# Patient Record
Sex: MALE | Race: WHITE | Employment: UNEMPLOYED | URBAN - METROPOLITAN AREA
[De-identification: names, ages, dates, MRNs, and addresses within clinical notes are randomized per-mention and may not be internally consistent; named-entity substitution may affect disease eponyms.]

---

## 2017-01-09 ENCOUNTER — DOCUMENTATION ONLY (OUTPATIENT)
Dept: CARDIOLOGY | Facility: CLINIC | Age: 57
End: 2017-01-09

## 2017-01-09 NOTE — PROGRESS NOTES
Patient left  on 12/27 that he had to cancel 12/28 echo due to his wife being in the hospital. He stated that he would call back sometime soon to reschedule. Echo cd form [facility] was uploaded to [employer] today. Leona

## 2017-02-10 ENCOUNTER — HOSPITAL ENCOUNTER (OUTPATIENT)
Dept: CARDIOLOGY | Facility: CLINIC | Age: 57
Discharge: HOME | End: 2017-02-10
Attending: INTERNAL MEDICINE | Admitting: INTERNAL MEDICINE
Payer: MEDICAID

## 2017-02-10 DIAGNOSIS — E78.5 HYPERLIPIDEMIA LDL GOAL <160: ICD-10-CM

## 2017-02-10 DIAGNOSIS — I35.0 NONRHEUMATIC AORTIC VALVE STENOSIS: ICD-10-CM

## 2017-02-10 PROCEDURE — 93306 TTE W/DOPPLER COMPLETE: CPT | Mod: 26 | Performed by: INTERNAL MEDICINE

## 2017-02-10 PROCEDURE — 25500064 ZZH RX 255 OP 636: Performed by: INTERNAL MEDICINE

## 2017-02-10 PROCEDURE — 40000264 ECHO COMPLETE WITH LUMASON

## 2017-02-10 RX ADMIN — SULFUR HEXAFLUORIDE 5 ML: KIT at 14:42

## 2017-02-13 ENCOUNTER — DOCUMENTATION ONLY (OUTPATIENT)
Dept: CARDIOLOGY | Facility: CLINIC | Age: 57
End: 2017-02-13

## 2017-02-13 NOTE — PROGRESS NOTES
Dr. Salinas: you saw this patient in December and a new patient for severe aortic stenosis based on an outside echo. At that time you wanted a repeat echo done here so this was done Friday and it shows a worsening of his aortic stenosis. Patient is scheduled to see Dr. Simon this Thursday in consult. I spoke to patient about the results today and advised him no to drive if possible due to concerns over syncope. Bill      Interpretation Summary     Severe valvular aortic stenosis.  Left ventricular systolic function is normal.  The study was technically difficult.  _____________________________________________________________________________  __        Left Ventricle  The left ventricle is normal in size. There is moderate asymmetric septal  hypertrophy. Images and measurments are technically difficult. Left  ventricular systolic function is normal. The visual ejection fraction is  estimated at 55-60%. Left ventricular diastolic function is indeterminate. E  by E prime ratio is between 8 and 15, which is indeterminate for assessment of  left ventricular filling pressures. No regional wall motion abnormalities  noted.     Right Ventricle  The right ventricle is grossly normal size. The right ventricular systolic  function is normal.     Atria  The left atrium is not well visualized. The left atrium is mildly dilated.  Right atrium not well visualized. Right atrial size is normal. A patent  foramen ovale is suspected.     Mitral Valve  The mitral valve is not well visualized. There is no mitral regurgitation  noted. Trivial mitral stenosis. The mean mitral valve gradient is 1.6 mmHg.     Tricuspid Valve  The tricuspid valve is not well visualized. There is physiologic tricuspid  regurgitation. Right ventricular systolic pressure could not be approximated  due to inadequate tricuspid regurgitation. Normal IVC (1.5-2.5cm) with <50%  respiratory collapse; right atrial pressure is estimated at 10-15mmHg.        Aortic  Valve  The aortic valve is not well visualized. There is mild (1+) aortic  regurgitation. The calculated aortic valve are is 0.58 cm^2. The peak AoV  pressure gradient is 91.8 mmHg. The mean AoV pressure gradient is 53.7 mmHg.  Severe valvular aortic stenosis.     Pulmonic Valve  The pulmonic valve is not well visualized. There is no pulmonic valvular  regurgitation. Normal pulmonic valve velocity.     Vessels  The aortic root is normal size. Normal size ascending aorta. The inferior vena  cava is not dilated.     Pericardium  Small pericardial effusion.     Rhythm  The rhythm was normal sinus.     _____________________________________________________________________________  __  MMode/2D Measurements & Calculations  IVSd: 1.5 cm  LVIDd: 3.8 cm  LVIDs: 2.6 cm  LVPWd: 1.0 cm  FS: 32.6 %  EDV(Teich): 63.8 ml  ESV(Teich): 24.4 ml  LV mass(C)d: 165.8 grams  Ao root diam: 3.2 cm  LA dimension: 3.6 cm     asc Aorta Diam: 3.0 cm  LA/Ao: 1.1  LVOT diam: 2.0 cm  LVOT area: 3.0 cm2        Doppler Measurements & Calculations  MV E max magali: 76.1 cm/sec  MV A max magali: 76.8 cm/sec  MV E/A: 0.99  MV max PG: 3.4 mmHg  MV mean P.6 mmHg  MV V2 VTI: 25.5 cm  MVA(VTI): 2.3 cm2  MV dec time: 0.21 sec  Ao V2 max: 479.2 cm/sec  Ao max P.8 mmHg  Ao V2 mean: 349.9 cm/sec  Ao mean P.7 mmHg  Ao V2 VTI: 102.3 cm  MACARENA(I,D): 0.58 cm2  MACARENA(V,D): 0.63 cm2  AI P1/2t: 615.1 msec  LV V1 max P.0 mmHg  LV V1 max: 99.8 cm/sec  LV V1 VTI: 19.4 cm  SV(LVOT): 59.0 ml  PA acc time: 0.12 sec  MACARENA Index (cm2/m2): 0.26  Lateral E/e': 10.9  Medial E/e': 11.2             2016       JOAN Johansen   [facility]   [address] [place] [id number]       Rylie Katz MD   [facility] Cardiothoracic Surgery   [facility] [facility], [address]   [place] [address]       RE: Gil Chowdary   MRN: [id number]   : 1960       Dear Doctors:       I met Gil Chowdary today. He is  56. He has been a remarkably healthy man all of his life and has never really had much in the way of any medical problems. He does not remember ever being told he had a cardiac murmur, but for all that, Dr. Villatoro appreciated one and sent him for an echocardiogram. His echocardiogram was done through Dr. Nely Cardona, who interpreted the study showing a calcific aortic valve stenosis, a mean gradient of 42 mmHg, a calculated aortic valve area of 0.8 cm squared and trace aortic insufficiency. The left ventricle was normal size. There was mild concentric LVH and, not surprisingly, diastolic dysfunction of the LV. There was mild left atrial dilation associated with this.       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       SOCIAL HISTORY: He lives with his wife. He drives a car. He does not smoke or drink. He leads a very active lifestyle, but he does not exercise.       With regards to symptoms, he denies any cardiovascular symptoms at all. He denies chest pressure, pain, tightness, squeezing, shortness of breath on exertion, palpitations, dizziness or syncope.       REVIEW OF SYSTEMS: From a review of systems standpoint, which is available in Epic, he has a negative 10-point review except for the fact that he has sleep apnea and uses CPAP.       MEDICATIONS: Currently are aspirin and vitamin D and omega 3 fatty acids.       FAMILY HISTORY: Noncontributory, although he had a father who had diabetes. He had a brother who has coronary artery disease and bypass surgery.       ALLERGIES: Amoxicillin led to some itchiness of the skin.       PHYSICAL EXAMINATION:   GENERAL: Well-developed, well-nourished male. Weighs 241 pounds. He is mildly overweight.   VITAL SIGNS: Blood pressure 130/80, heart rate 76 beats a minute.   HEAD: Normal.   NECK: Free of neck vein distention, mass, bruit or  "goiter.   HEART: Regular with a 2 to 3/6 systolic ejection murmur in the aortic area radiating to the left sternal border, no S3 or AI heard.   LUNGS: Clear.   ABDOMEN: Soft without organomegaly.   EXTREMITIES: Show +2 pedal pulses, no edema.   NEUROLOGIC AND CUTANEOUS: Observations were normal.       Over an hour was spent reviewing Mr. Chowdary's history. He has severe aortic stenosis. His ejection fraction of 70%-75%, might be a bit artificially increasing the transvalvular gradients, but nonetheless his valve area of 0.8 and the mean gradient of 41 mmHg supports severe aortic stenosis. He has trace aortic insufficiency. His left ventricle is not dilated, but he does have mild LVH and diastolic dysfunction of the left ventricle with mild left atrial enlargement. He has not been hypertensive. He is not symptomatic.       I discussed aortic stenosis and the effects on the left ventricle and the heart. I discussed the effects of aortic stenosis on increasing exercise and activity. I encouraged him to not do activities that \"take him to the wall\" or lead to significant shortness of breath, and certainly if he were to have any dyspnea, chest pain, palpitations or dizziness that he would let us or Dr. Villatoro know immediately.       I have discussed the possibility of reviewing these issues with CV Surgery. I gave him the names of Dr. Katz and Dr. Guallpa, CV surgeons at [facility] and the [facility].       He has virtually no symptoms and he has been very tolerant of this degree of AS. I believe this can be followed by echo and by symptoms, although I think at his relatively young age and his very vigorous lifestyle he may wish to proceed to surgical intervention. He probably would be a surgical candidate. He is too young and healthy to be a TAVR candidate. I did open the door to tissue valve versus a mechanical [facility] type valve.       IMPRESSION:   1. Severe well-tolerated aortic stenosis.   2. " No signs of significant hypertension.   3. If there is any, we should treat it gingerly with appropriate antihypertensive therapy.   4. Mildly overweight.   5. Mild hyperlipidemia.   6. Positive family history for CAD, no current signs of angina.       I anticipate in the next 6 months to 2 years he will choose to have aortic valve replacement. He will have this discussion with CV Surgery. I believe he would have a very successful surgical result, but I do not believe he needs to jump to treatment at this time. I will see him in 6 months. I have given him the names of our CV surgeons, and he will likely make an appointment with them to review their surgical perspective.       Prognosis is excellent, but he should not be pushing his body violently with sustained or intense aerobic activity, and he understands that.       IMPRESSION: Severe symptomatic aortic stenosis.       PLAN

## 2017-02-14 ENCOUNTER — PRE VISIT (OUTPATIENT)
Dept: CARDIOLOGY | Facility: CLINIC | Age: 57
End: 2017-02-14

## 2017-02-14 NOTE — TELEPHONE ENCOUNTER
ASKED BY REFERRING PHYSICIAN: Dr Guru Salinas    CHIEF COMPLAINT: Non-Rheumatic aortic stenosis    HPI: Gil is a 56 year old male who on recent echocardiogram study showed a calcific aortic valve stenosis, a mean gradient of 42 mmHg, a calculated aortic valve area of 0.8 cm squared and trace aortic insufficiency. The left ventricle was normal size. There was mild concentric LVH and, not surprisingly, diastolic dysfunction of the LV. There was mild left atrial dilation associated with this.     PAST MEDICAL HISTORY:  Past Medical History   Diagnosis Date     Heart murmur previously undiagnosed      Right bundle branch block      Severe aortic stenosis      On Echo 10/28/16       PAST SURGICAL HISTORY:  No past surgical history on file.    FAMILY HISTORY:   Family History   Problem Relation Age of Onset     DIABETES Father      Hypertension Brother      DIABETES Brother      HEART DISEASE Brother 49     TRIPLE BYPASS       SOCIAL HISTORY:  Social History     Social History     Marital status:      Spouse name: N/A     Number of children: N/A     Years of education: N/A     Occupational History     Not on file.     Social History Main Topics     Smoking status: Former Smoker     Packs/day: 1.00     Years: 20.00     Types: Cigarettes, Cigars     Quit date: 10/21/2011     Smokeless tobacco: Never Used     Alcohol use No     Drug use: No     Sexual activity: Yes     Partners: Female     Other Topics Concern     Parent/Sibling W/ Cabg, Mi Or Angioplasty Before 65f 55m? Yes     brother triple bypass 49     Social History Narrative        ALLERGIES:   Allergies   Allergen Reactions     Amoxicillin      Itchy        CURRENT MEDICATIONS:   [unfilled]    ROS:  Constitutional: No fever, chills, or sweats. No weight gain/loss.   HEENT: No visual disturbance, ear ache, epistaxis, sore throat.   Allergies/Immunologic: Negative.   Respiratory: No cough, hemoptysis.   Cardiovascular: As per HPI.   GI: No nausea, vomiting,  hematemesis, melena, or hematochezia.   : No urinary frequency, dysuria, or hematuria.   Integument: No rash.   Psychiatric: No anxiety / depression.   Neuro: No speech disturbance, focal sensory or motor deficit.   Endocrinology: No polyuria / polyphagia.   Musculoskeletal: No myalgia.      PHYSICAL EXAMINATION:   There were no vitals taken for this visit.  General: alert and oriented x 3, pleasant, no acute distress  CV: S1 S2, positive for murmur, no rubs or gallops, regular rate and rhythm, no peripheral edema, no carotid or abdomenal bruits, pulses in upper and lower extremities palpable  Pulm: bilateral breath sounds, clear to auscultation, easy work of breathing  GI: (+) bowel sounds, soft non-tender and non-distended  : voiding without problems  MS: moves all extremities x 4,  5+/5+ equal strength bilaterally  Neuro: pupils equal round and reactive to light, cranial nerves, II-XII grossly intact, no gross neurologic deficits noted    LABS:  BMP RESULTS:  Lab Results   Component Value Date     10/22/2016    POTASSIUM 4.5 10/22/2016    CHLORIDE 99 10/22/2016    GLC 87 10/22/2016    BUN 11 10/22/2016    CR 0.80 10/22/2016    MYRON 9.0 10/22/2016        CBC RESULTS:  No results found for: WBC, RBC, HGB, HCT, MCV, MCH, MCHC, RDW, PLT    No results found for: INR  No results found for: PTT  No results found for: UA  No results found for: A1C    PROCEDURES/IMAGING:    ECHOCARDIOGRAM: 02/10/17  Interpretation Summary     Severe valvular aortic stenosis.  Left ventricular systolic function is normal.  The study was technically difficult.     Left Ventricle  The left ventricle is normal in size. There is moderate asymmetric septal hypertrophy. Images and measurments are technically difficult. Left ventricular systolic function is normal. The visual ejection fraction is estimated at 55-60%. Left ventricular diastolic function is indeterminate. E by E prime ratio is between 8 and 15, which is indeterminate for  assessment of left ventricular filling pressures. No regional wall motion abnormalities noted.     Right Ventricle  The right ventricle is grossly normal size. The right ventricular systolic function is normal.     Atria  The left atrium is not well visualized. The left atrium is mildly dilated.  Right atrium not well visualized. Right atrial size is normal. A patent foramen ovale is suspected.     Mitral Valve  The mitral valve is not well visualized. There is no mitral regurgitation noted. Trivial mitral stenosis. The mean mitral valve gradient is 1.6 mmHg.     Tricuspid Valve  The tricuspid valve is not well visualized. There is physiologic tricuspid regurgitation. Right ventricular systolic pressure could not be approximated due to inadequate tricuspid regurgitation. Normal IVC (1.5-2.5cm) with <50% respiratory collapse; right atrial pressure is estimated at 10-15mmHg.      Aortic Valve  The aortic valve is not well visualized. There is mild (1+) aortic regurgitation. The calculated aortic valve are is 0.58 cm^2. The peak AoV pressure gradient is 91.8 mmHg. The mean AoV pressure gradient is 53.7 mmHg. Severe valvular aortic stenosis.      Pulmonic Valve  The pulmonic valve is not well visualized. There is no pulmonic valvular  regurgitation. Normal pulmonic valve velocity.     Vessels  The aortic root is normal size. Normal size ascending aorta. The inferior vena cava is not dilated.     Pericardium  Small pericardial effusion.     Rhythm  The rhythm was normal sinus.     MMode/2D Measurements & Calculations  IVSd: 1.5 cm  LVIDd: 3.8 cm  LVIDs: 2.6 cm  LVPWd: 1.0 cm  FS: 32.6 %  EDV(Teich): 63.8 ml  ESV(Teich): 24.4 ml  LV mass(C)d: 165.8 grams  Ao root diam: 3.2 cm  LA dimension: 3.6 cm  asc Aorta Diam: 3.0 cm  LA/Ao: 1.1  LVOT diam: 2.0 cm  LVOT area: 3.0 cm2    Doppler Measurements & Calculations  MV E max magali: 76.1 cm/sec  MV A max magali: 76.8 cm/sec  MV E/A: 0.99  MV max PG: 3.4 mmHg  MV mean P.6 mmHg  MV  V2 VTI: 25.5 cm  MVA(VTI): 2.3 cm2  MV dec time: 0.21 sec  Ao V2 max: 479.2 cm/sec  Ao max P.8 mmHg  Ao V2 mean: 349.9 cm/sec  Ao mean P.7 mmHg  Ao V2 VTI: 102.3 cm  MACARENA(I,D): 0.58 cm2  MACARENA(V,D): 0.63 cm2  AI P1/2t: 615.1 msec  LV V1 max P.0 mmHg  LV V1 max: 99.8 cm/sec  LV V1 VTI: 19.4 cm  SV(LVOT): 59.0 ml  PA acc time: 0.12 sec  MACARENA Index (cm2/m2): 0.26  Lateral E/e': 10.9  Medial E/e': 11.2    ECHOCARDIOGRAM: 10/28/16  Scanned into chart    ASSESSMENT AND PLAN:  Gil is a 56 year old male    Risks and benefits of surgery were discussed with patient (and all present) including risk of death, stroke, bleeding, cardiac ischemia, wound infection, renal failure, arrhythmias and possible pacemaker implantation. Patient accepts these risks and is willing to proceed with surgery.   Valve choices were discussed with the patient, mechanical and bioprosthetic. Risks and benefits of both explained. Patient accepts these and prefers a mechanical/bioprosthetic valve.     Approximately 50 minutes spent with this case including review of the clinical history and data; discussion with the patient and his family and coordination of the care    Thank you for including me in the care of this kind patient. Do not hesitate to contact me with any questions.    Dr. Jun Simon     Cardiothoracic Surgery  [phone] pager  [phone] office      CC  Patient Care Team:  Sam Villatoro [id number] as PCP - General   manuela Salinas

## 2017-02-16 ENCOUNTER — OFFICE VISIT (OUTPATIENT)
Dept: CARDIOLOGY | Facility: CLINIC | Age: 57
End: 2017-02-16
Attending: THORACIC SURGERY (CARDIOTHORACIC VASCULAR SURGERY)
Payer: MEDICAID

## 2017-02-16 VITALS
BODY MASS INDEX: 34.13 KG/M2 | DIASTOLIC BLOOD PRESSURE: 83 MMHG | WEIGHT: 243.8 LBS | HEART RATE: 102 BPM | HEIGHT: 71 IN | OXYGEN SATURATION: 95 % | SYSTOLIC BLOOD PRESSURE: 132 MMHG

## 2017-02-16 DIAGNOSIS — I35.0 NONRHEUMATIC AORTIC VALVE STENOSIS: Primary | ICD-10-CM

## 2017-02-16 PROCEDURE — 99213 OFFICE O/P EST LOW 20 MIN: CPT | Mod: ZF

## 2017-02-16 RX ORDER — LIDOCAINE 40 MG/G
CREAM TOPICAL
Status: CANCELLED | OUTPATIENT
Start: 2017-02-16

## 2017-02-16 RX ORDER — SODIUM CHLORIDE 9 MG/ML
INJECTION, SOLUTION INTRAVENOUS CONTINUOUS
Status: CANCELLED | OUTPATIENT
Start: 2017-02-16

## 2017-02-16 RX ORDER — TIMOLOL MALEATE 5 MG/ML
1 SOLUTION/ DROPS OPHTHALMIC 2 TIMES DAILY
COMMUNITY
Start: 2015-12-29

## 2017-02-16 RX ORDER — PREDNISOLONE ACETATE 10 MG/ML
1 SUSPENSION/ DROPS OPHTHALMIC PRN
COMMUNITY
Start: 2015-12-29

## 2017-02-16 ASSESSMENT — PAIN SCALES - GENERAL: PAINLEVEL: NO PAIN (0)

## 2017-02-16 NOTE — LETTER
2/16/2017      RE: Gil Chowdary  [address] [place] [address]       Dear Colleague,    Thank you for the opportunity to participate in the care of your patient, Gil Chowdary, at the [facility] at [facility]. Please see a copy of my visit note below.    ASKED BY REFERRING PHYSICIAN: Dr Guru herrera see patient in consultation of surgical treatment of AS.     CHIEF COMPLAINT: activity tolerance diminishing, SOB and fatigue     HPI:   Gil is a 56 year old male who on recent echocardiogram study showed a calcific aortic valve stenosis, a mean gradient of 53 mmHg, a calculated aortic valve area of 0.8 cm squared and trace aortic insufficiency. The left ventricle was normal size. There was mild concentric LVH and, not surprisingly, diastolic dysfunction of the LV. There was mild left atrial dilation associated with this.     He tells me that he had been noticing his activity tolerance diminishing to some degree, though he was not really describing any chest pain or shortness of breath per se.  He did not have any lightheadedness or dizziness, no palpitations, orthopnea, or paroxysmal nocturnal dyspnea.  He was not complaining of any lower extremity edema at all either.       PAST MEDICAL HISTORY:  Past Medical History   Diagnosis Date     Heart murmur previously undiagnosed      Hyperlipidemia LDL goal <160 12/6/2011     Right bundle branch block      Severe aortic stenosis      On Echo 10/28/16       PAST SURGICAL HISTORY:  No past surgical history on file.    FAMILY HISTORY:   Family History   Problem Relation Age of Onset     DIABETES Father      Hypertension Brother      DIABETES Brother      HEART DISEASE Brother 49     TRIPLE BYPASS       SOCIAL HISTORY:  Social History     Social History     Marital status:      Spouse name: N/A     Number of children: N/A     Years of education: N/A     Occupational History     Not on file.  "    Social History Main Topics     Smoking status: Former Smoker     Packs/day: 1.00     Years: 20.00     Types: Cigarettes, Cigars     Quit date: 10/21/2011     Smokeless tobacco: Never Used     Alcohol use No     Drug use: No     Sexual activity: Yes     Partners: Female     Other Topics Concern     Parent/Sibling W/ Cabg, Mi Or Angioplasty Before 65f 55m? Yes     brother triple bypass 49     Social History Narrative        ALLERGIES:   Allergies   Allergen Reactions     Amoxicillin      Itchy      Penicillins Hives       CURRENT MEDICATIONS:   Prescription Medications as of 2/16/2017             prednisoLONE acetate (PRED FORTE) 1 % ophthalmic susp INSTILL 1 DROP INTO RIGHT THREE TIME DAILY    timolol (TIMOPTIC) 0.5 % ophthalmic solution 1 drop 1 DROP INTO BOTH EYES 2 TIMES DAILY    aspirin 81 MG tablet Take 81 mg by mouth daily    vitamin D (ERGOCALCIFEROL) 63122 UNIT capsule 1 capsule by mouth weekly.    Omega-3 Fatty Acids (FISH OIL PO) Take  by mouth daily.          ROS:  Constitutional: No fever, chills, or sweats. No weight gain/loss.   HEENT: No visual disturbance, ear ache, epistaxis, sore throat.   Allergies/Immunologic: Negative.   Respiratory: No cough, hemoptysis.   Cardiovascular: As per HPI.   GI: No nausea, vomiting, hematemesis, melena, or hematochezia.   : No urinary frequency, dysuria, or hematuria.   Integument: No rash.   Psychiatric: No anxiety / depression.   Neuro: No speech disturbance, focal sensory or motor deficit.   Endocrinology: No polyuria / polyphagia.   Musculoskeletal: No myalgia.      PHYSICAL EXAMINATION:   /83 (BP Location: Right arm, Patient Position: Chair, Cuff Size: Adult Large)  Pulse 102  Ht 1.803 m (5' 11\")  Wt 110.6 kg (243 lb 12.8 oz)  SpO2 95%  BMI 34 kg/m2  General: alert and oriented x 3, pleasant, no acute distress  CV: S1 S2, there is systolic murmur; no rubs or gallops, regular rate and rhythm, no peripheral edema, no carotid or abdomenal bruits, pulses " in upper and lower extremities palpable  Pulm: bilateral breath sounds, clear to auscultation, easy work of breathing  GI: (+) bowel sounds, soft non-tender and non-distended  : voiding without problems  MS: moves all extremities x 4,  5+/5+ equal strength bilaterally  Neuro: pupils equal round and reactive to light, cranial nerves, II-XII grossly intact, no gross neurologic deficits noted    LABS:  BMP RESULTS:  Lab Results   Component Value Date     10/22/2016    POTASSIUM 4.5 10/22/2016    CHLORIDE 99 10/22/2016    GLC 87 10/22/2016    BUN 11 10/22/2016    CR 0.80 10/22/2016    MYRON 9.0 10/22/2016        CBC RESULTS:  No results found for: WBC, RBC, HGB, HCT, MCV, MCH, MCHC, RDW, PLT    No results found for: INR  No results found for: PTT  No results found for: UA  No results found for: A1C    PROCEDURES/IMAGING:  ECHOCARDIOGRAM: 02/10/17  Interpretation Summary     Severe valvular aortic stenosis.  Left ventricular systolic function is normal.  The study was technically difficult.     Left Ventricle  The left ventricle is normal in size. There is moderate asymmetric septal hypertrophy. Images and measurments are technically difficult. Left ventricular systolic function is normal. The visual ejection fraction is estimated at 55-60%. Left ventricular diastolic function is indeterminate. E by E prime ratio is between 8 and 15, which is indeterminate for assessment of left ventricular filling pressures. No regional wall motion abnormalities noted.     Right Ventricle  The right ventricle is grossly normal size. The right ventricular systolic function is normal.     Atria  The left atrium is not well visualized. The left atrium is mildly dilated. Right atrium not well visualized. Right atrial size is normal. A patent foramen ovale is suspected.     Mitral Valve  The mitral valve is not well visualized. There is no mitral regurgitation noted. Trivial mitral stenosis. The mean mitral valve gradient is 1.6  mmHg.     Tricuspid Valve  The tricuspid valve is not well visualized. There is physiologic tricuspid regurgitation. Right ventricular systolic pressure could not be approximated due to inadequate tricuspid regurgitation. Normal IVC (1.5-2.5cm) with <50% respiratory collapse; right atrial pressure is estimated at 10-15mmHg.      Aortic Valve  The aortic valve is not well visualized. There is mild (1+) aortic regurgitation. The calculated aortic valve are is 0.58 cm^2. The peak AoV pressure gradient is 91.8 mmHg. The mean AoV pressure gradient is 53.7 mmHg. Severe valvular aortic stenosis.      Pulmonic Valve  The pulmonic valve is not well visualized. There is no pulmonic valvular  regurgitation. Normal pulmonic valve velocity.     Vessels  The aortic root is normal size. Normal size ascending aorta. The inferior vena cava is not dilated.     Pericardium  Small pericardial effusion.     Rhythm  The rhythm was normal sinus.     MMode/2D Measurements & Calculations  IVSd: 1.5 cm  LVIDd: 3.8 cm  LVIDs: 2.6 cm  LVPWd: 1.0 cm  FS: 32.6 %  EDV(Teich): 63.8 ml  ESV(Teich): 24.4 ml  LV mass(C)d: 165.8 grams  Ao root diam: 3.2 cm  LA dimension: 3.6 cm  asc Aorta Diam: 3.0 cm  LA/Ao: 1.1  LVOT diam: 2.0 cm  LVOT area: 3.0 cm2    Doppler Measurements & Calculations  MV E max magali: 76.1 cm/sec  MV A max magali: 76.8 cm/sec  MV E/A: 0.99  MV max PG: 3.4 mmHg  MV mean P.6 mmHg  MV V2 VTI: 25.5 cm  MVA(VTI): 2.3 cm2  MV dec time: 0.21 sec  Ao V2 max: 479.2 cm/sec  Ao max P.8 mmHg  Ao V2 mean: 349.9 cm/sec  Ao mean P.7 mmHg  Ao V2 VTI: 102.3 cm  MACARENA(I,D): 0.58 cm2  MACARENA(V,D): 0.63 cm2  AI P1/2t: 615.1 msec  LV V1 max P.0 mmHg  LV V1 max: 99.8 cm/sec  LV V1 VTI: 19.4 cm  SV(LVOT): 59.0 ml  PA acc time: 0.12 sec  MACARENA Index (cm2/m2): 0.26  Lateral E/e': 10.9  Medial E/e': 11.2     ECHOCARDIOGRAM: 10/28/16  Scanned into chart      ASSESSMENT AND PLAN:    Gil is a 56 year old male presents with activity intolerance  diminishing and fatigue.  Cardiac work up revealed severe AS with symptoms.    Recommend surgical AVR.     Valve choices were discussed with the patient, mechanical and bioprosthetic. Risks and benefits of both explained. Patient accepts these and prefers a bioprosthetic valve.     Risks and benefits of surgery were discussed with patient. Patient accepts these risks and is willing to proceed with surgery.      Approximately 50 minutes spent with this case including review of the clinical history and data; discussion with the patient and his family and coordination of the care     Thank you for including me in the care of this kind patient. Do not hesitate to contact me with any questions.     Dr. Jun Simon   Cardiothoracic Surgery  [phone] pager  [phone] office        CC  Patient Care Team:  Sam Villatoro [id number] as PCP - General  Mercedes Salinas MD as MD (Cardiology)  MERCEDES SALINAS    Please do not hesitate to contact me if you have any questions/concerns.     Sincerely,     Jun Simon MD

## 2017-02-16 NOTE — PATIENT INSTRUCTIONS
You were seen today in the [facility]                     Cardiothoracic Surgery Clinic    Your surgeon was:  Dr Jun Simon      Recommendations:  Dr Jun Simon recommends you have your aortic valve replaced.    Your will need several tests prior to surgery including  Coronary angiogram  CT of the chest  Carotid US  Pulmonary function test  Lab work  EKG  Chest X-ray.    We will call you to schedule these test at your convenience.          Please feel free to call me with any questions or concerns.    Raine Myers RN  Care Coordinator, Cardiothoracic Surgery   [phone]

## 2017-02-16 NOTE — NURSING NOTE
Patient here for consultation with Dr Simon for aortic valve replacement.    Patient was instructed on procedure, need for surgery, hospital stay, and recovery.    Patient states he will call after next week to discuss a date for surgery as his wife recently passed away and he is having a  service for her this weekend.      Patient will call for questions or concerns.

## 2017-02-16 NOTE — NURSING NOTE
Chief Complaint   Patient presents with     New Patient     Referral from Dr Salinas [facility], non-rheumatic aortic valve stenosis

## 2017-02-16 NOTE — LETTER
February 16, 2017      To Whom it May Concern:      Gil Chowdary needs to have his aortic valve replaced due to severe stenosis.  Prior to surgery Mr Chowdary has been asked to refrain from strenuous activities.  He should not lift, push or pull anything over 20 pounds, he should not run or change positions rapidly.      Please feel free to call my nurse Raine Myers RN at [phone] with any questions regarding these limitations.     Sincerely,        Jun Simon MD  [facility]

## 2017-02-16 NOTE — PROGRESS NOTES
ASKED BY REFERRING PHYSICIAN: Dr Guru herrera see patient in consultation of surgical treatment of AS.     CHIEF COMPLAINT: activity tolerance diminishing, SOB and fatigue     HPI:   Gil is a 56 year old male who on recent echocardiogram study showed a calcific aortic valve stenosis, a mean gradient of 53 mmHg, a calculated aortic valve area of 0.8 cm squared and trace aortic insufficiency. The left ventricle was normal size. There was mild concentric LVH and, not surprisingly, diastolic dysfunction of the LV. There was mild left atrial dilation associated with this.     He tells me that he had been noticing his activity tolerance diminishing to some degree, though he was not really describing any chest pain or shortness of breath per se.  He did not have any lightheadedness or dizziness, no palpitations, orthopnea, or paroxysmal nocturnal dyspnea.  He was not complaining of any lower extremity edema at all either.       PAST MEDICAL HISTORY:  Past Medical History   Diagnosis Date     Heart murmur previously undiagnosed      Hyperlipidemia LDL goal <160 12/6/2011     Right bundle branch block      Severe aortic stenosis      On Echo 10/28/16       PAST SURGICAL HISTORY:  No past surgical history on file.    FAMILY HISTORY:   Family History   Problem Relation Age of Onset     DIABETES Father      Hypertension Brother      DIABETES Brother      HEART DISEASE Brother 49     TRIPLE BYPASS       SOCIAL HISTORY:  Social History     Social History     Marital status:      Spouse name: N/A     Number of children: N/A     Years of education: N/A     Occupational History     Not on file.     Social History Main Topics     Smoking status: Former Smoker     Packs/day: 1.00     Years: 20.00     Types: Cigarettes, Cigars     Quit date: 10/21/2011     Smokeless tobacco: Never Used     Alcohol use No     Drug use: No     Sexual activity: Yes     Partners: Female     Other Topics Concern     Parent/Sibling W/ Cabg, Mi  "Or Angioplasty Before 65f 55m? Yes     brother triple bypass 49     Social History Narrative        ALLERGIES:   Allergies   Allergen Reactions     Amoxicillin      Itchy      Penicillins Hives       CURRENT MEDICATIONS:   Prescription Medications as of 2/16/2017             prednisoLONE acetate (PRED FORTE) 1 % ophthalmic susp INSTILL 1 DROP INTO RIGHT THREE TIME DAILY    timolol (TIMOPTIC) 0.5 % ophthalmic solution 1 drop 1 DROP INTO BOTH EYES 2 TIMES DAILY    aspirin 81 MG tablet Take 81 mg by mouth daily    vitamin D (ERGOCALCIFEROL) 81500 UNIT capsule 1 capsule by mouth weekly.    Omega-3 Fatty Acids (FISH OIL PO) Take  by mouth daily.          ROS:  Constitutional: No fever, chills, or sweats. No weight gain/loss.   HEENT: No visual disturbance, ear ache, epistaxis, sore throat.   Allergies/Immunologic: Negative.   Respiratory: No cough, hemoptysis.   Cardiovascular: As per HPI.   GI: No nausea, vomiting, hematemesis, melena, or hematochezia.   : No urinary frequency, dysuria, or hematuria.   Integument: No rash.   Psychiatric: No anxiety / depression.   Neuro: No speech disturbance, focal sensory or motor deficit.   Endocrinology: No polyuria / polyphagia.   Musculoskeletal: No myalgia.      PHYSICAL EXAMINATION:   /83 (BP Location: Right arm, Patient Position: Chair, Cuff Size: Adult Large)  Pulse 102  Ht 1.803 m (5' 11\")  Wt 110.6 kg (243 lb 12.8 oz)  SpO2 95%  BMI 34 kg/m2  General: alert and oriented x 3, pleasant, no acute distress  CV: S1 S2, there is systolic murmur; no rubs or gallops, regular rate and rhythm, no peripheral edema, no carotid or abdomenal bruits, pulses in upper and lower extremities palpable  Pulm: bilateral breath sounds, clear to auscultation, easy work of breathing  GI: (+) bowel sounds, soft non-tender and non-distended  : voiding without problems  MS: moves all extremities x 4,  5+/5+ equal strength bilaterally  Neuro: pupils equal round and reactive to light, cranial " nerves, II-XII grossly intact, no gross neurologic deficits noted    LABS:  BMP RESULTS:  Lab Results   Component Value Date     10/22/2016    POTASSIUM 4.5 10/22/2016    CHLORIDE 99 10/22/2016    GLC 87 10/22/2016    BUN 11 10/22/2016    CR 0.80 10/22/2016    MYRON 9.0 10/22/2016        CBC RESULTS:  No results found for: WBC, RBC, HGB, HCT, MCV, MCH, MCHC, RDW, PLT    No results found for: INR  No results found for: PTT  No results found for: UA  No results found for: A1C    PROCEDURES/IMAGING:  ECHOCARDIOGRAM: 02/10/17  Interpretation Summary     Severe valvular aortic stenosis.  Left ventricular systolic function is normal.  The study was technically difficult.     Left Ventricle  The left ventricle is normal in size. There is moderate asymmetric septal hypertrophy. Images and measurments are technically difficult. Left ventricular systolic function is normal. The visual ejection fraction is estimated at 55-60%. Left ventricular diastolic function is indeterminate. E by E prime ratio is between 8 and 15, which is indeterminate for assessment of left ventricular filling pressures. No regional wall motion abnormalities noted.     Right Ventricle  The right ventricle is grossly normal size. The right ventricular systolic function is normal.     Atria  The left atrium is not well visualized. The left atrium is mildly dilated. Right atrium not well visualized. Right atrial size is normal. A patent foramen ovale is suspected.     Mitral Valve  The mitral valve is not well visualized. There is no mitral regurgitation noted. Trivial mitral stenosis. The mean mitral valve gradient is 1.6 mmHg.     Tricuspid Valve  The tricuspid valve is not well visualized. There is physiologic tricuspid regurgitation. Right ventricular systolic pressure could not be approximated due to inadequate tricuspid regurgitation. Normal IVC (1.5-2.5cm) with <50% respiratory collapse; right atrial pressure is estimated at  10-15mmHg.      Aortic Valve  The aortic valve is not well visualized. There is mild (1+) aortic regurgitation. The calculated aortic valve are is 0.58 cm^2. The peak AoV pressure gradient is 91.8 mmHg. The mean AoV pressure gradient is 53.7 mmHg. Severe valvular aortic stenosis.      Pulmonic Valve  The pulmonic valve is not well visualized. There is no pulmonic valvular  regurgitation. Normal pulmonic valve velocity.     Vessels  The aortic root is normal size. Normal size ascending aorta. The inferior vena cava is not dilated.     Pericardium  Small pericardial effusion.     Rhythm  The rhythm was normal sinus.     MMode/2D Measurements & Calculations  IVSd: 1.5 cm  LVIDd: 3.8 cm  LVIDs: 2.6 cm  LVPWd: 1.0 cm  FS: 32.6 %  EDV(Teich): 63.8 ml  ESV(Teich): 24.4 ml  LV mass(C)d: 165.8 grams  Ao root diam: 3.2 cm  LA dimension: 3.6 cm  asc Aorta Diam: 3.0 cm  LA/Ao: 1.1  LVOT diam: 2.0 cm  LVOT area: 3.0 cm2    Doppler Measurements & Calculations  MV E max magali: 76.1 cm/sec  MV A max magali: 76.8 cm/sec  MV E/A: 0.99  MV max PG: 3.4 mmHg  MV mean P.6 mmHg  MV V2 VTI: 25.5 cm  MVA(VTI): 2.3 cm2  MV dec time: 0.21 sec  Ao V2 max: 479.2 cm/sec  Ao max P.8 mmHg  Ao V2 mean: 349.9 cm/sec  Ao mean P.7 mmHg  Ao V2 VTI: 102.3 cm  MACARENA(I,D): 0.58 cm2  MACARENA(V,D): 0.63 cm2  AI P1/2t: 615.1 msec  LV V1 max P.0 mmHg  LV V1 max: 99.8 cm/sec  LV V1 VTI: 19.4 cm  SV(LVOT): 59.0 ml  PA acc time: 0.12 sec  MACARENA Index (cm2/m2): 0.26  Lateral E/e': 10.9  Medial E/e': 11.2     ECHOCARDIOGRAM: 10/28/16  Scanned into chart      ASSESSMENT AND PLAN:    Gil is a 56 year old male presents with activity intolerance diminishing and fatigue.  Cardiac work up revealed severe AS with symptoms.    Recommend surgical AVR.     Valve choices were discussed with the patient, mechanical and bioprosthetic. Risks and benefits of both explained. Patient accepts these and prefers a bioprosthetic valve.     Risks and benefits of surgery were  discussed with patient. Patient accepts these risks and is willing to proceed with surgery.      Approximately 50 minutes spent with this case including review of the clinical history and data; discussion with the patient and his family and coordination of the care     Thank you for including me in the care of this kind patient. Do not hesitate to contact me with any questions.     Dr. Jun Simon   Cardiothoracic Surgery  [phone] pager  [phone] office        CC  Patient Care Team:  Sam Villatoro [id number] as PCP - General  Mercedes Salinas MD as MD (Cardiology)  MERCEDES SALINAS

## 2017-02-20 ENCOUNTER — CARE COORDINATION (OUTPATIENT)
Dept: CARDIOLOGY | Facility: CLINIC | Age: 57
End: 2017-02-20

## 2017-02-20 NOTE — PROGRESS NOTES
Sent patient an email regarding scheduling his pre op tests at [facility], and gave patient the scheduling phone numbers.  Waiting for patient to schedule.    Raine Myers -[phone]

## 2017-03-02 ENCOUNTER — CARE COORDINATION (OUTPATIENT)
Dept: CARDIOLOGY | Facility: CLINIC | Age: 57
End: 2017-03-02

## 2017-03-02 NOTE — PROGRESS NOTES
Patient called to state he is waiting for his new health insurance information before scheduling his pre-op testing.  Patient will call when his new insurance is established.  Will continue to follow.    Raine Myers -[phone]

## 2017-03-10 ENCOUNTER — TELEPHONE (OUTPATIENT)
Dept: CARDIOLOGY | Facility: CLINIC | Age: 57
End: 2017-03-10

## 2017-03-10 NOTE — TELEPHONE ENCOUNTER
Per task, pt wanted to hold off scheduling surgery due to a death in the family. Called pt to follow up. No answer . Left message asking pt to call back if ready to schedule. Will try again later

## 2017-03-10 NOTE — TELEPHONE ENCOUNTER
Pt returned call. Explained that they still want to have surgery just waiting for insurance. Wont have coverage until April 1. Pt states that he will call back once covered. Will follow up second week in April if pt doesn't call

## 2017-03-24 ENCOUNTER — HOSPITAL ENCOUNTER (OUTPATIENT)
Facility: CLINIC | Age: 57
End: 2017-03-24
Admitting: THORACIC SURGERY (CARDIOTHORACIC VASCULAR SURGERY)
Payer: COMMERCIAL

## 2017-03-28 ENCOUNTER — OFFICE VISIT (OUTPATIENT)
Dept: CARDIOLOGY | Facility: CLINIC | Age: 57
End: 2017-03-28
Payer: MEDICAID

## 2017-03-28 ENCOUNTER — HOSPITAL ENCOUNTER (OUTPATIENT)
Dept: RESPIRATORY THERAPY | Facility: CLINIC | Age: 57
End: 2017-03-28
Attending: THORACIC SURGERY (CARDIOTHORACIC VASCULAR SURGERY)
Payer: MEDICAID

## 2017-03-28 ENCOUNTER — HOSPITAL ENCOUNTER (OUTPATIENT)
Dept: LAB | Facility: CLINIC | Age: 57
Discharge: HOME | End: 2017-03-28
Attending: THORACIC SURGERY (CARDIOTHORACIC VASCULAR SURGERY) | Admitting: THORACIC SURGERY (CARDIOTHORACIC VASCULAR SURGERY)
Payer: MEDICAID

## 2017-03-28 VITALS
HEIGHT: 70 IN | DIASTOLIC BLOOD PRESSURE: 88 MMHG | WEIGHT: 251 LBS | HEART RATE: 90 BPM | BODY MASS INDEX: 35.93 KG/M2 | SYSTOLIC BLOOD PRESSURE: 130 MMHG

## 2017-03-28 DIAGNOSIS — Z87.891 PERSONAL HISTORY OF TOBACCO USE, PRESENTING HAZARDS TO HEALTH: ICD-10-CM

## 2017-03-28 DIAGNOSIS — I35.0 NONRHEUMATIC AORTIC VALVE STENOSIS: Primary | ICD-10-CM

## 2017-03-28 DIAGNOSIS — Z01.810 PRE-OPERATIVE CARDIOVASCULAR EXAMINATION: ICD-10-CM

## 2017-03-28 LAB — HGB BLD-MCNC: 14.4 G/DL (ref 13.3–17.7)

## 2017-03-28 PROCEDURE — 85018 HEMOGLOBIN: CPT | Performed by: THORACIC SURGERY (CARDIOTHORACIC VASCULAR SURGERY)

## 2017-03-28 PROCEDURE — 99214 OFFICE O/P EST MOD 30 MIN: CPT | Performed by: NURSE PRACTITIONER

## 2017-03-28 PROCEDURE — 36415 COLL VENOUS BLD VENIPUNCTURE: CPT | Performed by: THORACIC SURGERY (CARDIOTHORACIC VASCULAR SURGERY)

## 2017-03-28 NOTE — MR AVS SNAPSHOT
After Visit Summary   3/28/2017    Gil Chowdary    MRN: [id number]           Patient Information     Date Of Birth          1960        Visit Information        Provider Department      3/28/2017 12:30 PM Abena Christiansen APRN CNP [facility] PHYSIC[facility] HE[facility] AT [place]        Today's Diagnoses     Nonrheumatic aortic valve stenosis    -  1       Follow-ups after your visit        Your next 10 appointments already scheduled     Mar 29, 2017  2:00 PM CDT   XR CHEST 2 VIEWS with SHXR3   [facility] Radiology ([facility])    [address] [address]   [phone]           Please bring a list of your current medicines to your exam. (Include vitamins, minerals and over-thecounter medicines.) Leave your valuables at home.  Tell your doctor if there is a chance you may be pregnant.  You do not need to do anything special for this exam.            Mar 29, 2017  2:15 PM CDT   CT CHEST W/O CONTRAST with SHCT1   [facility] CT ([facility])    [address] [address] [phone]           Please bring any scans or X-rays taken at other hospitals, if similar tests were done. Also bring a list of your medicines, including vitamins, minerals and over-the-counter drugs. It is safest to leave personal items at home.  Be sure to tell your doctor:   If you have any allergies.   If there s any chance you are pregnant.   If you are breastfeeding.   If you have any special needs.  You do not need to do anything special to prepare.  Please wear loose clothing, such as a sweat suit or jogging clothes. Avoid snaps, zippers and other metal. We may ask you to undress and put on a hospital gown.            Mar 29, 2017  2:30 PM CDT   US CAROTID BILATERAL with SHUS5   [facility] Ultrasound ([facility])    [address] [address] [phone]           Please  bring a list of your medicines (including vitamins, minerals and over-the-counter drugs). Also, tell your doctor about any allergies you may have. Wear comfortable clothes and leave your valuables at home.  You do not need to do anything special to prepare for your exam.  Please call the Imaging Department at your exam site with any questions.            Apr 03, 2017  8:30 AM CDT   Cath 90 Minute with SHCVR2   [facility] Cardiac Catheter[facility] ([facility])    [address]  [place] [address] [phone]            Apr 11, 2017  1:50 PM CDT   Return Visit with RODRIGUEZ Rodriguez CNP   [facility] PHYSICIANS HEART AT [place] ([facility])    [address]  [place] [id number] [phone]              Future tests that were ordered for you today     Open Future Orders        Priority Expected Expires Ordered    Hemoglobin ASAP  3/28/2018 3/28/2017            Who to contact     If you have questions or need follow up information about today's clinic visit or your schedule please contact [facility] PHYS[facility] HE[facility] AT [place] directly at [phone].  Normal or non-critical lab and imaging results will be communicated to you by MyChart, letter or phone within 4 business days after the clinic has received the results. If you do not hear from us within 7 days, please contact the clinic through Weevehart or phone. If you have a critical or abnormal lab result, we will notify you by phone as soon as possible.  Submit refill requests through MailLift or call your pharmacy and they will forward the refill request to us. Please allow 3 business days for your refill to be completed.          Additional Information About Your Visit        WeeveharTopFun Information     MailLift gives you secure access to your electronic health record. If you see a primary care provider, you can also send messages to your care team and make appointments.  "If you have questions, please call your primary care clinic.  If you do not have a primary care provider, please call [phone] and they will assist you.        Care EveryWhere ID     This is your Care EveryWhere ID. This could be used by other organizations to access your [place] medical records  [id number]        Your Vitals Were     Pulse Height BMI (Body Mass Index)             90 1.778 m (5' 10\") 36.01 kg/m2          Blood Pressure from Last 3 Encounters:   03/28/17 130/88   02/16/17 132/83   12/07/16 134/78    Weight from Last 3 Encounters:   03/28/17 113.9 kg (251 lb)   02/16/17 110.6 kg (243 lb 12.8 oz)   12/07/16 109.3 kg (241 lb)              Today, you had the following     No orders found for display       Primary Care Provider Fax #    Sam Grimes [id number] [phone]       [place] [address]        Thank you!     Thank you for choosing [facility] PHYSIC[facility] HEART AT [place]  for your care. Our goal is always to provide you with excellent care. Hearing back from our patients is one way we can continue to improve our services. Please take a few minutes to complete the written survey that you may receive in the mail after your visit with us. Thank you!             Your Updated Medication List - Protect others around you: Learn how to safely use, store and throw away your medicines at www.disposemymeds.org.          This list is accurate as of: 3/28/17  1:09 PM.  Always use your most recent med list.                   Brand Name Dispense Instructions for use    aspirin 81 MG tablet      Take 81 mg by mouth daily       CENTRUM ADULTS PO          FISH OIL PO      Take  by mouth daily.       prednisoLONE acetate 1 % ophthalmic susp    PRED FORTE     INSTILL 1 DROP INTO RIGHT THREE TIME DAILY       timolol 0.5 % ophthalmic solution    TIMOPTIC     1 drop 1 DROP INTO BOTH EYES 2 TIMES DAILY       vitamin D 06313 UNIT capsule    ERGOCALCIFEROL     1 capsule by mouth weekly.         "

## 2017-03-28 NOTE — PROGRESS NOTES
CHIEF COMPLAINT:  H&P for angiogram.      HISTORY OF PRESENT ILLNESS:  Mr. Chowdary is a pleasant 56-year-old gentleman who recently established with Dr. Salinas.  He has a new diagnosis of severe aortic stenosis.  He tells me that he had been noticing his activity tolerance diminishing to some degree, though he was not really describing any chest pain or shortness of breath per se.  He did not have any lightheadedness or dizziness, no palpitations, orthopnea, or paroxysmal nocturnal dyspnea.  He was not complaining of any lower extremity edema at all either.  His wife apparently passed away at the beginning of this year and up until then was having a number of medical issues.  He thought perhaps that his fatigue was more related to stress than anything else.  Regardless, he had seen a primary doctor who noticed a cardiac murmur and sent him subsequently for an echocardiogram.  This was completed in 10/2016.  Results identified a normal resting left ventricular size.  Ejection fraction noted at 70%-75%.  There were no regional wall motion abnormalities noted.  There was concentric left ventricular hypertrophy and grade 1 diastolic dysfunction.  He was also found to have severe aortic stenosis with a mean transvalvular gradient of 41 mmHg and a calculated valve area of 0.5 cm2.  From there he was referred to Cardiology.  He saw Dr. Salinas in December.  He underwent a repeat echocardiogram which again showed severe aortic stenosis with a mean pressure gradient of 57.3 mmHg and a valve area of 0.58 cm2.  He had normal left ventricular systolic function with an ejection fraction of 55%-60%.  He was referred to Dr. Simon who he saw on 02/16.  At this point, the plan is to pursue aortic valve replacement.  He is now in the preoperative workup process.  He has a carotid ultrasound ordered in the upcoming weeks.  He also has a coronary angiogram scheduled in the near future.      PHYSICAL EXAMINATION:   GENERAL:  On exam, this  is a well-developed, well-nourished male who appears his stated age.  He is cooperative and appropriate.   VITAL SIGNS:  Stable.   NEUROLOGIC:  He is intact.   HEENT:  Normocephalic, atraumatic without tenderness or involuntary movements.  Face appears symmetric.  Visual fields are full.  EOMs intact.  Conjunctivae clear.  Oral mucosa is pink and moist.   NECK:  Supple, full range of motion.  Trachea appears midline.  No JVD, no carotid bruit.  There is a transmitted murmur.   CARDIOVASCULAR:  S1, S2, regular rate and rhythm, 2/6 systolic murmur at the right upper sternal border, 3/6 systolic murmur at the left upper sternal border.   LUNGS:  Chest expansion appears symmetric.  Breath sounds are clear.   ABDOMEN:  Soft.  Bowel sounds present.   EXTREMITIES:  Warm and dry without edema, cyanosis or clubbing.      IMPRESSION AND PLAN:   1.  A 56-year-old gentleman with new diagnosis of severe aortic stenosis.  Mr. Chowdary is blake in that he really has not had any significant symptoms of this.  Fortunately, he was seen in primary care and appropriately sent for an echocardiogram.  At this point, he is scheduled to undergo aortic valve replacement.  He is scheduled for a diagnostic only coronary angiogram in the coming weeks.  I talked to Mr. Chowdary about the procedures itself.  We talked about the risks and benefits.  Risks include but are not limited to bleeding, hematoma, infection, contrast nephropathy, vascular perforation, heart attack, stroke or death.  He verbalizes understanding of this and is willing to proceed.  He has an upcoming appointment with me again after the procedure just to make sure that he is stable and not having any issues with the stick site.  If he has any questions or concerns, he can contact our office.  Beyond that, thank you for allowing me to participate in the care of this patient.         RODRIGUEZ ALRBECHT CNP             D: 03/28/2017 13:08   T: 03/28/2017 17:19   MT: al       Name:     ABIGAIL MATOS   MRN:      [id number]        Account:      [id number]   :      1960           Service Date: 2017      Document: [id number]

## 2017-03-28 NOTE — LETTER
3/28/2017    Sam Villatoro      RE: Gil Chowdary       Dear Colleague,    I had the pleasure of seeing Gil Chowdary in the [facility].    CHIEF COMPLAINT:  H&P for angiogram.      Mr. Chowdary is a pleasant 56-year-old gentleman who recently established with Dr. Salinas.  He has a new diagnosis of severe aortic stenosis.  He tells me that he had been noticing his activity tolerance diminishing to some degree, though he was not really describing any chest pain or shortness of breath per se.  He did not have any lightheadedness or dizziness, no palpitations, orthopnea, or paroxysmal nocturnal dyspnea.  He was not complaining of any lower extremity edema at all either.  His wife apparently passed away at the beginning of this year and up until then was having a number of medical issues.  He thought perhaps that his fatigue was more related to stress than anything else.  Regardless, he had seen a primary doctor who noticed a cardiac murmur and sent him subsequently for an echocardiogram.  This was completed in 10/2016.  Results identified a normal resting left ventricular size.  Ejection fraction noted at 70%-75%.  There were no regional wall motion abnormalities noted.  There was concentric left ventricular hypertrophy and grade 1 diastolic dysfunction.  He was also found to have severe aortic stenosis with a mean transvalvular gradient of 41 mmHg and a calculated valve area of 0.5 cm2.  From there he was referred to Cardiology.  He saw Dr. Salinas in December.  He underwent a repeat echocardiogram which again showed severe aortic stenosis with a mean pressure gradient of 57.3 mmHg and a valve area of 0.58 cm2.  He had normal left ventricular systolic function with an ejection fraction of 55%-60%.  He was referred to Dr. Simon who he saw on 02/16.  At this point, the plan is to pursue aortic valve replacement.  He is now in the preoperative workup process.  He has a carotid  ultrasound ordered in the upcoming weeks.  He also has a coronary angiogram scheduled in the near future.      PHYSICAL EXAMINATION:   GENERAL:  On exam, this is a well-developed, well-nourished male who appears his stated age.  He is cooperative and appropriate.   VITAL SIGNS:  Stable.   NEUROLOGIC:  He is intact.   HEENT:  Normocephalic, atraumatic without tenderness or involuntary movements.  Face appears symmetric.  Visual fields are full.  EOMs intact.  Conjunctivae clear.  Oral mucosa is pink and moist.   NECK:  Supple, full range of motion.  Trachea appears midline.  No JVD, no carotid bruit.  There is a transmitted murmur.   CARDIOVASCULAR:  S1, S2, regular rate and rhythm, 2/6 systolic murmur at the right upper sternal border, 3/6 systolic murmur at the left upper sternal border.   LUNGS:  Chest expansion appears symmetric.  Breath sounds are clear.   ABDOMEN:  Soft.  Bowel sounds present.   EXTREMITIES:  Warm and dry without edema, cyanosis or clubbing.     Outpatient Encounter Prescriptions as of 3/28/2017   Medication Sig Dispense Refill     Multiple Vitamins-Minerals (CENTRUM ADULTS PO)        prednisoLONE acetate (PRED FORTE) 1 % ophthalmic susp INSTILL 1 DROP INTO RIGHT THREE TIME DAILY       timolol (TIMOPTIC) 0.5 % ophthalmic solution 1 drop 1 DROP INTO BOTH EYES 2 TIMES DAILY       aspirin 81 MG tablet Take 81 mg by mouth daily       vitamin D (ERGOCALCIFEROL) 88938 UNIT capsule 1 capsule by mouth weekly.       Omega-3 Fatty Acids (FISH OIL PO) Take  by mouth daily.       No facility-administered encounter medications on file as of 3/28/2017.       IMPRESSION AND PLAN:   1.  A 56-year-old gentleman with new diagnosis of severe aortic stenosis.  Mr. Chowdary is blake in that he really has not had any significant symptoms of this.  Fortunately, he was seen in primary care and appropriately sent for an echocardiogram.  At this point, he is scheduled to undergo aortic valve replacement.  He is scheduled  for a diagnostic only coronary angiogram in the coming weeks.  I talked to Mr. Chowdary about the procedures itself.  We talked about the risks and benefits.  Risks include but are not limited to bleeding, hematoma, infection, contrast nephropathy, vascular perforation, heart attack, stroke or death.  He verbalizes understanding of this and is willing to proceed.  He has an upcoming appointment with me again after the procedure just to make sure that he is stable and not having any issues with the stick site.  If he has any questions or concerns, he can contact our office.  Beyond that, thank you for allowing me to participate in the care of this patient.     Again, thank you for allowing me to participate in the care of your patient.      Sincerely,    RODRIGUEZ Rodriguez [facility]

## 2017-03-28 NOTE — PROGRESS NOTES
HPI and Plan:   See dictation  1:02 PM  [id number]    No orders of the defined types were placed in this encounter.      Orders Placed This Encounter   Medications     Multiple Vitamins-Minerals (CENTRUM ADULTS PO)       There are no discontinued medications.      No diagnosis found.    CURRENT MEDICATIONS:  Current Outpatient Prescriptions   Medication Sig Dispense Refill     Multiple Vitamins-Minerals (CENTRUM ADULTS PO)        prednisoLONE acetate (PRED FORTE) 1 % ophthalmic susp INSTILL 1 DROP INTO RIGHT THREE TIME DAILY       timolol (TIMOPTIC) 0.5 % ophthalmic solution 1 drop 1 DROP INTO BOTH EYES 2 TIMES DAILY       aspirin 81 MG tablet Take 81 mg by mouth daily       vitamin D (ERGOCALCIFEROL) 74529 UNIT capsule 1 capsule by mouth weekly.       Omega-3 Fatty Acids (FISH OIL PO) Take  by mouth daily.         ALLERGIES     Allergies   Allergen Reactions     Amoxicillin      Itchy      Penicillins Hives       PAST MEDICAL HISTORY:  Past Medical History:   Diagnosis Date     Heart murmur previously undiagnosed      Hyperlipidemia LDL goal <160 12/6/2011     Right bundle branch block      Severe aortic stenosis     On Echo 10/28/16       PAST SURGICAL HISTORY:  No past surgical history on file.    FAMILY HISTORY:  Family History   Problem Relation Age of Onset     DIABETES Father      Hypertension Brother      DIABETES Brother      HEART DISEASE Brother 49     TRIPLE BYPASS       SOCIAL HISTORY:  Social History     Social History     Marital status:      Spouse name: N/A     Number of children: N/A     Years of education: N/A     Social History Main Topics     Smoking status: Former Smoker     Packs/day: 1.00     Years: 20.00     Types: Cigarettes, Cigars     Quit date: 10/21/2011     Smokeless tobacco: Never Used     Alcohol use No     Drug use: No     Sexual activity: Yes     Partners: Female     Other Topics Concern     Parent/Sibling W/ Cabg, Mi Or Angioplasty Before 65f 55m? Yes     brother triple bypass  "49     Social History Narrative       Review of Systems:  Skin:  Negative       Eyes:  Negative      ENT:  Negative      Respiratory:  Positive for sleep apnea;CPAP     Cardiovascular:    Positive for;palpitations on occ  Gastroenterology: Negative      Genitourinary:  Negative      Musculoskeletal:  Negative      Neurologic:  Negative      Psychiatric:  Negative      Heme/Lymph/Imm:  Negative      Endocrine:  Negative        Physical Exam:  Vitals: /88  Pulse 90  Ht 1.778 m (5' 10\")  Wt 113.9 kg (251 lb)  BMI 36.01 kg/m2    Constitutional:  cooperative, alert and oriented, well developed, well nourished, in no acute distress        Skin:  warm and dry to the touch, no apparent skin lesions or masses noted        Head:  normocephalic, no masses or lesions        Eyes:  pupils equal and round, conjunctivae and lids unremarkable, sclera white, no xanthalasma, EOMS intact, no nystagmus        ENT:  no pallor or cyanosis, dentition good        Neck:  carotid pulses are full and equal bilaterally, JVP normal, no carotid bruit, no thyromegaly transmitted murmur      Chest:  clear to auscultation          Cardiac: regular rhythm       systolic murmur;grade 2;RUSB systolic murmur;LUSB;grade 3        Abdomen:  abdomen soft;BS normoactive        Vascular: not assessed this visit                                        Extremities and Back:  no deformities, clubbing, cyanosis, erythema observed;no edema              Neurological:  affect appropriate, oriented to time, person and place;no gross motor deficits              JARETT Vargas [id number] [place] [address]              "

## 2017-03-29 ENCOUNTER — HOSPITAL ENCOUNTER (OUTPATIENT)
Dept: ULTRASOUND IMAGING | Facility: CLINIC | Age: 57
End: 2017-03-29
Attending: THORACIC SURGERY (CARDIOTHORACIC VASCULAR SURGERY)
Payer: MEDICAID

## 2017-03-29 ENCOUNTER — HOSPITAL ENCOUNTER (OUTPATIENT)
Dept: CT IMAGING | Facility: CLINIC | Age: 57
End: 2017-03-29
Attending: THORACIC SURGERY (CARDIOTHORACIC VASCULAR SURGERY)
Payer: MEDICAID

## 2017-03-29 ENCOUNTER — HOSPITAL ENCOUNTER (OUTPATIENT)
Dept: GENERAL RADIOLOGY | Facility: CLINIC | Age: 57
Discharge: HOME | End: 2017-03-29
Attending: THORACIC SURGERY (CARDIOTHORACIC VASCULAR SURGERY) | Admitting: THORACIC SURGERY (CARDIOTHORACIC VASCULAR SURGERY)
Payer: MEDICAID

## 2017-03-29 DIAGNOSIS — I35.0 AORTIC VALVE STENOSIS: ICD-10-CM

## 2017-03-29 DIAGNOSIS — Z01.810 PRE-OPERATIVE CARDIOVASCULAR EXAMINATION: ICD-10-CM

## 2017-03-29 PROCEDURE — 71020 XR CHEST 2 VW: CPT

## 2017-03-29 PROCEDURE — 93880 EXTRACRANIAL BILAT STUDY: CPT

## 2017-03-29 PROCEDURE — 71250 CT THORAX DX C-: CPT

## 2017-03-31 LAB
DLCOCOR-%PRED-PRE: 81 %
DLCOCOR-PRE: 23.35 ML/MIN/MMHG
DLCOUNC-%PRED-PRE: 81 %
DLCOUNC-PRE: 23.22 ML/MIN/MMHG
DLCOUNC-PRED: 28.59 ML/MIN/MMHG
ERV-%PRED-PRE: 37 %
ERV-PRE: 0.26 L
ERV-PRED: 0.7 L
EXPTIME-PRE: 7.25 SEC
FEF2575-%PRED-POST: 79 %
FEF2575-%PRED-PRE: 85 %
FEF2575-POST: 2.43 L/SEC
FEF2575-PRE: 2.62 L/SEC
FEF2575-PRED: 3.06 L/SEC
FEFMAX-%PRED-PRE: 75 %
FEFMAX-PRE: 6.91 L/SEC
FEFMAX-PRED: 9.17 L/SEC
FEV1-%PRED-PRE: 82 %
FEV1-PRE: 2.95 L
FEV1FEV6-PRE: 78 %
FEV1FEV6-PRED: 79 %
FEV1FVC-PRE: 78 %
FEV1FVC-PRED: 78 %
FEV1SVC-PRE: 78 %
FEV1SVC-PRED: 73 %
FIFMAX-PRE: 5.18 L/SEC
FRCPLETH-%PRED-PRE: 71 %
FRCPLETH-PRE: 2.54 L
FRCPLETH-PRED: 3.53 L
FVC-%PRED-PRE: 83 %
FVC-PRE: 3.8 L
FVC-PRED: 4.58 L
IC-%PRED-PRE: 84 %
IC-PRE: 3.52 L
IC-PRED: 4.17 L
RVPLETH-%PRED-PRE: 97 %
RVPLETH-PRE: 2.28 L
RVPLETH-PRED: 2.33 L
TLCPLETH-%PRED-PRE: 87 %
TLCPLETH-PRE: 6.06 L
TLCPLETH-PRED: 6.92 L
VA-%PRED-PRE: 88 %
VA-PRE: 5.84 L
VC-%PRED-PRE: 77 %
VC-PRE: 3.78 L
VC-PRED: 4.87 L

## 2017-04-07 ENCOUNTER — APPOINTMENT (OUTPATIENT)
Dept: CARDIOLOGY | Facility: CLINIC | Age: 57
End: 2017-04-07
Attending: THORACIC SURGERY (CARDIOTHORACIC VASCULAR SURGERY)
Payer: COMMERCIAL

## 2017-04-07 ENCOUNTER — HOSPITAL ENCOUNTER (OUTPATIENT)
Facility: CLINIC | Age: 57
Discharge: HOME | End: 2017-04-07
Attending: THORACIC SURGERY (CARDIOTHORACIC VASCULAR SURGERY) | Admitting: THORACIC SURGERY (CARDIOTHORACIC VASCULAR SURGERY)
Payer: COMMERCIAL

## 2017-04-07 VITALS
SYSTOLIC BLOOD PRESSURE: 106 MMHG | RESPIRATION RATE: 18 BRPM | DIASTOLIC BLOOD PRESSURE: 74 MMHG | TEMPERATURE: 97.8 F | HEART RATE: 94 BPM | OXYGEN SATURATION: 94 % | HEIGHT: 71 IN | WEIGHT: 245.5 LBS | BODY MASS INDEX: 34.37 KG/M2

## 2017-04-07 DIAGNOSIS — Z98.890 STATUS POST CORONARY ANGIOGRAM: ICD-10-CM

## 2017-04-07 DIAGNOSIS — I25.10 ATHEROSCLEROSIS OF NATIVE CORONARY ARTERY, ANGINA PRESENCE UNSPECIFIED, UNSPECIFIED WHETHER NATIVE OR TRANSPLANTED HEART: ICD-10-CM

## 2017-04-07 DIAGNOSIS — I25.10 CORONARY ARTERY DISEASE INVOLVING NATIVE CORONARY ARTERY OF NATIVE HEART WITHOUT ANGINA PECTORIS: ICD-10-CM

## 2017-04-07 LAB
ANION GAP SERPL CALCULATED.3IONS-SCNC: 7 MMOL/L (ref 3–14)
BUN SERPL-MCNC: 12 MG/DL (ref 7–30)
CALCIUM SERPL-MCNC: 8.8 MG/DL (ref 8.5–10.1)
CHLORIDE SERPL-SCNC: 108 MMOL/L (ref 94–109)
CO2 SERPL-SCNC: 23 MMOL/L (ref 20–32)
CREAT SERPL-MCNC: 0.75 MG/DL (ref 0.66–1.25)
ERYTHROCYTE [DISTWIDTH] IN BLOOD BY AUTOMATED COUNT: 13.9 % (ref 10–15)
GFR SERPL CREATININE-BSD FRML MDRD: ABNORMAL ML/MIN/1.7M2
GLUCOSE SERPL-MCNC: 130 MG/DL (ref 70–99)
HCT VFR BLD AUTO: 40.8 % (ref 40–53)
HGB BLD-MCNC: 13.9 G/DL (ref 13.3–17.7)
MCH RBC QN AUTO: 31.9 PG (ref 26.5–33)
MCHC RBC AUTO-ENTMCNC: 34.1 G/DL (ref 31.5–36.5)
MCV RBC AUTO: 94 FL (ref 78–100)
PLATELET # BLD AUTO: 122 10E9/L (ref 150–450)
POTASSIUM SERPL-SCNC: 4 MMOL/L (ref 3.4–5.3)
RBC # BLD AUTO: 4.36 10E12/L (ref 4.4–5.9)
SODIUM SERPL-SCNC: 138 MMOL/L (ref 133–144)
WBC # BLD AUTO: 5.6 10E9/L (ref 4–11)

## 2017-04-07 PROCEDURE — 99152 MOD SED SAME PHYS/QHP 5/>YRS: CPT | Performed by: INTERNAL MEDICINE

## 2017-04-07 PROCEDURE — 36415 COLL VENOUS BLD VENIPUNCTURE: CPT | Performed by: THORACIC SURGERY (CARDIOTHORACIC VASCULAR SURGERY)

## 2017-04-07 PROCEDURE — 99152 MOD SED SAME PHYS/QHP 5/>YRS: CPT

## 2017-04-07 PROCEDURE — 80048 BASIC METABOLIC PNL TOTAL CA: CPT | Performed by: THORACIC SURGERY (CARDIOTHORACIC VASCULAR SURGERY)

## 2017-04-07 PROCEDURE — 93010 ELECTROCARDIOGRAM REPORT: CPT | Mod: 59 | Performed by: INTERNAL MEDICINE

## 2017-04-07 PROCEDURE — 27210795 ZZH PAD DEFIB QUICK CR4

## 2017-04-07 PROCEDURE — B2111ZZ FLUOROSCOPY OF MULTIPLE CORONARY ARTERIES USING LOW OSMOLAR CONTRAST: ICD-10-PCS | Performed by: INTERNAL MEDICINE

## 2017-04-07 PROCEDURE — 85027 COMPLETE CBC AUTOMATED: CPT | Performed by: THORACIC SURGERY (CARDIOTHORACIC VASCULAR SURGERY)

## 2017-04-07 PROCEDURE — C1769 GUIDE WIRE: HCPCS

## 2017-04-07 PROCEDURE — 93454 CORONARY ARTERY ANGIO S&I: CPT | Mod: 26 | Performed by: INTERNAL MEDICINE

## 2017-04-07 PROCEDURE — 25000125 ZZHC RX 250: Performed by: INTERNAL MEDICINE

## 2017-04-07 PROCEDURE — 25000128 H RX IP 250 OP 636: Performed by: THORACIC SURGERY (CARDIOTHORACIC VASCULAR SURGERY)

## 2017-04-07 PROCEDURE — 93454 CORONARY ARTERY ANGIO S&I: CPT

## 2017-04-07 PROCEDURE — 27210787 ZZH MANIFOLD CR2

## 2017-04-07 PROCEDURE — 27210856 ZZH ACCESS HEART CATH CR2

## 2017-04-07 PROCEDURE — 27210914 ZZH SHEATH CR8

## 2017-04-07 PROCEDURE — 27210946 ZZH KIT HC TOTES DISP CR8

## 2017-04-07 PROCEDURE — 25000128 H RX IP 250 OP 636: Performed by: INTERNAL MEDICINE

## 2017-04-07 PROCEDURE — 93005 ELECTROCARDIOGRAM TRACING: CPT

## 2017-04-07 PROCEDURE — 40000852 ZZH STATISTIC HEART CATH LAB OR EP LAB

## 2017-04-07 PROCEDURE — 27210892 ZZH CATH CR4

## 2017-04-07 PROCEDURE — 27211089 ZZH KIT ACIST INJECTOR CR3

## 2017-04-07 PROCEDURE — 40000065 ZZH STATISTIC EKG NON-CHARGEABLE

## 2017-04-07 RX ORDER — ASPIRIN 81 MG/1
81-324 TABLET, CHEWABLE ORAL
Status: DISCONTINUED | OUTPATIENT
Start: 2017-04-07 | End: 2017-04-07 | Stop reason: HOSPADM

## 2017-04-07 RX ORDER — PROMETHAZINE HYDROCHLORIDE 25 MG/ML
6.25-25 INJECTION, SOLUTION INTRAMUSCULAR; INTRAVENOUS EVERY 4 HOURS PRN
Status: DISCONTINUED | OUTPATIENT
Start: 2017-04-07 | End: 2017-04-07 | Stop reason: HOSPADM

## 2017-04-07 RX ORDER — SODIUM CHLORIDE 9 MG/ML
INJECTION, SOLUTION INTRAVENOUS CONTINUOUS
Status: DISCONTINUED | OUTPATIENT
Start: 2017-04-07 | End: 2017-04-07 | Stop reason: HOSPADM

## 2017-04-07 RX ORDER — PROTAMINE SULFATE 10 MG/ML
25-100 INJECTION, SOLUTION INTRAVENOUS EVERY 5 MIN PRN
Status: DISCONTINUED | OUTPATIENT
Start: 2017-04-07 | End: 2017-04-07 | Stop reason: HOSPADM

## 2017-04-07 RX ORDER — NALOXONE HYDROCHLORIDE 0.4 MG/ML
.2-.4 INJECTION, SOLUTION INTRAMUSCULAR; INTRAVENOUS; SUBCUTANEOUS
Status: DISCONTINUED | OUTPATIENT
Start: 2017-04-07 | End: 2017-04-07 | Stop reason: HOSPADM

## 2017-04-07 RX ORDER — NITROGLYCERIN 20 MG/100ML
.07-1.8 INJECTION INTRAVENOUS CONTINUOUS PRN
Status: DISCONTINUED | OUTPATIENT
Start: 2017-04-07 | End: 2017-04-07 | Stop reason: HOSPADM

## 2017-04-07 RX ORDER — LIDOCAINE HYDROCHLORIDE 10 MG/ML
1-10 INJECTION, SOLUTION EPIDURAL; INFILTRATION; INTRACAUDAL; PERINEURAL
Status: COMPLETED | OUTPATIENT
Start: 2017-04-07 | End: 2017-04-07

## 2017-04-07 RX ORDER — DEXTROSE MONOHYDRATE 25 G/50ML
12.5-5 INJECTION, SOLUTION INTRAVENOUS EVERY 30 MIN PRN
Status: DISCONTINUED | OUTPATIENT
Start: 2017-04-07 | End: 2017-04-07 | Stop reason: HOSPADM

## 2017-04-07 RX ORDER — DIPHENHYDRAMINE HYDROCHLORIDE 50 MG/ML
25-50 INJECTION INTRAMUSCULAR; INTRAVENOUS
Status: DISCONTINUED | OUTPATIENT
Start: 2017-04-07 | End: 2017-04-07 | Stop reason: HOSPADM

## 2017-04-07 RX ORDER — SODIUM NITROPRUSSIDE 25 MG/ML
100-200 INJECTION INTRAVENOUS
Status: DISCONTINUED | OUTPATIENT
Start: 2017-04-07 | End: 2017-04-07 | Stop reason: HOSPADM

## 2017-04-07 RX ORDER — ONDANSETRON 2 MG/ML
4 INJECTION INTRAMUSCULAR; INTRAVENOUS EVERY 4 HOURS PRN
Status: DISCONTINUED | OUTPATIENT
Start: 2017-04-07 | End: 2017-04-07 | Stop reason: HOSPADM

## 2017-04-07 RX ORDER — POTASSIUM CHLORIDE 7.45 MG/ML
10 INJECTION INTRAVENOUS
Status: DISCONTINUED | OUTPATIENT
Start: 2017-04-07 | End: 2017-04-07 | Stop reason: HOSPADM

## 2017-04-07 RX ORDER — FUROSEMIDE 10 MG/ML
20-100 INJECTION INTRAMUSCULAR; INTRAVENOUS
Status: DISCONTINUED | OUTPATIENT
Start: 2017-04-07 | End: 2017-04-07 | Stop reason: HOSPADM

## 2017-04-07 RX ORDER — NITROGLYCERIN 5 MG/ML
100-500 VIAL (ML) INTRAVENOUS
Status: COMPLETED | OUTPATIENT
Start: 2017-04-07 | End: 2017-04-07

## 2017-04-07 RX ORDER — VERAPAMIL HYDROCHLORIDE 2.5 MG/ML
1-2.5 INJECTION, SOLUTION INTRAVENOUS
Status: COMPLETED | OUTPATIENT
Start: 2017-04-07 | End: 2017-04-07

## 2017-04-07 RX ORDER — DOBUTAMINE HYDROCHLORIDE 200 MG/100ML
2-20 INJECTION INTRAVENOUS CONTINUOUS PRN
Status: DISCONTINUED | OUTPATIENT
Start: 2017-04-07 | End: 2017-04-07 | Stop reason: HOSPADM

## 2017-04-07 RX ORDER — PHENYLEPHRINE HCL IN 0.9% NACL 1 MG/10 ML
20-100 SYRINGE (ML) INTRAVENOUS
Status: DISCONTINUED | OUTPATIENT
Start: 2017-04-07 | End: 2017-04-07 | Stop reason: HOSPADM

## 2017-04-07 RX ORDER — METHYLPREDNISOLONE SODIUM SUCCINATE 125 MG/2ML
125 INJECTION, POWDER, LYOPHILIZED, FOR SOLUTION INTRAMUSCULAR; INTRAVENOUS
Status: DISCONTINUED | OUTPATIENT
Start: 2017-04-07 | End: 2017-04-07 | Stop reason: HOSPADM

## 2017-04-07 RX ORDER — ATROPINE SULFATE 0.1 MG/ML
0.5 INJECTION INTRAVENOUS EVERY 5 MIN PRN
Status: DISCONTINUED | OUTPATIENT
Start: 2017-04-07 | End: 2017-04-07 | Stop reason: HOSPADM

## 2017-04-07 RX ORDER — DOPAMINE HYDROCHLORIDE 160 MG/100ML
2-20 INJECTION, SOLUTION INTRAVENOUS CONTINUOUS PRN
Status: DISCONTINUED | OUTPATIENT
Start: 2017-04-07 | End: 2017-04-07 | Stop reason: HOSPADM

## 2017-04-07 RX ORDER — CLOPIDOGREL BISULFATE 75 MG/1
300-600 TABLET ORAL
Status: DISCONTINUED | OUTPATIENT
Start: 2017-04-07 | End: 2017-04-07 | Stop reason: HOSPADM

## 2017-04-07 RX ORDER — NALOXONE HYDROCHLORIDE 0.4 MG/ML
.1-.4 INJECTION, SOLUTION INTRAMUSCULAR; INTRAVENOUS; SUBCUTANEOUS
Status: DISCONTINUED | OUTPATIENT
Start: 2017-04-07 | End: 2017-04-07 | Stop reason: HOSPADM

## 2017-04-07 RX ORDER — HYDROCODONE BITARTRATE AND ACETAMINOPHEN 5; 325 MG/1; MG/1
1-2 TABLET ORAL EVERY 4 HOURS PRN
Status: DISCONTINUED | OUTPATIENT
Start: 2017-04-07 | End: 2017-04-07 | Stop reason: HOSPADM

## 2017-04-07 RX ORDER — FENTANYL CITRATE 50 UG/ML
25-50 INJECTION, SOLUTION INTRAMUSCULAR; INTRAVENOUS
Status: DISCONTINUED | OUTPATIENT
Start: 2017-04-07 | End: 2017-04-07 | Stop reason: HOSPADM

## 2017-04-07 RX ORDER — NITROGLYCERIN 5 MG/ML
100-200 VIAL (ML) INTRAVENOUS
Status: DISCONTINUED | OUTPATIENT
Start: 2017-04-07 | End: 2017-04-07 | Stop reason: HOSPADM

## 2017-04-07 RX ORDER — PRASUGREL 10 MG/1
10-60 TABLET, FILM COATED ORAL
Status: DISCONTINUED | OUTPATIENT
Start: 2017-04-07 | End: 2017-04-07 | Stop reason: HOSPADM

## 2017-04-07 RX ORDER — LIDOCAINE 40 MG/G
CREAM TOPICAL
Status: DISCONTINUED | OUTPATIENT
Start: 2017-04-07 | End: 2017-04-07 | Stop reason: HOSPADM

## 2017-04-07 RX ORDER — NALOXONE HYDROCHLORIDE 0.4 MG/ML
0.4 INJECTION, SOLUTION INTRAMUSCULAR; INTRAVENOUS; SUBCUTANEOUS EVERY 5 MIN PRN
Status: DISCONTINUED | OUTPATIENT
Start: 2017-04-07 | End: 2017-04-07 | Stop reason: HOSPADM

## 2017-04-07 RX ORDER — ATROPINE SULFATE 0.1 MG/ML
.5-1 INJECTION INTRAVENOUS
Status: DISCONTINUED | OUTPATIENT
Start: 2017-04-07 | End: 2017-04-07 | Stop reason: HOSPADM

## 2017-04-07 RX ORDER — BUPIVACAINE HYDROCHLORIDE 2.5 MG/ML
1-10 INJECTION, SOLUTION EPIDURAL; INFILTRATION; INTRACAUDAL
Status: DISCONTINUED | OUTPATIENT
Start: 2017-04-07 | End: 2017-04-07 | Stop reason: HOSPADM

## 2017-04-07 RX ORDER — ENALAPRILAT 1.25 MG/ML
1.25-2.5 INJECTION INTRAVENOUS
Status: DISCONTINUED | OUTPATIENT
Start: 2017-04-07 | End: 2017-04-07 | Stop reason: HOSPADM

## 2017-04-07 RX ORDER — ASPIRIN 325 MG
325 TABLET ORAL
Status: DISCONTINUED | OUTPATIENT
Start: 2017-04-07 | End: 2017-04-07 | Stop reason: HOSPADM

## 2017-04-07 RX ORDER — POTASSIUM CHLORIDE 29.8 MG/ML
20 INJECTION INTRAVENOUS
Status: DISCONTINUED | OUTPATIENT
Start: 2017-04-07 | End: 2017-04-07 | Stop reason: HOSPADM

## 2017-04-07 RX ORDER — HEPARIN SODIUM 1000 [USP'U]/ML
1000-10000 INJECTION, SOLUTION INTRAVENOUS; SUBCUTANEOUS EVERY 5 MIN PRN
Status: DISCONTINUED | OUTPATIENT
Start: 2017-04-07 | End: 2017-04-07 | Stop reason: HOSPADM

## 2017-04-07 RX ORDER — ACETAMINOPHEN 325 MG/1
325-650 TABLET ORAL EVERY 4 HOURS PRN
Status: DISCONTINUED | OUTPATIENT
Start: 2017-04-07 | End: 2017-04-07 | Stop reason: HOSPADM

## 2017-04-07 RX ORDER — NITROGLYCERIN 0.4 MG/1
0.4 TABLET SUBLINGUAL EVERY 5 MIN PRN
Status: DISCONTINUED | OUTPATIENT
Start: 2017-04-07 | End: 2017-04-07 | Stop reason: HOSPADM

## 2017-04-07 RX ORDER — HYDRALAZINE HYDROCHLORIDE 20 MG/ML
10-20 INJECTION INTRAMUSCULAR; INTRAVENOUS
Status: DISCONTINUED | OUTPATIENT
Start: 2017-04-07 | End: 2017-04-07 | Stop reason: HOSPADM

## 2017-04-07 RX ORDER — CLOPIDOGREL BISULFATE 75 MG/1
75 TABLET ORAL
Status: DISCONTINUED | OUTPATIENT
Start: 2017-04-07 | End: 2017-04-07 | Stop reason: HOSPADM

## 2017-04-07 RX ORDER — EPTIFIBATIDE 2 MG/ML
180 INJECTION, SOLUTION INTRAVENOUS EVERY 10 MIN PRN
Status: DISCONTINUED | OUTPATIENT
Start: 2017-04-07 | End: 2017-04-07 | Stop reason: HOSPADM

## 2017-04-07 RX ORDER — NIFEDIPINE 10 MG/1
10 CAPSULE ORAL
Status: DISCONTINUED | OUTPATIENT
Start: 2017-04-07 | End: 2017-04-07 | Stop reason: HOSPADM

## 2017-04-07 RX ORDER — LIDOCAINE HYDROCHLORIDE 10 MG/ML
30 INJECTION, SOLUTION EPIDURAL; INFILTRATION; INTRACAUDAL; PERINEURAL
Status: DISCONTINUED | OUTPATIENT
Start: 2017-04-07 | End: 2017-04-07 | Stop reason: HOSPADM

## 2017-04-07 RX ORDER — FLUMAZENIL 0.1 MG/ML
0.2 INJECTION, SOLUTION INTRAVENOUS
Status: DISCONTINUED | OUTPATIENT
Start: 2017-04-07 | End: 2017-04-07 | Stop reason: HOSPADM

## 2017-04-07 RX ORDER — NICARDIPINE HYDROCHLORIDE 2.5 MG/ML
100 INJECTION INTRAVENOUS
Status: DISCONTINUED | OUTPATIENT
Start: 2017-04-07 | End: 2017-04-07 | Stop reason: HOSPADM

## 2017-04-07 RX ORDER — PROTAMINE SULFATE 10 MG/ML
1-5 INJECTION, SOLUTION INTRAVENOUS
Status: DISCONTINUED | OUTPATIENT
Start: 2017-04-07 | End: 2017-04-07 | Stop reason: HOSPADM

## 2017-04-07 RX ORDER — METOPROLOL TARTRATE 1 MG/ML
5 INJECTION, SOLUTION INTRAVENOUS EVERY 5 MIN PRN
Status: DISCONTINUED | OUTPATIENT
Start: 2017-04-07 | End: 2017-04-07 | Stop reason: HOSPADM

## 2017-04-07 RX ORDER — EPTIFIBATIDE 2 MG/ML
2 INJECTION, SOLUTION INTRAVENOUS CONTINUOUS PRN
Status: DISCONTINUED | OUTPATIENT
Start: 2017-04-07 | End: 2017-04-07 | Stop reason: HOSPADM

## 2017-04-07 RX ORDER — LORAZEPAM 2 MG/ML
.5-2 INJECTION INTRAMUSCULAR EVERY 4 HOURS PRN
Status: DISCONTINUED | OUTPATIENT
Start: 2017-04-07 | End: 2017-04-07 | Stop reason: HOSPADM

## 2017-04-07 RX ORDER — IOPAMIDOL 755 MG/ML
45 INJECTION, SOLUTION INTRAVASCULAR ONCE
Status: COMPLETED | OUTPATIENT
Start: 2017-04-07 | End: 2017-04-07

## 2017-04-07 RX ORDER — ADENOSINE 3 MG/ML
12-12000 INJECTION, SOLUTION INTRAVENOUS
Status: DISCONTINUED | OUTPATIENT
Start: 2017-04-07 | End: 2017-04-07 | Stop reason: HOSPADM

## 2017-04-07 RX ORDER — MORPHINE SULFATE 2 MG/ML
1-2 INJECTION, SOLUTION INTRAMUSCULAR; INTRAVENOUS EVERY 5 MIN PRN
Status: DISCONTINUED | OUTPATIENT
Start: 2017-04-07 | End: 2017-04-07 | Stop reason: HOSPADM

## 2017-04-07 RX ADMIN — LIDOCAINE HYDROCHLORIDE 1 ML: 10 INJECTION, SOLUTION EPIDURAL; INFILTRATION; INTRACAUDAL; PERINEURAL at 08:54

## 2017-04-07 RX ADMIN — NITROGLYCERIN 400 MCG: 5 INJECTION, SOLUTION INTRAVENOUS at 08:55

## 2017-04-07 RX ADMIN — MIDAZOLAM HYDROCHLORIDE 2 MG: 1 INJECTION, SOLUTION INTRAMUSCULAR; INTRAVENOUS at 08:46

## 2017-04-07 RX ADMIN — IOPAMIDOL 45 ML: 755 INJECTION, SOLUTION INTRAVASCULAR at 09:15

## 2017-04-07 RX ADMIN — FENTANYL CITRATE 50 MCG: 50 INJECTION, SOLUTION INTRAMUSCULAR; INTRAVENOUS at 08:53

## 2017-04-07 RX ADMIN — FENTANYL CITRATE 50 MCG: 50 INJECTION, SOLUTION INTRAMUSCULAR; INTRAVENOUS at 08:46

## 2017-04-07 RX ADMIN — VERAPAMIL HYDROCHLORIDE 2.5 MG: 2.5 INJECTION, SOLUTION INTRAVENOUS at 08:55

## 2017-04-07 RX ADMIN — SODIUM CHLORIDE: 9 INJECTION, SOLUTION INTRAVENOUS at 07:15

## 2017-04-07 RX ADMIN — Medication 5000 UNITS: at 08:55

## 2017-04-07 NOTE — DISCHARGE INSTRUCTIONS
Cardiac Angiogram Discharge Instructions - Radial    After you go home:      Have an adult stay with you until tomorrow.    Drink extra fluids for 2 days.    You may resume your normal diet.    No smoking       For 24 hours - due to the sedation you received:    Relax and take it easy.    Do NOT make any important or legal decisions.    Do NOT drive or operate machines at home or at work.    Do NOT drink alcohol.    Care of Wrist Puncture Site:      For the first 24 hrs - check the puncture site every 1-2 hours while awake.    It is normal to have soreness at the puncture site and mild tingling in your hand for up to 3 days.    Remove the bandaid after 24 hours. If there is minor oozing, apply another bandaid and remove it after 12 hours.    You may shower tomorrow.  Do NOT take a bath, or use a hot tub or pool for at least 3 days. Do NOT scrub the site. Do not use lotion or powder near the puncture site.           Activity:        For 2 days:     do not use your hand or arm to support your weight (such as rising from a chair)     do not bend your wrist (such as lifting a garage door).    do not lift more than 5 pounds or exercise your arm (such as tennis, golf or bowling).    Do NOT do any heavy activity such as exercise, lifting, or straining.     Bleeding:      If you start bleeding from the site in your wrist, sit down and press firmly on/above the site for 10 minutes.     Once bleeding stops, keep arm still for 2 hours.     Call [facility] as soon as you can.       Call 911 right away if you have heavy bleeding or bleeding that does not stop.      Medicines:      If you are taking antiplatelet medications such as Plavix, Brilinta or Effient, do not stop taking it until you talk to your cardiologist.        If you are on Metformin (Glucophage), do not restart it until you have blood tests (within 2 to 3 days after discharge).  After you have your blood drawn, you may restart the Metformin.    Take your  medications, including blood thinners, unless your provider tells you not to.  If you take Coumadin (Warfarin), have your INR checked by your provider in  3-5 days. Call your clinic to schedule this.    If you have stopped any other medicines, check with your provider about when to restart them.    Follow Up Appointments:      Follow up with [facility] Nurse Practitioner at [facility] of patient preference in 7-10 days.    Call the clinic if:      You have a large or growing hard lump around the site.    The site is red, swollen, hot or tender.    Blood or fluid is draining from the site.    You have chills or a fever greater than 101 F (38 C).    Your arm turns feels numb, cool or changes color.    You have hives, a rash or unusual itching.    Any questions or concerns.          [facility] at [place]:    [phone] [facility] (7 days a week)

## 2017-04-07 NOTE — CONSULTS
I have examined the patient, reviewed the history, medications and pre procedural tests. Severe symptomatic AS. I have explained to the patient the risks of death, MI, stroke, hematoma, peripheral vascular complications with coronary angiograrphy. The patient voiced understanding and wishes to proceed. The patient has a good right radial pulse, normal ulnar pulse and a normal Norman's sign.

## 2017-04-07 NOTE — IP AVS SNAPSHOT
[place] [address]    [place] [address]    Phone:  [phone]                                       After Visit Summary   4/7/2017    Gil Chowdary    MRN: [id number]           After Visit Summary Signature Page     I have received my discharge instructions, and my questions have been answered. I have discussed any challenges I see with this plan with the nurse or doctor.    ..........................................................................................................................................  Patient/Patient Representative Signature      ..........................................................................................................................................  Patient Representative Print Name and Relationship to Patient    ..................................................               ................................................  Date                                            Time    ..........................................................................................................................................  Reviewed by Signature/Title    ...................................................              ..............................................  Date                                                            Time

## 2017-04-07 NOTE — IP AVS SNAPSHOT
MRN:[id number]                      After Visit Summary   4/7/2017    Gil Chowdary    MRN: [id number]           Visit Information        Department      4/7/2017  6:10 AM [facility]          Review of your medicines      UNREVIEWED medicines. Ask your doctor about these medicines        Dose / Directions    aspirin 81 MG tablet        Dose:  81 mg   Take 81 mg by mouth daily   Refills:  0       CENTRUM ADULTS PO        Refills:  0       FISH OIL PO        Take  by mouth daily.   Refills:  0       prednisoLONE acetate 1 % ophthalmic susp   Commonly known as:  PRED FORTE   Used for:  Aortic valve stenosis, Pre-operative cardiovascular examination        INSTILL 1 DROP INTO RIGHT THREE TIME DAILY   Refills:  0       timolol 0.5 % ophthalmic solution   Commonly known as:  TIMOPTIC   Used for:  Aortic valve stenosis, Pre-operative cardiovascular examination        Dose:  1 drop   1 drop 1 DROP INTO BOTH EYES 2 TIMES DAILY   Refills:  0       vitamin D 04474 UNIT capsule   Commonly known as:  ERGOCALCIFEROL        1 capsule by mouth weekly.   Refills:  0                Protect others around you: Learn how to safely use, store and throw away your medicines at www.disposemymeds.org.         Follow-ups after your visit        Your next 10 appointments already scheduled     Apr 11, 2017  1:50 PM CDT   Return Visit with RODRIGUEZ Rodriguez CNP   [facility] AT [place] ([facility])    [address] [place] [id number] [phone]               Care Instructions        After Care Instructions     Discharge Instructions - Follow up with [facility] Heart Nurse Practitioner        Follow up with [facility] Heart Nurse Practitioner at [facility] Heart Clinic of patient preference in 7-10 days.            Discharge Instructions - IF on Metformin (Glucophage or Glucovance) or Metformin containing medications       IF on Metformin (Glucophage or  Glucovance) or Metformin containing medications , schedule a Basic Metabolic Panel at [facility] Heart or Primary Clinic in 48 - 72 hours post procedure and PRIOR TO resuming the Metformin or Metformin containing medications.  Hold Metformin (Glucophage or Glucovance) or Metformin containing medications until after the Basic Metabolic Panel on the 2nd or 3rd day following the procedure.  May resume after blood draw is complete.                  Further instructions from your care team       Cardiac Angiogram Discharge Instructions - Radial    After you go home:      Have an adult stay with you until tomorrow.    Drink extra fluids for 2 days.    You may resume your normal diet.    No smoking       For 24 hours - due to the sedation you received:    Relax and take it easy.    Do NOT make any important or legal decisions.    Do NOT drive or operate machines at home or at work.    Do NOT drink alcohol.    Care of Wrist Puncture Site:      For the first 24 hrs - check the puncture site every 1-2 hours while awake.    It is normal to have soreness at the puncture site and mild tingling in your hand for up to 3 days.    Remove the bandaid after 24 hours. If there is minor oozing, apply another bandaid and remove it after 12 hours.    You may shower tomorrow.  Do NOT take a bath, or use a hot tub or pool for at least 3 days. Do NOT scrub the site. Do not use lotion or powder near the puncture site.           Activity:        For 2 days:     do not use your hand or arm to support your weight (such as rising from a chair)     do not bend your wrist (such as lifting a garage door).    do not lift more than 5 pounds or exercise your arm (such as tennis, golf or bowling).    Do NOT do any heavy activity such as exercise, lifting, or straining.     Bleeding:      If you start bleeding from the site in your wrist, sit down and press firmly on/above the site for 10 minutes.     Once bleeding stops, keep arm still for 2 hours.     Call  [facility] as soon as you can.       Call 911 right away if you have heavy bleeding or bleeding that does not stop.      Medicines:      If you are taking antiplatelet medications such as Plavix, Brilinta or Effient, do not stop taking it until you talk to your cardiologist.        If you are on Metformin (Glucophage), do not restart it until you have blood tests (within 2 to 3 days after discharge).  After you have your blood drawn, you may restart the Metformin.    Take your medications, including blood thinners, unless your provider tells you not to.  If you take Coumadin (Warfarin), have your INR checked by your provider in  3-5 days. Call your clinic to schedule this.    If you have stopped any other medicines, check with your provider about when to restart them.    Follow Up Appointments:      Follow up with [facility] Nurse Practitioner at [facility] Clinic of patient preference in 7-10 days.    Call the clinic if:      You have a large or growing hard lump around the site.    The site is red, swollen, hot or tender.    Blood or fluid is draining from the site.    You have chills or a fever greater than 101 F (38 C).    Your arm turns feels numb, cool or changes color.    You have hives, a rash or unusual itching.    Any questions or concerns.          [facility] [place]:    [phone] [facility] (7 days a week)             Additional Information About Your Visit        MyChart Information     [employer] gives you secure access to your electronic health record. If you see a primary care provider, you can also send messages to your care team and make appointments. If you have questions, please call your primary care clinic.  If you do not have a primary care provider, please call [phone] and they will assist you.        Care EveryWhere ID     This is your Care EveryWhere ID. This could be used by other organizations to access your [place] medical records  [id number]        Your  "Vitals Were     Blood Pressure Pulse Temperature Respirations Height Weight    106/73 94 97.8  F (36.6  C) (Oral) 18 1.803 m (5' 11\") 111.4 kg (245 lb 8 oz)    Pulse Oximetry BMI (Body Mass Index)                93% 34.24 kg/m2           Primary Care Provider Fax #    Sam Grimes [phone] [place] [phone]      Thank you!     Thank you for choosing [facility] for your care. Our goal is always to provide you with excellent care. Hearing back from our patients is one way we can continue to improve our services. Please take a few minutes to complete the written survey that you may receive in the mail after you visit with us. Thank you!             Medication List: This is a list of all your medications and when to take them. Check marks below indicate your daily home schedule. Keep this list as a reference.      Medications           Morning Afternoon Evening Bedtime As Needed    aspirin 81 MG tablet   Take 81 mg by mouth daily                                CENTRUM ADULTS PO                                FISH OIL PO   Take  by mouth daily.                                prednisoLONE acetate 1 % ophthalmic susp   Commonly known as:  PRED FORTE   INSTILL 1 DROP INTO RIGHT THREE TIME DAILY                                timolol 0.5 % ophthalmic solution   Commonly known as:  TIMOPTIC   1 drop 1 DROP INTO BOTH EYES 2 TIMES DAILY                                vitamin D 80200 UNIT capsule   Commonly known as:  ERGOCALCIFEROL   1 capsule by mouth weekly.                                  "

## 2017-04-07 NOTE — PROGRESS NOTES
Care Suites Discharge Summary    Heart Catheterization: Right tr band site dry and intact at time of discharge.    Discharge Criteria:   Discharge Criteria met per MD orders: Yes.   Vital signs stable.     Pt demonstrates ability to ambulate safely: Yes.  (See discharge questionnaire for additional information)    Discharge instructions & education:   Discharge instructions reviewed with patient. Patient verbalizes understanding.   Patient education provided:  Angiogram and contrast dc instructions     Medications:   Patient will be discharging on new medications- No. Patient verbalizes reason for use, start date, and side effects NA.    Items returned to patient:   Home and hospital acquired medications returned to patient NA   Listed belongings gathered and returned to patient: Yes    Patient discharged to home with friend.     Meliton Stoll

## 2017-04-08 LAB — INTERPRETATION ECG - MUSE: NORMAL

## 2017-04-10 DIAGNOSIS — I35.0 NONRHEUMATIC AORTIC VALVE STENOSIS: Primary | ICD-10-CM

## 2017-04-10 DIAGNOSIS — Z01.810 PRE-OPERATIVE CARDIOVASCULAR EXAMINATION: ICD-10-CM

## 2017-04-10 NOTE — TELEPHONE ENCOUNTER
Returning pt voicemail. Pt is ready to schedule surgery. Offered him the first opening. 5/16 at 830. Pt ok with that. Also scheduled preop test for 5/9 starting at 11. Will mail letter

## 2017-04-17 ENCOUNTER — DOCUMENTATION ONLY (OUTPATIENT)
Dept: CARDIOLOGY | Facility: CLINIC | Age: 57
End: 2017-04-17

## 2017-04-18 ENCOUNTER — OFFICE VISIT (OUTPATIENT)
Dept: CARDIOLOGY | Facility: CLINIC | Age: 57
End: 2017-04-18
Payer: COMMERCIAL

## 2017-04-18 VITALS
BODY MASS INDEX: 36.22 KG/M2 | DIASTOLIC BLOOD PRESSURE: 90 MMHG | HEIGHT: 70 IN | HEART RATE: 88 BPM | SYSTOLIC BLOOD PRESSURE: 116 MMHG | WEIGHT: 253 LBS

## 2017-04-18 DIAGNOSIS — I25.10 CORONARY ARTERY DISEASE INVOLVING NATIVE CORONARY ARTERY OF NATIVE HEART WITHOUT ANGINA PECTORIS: Primary | ICD-10-CM

## 2017-04-18 DIAGNOSIS — I35.0 NONRHEUMATIC AORTIC VALVE STENOSIS: ICD-10-CM

## 2017-04-18 PROCEDURE — 99214 OFFICE O/P EST MOD 30 MIN: CPT | Performed by: NURSE PRACTITIONER

## 2017-04-18 NOTE — PROGRESS NOTES
HPI and Plan:   See dictation  12:54 PM  [id number]    Orders Placed This Encounter   Procedures     Lipid Profile     ALT       No orders of the defined types were placed in this encounter.      There are no discontinued medications.      Encounter Diagnosis   Name Primary?     Coronary artery disease involving native coronary artery of native heart without angina pectoris Yes       CURRENT MEDICATIONS:  Current Outpatient Prescriptions   Medication Sig Dispense Refill     Multiple Vitamins-Minerals (CENTRUM ADULTS PO) Take by mouth daily        prednisoLONE acetate (PRED FORTE) 1 % ophthalmic susp INSTILL 1 DROP INTO RIGHT THREE TIME DAILY       timolol (TIMOPTIC) 0.5 % ophthalmic solution 1 drop 1 DROP INTO BOTH EYES 2 TIMES DAILY       aspirin 81 MG tablet Take 81 mg by mouth daily       vitamin D (ERGOCALCIFEROL) 83409 UNIT capsule 1 capsule by mouth weekly.       Omega-3 Fatty Acids (FISH OIL PO) Take  by mouth daily.         ALLERGIES     Allergies   Allergen Reactions     Amoxicillin      Itchy      Penicillins Hives       PAST MEDICAL HISTORY:  Past Medical History:   Diagnosis Date     Heart murmur previously undiagnosed      Hyperlipidemia LDL goal <160 12/6/2011     Right bundle branch block      Severe aortic stenosis     On Echo 10/28/16       PAST SURGICAL HISTORY:  Past Surgical History:   Procedure Laterality Date     HEART CATH LEFT HEART CATH  04/07/2017    Diffuse non-obstuctive CAD       FAMILY HISTORY:  Family History   Problem Relation Age of Onset     Family History Negative Mother      DIABETES Father      Heart Surgery Father      bypass     Hypertension Brother      DIABETES Brother      HEART DISEASE Brother 49     TRIPLE BYPASS     Heart Surgery Brother      bypass x3     Family History Negative Sister      Family History Negative Brother      Family History Negative Brother        SOCIAL HISTORY:  Social History     Social History     Marital status:      Spouse name: N/A     Number  "of children: N/A     Years of education: N/A     Social History Main Topics     Smoking status: Former Smoker     Packs/day: 1.00     Years: 20.00     Types: Cigarettes, Cigars     Quit date: 10/21/2011     Smokeless tobacco: Never Used     Alcohol use No     Drug use: No     Sexual activity: Yes     Partners: Female     Other Topics Concern     Parent/Sibling W/ Cabg, Mi Or Angioplasty Before 65f 55m? Yes     brother triple bypass 49     Social History Narrative       Review of Systems:  Skin:  Negative       Eyes:  Positive for glasses    ENT:  Negative      Respiratory:  Positive for sleep apnea;CPAP;cough dry cough   Cardiovascular:  Negative      Gastroenterology: Negative      Genitourinary:  Negative      Musculoskeletal:  Negative      Neurologic:  Negative      Psychiatric:  Negative      Heme/Lymph/Imm:  Negative      Endocrine:  Negative        Physical Exam:  Vitals: /90  Pulse 88  Ht 1.778 m (5' 10\")  Wt 114.8 kg (253 lb)  BMI 36.3 kg/m2    Constitutional:  cooperative, alert and oriented, well developed, well nourished, in no acute distress        Skin:  warm and dry to the touch, no apparent skin lesions or masses noted        Head:  normocephalic, no masses or lesions        Eyes:  pupils equal and round, conjunctivae and lids unremarkable, sclera white, no xanthalasma, EOMS intact, no nystagmus        ENT:  no pallor or cyanosis, dentition good        Neck:  carotid pulses are full and equal bilaterally;JVP normal;no carotid bruit transmitted murmur      Chest:  clear to auscultation          Cardiac: regular rhythm       systolic murmur;grade 2;RUSB systolic murmur;LUSB;grade 3        Abdomen:  abdomen soft, non-tender, BS normoactive, no mass, no HSM, no bruits;abdomen soft        Vascular: pulses full and equal, no bruits auscultated                                        Extremities and Back:  no deformities, clubbing, cyanosis, erythema observed;no edema              Neurological:  " affect appropriate, oriented to time, person and place;no gross motor deficits              CC  Sam [id number] [place] [address]

## 2017-04-18 NOTE — LETTER
4/18/2017    Sam Villatoro      RE: Gil Chowdary       Dear Colleague,    I had the pleasure of seeing Gil Chowdary in the [facility].    CHIEF COMPLAINT:  Followup angiogram.      Mr. Chowdary is a pleasant 56-year-old patient who recently established with Dr. Salinas.  He has a new diagnosis of severe aortic stenosis.  He had been noticing that his activity tolerance had diminished to some degree.  He was not really describing any chest pain or shortness of breath, per se.  He saw Dr. Sailnas in December.  He then underwent an echocardiogram in February.  Results showed an ejection fraction of 55%-60%.  He did have moderate asymmetric septal hypertrophy identified.  Left ventricular size was normal.  Ejection fraction was 55%-60%.  Right ventricular systolic function was normal.  The aortic valve appeared to have mild regurgitation.  The aortic valve area was 0.58 cm squared.  The peak aortic valve gradient was 91.8 mmHg.  The mean was 53.7 mmHg.  He saw Dr. Simon on 02/16.  At that time, they discussed aortic valve replacement surgery.  I then saw him on 03/28 in preparation for a presurgical diagnostic coronary angiogram.  Results identified diffuse atheromatous change but no significant focal narrowing.  There was a right dominant coronary system.  The proximal right coronary artery had about a 50% narrowing.  He comes in today post-angiogram telling me he feels relatively well.  He has no complaints of chest pain, no complaints of shortness of breath, no lightheadedness, dizziness, palpitations, orthopnea, paroxysmal nocturnal dyspnea.  Right radial site has healed up well.  Blood pressure is stable.  Heart rate is stable.  He is frustrated with his weight.  He has gained a couple of pounds since the last time I visited with him.  He has been working hard on trying to modify his diet.  He is frustrated that he cannot exercise to the degree he would like to right now.       PHYSICAL EXAMINATION:   GENERAL:  On exam, this is a well-developed, well-nourished male who appears his stated age.  He is cooperative and appropriate.   VITAL SIGNS:  Stable.   NEUROLOGIC:  Intact.   HEENT:  Normocephalic, atraumatic without tenderness or involuntary movements.  Face appears symmetric.  Visual fields are full, EOMs intact.  Conjunctivae clear.  Oral mucosa is pink and moist.   NECK:  Supple, full range of motion, trachea appears midline, no JVD, no carotid bruit.   CARDIAC:  S1, S2, regular rate and rhythm, 2 to 3/6 systolic murmur.   LUNGS:  Chest expansion appears symmetric.  Breath sounds are clear.   ABDOMEN:  Soft, bowel sounds present.   EXTREMITIES:  Warm and dry without edema, cyanosis or clubbing.  Right radial site without hematoma or bruit.     Outpatient Encounter Prescriptions as of 4/18/2017   Medication Sig Dispense Refill     Multiple Vitamins-Minerals (CENTRUM ADULTS PO) Take 1 tablet by mouth every morning        prednisoLONE acetate (PRED FORTE) 1 % ophthalmic susp Place 1 drop into both eyes as needed        timolol (TIMOPTIC) 0.5 % ophthalmic solution 1 drop 1 DROP INTO BOTH EYES 2 TIMES DAILY       aspirin 81 MG tablet Take 81 mg by mouth every morning        [DISCONTINUED] vitamin D (ERGOCALCIFEROL) 23289 UNIT capsule 1 capsule by mouth weekly.       Omega-3 Fatty Acids (FISH OIL PO) Take 1 g by mouth every morning        No facility-administered encounter medications on file as of 4/18/2017.       IMPRESSION AND PLAN:   1.  Severe aortic stenosis.  Plan for aortic valve replacement with Dr. Simon next month.  Further preoperative workup as per Surgery.   2.  Nonobstructive coronary artery disease.  No complaints of angina or anginal equivalents.  He is on a daily aspirin.  He has not had a recent cholesterol panel.  I would like to check fasting lipids and then get him started on a statin medication.  I told him I will be in contact with him regarding his lipid results, at  which time we would consider using either atorvastatin or rosuvastatin.      Once we have gotten him on statin therapy, he will need a repeat fasting lipid and liver functions within 6 weeks.  Again, as mentioned, he is scheduled for surgery next month.  Certainly we will be happy to be involved in his care thereafter.      Thank you for allowing me to participate in the care of this patient.     Sincerely,    RODRIGUEZ Rodriguez [facility]

## 2017-04-18 NOTE — MR AVS SNAPSHOT
After Visit Summary   4/18/2017    Gil Chowdary    MRN: [id number]           Patient Information     Date Of Birth          1960        Visit Information        Provider Department      4/18/2017 12:30 PM Abena Christiansen APRN CNP [facility] AT [place]        Today's Diagnoses     Coronary artery disease involving native coronary artery of native heart without angina pectoris    -  1    Nonrheumatic aortic valve stenosis           Follow-ups after your visit        Your next 10 appointments already scheduled     Apr 25, 2017  9:50 AM CDT   LAB with SOTOMAYOR LAB   [facility] AT [place] ([facility] Clin[facility])    [address]  [facility] [address] [phone]           Patient must bring picture ID.  Patient should be prepared to give a urine specimen  Please do not eat 10-12 hours before your appointment if you are coming in fasting for labs on lipids, cholesterol, or glucose (sugar).  Pregnant women should follow their Care Team instructions. Water with medications is okay. Do not drink coffee or other fluids.   If you have concerns about taking  your medications, please ask at office or if scheduling via [employer], send a message by clicking on Secure Messaging, Message Your Care Team.            May 09, 2017 11:00 AM CDT   (Arrive by 10:45 AM)   PAC RN ASSESSMENT with Karla Pac Rn   [facility] Preoperative Assessment [place] ([facility])    [address] [place] [id number] [phone]            May 09, 2017 11:30 AM CDT   (Arrive by 11:15 AM)   PAC EVALUATION with Karla Pac Kristy 3   [facility] Preoperative Assessment [place] ([facility])    [address] [place] [address]   [phone]            May 09, 2017 12:30 PM CDT   (Arrive by 12:15 PM)   PAC Anesthesia Consult with Karla Pac Anesthesiologist   [facility]  Preoperative Assessment Center ([facility])    [address]  4th [address]  [facility] [address]   [phone]            May 09, 2017 12:45 PM CDT   LAB with  LAB   [facility] ([facility])    [address]  [address] [facility] [address]   [phone]           Patient must bring picture ID.  Patient should be prepared to give a urine specimen  Please do not eat 10-12 hours before your appointment if you are coming in fasting for labs on lipids, cholesterol, or glucose (sugar).  Pregnant women should follow their Care Team instructions. Water with medications is okay. Do not drink coffee or other fluids.   If you have concerns about taking  your medications, please ask at office or if scheduling via [employer], send a message by clicking on Secure Messaging, Message Your Care Team.            May 16, 2017   Procedure with Jun Simon MD   [facility], [place], Same Day Surgery (--)    [address]  [place] [address]   [phone]              Future tests that were ordered for you today     Open Future Orders        Priority Expected Expires Ordered    Lipid Profile Routine 4/25/2017 4/18/2018 4/18/2017    ALT Routine 4/25/2017 4/18/2018 4/18/2017            Who to contact     If you have questions or need follow up information about today's clinic visit or your schedule please contact [facility] [place] directly at [phone].  Normal or non-critical lab and imaging results will be communicated to you by MyChart, letter or phone within 4 business days after the clinic has received the results. If you do not hear from us within 7 days, please contact the clinic through [employer] or phone. If you have a critical or abnormal lab result, we will notify you by phone as soon as possible.  Submit refill requests through [employer] or call your pharmacy and they will forward the refill request to us. Please  "allow 3 business days for your refill to be completed.          Additional Information About Your Visit        MyChart Information     [employer] gives you secure access to your electronic health record. If you see a primary care provider, you can also send messages to your care team and make appointments. If you have questions, please call your primary care clinic.  If you do not have a primary care provider, please call [phone] and they will assist you.        Care EveryWhere ID     This is your Care EveryWhere ID. This could be used by other organizations to access your [place] medical records  [id number]        Your Vitals Were     Pulse Height BMI (Body Mass Index)             88 1.778 m (5' 10\") 36.3 kg/m2          Blood Pressure from Last 3 Encounters:   04/18/17 116/90   04/07/17 106/74   03/28/17 130/88    Weight from Last 3 Encounters:   04/18/17 114.8 kg (253 lb)   04/07/17 111.4 kg (245 lb 8 oz)   03/28/17 113.9 kg (251 lb)               Primary Care Provider Fax #    Sam Grimes [phone] [place] [phone]       [address]  [facility] [address]        Thank you!     Thank you for choosing [facility] AT [place]  for your care. Our goal is always to provide you with excellent care. Hearing back from our patients is one way we can continue to improve our services. Please take a few minutes to complete the written survey that you may receive in the mail after your visit with us. Thank you!             Your Updated Medication List - Protect others around you: Learn how to safely use, store and throw away your medicines at www.disposemymeds.org.          This list is accurate as of: 4/18/17 12:59 PM.  Always use your most recent med list.                   Brand Name Dispense Instructions for use    aspirin 81 MG tablet      Take 81 mg by mouth daily       CENTRUM ADULTS PO      Take by mouth daily       FISH OIL PO      Take  by mouth daily.       prednisoLONE acetate 1 % ophthalmic " susp    PRED FORTE     INSTILL 1 DROP INTO RIGHT THREE TIME DAILY       timolol 0.5 % ophthalmic solution    TIMOPTIC     1 drop 1 DROP INTO BOTH EYES 2 TIMES DAILY       vitamin D 93694 UNIT capsule    ERGOCALCIFEROL     1 capsule by mouth weekly.

## 2017-04-19 NOTE — PROGRESS NOTES
CHIEF COMPLAINT:  Followup angiogram.      HISTORY OF PRESENT ILLNESS:  Mr. Chowdary is a pleasant 56-year-old patient who recently established with Dr. Salinas.  He has a new diagnosis of severe aortic stenosis.  He had been noticing that his activity tolerance had diminished to some degree.  He was not really describing any chest pain or shortness of breath, per se.  He saw Dr. Salinas in December.  He then underwent an echocardiogram in February.  Results showed an ejection fraction of 55%-60%.  He did have moderate asymmetric septal hypertrophy identified.  Left ventricular size was normal.  Ejection fraction was 55%-60%.  Right ventricular systolic function was normal.  The aortic valve appeared to have mild regurgitation.  The aortic valve area was 0.58 cm squared.  The peak aortic valve gradient was 91.8 mmHg.  The mean was 53.7 mmHg.  He saw Dr. Simon on 02/16.  At that time, they discussed aortic valve replacement surgery.  I then saw him on 03/28 in preparation for a presurgical diagnostic coronary angiogram.  Results identified diffuse atheromatous change but no significant focal narrowing.  There was a right dominant coronary system.  The proximal right coronary artery had about a 50% narrowing.  He comes in today post-angiogram telling me he feels relatively well.  He has no complaints of chest pain, no complaints of shortness of breath, no lightheadedness, dizziness, palpitations, orthopnea, paroxysmal nocturnal dyspnea.  Right radial site has healed up well.  Blood pressure is stable.  Heart rate is stable.  He is frustrated with his weight.  He has gained a couple of pounds since the last time I visited with him.  He has been working hard on trying to modify his diet.  He is frustrated that he cannot exercise to the degree he would like to right now.      PHYSICAL EXAMINATION:   GENERAL:  On exam, this is a well-developed, well-nourished male who appears his stated age.  He is cooperative and  appropriate.   VITAL SIGNS:  Stable.   NEUROLOGIC:  Intact.   HEENT:  Normocephalic, atraumatic without tenderness or involuntary movements.  Face appears symmetric.  Visual fields are full, EOMs intact.  Conjunctivae clear.  Oral mucosa is pink and moist.   NECK:  Supple, full range of motion, trachea appears midline, no JVD, no carotid bruit.   CARDIAC:  S1, S2, regular rate and rhythm, 2 to 3/6 systolic murmur.   LUNGS:  Chest expansion appears symmetric.  Breath sounds are clear.   ABDOMEN:  Soft, bowel sounds present.   EXTREMITIES:  Warm and dry without edema, cyanosis or clubbing.  Right radial site without hematoma or bruit.      IMPRESSION AND PLAN:   1.  Severe aortic stenosis.  Plan for aortic valve replacement with Dr. Simon next month.  Further preoperative workup as per Surgery.   2.  Nonobstructive coronary artery disease.  No complaints of angina or anginal equivalents.  He is on a daily aspirin.  He has not had a recent cholesterol panel.  I would like to check fasting lipids and then get him started on a statin medication.  I told him I will be in contact with him regarding his lipid results, at which time we would consider using either atorvastatin or rosuvastatin.      Once we have gotten him on statin therapy, he will need a repeat fasting lipid and liver functions within 6 weeks.  Again, as mentioned, he is scheduled for surgery next month.  Certainly we will be happy to be involved in his care thereafter.      Thank you for allowing me to participate in the care of this patient.         RODRIGUEZ ALBRECHT CNP             D: 2017 12:58   T: 2017 02:31   MT: BLANE      Name:     ABIGAIL MATOS   MRN:      [id number]        Account:      [id number]   :      1960           Service Date: 2017      Document: [id number]

## 2017-04-25 ENCOUNTER — TELEPHONE (OUTPATIENT)
Dept: CARDIOLOGY | Facility: CLINIC | Age: 57
End: 2017-04-25

## 2017-04-25 DIAGNOSIS — E78.5 HYPERLIPIDEMIA WITH TARGET LDL LESS THAN 70: Primary | ICD-10-CM

## 2017-04-25 DIAGNOSIS — I25.10 CORONARY ARTERY DISEASE INVOLVING NATIVE CORONARY ARTERY OF NATIVE HEART WITHOUT ANGINA PECTORIS: ICD-10-CM

## 2017-04-25 LAB
ALT SERPL W P-5'-P-CCNC: 52 U/L (ref 5–30)
CHOLEST SERPL-MCNC: 283 MG/DL
HDLC SERPL-MCNC: 39 MG/DL
LDLC SERPL CALC-MCNC: 209 MG/DL
NONHDLC SERPL-MCNC: 244 MG/DL
TRIGL SERPL-MCNC: 174 MG/DL

## 2017-04-25 PROCEDURE — 36415 COLL VENOUS BLD VENIPUNCTURE: CPT | Performed by: NURSE PRACTITIONER

## 2017-04-25 PROCEDURE — 80061 LIPID PANEL: CPT | Performed by: NURSE PRACTITIONER

## 2017-04-25 PROCEDURE — 84460 ALANINE AMINO (ALT) (SGPT): CPT | Performed by: NURSE PRACTITIONER

## 2017-04-25 RX ORDER — ATORVASTATIN CALCIUM 40 MG/1
40 TABLET, FILM COATED ORAL DAILY
Qty: 90 TABLET | Refills: 0 | Status: SHIPPED | OUTPATIENT
Start: 2017-04-25 | End: 2017-06-06

## 2017-04-25 NOTE — TELEPHONE ENCOUNTER
Forwarded labs to providers nurse team to address in KARYN absence.     Recent Labs   Lab Test  04/25/17   0914   CHOL  283*   HDL  39*   LDL  209*   TRIG  174*   Alt       52  (h)    Plan per KARYN visit note 4/18/2017 start statin: will be in contact with him regarding his lipid results, at which time we would consider using either atorvastatin or rosuvastatin.   Once we have gotten him on statin therapy, he will need a repeat fasting lipid and liver functions within 6 weeks.    Nurse team to further discuss w/ DR Salinas for medication and dosing. Valve surgery scheduled w/ Dr Simon 5/16/2017.  Jennifer Savage RN    4/25/2017 201 pm Reponse from Dr Salinas:  Guru Salinas MD McMahon, Bullis Mary, RN                   Needs atorv 40 at HS indefinitely.   Non critical CAD LDL > 200!   Lots of reassurance - needs AVR.    je                4/25/2017 235  pm Called to patient w/ Dr Alvarez response. Patient agreeable to proceed with statin therapy, reviewed plan and potential side effects, taking dose in PM. Patient states planned compliance and understanding, states has already started on low fat, low carb, low salt diet and life style changes.  Reviewed to call if muscle aches (myalgias) or other symptoms/concerns on drug. Patient agrees to repeat labs 1st week of June (6 weeks). Orders placed. Script sent to [facility] pharmacy [facility].  Jennifer Savage RN

## 2017-05-08 ENCOUNTER — ANESTHESIA EVENT (OUTPATIENT)
Dept: SURGERY | Facility: CLINIC | Age: 57
DRG: 220 | End: 2017-05-08
Payer: COMMERCIAL

## 2017-05-09 ENCOUNTER — HOSPITAL ENCOUNTER (OUTPATIENT)
Facility: CLINIC | Age: 57
Setting detail: SPECIMEN
Discharge: HOME | End: 2017-05-09
Admitting: THORACIC SURGERY (CARDIOTHORACIC VASCULAR SURGERY)
Payer: COMMERCIAL

## 2017-05-09 ENCOUNTER — ALLIED HEALTH/NURSE VISIT (OUTPATIENT)
Dept: SURGERY | Facility: CLINIC | Age: 57
End: 2017-05-09

## 2017-05-09 ENCOUNTER — OFFICE VISIT (OUTPATIENT)
Dept: SURGERY | Facility: CLINIC | Age: 57
End: 2017-05-09

## 2017-05-09 VITALS
OXYGEN SATURATION: 96 % | TEMPERATURE: 98.2 F | HEART RATE: 91 BPM | WEIGHT: 253.5 LBS | RESPIRATION RATE: 18 BRPM | SYSTOLIC BLOOD PRESSURE: 130 MMHG | BODY MASS INDEX: 36.29 KG/M2 | DIASTOLIC BLOOD PRESSURE: 87 MMHG | HEIGHT: 70 IN

## 2017-05-09 DIAGNOSIS — Z01.810 PRE-OPERATIVE CARDIOVASCULAR EXAMINATION: Primary | ICD-10-CM

## 2017-05-09 DIAGNOSIS — Z01.818 PREOP EXAMINATION: Primary | ICD-10-CM

## 2017-05-09 DIAGNOSIS — Z01.810 PRE-OPERATIVE CARDIOVASCULAR EXAMINATION: ICD-10-CM

## 2017-05-09 DIAGNOSIS — I35.0 NONRHEUMATIC AORTIC VALVE STENOSIS: ICD-10-CM

## 2017-05-09 DIAGNOSIS — E78.5 HYPERLIPIDEMIA WITH TARGET LDL LESS THAN 70: ICD-10-CM

## 2017-05-09 LAB
ALBUMIN SERPL-MCNC: 3.8 G/DL (ref 3.4–5)
ALBUMIN UR-MCNC: NEGATIVE MG/DL
ALP SERPL-CCNC: 72 U/L (ref 40–150)
ALT SERPL W P-5'-P-CCNC: 79 U/L (ref 0–70)
ALT SERPL W P-5'-P-CCNC: 85 U/L (ref 0–70)
ANION GAP SERPL CALCULATED.3IONS-SCNC: 9 MMOL/L (ref 3–14)
APPEARANCE UR: CLEAR
AST SERPL W P-5'-P-CCNC: 110 U/L (ref 0–45)
BASOPHILS # BLD AUTO: 0 10E9/L (ref 0–0.2)
BASOPHILS NFR BLD AUTO: 0.4 %
BILIRUB DIRECT SERPL-MCNC: 0.1 MG/DL (ref 0–0.2)
BILIRUB SERPL-MCNC: 1.2 MG/DL (ref 0.2–1.3)
BILIRUB UR QL STRIP: NEGATIVE
BUN SERPL-MCNC: 9 MG/DL (ref 7–30)
CALCIUM SERPL-MCNC: 9 MG/DL (ref 8.5–10.1)
CHLORIDE SERPL-SCNC: 102 MMOL/L (ref 94–109)
CO2 SERPL-SCNC: 27 MMOL/L (ref 20–32)
COLOR UR AUTO: ABNORMAL
CREAT SERPL-MCNC: 0.75 MG/DL (ref 0.66–1.25)
DIFFERENTIAL METHOD BLD: ABNORMAL
EOSINOPHIL # BLD AUTO: 0.3 10E9/L (ref 0–0.7)
EOSINOPHIL NFR BLD AUTO: 3.7 %
ERYTHROCYTE [DISTWIDTH] IN BLOOD BY AUTOMATED COUNT: 13.5 % (ref 10–15)
GFR SERPL CREATININE-BSD FRML MDRD: NORMAL ML/MIN/1.7M2
GLUCOSE SERPL-MCNC: 96 MG/DL (ref 70–99)
GLUCOSE UR STRIP-MCNC: NEGATIVE MG/DL
HCT VFR BLD AUTO: 42.8 % (ref 40–53)
HGB BLD-MCNC: 13.9 G/DL (ref 13.3–17.7)
HGB UR QL STRIP: NEGATIVE
IMM GRANULOCYTES # BLD: 0 10E9/L (ref 0–0.4)
IMM GRANULOCYTES NFR BLD: 0.6 %
KETONES UR STRIP-MCNC: NEGATIVE MG/DL
LEUKOCYTE ESTERASE UR QL STRIP: NEGATIVE
LYMPHOCYTES # BLD AUTO: 2.2 10E9/L (ref 0.8–5.3)
LYMPHOCYTES NFR BLD AUTO: 31.8 %
MCH RBC QN AUTO: 31 PG (ref 26.5–33)
MCHC RBC AUTO-ENTMCNC: 32.5 G/DL (ref 31.5–36.5)
MCV RBC AUTO: 96 FL (ref 78–100)
MONOCYTES # BLD AUTO: 0.6 10E9/L (ref 0–1.3)
MONOCYTES NFR BLD AUTO: 8.4 %
MUCOUS THREADS #/AREA URNS LPF: PRESENT /LPF
NEUTROPHILS # BLD AUTO: 3.9 10E9/L (ref 1.6–8.3)
NEUTROPHILS NFR BLD AUTO: 55.1 %
NITRATE UR QL: NEGATIVE
NRBC # BLD AUTO: 0 10*3/UL
NRBC BLD AUTO-RTO: 0 /100
PH UR STRIP: 6 PH (ref 5–7)
PLATELET # BLD AUTO: 136 10E9/L (ref 150–450)
POTASSIUM SERPL-SCNC: 4 MMOL/L (ref 3.4–5.3)
PROT SERPL-MCNC: 8.7 G/DL (ref 6.8–8.8)
RBC # BLD AUTO: 4.48 10E12/L (ref 4.4–5.9)
RBC #/AREA URNS AUTO: 1 /HPF (ref 0–2)
SODIUM SERPL-SCNC: 138 MMOL/L (ref 133–144)
SP GR UR STRIP: 1.01 (ref 1–1.03)
URN SPEC COLLECT METH UR: ABNORMAL
UROBILINOGEN UR STRIP-MCNC: 0 MG/DL (ref 0–2)
WBC # BLD AUTO: 7 10E9/L (ref 4–11)
WBC #/AREA URNS AUTO: <1 /HPF (ref 0–2)

## 2017-05-09 PROCEDURE — 80076 HEPATIC FUNCTION PANEL: CPT | Performed by: THORACIC SURGERY (CARDIOTHORACIC VASCULAR SURGERY)

## 2017-05-09 ASSESSMENT — LIFESTYLE VARIABLES: TOBACCO_USE: 1

## 2017-05-09 ASSESSMENT — ENCOUNTER SYMPTOMS: ORTHOPNEA: 0

## 2017-05-09 NOTE — PATIENT INSTRUCTIONS
Preparing for Your Surgery  Name:  Gil Chowdary   MRN:  [id number]   :  1960   Today's Date:  2017     Arriving for surgery:  Surgery date:  17  Surgery time:  8:30 am  Arrival time:  6:30 am    Please come to:     [facility], [address]    [address] [place]  [address]    -   parking is available in front of the hospital from 5:15 am to 8:00 pm    -  Stop at the Information Desk in the lobby    -   Inform the information person that you are here for surgery. An escort to  will be provided. If you would not like an escort, please proceed to 3C on the 3rd floor. [phone]     What can I eat or drink?  -  You may have solid food or milk products until 8 hours prior to your surgery (midnight).  -  You may have clear liquids such as water, gatorade, tea, black coffee, broth, jello, apple juice or 7up/Sprite until 2 hours prior to your surgery ( 6:30 am).    Which medicines can I take?        -   Please hold vitamins/supplements and fish oil for 7 days before surgery.    -  Please take these medications the day of surgery: ASPIRIN.    How do I prepare myself?  -  Take two showers: one the night before surgery; and one the morning of surgery.         Use Scrubcare or Hibiclens to wash from neck down.  You may use your own shampoo and conditioner. No other hair products.   -  Do NOT use lotion, powder, deodorant, or antiperspirant the day of your surgery.  -  Do NOT wear any makeup, fingernail polish or jewelry.  -  Begin using Incentive Spirometer 1 week prior to surgery.  Use 4 times per day, up to 5-10 breaths each time.  Bring Incentive Spirometer to hospital.  -  Do not bring your own medications to the hospital, except for inhalers and eye drops.  -  Bring your ID and insurance card.    Questions or Concerns:  If you have questions or concerns, please call the  Preoperative Assessment Center, Monday-Friday 7AM-7PM:  [phone]    AFTER  YOUR SURGERY  Breathing exercises   Breathing exercises help you recover faster. Take deep breaths and let the air out slowly. This will:     Help you wake up after surgery.    Help prevent complications like pneumonia.  Preventing complications will help you go home sooner.   We may give you a breathing device (incentive spirometer) to encourage you to breathe deeply.     Using an Incentive Spirometer  Soon after your surgery, a nurse or therapist will teach you breathing exercises. These keep your lungs clear, strengthen your breathing muscles, and help prevent complications.  The exercises include doing a deep-breathing exercise using a device called an incentive spirometer.  To do these exercises, you will breathe in through your mouth and not your nose. The incentive spirometer only works correctly if you breathe in through your mouth.  Four steps to clear lungs     Deep breathing expands the lungs, aids circulation, and helps prevent pneumonia.   1. Exhale normally.    Relax and breathe out.  2. Place your lips tightly around the mouthpiece.    Make sure the device is upright and not tilted.  3. Inhale as much air as you can through the mouthpiece (don't breath through your nose).    Inhale slowly and deeply.    Hold your breath long enough to keep the balls or disk raised for at least 3 seconds.    If you re inhaling too quickly, your device may make a tone. If you hear this tone, inhale more slowly.  4. Repeat the exercise regularly.    Do this exercise every hour while you're awake, or as your health care provider instructs.    You will also be taught coughing exercises and be asked to do them regularly on your own.    [id number] [employer]. [address] [place] [address], [place] [address]. All rights reserved. This information is not intended as a substitute for professional medical care. Always follow your healthcare professional's instructions.        Nausea and vomiting   You may feel sick to  your stomach after surgery; if so, let your nurse know.    Pain control:  After surgery, you may have pain. Our goal is to help you manage your pain. Pain medicine will help you feel comfortable enough to do activities that will help you heal.  These activities may include breathing exercises, walking and physical therapy.   To help your health care team treat your pain we will ask: 1) If you have pain  2) where it is located 3) describe your pain in your words  Methods of pain control include medications given by mouth, vein or by nerve block for some surgeries.  We may give you a pain control pump that will:  1) Deliver the medicine through a tube placed in your vein  2) Control the amount of medicine you receive  3) Allow you to push a button to deliver a dose of pain medicine  Sequential Compression Device (SCD) or Pneumo Boots:  You may need to wear SCD S on your legs or feet. These are wraps connected to a machine that pumps in air and releases it. The repeated pumping helps prevent blood clots from forming.

## 2017-05-09 NOTE — ANESTHESIA PREPROCEDURE EVALUATION
"  Anesthesia Evaluation     . Pt has had prior anesthetic. Type: General    No history of anesthetic complications          ROS/MED HX    ENT/Pulmonary:     (+)sleep apnea, tobacco use, Past use uses CPAP , . .    Neurologic:  - neg neurologic ROS     Cardiovascular: Comment: Will note slight chest discomfort, \"a jabbing\" sensation over the left chest wall at times    (+) Dyslipidemia, ----. Taking blood thinners Pt has received instructions: Instructions Given to patient: ASA 81 mg PO QD to be continued. . . :. valvular problems/murmurs type: AS Severe aortic stenosis with a mean gradient measuring 53.7 mmHg, aortic valve area at 0.58 cm2 on 2/13 echo:.      (-) CHF, MOTA, orthopnea/PND and irregular heartbeat/palpitations   METS/Exercise Tolerance:  4 - Raking leaves, gardening   Hematologic:  - neg hematologic  ROS       Musculoskeletal:   (+) , , other musculoskeletal- Chronic neck pain, treats with chiropractic care      GI/Hepatic:  - neg GI/hepatic ROS       Renal/Genitourinary:  - ROS Renal section negative       Endo:     (+) Obesity, .      Psychiatric: Comment: Situational depression due to recent death of his wife.    (+) psychiatric history depression      Infectious Disease:  - neg infectious disease ROS       Malignancy:      - no malignancy   Other:    - neg other ROS                 Physical Exam      Airway   Mallampati: I  TM distance: >3 FB  Neck ROM: full    Dental   (+) partials    Cardiovascular   Rhythm and rate: regular and normal  (+) murmur   (-) carotid bruit is not present and no peripheral edema    Pulmonary    breath sounds clear to auscultation    Other findings: 5/9/17: WBC 7; Hgb 13.9; Hct 42.8; Plt 136  5/9/17: Na 138; K 4; Cl 102; Glu 96; BUN 9; Cr 0.75; Ca 9    2/10/17 Echocardiogram:  The left ventricle is normal in size. There is moderate asymmetric septal hypertrophy. Images and measurments are technically difficult. Left ventricular systolic function is normal. The visual " ejection fraction is estimated at 55-60%. Left ventricular diastolic function is indeterminate. E by E prime ratio is between 8 and 15, which is indeterminate for assessment of left ventricular filling pressures. No regional wall motion abnormalities noted.  The right ventricle is grossly normal size. The right ventricular systolic function is normal.  The left atrium is not well visualized. The left atrium is mildly dilated.  Right atrium not well visualized. Right atrial size is normal. A patent foramen ovale is suspected.  The mitral valve is not well visualized. There is no mitral regurgitation noted. Trivial mitral stenosis. The mean mitral valve gradient is 1.6 mmHg.  The tricuspid valve is not well visualized. There is physiologic tricuspid regurgitation. Right ventricular systolic pressure could not be approximated due to inadequate tricuspid regurgitation. Normal IVC (1.5-2.5cm) with <50% respiratory collapse; right atrial pressure is estimated at 10-15mmHg.  The aortic valve is not well visualized. There is mild (1+) aortic regurgitation. The calculated aortic valve are is 0.58 cm^2. The peak AoV pressure gradient is 91.8 mmHg. The mean AoV pressure gradient is 53.7 mmHg. Severe valvular aortic stenosis.   The pulmonic valve is not well visualized. There is no pulmonic valvular regurgitation. Normal pulmonic valve velocity.  The aortic root is normal size. Normal size ascending aorta. The inferior vena cava is not dilated.  Small pericardial effusion.  The rhythm was normal sinus.    3/28/17 PFT:  The FEV1 and FVC are reduced but the FEV1/FVC ratio is normal.  The inspiratory flow rates are within normal limits.  Lung volumes are within normal limits.  Following administration of bronchodilators, there is no significant response.  The diffusing  capacity is normal.  The results are within normal limits.  IMPRESSION: Normal Pulmonary Function    3/29/17 Bilateral Carotid Ultrasound:  IMPRESSION: Less than  50% bilateral internal carotid artery stenosis.    3/29/17 CT Chest:  IMPRESSION:   1. Calcification of the aortic valve with mild coronary calcification.  2. Tiny nodule in the left lung base.  3. Irregular contour to the liver with prominence of the left lobe which can be seen with cirrhosis.  4. Mild emphysematous changes in both lungs.  5. Remainder of the scan is negative.    4/7/17 EKG: Sinus rhythm with a right bundle branch block    4/7/17 Coronary Angiogram:  FINDINGS:  Left main: No significant narrowing  Circumflex: Proximal segment no significant narrowing. The circumflex then gives off a very large single marginal branch which exhibits diffuse atheromatous change but is no significant focal stenosis. The remainder of the circumflex runs in the AV groove and gives off no other marginal branches.  LAD: Proximal segment no significant narrowing. Midsegment mild atheromatous change and tortuosity with no significant narrowing. Distal segment tortuous with no significant focal narrowing.  Right coronary: Dominant vessel. Very tortuous. Diffuse atheromatous change. Proximal 50% narrowing. Mid vessel no significant narrowing. Distal segment no significant narrowing.  Assessment: The patient's coronaries exhibits diffuse atheromatous change with no significant focal narrowing. There is a right dominant coronary system.           PAC Discussion and Assessment    ASA Classification: 4  Case is suitable for: [place]  Anesthetic techniques and relevant risks discussed: GA  Invasive monitoring and risk discussed: Yes  Types:   Possibility and Risk of blood transfusion discussed: Yes  NPO instructions given:   Additional anesthetic preparation and risks discussed:   Needs early admission to pre-op area:   Other:     PAC Resident/NP Anesthesia Assessment:  Gil Chowdary is a 56 year old male scheduled to undergo Aortic Valve Repair or Replacement on 5/16/17 with Dr. Jun Simon.    He has the following  specific operative considerations:   1.  Diagnosis of obstructive sleep apnea with CPAP use: Recommend careful positioning to prevent airway compromise and close monitoring of the patient's respiratory status.    2.  Severe aortic stenosis: Recommend close monitoring of hemodynamics throughout the perioperative period.    3.  Obesity: Recommend careful positioning to prevent airway/ventilatory compromise, or tissue injury.    4.  Labs as ordered by Dr. Simon.    VTE risk: 0.5%  DRAKE risk: Diagnosed with DRAKE, uses CPAP.  PONV risk score= 1.  (If > 2, anti-emetic intervention is recommended.)  Anesthesia considerations: Refer to PAC assessment in the anesthesia records.        Reviewed and Signed by PAC Mid-Level Provider/Resident  Mid-Level Provider/Resident: Indira Ford CNP  Date: 5/9/17  Time: 1410    Attending Anesthesiologist Anesthesia Assessment:  56 year old for AVR in management of severe AS. Chart reviewed, patient seen and evaluated; agree with above assessment. Last echo shows peak gradient of 90 mmHg, MACARENA 0.58. Patient has SOB, but has not had decompensation or CHF. No pulmonary disease.     Patient is appropriate for the planned procedure without further workup or medical management change. The final anesthesia plan will be determined by the physician anesthesiologist caring for the patient on the day of surgery.      Reviewed and Signed by PAC Anesthesiologist  Anesthesiologist: DONNA  Date: 5/9/2017  Time:   Pass/Fail: Pass  Disposition:     PAC Pharmacist Assessment:        Pharmacist:   Date:   Time:      Anesthesia Plan      History & Physical Review  History and physical reviewed and following examination; no interval change.    ASA Status:  4 .    NPO Status:  > 8 hours    Plan for General and ETT with Intravenous induction. Maintenance will be Balanced.           Postoperative Care  Postoperative pain management:  IV analgesics.      Consents  Anesthetic plan, risks, benefits and alternatives  discussed with:  Patient.  Use of blood products discussed: Yes.   .          Procedure:  Procedure(s):  Median Sternotony, CardioPulmonary Bypass, Aortic Valve Replace using 25MM Trifecta Valve with [employer], Septal myectomy - Wound Class: I-Clean   - Wound Class: I-Clean    Patient Active Problem List   Diagnosis     Hyperlipidemia LDL goal <160     Aortic valve stenosis     Aortic stenosis       Past Medical History:   Diagnosis Date     Former tobacco use      Glaucoma      Hyperlipidemia LDL goal <160 12/6/2011     Obesity      DRAKE on CPAP      Right bundle branch block      Severe aortic stenosis     On Echo 10/28/16       Past Surgical History:   Procedure Laterality Date     HEART CATH LEFT HEART CATH  04/07/2017    Diffuse non-obstuctive CAD     SINUS SURGERY  2005         No current facility-administered medications on file prior to encounter.   Current Outpatient Prescriptions on File Prior to Encounter:  Multiple Vitamins-Minerals (CENTRUM ADULTS PO) Take 1 tablet by mouth every morning    prednisoLONE acetate (PRED FORTE) 1 % ophthalmic susp Place 1 drop into both eyes as needed    timolol (TIMOPTIC) 0.5 % ophthalmic solution 1 drop 1 DROP INTO BOTH EYES 2 TIMES DAILY   aspirin 81 MG tablet Take 81 mg by mouth every morning    Omega-3 Fatty Acids (FISH OIL PO) Take 1 g by mouth every morning        Allergies   Allergen Reactions     Amoxicillin      Itchy      Penicillins Hives       Recent Labs   Lab Test  05/16/17   1415   HGB  12.0*     Recent Labs   Lab Test  05/16/17   1227   POTASSIUM  5.2     Recent Labs   Lab Test  05/16/17   1415   PLT  124*     Recent Labs   Lab Test  05/16/17   1225   INR  1.64*       No results found for this or any previous visit (from the past 4320 hour(s)).      Attending Anesthesiologist    Arnold Reddy MD    *[id number]                .

## 2017-05-09 NOTE — H&P
Pre-Operative H & P     Date of Encounter: 5/9/2017  Primary Care Physician:  Sam Villatoro [id number]    CC: Severe aortic stenosis.    HPI:  Gil Chowdary is a 56 year old male who presents for pre-operative H & P in preparation for Aortic Valve Repair or Replacement on 5/16/17 with Dr. Jun Simon at [facility].   The patient was referred to Cardiology after his primary provider noted a cardiac murmur on physical exam.  An echocardiogram was completed at an outside facility and revealed calcific aortic valve stenosis with a mean gradient of 42 mmHg, aortic valve area of 0.8 cm2, and trace aortic insufficiency.  The patient was evaluated by Dr. Salinas on 12/7/16, and denied any cardiovascular symptoms at all.  At that appointment, plans were made for evaluation by a Cardiovascular Surgeon, but there was no rush to proceed with surgery.  In addition, a repeat echocardiogram was ordered, but was not completed until 2/13/17, and revealed progression of the aortic stenosis, with a mean gradient measuring 53.7 mmHg, and aortic valve area at 0.58 cm2.  The patient was evaluated by Dr. Simon on 2/14, and surgical intervention was recommended.  Unfortunately, preparatory testing and scheduling of the surgery were delayed due to the death of the patient's wife earlier this year and a change in insurance.  Arrangements are now being made for the above procedures.    History is obtained from the patient and the medical record.    Past Medical History:  Past Medical History:   Diagnosis Date     Hyperlipidemia LDL goal <160 12/6/2011     Right bundle branch block      Severe aortic stenosis     On Echo 10/28/16     Past Surgical History:  Past Surgical History:   Procedure Laterality Date     HEART CATH LEFT HEART CATH  04/07/2017    Diffuse non-obstuctive CAD     Hx of Blood transfusions/reactions: Denies.     Hx of abnormal bleeding or anti-platelet use: Patient  instructed to continue ASA 81 mg PO QD prior to surgery.      Menstrual history: No LMP for male patient.    Steroid use in the last year: Denies.    Personal or FH of difficulty with anesthesia: Denies.    Prior to admission medications  Current Outpatient Prescriptions   Medication Sig Dispense Refill     atorvastatin (LIPITOR) 40 MG tablet Take 1 tablet (40 mg) by mouth daily 90 tablet 0     Multiple Vitamins-Minerals (CENTRUM ADULTS PO) Take by mouth daily        prednisoLONE acetate (PRED FORTE) 1 % ophthalmic susp INSTILL 1 DROP INTO RIGHT THREE TIME DAILY       timolol (TIMOPTIC) 0.5 % ophthalmic solution 1 drop 1 DROP INTO BOTH EYES 2 TIMES DAILY       aspirin 81 MG tablet Take 81 mg by mouth daily       Omega-3 Fatty Acids (FISH OIL PO) Take  by mouth daily.       Allergies  Allergies   Allergen Reactions     Amoxicillin      Itchy      Penicillins Hives     Social History  Social History     Social History     Marital status:      Spouse name: N/A     Number of children: N/A     Years of education: N/A     Occupational History     Not on file.     Social History Main Topics     Smoking status: Former Smoker     Packs/day: 1.00     Years: 20.00     Types: Cigarettes, Cigars     Quit date: 10/21/2011     Smokeless tobacco: Never Used     Alcohol use No     Drug use: No     Sexual activity: Yes     Partners: Female     Other Topics Concern     Parent/Sibling W/ Cabg, Mi Or Angioplasty Before 65f 55m? Yes     brother triple bypass 49     Social History Narrative     Family History  Family History   Problem Relation Age of Onset     Family History Negative Mother      DIABETES Father      Heart Surgery Father      bypass     Hypertension Brother      DIABETES Brother      HEART DISEASE Brother 49     TRIPLE BYPASS     Heart Surgery Brother      bypass x3     Family History Negative Sister      Family History Negative Brother      Family History Negative Brother      Review of Systems  Functional status:  "Independent in ADL's.  4 METS.     The complete review of systems is negative other than noted in the HPI or here.   Constitutional: Denies recent fevers/chills.  Reports an approximately 25 pound weight gain since the beginning of the year.  Reports some nights, he will toss/turn.  Eyes: Glasses for vision correction.  No recent vision changes.  EENT: Denies recent changes in hearing, mouth pain, or difficulty swallowing.  Cardiovascular: Denies MOTA or orthopnea, or palpitations.  Reports occasional \"jabbing\" discomfort over his left anterior chest wall.  They do not occur frequently, and quickly resolve.  Respiratory: Denies shortness of breath or significant cough.    GI: Denies nausea/vomiting or diarrhea/constipation.    : Denies dysuria.    Musculoskeletal: Denies joint pain or swelling.    Skin: Denies rashes or wounds.    Hematologic: Denies easy bruising or bleeding.    Neurologic: Denies migraines, seizures, dizziness, numbness/tingling.  Psychiatric: Denies changes in mood or affect.      /87  Pulse 91  Temp 98.2  F (36.8  C) (Oral)  Resp 18  Ht 1.778 m (5' 10\")  Wt 115 kg (253 lb 8 oz)  SpO2 96%  BMI 36.37 kg/m2    253 lbs 8 oz  5' 10\"   Body mass index is 36.37 kg/(m^2).    Physical Exam  Constitutional: Patient awake, seated upright in a chair, in no apparent distress.  Appears stated age.  Eyes: Pupils equal, round and reactive to light.  Extra ocular muscles intact. Sclera clear.  Conjunctiva normal.  HENT: Head normocephalic.  Oral pharynx intact with moist mucous membranes.  Dentition intact.  No thyromegaly appreciated.   Respiratory: Lung sounds clear to auscultation bilaterally.  No rales, rhonchi, or wheezing noted.    Cardiovascular: S1, S2, regular rate and rhythm.  2/6 holosystolic murmur noted.  No rubs, or gallops noted. No carotid bruits auscultated.  Radial and pedal pulses palpable, bilaterally.  Trace pedal edema noted, bilaterally.   GI: Bowel sounds present.  Abdomen " rounded, soft, non-tender to light palpation.  No hepatosplenomegaly or masses palpated.   Genitourinary: Exam deferred.  Lymph/Hematologic: No cervical or supraclavicular lymphadenopathy noted.  No excessive bruising noted.    Skin: Color appropriate for race, warm, dry.  No rashes or wounds at anticipated surgical site.   Musculoskeletal: Full extension of the neck.  No redness, warmth, or swelling of the joints noted. Gross motor strength is normal.    Neurologic: Alert, oriented to name, place and time. Cranial nerves II-XII are grossly intact. Gait is normal.      Neuropsychiatric: Calm, cooperative. Normal affect.     Labs:  17: WBC 7; Hgb 13.9; Hct 42.8; Plt 136  17: Na 138; K 4; Cl 102; Glu 96; BUN 9; Cr 0.75; Ca 9    Imagin/10/17 Echocardiogram:  The left ventricle is normal in size. There is moderate asymmetric septal hypertrophy. Images and measurments are technically difficult. Left ventricular systolic function is normal. The visual ejection fraction is estimated at 55-60%. Left ventricular diastolic function is indeterminate. E by E prime ratio is between 8 and 15, which is indeterminate for assessment of left ventricular filling pressures. No regional wall motion abnormalities noted.  The right ventricle is grossly normal size. The right ventricular systolic function is normal.  The left atrium is not well visualized. The left atrium is mildly dilated.  Right atrium not well visualized. Right atrial size is normal. A patent foramen ovale is suspected.  The mitral valve is not well visualized. There is no mitral regurgitation noted. Trivial mitral stenosis. The mean mitral valve gradient is 1.6 mmHg.  The tricuspid valve is not well visualized. There is physiologic tricuspid regurgitation. Right ventricular systolic pressure could not be approximated due to inadequate tricuspid regurgitation. Normal IVC (1.5-2.5cm) with <50% respiratory collapse; right atrial pressure is estimated at  10-15mmHg.  The aortic valve is not well visualized. There is mild (1+) aortic regurgitation. The calculated aortic valve are is 0.58 cm^2. The peak AoV pressure gradient is 91.8 mmHg. The mean AoV pressure gradient is 53.7 mmHg. Severe valvular aortic stenosis.   The pulmonic valve is not well visualized. There is no pulmonic valvular regurgitation. Normal pulmonic valve velocity.  The aortic root is normal size. Normal size ascending aorta. The inferior vena cava is not dilated.  Small pericardial effusion.  The rhythm was normal sinus.    3/28/17 PFT:  The FEV1 and FVC are reduced but the FEV1/FVC ratio is normal.  The inspiratory flow rates are within normal limits.  Lung volumes are within normal limits.  Following administration of bronchodilators, there is no significant response.  The diffusing  capacity is normal.  The results are within normal limits.  IMPRESSION: Normal Pulmonary Function    3/29/17 Bilateral Carotid Ultrasound:  IMPRESSION: Less than 50% bilateral internal carotid artery stenosis.    3/29/17 CT Chest:  IMPRESSION:   1. Calcification of the aortic valve with mild coronary calcification.  2. Tiny nodule in the left lung base.  3. Irregular contour to the liver with prominence of the left lobe which can be seen with cirrhosis.  4. Mild emphysematous changes in both lungs.  5. Remainder of the scan is negative.    4/7/17 EKG: Sinus rhythm with a right bundle branch block    4/7/17 Coronary Angiogram:  FINDINGS:  Left main: No significant narrowing  Circumflex: Proximal segment no significant narrowing. The circumflex then gives off a very large single marginal branch which exhibits diffuse atheromatous change but is no significant focal stenosis. The remainder of the circumflex runs in the AV groove and gives off no other marginal branches.  LAD: Proximal segment no significant narrowing. Midsegment mild atheromatous change and tortuosity with no significant narrowing. Distal segment  tortuous with no significant focal narrowing.  Right coronary: Dominant vessel. Very tortuous. Diffuse atheromatous change. Proximal 50% narrowing. Mid vessel no significant narrowing. Distal segment no significant narrowing.  Assessment: The patient's coronaries exhibits diffuse atheromatous change with no significant focal narrowing. There is a right dominant coronary system.    Lab results, EKG were personally reviewed by this provider.      Assessment and Plan  Gil Chowdary is a 56 year old male scheduled to undergo Aortic Valve Repair or Replacement on 5/16/17 with Dr. Jun Simon.    He has the following specific operative considerations:   1.  Diagnosis of obstructive sleep apnea with CPAP use: Recommend careful positioning to prevent airway compromise and close monitoring of the patient's respiratory status.    2.  Severe aortic stenosis: Recommend close monitoring of hemodynamics throughout the perioperative period.    3.  Obesity: Recommend careful positioning to prevent airway/ventilatory compromise, or tissue injury.    4.  Labs as ordered by Dr. Simon.    VTE risk: 0.5%  DRAKE risk: Diagnosed with DRAKE, uses CPAP.  PONV risk score= 1.  (If > 2, anti-emetic intervention is recommended.)  Anesthesia considerations: Refer to PAC assessment in the anesthesia records.    Patient was discussed with Dr. Kelly.    Indira Ford NP  Preoperative Assessment Center  [facility]  Phone: [phone]  Fax: [phone]

## 2017-05-09 NOTE — MR AVS SNAPSHOT
After Visit Summary   2017    Gil Chowdary    MRN: [id number]           Patient Information     Date Of Birth          1960        Visit Information        Provider Department      2017 11:00 AM Rn, [facility] Preoperative [facility]        Care Instructions    Preparing for Your Surgery  Name:  Gil Chowdary   MRN:  [id number]   :  1960   Today's Date:  2017     Arriving for surgery:  Surgery date:  17  Surgery time:  8:30 am  Arrival time:  6:30 am    Please come to:     [facility], [address]    [address] [id number]    -   parking is available in front of the hospital from 5:15 am to 8:00 pm    -  Stop at the Information Desk in the lobby    -   Inform the information person that you are here for surgery. An escort to 3c will be provided. If you would not like an escort, please proceed to 3C on the 3rd floor. [phone]     What can I eat or drink?  -  You may have solid food or milk products until 8 hours prior to your surgery (midnight).  -  You may have clear liquids such as water, gatorade, tea, black coffee, broth, jello, apple juice or 7up/Sprite until 2 hours prior to your surgery ( 6:30 am).    Which medicines can I take?        -   Please hold vitamins/supplements and fish oil for 7 days before surgery.    -  Please take these medications the day of surgery: ASPIRIN.    How do I prepare myself?  -  Take two showers: one the night before surgery; and one the morning of surgery.         Use Scrubcare or Hibiclens to wash from neck down.  You may use your own shampoo and conditioner. No other hair products.   -  Do NOT use lotion, powder, deodorant, or antiperspirant the day of your surgery.  -  Do NOT wear any makeup, fingernail polish or jewelry.  -  Begin using Incentive Spirometer 1 week prior to surgery.  Use 4 times per day, up to 5-10 breaths each time.  Bring Incentive  Spirometer to hospital.  -  Do not bring your own medications to the hospital, except for inhalers and eye drops.  -  Bring your ID and insurance card.    Questions or Concerns:  If you have questions or concerns, please call the  Preoperative Assessment Center, Monday-Friday 7AM-7PM:  [phone]    AFTER YOUR SURGERY  Breathing exercises   Breathing exercises help you recover faster. Take deep breaths and let the air out slowly. This will:     Help you wake up after surgery.    Help prevent complications like pneumonia.  Preventing complications will help you go home sooner.   We may give you a breathing device (incentive spirometer) to encourage you to breathe deeply.     Using an Incentive Spirometer  Soon after your surgery, a nurse or therapist will teach you breathing exercises. These keep your lungs clear, strengthen your breathing muscles, and help prevent complications.  The exercises include doing a deep-breathing exercise using a device called an incentive spirometer.  To do these exercises, you will breathe in through your mouth and not your nose. The incentive spirometer only works correctly if you breathe in through your mouth.  Four steps to clear lungs     Deep breathing expands the lungs, aids circulation, and helps prevent pneumonia.   1. Exhale normally.    Relax and breathe out.  2. Place your lips tightly around the mouthpiece.    Make sure the device is upright and not tilted.  3. Inhale as much air as you can through the mouthpiece (don't breath through your nose).    Inhale slowly and deeply.    Hold your breath long enough to keep the balls or disk raised for at least 3 seconds.    If you re inhaling too quickly, your device may make a tone. If you hear this tone, inhale more slowly.  4. Repeat the exercise regularly.    Do this exercise every hour while you're awake, or as your health care provider instructs.    You will also be taught coughing exercises and be asked to do them regularly  on your own.    2603-6108 [employer]. [address] [place] [address], [place] [address]. All rights reserved. This information is not intended as a substitute for professional medical care. Always follow your healthcare professional's instructions.        Nausea and vomiting   You may feel sick to your stomach after surgery; if so, let your nurse know.    Pain control:  After surgery, you may have pain. Our goal is to help you manage your pain. Pain medicine will help you feel comfortable enough to do activities that will help you heal.  These activities may include breathing exercises, walking and physical therapy.   To help your health care team treat your pain we will ask: 1) If you have pain  2) where it is located 3) describe your pain in your words  Methods of pain control include medications given by mouth, vein or by nerve block for some surgeries.  We may give you a pain control pump that will:  1) Deliver the medicine through a tube placed in your vein  2) Control the amount of medicine you receive  3) Allow you to push a button to deliver a dose of pain medicine  Sequential Compression Device (SCD) or Pneumo Boots:  You may need to wear SCD S on your legs or feet. These are wraps connected to a machine that pumps in air and releases it. The repeated pumping helps prevent blood clots from forming.         Follow-ups after your visit        Your next 10 appointments already scheduled     May 09, 2017 12:30 PM CDT   (Arrive by 12:15 PM)   PAC Anesthesia Consult with  Pac Anesthesiologist   [facility]operative [facility] ([facility])    [address] [place] [id number] [phone]            May 09, 2017 12:45 PM CDT   LAB with  LAB   [facility] ([facility])    [address] [place] [id number] [phone]           Patient must bring picture ID.  Patient should be prepared to give  a urine specimen  Please do not eat 10-12 hours before your appointment if you are coming in fasting for labs on lipids, cholesterol, or glucose (sugar).  Pregnant women should follow their Care Team instructions. Water with medications is okay. Do not drink coffee or other fluids.   If you have concerns about taking  your medications, please ask at office or if scheduling via [employer], send a message by clicking on Secure Messaging, Message Your Care Team.            May 16, 2017   Procedure with uJn Simon MD   [facility], [place], Same Day Surgery (--)    [address]  [place] [id number] [phone]              Who to contact     Please call your clinic at [phone] to:    Ask questions about your health    Make or cancel appointments    Discuss your medicines    Learn about your test results    Speak to your doctor   If you have compliments or concerns about an experience at your clinic, or if you wish to file a complaint, please contact [facility] Patient Relations at [phone] or email us at [email or web address]         Additional Information About Your Visit        [employer] Information     [employer] gives you secure access to your electronic health record. If you see a primary care provider, you can also send messages to your care team and make appointments. If you have questions, please call your primary care clinic.  If you do not have a primary care provider, please call [phone] and they will assist you.      [employer] is an electronic gateway that provides easy, online access to your medical records. With [employer], you can request a clinic appointment, read your test results, renew a prescription or communicate with your care team.     To access your existing account, please contact your [facility] or call [phone] for assistance.        Care EveryWhere ID     This is your Care EveryWhere ID. This could be used by  other organizations to access your [place] medical records  [id number]         Blood Pressure from Last 3 Encounters:   05/09/17 130/87   04/18/17 116/90   04/07/17 106/74    Weight from Last 3 Encounters:   05/09/17 115 kg (253 lb 8 oz)   04/18/17 114.8 kg (253 lb)   04/07/17 111.4 kg (245 lb 8 oz)              Today, you had the following     No orders found for display         Today's Medication Changes          These changes are accurate as of: 5/9/17 12:08 PM.  If you have any questions, ask your nurse or doctor.               These medicines have changed or have updated prescriptions.        Dose/Directions    atorvastatin 40 MG tablet   Commonly known as:  LIPITOR   This may have changed:  when to take this   Used for:  Hyperlipidemia with target LDL less than 70        Dose:  40 mg   Take 1 tablet (40 mg) by mouth daily   Quantity:  90 tablet   Refills:  0                Primary Care Provider Fax #    Sam Grimes [address] [place] [phone]       DUPLICATE  [place] [address]        Thank you!     Thank you for choosing [facility]OPER[facility]  for your care. Our goal is always to provide you with excellent care. Hearing back from our patients is one way we can continue to improve our services. Please take a few minutes to complete the written survey that you may receive in the mail after your visit with us. Thank you!             Your Updated Medication List - Protect others around you: Learn how to safely use, store and throw away your medicines at www.disposemymeds.org.          This list is accurate as of: 5/9/17 12:08 PM.  Always use your most recent med list.                   Brand Name Dispense Instructions for use    aspirin 81 MG tablet      Take 81 mg by mouth every morning       atorvastatin 40 MG tablet    LIPITOR    90 tablet    Take 1 tablet (40 mg) by mouth daily       CENTRUM ADULTS PO      Take 1 tablet by mouth every morning       cholecalciferol 5000 UNITS Tabs tablet     vitamin D3     Take 5,000 Units by mouth every morning       FISH OIL PO      Take 1 g by mouth every morning       prednisoLONE acetate 1 % ophthalmic susp    PRED FORTE     Place 1 drop into both eyes as needed       timolol 0.5 % ophthalmic solution    TIMOPTIC     1 drop 1 DROP INTO BOTH EYES 2 TIMES DAILY

## 2017-05-12 DIAGNOSIS — I35.0 AORTIC VALVE STENOSIS, UNSPECIFIED ETIOLOGY: Primary | ICD-10-CM

## 2017-05-12 RX ORDER — CLINDAMYCIN PHOSPHATE 900 MG/50ML
900 INJECTION, SOLUTION INTRAVENOUS SEE ADMIN INSTRUCTIONS
Status: CANCELLED | OUTPATIENT
Start: 2017-05-12

## 2017-05-12 RX ORDER — CLINDAMYCIN PHOSPHATE 900 MG/50ML
900 INJECTION, SOLUTION INTRAVENOUS
Status: CANCELLED | OUTPATIENT
Start: 2017-05-12

## 2017-05-16 ENCOUNTER — APPOINTMENT (OUTPATIENT)
Dept: GENERAL RADIOLOGY | Facility: CLINIC | Age: 57
DRG: 220 | End: 2017-05-16
Attending: THORACIC SURGERY (CARDIOTHORACIC VASCULAR SURGERY)
Payer: COMMERCIAL

## 2017-05-16 ENCOUNTER — HOSPITAL ENCOUNTER (INPATIENT)
Facility: CLINIC | Age: 57
LOS: 8 days | Discharge: HOME | DRG: 220 | End: 2017-05-24
Attending: THORACIC SURGERY (CARDIOTHORACIC VASCULAR SURGERY) | Admitting: THORACIC SURGERY (CARDIOTHORACIC VASCULAR SURGERY)
Payer: COMMERCIAL

## 2017-05-16 ENCOUNTER — ANESTHESIA (OUTPATIENT)
Dept: SURGERY | Facility: CLINIC | Age: 57
DRG: 220 | End: 2017-05-16
Payer: COMMERCIAL

## 2017-05-16 DIAGNOSIS — I47.10 PAROXYSMAL SUPRAVENTRICULAR TACHYCARDIA (H): Primary | ICD-10-CM

## 2017-05-16 DIAGNOSIS — G89.18 ACUTE POST-OPERATIVE PAIN: ICD-10-CM

## 2017-05-16 DIAGNOSIS — I35.0 AORTIC VALVE STENOSIS, UNSPECIFIED ETIOLOGY: ICD-10-CM

## 2017-05-16 DIAGNOSIS — Z95.2 S/P AVR (AORTIC VALVE REPLACEMENT): ICD-10-CM

## 2017-05-16 LAB
ABO + RH BLD: NORMAL
ABO + RH BLD: NORMAL
ANION GAP SERPL CALCULATED.3IONS-SCNC: 8 MMOL/L (ref 3–14)
APTT PPP: 29 SEC (ref 22–37)
APTT PPP: 33 SEC (ref 22–37)
APTT PPP: 44 SEC (ref 22–37)
BASE DEFICIT BLDA-SCNC: 0.4 MMOL/L
BASE DEFICIT BLDA-SCNC: 0.6 MMOL/L
BASE DEFICIT BLDA-SCNC: 0.6 MMOL/L
BASE DEFICIT BLDA-SCNC: 1.5 MMOL/L
BASE DEFICIT BLDA-SCNC: 1.5 MMOL/L
BASE DEFICIT BLDA-SCNC: 2.5 MMOL/L
BASE DEFICIT BLDA-SCNC: 3.1 MMOL/L
BASE DEFICIT BLDA-SCNC: 3.3 MMOL/L
BASE DEFICIT BLDA-SCNC: 5.3 MMOL/L
BASE DEFICIT BLDA-SCNC: 5.9 MMOL/L
BASE DEFICIT BLDV-SCNC: 0.6 MMOL/L
BASE EXCESS BLDA CALC-SCNC: 0.4 MMOL/L
BASE EXCESS BLDV CALC-SCNC: 3.2 MMOL/L
BLD GP AB SCN SERPL QL: NORMAL
BLD PROD TYP BPU: NORMAL
BLD UNIT ID BPU: 0
BLOOD BANK CMNT PATIENT-IMP: NORMAL
BLOOD BANK CMNT PATIENT-IMP: NORMAL
BLOOD PRODUCT CODE: NORMAL
BPU ID: NORMAL
BUN SERPL-MCNC: 10 MG/DL (ref 7–30)
CA-I BLD-MCNC: 4.3 MG/DL (ref 4.4–5.2)
CA-I BLD-MCNC: 4.3 MG/DL (ref 4.4–5.2)
CA-I BLD-MCNC: 4.4 MG/DL (ref 4.4–5.2)
CA-I BLD-MCNC: 4.7 MG/DL (ref 4.4–5.2)
CA-I BLD-MCNC: 4.7 MG/DL (ref 4.4–5.2)
CA-I BLD-MCNC: 4.8 MG/DL (ref 4.4–5.2)
CA-I BLD-MCNC: 4.8 MG/DL (ref 4.4–5.2)
CALCIUM SERPL-MCNC: 8.5 MG/DL (ref 8.5–10.1)
CHLORIDE SERPL-SCNC: 110 MMOL/L (ref 94–109)
CO2 SERPL-SCNC: 23 MMOL/L (ref 20–32)
CREAT SERPL-MCNC: 0.94 MG/DL (ref 0.66–1.25)
ERYTHROCYTE [DISTWIDTH] IN BLOOD BY AUTOMATED COUNT: 14 % (ref 10–15)
FIBRINOGEN PPP-MCNC: 202 MG/DL (ref 200–420)
FIBRINOGEN PPP-MCNC: 248 MG/DL (ref 200–420)
GFR SERPL CREATININE-BSD FRML MDRD: 83 ML/MIN/1.7M2
GLUCOSE BLD-MCNC: 109 MG/DL (ref 70–99)
GLUCOSE BLD-MCNC: 114 MG/DL (ref 70–99)
GLUCOSE BLD-MCNC: 118 MG/DL (ref 70–99)
GLUCOSE BLD-MCNC: 129 MG/DL (ref 70–99)
GLUCOSE BLD-MCNC: 131 MG/DL (ref 70–99)
GLUCOSE BLD-MCNC: 149 MG/DL (ref 70–99)
GLUCOSE BLDC GLUCOMTR-MCNC: 102 MG/DL (ref 70–99)
GLUCOSE BLDC GLUCOMTR-MCNC: 107 MG/DL (ref 70–99)
GLUCOSE BLDC GLUCOMTR-MCNC: 117 MG/DL (ref 70–99)
GLUCOSE BLDC GLUCOMTR-MCNC: 135 MG/DL (ref 70–99)
GLUCOSE BLDC GLUCOMTR-MCNC: 139 MG/DL (ref 70–99)
GLUCOSE BLDC GLUCOMTR-MCNC: 140 MG/DL (ref 70–99)
GLUCOSE BLDC GLUCOMTR-MCNC: 142 MG/DL (ref 70–99)
GLUCOSE BLDC GLUCOMTR-MCNC: 143 MG/DL (ref 70–99)
GLUCOSE SERPL-MCNC: 121 MG/DL (ref 70–99)
HCO3 BLD-SCNC: 18 MMOL/L (ref 21–28)
HCO3 BLD-SCNC: 19 MMOL/L (ref 21–28)
HCO3 BLD-SCNC: 22 MMOL/L (ref 21–28)
HCO3 BLD-SCNC: 22 MMOL/L (ref 21–28)
HCO3 BLD-SCNC: 23 MMOL/L (ref 21–28)
HCO3 BLD-SCNC: 24 MMOL/L (ref 21–28)
HCO3 BLD-SCNC: 25 MMOL/L (ref 21–28)
HCO3 BLD-SCNC: 25 MMOL/L (ref 21–28)
HCO3 BLD-SCNC: 27 MMOL/L (ref 21–28)
HCO3 BLDV-SCNC: 26 MMOL/L (ref 21–28)
HCO3 BLDV-SCNC: 28 MMOL/L (ref 21–28)
HCT VFR BLD AUTO: 36.2 % (ref 40–53)
HGB BLD-MCNC: 11 G/DL (ref 13.3–17.7)
HGB BLD-MCNC: 11.1 G/DL (ref 13.3–17.7)
HGB BLD-MCNC: 11.4 G/DL (ref 13.3–17.7)
HGB BLD-MCNC: 11.5 G/DL (ref 13.3–17.7)
HGB BLD-MCNC: 11.6 G/DL (ref 13.3–17.7)
HGB BLD-MCNC: 12 G/DL (ref 13.3–17.7)
HGB BLD-MCNC: 12.9 G/DL (ref 13.3–17.7)
INR PPP: 1.18 (ref 0.86–1.14)
INR PPP: 1.55 (ref 0.86–1.14)
INR PPP: 1.64 (ref 0.86–1.14)
LACTATE BLD-SCNC: 1.5 MMOL/L (ref 0.7–2.1)
LACTATE BLD-SCNC: 1.5 MMOL/L (ref 0.7–2.1)
LACTATE BLD-SCNC: 1.6 MMOL/L (ref 0.7–2.1)
LACTATE BLD-SCNC: 2.1 MMOL/L (ref 0.7–2.1)
LACTATE BLD-SCNC: 2.1 MMOL/L (ref 0.7–2.1)
LACTATE BLD-SCNC: 2.4 MMOL/L (ref 0.7–2.1)
LACTATE BLD-SCNC: 2.6 MMOL/L (ref 0.7–2.1)
MAGNESIUM SERPL-MCNC: 2.6 MG/DL (ref 1.6–2.3)
MCH RBC QN AUTO: 31.2 PG (ref 26.5–33)
MCHC RBC AUTO-ENTMCNC: 33.1 G/DL (ref 31.5–36.5)
MCV RBC AUTO: 94 FL (ref 78–100)
MRSA DNA SPEC QL NAA+PROBE: NORMAL
NUM BPU REQUESTED: 1
NUM BPU REQUESTED: 2
O2/TOTAL GAS SETTING VFR VENT: 100 %
O2/TOTAL GAS SETTING VFR VENT: 100 %
O2/TOTAL GAS SETTING VFR VENT: 40 %
O2/TOTAL GAS SETTING VFR VENT: 70 %
O2/TOTAL GAS SETTING VFR VENT: 70 %
O2/TOTAL GAS SETTING VFR VENT: 80 %
O2/TOTAL GAS SETTING VFR VENT: 80 %
O2/TOTAL GAS SETTING VFR VENT: ABNORMAL %
OXYHGB MFR BLD: 93 % (ref 92–100)
OXYHGB MFR BLD: 93 % (ref 92–100)
OXYHGB MFR BLD: 94 % (ref 92–100)
OXYHGB MFR BLD: 95 % (ref 92–100)
OXYHGB MFR BLD: 95 % (ref 92–100)
OXYHGB MFR BLD: 97 % (ref 92–100)
OXYHGB MFR BLDV: 64 %
PCO2 BLD: 29 MM HG (ref 35–45)
PCO2 BLD: 32 MM HG (ref 35–45)
PCO2 BLD: 32 MM HG (ref 35–45)
PCO2 BLD: 33 MM HG (ref 35–45)
PCO2 BLD: 34 MM HG (ref 35–45)
PCO2 BLD: 37 MM HG (ref 35–45)
PCO2 BLD: 37 MM HG (ref 35–45)
PCO2 BLD: 40 MM HG (ref 35–45)
PCO2 BLD: 53 MM HG (ref 35–45)
PCO2 BLD: 55 MM HG (ref 35–45)
PCO2 BLD: 63 MM HG (ref 35–45)
PCO2 BLDV: 44 MM HG (ref 40–50)
PCO2 BLDV: 55 MM HG (ref 40–50)
PH BLD: 7.24 PH (ref 7.35–7.45)
PH BLD: 7.26 PH (ref 7.35–7.45)
PH BLD: 7.28 PH (ref 7.35–7.45)
PH BLD: 7.35 PH (ref 7.35–7.45)
PH BLD: 7.37 PH (ref 7.35–7.45)
PH BLD: 7.39 PH (ref 7.35–7.45)
PH BLD: 7.4 PH (ref 7.35–7.45)
PH BLD: 7.4 PH (ref 7.35–7.45)
PH BLD: 7.46 PH (ref 7.35–7.45)
PH BLDV: 7.28 PH (ref 7.32–7.43)
PH BLDV: 7.41 PH (ref 7.32–7.43)
PHOSPHATE SERPL-MCNC: 3.3 MG/DL (ref 2.5–4.5)
PLATELET # BLD AUTO: 124 10E9/L (ref 150–450)
PLATELET # BLD AUTO: 155 10E9/L (ref 150–450)
PO2 BLD: 110 MM HG (ref 80–105)
PO2 BLD: 111 MM HG (ref 80–105)
PO2 BLD: 316 MM HG (ref 80–105)
PO2 BLD: 316 MM HG (ref 80–105)
PO2 BLD: 350 MM HG (ref 80–105)
PO2 BLD: 409 MM HG (ref 80–105)
PO2 BLD: 451 MM HG (ref 80–105)
PO2 BLD: 71 MM HG (ref 80–105)
PO2 BLD: 77 MM HG (ref 80–105)
PO2 BLD: 79 MM HG (ref 80–105)
PO2 BLD: 92 MM HG (ref 80–105)
PO2 BLDV: 35 MM HG (ref 25–47)
PO2 BLDV: 57 MM HG (ref 25–47)
POTASSIUM BLD-SCNC: 3.9 MMOL/L (ref 3.4–5.3)
POTASSIUM BLD-SCNC: 4.9 MMOL/L (ref 3.4–5.3)
POTASSIUM BLD-SCNC: 5.2 MMOL/L (ref 3.4–5.3)
POTASSIUM BLD-SCNC: 5.2 MMOL/L (ref 3.4–5.3)
POTASSIUM BLD-SCNC: 5.9 MMOL/L (ref 3.4–5.3)
POTASSIUM BLD-SCNC: 6.1 MMOL/L (ref 3.4–5.3)
POTASSIUM SERPL-SCNC: 4.7 MMOL/L (ref 3.4–5.3)
RBC # BLD AUTO: 3.85 10E12/L (ref 4.4–5.9)
SODIUM BLD-SCNC: 136 MMOL/L (ref 133–144)
SODIUM BLD-SCNC: 137 MMOL/L (ref 133–144)
SODIUM BLD-SCNC: 138 MMOL/L (ref 133–144)
SODIUM BLD-SCNC: 138 MMOL/L (ref 133–144)
SODIUM BLD-SCNC: 139 MMOL/L (ref 133–144)
SODIUM BLD-SCNC: 142 MMOL/L (ref 133–144)
SODIUM SERPL-SCNC: 140 MMOL/L (ref 133–144)
SPECIMEN EXP DATE BLD: NORMAL
SPECIMEN SOURCE: NORMAL
TRANSFUSION STATUS PATIENT QL: NORMAL
WBC # BLD AUTO: 12.9 10E9/L (ref 4–11)

## 2017-05-16 PROCEDURE — 83605 ASSAY OF LACTIC ACID: CPT | Performed by: ANESTHESIOLOGY

## 2017-05-16 PROCEDURE — 82330 ASSAY OF CALCIUM: CPT | Performed by: ANESTHESIOLOGY

## 2017-05-16 PROCEDURE — 25000565 ZZH ISOFLURANE, EA 15 MIN: Performed by: THORACIC SURGERY (CARDIOTHORACIC VASCULAR SURGERY)

## 2017-05-16 PROCEDURE — 25800025 ZZH RX 258: Performed by: THORACIC SURGERY (CARDIOTHORACIC VASCULAR SURGERY)

## 2017-05-16 PROCEDURE — P9016 RBC LEUKOCYTES REDUCED: HCPCS | Performed by: THORACIC SURGERY (CARDIOTHORACIC VASCULAR SURGERY)

## 2017-05-16 PROCEDURE — 40000275 ZZH STATISTIC RCP TIME EA 10 MIN

## 2017-05-16 PROCEDURE — 83735 ASSAY OF MAGNESIUM: CPT | Performed by: THORACIC SURGERY (CARDIOTHORACIC VASCULAR SURGERY)

## 2017-05-16 PROCEDURE — S0077 INJECTION, CLINDAMYCIN PHOSP: HCPCS | Performed by: THORACIC SURGERY (CARDIOTHORACIC VASCULAR SURGERY)

## 2017-05-16 PROCEDURE — 40000940 XR CHEST PORT 1 VW

## 2017-05-16 PROCEDURE — 88311 DECALCIFY TISSUE: CPT | Performed by: THORACIC SURGERY (CARDIOTHORACIC VASCULAR SURGERY)

## 2017-05-16 PROCEDURE — 36415 COLL VENOUS BLD VENIPUNCTURE: CPT | Performed by: THORACIC SURGERY (CARDIOTHORACIC VASCULAR SURGERY)

## 2017-05-16 PROCEDURE — 83605 ASSAY OF LACTIC ACID: CPT | Performed by: THORACIC SURGERY (CARDIOTHORACIC VASCULAR SURGERY)

## 2017-05-16 PROCEDURE — 25000125 ZZHC RX 250: Performed by: NURSE ANESTHETIST, CERTIFIED REGISTERED

## 2017-05-16 PROCEDURE — 02BM0ZZ EXCISION OF VENTRICULAR SEPTUM, OPEN APPROACH: ICD-10-PCS | Performed by: THORACIC SURGERY (CARDIOTHORACIC VASCULAR SURGERY)

## 2017-05-16 PROCEDURE — P9041 ALBUMIN (HUMAN),5%, 50ML: HCPCS

## 2017-05-16 PROCEDURE — 41000018 ZZH PER-PERFUSION 1ST 30 MIN: Performed by: THORACIC SURGERY (CARDIOTHORACIC VASCULAR SURGERY)

## 2017-05-16 PROCEDURE — 40000014 ZZH STATISTIC ARTERIAL MONITORING DAILY

## 2017-05-16 PROCEDURE — 74000 XR ABDOMEN PORT F1 VW: CPT

## 2017-05-16 PROCEDURE — 20000004 ZZH R&B ICU UMMC

## 2017-05-16 PROCEDURE — 25000132 ZZH RX MED GY IP 250 OP 250 PS 637: Performed by: THORACIC SURGERY (CARDIOTHORACIC VASCULAR SURGERY)

## 2017-05-16 PROCEDURE — 88305 TISSUE EXAM BY PATHOLOGIST: CPT | Performed by: THORACIC SURGERY (CARDIOTHORACIC VASCULAR SURGERY)

## 2017-05-16 PROCEDURE — 94002 VENT MGMT INPAT INIT DAY: CPT

## 2017-05-16 PROCEDURE — 25000128 H RX IP 250 OP 636

## 2017-05-16 PROCEDURE — 25000128 H RX IP 250 OP 636: Performed by: THORACIC SURGERY (CARDIOTHORACIC VASCULAR SURGERY)

## 2017-05-16 PROCEDURE — 85384 FIBRINOGEN ACTIVITY: CPT | Performed by: THORACIC SURGERY (CARDIOTHORACIC VASCULAR SURGERY)

## 2017-05-16 PROCEDURE — 82330 ASSAY OF CALCIUM: CPT

## 2017-05-16 PROCEDURE — 85610 PROTHROMBIN TIME: CPT | Performed by: ANESTHESIOLOGY

## 2017-05-16 PROCEDURE — 25000125 ZZHC RX 250: Performed by: THORACIC SURGERY (CARDIOTHORACIC VASCULAR SURGERY)

## 2017-05-16 PROCEDURE — 80048 BASIC METABOLIC PNL TOTAL CA: CPT | Performed by: THORACIC SURGERY (CARDIOTHORACIC VASCULAR SURGERY)

## 2017-05-16 PROCEDURE — 85027 COMPLETE CBC AUTOMATED: CPT | Performed by: THORACIC SURGERY (CARDIOTHORACIC VASCULAR SURGERY)

## 2017-05-16 PROCEDURE — 40000196 ZZH STATISTIC RAPCV CVP MONITORING

## 2017-05-16 PROCEDURE — 82805 BLOOD GASES W/O2 SATURATION: CPT | Performed by: THORACIC SURGERY (CARDIOTHORACIC VASCULAR SURGERY)

## 2017-05-16 PROCEDURE — 36000076 ZZH SURGERY LEVEL 6 EA 15 ADDTL MIN - UMMC: Performed by: THORACIC SURGERY (CARDIOTHORACIC VASCULAR SURGERY)

## 2017-05-16 PROCEDURE — 02RF08Z REPLACEMENT OF AORTIC VALVE WITH ZOOPLASTIC TISSUE, OPEN APPROACH: ICD-10-PCS | Performed by: THORACIC SURGERY (CARDIOTHORACIC VASCULAR SURGERY)

## 2017-05-16 PROCEDURE — 84100 ASSAY OF PHOSPHORUS: CPT | Performed by: THORACIC SURGERY (CARDIOTHORACIC VASCULAR SURGERY)

## 2017-05-16 PROCEDURE — 85730 THROMBOPLASTIN TIME PARTIAL: CPT | Performed by: ANESTHESIOLOGY

## 2017-05-16 PROCEDURE — 86901 BLOOD TYPING SEROLOGIC RH(D): CPT | Performed by: THORACIC SURGERY (CARDIOTHORACIC VASCULAR SURGERY)

## 2017-05-16 PROCEDURE — 25000128 H RX IP 250 OP 636: Performed by: NURSE ANESTHETIST, CERTIFIED REGISTERED

## 2017-05-16 PROCEDURE — P9041 ALBUMIN (HUMAN),5%, 50ML: HCPCS | Performed by: NURSE ANESTHETIST, CERTIFIED REGISTERED

## 2017-05-16 PROCEDURE — P9041 ALBUMIN (HUMAN),5%, 50ML: HCPCS | Performed by: THORACIC SURGERY (CARDIOTHORACIC VASCULAR SURGERY)

## 2017-05-16 PROCEDURE — 27210460 ZZH PUMP APP ADULT PERFUSION: Performed by: THORACIC SURGERY (CARDIOTHORACIC VASCULAR SURGERY)

## 2017-05-16 PROCEDURE — 85730 THROMBOPLASTIN TIME PARTIAL: CPT | Performed by: THORACIC SURGERY (CARDIOTHORACIC VASCULAR SURGERY)

## 2017-05-16 PROCEDURE — 87641 MR-STAPH DNA AMP PROBE: CPT | Performed by: THORACIC SURGERY (CARDIOTHORACIC VASCULAR SURGERY)

## 2017-05-16 PROCEDURE — 84132 ASSAY OF SERUM POTASSIUM: CPT

## 2017-05-16 PROCEDURE — 85610 PROTHROMBIN TIME: CPT | Performed by: THORACIC SURGERY (CARDIOTHORACIC VASCULAR SURGERY)

## 2017-05-16 PROCEDURE — 25000125 ZZHC RX 250

## 2017-05-16 PROCEDURE — 93005 ELECTROCARDIOGRAM TRACING: CPT

## 2017-05-16 PROCEDURE — 86900 BLOOD TYPING SEROLOGIC ABO: CPT | Performed by: THORACIC SURGERY (CARDIOTHORACIC VASCULAR SURGERY)

## 2017-05-16 PROCEDURE — 82803 BLOOD GASES ANY COMBINATION: CPT

## 2017-05-16 PROCEDURE — 86850 RBC ANTIBODY SCREEN: CPT | Performed by: THORACIC SURGERY (CARDIOTHORACIC VASCULAR SURGERY)

## 2017-05-16 PROCEDURE — 84295 ASSAY OF SERUM SODIUM: CPT

## 2017-05-16 PROCEDURE — 27210447 ZZH PACK CELL SAVER CSP: Performed by: THORACIC SURGERY (CARDIOTHORACIC VASCULAR SURGERY)

## 2017-05-16 PROCEDURE — 27210794 ZZH OR GENERAL SUPPLY STERILE: Performed by: THORACIC SURGERY (CARDIOTHORACIC VASCULAR SURGERY)

## 2017-05-16 PROCEDURE — 5A1221Z PERFORMANCE OF CARDIAC OUTPUT, CONTINUOUS: ICD-10-PCS | Performed by: THORACIC SURGERY (CARDIOTHORACIC VASCULAR SURGERY)

## 2017-05-16 PROCEDURE — 82947 ASSAY GLUCOSE BLOOD QUANT: CPT

## 2017-05-16 PROCEDURE — 83605 ASSAY OF LACTIC ACID: CPT

## 2017-05-16 PROCEDURE — 27810169 ZZH OR IMPLANT GENERAL: Performed by: THORACIC SURGERY (CARDIOTHORACIC VASCULAR SURGERY)

## 2017-05-16 PROCEDURE — 84295 ASSAY OF SERUM SODIUM: CPT | Performed by: ANESTHESIOLOGY

## 2017-05-16 PROCEDURE — 87640 STAPH A DNA AMP PROBE: CPT | Performed by: THORACIC SURGERY (CARDIOTHORACIC VASCULAR SURGERY)

## 2017-05-16 PROCEDURE — 37000009 ZZH ANESTHESIA TECHNICAL FEE, EACH ADDTL 15 MIN: Performed by: THORACIC SURGERY (CARDIOTHORACIC VASCULAR SURGERY)

## 2017-05-16 PROCEDURE — 00000146 ZZHCL STATISTIC GLUCOSE BY METER IP

## 2017-05-16 PROCEDURE — 84132 ASSAY OF SERUM POTASSIUM: CPT | Performed by: ANESTHESIOLOGY

## 2017-05-16 PROCEDURE — 85049 AUTOMATED PLATELET COUNT: CPT | Performed by: ANESTHESIOLOGY

## 2017-05-16 PROCEDURE — 85384 FIBRINOGEN ACTIVITY: CPT | Performed by: ANESTHESIOLOGY

## 2017-05-16 PROCEDURE — 71010 XR CHEST PORT 1 VW: CPT

## 2017-05-16 PROCEDURE — 82803 BLOOD GASES ANY COMBINATION: CPT | Performed by: ANESTHESIOLOGY

## 2017-05-16 PROCEDURE — 82330 ASSAY OF CALCIUM: CPT | Performed by: THORACIC SURGERY (CARDIOTHORACIC VASCULAR SURGERY)

## 2017-05-16 PROCEDURE — 99233 SBSQ HOSP IP/OBS HIGH 50: CPT | Mod: GC | Performed by: SURGERY

## 2017-05-16 PROCEDURE — 93010 ELECTROCARDIOGRAM REPORT: CPT | Performed by: INTERNAL MEDICINE

## 2017-05-16 PROCEDURE — 40000170 ZZH STATISTIC PRE-PROCEDURE ASSESSMENT II: Performed by: THORACIC SURGERY (CARDIOTHORACIC VASCULAR SURGERY)

## 2017-05-16 PROCEDURE — 36000074 ZZH SURGERY LEVEL 6 1ST 30 MIN - UMMC: Performed by: THORACIC SURGERY (CARDIOTHORACIC VASCULAR SURGERY)

## 2017-05-16 PROCEDURE — S5010 5% DEXTROSE AND 0.45% SALINE: HCPCS | Performed by: THORACIC SURGERY (CARDIOTHORACIC VASCULAR SURGERY)

## 2017-05-16 PROCEDURE — S0017 INJECTION, AMINOCAPROIC ACID: HCPCS | Performed by: NURSE ANESTHETIST, CERTIFIED REGISTERED

## 2017-05-16 PROCEDURE — 82947 ASSAY GLUCOSE BLOOD QUANT: CPT | Performed by: ANESTHESIOLOGY

## 2017-05-16 PROCEDURE — 41000019 ZZH PERA-PERFUSION EACH ADDTL 15 MIN: Performed by: THORACIC SURGERY (CARDIOTHORACIC VASCULAR SURGERY)

## 2017-05-16 PROCEDURE — S0017 INJECTION, AMINOCAPROIC ACID: HCPCS | Performed by: THORACIC SURGERY (CARDIOTHORACIC VASCULAR SURGERY)

## 2017-05-16 PROCEDURE — 27210995 ZZH RX 272: Performed by: THORACIC SURGERY (CARDIOTHORACIC VASCULAR SURGERY)

## 2017-05-16 PROCEDURE — 37000008 ZZH ANESTHESIA TECHNICAL FEE, 1ST 30 MIN: Performed by: THORACIC SURGERY (CARDIOTHORACIC VASCULAR SURGERY)

## 2017-05-16 DEVICE — VALVE AORTIC ST JUDE TRIFECTA GLIDE BIOPROSTH 25MM TFGT-25A: Type: IMPLANTABLE DEVICE | Site: AORTA | Status: FUNCTIONAL

## 2017-05-16 RX ORDER — ONDANSETRON 2 MG/ML
4 INJECTION INTRAMUSCULAR; INTRAVENOUS EVERY 6 HOURS PRN
Status: DISCONTINUED | OUTPATIENT
Start: 2017-05-16 | End: 2017-05-24 | Stop reason: HOSPADM

## 2017-05-16 RX ORDER — CLINDAMYCIN PHOSPHATE 900 MG/50ML
900 INJECTION, SOLUTION INTRAVENOUS
Status: DISCONTINUED | OUTPATIENT
Start: 2017-05-16 | End: 2017-05-16 | Stop reason: HOSPADM

## 2017-05-16 RX ORDER — ETOMIDATE 2 MG/ML
INJECTION INTRAVENOUS PRN
Status: DISCONTINUED | OUTPATIENT
Start: 2017-05-16 | End: 2017-05-16

## 2017-05-16 RX ORDER — SODIUM CHLORIDE, SODIUM LACTATE, POTASSIUM CHLORIDE, CALCIUM CHLORIDE 600; 310; 30; 20 MG/100ML; MG/100ML; MG/100ML; MG/100ML
INJECTION, SOLUTION INTRAVENOUS CONTINUOUS PRN
Status: DISCONTINUED | OUTPATIENT
Start: 2017-05-16 | End: 2017-05-16

## 2017-05-16 RX ORDER — FENTANYL CITRATE 50 UG/ML
INJECTION, SOLUTION INTRAMUSCULAR; INTRAVENOUS PRN
Status: DISCONTINUED | OUTPATIENT
Start: 2017-05-16 | End: 2017-05-16

## 2017-05-16 RX ORDER — PROPOFOL 10 MG/ML
10-20 INJECTION, EMULSION INTRAVENOUS EVERY 30 MIN PRN
Status: DISCONTINUED | OUTPATIENT
Start: 2017-05-16 | End: 2017-05-17

## 2017-05-16 RX ORDER — LIDOCAINE 40 MG/G
CREAM TOPICAL
Status: DISCONTINUED | OUTPATIENT
Start: 2017-05-16 | End: 2017-05-24 | Stop reason: HOSPADM

## 2017-05-16 RX ORDER — MAGNESIUM SULFATE HEPTAHYDRATE 40 MG/ML
4 INJECTION, SOLUTION INTRAVENOUS EVERY 4 HOURS PRN
Status: DISCONTINUED | OUTPATIENT
Start: 2017-05-16 | End: 2017-05-24 | Stop reason: HOSPADM

## 2017-05-16 RX ORDER — POTASSIUM CHLORIDE 750 MG/1
20-40 TABLET, EXTENDED RELEASE ORAL
Status: DISCONTINUED | OUTPATIENT
Start: 2017-05-16 | End: 2017-05-24 | Stop reason: HOSPADM

## 2017-05-16 RX ORDER — NITROGLYCERIN 20 MG/100ML
.07-1 INJECTION INTRAVENOUS CONTINUOUS
Status: DISCONTINUED | OUTPATIENT
Start: 2017-05-16 | End: 2017-05-17

## 2017-05-16 RX ORDER — NALOXONE HYDROCHLORIDE 0.4 MG/ML
.1-.4 INJECTION, SOLUTION INTRAMUSCULAR; INTRAVENOUS; SUBCUTANEOUS
Status: DISCONTINUED | OUTPATIENT
Start: 2017-05-16 | End: 2017-05-16

## 2017-05-16 RX ORDER — ONDANSETRON 4 MG/1
4 TABLET, ORALLY DISINTEGRATING ORAL EVERY 6 HOURS PRN
Status: DISCONTINUED | OUTPATIENT
Start: 2017-05-16 | End: 2017-05-24 | Stop reason: HOSPADM

## 2017-05-16 RX ORDER — CLINDAMYCIN PHOSPHATE 900 MG/50ML
900 INJECTION, SOLUTION INTRAVENOUS EVERY 8 HOURS
Status: COMPLETED | OUTPATIENT
Start: 2017-05-16 | End: 2017-05-17

## 2017-05-16 RX ORDER — ACETAMINOPHEN 650 MG/1
650 SUPPOSITORY RECTAL EVERY 4 HOURS PRN
Status: DISCONTINUED | OUTPATIENT
Start: 2017-05-16 | End: 2017-05-24 | Stop reason: HOSPADM

## 2017-05-16 RX ORDER — MUPIROCIN 20 MG/G
0.5 OINTMENT TOPICAL 2 TIMES DAILY
Status: DISCONTINUED | OUTPATIENT
Start: 2017-05-16 | End: 2017-05-18 | Stop reason: CLARIF

## 2017-05-16 RX ORDER — CALCIUM CHLORIDE 100 MG/ML
INJECTION INTRAVENOUS; INTRAVENTRICULAR PRN
Status: DISCONTINUED | OUTPATIENT
Start: 2017-05-16 | End: 2017-05-16

## 2017-05-16 RX ORDER — PROPOFOL 10 MG/ML
INJECTION, EMULSION INTRAVENOUS CONTINUOUS PRN
Status: DISCONTINUED | OUTPATIENT
Start: 2017-05-16 | End: 2017-05-16

## 2017-05-16 RX ORDER — PROTAMINE SULFATE 10 MG/ML
INJECTION, SOLUTION INTRAVENOUS PRN
Status: DISCONTINUED | OUTPATIENT
Start: 2017-05-16 | End: 2017-05-16

## 2017-05-16 RX ORDER — ALBUTEROL SULFATE 90 UG/1
6 AEROSOL, METERED RESPIRATORY (INHALATION) EVERY 4 HOURS PRN
Status: DISCONTINUED | OUTPATIENT
Start: 2017-05-16 | End: 2017-05-24 | Stop reason: HOSPADM

## 2017-05-16 RX ORDER — POTASSIUM CHLORIDE 29.8 MG/ML
20 INJECTION INTRAVENOUS
Status: DISCONTINUED | OUTPATIENT
Start: 2017-05-16 | End: 2017-05-24 | Stop reason: HOSPADM

## 2017-05-16 RX ORDER — ASPIRIN 81 MG/1
81 TABLET, CHEWABLE ORAL DAILY
Status: DISCONTINUED | OUTPATIENT
Start: 2017-05-17 | End: 2017-05-24 | Stop reason: HOSPADM

## 2017-05-16 RX ORDER — POTASSIUM CHLORIDE 1.5 G/1.58G
20-40 POWDER, FOR SOLUTION ORAL
Status: DISCONTINUED | OUTPATIENT
Start: 2017-05-16 | End: 2017-05-24 | Stop reason: HOSPADM

## 2017-05-16 RX ORDER — ALBUMIN, HUMAN INJ 5% 5 %
SOLUTION INTRAVENOUS CONTINUOUS PRN
Status: DISCONTINUED | OUTPATIENT
Start: 2017-05-16 | End: 2017-05-16

## 2017-05-16 RX ORDER — FENTANYL CITRATE 50 UG/ML
50-100 INJECTION, SOLUTION INTRAMUSCULAR; INTRAVENOUS
Status: DISCONTINUED | OUTPATIENT
Start: 2017-05-16 | End: 2017-05-17

## 2017-05-16 RX ORDER — DEXTROSE MONOHYDRATE, SODIUM CHLORIDE, AND POTASSIUM CHLORIDE 50; 1.49; 4.5 G/1000ML; G/1000ML; G/1000ML
INJECTION, SOLUTION INTRAVENOUS
Status: DISCONTINUED
Start: 2017-05-16 | End: 2017-05-19 | Stop reason: HOSPADM

## 2017-05-16 RX ORDER — AMOXICILLIN 250 MG
1-2 CAPSULE ORAL 2 TIMES DAILY
Status: DISCONTINUED | OUTPATIENT
Start: 2017-05-16 | End: 2017-05-24 | Stop reason: HOSPADM

## 2017-05-16 RX ORDER — PROPOFOL 10 MG/ML
5-75 INJECTION, EMULSION INTRAVENOUS CONTINUOUS
Status: DISCONTINUED | OUTPATIENT
Start: 2017-05-16 | End: 2017-05-17

## 2017-05-16 RX ORDER — POTASSIUM CHLORIDE 7.45 MG/ML
10 INJECTION INTRAVENOUS
Status: DISCONTINUED | OUTPATIENT
Start: 2017-05-16 | End: 2017-05-24 | Stop reason: HOSPADM

## 2017-05-16 RX ORDER — DEXTROSE MONOHYDRATE 25 G/50ML
25-50 INJECTION, SOLUTION INTRAVENOUS
Status: DISCONTINUED | OUTPATIENT
Start: 2017-05-16 | End: 2017-05-24 | Stop reason: HOSPADM

## 2017-05-16 RX ORDER — CLINDAMYCIN PHOSPHATE 900 MG/50ML
900 INJECTION, SOLUTION INTRAVENOUS SEE ADMIN INSTRUCTIONS
Status: DISCONTINUED | OUTPATIENT
Start: 2017-05-16 | End: 2017-05-16 | Stop reason: HOSPADM

## 2017-05-16 RX ORDER — EPHEDRINE SULFATE 50 MG/ML
INJECTION, SOLUTION INTRAMUSCULAR; INTRAVENOUS; SUBCUTANEOUS PRN
Status: DISCONTINUED | OUTPATIENT
Start: 2017-05-16 | End: 2017-05-16

## 2017-05-16 RX ORDER — LIDOCAINE HYDROCHLORIDE 20 MG/ML
INJECTION, SOLUTION INFILTRATION; PERINEURAL PRN
Status: DISCONTINUED | OUTPATIENT
Start: 2017-05-16 | End: 2017-05-16

## 2017-05-16 RX ORDER — POTASSIUM CL/LIDO/0.9 % NACL 10MEQ/0.1L
10 INTRAVENOUS SOLUTION, PIGGYBACK (ML) INTRAVENOUS
Status: DISCONTINUED | OUTPATIENT
Start: 2017-05-16 | End: 2017-05-24 | Stop reason: HOSPADM

## 2017-05-16 RX ORDER — NICOTINE POLACRILEX 4 MG
15-30 LOZENGE BUCCAL
Status: DISCONTINUED | OUTPATIENT
Start: 2017-05-16 | End: 2017-05-24 | Stop reason: HOSPADM

## 2017-05-16 RX ORDER — NITROGLYCERIN 10 MG/100ML
INJECTION INTRAVENOUS PRN
Status: DISCONTINUED | OUTPATIENT
Start: 2017-05-16 | End: 2017-05-16

## 2017-05-16 RX ORDER — MEPERIDINE HYDROCHLORIDE 25 MG/ML
12.5-25 INJECTION INTRAMUSCULAR; INTRAVENOUS; SUBCUTANEOUS
Status: DISCONTINUED | OUTPATIENT
Start: 2017-05-16 | End: 2017-05-17

## 2017-05-16 RX ORDER — ACETAMINOPHEN 325 MG/1
650 TABLET ORAL EVERY 4 HOURS PRN
Status: DISCONTINUED | OUTPATIENT
Start: 2017-05-16 | End: 2017-05-24 | Stop reason: HOSPADM

## 2017-05-16 RX ORDER — ALBUMIN, HUMAN INJ 5% 5 %
500-1000 SOLUTION INTRAVENOUS
Status: COMPLETED | OUTPATIENT
Start: 2017-05-16 | End: 2017-05-16

## 2017-05-16 RX ORDER — HEPARIN SODIUM 1000 [USP'U]/ML
INJECTION, SOLUTION INTRAVENOUS; SUBCUTANEOUS PRN
Status: DISCONTINUED | OUTPATIENT
Start: 2017-05-16 | End: 2017-05-16

## 2017-05-16 RX ORDER — NALOXONE HYDROCHLORIDE 0.4 MG/ML
.1-.4 INJECTION, SOLUTION INTRAMUSCULAR; INTRAVENOUS; SUBCUTANEOUS
Status: DISCONTINUED | OUTPATIENT
Start: 2017-05-16 | End: 2017-05-24 | Stop reason: HOSPADM

## 2017-05-16 RX ADMIN — METOPROLOL TARTRATE 12.5 MG: 25 TABLET, FILM COATED ORAL at 07:31

## 2017-05-16 RX ADMIN — FENTANYL CITRATE 200 MCG: 50 INJECTION, SOLUTION INTRAMUSCULAR; INTRAVENOUS at 08:43

## 2017-05-16 RX ADMIN — CALCIUM CHLORIDE 1000 MG: 100 INJECTION, SOLUTION INTRAVENOUS at 11:55

## 2017-05-16 RX ADMIN — MIDAZOLAM HYDROCHLORIDE 1 MG: 1 INJECTION, SOLUTION INTRAMUSCULAR; INTRAVENOUS at 08:17

## 2017-05-16 RX ADMIN — PHENYLEPHRINE HYDROCHLORIDE 100 MCG: 10 INJECTION, SOLUTION INTRAMUSCULAR; INTRAVENOUS; SUBCUTANEOUS at 08:55

## 2017-05-16 RX ADMIN — EPINEPHRINE 0.03 MCG/KG/MIN: 1 INJECTION PARENTERAL at 11:32

## 2017-05-16 RX ADMIN — ALBUMIN (HUMAN) 500 ML: 12.5 INJECTION, SOLUTION INTRAVENOUS at 17:15

## 2017-05-16 RX ADMIN — LIDOCAINE HYDROCHLORIDE 100 MG: 20 INJECTION, SOLUTION INFILTRATION; PERINEURAL at 08:43

## 2017-05-16 RX ADMIN — ROCURONIUM BROMIDE 50 MG: 10 INJECTION INTRAVENOUS at 09:45

## 2017-05-16 RX ADMIN — AMINOCAPROIC ACID 1 G/HR: 250 INJECTION, SOLUTION INTRAVENOUS at 10:18

## 2017-05-16 RX ADMIN — MIDAZOLAM HYDROCHLORIDE 2 MG: 1 INJECTION, SOLUTION INTRAMUSCULAR; INTRAVENOUS at 13:30

## 2017-05-16 RX ADMIN — FENTANYL CITRATE 50 MCG: 50 INJECTION, SOLUTION INTRAMUSCULAR; INTRAVENOUS at 13:45

## 2017-05-16 RX ADMIN — FENTANYL CITRATE 50 MCG: 50 INJECTION, SOLUTION INTRAMUSCULAR; INTRAVENOUS at 08:27

## 2017-05-16 RX ADMIN — VANCOMYCIN HYDROCHLORIDE 1500 MG: 10 INJECTION, POWDER, LYOPHILIZED, FOR SOLUTION INTRAVENOUS at 09:19

## 2017-05-16 RX ADMIN — FENTANYL CITRATE 100 MCG: 50 INJECTION, SOLUTION INTRAMUSCULAR; INTRAVENOUS at 09:34

## 2017-05-16 RX ADMIN — DEXTROSE AND SODIUM CHLORIDE: 5; 450 INJECTION, SOLUTION INTRAVENOUS at 15:14

## 2017-05-16 RX ADMIN — MIDAZOLAM HYDROCHLORIDE 2 MG: 1 INJECTION, SOLUTION INTRAMUSCULAR; INTRAVENOUS at 08:43

## 2017-05-16 RX ADMIN — SODIUM BICARBONATE 100 MEQ: 84 INJECTION, SOLUTION INTRAVENOUS at 18:02

## 2017-05-16 RX ADMIN — ROCURONIUM BROMIDE 30 MG: 10 INJECTION INTRAVENOUS at 12:30

## 2017-05-16 RX ADMIN — NITROGLYCERIN 100 MCG: 10 INJECTION INTRAVENOUS at 13:03

## 2017-05-16 RX ADMIN — PROPOFOL 30 MCG/KG/MIN: 10 INJECTION, EMULSION INTRAVENOUS at 13:23

## 2017-05-16 RX ADMIN — HUMAN INSULIN 1 UNITS/HR: 100 INJECTION, SOLUTION SUBCUTANEOUS at 17:51

## 2017-05-16 RX ADMIN — SODIUM CHLORIDE, POTASSIUM CHLORIDE, SODIUM LACTATE AND CALCIUM CHLORIDE: 600; 310; 30; 20 INJECTION, SOLUTION INTRAVENOUS at 09:25

## 2017-05-16 RX ADMIN — NOREPINEPHRINE BITARTRATE 0.03 MCG/KG/MIN: 1 INJECTION INTRAVENOUS at 11:33

## 2017-05-16 RX ADMIN — PANTOPRAZOLE SODIUM 40 MG: 40 INJECTION, POWDER, FOR SOLUTION INTRAVENOUS at 17:51

## 2017-05-16 RX ADMIN — Medication 5 MG: at 11:51

## 2017-05-16 RX ADMIN — ROCURONIUM BROMIDE 50 MG: 10 INJECTION INTRAVENOUS at 10:59

## 2017-05-16 RX ADMIN — ALBUMIN (HUMAN): 12.5 SOLUTION INTRAVENOUS at 13:17

## 2017-05-16 RX ADMIN — ETOMIDATE 20 MG: 2 INJECTION INTRAVENOUS at 08:43

## 2017-05-16 RX ADMIN — NOREPINEPHRINE BITARTRATE 0.06 MCG/KG/MIN: 1 INJECTION INTRAVENOUS at 14:27

## 2017-05-16 RX ADMIN — PHENYLEPHRINE HYDROCHLORIDE 200 MCG: 10 INJECTION, SOLUTION INTRAMUSCULAR; INTRAVENOUS; SUBCUTANEOUS at 12:06

## 2017-05-16 RX ADMIN — AMINOCAPROIC ACID 1 G/HR: 250 INJECTION, SOLUTION INTRAVENOUS at 14:48

## 2017-05-16 RX ADMIN — ACETAMINOPHEN 650 MG: 325 TABLET, FILM COATED ORAL at 15:16

## 2017-05-16 RX ADMIN — CLINDAMYCIN PHOSPHATE 900 MG: 18 INJECTION, SOLUTION INTRAVENOUS at 16:06

## 2017-05-16 RX ADMIN — SENNOSIDES AND DOCUSATE SODIUM 1 TABLET: 8.6; 5 TABLET ORAL at 20:22

## 2017-05-16 RX ADMIN — Medication 34000 UNITS: at 09:42

## 2017-05-16 RX ADMIN — CLINDAMYCIN PHOSPHATE 900 MG: 18 INJECTION, SOLUTION INTRAVENOUS at 09:20

## 2017-05-16 RX ADMIN — PHENYLEPHRINE HYDROCHLORIDE 200 MCG: 10 INJECTION, SOLUTION INTRAMUSCULAR; INTRAVENOUS; SUBCUTANEOUS at 09:17

## 2017-05-16 RX ADMIN — ROCURONIUM BROMIDE 100 MG: 10 INJECTION INTRAVENOUS at 08:43

## 2017-05-16 RX ADMIN — PROPOFOL 25 MCG/KG/MIN: 10 INJECTION, EMULSION INTRAVENOUS at 14:49

## 2017-05-16 RX ADMIN — PHENYLEPHRINE HYDROCHLORIDE 100 MCG: 10 INJECTION, SOLUTION INTRAMUSCULAR; INTRAVENOUS; SUBCUTANEOUS at 09:43

## 2017-05-16 RX ADMIN — FENTANYL CITRATE 100 MCG: 50 INJECTION, SOLUTION INTRAMUSCULAR; INTRAVENOUS at 13:51

## 2017-05-16 RX ADMIN — NITROGLYCERIN 100 MCG: 10 INJECTION INTRAVENOUS at 12:52

## 2017-05-16 RX ADMIN — VANCOMYCIN HYDROCHLORIDE 1500 MG: 10 INJECTION, POWDER, LYOPHILIZED, FOR SOLUTION INTRAVENOUS at 20:29

## 2017-05-16 RX ADMIN — AMINOCAPROIC ACID 5 G: 250 INJECTION, SOLUTION INTRAVENOUS at 09:18

## 2017-05-16 RX ADMIN — MUPIROCIN 0.5 G: 20 OINTMENT TOPICAL at 20:22

## 2017-05-16 RX ADMIN — SODIUM CHLORIDE, POTASSIUM CHLORIDE, SODIUM LACTATE AND CALCIUM CHLORIDE: 600; 310; 30; 20 INJECTION, SOLUTION INTRAVENOUS at 08:10

## 2017-05-16 RX ADMIN — PROTAMINE SULFATE 250 MG: 10 INJECTION, SOLUTION INTRAVENOUS at 12:10

## 2017-05-16 RX ADMIN — SODIUM CHLORIDE, POTASSIUM CHLORIDE, SODIUM LACTATE AND CALCIUM CHLORIDE: 600; 310; 30; 20 INJECTION, SOLUTION INTRAVENOUS at 12:08

## 2017-05-16 RX ADMIN — MIDAZOLAM HYDROCHLORIDE 2 MG: 1 INJECTION, SOLUTION INTRAMUSCULAR; INTRAVENOUS at 08:25

## 2017-05-16 NOTE — PROGRESS NOTES
SPIRITUAL HEALTH SERVICES  [facility] 3C  PRE-SURGERY VISIT    Had brief pre-surgery visit with pt.  Provided spiritual support, prayer per request.     Sam Felix  ACPE Supervisor  Staff /Clinical Educator

## 2017-05-16 NOTE — ANESTHESIA CARE TRANSFER NOTE
Patient: Gil Chowdary    Procedure(s):  Median Sternotony, CardioPulmonary Bypass, Aortic Valve Replace using 25MM Trifecta Valve with [employer], Septal myectomy - Wound Class: I-Clean   - Wound Class: I-Clean    Diagnosis: Aortic Stenosis   Diagnosis Additional Information: No value filed.    Anesthesia Type:   No value filed.     Note:  Airway :ETT  Patient transferred to:ICU  Comments: VSS. Remains sedated and intubated.        Vitals: (Last set prior to Anesthesia Care Transfer)    CRNA VITALS  5/16/2017 1317 - 5/16/2017 1357      5/16/2017             Resp Rate (observed): (!)  5    Resp Rate (set): 14                Electronically Signed By: RODRIGUEZ De La Vega CRNA  May 16, 2017  1:57 PM

## 2017-05-16 NOTE — ANESTHESIA POSTPROCEDURE EVALUATION
Patient: Gil Chowdary    Procedure(s):  Median Sternotony, CardioPulmonary Bypass, Aortic Valve Replace using 25MM Trifecta Valve with [employer], Septal myectomy - Wound Class: I-Clean   - Wound Class: I-Clean    Diagnosis:Aortic Stenosis   Diagnosis Additional Information: No value filed.    Anesthesia Type:  General    Note:  Anesthesia Post Evaluation    Patient location during evaluation: ICU  Patient participation: Unable to evaluate secondary to administered sedation  Level of consciousness: obtunded/minimal responses  Pain management: adequate  Airway patency: patent  Cardiovascular status: acceptable  Respiratory status: acceptable, intubated and ventilator  Hydration status: acceptable  PONV: unable to assess     Anesthetic complications: None          Last vitals:  Vitals:    05/16/17 0706 05/16/17 1400   BP: 135/89    Pulse: 91    Resp: 18 17   Temp: 36.8  C (98.3  F) 36.4  C (97.6  F)   SpO2: 93% 99%         Electronically Signed By: Arnold Reddy MD  May 16, 2017  2:46 PM

## 2017-05-16 NOTE — PLAN OF CARE
Problem: Goal Outcome Summary  Goal: Goal Outcome Summary  Outcome: Improving  Patient arrived to unit at ~1400. Labile pressures, requiring intermittent Epi, Levo infusion between 0.04 - 0.1 mcg/kg/min. Now off propofol. Pressure supporting with anticipated extubation. Lungs clear, diminished bilaterally with oxygen saturations 94 - 98 percent. 500 ml albumin given. CVP 11, 16. Pressures now 100s / 50s with MAPs 70 - 75 on 0.08 mcg/kg/min Levo. D5 1/2 NS at 10 ml/hr. Blood sugars low 100s. Insulin gtt held. Chest tube output minimal, with 80 cc total since 1400. Urine output between 60 - 200 ml/hr. NSR w BBB, epicardial PM in place set to VVI 52. Follows commands, moves all extremities. Low grade temp of 99.6 - Tylenol given. PERRLA. Will continue with plan of care and likely extubate by end of shift.

## 2017-05-16 NOTE — IP AVS SNAPSHOT
[address] [address]    Phone:  [phone]                                       After Visit Summary   5/16/2017    Gil Chowdary    MRN: [id number]           After Visit Summary Signature Page     I have received my discharge instructions, and my questions have been answered. I have discussed any challenges I see with this plan with the nurse or doctor.    ..........................................................................................................................................  Patient/Patient Representative Signature      ..........................................................................................................................................  Patient Representative Print Name and Relationship to Patient    ..................................................               ................................................  Date                                            Time    ..........................................................................................................................................  Reviewed by Signature/Title    ...................................................              ..............................................  Date                                                            Time

## 2017-05-16 NOTE — H&P
CVICU Consult    HPI:   Gil Chowdary is a 56 year old male severe aortic stenosis manifesting as decreased activity tolerance. On diagnostic coronary angio found to have a right dominant coronary system and diffuse atherosclerotic disease w/o focal narrowing. Patient presents today for elective aortic valve replacement. Uncomplicated intraop course. 400cc of cell saver and colloid used for fluids. He did not require pressors. Adequate urine output.     PMH:  Past Medical History:   Diagnosis Date     Former tobacco use      Glaucoma      Hyperlipidemia LDL goal <160 12/6/2011     Obesity      DRAKE on CPAP      Right bundle branch block      Severe aortic stenosis     On Echo 10/28/16      PSHx:  Past Surgical History:   Procedure Laterality Date     HEART CATH LEFT HEART CATH  04/07/2017    Diffuse non-obstuctive CAD     SINUS SURGERY  2005      Medications:  No current facility-administered medications for this encounter.      Allergies:   Amoxicillin and Penicillins     FamHx:  Family History   Problem Relation Age of Onset     Family History Negative Mother      DIABETES Father      Heart Surgery Father      CABG     Coronary Artery Disease Father      Hypertension Brother      DIABETES Brother      HEART DISEASE Brother      Heart Surgery Brother      CABG x 3     Family History Negative Sister      DIABETES Brother      History of diabetes, but no longer an issue     Family History Negative Brother        SocHx:   reports that he quit smoking about 5 years ago. His smoking use included Cigarettes and Cigars. He has a 20.00 pack-year smoking history. He has never used smokeless tobacco. He reports that he does not drink alcohol or use illicit drugs.     Review of Systems:  ROS: 10 point ROS neg other than the symptoms noted above in the HPI.    Objective:  Vitals:   Temp:  [97.6  F (36.4  C)-98.3  F (36.8  C)] 97.6  F (36.4  C)  Pulse:  [91] 91  Heart Rate:  [83] 83  Resp:  [17-18] 17  BP: (135)/(89)  135/89  MAP:  [59 mmHg] 59 mmHg  Arterial Line BP: (82)/(47) 82/47  SpO2:  [93 %-99 %] 99 %    Ventilation Mode: CMV/AC  Rate Set (breaths/minute): 14 breaths/min  Tidal Volume Set (mL): 500 mL  PEEP (cm H2O): 5 cmH2O  Oxygen Concentration (%): 60 %  Resp: 17    Intake/Output:        Physical exam:  General: NAD  Neuro: sedated  Resp: Breathing non-labored; intubated  CV: RRR  Abdomen: Soft, Non-distended, Non-tender  Extremities: warm and well perfused    Labs:    Recent Labs  Lab 05/16/17  1227 05/16/17  1225 05/16/17  1152  05/16/17  0748   HGB 11.1*  --  11.5*  < >  --    PLT  --  155  --   --   --    INR  --  1.64*  --   --  1.18*     --  142  < >  --    POTASSIUM 5.2  --  5.9*  < >  --    < > = values in this interval not displayed.    Assessment and Plan: 56M with severe aortic stenosis POD#0 s/p median sternotomy, CP bypass, aortic valve replacement, septal myectomy.    Neuro:  propofol gtt, precedex gtt, prn fentanyl  Psych: n/a  CV:  NE and vaso as needed, ASA 81, metop 12.5 BID  Resp:  Intubated; wean PEEP and FiO2, pressure support in the morning  GI: PPI, senna  FEN:  NPO  :  [place], strict I/Os   Heme:  Post op labs pending  ID:  Vanc/ clinda post op x 48hrs  Endo:  Insulin gtt  PPx:  SCDs  Lines: CVC, a line, ETT, OG, PIV, upton  Dispo:  CVICU    Seen and discussed with Dr. Tonja Mejia MD PGY-3  Surgery Resident

## 2017-05-16 NOTE — LETTER
Transition Communication Hand-off for Care Transitions to Next Level of Care Provider    Name: Gil Chowdary  MRN #: [id number]  Primary Care Provider: Sam Grimse [id number]     Primary Clinic: [facility] [place] [address]     Reason for Hospitalization:  Aortic Stenosis   Aortic stenosis  Admit Date/Time: 5/16/2017  6:29 AM  Discharge Date: 5/24/17  Payor Source: Payor: MEDICA / Plan: MEDICA CHOICE CARE [employer] PLUS / Product Type: HMO /     Reason for Communication Hand-off Referral: Multiple providers/specialties  Discharge Plan:     Concern for non-adherence with plan of care:   no  Discharge Needs Assessment:  Needs       Most Recent Value    Equipment Currently Used at Home none    Transportation Available car, family or friend will provide    Other Resources Other (see comment) [[facility]: [phone] for INR]     Follow-up specialty is recommended: Yes    Follow-up plan:  Future Appointments  Date Time Provider Department Center          6/6/2017 3:30 PM [facility]                Key Recommendations:  Post hospitalization.     FRANCOIS HERNANDEZ RN CC    AVS/Discharge Summary is the source of truth; this is a helpful guide for improved communication of patient story

## 2017-05-16 NOTE — BRIEF OP NOTE
[facility], [place]    Brief Operative Note    Pre-operative diagnosis: Aortic Stenosis   Post-operative diagnosis * No post-op diagnosis entered *  Procedure: Procedure(s):  Median Sternotony, CardioPulmonary Bypass, Aortic Valve Replace using 25MM Trifecta Valve with [employer], Septal myectomy - Wound Class: I-Clean   - Wound Class: I-Clean  Surgeon: Surgeon(s) and Role:     * Jun Simon MD - Primary     * Arnold Flowers MD     * Terrance Mejia MD - Resident - Assisting  Anesthesia: General   Estimated blood loss: 1000cc  Drains: Mediastinal x2  Specimens:   ID Type Source Tests Collected by Time Destination   A : Aortic valve leaflets Tissue Heart SURGICAL PATHOLOGY EXAM Jun Simon MD 5/16/2017 10:44 AM    B : Myomectomy  Tissue Heart SURGICAL PATHOLOGY EXAM Jun Simon MD 5/16/2017 10:47 AM      Findings:   See op note.  Complications: None.  Implants: None.

## 2017-05-16 NOTE — ANESTHESIA PROCEDURE NOTES
WALESKA Probe Insertion Note  Probe Inserted by: YANG MIRAMONTES   Insertion method: WALESKA probe easily inserted and WALESKA probe insertion required a jaw lift   Bite block used:   Yes      Probe type:  Adult 3D   Insertion complications: No complications.      Billing for WALESKA report is being done by Anesthesiology

## 2017-05-16 NOTE — ANESTHESIA PROCEDURE NOTES
Perioperative WALESKA Report  Anesthesia Information  WALESKA probe placed and report generated by: : YANG MIRAMONTES MATTHEW DOUGLAS  Images for this study have been archived.   Surgeon:  MARGARET JEFFERS      Preanesthesia Checklist:  Patient identified, IV assessed, risks and benefits discussed, monitors and equipment assessed, procedure being performed at surgeon's request and anesthesia consent obtained.  General Procedure Information  Modalities:  2D, 3D, CW Doppler, PW Doppler and Color flow mapping  Diagnostic indications for WALESKA:         Aortic stenosis                    .    Echocardiographic and Doppler Measurements  Right Ventricle:  Cavity size normal.   Hypertrophy not present.   Thrombus not present.    Global function normal.     Left Ventricle:  Cavity size normal.   Hypertrophy present.   Thrombus not present.   Global Function normal.   Ejection Fraction 60%.      Ventricular Regional Function:  1- Basal Anteroseptal:  normal  2- Basal Anterior:  normal  3- Basal Anterolateral:  normal  4- Basal Inferolateral:  normal  5- Basal Inferior:  normal  6- Basal Inferoseptal:  normal  7- Mid Anteroseptal:  normal  8- Mid Anterior:  normal  9- Mid Anterolateral:  normal  10- Mid Inferolateral:  normal  11- Mid Inferior:  normal  12- Mid Inferoseptal:  normal  13- Apical Anterior:  normal  14- Apical Lateral:  normal  15- Apical Inferior:  normal  16- Apical Septal:  normal  17- [place]:  normal    Valves  Aortic Valve: Annulus calcified.  Stenosis severe.  Regurgitation +2.  Leaflets calcified.  Leaflet motions restricted.  Specific leaflet segments with abnormal motions:  RCC, LCC and NCC  Mitral Valve: Annulus normal.  Stenosis not present.  Regurgitation +1.  Leaflets normal.  Leaflet motions normal.    Tricuspid Valve: Annulus normal.  Stenosis not present.  Regurgitation +2.  Leaflets normal.  Leaflet motions normal.    Pulmonic Valve: Annulus normal.  Stenosis not present.   Regurgitation absent.      Aorta: Ascending Aorta: Size normal.  Dissection not present.  Mobile plaque not present.    Aortic Arch: Mobile plaque not present.    Descending Aorta: Size normal.   Dissection not present.   Mobile plaque not present.        Right Atrium:  Size dilated.   Spontaneous echo contrast not present.   Thrombus not present.   Tumor not present.   Device not present.     Left Atrium: Size dilated.  Spontaneous echo contrast not present.  Thrombus not present.  Tumor not present.  Device not present.    Left atrial appendage normal.     Atrial Septum: Intra-atrial septal morphology normal.     Ventricular Septum: Intra-ventricular septum morphology normal.       Other Findings:   Pericardium:  normal.  Pleural Effusion:  none. Pulmonary Arteries:  normal.  Pulmonary Venous Flow:  normal.  Cornoary sinus catheter present.  .  .  Post Intervention Findings  Procedure(s) performed:  Aortic Valve Repair/Replace. Global function:  Unchanged.   Regional wall motion:  Unchanged   Surgeon(s) notified of all postintervention findings:  Yes  .  .  .   Aortic Valve: Valve replaced with bioprosthetic valve.  No KIKI present.  No paravalvular leak.  .  .  .  .  .  .        Echocardiogram Comments

## 2017-05-16 NOTE — ANESTHESIA PROCEDURE NOTES
Arterial Line Procedure Note  Staff:     Anesthesiologist:  YANG MIRAMONTES    Resident/CRNA:  MARKY ALEXANDER    Arterial line performed by resident/CRNA in presence of a teaching physician    Location: In OR Before Induction  Procedure Start/Stop Times:     patient identified, IV checked, site marked, risks and benefits discussed, informed consent, monitors and equipment checked, pre-op evaluation and at physician/surgeon's request      Correct Patient: Yes      Correct Position: Yes      Correct Site: Yes      Correct Procedure: Yes      Correct Laterality:  Yes    Site Marked:  Yes  Line Placement:     Procedure:  Arterial Line    Insertion Site:  Radial    Insertion laterality:  Left    Skin Prep: Chloraprep      Patient Prep: patient draped, mask, sterile gloves and hat      Local skin infiltration:  2% lidocaine    amount (mL):  2    Ultrasound Guided?: Yes      Artery evaluated via ultrasound confirming patency.   Using realtime imaging, the artery was punctured and the needle was observed entering the artery.      A permanent image is entered into patient's chart.      Catheter size:  20 gauge, 12 cm    Cath secured with: suture      Dressing:  Tegaderm    Complications:  Hematoma    Arterial waveform: Yes      IBP within 10% of NIBP: Yes

## 2017-05-16 NOTE — ANESTHESIA PROCEDURE NOTES
Central Line Procedure Note  Staff:     Anesthesiologist:  YANG MIRAMONTES  Location: In OR after induction  Procedure Start/Stop Times:     patient identified, IV checked, site marked, risks and benefits discussed, informed consent, monitors and equipment checked, pre-op evaluation and at physician/surgeon's request      Correct Patient: Yes      Correct Position: Yes      Correct Site: Yes      Correct Procedure: Yes      Correct Laterality:  Yes    Site Marked:  Yes  Line Placement:     Procedure:  Central Line    Insertion laterality:  Right    Insertion site:  Internal Jugular    Position:  Trendelenburg      Maximal Sterile Barriers: All elements of maximal sterile barrier technique followed      (Maximal sterile barriers include:   Sterile gown, Sterile Gloves, Mask, Cap, Whole body draped, hand hygiene and acceptable skin prep).Skin Prep: Chloraprep         Injection Technique:  Ultrasound guided    Sterile Ultrasound Technique:  Sterile probe cover and Sterile gel    Vein evaluated via U/S for patency/adequacy of catheter insertion and is adequate.  Using realtime U/S imaging the vein was punctured, and needle was observed entering vein on U/S      Permanent Image entered into patient's record      Local skin infiltration:  None    Catheter size:  9 Fr, 2 lumen 11.5 cm (MAC)    Cath secured with: suture      Dressing:  Tegaderm and Biopatch    Complications:  None obvious    Blood aspirated all lumens: Yes      All Lumens Flushed: Yes      Verification method:  Ultrasound

## 2017-05-16 NOTE — IP AVS SNAPSHOT
MRN:[id number]                      After Visit Summary   5/16/2017    Gil Chowdary    MRN: [id number]           Thank you!     Thank you for choosing [facility] for your care. Our goal is always to provide you with excellent care. Hearing back from our patients is one way we can continue to improve our services. Please take a few minutes to complete the written survey that you may receive in the mail after you visit with us. Thank you!        Patient Information     Date Of Birth          1960        Designated Caregiver       Most Recent Value    Caregiver    Will someone help with your care after discharge? yes    Name of designated caregiver Greg    Phone number of caregiver NA    Caregiver address NA      About your hospital stay     You were admitted on:  May 16, 2017 You last received care in the:  [address] [facility]    You were discharged on:  May 24, 2017        Reason for your hospital stay       You had your aortic valve replaced by Dr. Simon                  Who to Call     For medical emergencies, please call 911.  For non-urgent questions about your medical care, please call your primary care provider or clinic, None  For questions related to your surgery, please call your surgery clinic        Attending Provider     Provider Jun Schmidt MD Thoracic Diseases       Primary Care Provider Fax #    [facility] [phone] [place] [phone]       [facility] [address]         When to contact your care team       Call your primary doctor if you have any of the following: fevers, chills, increased shortness of breath, increased drainage, increased swelling or increased pain. Please call if you have any weight gain of 3 pounds overnight or 5 pounds in a week.                  After Care Instructions     Activity       Your activity upon discharge: activity as tolerated with sternal precautions            Diet       Follow this diet upon discharge: Orders  Placed This Encounter      Regular Diet Adult    Restrict fluid to less than 2.5 liters            Monitor and record       pulse when/if you feel palpitations  weight every day            Wound care and dressings       Instructions to care for your wound at home:   You have dissolvable sutures under your skin that do not need to be removed.  Pat wound dressing dry after showers.  Skin glue will eventually fall off in 1-2 weeks.     Keep wound clean and dry, showers are okay after discharge, but don't let spray hit directly on incision. No baths or swimming for 1 month.  Clean wounds twice a day for 2 weeks with microklenz spray if available. Cover chest tube sites with gauze until they stop draining, then leave open.                  Follow-up Appointments     Adult [facility] Follow-up and recommended labs and tests       Follow up with primary care provider, Sma Simon67Ciarra Villatoro, within 7 days to evaluate medication change, to evaluate treatment change, to evaluate after surgery and for hospital follow- up.  The following labs/tests are recommended: BMP, Hgb and INR. Please have bumex and coumadin adjusted as necessary.    Follow up with CV Surgery on 6/6/2017 at 3:30pm.   Follow up with your primary cardiologist within 4-6 weeks after discharge  to evaluate medication change, to evaluate treatment change and to evaluate after surgery. The following labs/tests are recommended: echo.  Follow-up with electrophysiology in 6-8 weeks to discuss your rhythm and possible ablation. Please call to make this appointment    Appointments on [facility] and/or [facility] (with [facility] or [facility] provider or service). Call [phone] if you haven't heard regarding these appointments within 7 days of discharge.            Follow Up and recommended labs and tests       ______________________________________________________    [facility] [address] [place]   Phone: [phone]    Fax: [phone]    For INR and Warfarin monitoring (Goal=2-3).   Indication for Anticoagulation: Bioprosthetic AVR and Atrial Fibrillation.   Expected duration of therapy: 3 months; then reassess.   Dr. Melecio Mcgowan to follow.      Appointment on 5/26/17 at 10:10 AM for INR lab draw, Warfarin education reinforcement, and post hospitalization follow up.   _______________________________________________________                  Your next 10 appointments already scheduled     May 31, 2017  2:30 PM CDT   Cardiac Evaluation with  Cardiac Rehab 2   [facility]    [address] [place] [address] 1st Floor [address]  [facility] [id number] [phone]            Jun 06, 2017  3:30 PM CDT   (Arrive by 3:15 PM)   Return Visit with  CVTS   [facility] ([facility] [place])    [address] [place] [address]  [address] [facility] [id number] [phone]            Jun 19, 2017 11:00 AM CDT   Procedure 1.5 hr with U2A ROOM 10   [address] [facility] ([facility])    [address] [facility] [address]               Jun 19, 2017 12:30 PM CDT   Ep 90 Minute with UUHCVR1   [facility] ([facility])    [address] [facility] [address] [facility] [address] [phone]              Additional Services     Cardiac Rehab Referral       Your provider has referred you to: [facility]: [facility] ([facility]) [facility] [phone]   [email or web address]                  Further instructions from your care team         We will call you to arrange your ablation for your SVT (fast heart rhythm).   Cardiovascular Clinic:   [address] [place] [address]. [place] [address]  Your Care Team:  EP Cardiology   Telephone Number     Radha Quintana [phone]    Shilpa Singleton RN  [phone]     For scheduling appts or procedures:    Lashonda Zhang   [phone]     As always, Thank you for trusting us with your health care needs!          [facility]      AFTER YOU GO HOME FROM YOUR HEART SURGERY    Avoid lifting anything greater than ten pounds for 6 weeks after surgery and then less than 20 pounds for an additional 6 weeks.    No driving for 4 weeks after surgery or while on pain medication. If you had a minimally invasive procedure, you may drive after three weeks if not taking pain medication.      Avoid strenuous activities such as bowling, vacuuming, raking, shoveling, golf or tennis for 12 weeks after your surgery. It is okay to resume sex if you feel comfortable in doing so. You may have to try different positions with your partner.     Splint your chest incision by hugging a pillow or bringing your arms across your chest when coughing or sneezing. Avoid pushing off with your arms when getting up for the first month if you have had your sternum opened.     Shower or wash your incisions daily with soap and water (or as instructed), pat dry. Watch for signs of infection: increased redness, tenderness, warmth or any drainage that appears infected (pus like) or is persistent.  Also a temperature > 100.5 F or chills. Call your surgeon or primary care provider's office immediately. Remove any skin glue left on incisions after 10 days. This will not affect your incision and can speed up healing.    Exercise is very important in your recovery. Please follow the guidelines set up for you in your cardiac rehab classes at the hospital. If outpatient cardiac rehab was ordered for you, we highly recommend you participate. If you have problems arranging your cardiac rehab, please call [phone].     Avoid sitting for prolonged periods of time, try to walk every hour during the day. If you have a leg incision, elevate your leg  often when you are not walking.    Check your weight when you get home from the hospital and continue to check it daily through your recovery for at least a month. If you notice a weight gain of 2-3 pounds in a week, notify your primary care physician, cardiologist or surgeon.    Bowel activity may be slow after surgery. If necessary, you may take an over the counter laxative such as Milk of Magnesia or Miralax. You may have stool softeners prescribed (docusate sodium, Senokot). We recommend using stool softeners while using narcotics for pain (oxycodone/percocet, hydrocodone/vicodin).      DENTAL VISITS AFTER SURGERY  If you have had your heart valve repaired or replaced, we do not recommend having any dental work done for 6 months and you will need to take an antibiotic prior to dental visits from now on.  Please notify your dentist before any procedure for the proper treatment needed. The antibiotic is taken by mouth one hour prior to visit. This includes routine cleanings.    DO NOT SMOKE.  IF YOU NEED HELP QUITTING, PLEASE TALK WITH YOUR CARDIOLOGIST OR PRIMARY DOCTOR.    If you are on a blood thinner, follow the instructions you were given in the hospital and DO NOT SKIP this medication. Try and take it the same time everyday. Your primary care physician or coumadin clinic will manage the dosing.     REGARDING PRESCRIPTION REFILLS.  If you need a refill on your pain medication contact us.  All other medications will be adjusted, discontinued and re-filled by your primary care physician and/or your cardiologist as they were prior to your surgery. We have given you enough for one to three month with possibly one refill.    POST-OPERATIVE CLINIC VISITS  You have a follow up visit with CVTS Surgery Advance Care Practitioners on 6/6/2017 at 3:30 pm at the [facility].  You will then return to the care of your primary physician and your cardiologist.   It is important to call for an  appointment to see your cardiologist in 4-6 weeks after surgery and your primary care physician in 2-4 weeks after surgery. If there is a need to return to see CT Surgery please call our  at [phone].    SURGICAL QUESTIONS  Please call Raine Myers with any surgical questions, her phone number is listed below.  She can assist you with your needs and contact other surgery care team members as indicated.    For general questions or concerns, please call the Cardiothoracic Surgery Department at [phone] 8-4:30 M-F.   On weekends or after hours, please call [phone] and ask the  to   page the Cardiothoracic Surgery fellow on call.      Thank you,    Your Cardiothoracic Surgery Team  Raine Myers RN Care Coordinator-  [phone]   Francia Barnett PA-C                        Warfarin Instruction     You have started taking a medicine called warfarin. This is a blood-thinning medicine (anticoagulant). It helps prevent and treat blood clots.      Before leaving the hospital, make sure you know how much to take and how long to take it.      You will need regular blood tests to make sure your blood is clotting safely. It is very important to see your doctor for regular blood tests.    Talk to your doctor before taking any new medicine (this includes over-the-counter drugs and herbal products). Many medicines can interact with warfarin. This may cause more bleeding or too much clotting.     Eating a lot of vitamin K--found in green, leafy vegetables--can change the way warfarin works in your body. Do NOT avoid these foods. Instead, try to eat the same amount each day.     Bleeding is the most common side-effect of warfarin. You may notice bleeding gums, a bloody nose, bruises and bleeding longer when you cut yourself. See a doctor at once if:   o You cough up blood  o You find blood in your stool (poop)  o You have a deep cut, or a cut that bleeds  "longer than 10 minutes   o You have a bad cut, hard fall, accident or hit your head (go to urgent care or the emergency room).    For women who can get pregnant: This medicine can harm an unborn baby. Be very careful not to get pregnant while taking this medicine. If you think you might be pregnant, call your doctor right away.    For more information, read \"Guide to Warfarin Therapy,  the booklet you received in the hospital.        Pending Results     Date and Time Order Name Status Description    5/23/2017 0337 EKG 12-lead, tracing only Preliminary             Statement of Approval     Ordered          05/24/17 8822  I have reviewed and agree with all the recommendations and orders detailed in this document.  EFFECTIVE NOW     Approved and electronically signed by:  Hugo Barnett PA-C           05/24/17 6044  I have reviewed and agree with all the recommendations and orders detailed in this document.     Approved and electronically signed by:  Hugo Barnett PA-C             Admission Information     Date & Time Provider Department Dept. Phone    5/16/2017 Jun Simon MD [address] [facility] [phone]      Your Vitals Were     Blood Pressure Pulse Temperature Respirations Height Weight    105/69 (BP Location: Left arm) 81 97.5  F (36.4  C) (Oral) 16 1.778 m (5' 10\") 116.3 kg (256 lb 6.4 oz)    Pulse Oximetry BMI (Body Mass Index)                95% 36.79 kg/m2          MyChart Information     [employer] gives you secure access to your electronic health record. If you see a primary care provider, you can also send messages to your care team and make appointments. If you have questions, please call your primary care clinic.  If you do not have a primary care provider, please call [phone] and they will assist you.        Care EveryWhere ID     This is your Care EveryWhere ID. This could be used by other organizations to access your [facility] medical records  [id number]         "   Review of your medicines      START taking        Dose / Directions    acetaminophen 325 MG tablet   Commonly known as:  TYLENOL   Used for:  S/P AVR (aortic valve replacement), Acute post-operative pain        Dose:  650 mg   Take 2 tablets (650 mg) by mouth every 4 hours as needed for mild pain   Quantity:  100 tablet   Refills:  0       bumetanide 1 MG tablet   Commonly known as:  BUMEX   Used for:  S/P AVR (aortic valve replacement)        Dose:  1 mg   Take 1 tablet (1 mg) by mouth 2 times daily   Quantity:  60 tablet   Refills:  0       metoprolol 50 MG tablet   Commonly known as:  LOPRESSOR   Used for:  Paroxysmal supraventricular tachycardia (H), S/P AVR (aortic valve replacement)        Dose:  50 mg   Take 1 tablet (50 mg) by mouth 2 times daily   Quantity:  60 tablet   Refills:  0       oxyCODONE 5 MG IR tablet   Commonly known as:  ROXICODONE   Used for:  S/P AVR (aortic valve replacement), Acute post-operative pain        Dose:  5-10 mg   Take 1-2 tablets (5-10 mg) by mouth every 4 hours as needed for moderate to severe pain   Quantity:  80 tablet   Refills:  0       pantoprazole 40 MG EC tablet   Commonly known as:  PROTONIX   Used for:  S/P AVR (aortic valve replacement)   Notes to Patient:  Best to take 30 minutes before a meal        Dose:  40 mg   Take 1 tablet (40 mg) by mouth every morning   Quantity:  30 tablet   Refills:  0       Potassium Chloride Glenys CR 15 MEQ Tbcr   Used for:  S/P AVR (aortic valve replacement)        Dose:  30 mEq   Take 30 mEq by mouth 2 times daily   Quantity:  90 tablet   Refills:  0       senna-docusate 8.6-50 MG per tablet   Commonly known as:  SENOKOT-S;PERICOLACE   Used for:  S/P AVR (aortic valve replacement)        Dose:  1-2 tablet   Take 1-2 tablets by mouth 2 times daily   Quantity:  100 tablet   Refills:  0       warfarin 3 MG tablet   Commonly known as:  COUMADIN   Used for:  Paroxysmal supraventricular tachycardia (H), S/P AVR (aortic valve replacement)         Take 1 tab (3 mg) on 5/24 and 5/25 and have INR checked on 5/26 and have dose adjusted   Quantity:  30 tablet   Refills:  0         CONTINUE these medicines which may have CHANGED, or have new prescriptions. If we are uncertain of the size of tablets/capsules you have at home, strength may be listed as something that might have changed.        Dose / Directions    atorvastatin 40 MG tablet   Commonly known as:  LIPITOR   This may have changed:  when to take this   Used for:  Hyperlipidemia with target LDL less than 70        Dose:  40 mg   Take 1 tablet (40 mg) by mouth daily   Quantity:  90 tablet   Refills:  0         CONTINUE these medicines which have NOT CHANGED        Dose / Directions    aspirin 81 MG tablet        Dose:  81 mg   Take 81 mg by mouth every morning   Refills:  0       CENTRUM ADULTS PO        Dose:  1 tablet   Take 1 tablet by mouth every morning   Refills:  0       cholecalciferol 5000 UNITS Tabs tablet   Commonly known as:  vitamin D3        Dose:  5000 Units   Take 5,000 Units by mouth every morning   Refills:  0       FISH OIL PO        Dose:  1 g   Take 1 g by mouth every morning   Refills:  0       prednisoLONE acetate 1 % ophthalmic susp   Commonly known as:  PRED FORTE   Used for:  Aortic valve stenosis, Pre-operative cardiovascular examination        Dose:  1 drop   Place 1 drop into both eyes as needed   Refills:  0       timolol 0.5 % ophthalmic solution   Commonly known as:  TIMOPTIC   Used for:  Aortic valve stenosis, Pre-operative cardiovascular examination        Dose:  1 drop   1 drop 1 DROP INTO BOTH EYES 2 TIMES DAILY   Refills:  0            Where to get your medicines      These medications were sent to [facility] [place], [place] - [address] [facility]  [address] [address]     Phone:  [phone]     acetaminophen 325 MG tablet    bumetanide 1 MG tablet    metoprolol 50 MG tablet    pantoprazole 40 MG EC tablet    Potassium  Chloride Glenys CR 15 MEQ Tbcr    senna-docusate 8.6-50 MG per tablet    warfarin 3 MG tablet         Some of these will need a paper prescription and others can be bought over the counter. Ask your nurse if you have questions.     Bring a paper prescription for each of these medications     oxyCODONE 5 MG IR tablet                Protect others around you: Learn how to safely use, store and throw away your medicines at www.disposemymeds.org.             Medication List: This is a list of all your medications and when to take them. Check marks below indicate your daily home schedule. Keep this list as a reference.      Medications           Morning Afternoon Evening Bedtime As Needed    acetaminophen 325 MG tablet   Commonly known as:  TYLENOL   Take 2 tablets (650 mg) by mouth every 4 hours as needed for mild pain   Last time this was given:  650 mg on 5/23/2017  7:50 PM                                   aspirin 81 MG tablet   Take 81 mg by mouth every morning   Next Dose Due:  5/25                                   atorvastatin 40 MG tablet   Commonly known as:  LIPITOR   Take 1 tablet (40 mg) by mouth daily   Last time this was given:  40 mg on 5/23/2017  9:26 PM   Next Dose Due:  5/25                                   bumetanide 1 MG tablet   Commonly known as:  BUMEX   Take 1 tablet (1 mg) by mouth 2 times daily   Last time this was given:  1 mg on 5/24/2017  9:46 AM   Next Dose Due:  Afternoon dose 5/24                                      CENTRUM ADULTS PO   Take 1 tablet by mouth every morning   Next Dose Due:  5/25                                   cholecalciferol 5000 UNITS Tabs tablet   Commonly known as:  vitamin D3   Take 5,000 Units by mouth every morning   Next Dose Due:  5/25                                   FISH OIL PO   Take 1 g by mouth every morning   Next Dose Due:  5/25                                metoprolol 50 MG tablet   Commonly known as:  LOPRESSOR   Take 1 tablet (50 mg) by mouth 2 times  daily   Last time this was given:  50 mg on 5/24/2017  8:14 AM            8AM        8PM               oxyCODONE 5 MG IR tablet   Commonly known as:  ROXICODONE   Take 1-2 tablets (5-10 mg) by mouth every 4 hours as needed for moderate to severe pain   Last time this was given:  5 mg on 5/20/2017 10:00 PM                                   pantoprazole 40 MG EC tablet   Commonly known as:  PROTONIX   Take 1 tablet (40 mg) by mouth every morning   Last time this was given:  40 mg on 5/24/2017  8:14 AM   Next Dose Due:  5/25   Notes to Patient:  Best to take 30 minutes before a meal                                   Potassium Chloride Glenys CR 15 MEQ Tbcr   Take 30 mEq by mouth 2 times daily   Last time this was given:  20 mEq on 5/24/2017  9:48 AM   Next Dose Due:  Afternoon 5/24                                      prednisoLONE acetate 1 % ophthalmic susp   Commonly known as:  PRED FORTE   Place 1 drop into both eyes as needed                                   senna-docusate 8.6-50 MG per tablet   Commonly known as:  SENOKOT-S;PERICOLACE   Take 1-2 tablets by mouth 2 times daily   Last time this was given:  1 tablet on 5/23/2017  7:43 PM                                   timolol 0.5 % ophthalmic solution   Commonly known as:  TIMOPTIC   1 drop 1 DROP INTO BOTH EYES 2 TIMES DAILY                                warfarin 3 MG tablet   Commonly known as:  COUMADIN   Take 1 tab (3 mg) on 5/24 and 5/25 and have INR checked on 5/26 and have dose adjusted   Last time this was given:  4 mg on 5/23/2017  5:54 PM   Next Dose Due:  5/24

## 2017-05-17 ENCOUNTER — APPOINTMENT (OUTPATIENT)
Dept: OCCUPATIONAL THERAPY | Facility: CLINIC | Age: 57
DRG: 220 | End: 2017-05-17
Attending: THORACIC SURGERY (CARDIOTHORACIC VASCULAR SURGERY)
Payer: COMMERCIAL

## 2017-05-17 ENCOUNTER — APPOINTMENT (OUTPATIENT)
Dept: GENERAL RADIOLOGY | Facility: CLINIC | Age: 57
DRG: 220 | End: 2017-05-17
Attending: THORACIC SURGERY (CARDIOTHORACIC VASCULAR SURGERY)
Payer: COMMERCIAL

## 2017-05-17 LAB
ALBUMIN UR-MCNC: 30 MG/DL
AMORPH CRY #/AREA URNS HPF: ABNORMAL /HPF
ANION GAP SERPL CALCULATED.3IONS-SCNC: 9 MMOL/L (ref 3–14)
ANION GAP SERPL CALCULATED.3IONS-SCNC: 9 MMOL/L (ref 3–14)
APPEARANCE UR: ABNORMAL
BACTERIA #/AREA URNS HPF: ABNORMAL /HPF
BASE DEFICIT BLDA-SCNC: 0 MMOL/L
BASE DEFICIT BLDA-SCNC: 0.3 MMOL/L
BASE DEFICIT BLDA-SCNC: 0.5 MMOL/L
BASE DEFICIT BLDA-SCNC: 2.7 MMOL/L
BASE EXCESS BLDA CALC-SCNC: 0 MMOL/L
BASE EXCESS BLDA CALC-SCNC: 0.4 MMOL/L
BASE EXCESS BLDV CALC-SCNC: 1.7 MMOL/L
BILIRUB UR QL STRIP: NEGATIVE
BUN SERPL-MCNC: 13 MG/DL (ref 7–30)
BUN SERPL-MCNC: 16 MG/DL (ref 7–30)
CA-I BLD-MCNC: 4.4 MG/DL (ref 4.4–5.2)
CALCIUM SERPL-MCNC: 7.9 MG/DL (ref 8.5–10.1)
CALCIUM SERPL-MCNC: 8.2 MG/DL (ref 8.5–10.1)
CHLORIDE SERPL-SCNC: 108 MMOL/L (ref 94–109)
CHLORIDE SERPL-SCNC: 110 MMOL/L (ref 94–109)
CO2 SERPL-SCNC: 24 MMOL/L (ref 20–32)
CO2 SERPL-SCNC: 24 MMOL/L (ref 20–32)
COLOR UR AUTO: ABNORMAL
CREAT SERPL-MCNC: 0.95 MG/DL (ref 0.66–1.25)
CREAT SERPL-MCNC: 1.03 MG/DL (ref 0.66–1.25)
ERYTHROCYTE [DISTWIDTH] IN BLOOD BY AUTOMATED COUNT: 14.4 % (ref 10–15)
GFR SERPL CREATININE-BSD FRML MDRD: 74 ML/MIN/1.7M2
GFR SERPL CREATININE-BSD FRML MDRD: 82 ML/MIN/1.7M2
GLUCOSE BLDC GLUCOMTR-MCNC: 127 MG/DL (ref 70–99)
GLUCOSE BLDC GLUCOMTR-MCNC: 129 MG/DL (ref 70–99)
GLUCOSE BLDC GLUCOMTR-MCNC: 139 MG/DL (ref 70–99)
GLUCOSE BLDC GLUCOMTR-MCNC: 140 MG/DL (ref 70–99)
GLUCOSE BLDC GLUCOMTR-MCNC: 144 MG/DL (ref 70–99)
GLUCOSE BLDC GLUCOMTR-MCNC: 146 MG/DL (ref 70–99)
GLUCOSE BLDC GLUCOMTR-MCNC: 147 MG/DL (ref 70–99)
GLUCOSE BLDC GLUCOMTR-MCNC: 99 MG/DL (ref 70–99)
GLUCOSE SERPL-MCNC: 104 MG/DL (ref 70–99)
GLUCOSE SERPL-MCNC: 140 MG/DL (ref 70–99)
GLUCOSE UR STRIP-MCNC: NEGATIVE MG/DL
HBA1C MFR BLD: 6.2 % (ref 4.3–6)
HCO3 BLD-SCNC: 21 MMOL/L (ref 21–28)
HCO3 BLD-SCNC: 23 MMOL/L (ref 21–28)
HCO3 BLD-SCNC: 23 MMOL/L (ref 21–28)
HCO3 BLD-SCNC: 24 MMOL/L (ref 21–28)
HCO3 BLDV-SCNC: 27 MMOL/L (ref 21–28)
HCT VFR BLD AUTO: 34.5 % (ref 40–53)
HGB BLD-MCNC: 11 G/DL (ref 13.3–17.7)
HGB UR QL STRIP: ABNORMAL
INR PPP: 1.35 (ref 0.86–1.14)
INTERPRETATION ECG - MUSE: NORMAL
INTERPRETATION ECG - MUSE: NORMAL
KETONES UR STRIP-MCNC: NEGATIVE MG/DL
LEUKOCYTE ESTERASE UR QL STRIP: NEGATIVE
MAGNESIUM SERPL-MCNC: 2.2 MG/DL (ref 1.6–2.3)
MAGNESIUM SERPL-MCNC: 2.5 MG/DL (ref 1.6–2.3)
MCH RBC QN AUTO: 30.4 PG (ref 26.5–33)
MCHC RBC AUTO-ENTMCNC: 31.9 G/DL (ref 31.5–36.5)
MCV RBC AUTO: 95 FL (ref 78–100)
MUCOUS THREADS #/AREA URNS LPF: PRESENT /LPF
NITRATE UR QL: NEGATIVE
O2/TOTAL GAS SETTING VFR VENT: 10 %
O2/TOTAL GAS SETTING VFR VENT: 40 %
O2/TOTAL GAS SETTING VFR VENT: 40 %
O2/TOTAL GAS SETTING VFR VENT: 50 %
O2/TOTAL GAS SETTING VFR VENT: 70 %
O2/TOTAL GAS SETTING VFR VENT: ABNORMAL %
O2/TOTAL GAS SETTING VFR VENT: ABNORMAL %
OXYHGB MFR BLD: 91 % (ref 92–100)
OXYHGB MFR BLD: 92 % (ref 92–100)
OXYHGB MFR BLD: 93 % (ref 92–100)
OXYHGB MFR BLD: 94 % (ref 92–100)
OXYHGB MFR BLD: 94 % (ref 92–100)
OXYHGB MFR BLD: 96 % (ref 92–100)
OXYHGB MFR BLDV: 54 %
PCO2 BLD: 30 MM HG (ref 35–45)
PCO2 BLD: 31 MM HG (ref 35–45)
PCO2 BLD: 31 MM HG (ref 35–45)
PCO2 BLD: 33 MM HG (ref 35–45)
PCO2 BLD: 33 MM HG (ref 35–45)
PCO2 BLD: 34 MM HG (ref 35–45)
PCO2 BLDV: 43 MM HG (ref 40–50)
PH BLD: 7.46 PH (ref 7.35–7.45)
PH BLD: 7.47 PH (ref 7.35–7.45)
PH BLD: 7.48 PH (ref 7.35–7.45)
PH BLD: 7.49 PH (ref 7.35–7.45)
PH BLDV: 7.4 PH (ref 7.32–7.43)
PH UR STRIP: 6 PH (ref 5–7)
PHOSPHATE SERPL-MCNC: 2.8 MG/DL (ref 2.5–4.5)
PHOSPHATE SERPL-MCNC: 4 MG/DL (ref 2.5–4.5)
PLATELET # BLD AUTO: 88 10E9/L (ref 150–450)
PO2 BLD: 103 MM HG (ref 80–105)
PO2 BLD: 62 MM HG (ref 80–105)
PO2 BLD: 70 MM HG (ref 80–105)
PO2 BLD: 75 MM HG (ref 80–105)
PO2 BLD: 76 MM HG (ref 80–105)
PO2 BLD: 78 MM HG (ref 80–105)
PO2 BLDV: 31 MM HG (ref 25–47)
POTASSIUM BLD-SCNC: 3.7 MMOL/L (ref 3.4–5.3)
POTASSIUM BLD-SCNC: 3.8 MMOL/L (ref 3.4–5.3)
POTASSIUM SERPL-SCNC: 3.6 MMOL/L (ref 3.4–5.3)
POTASSIUM SERPL-SCNC: 4 MMOL/L (ref 3.4–5.3)
RBC # BLD AUTO: 3.62 10E12/L (ref 4.4–5.9)
RBC #/AREA URNS AUTO: >182 /HPF (ref 0–2)
SODIUM SERPL-SCNC: 141 MMOL/L (ref 133–144)
SODIUM SERPL-SCNC: 143 MMOL/L (ref 133–144)
SP GR UR STRIP: 1.03 (ref 1–1.03)
SQUAMOUS #/AREA URNS AUTO: 1 /HPF (ref 0–1)
URN SPEC COLLECT METH UR: ABNORMAL
UROBILINOGEN UR STRIP-MCNC: NORMAL MG/DL (ref 0–2)
WBC # BLD AUTO: 10 10E9/L (ref 4–11)
WBC #/AREA URNS AUTO: 2 /HPF (ref 0–2)

## 2017-05-17 PROCEDURE — 25000128 H RX IP 250 OP 636

## 2017-05-17 PROCEDURE — 25000128 H RX IP 250 OP 636: Performed by: THORACIC SURGERY (CARDIOTHORACIC VASCULAR SURGERY)

## 2017-05-17 PROCEDURE — 83735 ASSAY OF MAGNESIUM: CPT | Performed by: THORACIC SURGERY (CARDIOTHORACIC VASCULAR SURGERY)

## 2017-05-17 PROCEDURE — S0077 INJECTION, CLINDAMYCIN PHOSP: HCPCS | Performed by: THORACIC SURGERY (CARDIOTHORACIC VASCULAR SURGERY)

## 2017-05-17 PROCEDURE — 84100 ASSAY OF PHOSPHORUS: CPT | Performed by: THORACIC SURGERY (CARDIOTHORACIC VASCULAR SURGERY)

## 2017-05-17 PROCEDURE — 82805 BLOOD GASES W/O2 SATURATION: CPT | Performed by: THORACIC SURGERY (CARDIOTHORACIC VASCULAR SURGERY)

## 2017-05-17 PROCEDURE — 85610 PROTHROMBIN TIME: CPT | Performed by: THORACIC SURGERY (CARDIOTHORACIC VASCULAR SURGERY)

## 2017-05-17 PROCEDURE — 25000132 ZZH RX MED GY IP 250 OP 250 PS 637: Performed by: THORACIC SURGERY (CARDIOTHORACIC VASCULAR SURGERY)

## 2017-05-17 PROCEDURE — 83036 HEMOGLOBIN GLYCOSYLATED A1C: CPT | Performed by: THORACIC SURGERY (CARDIOTHORACIC VASCULAR SURGERY)

## 2017-05-17 PROCEDURE — P9041 ALBUMIN (HUMAN),5%, 50ML: HCPCS

## 2017-05-17 PROCEDURE — 71010 XR CHEST PORT 1 VW: CPT | Mod: 77

## 2017-05-17 PROCEDURE — 85027 COMPLETE CBC AUTOMATED: CPT | Performed by: THORACIC SURGERY (CARDIOTHORACIC VASCULAR SURGERY)

## 2017-05-17 PROCEDURE — 80048 BASIC METABOLIC PNL TOTAL CA: CPT | Performed by: THORACIC SURGERY (CARDIOTHORACIC VASCULAR SURGERY)

## 2017-05-17 PROCEDURE — 84132 ASSAY OF SERUM POTASSIUM: CPT | Performed by: THORACIC SURGERY (CARDIOTHORACIC VASCULAR SURGERY)

## 2017-05-17 PROCEDURE — 97165 OT EVAL LOW COMPLEX 30 MIN: CPT | Mod: GO | Performed by: OCCUPATIONAL THERAPIST

## 2017-05-17 PROCEDURE — 71010 XR CHEST PORT 1 VW: CPT

## 2017-05-17 PROCEDURE — 00000146 ZZHCL STATISTIC GLUCOSE BY METER IP

## 2017-05-17 PROCEDURE — 40000014 ZZH STATISTIC ARTERIAL MONITORING DAILY

## 2017-05-17 PROCEDURE — 99233 SBSQ HOSP IP/OBS HIGH 50: CPT | Mod: GC | Performed by: SURGERY

## 2017-05-17 PROCEDURE — 94003 VENT MGMT INPAT SUBQ DAY: CPT

## 2017-05-17 PROCEDURE — 93010 ELECTROCARDIOGRAM REPORT: CPT | Performed by: INTERNAL MEDICINE

## 2017-05-17 PROCEDURE — 25000132 ZZH RX MED GY IP 250 OP 250 PS 637: Performed by: SURGERY

## 2017-05-17 PROCEDURE — 20000004 ZZH R&B ICU UMMC

## 2017-05-17 PROCEDURE — 25000128 H RX IP 250 OP 636: Performed by: STUDENT IN AN ORGANIZED HEALTH CARE EDUCATION/TRAINING PROGRAM

## 2017-05-17 PROCEDURE — 25000131 ZZH RX MED GY IP 250 OP 636 PS 637: Performed by: THORACIC SURGERY (CARDIOTHORACIC VASCULAR SURGERY)

## 2017-05-17 PROCEDURE — 97535 SELF CARE MNGMENT TRAINING: CPT | Mod: GO | Performed by: OCCUPATIONAL THERAPIST

## 2017-05-17 PROCEDURE — 40000275 ZZH STATISTIC RCP TIME EA 10 MIN

## 2017-05-17 PROCEDURE — 93005 ELECTROCARDIOGRAM TRACING: CPT

## 2017-05-17 PROCEDURE — 81001 URINALYSIS AUTO W/SCOPE: CPT | Performed by: STUDENT IN AN ORGANIZED HEALTH CARE EDUCATION/TRAINING PROGRAM

## 2017-05-17 PROCEDURE — 40000133 ZZH STATISTIC OT WARD VISIT: Performed by: OCCUPATIONAL THERAPIST

## 2017-05-17 PROCEDURE — 40000196 ZZH STATISTIC RAPCV CVP MONITORING

## 2017-05-17 PROCEDURE — 25000125 ZZHC RX 250: Performed by: THORACIC SURGERY (CARDIOTHORACIC VASCULAR SURGERY)

## 2017-05-17 PROCEDURE — 82330 ASSAY OF CALCIUM: CPT | Performed by: THORACIC SURGERY (CARDIOTHORACIC VASCULAR SURGERY)

## 2017-05-17 RX ORDER — HYDROMORPHONE HYDROCHLORIDE 1 MG/ML
.3-.5 INJECTION, SOLUTION INTRAMUSCULAR; INTRAVENOUS; SUBCUTANEOUS
Status: DISCONTINUED | OUTPATIENT
Start: 2017-05-17 | End: 2017-05-22

## 2017-05-17 RX ORDER — FUROSEMIDE 10 MG/ML
INJECTION INTRAMUSCULAR; INTRAVENOUS
Status: COMPLETED
Start: 2017-05-17 | End: 2017-05-17

## 2017-05-17 RX ORDER — ALBUMIN, HUMAN INJ 5% 5 %
SOLUTION INTRAVENOUS
Status: COMPLETED
Start: 2017-05-17 | End: 2017-05-17

## 2017-05-17 RX ORDER — OXYCODONE HYDROCHLORIDE 5 MG/1
5-10 TABLET ORAL EVERY 4 HOURS PRN
Status: DISCONTINUED | OUTPATIENT
Start: 2017-05-17 | End: 2017-05-24 | Stop reason: HOSPADM

## 2017-05-17 RX ORDER — ATORVASTATIN CALCIUM 40 MG/1
40 TABLET, FILM COATED ORAL AT BEDTIME
Status: DISCONTINUED | OUTPATIENT
Start: 2017-05-17 | End: 2017-05-24 | Stop reason: HOSPADM

## 2017-05-17 RX ORDER — WARFARIN SODIUM 4 MG/1
4 TABLET ORAL
Status: COMPLETED | OUTPATIENT
Start: 2017-05-17 | End: 2017-05-17

## 2017-05-17 RX ORDER — ALBUMIN (HUMAN) 12.5 G/50ML
12.5 SOLUTION INTRAVENOUS ONCE
Status: DISCONTINUED | OUTPATIENT
Start: 2017-05-17 | End: 2017-05-17

## 2017-05-17 RX ORDER — FUROSEMIDE 10 MG/ML
10 INJECTION INTRAMUSCULAR; INTRAVENOUS ONCE
Status: COMPLETED | OUTPATIENT
Start: 2017-05-17 | End: 2017-05-17

## 2017-05-17 RX ORDER — ALBUMIN (HUMAN) 12.5 G/50ML
25 SOLUTION INTRAVENOUS ONCE
Status: DISCONTINUED | OUTPATIENT
Start: 2017-05-17 | End: 2017-05-18

## 2017-05-17 RX ADMIN — POTASSIUM CHLORIDE 20 MEQ: 29.8 INJECTION, SOLUTION INTRAVENOUS at 07:33

## 2017-05-17 RX ADMIN — ACETAMINOPHEN 650 MG: 325 TABLET, FILM COATED ORAL at 02:03

## 2017-05-17 RX ADMIN — MUPIROCIN 0.5 G: 20 OINTMENT TOPICAL at 07:42

## 2017-05-17 RX ADMIN — POTASSIUM CHLORIDE 20 MEQ: 750 TABLET, EXTENDED RELEASE ORAL at 21:27

## 2017-05-17 RX ADMIN — ACETAMINOPHEN 650 MG: 325 TABLET, FILM COATED ORAL at 15:22

## 2017-05-17 RX ADMIN — FUROSEMIDE 20 MG: 10 INJECTION, SOLUTION INTRAVENOUS at 14:30

## 2017-05-17 RX ADMIN — INSULIN ASPART 2 UNITS: 100 INJECTION, SOLUTION INTRAVENOUS; SUBCUTANEOUS at 12:27

## 2017-05-17 RX ADMIN — METOPROLOL TARTRATE 12.5 MG: 25 TABLET, FILM COATED ORAL at 19:59

## 2017-05-17 RX ADMIN — ALBUMIN (HUMAN) 25 G: 12.5 INJECTION, SOLUTION INTRAVENOUS at 16:11

## 2017-05-17 RX ADMIN — METOPROLOL TARTRATE 12.5 MG: 25 TABLET, FILM COATED ORAL at 10:11

## 2017-05-17 RX ADMIN — ASPIRIN 81 MG CHEWABLE TABLET 81 MG: 81 TABLET CHEWABLE at 07:35

## 2017-05-17 RX ADMIN — ATORVASTATIN CALCIUM 40 MG: 40 TABLET, FILM COATED ORAL at 21:27

## 2017-05-17 RX ADMIN — CLINDAMYCIN PHOSPHATE 900 MG: 18 INJECTION, SOLUTION INTRAVENOUS at 09:30

## 2017-05-17 RX ADMIN — OXYCODONE HYDROCHLORIDE 5 MG: 5 TABLET ORAL at 19:59

## 2017-05-17 RX ADMIN — PROPOFOL 30 MCG/KG/MIN: 10 INJECTION, EMULSION INTRAVENOUS at 02:59

## 2017-05-17 RX ADMIN — SENNOSIDES AND DOCUSATE SODIUM 1 TABLET: 8.6; 5 TABLET ORAL at 19:59

## 2017-05-17 RX ADMIN — VANCOMYCIN HYDROCHLORIDE 1500 MG: 10 INJECTION, POWDER, LYOPHILIZED, FOR SOLUTION INTRAVENOUS at 09:31

## 2017-05-17 RX ADMIN — FUROSEMIDE 10 MG: 10 INJECTION, SOLUTION INTRAVENOUS at 05:23

## 2017-05-17 RX ADMIN — WARFARIN SODIUM 4 MG: 4 TABLET ORAL at 20:00

## 2017-05-17 RX ADMIN — PANTOPRAZOLE SODIUM 40 MG: 40 INJECTION, POWDER, FOR SOLUTION INTRAVENOUS at 07:35

## 2017-05-17 RX ADMIN — CLINDAMYCIN PHOSPHATE 900 MG: 18 INJECTION, SOLUTION INTRAVENOUS at 01:27

## 2017-05-17 RX ADMIN — SENNOSIDES AND DOCUSATE SODIUM 2 TABLET: 8.6; 5 TABLET ORAL at 07:35

## 2017-05-17 ASSESSMENT — PAIN DESCRIPTION - DESCRIPTORS: DESCRIPTORS: DISCOMFORT

## 2017-05-17 ASSESSMENT — ACTIVITIES OF DAILY LIVING (ADL): PREVIOUS_RESPONSIBILITIES: MEAL PREP;HOUSEKEEPING;LAUNDRY;SHOPPING;YARDWORK;MEDICATION MANAGEMENT;FINANCES;DRIVING;WORK

## 2017-05-17 NOTE — PROGRESS NOTES
Patient suctioned and electively extubated per physician order. Placed on 4L Oxi-Plus, breath sounds were diminished/clear, fair cough with large amount of white secretions. Patient tolerated procedure well without any immediate complications.    Mckenzie Blanco, RT  5/17/2017 8:24 AM

## 2017-05-17 NOTE — PLAN OF CARE
"Problem: Goal Outcome Summary  Goal: Goal Outcome Summary  Outcome: Improving  Patient follows commands. Stays calm with no sedation during PS trails. Can write out questions. PS x 2 this shift. Currently PS. P02 in 70s has delayed extubation.  Lungs clear and diminished bilat. SR with BBB with rates 70s-90s. No ectopy noted. Temp pacemaker at 52. Not paced over NOC. CVP 18,20,20. CVTS notified of CVP of 18. \"Not concerned\". CVTS notified x3 more times of CVP levels. 10mg Lasix given. Levo titrated off to time. MAPs steady in 70-80s. Med CT output 10-50cc/hr. UOP 20-150cc/hr. Insulin at 0.5 units all NOC. Plan to extubate today if able. Recheck ABG around 0700.      "

## 2017-05-17 NOTE — PROGRESS NOTES
CVICU Progress Note  05/17/2017    Subjective and Interval Events: Overnight would drop PaO2 to 70 during pressure support trials but had good tidal volumes 400-500s. Doing well with pressure support this am. Denies cp, n/v, lightheadedness. Pain controlled.    Objective:  Vitals:   Temp:  [97.6  F (36.4  C)-100.2  F (37.9  C)] 100  F (37.8  C)  Heart Rate:  [78-92] 92  Resp:  [14-21] 21  BP: (106)/(55) 106/55  MAP:  [59 mmHg-87 mmHg] 87 mmHg  Arterial Line BP: ()/(47-67) 136/67  FiO2 (%):  [40 %-45 %] 40 %  SpO2:  [94 %-99 %] 95 %    Ventilation Mode: CPAP/PS  FiO2 (%): 40 %  Rate Set (breaths/minute): 14 breaths/min  Tidal Volume Set (mL): 500 mL  PEEP (cm H2O): 5 cmH2O  Pressure Support (cm H2O): 7 cmH2O  Oxygen Concentration (%): 70 %  Resp: 21    Intake/Output:   I/O last 3 completed shifts:  In: 3834.71 [I.V.:2934.71; Other:400]  Out: 3400 [Urine:2075; Blood:1000; Chest Tube:325]    Physical exam:  General: NAD  Neuro: A&Ox3  Resp: Breathing non-labored; intuabted  CV: RRR  Abdomen: Soft, Non-distended, Non-tender  Extremities: warm and well perfused    Labs:    Recent Labs  Lab 05/17/17  0421 05/16/17  1415   WBC 10.0 12.9*   HGB 11.0* 12.0*   PLT 88* 124*   INR 1.35* 1.55*    140   POTASSIUM 4.0 4.7   BUN 13 10   CR 0.95 0.94       Assessment and Plan: 56M with severe aortic stenosis POD#1 s/p median sternotomy, CP bypass, aortic valve replacement, septal myectomy.     Neuro:  PO oxy and prn dilaudid  Psych: n/a  CV:  Off pressors ASA 81, metop 12.5 BID  Resp:  Wean O2 as tolerated. Cont mediastinal tubes  GI: PPI, senna  FEN:  Regular diet  :  D/c alexsandra  Heme:  Post op labs pending  ID:  Vanc/ clinda post op x 48hrs  Endo:  SSI  PPx:  SCDs  Lines: PIV, CT x2  Dispo:  CVICU, to floor this PM if doing well.    Seen and discussed with Dr. Flowers.    Terrance Mejia MD PGY-3  Surgery Resident

## 2017-05-17 NOTE — PROGRESS NOTES
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  His family is helping with her now and will assist him at [place].    Self-Care   Dominant Hand right   Usual Activity Tolerance good   Current Activity Tolerance fair   Activity/Exercise/Self-Care Comment Pt was IND prior.    Functional Level Prior   Ambulation 0-->independent   Transferring 0-->independent   Toileting 0-->independent   Bathing 0-->independent   Dressing 0-->independent   Eating 0-->independent   Cognition 0 - no cognition issues reported   Fall history within last six months no   Which of the above functional risks had a recent onset or change? ambulation;transferring;toileting;bathing;dressing   General Information   Onset of Illness/Injury or Date of Surgery - Date 05/16/17   Referring Physician Dr Flowers   Patient/Family Goals Statement to get fang and back to work   Additional Occupational Profile Info/Pertinent History of Current Problem 56M with severe aortic stenosis POD#1 s/p median sternotomy, CP bypass, aortic valve replacement, septal myectomy.   Precautions/Limitations sternal precautions   General Observations Pt supine, hugging sternal pillow.    General Info Comments activity: up to chair   Cognitive Status Examination   Orientation orientation to person, place and time   Cognitive Comment mild post op deficits   Visual Perception   Visual Perception No deficits were identified   Pain Assessment   Patient Currently in Pain Yes, see Vital Sign flowsheet   Posture   Posture not impaired   Range of Motion (ROM)   ROM  Comment limited by precautions   Strength   Strength Comments NT   Coordination   Gross Motor Coordination WFL   Fine Motor Coordination WFL   Mobility   Bed Mobility Comments mod A x2 with VC for sternal precautions   Transfer Skill: Bed to Chair/Chair to Bed   Level of [place]: Bed to Chair moderate assist (50% patients effort)   Physical Assist/Nonphysical Assist: Bed to Chair 2 persons   Transfer Skill: Sit to Stand   Level of [place]: Sit/Stand minimum assist (75% patients effort)   Physical Assist/Nonphysical Assist: Sit/Stand 2 persons   Balance   Balance Comments below baseline   Lower Body Dressing   Level of [place]: Dress Lower Body maximum assist (25% patients effort)   Eating/Self Feeding   Level of [place]: Eating minimum assist (75% patients effort)   Instrumental Activities of Daily Living (IADL)   Previous Responsibilities meal prep;housekeeping;laundry;shopping;yardwork;medication management;finances;driving;work   Activities of Daily Living Analysis   Impairments Contributing to Impaired Activities of Daily Living balance impaired;cognition impaired;pain;post surgical precautions;ROM decreased;strength decreased   General Therapy Interventions   Planned Therapy Interventions ADL retraining;IADL retraining;transfer training;bed mobility training;progressive activity/exercise;home program guidelines;risk factor education   Clinical Impression   Criteria for Skilled Therapeutic Interventions Met yes, treatment indicated   OT Diagnosis post op   Influenced by the following impairments post op precautions and pain   Assessment of Occupational Performance 1-3 Performance Deficits   Identified Performance Deficits dressing, home management   Clinical Decision Making (Complexity) Low complexity   Therapy Frequency daily   Predicted Duration of Therapy Intervention (days/wks) 1 week   Anticipated Discharge Disposition Home with Outpatient Therapy   Risks and Benefits of Treatment have  "been explained. Yes   Patient, Family & other staff in agreement with plan of care Yes   [facility] TM \"6 Clicks\"   2016, Trustees of [facility], under license to [employer].  All rights reserved.   6 Clicks Short Forms Daily Activity Inpatient Short Form   [facility]  \"6 Clicks\" Daily Activity Inpatient Short Form   1. Putting on and taking off regular lower body clothing? 2 - A Lot   2. Bathing (including washing, rinsing, drying)? 2 - A Lot   3. Toileting, which includes using toilet, bedpan or urinal? 1 - Total   4. Putting on and taking off regular upper body clothing? 2 - A Lot   5. Taking care of personal grooming such as brushing teeth? 3 - A Little   6. Eating meals? 3 - A Little   Daily Activity Raw Score (Score out of 24.Lower scores equate to lower levels of function) 13   Total Evaluation Time   Total Evaluation Time (Minutes) 5     "

## 2017-05-17 NOTE — PLAN OF CARE
Problem: Goal Outcome Summary  Goal: Goal Outcome Summary  OT 4E Orders received, evaluation complete, treatment initiated.  Pt doing well recently extubated.  Motivated to get up and work with therapies.  Mod A x2 for bed mobility and up to chair.   Provided education re sternal precautions and OP CR.     Rec:   Anticipate home w A and oP CR pending progress

## 2017-05-17 NOTE — PROGRESS NOTES
CVICU Progress Note  05/17/2017    Subjective and Interval Events: Overnight would drop PaO2 to 70 during pressure support trials but had good tidal volumes 400-500s. [place] with pressure support this am. Extubated without issues. Denies cp, n/v, lightheadedness. Pain controlled.    Objective:  Vitals:   Temp:  [97.6  F (36.4  C)-100.2  F (37.9  C)] 100  F (37.8  C)  Heart Rate:  [78-92] 92  Resp:  [14-21] 21  BP: (106)/(55) 106/55  MAP:  [59 mmHg-87 mmHg] 87 mmHg  Arterial Line BP: ()/(47-67) 136/67  FiO2 (%):  [40 %-45 %] 40 %  SpO2:  [94 %-99 %] 95 %    Ventilation Mode: CPAP/PS  FiO2 (%): 40 %  Rate Set (breaths/minute): 14 breaths/min  Tidal Volume Set (mL): 500 mL  PEEP (cm H2O): 5 cmH2O  Pressure Support (cm H2O): 7 cmH2O  Oxygen Concentration (%): 70 %  Resp: 21    Intake/Output:   I/O last 3 completed shifts:  In: 3834.71 [I.V.:2934.71; Other:400]  Out: 3400 [Urine:2075; Blood:1000; Chest Tube:325]    Physical exam:  General: NAD  Neuro: A&Ox3  Resp: Breathing non-labored  CV: RRR  Abdomen: Soft, Non-distended, Non-tender  Extremities: warm and well perfused    Labs:    Recent Labs  Lab 05/17/17  0421 05/16/17  1415   WBC 10.0 12.9*   HGB 11.0* 12.0*   PLT 88* 124*   INR 1.35* 1.55*    140   POTASSIUM 4.0 4.7   BUN 13 10   CR 0.95 0.94       Assessment and Plan: 56M with severe aortic stenosis POD#1 s/p median sternotomy, CP bypass, aortic valve replacement, septal myectomy.     Neuro:  PO oxy and prn dilaudid  Psych: n/a  CV:  Off pressors ASA 81, metop 12.5 BID  Resp:  Wean O2 as tolerated. Cont mediastinal tubes  GI: PPI, senna  FEN:  Regular diet  :  D/c alexsandra  Heme:  Post op labs pending  ID:  Vanc/ clinda post op x 48hrs  Endo:  SSI  PPx:  SCDs  Lines: PIV, CT x2  Dispo:  CVICU, transfer to floor this PM if doing well.    Seen and discussed with Dr. Evangelista.    Terrance Mejia MD PGY-3  Surgery Resident

## 2017-05-17 NOTE — PLAN OF CARE
Problem: Goal Outcome Summary  Goal: Goal Outcome Summary  4E PT - Hold PT as pt moving well with OT and anticipate will need only 1 discipline.

## 2017-05-17 NOTE — PHARMACY-ANTICOAGULATION SERVICE
Pharmacy Warfarin Consult Note     Pharmacy has been consulted to manage this patient s warfarin therapy.  Indication: Bioprosthetic Heart Valve  Therapy Goal: INR 2-3  OP Anticoag Clinic: CVTS   Warfarin Prior to Admission: No  Significant drug interactions: - Aspirin: increased risk of bleeding  Recent documented change in oral intake/nutrition: Yes (Recently NPO for surgery.)  Dose Comments: Given elevated baseline INR and low platelets will start with lower initial warfarin dose.    INR   Date Value Ref Range Status   05/17/2017 1.35 (H) 0.86 - 1.14 Final   05/16/2017 1.55 (H) 0.86 - 1.14 Final       Recommend warfarin 4 mg today.  Pharmacy will monitor Gil Chowdary daily and order warfarin doses to achieve specified goal.      Please contact pharmacy as soon as possible if the warfarin needs to be held for a procedure or if the warfarin goals change.      Yadi Sanchez, PharmD  May 17, 2017

## 2017-05-17 NOTE — PROGRESS NOTES
CLINICAL NUTRITION SERVICES - BRIEF NOTE    Received provider consult for nutrition education with comments post op cardiovascular surgery (automatic consult on post-op order set). S/p median sternotomy, CP bypass, aortic valve replacement, septal myectomy on 5/17. Nutrition education not indicated.    RD will follow per LOS protocol or if re-consulted.     Carolina Gillespie RD, LD  CVICU Dietitian  Pager: [phone]

## 2017-05-17 NOTE — PLAN OF CARE
Problem: Goal Outcome Summary  Goal: Goal Outcome Summary  Outcome: Improving  Patient extubated at 0800 this morning. Oxygen has been between 6LNC to 8L Oxymask. Current saturations between 94 - 98 percent. 20 mg Lasix given for increased O2 requirements, inspiratory wheezes in right lung field, low urine output, and CVP of 22. Up to chair x2. Mediastinal chest tube output minimal. BP between 100 - 120s / 60s. MAPs 70s. AOx4, fevers between 100.4 - 99.6. WNL neurologically. Plan to monitor overnight and transfer to floor tomorrow.

## 2017-05-18 ENCOUNTER — APPOINTMENT (OUTPATIENT)
Dept: GENERAL RADIOLOGY | Facility: CLINIC | Age: 57
DRG: 220 | End: 2017-05-18
Attending: NEUROLOGICAL SURGERY
Payer: COMMERCIAL

## 2017-05-18 ENCOUNTER — APPOINTMENT (OUTPATIENT)
Dept: OCCUPATIONAL THERAPY | Facility: CLINIC | Age: 57
DRG: 220 | End: 2017-05-18
Attending: THORACIC SURGERY (CARDIOTHORACIC VASCULAR SURGERY)
Payer: COMMERCIAL

## 2017-05-18 LAB
ANION GAP SERPL CALCULATED.3IONS-SCNC: 7 MMOL/L (ref 3–14)
ANION GAP SERPL CALCULATED.3IONS-SCNC: 8 MMOL/L (ref 3–14)
BASE EXCESS BLDA CALC-SCNC: 0.4 MMOL/L
BASE EXCESS BLDA CALC-SCNC: 0.6 MMOL/L
BUN SERPL-MCNC: 22 MG/DL (ref 7–30)
BUN SERPL-MCNC: 27 MG/DL (ref 7–30)
CALCIUM SERPL-MCNC: 8.3 MG/DL (ref 8.5–10.1)
CALCIUM SERPL-MCNC: 8.4 MG/DL (ref 8.5–10.1)
CHLORIDE SERPL-SCNC: 105 MMOL/L (ref 94–109)
CHLORIDE SERPL-SCNC: 106 MMOL/L (ref 94–109)
CO2 SERPL-SCNC: 25 MMOL/L (ref 20–32)
CO2 SERPL-SCNC: 26 MMOL/L (ref 20–32)
CREAT SERPL-MCNC: 1.01 MG/DL (ref 0.66–1.25)
CREAT SERPL-MCNC: 1.08 MG/DL (ref 0.66–1.25)
ERYTHROCYTE [DISTWIDTH] IN BLOOD BY AUTOMATED COUNT: 14.6 % (ref 10–15)
GFR SERPL CREATININE-BSD FRML MDRD: 70 ML/MIN/1.7M2
GFR SERPL CREATININE-BSD FRML MDRD: 76 ML/MIN/1.7M2
GLUCOSE BLDC GLUCOMTR-MCNC: 103 MG/DL (ref 70–99)
GLUCOSE BLDC GLUCOMTR-MCNC: 131 MG/DL (ref 70–99)
GLUCOSE BLDC GLUCOMTR-MCNC: 139 MG/DL (ref 70–99)
GLUCOSE SERPL-MCNC: 113 MG/DL (ref 70–99)
GLUCOSE SERPL-MCNC: 128 MG/DL (ref 70–99)
HCO3 BLD-SCNC: 25 MMOL/L (ref 21–28)
HCO3 BLD-SCNC: 25 MMOL/L (ref 21–28)
HCT VFR BLD AUTO: 32.4 % (ref 40–53)
HGB BLD-MCNC: 10.4 G/DL (ref 13.3–17.7)
INR PPP: 1.44 (ref 0.86–1.14)
MAGNESIUM SERPL-MCNC: 2.3 MG/DL (ref 1.6–2.3)
MAGNESIUM SERPL-MCNC: 2.5 MG/DL (ref 1.6–2.3)
MCH RBC QN AUTO: 30.7 PG (ref 26.5–33)
MCHC RBC AUTO-ENTMCNC: 32.1 G/DL (ref 31.5–36.5)
MCV RBC AUTO: 96 FL (ref 78–100)
O2/TOTAL GAS SETTING VFR VENT: 45 %
O2/TOTAL GAS SETTING VFR VENT: 60 %
OXYHGB MFR BLD: 92 % (ref 92–100)
OXYHGB MFR BLD: 94 % (ref 92–100)
PCO2 BLD: 35 MM HG (ref 35–45)
PCO2 BLD: 38 MM HG (ref 35–45)
PH BLD: 7.43 PH (ref 7.35–7.45)
PH BLD: 7.46 PH (ref 7.35–7.45)
PHOSPHATE SERPL-MCNC: 2.5 MG/DL (ref 2.5–4.5)
PHOSPHATE SERPL-MCNC: 3.4 MG/DL (ref 2.5–4.5)
PLATELET # BLD AUTO: 69 10E9/L (ref 150–450)
PO2 BLD: 67 MM HG (ref 80–105)
PO2 BLD: 84 MM HG (ref 80–105)
POTASSIUM SERPL-SCNC: 3.8 MMOL/L (ref 3.4–5.3)
POTASSIUM SERPL-SCNC: 4 MMOL/L (ref 3.4–5.3)
RBC # BLD AUTO: 3.39 10E12/L (ref 4.4–5.9)
SODIUM SERPL-SCNC: 138 MMOL/L (ref 133–144)
SODIUM SERPL-SCNC: 138 MMOL/L (ref 133–144)
WBC # BLD AUTO: 11.9 10E9/L (ref 4–11)

## 2017-05-18 PROCEDURE — 40000014 ZZH STATISTIC ARTERIAL MONITORING DAILY

## 2017-05-18 PROCEDURE — 80048 BASIC METABOLIC PNL TOTAL CA: CPT | Performed by: SURGERY

## 2017-05-18 PROCEDURE — 12000008 ZZH R&B INTERMEDIATE UMMC

## 2017-05-18 PROCEDURE — 83735 ASSAY OF MAGNESIUM: CPT | Performed by: SURGERY

## 2017-05-18 PROCEDURE — 94660 CPAP INITIATION&MGMT: CPT

## 2017-05-18 PROCEDURE — 97110 THERAPEUTIC EXERCISES: CPT | Mod: GO

## 2017-05-18 PROCEDURE — 00000146 ZZHCL STATISTIC GLUCOSE BY METER IP

## 2017-05-18 PROCEDURE — 40000275 ZZH STATISTIC RCP TIME EA 10 MIN

## 2017-05-18 PROCEDURE — 25000128 H RX IP 250 OP 636: Performed by: SURGERY

## 2017-05-18 PROCEDURE — 25000132 ZZH RX MED GY IP 250 OP 250 PS 637: Performed by: SURGERY

## 2017-05-18 PROCEDURE — 25000125 ZZHC RX 250: Performed by: THORACIC SURGERY (CARDIOTHORACIC VASCULAR SURGERY)

## 2017-05-18 PROCEDURE — 86022 PLATELET ANTIBODIES: CPT | Performed by: SURGERY

## 2017-05-18 PROCEDURE — 84100 ASSAY OF PHOSPHORUS: CPT | Performed by: SURGERY

## 2017-05-18 PROCEDURE — 85027 COMPLETE CBC AUTOMATED: CPT | Performed by: SURGERY

## 2017-05-18 PROCEDURE — 40000196 ZZH STATISTIC RAPCV CVP MONITORING

## 2017-05-18 PROCEDURE — 25000132 ZZH RX MED GY IP 250 OP 250 PS 637: Performed by: THORACIC SURGERY (CARDIOTHORACIC VASCULAR SURGERY)

## 2017-05-18 PROCEDURE — 71010 XR CHEST PORT 1 VW: CPT

## 2017-05-18 PROCEDURE — 85610 PROTHROMBIN TIME: CPT | Performed by: SURGERY

## 2017-05-18 PROCEDURE — 97535 SELF CARE MNGMENT TRAINING: CPT | Mod: GO

## 2017-05-18 PROCEDURE — 82805 BLOOD GASES W/O2 SATURATION: CPT | Performed by: THORACIC SURGERY (CARDIOTHORACIC VASCULAR SURGERY)

## 2017-05-18 PROCEDURE — 40000133 ZZH STATISTIC OT WARD VISIT

## 2017-05-18 RX ORDER — PANTOPRAZOLE SODIUM 40 MG/1
40 TABLET, DELAYED RELEASE ORAL EVERY MORNING
Status: DISCONTINUED | OUTPATIENT
Start: 2017-05-19 | End: 2017-05-24 | Stop reason: HOSPADM

## 2017-05-18 RX ORDER — METOPROLOL TARTRATE 25 MG/1
25 TABLET, FILM COATED ORAL 2 TIMES DAILY
Status: DISCONTINUED | OUTPATIENT
Start: 2017-05-18 | End: 2017-05-18

## 2017-05-18 RX ADMIN — ACETAMINOPHEN 650 MG: 325 TABLET, FILM COATED ORAL at 07:55

## 2017-05-18 RX ADMIN — SENNOSIDES AND DOCUSATE SODIUM 2 TABLET: 8.6; 5 TABLET ORAL at 07:40

## 2017-05-18 RX ADMIN — PANTOPRAZOLE SODIUM 40 MG: 40 INJECTION, POWDER, FOR SOLUTION INTRAVENOUS at 07:40

## 2017-05-18 RX ADMIN — METOPROLOL TARTRATE 25 MG: 25 TABLET, FILM COATED ORAL at 07:40

## 2017-05-18 RX ADMIN — POTASSIUM CHLORIDE 20 MEQ: 750 TABLET, EXTENDED RELEASE ORAL at 20:04

## 2017-05-18 RX ADMIN — ASPIRIN 81 MG CHEWABLE TABLET 81 MG: 81 TABLET CHEWABLE at 07:40

## 2017-05-18 RX ADMIN — CALCIUM GLUCONATE 1 G: 94 INJECTION, SOLUTION INTRAVENOUS at 18:15

## 2017-05-18 RX ADMIN — OXYCODONE HYDROCHLORIDE 5 MG: 5 TABLET ORAL at 20:52

## 2017-05-18 RX ADMIN — SENNOSIDES AND DOCUSATE SODIUM 1 TABLET: 8.6; 5 TABLET ORAL at 19:58

## 2017-05-18 RX ADMIN — ATORVASTATIN CALCIUM 40 MG: 40 TABLET, FILM COATED ORAL at 21:34

## 2017-05-18 RX ADMIN — METOPROLOL TARTRATE 12.5 MG: 25 TABLET, FILM COATED ORAL at 19:58

## 2017-05-18 RX ADMIN — POTASSIUM CHLORIDE 20 MEQ: 29.8 INJECTION, SOLUTION INTRAVENOUS at 05:31

## 2017-05-18 ASSESSMENT — ACTIVITIES OF DAILY LIVING (ADL)
BATHING: 0-->INDEPENDENT
TOILETING: 0-->INDEPENDENT
TRANSFERRING: 0-->INDEPENDENT
SWALLOWING: 0-->SWALLOWS FOODS/LIQUIDS WITHOUT DIFFICULTY
FALL_HISTORY_WITHIN_LAST_SIX_MONTHS: NO
COGNITION: 0 - NO COGNITION ISSUES REPORTED
AMBULATION: 0-->INDEPENDENT
RETIRED_EATING: 0-->INDEPENDENT
DRESS: 0-->INDEPENDENT
RETIRED_COMMUNICATION: 0-->UNDERSTANDS/COMMUNICATES WITHOUT DIFFICULTY

## 2017-05-18 ASSESSMENT — PAIN DESCRIPTION - DESCRIPTORS: DESCRIPTORS: DISCOMFORT

## 2017-05-18 NOTE — PLAN OF CARE
Problem: Goal Outcome Summary  Goal: Goal Outcome Summary  Outcome: Improving  VSS overnight.  A&Ox4, following commands.  Afebrile.  Placed on BiPAP at 45% for sleeping, sats 90-93%, ABGs drawn.  FiO2 increased to 60% on BiPAP 12/7, sats 93-96%, new ABG drawn.  See results in chart.  Strong productive cough, self suctioning, clear-coarse LS with rhonchi, diminished bases bilaterally.  MAPs 70-90s, HR 80s SR, CTOP 10-20ml/hr, CVP 20.  UOP>30ml/hr, no BMs.  Electrolytes replaced per protocol. Will continue to update MD with any changes in condition per protocol. Will continue to update MD with any changes in condition per protocol.

## 2017-05-18 NOTE — PROGRESS NOTES
CVICU Progress Note  05/18/2017    Subjective and Interval Events: patient was on BiPAP overnight. No desaturations. BP stable.     Objective:  Vitals:   Temp:  [98.1  F (36.7  C)-101.1  F (38.4  C)] 100.1  F (37.8  C)  Heart Rate:  [75-93] 85  Resp:  [18-24] 20  MAP:  [69 mmHg-90 mmHg] 77 mmHg  Arterial Line BP: (109-148)/(51-72) 118/58  FiO2 (%):  [45 %-60 %] 60 %  SpO2:  [90 %-96 %] 93 %    Ventilation Mode: CPAP/PS  FiO2 (%): 60 %  Rate Set (breaths/minute): 14 breaths/min  Tidal Volume Set (mL): 500 mL  PEEP (cm H2O): 5 cmH2O  Pressure Support (cm H2O): 7 cmH2O  Oxygen Concentration (%): 70 %  Resp: 20    Intake/Output:   I/O last 3 completed shifts:  In: 595.5 [P.O.:200; I.V.:395.5]  Out: 1480 [Urine:1260; Chest Tube:220]    Physical exam:  General: NAD  Neuro: A&Ox3  Resp: Breathing non-labored   CV: RRR  Abdomen: Soft, Non-distended, Non-tender  Extremities: warm and well perfused    Labs:    Recent Labs  Lab 05/18/17  0352 05/17/17 2010 05/17/17  1527 05/17/17  0421   WBC 11.9*  --   --  10.0   HGB 10.4*  --   --  11.0*   PLT 69*  --   --  88*   INR 1.44*  --   --  1.35*     --  141 143   POTASSIUM 3.8 3.7 3.6  3.8 4.0   BUN 22  --  16 13   CR 1.01  --  1.03 0.95       Assessment and Plan: 56M with severe aortic stenosis POD#2 s/p median sternotomy, CP bypass, aortic valve replacement, septal myectomy.     Neuro:  PO oxy and prn dilaudid  Psych: n/a  CV:  ASA 81, metop 12.5 BID. Patient's BP dropped when metoprolol was increased to 25 mg BID  Resp:  Wean O2 as tolerated. Cont mediastinal tubes  GI: PPI, senna  FEN:  Regular diet  :  D/c alexsandra  Heme:  Post op labs pending  ID:  Vanc/ clinda post op x 48hrs  Endo:  SSI  PPx:  SCDs  Lines: PIV, CT x2  [facility]  [facility]    Seen and discussed with Dr. Evangelista.    Chito Roger CA-2

## 2017-05-18 NOTE — PROGRESS NOTES
CVICU Progress Note  05/18/2017     Subjective and Interval Events: patient was on BiPAP overnight. No desaturations. BP stable.      Objective:  Vitals:   Temp: [98.1  F (36.7  C)-101.1  F (38.4  C)] 100.1  F (37.8  C)  Heart Rate: [75-93] 85  Resp: [18-24] 20  MAP: [69 mmHg-90 mmHg] 77 mmHg  Arterial Line BP: (109-148)/(51-72) 118/58  FiO2 (%): [45 %-60 %] 60 %  SpO2: [90 %-96 %] 93 %     Ventilation Mode: CPAP/PS  FiO2 (%): 60 %  Rate Set (breaths/minute): 14 breaths/min  Tidal Volume Set (mL): 500 mL  PEEP (cm H2O): 5 cmH2O  Pressure Support (cm H2O): 7 cmH2O  Oxygen Concentration (%): 70 %  Resp: 20   Intake/Output:   I/O last 3 completed shifts:  In: 595.5 [P.O.:200; I.V.:395.5]  Out: 1480 [Urine:1260; Chest Tube:220]     Physical exam:  General: NAD  Neuro: A&Ox3  Resp: Breathing non-labored   CV: RRR  Abdomen: Soft, Non-distended, Non-tender  Extremities: warm and well perfused   Labs:     Recent Labs  Lab 05/18/17  0352 05/17/17 2010 05/17/17  1527 05/17/17  0421   WBC 11.9* --  --  10.0   HGB 10.4* --  --  11.0*   PLT 69* --  --  88*   INR 1.44* --  --  1.35*    --  141 143   POTASSIUM 3.8 3.7 3.6  3.8 4.0   BUN 22 --  16 13   CR 1.01 --  1.03 0.95         Assessment and Plan: 56M with severe aortic stenosis POD#2 s/p median sternotomy, CP bypass, aortic valve replacement, septal myectomy.      Neuro: PO oxy and prn dilaudid  Psych: n/a  CV: ASA 81, metop 12.5 BID. Patient's BP dropped when metoprolol was increased to 25 mg BID  Resp: Wean O2 as tolerated. Cont mediastinal tubes  GI: PPI, senna  FEN: Regular diet  : D/c alexsandra  Heme: Post op labs pending  ID: Vanc/ clinda post op x 48hrs  Endo: SSI  PPx: SCDs  Lines: PIV, CT x2  Dispo: Transfer to [facility]     Seen and discussed with Dr. Flowers.    Terrance Mejia MD PGY-3  Surgery Resident

## 2017-05-18 NOTE — PLAN OF CARE
Problem: Goal Outcome Summary  Goal: Goal Outcome Summary  OT AM: Facilitated increased independence with functional transfers and ADLs. Pt completed x4 sit <> stands with min-mod A from low surface. Pt mod I for LB dressing within precautions. Pt tolerated standing activity for 8 minutes with x2 seated rest breaks.      OT PM: Pt progressing in activity tolerance for ADLs. Pt mod A for bed mobility and min A for sit <> stands. Pt ambulated hallway 50ft with CGA and FWW. Pt ambulated with decreased gait speed and limited by SOB. Pt on 10L oxymask with O2 sats 90-94%.

## 2017-05-19 ENCOUNTER — APPOINTMENT (OUTPATIENT)
Dept: GENERAL RADIOLOGY | Facility: CLINIC | Age: 57
DRG: 220 | End: 2017-05-19
Attending: THORACIC SURGERY (CARDIOTHORACIC VASCULAR SURGERY)
Payer: COMMERCIAL

## 2017-05-19 ENCOUNTER — APPOINTMENT (OUTPATIENT)
Dept: PHYSICAL THERAPY | Facility: CLINIC | Age: 57
DRG: 220 | End: 2017-05-19
Attending: THORACIC SURGERY (CARDIOTHORACIC VASCULAR SURGERY)
Payer: COMMERCIAL

## 2017-05-19 LAB
ANION GAP SERPL CALCULATED.3IONS-SCNC: 8 MMOL/L (ref 3–14)
BUN SERPL-MCNC: 27 MG/DL (ref 7–30)
CALCIUM SERPL-MCNC: 8.3 MG/DL (ref 8.5–10.1)
CHLORIDE SERPL-SCNC: 104 MMOL/L (ref 94–109)
CO2 SERPL-SCNC: 25 MMOL/L (ref 20–32)
CREAT SERPL-MCNC: 1 MG/DL (ref 0.66–1.25)
ERYTHROCYTE [DISTWIDTH] IN BLOOD BY AUTOMATED COUNT: 14.7 % (ref 10–15)
ERYTHROCYTE [DISTWIDTH] IN BLOOD BY AUTOMATED COUNT: NORMAL % (ref 10–15)
GFR SERPL CREATININE-BSD FRML MDRD: 77 ML/MIN/1.7M2
GLUCOSE BLDC GLUCOMTR-MCNC: 108 MG/DL (ref 70–99)
GLUCOSE BLDC GLUCOMTR-MCNC: 109 MG/DL (ref 70–99)
GLUCOSE BLDC GLUCOMTR-MCNC: 111 MG/DL (ref 70–99)
GLUCOSE BLDC GLUCOMTR-MCNC: 122 MG/DL (ref 70–99)
GLUCOSE BLDC GLUCOMTR-MCNC: 81 MG/DL (ref 70–99)
GLUCOSE SERPL-MCNC: 108 MG/DL (ref 70–99)
HCT VFR BLD AUTO: 30.7 % (ref 40–53)
HCT VFR BLD AUTO: NORMAL % (ref 40–53)
HGB BLD-MCNC: 9.9 G/DL (ref 13.3–17.7)
HGB BLD-MCNC: NORMAL G/DL (ref 13.3–17.7)
INR PPP: 1.29 (ref 0.86–1.14)
MAGNESIUM SERPL-MCNC: 2.8 MG/DL (ref 1.6–2.3)
MCH RBC QN AUTO: 30.4 PG (ref 26.5–33)
MCH RBC QN AUTO: NORMAL PG (ref 26.5–33)
MCHC RBC AUTO-ENTMCNC: 32.2 G/DL (ref 31.5–36.5)
MCHC RBC AUTO-ENTMCNC: NORMAL G/DL (ref 31.5–36.5)
MCV RBC AUTO: 94 FL (ref 78–100)
MCV RBC AUTO: NORMAL FL (ref 78–100)
PF4 HEPARIN CMPLX AB SER QL: NORMAL
PHOSPHATE SERPL-MCNC: 3.5 MG/DL (ref 2.5–4.5)
PLATELET # BLD AUTO: 76 10E9/L (ref 150–450)
PLATELET # BLD AUTO: NORMAL 10E9/L (ref 150–450)
POTASSIUM SERPL-SCNC: 4 MMOL/L (ref 3.4–5.3)
RBC # BLD AUTO: 3.26 10E12/L (ref 4.4–5.9)
RBC # BLD AUTO: NORMAL 10E12/L (ref 4.4–5.9)
SODIUM SERPL-SCNC: 138 MMOL/L (ref 133–144)
WBC # BLD AUTO: 10 10E9/L (ref 4–11)
WBC # BLD AUTO: NORMAL 10E9/L (ref 4–11)

## 2017-05-19 PROCEDURE — 21400006 ZZH R&B CCU INTERMEDIATE UMMC

## 2017-05-19 PROCEDURE — 25000132 ZZH RX MED GY IP 250 OP 250 PS 637: Performed by: THORACIC SURGERY (CARDIOTHORACIC VASCULAR SURGERY)

## 2017-05-19 PROCEDURE — 97116 GAIT TRAINING THERAPY: CPT | Mod: GP | Performed by: PHYSICAL THERAPIST

## 2017-05-19 PROCEDURE — 71010 XR CHEST PORT 1 VW: CPT

## 2017-05-19 PROCEDURE — 80048 BASIC METABOLIC PNL TOTAL CA: CPT | Performed by: SURGERY

## 2017-05-19 PROCEDURE — 97530 THERAPEUTIC ACTIVITIES: CPT | Mod: GP | Performed by: PHYSICAL THERAPIST

## 2017-05-19 PROCEDURE — 85610 PROTHROMBIN TIME: CPT | Performed by: SURGERY

## 2017-05-19 PROCEDURE — 84100 ASSAY OF PHOSPHORUS: CPT | Performed by: SURGERY

## 2017-05-19 PROCEDURE — 00000146 ZZHCL STATISTIC GLUCOSE BY METER IP

## 2017-05-19 PROCEDURE — 25000132 ZZH RX MED GY IP 250 OP 250 PS 637: Performed by: SURGERY

## 2017-05-19 PROCEDURE — 40000193 ZZH STATISTIC PT WARD VISIT: Performed by: PHYSICAL THERAPIST

## 2017-05-19 PROCEDURE — 85027 COMPLETE CBC AUTOMATED: CPT | Performed by: NEUROLOGICAL SURGERY

## 2017-05-19 PROCEDURE — 40000275 ZZH STATISTIC RCP TIME EA 10 MIN

## 2017-05-19 PROCEDURE — 85027 COMPLETE CBC AUTOMATED: CPT | Performed by: SURGERY

## 2017-05-19 PROCEDURE — 83735 ASSAY OF MAGNESIUM: CPT | Performed by: SURGERY

## 2017-05-19 PROCEDURE — 97161 PT EVAL LOW COMPLEX 20 MIN: CPT | Mod: GP | Performed by: PHYSICAL THERAPIST

## 2017-05-19 RX ADMIN — METOPROLOL TARTRATE 12.5 MG: 25 TABLET, FILM COATED ORAL at 09:05

## 2017-05-19 RX ADMIN — OXYCODONE HYDROCHLORIDE 5 MG: 5 TABLET ORAL at 04:50

## 2017-05-19 RX ADMIN — POTASSIUM CHLORIDE 20 MEQ: 750 TABLET, EXTENDED RELEASE ORAL at 06:03

## 2017-05-19 RX ADMIN — PANTOPRAZOLE SODIUM 40 MG: 40 TABLET, DELAYED RELEASE ORAL at 09:04

## 2017-05-19 RX ADMIN — OXYCODONE HYDROCHLORIDE 5 MG: 5 TABLET ORAL at 22:31

## 2017-05-19 RX ADMIN — ATORVASTATIN CALCIUM 40 MG: 40 TABLET, FILM COATED ORAL at 21:13

## 2017-05-19 RX ADMIN — ASPIRIN 81 MG CHEWABLE TABLET 81 MG: 81 TABLET CHEWABLE at 09:04

## 2017-05-19 RX ADMIN — METOPROLOL TARTRATE 12.5 MG: 25 TABLET, FILM COATED ORAL at 21:11

## 2017-05-19 RX ADMIN — SENNOSIDES AND DOCUSATE SODIUM 2 TABLET: 8.6; 5 TABLET ORAL at 09:04

## 2017-05-19 ASSESSMENT — PAIN DESCRIPTION - DESCRIPTORS
DESCRIPTORS: DISCOMFORT
DESCRIPTORS: ACHING
DESCRIPTORS: DISCOMFORT

## 2017-05-19 NOTE — PROGRESS NOTES
CVICU Progress Note  05/19/2017    Subjective and Interval Events: No issues with BP or respiratory. Weaned down to 6L NC. Pain controlled. W    Objective:  Vitals:   Temp:  [98.4  F (36.9  C)-98.9  F (37.2  C)] 98.4  F (36.9  C)  Heart Rate:  [68-88] 78  Resp:  [16-25] 16  BP: (100-119)/(54-79) 108/79  FiO2 (%):  [60 %] 60 %  SpO2:  [90 %-98 %] 94 %    FiO2 (%): 60 %  Resp: 16    Intake/Output:   I/O last 3 completed shifts:  In: 966 [P.O.:640; I.V.:326]  Out: 550 [Urine:380; Chest Tube:170]    Physical exam:  General: NAD  Neuro: A&Ox3  Resp: Breathing non-labored   CV: RRR  Abdomen: Soft, Non-distended, Non-tender  Extremities: warm and well perfused     Labs:    Recent Labs  Lab 05/19/17  0745 05/19/17  0608 05/19/17  0436 05/18/17  1523 05/18/17  0352   WBC 10.0 Canceled, Test credited Unsatisfactory specimen - clottedNOTIFIED YOANNA OATES U4E ON 05/19/17 @ 0710 BY DTCORRECTED ON 05/19 AT 0718: PREVIOUSLY REPORTED AS 10.7  --   --  11.9*   HGB 9.9* Canceled, Test credited Unsatisfactory specimen - clottedNOTIFIED YOANNA OATES U4E ON 05/19/17 @ 0710 BY DTCORRECTED ON 05/19 AT 0718: PREVIOUSLY REPORTED AS 10.2  --   --  10.4*   PLT 76* Canceled, Test credited Unsatisfactory specimen - clottedNOTIFIED YOANNA OATES U4E ON 05/19/17 @ 0710 BY DTCORRECTED ON 05/19 AT 0718: PREVIOUSLY REPORTED AS 48 This result has been called to YOANNA OATES by Sanjay Junior on 05 19 2017 at 0702, and has been read back.  --   --  69*   INR  --   --  1.29*  --  1.44*   NA  --   --  138 138 138   POTASSIUM  --   --  4.0 4.0 3.8   BUN  --   --  27 27 22   CR  --   --  1.00 1.08 1.01       Assessment and Plan: 56M with severe aortic stenosis POD#3 s/p median sternotomy, CP bypass, aortic valve replacement, septal myectomy.      Neuro: PO oxy and prn dilaudid  Psych: n/a  CV: ASA 81, metop 12.5 BID. Stable HDs; remove cordis  Resp: Wean O2 as tolerated. Stable on 6L NC Cont mediastinal tubes  GI: PPI, senna  FEN:  Regular diet  : D/c alexsandra  Heme: Post op labs pending  ID: Vanc/ clinda post op x 48hrs  Endo: SSI  PPx: SCDs  Lines: PIV, CT x2  Dispo: Transfer to [facility]    Seen and discussed with Dr. Tonja Mejia MD PGY-3  Surgery Resident

## 2017-05-19 NOTE — PLAN OF CARE
Problem: Goal Outcome Summary  Goal: Goal Outcome Summary  Outcome: Improving  VSS overnight. A&Ox4, following commands. Afebrile. C/O pain overnight to incision, gave Oxycodone PRN 5mg x2. Placed on BiPAP at 60% for sleeping, sats %, pt request to try NC at 6L while sleeping so that he can continue to Pulmonary toilet and cough up secretions which he has difficulty with while wearing his BIPAP, sats 90-93%. Strong productive cough, self suctioning, clear-coarse LS with rhonchi, diminished bases bilaterally. MAPs 70-80s, HR 80s SR with occasional bigeminal PACs (strips in chart), CTOP 0-10ml/hr.  A-line DC'd yesterday, [place] DC'd yesterday, cordis remains in place for access until transfer to floor. UOP 300ml since 0000 , no BMs. Electrolytes replaced per protocol. Pt has transfer orders to .  Will continue to update MD with any changes in condition per protocol.

## 2017-05-19 NOTE — PLAN OF CARE
Problem: Goal Outcome Summary  Goal: Goal Outcome Summary  Outcome: Improving  D/I: VSS. SR 80s w/ freq PACs. Temp pacer wires capped. O2 sats 95% on 5L NC. Strong, productive cough. Minimal CT output. Voiding adequate amounts. Appetite good. Ambulated in marie w. Stand-by assist. Tires and some SOB after walk.  Denies pain, except w/ transfers.   A: progressing ell POD #3 AVR.   P; transferred to  from  @ 12:30 via w/c, as no longer requires ICU monitoring. Belongings w/ pt. Family/ sons not available to notify of transfer.

## 2017-05-19 NOTE — PLAN OF CARE
Problem: Goal Outcome Summary  Goal: Goal Outcome Summary  PT 4E: Evaluation completed, treatment initiated.  Patient requiring min assist to transfer OOB and ambulate without AD.  Mobility limited primarily by pain and fatigue.       Currently recommend transfer to TCU setting due to limited tolerance to activity and dependence with functional mobility.  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

## 2017-05-19 NOTE — PROGRESS NOTES
Transfer        12:50 PM  ---------------------------------------------------------------------  Transferred to/from: 4E  Via: Bed  Reason for transfer: stable for 6C  Family: Aware of transfer  Belongings: Sent with pt (clothes and bi pap)  Chart: Sent with pt  Medications: Meds from bin sent with pt  Report called to:      Temp:  [98.4  F (36.9  C)-98.9  F (37.2  C)] 98.4  F (36.9  C)  Heart Rate:  [68-88] 78  Resp:  [16-25] 16  BP: (100-119)/(54-79) 108/79  FiO2 (%):  [60 %] 60 %  SpO2:  [90 %-98 %] 94 %

## 2017-05-19 NOTE — PLAN OF CARE
Problem: Goal Outcome Summary  Goal: Goal Outcome Summary  OT 4E: Cancel - pt transitioning to 6C upon initial attempt, pt busy with other healthcare providers upon later attempt. Will reschedule per POC.

## 2017-05-19 NOTE — PROGRESS NOTES
CVICU Progress Note  05/19/2017    Subjective and Interval Events: No issues with BP or respiratory. Weaned down to 6L NC. Pain controlled. W    Objective:  Vitals:   Temp:  [98.4  F (36.9  C)-98.9  F (37.2  C)] 98.4  F (36.9  C)  Heart Rate:  [68-88] 78  Resp:  [16-25] 16  BP: (100-119)/(54-79) 108/79  FiO2 (%):  [60 %] 60 %  SpO2:  [90 %-98 %] 94 %    FiO2 (%): 60 %  Resp: 16    Intake/Output:   I/O last 3 completed shifts:  In: 966 [P.O.:640; I.V.:326]  Out: 550 [Urine:380; Chest Tube:170]    Physical exam:  General: NAD  Neuro: A&Ox3  Resp: Breathing non-labored   CV: RRR  Abdomen: Soft, Non-distended, Non-tender  Extremities: warm and well perfused     Labs:    Recent Labs  Lab 05/19/17  0745 05/19/17  0608 05/19/17  0436 05/18/17  1523 05/18/17  0352   WBC 10.0 Canceled, Test credited Unsatisfactory specimen - clottedNOTIFIED YOANNA OATES U4E ON 05/19/17 @ 0710 BY DTCORRECTED ON 05/19 AT 0718: PREVIOUSLY REPORTED AS 10.7  --   --  11.9*   HGB 9.9* Canceled, Test credited Unsatisfactory specimen - clottedNOTIFIED YOANNA OATES U4E ON 05/19/17 @ 0710 BY DTCORRECTED ON 05/19 AT 0718: PREVIOUSLY REPORTED AS 10.2  --   --  10.4*   PLT 76* Canceled, Test credited Unsatisfactory specimen - clottedNOTIFIED YOANNA OATES U4E ON 05/19/17 @ 0710 BY DTCORRECTED ON 05/19 AT 0718: PREVIOUSLY REPORTED AS 48 This result has been called to YOANNA OATES by Sanjay Junior on 05 19 2017 at 0702, and has been read back.  --   --  69*   INR  --   --  1.29*  --  1.44*   NA  --   --  138 138 138   POTASSIUM  --   --  4.0 4.0 3.8   BUN  --   --  27 27 22   CR  --   --  1.00 1.08 1.01       Assessment and Plan: 56M with severe aortic stenosis POD#3 s/p median sternotomy, CP bypass, aortic valve replacement, septal myectomy.      Neuro: PO oxy and prn dilaudid  Psych: n/a  CV: ASA 81, metop 12.5 BID. Stable HDs; remove cordis  Resp: Wean O2 as tolerated. Stable on 6L NC Cont mediastinal tubes  GI: PPI, senna  FEN:  Regular diet  : D/c alexsandra  Heme: Post op labs pending  ID: Vanc/ clinda post op x 48hrs  Endo: SSI  PPx: SCDs  Lines: PIV, CT x2  Dispo: Transfer to [facility]    Seen and discussed with Kristie Mejia MD PGY-3  Surgery Resident

## 2017-05-20 ENCOUNTER — APPOINTMENT (OUTPATIENT)
Dept: OCCUPATIONAL THERAPY | Facility: CLINIC | Age: 57
DRG: 220 | End: 2017-05-20
Attending: THORACIC SURGERY (CARDIOTHORACIC VASCULAR SURGERY)
Payer: COMMERCIAL

## 2017-05-20 ENCOUNTER — APPOINTMENT (OUTPATIENT)
Dept: GENERAL RADIOLOGY | Facility: CLINIC | Age: 57
DRG: 220 | End: 2017-05-20
Attending: THORACIC SURGERY (CARDIOTHORACIC VASCULAR SURGERY)
Payer: COMMERCIAL

## 2017-05-20 LAB
ANION GAP SERPL CALCULATED.3IONS-SCNC: 10 MMOL/L (ref 3–14)
BUN SERPL-MCNC: 31 MG/DL (ref 7–30)
CALCIUM SERPL-MCNC: 8.2 MG/DL (ref 8.5–10.1)
CHLORIDE SERPL-SCNC: 103 MMOL/L (ref 94–109)
CO2 SERPL-SCNC: 22 MMOL/L (ref 20–32)
CREAT SERPL-MCNC: 1 MG/DL (ref 0.66–1.25)
ERYTHROCYTE [DISTWIDTH] IN BLOOD BY AUTOMATED COUNT: 14.6 % (ref 10–15)
GFR SERPL CREATININE-BSD FRML MDRD: 77 ML/MIN/1.7M2
GLUCOSE BLDC GLUCOMTR-MCNC: 108 MG/DL (ref 70–99)
GLUCOSE BLDC GLUCOMTR-MCNC: 163 MG/DL (ref 70–99)
GLUCOSE SERPL-MCNC: 103 MG/DL (ref 70–99)
HCT VFR BLD AUTO: 31.1 % (ref 40–53)
HGB BLD-MCNC: 10 G/DL (ref 13.3–17.7)
INR PPP: 1.25 (ref 0.86–1.14)
MAGNESIUM SERPL-MCNC: 2.6 MG/DL (ref 1.6–2.3)
MCH RBC QN AUTO: 30.2 PG (ref 26.5–33)
MCHC RBC AUTO-ENTMCNC: 32.2 G/DL (ref 31.5–36.5)
MCV RBC AUTO: 94 FL (ref 78–100)
PHOSPHATE SERPL-MCNC: 3.4 MG/DL (ref 2.5–4.5)
PLATELET # BLD AUTO: 95 10E9/L (ref 150–450)
POTASSIUM SERPL-SCNC: 4 MMOL/L (ref 3.4–5.3)
RBC # BLD AUTO: 3.31 10E12/L (ref 4.4–5.9)
SODIUM SERPL-SCNC: 135 MMOL/L (ref 133–144)
WBC # BLD AUTO: 8.6 10E9/L (ref 4–11)

## 2017-05-20 PROCEDURE — 85610 PROTHROMBIN TIME: CPT | Performed by: SURGERY

## 2017-05-20 PROCEDURE — 40000133 ZZH STATISTIC OT WARD VISIT

## 2017-05-20 PROCEDURE — 25000128 H RX IP 250 OP 636: Performed by: SURGERY

## 2017-05-20 PROCEDURE — 85027 COMPLETE CBC AUTOMATED: CPT | Performed by: SURGERY

## 2017-05-20 PROCEDURE — 36415 COLL VENOUS BLD VENIPUNCTURE: CPT | Performed by: SURGERY

## 2017-05-20 PROCEDURE — 25000132 ZZH RX MED GY IP 250 OP 250 PS 637: Performed by: SURGERY

## 2017-05-20 PROCEDURE — 21400006 ZZH R&B CCU INTERMEDIATE UMMC

## 2017-05-20 PROCEDURE — 80048 BASIC METABOLIC PNL TOTAL CA: CPT | Performed by: SURGERY

## 2017-05-20 PROCEDURE — 71020 XR CHEST 2 VW: CPT

## 2017-05-20 PROCEDURE — 83735 ASSAY OF MAGNESIUM: CPT | Performed by: SURGERY

## 2017-05-20 PROCEDURE — 25000132 ZZH RX MED GY IP 250 OP 250 PS 637: Performed by: THORACIC SURGERY (CARDIOTHORACIC VASCULAR SURGERY)

## 2017-05-20 PROCEDURE — 97110 THERAPEUTIC EXERCISES: CPT | Mod: GO

## 2017-05-20 PROCEDURE — 84100 ASSAY OF PHOSPHORUS: CPT | Performed by: SURGERY

## 2017-05-20 PROCEDURE — 00000146 ZZHCL STATISTIC GLUCOSE BY METER IP

## 2017-05-20 RX ORDER — FUROSEMIDE 10 MG/ML
20 INJECTION INTRAMUSCULAR; INTRAVENOUS ONCE
Status: COMPLETED | OUTPATIENT
Start: 2017-05-20 | End: 2017-05-20

## 2017-05-20 RX ORDER — METOPROLOL TARTRATE 25 MG/1
25 TABLET, FILM COATED ORAL 2 TIMES DAILY
Status: DISCONTINUED | OUTPATIENT
Start: 2017-05-20 | End: 2017-05-21

## 2017-05-20 RX ORDER — WARFARIN SODIUM 5 MG/1
5 TABLET ORAL
Status: COMPLETED | OUTPATIENT
Start: 2017-05-20 | End: 2017-05-20

## 2017-05-20 RX ADMIN — METOPROLOL TARTRATE 12.5 MG: 25 TABLET, FILM COATED ORAL at 11:14

## 2017-05-20 RX ADMIN — PANTOPRAZOLE SODIUM 40 MG: 40 TABLET, DELAYED RELEASE ORAL at 08:53

## 2017-05-20 RX ADMIN — WARFARIN SODIUM 5 MG: 5 TABLET ORAL at 18:31

## 2017-05-20 RX ADMIN — METOPROLOL TARTRATE 12.5 MG: 25 TABLET, FILM COATED ORAL at 08:53

## 2017-05-20 RX ADMIN — FUROSEMIDE 20 MG: 10 INJECTION, SOLUTION INTRAVENOUS at 11:14

## 2017-05-20 RX ADMIN — ASPIRIN 81 MG CHEWABLE TABLET 81 MG: 81 TABLET CHEWABLE at 08:53

## 2017-05-20 RX ADMIN — ATORVASTATIN CALCIUM 40 MG: 40 TABLET, FILM COATED ORAL at 22:00

## 2017-05-20 RX ADMIN — METOPROLOL TARTRATE 25 MG: 25 TABLET ORAL at 20:26

## 2017-05-20 RX ADMIN — OXYCODONE HYDROCHLORIDE 5 MG: 5 TABLET ORAL at 22:00

## 2017-05-20 RX ADMIN — POTASSIUM CHLORIDE 20 MEQ: 750 TABLET, EXTENDED RELEASE ORAL at 11:14

## 2017-05-20 ASSESSMENT — PAIN DESCRIPTION - DESCRIPTORS: DESCRIPTORS: ACHING

## 2017-05-20 NOTE — PROGRESS NOTES
CVTS Fellow Note    Some frequent PACs  Plt improving further  No complaints this AM      Labs and imaging reviewed      Remove PW and CTs  Increase beta blocker  Ensure lytes wnl  Lasix today  Start coumadin for valve, goal 2-3      6th floor

## 2017-05-20 NOTE — PLAN OF CARE
Problem: Goal Outcome Summary  Goal: Goal Outcome Summary  OT/6C: Pt very motivated for therapy. For increased strength/activity tolerance needed to complete ADLs, pt completed functional mobility with CGA and TH managing oxygen/CT. Pt demonstrating minimal balance deficits, no LOB during activity. Pt completed ~90 ft x 2 with x 3 standing rest breaks. HR  with one instance of 117. SpO2 on 4L ~92-95%, pt endorsing fatigue but pt with good insight into deficits. CGA for sit > stand within precautions, able to verbalize with x 1 VC.     Recommendation: Anticipate Home with Assist and OP CR Phase II as pt is limited by decreased strength/activity tolerance, post-surgical precautions, and pain impacting pt's independence in ADLs/IADLs.

## 2017-05-20 NOTE — PROGRESS NOTES
MAURA: Pt with h/y of aortic stenosis, POD # 4 , AVR, Pt A&O X 4, afebrile, HR at 95, BP stable, O2 sat at 95 on 5 L nc, reported incisional pain,oxycodone prn given with effect. Dressings CDI, CT to -20 suc, with 40 cc output.  Plan: Continue to monitor,notify MD as needed.

## 2017-05-20 NOTE — PLAN OF CARE
Problem: Goal Outcome Summary  Goal: Goal Outcome Summary  D- POD 3 AVR  I/A- SR 60-90 with up to 36 PAC/min and up to 20 couplets. 5L O2 via NC. CT x2 to suction. VSS. Declines need for pain med. Pacer wires capped. + void, + BM x2 today.   P Continue to monitor and notify team of changes.

## 2017-05-21 ENCOUNTER — APPOINTMENT (OUTPATIENT)
Dept: OCCUPATIONAL THERAPY | Facility: CLINIC | Age: 57
DRG: 220 | End: 2017-05-21
Attending: THORACIC SURGERY (CARDIOTHORACIC VASCULAR SURGERY)
Payer: COMMERCIAL

## 2017-05-21 ENCOUNTER — APPOINTMENT (OUTPATIENT)
Dept: PHYSICAL THERAPY | Facility: CLINIC | Age: 57
DRG: 220 | End: 2017-05-21
Attending: THORACIC SURGERY (CARDIOTHORACIC VASCULAR SURGERY)
Payer: COMMERCIAL

## 2017-05-21 LAB
ANION GAP SERPL CALCULATED.3IONS-SCNC: 10 MMOL/L (ref 3–14)
BUN SERPL-MCNC: 27 MG/DL (ref 7–30)
CALCIUM SERPL-MCNC: 8.1 MG/DL (ref 8.5–10.1)
CHLORIDE SERPL-SCNC: 102 MMOL/L (ref 94–109)
CO2 SERPL-SCNC: 22 MMOL/L (ref 20–32)
CREAT SERPL-MCNC: 1.09 MG/DL (ref 0.66–1.25)
GFR SERPL CREATININE-BSD FRML MDRD: 70 ML/MIN/1.7M2
GLUCOSE SERPL-MCNC: 92 MG/DL (ref 70–99)
INR PPP: 1.2 (ref 0.86–1.14)
LACTATE BLD-SCNC: 1.3 MMOL/L (ref 0.7–2.1)
MAGNESIUM SERPL-MCNC: 2.3 MG/DL (ref 1.6–2.3)
POTASSIUM SERPL-SCNC: 3.4 MMOL/L (ref 3.4–5.3)
SODIUM SERPL-SCNC: 134 MMOL/L (ref 133–144)

## 2017-05-21 PROCEDURE — 25000132 ZZH RX MED GY IP 250 OP 250 PS 637: Performed by: THORACIC SURGERY (CARDIOTHORACIC VASCULAR SURGERY)

## 2017-05-21 PROCEDURE — 36415 COLL VENOUS BLD VENIPUNCTURE: CPT | Performed by: SURGERY

## 2017-05-21 PROCEDURE — 97110 THERAPEUTIC EXERCISES: CPT | Mod: GO

## 2017-05-21 PROCEDURE — 83605 ASSAY OF LACTIC ACID: CPT | Performed by: THORACIC SURGERY (CARDIOTHORACIC VASCULAR SURGERY)

## 2017-05-21 PROCEDURE — 97535 SELF CARE MNGMENT TRAINING: CPT | Mod: GO

## 2017-05-21 PROCEDURE — 40000133 ZZH STATISTIC OT WARD VISIT

## 2017-05-21 PROCEDURE — 21400006 ZZH R&B CCU INTERMEDIATE UMMC

## 2017-05-21 PROCEDURE — 83735 ASSAY OF MAGNESIUM: CPT | Performed by: SURGERY

## 2017-05-21 PROCEDURE — 93005 ELECTROCARDIOGRAM TRACING: CPT

## 2017-05-21 PROCEDURE — 93010 ELECTROCARDIOGRAM REPORT: CPT | Performed by: INTERNAL MEDICINE

## 2017-05-21 PROCEDURE — 85610 PROTHROMBIN TIME: CPT | Performed by: SURGERY

## 2017-05-21 PROCEDURE — 97116 GAIT TRAINING THERAPY: CPT | Mod: GP

## 2017-05-21 PROCEDURE — 80048 BASIC METABOLIC PNL TOTAL CA: CPT | Performed by: SURGERY

## 2017-05-21 PROCEDURE — 97530 THERAPEUTIC ACTIVITIES: CPT | Mod: GP

## 2017-05-21 PROCEDURE — 25000128 H RX IP 250 OP 636: Performed by: SURGERY

## 2017-05-21 PROCEDURE — 25000132 ZZH RX MED GY IP 250 OP 250 PS 637: Performed by: SURGERY

## 2017-05-21 PROCEDURE — 40000193 ZZH STATISTIC PT WARD VISIT

## 2017-05-21 PROCEDURE — 40000141 ZZH STATISTIC PERIPHERAL IV START W/O US GUIDANCE

## 2017-05-21 PROCEDURE — 36415 COLL VENOUS BLD VENIPUNCTURE: CPT | Performed by: THORACIC SURGERY (CARDIOTHORACIC VASCULAR SURGERY)

## 2017-05-21 RX ORDER — WARFARIN SODIUM 5 MG/1
5 TABLET ORAL
Status: COMPLETED | OUTPATIENT
Start: 2017-05-21 | End: 2017-05-21

## 2017-05-21 RX ORDER — FUROSEMIDE 10 MG/ML
20 INJECTION INTRAMUSCULAR; INTRAVENOUS
Status: COMPLETED | OUTPATIENT
Start: 2017-05-21 | End: 2017-05-22

## 2017-05-21 RX ADMIN — METOPROLOL TARTRATE 37.5 MG: 25 TABLET, FILM COATED ORAL at 20:32

## 2017-05-21 RX ADMIN — ATORVASTATIN CALCIUM 40 MG: 40 TABLET, FILM COATED ORAL at 21:49

## 2017-05-21 RX ADMIN — METOPROLOL TARTRATE 25 MG: 25 TABLET ORAL at 08:00

## 2017-05-21 RX ADMIN — POTASSIUM CHLORIDE 20 MEQ: 750 TABLET, EXTENDED RELEASE ORAL at 21:04

## 2017-05-21 RX ADMIN — ACETAMINOPHEN 650 MG: 325 TABLET, FILM COATED ORAL at 08:07

## 2017-05-21 RX ADMIN — PANTOPRAZOLE SODIUM 40 MG: 40 TABLET, DELAYED RELEASE ORAL at 08:00

## 2017-05-21 RX ADMIN — FUROSEMIDE 20 MG: 10 INJECTION, SOLUTION INTRAVENOUS at 17:19

## 2017-05-21 RX ADMIN — FUROSEMIDE 20 MG: 10 INJECTION, SOLUTION INTRAVENOUS at 10:49

## 2017-05-21 RX ADMIN — WARFARIN SODIUM 5 MG: 5 TABLET ORAL at 17:19

## 2017-05-21 RX ADMIN — ASPIRIN 81 MG CHEWABLE TABLET 81 MG: 81 TABLET CHEWABLE at 08:00

## 2017-05-21 RX ADMIN — ACETAMINOPHEN 650 MG: 325 TABLET, FILM COATED ORAL at 20:31

## 2017-05-21 ASSESSMENT — PAIN DESCRIPTION - DESCRIPTORS: DESCRIPTORS: ACHING

## 2017-05-21 NOTE — PLAN OF CARE
Problem: Goal Outcome Summary  Goal: Goal Outcome Summary  PT 6C: Pt ambulated 150' x2 with CGA, no AD. Demonstrates significantly decreased una, MOTA, and mild unsteadiness, 1 episode of knee buckling which was self corrected. Requires 1 seated rest break and 2 standing rest break due to SOB. Pt navigated 3 stairs x2 with 1 rail and CGA, slow but steady. SpO2 stable on 2L, HR 80s-120s.      REC: Current discharge recommendation is TCU as pt is very deconditioned, below baseline level of function, and is primary caregiver of 89 year old mother who lives with pt. Pending LOS and continued progress with therapy, pt may be appropriate to discharge home with OP Cardiac Rehab. Will continue to follow and update recs appropriately. Patient is agreeable to TCU if needed, but would prefer to discharge directly home. Pt's brother was present during session and stated that he does not believe pt is ready to discharge home given pt's living situation with their mother and current functional mobility status. Brother is hoping pt can stay in hospital a couple more days so the patient is in better position to discharge home independently.

## 2017-05-21 NOTE — PLAN OF CARE
Problem: Goal Outcome Summary  Goal: Goal Outcome Summary  OT/6C: Pt very motivated for therapy. Pt able to % of sternal precautions, educated on compensatory techniques for ADLs pt verbalized understanding. For increased strength/activity tolerance needed to complete ADLs, pt completed functional mobility with SBA while pushing IV pole. Pt with improved balance, pt completed ~400 ft with fair tolerance, endorsing fatigue. Pt's VSS throughout, initially pt on 3L with sats %, reduced to 2L pt's sats remained 91-93% during activity.      Recommendation: If pt were to discharge today would recommend TCU as pt is limited by decreased strength/activity tolerance, post-surgical precautions and SOB impacting pt's independence in ADLs/IADLs. 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 Pending LOS may be able to discharge home with OP CR Phase II. Will continue to monitor to facilitate safe discharge.

## 2017-05-21 NOTE — PROGRESS NOTES
CVTS Fellow Note    No acute  Feeling well    Labs and imaging reviewed      Increase beta blocker  Continue lasix  Continue warfarin  Wean oxygen  OOB, ambulate    6th floor

## 2017-05-21 NOTE — PLAN OF CARE
Problem: Goal Outcome Summary  Goal: Goal Outcome Summary  D- POD 4 AVR  I/A- SR  with up to 47 PAC/min and afib bursts up to 7 beats per min. MD aware, Metoprolol increased. O2 weaned to 2L O2 via NC. Pacer wires and CT d/c'd. VSS. Declines need for pain med. + void, + BM x2 today.   P-Continue to monitor and notify team of changes.

## 2017-05-21 NOTE — PLAN OF CARE
Problem: Goal Outcome Summary  Goal: Goal Outcome Summary  MAURA: Pt with h/y of aortic stenosis, POD # 4 , AVR, Pt A&O X 4, afebrile, HR at 95, BP stable, O2 sat at 93% on 2 L nc, reported incisional pain,oxycodone prn given with effect. Dressings CDI,tele reported that pt had a burst of Afib (not new) approximately at 9 p.m  that converted to SR. Pt asymptomatic,VSS, HR 99,  /61, denies any pain,nausea,palpitation.   Plan: Continue to monitor,notify MD with concerns.

## 2017-05-21 NOTE — PLAN OF CARE
Problem: Goal Outcome Summary  Goal: Goal Outcome Summary  D- POD 5 AVR  I/A- SR 60-90 with up to 24 PAC/min and afib bursts up to 8 beats per min, MD aware, Metoprolol increased. A-fib increased to bursts up to 10 sec this evening, MD notified, labs ordered. 2L O2 via NC. VSS. Declines need for pain med. Lasix 20 mg IV BID. + void, + BM x1 today. Ambulated in halls x3 with staff, tolerated fair. New on coumadin, education given, needs care management assist with set up.   P-Continue to monitor and notify team of changes.

## 2017-05-22 ENCOUNTER — APPOINTMENT (OUTPATIENT)
Dept: OCCUPATIONAL THERAPY | Facility: CLINIC | Age: 57
DRG: 220 | End: 2017-05-22
Attending: THORACIC SURGERY (CARDIOTHORACIC VASCULAR SURGERY)
Payer: COMMERCIAL

## 2017-05-22 LAB
ANION GAP SERPL CALCULATED.3IONS-SCNC: 9 MMOL/L (ref 3–14)
BUN SERPL-MCNC: 24 MG/DL (ref 7–30)
CALCIUM SERPL-MCNC: 8.1 MG/DL (ref 8.5–10.1)
CHLORIDE SERPL-SCNC: 101 MMOL/L (ref 94–109)
CO2 SERPL-SCNC: 24 MMOL/L (ref 20–32)
COPATH REPORT: NORMAL
CREAT SERPL-MCNC: 0.92 MG/DL (ref 0.66–1.25)
ERYTHROCYTE [DISTWIDTH] IN BLOOD BY AUTOMATED COUNT: 14.6 % (ref 10–15)
GFR SERPL CREATININE-BSD FRML MDRD: 85 ML/MIN/1.7M2
GLUCOSE BLDC GLUCOMTR-MCNC: 170 MG/DL (ref 70–99)
GLUCOSE BLDC GLUCOMTR-MCNC: 70 MG/DL (ref 70–99)
GLUCOSE BLDC GLUCOMTR-MCNC: 73 MG/DL (ref 70–99)
GLUCOSE BLDC GLUCOMTR-MCNC: 92 MG/DL (ref 70–99)
GLUCOSE BLDC GLUCOMTR-MCNC: 98 MG/DL (ref 70–99)
GLUCOSE SERPL-MCNC: 179 MG/DL (ref 70–99)
HCT VFR BLD AUTO: 32.9 % (ref 40–53)
HGB BLD-MCNC: 10.4 G/DL (ref 13.3–17.7)
INR PPP: 1.53 (ref 0.86–1.14)
MCH RBC QN AUTO: 30.3 PG (ref 26.5–33)
MCHC RBC AUTO-ENTMCNC: 31.6 G/DL (ref 31.5–36.5)
MCV RBC AUTO: 96 FL (ref 78–100)
PLATELET # BLD AUTO: 133 10E9/L (ref 150–450)
POTASSIUM SERPL-SCNC: 3.3 MMOL/L (ref 3.4–5.3)
POTASSIUM SERPL-SCNC: 4 MMOL/L (ref 3.4–5.3)
RBC # BLD AUTO: 3.43 10E12/L (ref 4.4–5.9)
SODIUM SERPL-SCNC: 134 MMOL/L (ref 133–144)
WBC # BLD AUTO: 9.2 10E9/L (ref 4–11)

## 2017-05-22 PROCEDURE — 25000132 ZZH RX MED GY IP 250 OP 250 PS 637: Performed by: SURGERY

## 2017-05-22 PROCEDURE — 85027 COMPLETE CBC AUTOMATED: CPT | Performed by: SURGERY

## 2017-05-22 PROCEDURE — 00000146 ZZHCL STATISTIC GLUCOSE BY METER IP

## 2017-05-22 PROCEDURE — 25000132 ZZH RX MED GY IP 250 OP 250 PS 637: Performed by: THORACIC SURGERY (CARDIOTHORACIC VASCULAR SURGERY)

## 2017-05-22 PROCEDURE — 25000128 H RX IP 250 OP 636: Performed by: PHYSICIAN ASSISTANT

## 2017-05-22 PROCEDURE — 80048 BASIC METABOLIC PNL TOTAL CA: CPT | Performed by: SURGERY

## 2017-05-22 PROCEDURE — 40000133 ZZH STATISTIC OT WARD VISIT: Performed by: OCCUPATIONAL THERAPIST

## 2017-05-22 PROCEDURE — 25000131 ZZH RX MED GY IP 250 OP 636 PS 637: Performed by: THORACIC SURGERY (CARDIOTHORACIC VASCULAR SURGERY)

## 2017-05-22 PROCEDURE — 36415 COLL VENOUS BLD VENIPUNCTURE: CPT | Performed by: SURGERY

## 2017-05-22 PROCEDURE — 21400006 ZZH R&B CCU INTERMEDIATE UMMC

## 2017-05-22 PROCEDURE — 97110 THERAPEUTIC EXERCISES: CPT | Mod: GO | Performed by: OCCUPATIONAL THERAPIST

## 2017-05-22 PROCEDURE — 40000275 ZZH STATISTIC RCP TIME EA 10 MIN

## 2017-05-22 PROCEDURE — 36415 COLL VENOUS BLD VENIPUNCTURE: CPT | Performed by: THORACIC SURGERY (CARDIOTHORACIC VASCULAR SURGERY)

## 2017-05-22 PROCEDURE — 99222 1ST HOSP IP/OBS MODERATE 55: CPT | Performed by: INTERNAL MEDICINE

## 2017-05-22 PROCEDURE — 84132 ASSAY OF SERUM POTASSIUM: CPT | Performed by: THORACIC SURGERY (CARDIOTHORACIC VASCULAR SURGERY)

## 2017-05-22 PROCEDURE — 25000132 ZZH RX MED GY IP 250 OP 250 PS 637

## 2017-05-22 PROCEDURE — 85610 PROTHROMBIN TIME: CPT | Performed by: SURGERY

## 2017-05-22 PROCEDURE — 25000128 H RX IP 250 OP 636: Performed by: SURGERY

## 2017-05-22 RX ORDER — WARFARIN SODIUM 5 MG/1
5 TABLET ORAL
Status: COMPLETED | OUTPATIENT
Start: 2017-05-22 | End: 2017-05-22

## 2017-05-22 RX ADMIN — METOPROLOL TARTRATE 37.5 MG: 25 TABLET, FILM COATED ORAL at 19:59

## 2017-05-22 RX ADMIN — WARFARIN SODIUM 5 MG: 5 TABLET ORAL at 17:41

## 2017-05-22 RX ADMIN — POTASSIUM CHLORIDE 40 MEQ: 750 TABLET, EXTENDED RELEASE ORAL at 10:26

## 2017-05-22 RX ADMIN — FUROSEMIDE 20 MG: 10 INJECTION, SOLUTION INTRAVENOUS at 09:27

## 2017-05-22 RX ADMIN — METOPROLOL TARTRATE 37.5 MG: 25 TABLET, FILM COATED ORAL at 09:28

## 2017-05-22 RX ADMIN — ACETAMINOPHEN 650 MG: 325 TABLET, FILM COATED ORAL at 19:59

## 2017-05-22 RX ADMIN — INSULIN ASPART 7 UNITS: 100 INJECTION, SOLUTION INTRAVENOUS; SUBCUTANEOUS at 18:41

## 2017-05-22 RX ADMIN — POTASSIUM CHLORIDE 20 MEQ: 750 TABLET, EXTENDED RELEASE ORAL at 12:44

## 2017-05-22 RX ADMIN — SENNOSIDES AND DOCUSATE SODIUM 1 TABLET: 8.6; 5 TABLET ORAL at 19:58

## 2017-05-22 RX ADMIN — FUROSEMIDE 20 MG: 10 INJECTION, SOLUTION INTRAVENOUS at 15:57

## 2017-05-22 RX ADMIN — PANTOPRAZOLE SODIUM 40 MG: 40 TABLET, DELAYED RELEASE ORAL at 09:28

## 2017-05-22 RX ADMIN — ATORVASTATIN CALCIUM 40 MG: 40 TABLET, FILM COATED ORAL at 20:58

## 2017-05-22 RX ADMIN — ASPIRIN 81 MG CHEWABLE TABLET 81 MG: 81 TABLET CHEWABLE at 09:30

## 2017-05-22 NOTE — CONSULTS
Electrophysiology Consultation Note   EP Attending: .   Reason for consultation: SVT.   Provider requesting consultation: , CVTS Service.  Date of Service: 5/22/2017      HPI:   Mr. Chowdary is a 56 year old male who has a past medical history significant for severe AS (?bicuspid) now s/p bioprosthetic AVR (25 mm Trifecta) and septal myectomy on 5/161/7, HLD, DRAKE, glaucoma, and former tobacco use. He has been recovering well post operatively. On 5/20/17 he started having bursts of SVT. His Metoprolol dose was escalated from 12.5 mg BID to 37.5 mg BID. He continues to have runs of SVT HRs 130's bpm. He reports intermittent minor palpitations; however, on exam today (5/22/17) he was noted to be in  bpm and was asymptomatic. Therapy reports deconditioning and MOTA with only minimal exertion. He denies any chest pain, worsening shortness of breath, or syncopal symptoms. He endorses chest congestion.  Telemetry reviewed showing SR with multiple (10+) episodes of SVT.  He denies any known history of arrhyhmias. He does report having some palpitations at home; however, these were never investigated nor that problematic. Pre-op echo showed LVEF 55-60%. Stable renal function and electrolytes. Current cardiac medications include: ASA, Lipitor, Lasix, Metoprolol, and Warfarin. He is hemodynamically stable.     Past Medical History:   Past Medical History:   Diagnosis Date     Former tobacco use      Glaucoma      Hyperlipidemia LDL goal <160 12/6/2011     Obesity      DRAKE on CPAP      Right bundle branch block      Severe aortic stenosis     On Echo 10/28/16     Past Surgical History:   Past Surgical History:   Procedure Laterality Date     HEART CATH LEFT HEART CATH  04/07/2017    Diffuse non-obstuctive CAD     REPAIR VALVE AORTIC N/A 5/16/2017    Procedure: REPAIR VALVE AORTIC;  Median Sternotony, CardioPulmonary Bypass, Aortic Valve Replace using 25MM Trifecta Valve with [employer], Septal  myectomy;  Surgeon: Jun Simon MD;  Location: [facility] OR     REPLACE VALVE AORTIC N/A 5/16/2017    Procedure: REPLACE VALVE AORTIC;;  Surgeon: Jun Simon MD;  Location: [facility] OR     SINUS SURGERY  2005     Allergies: Per MAR     Allergies   Allergen Reactions     Amoxicillin      Itchy      Penicillins Hives     Medications:   Per MAR current outpatient cardiovascular medications include: Prescriptions Prior to Admission   Medication Sig Dispense Refill Last Dose     cholecalciferol (VITAMIN D3) 5000 UNITS TABS tablet Take 5,000 Units by mouth every morning   Past Week at Unknown time     atorvastatin (LIPITOR) 40 MG tablet Take 1 tablet (40 mg) by mouth daily (Patient taking differently: Take 40 mg by mouth At Bedtime ) 90 tablet 0 5/15/2017 at Unknown time     Multiple Vitamins-Minerals (CENTRUM ADULTS PO) Take 1 tablet by mouth every morning    Past Week at Unknown time     prednisoLONE acetate (PRED FORTE) 1 % ophthalmic susp Place 1 drop into both eyes as needed    Past Week at Unknown time     timolol (TIMOPTIC) 0.5 % ophthalmic solution 1 drop 1 DROP INTO BOTH EYES 2 TIMES DAILY   Past Week at Unknown time     aspirin 81 MG tablet Take 81 mg by mouth every morning    5/15/2017 at Unknown time     Omega-3 Fatty Acids (FISH OIL PO) Take 1 g by mouth every morning    Past Week at Unknown time     No current outpatient prescriptions on file.     Current Facility-Administered Medications   Medication Dose Route Frequency     furosemide  20 mg Intravenous BID     metoprolol  37.5 mg Oral BID     pantoprazole  40 mg Oral QAM     atorvastatin  40 mg Oral At Bedtime     sodium chloride (PF)  3 mL Intracatheter Q8H     insulin aspart   Subcutaneous TID w/meals     aspirin  81 mg Oral Daily     senna-docusate  1-2 tablet Oral BID     Family History:   Family History   Problem Relation Age of Onset     Family History Negative Mother      DIABETES Father      Heart Surgery Father      CABG     Coronary  "Artery Disease Father      Hypertension Brother      DIABETES Brother      HEART DISEASE Brother      Heart Surgery Brother      CABG x 3     Family History Negative Sister      DIABETES Brother      History of diabetes, but no longer an issue     Family History Negative Brother      Social History:   Social History   Substance Use Topics     Smoking status: Former Smoker     Packs/day: 1.00     Years: 20.00     Types: Cigarettes, Cigars     Quit date: 10/21/2011     Smokeless tobacco: Never Used     Alcohol use No       ROS:   A comprehensive 10 point ROS was negative other than as mentioned in HPI.    Physical Examination:   VITALS: /60 (BP Location: Left arm)  Pulse 136  Temp 98  F (36.7  C) (Oral)  Resp 18  Ht 1.778 m (5' 10\")  Wt 114.7 kg (252 lb 12.8 oz)  SpO2 100%  BMI 36.27 kg/m2  GENERAL APPEARANCE: AxO, NAD   HEENT: NCAT, EOMI, MMM. PERRLA.   NECK: Supple.  CHEST: CTAB   CARDIOVASCULAR: S1S2, Reg, No m/r/g.   ABDOMEN: Obese, rounded, NT, ND, BS+, soft.  EXTREMITIES: 1+ BLE edema. Distal pulses intact.   NEURO: Grossly nonfocal.   PSYCH: Normal affect.  SKIN: Warm and dry.   Data:   Labs:  BMP  Recent Labs  Lab 05/21/17  1950 05/20/17  0654 05/19/17  0436 05/18/17  1523    135 138 138   POTASSIUM 3.4 4.0 4.0 4.0   CHLORIDE 102 103 104 106   MYRON 8.1* 8.2* 8.3* 8.3*   CO2 22 22 25 25   BUN 27 31* 27 27   CR 1.09 1.00 1.00 1.08   GLC 92 103* 108* 113*     CBC  Recent Labs  Lab 05/20/17  0654 05/19/17  0745 05/19/17  0608 05/18/17  0352   WBC 8.6 10.0 Canceled, Test credited Unsatisfactory specimen - clottedNOTIFIED YOANNA OATES U4E ON 05/19/17 @ 0710 BY DTCORRECTED ON 05/19 AT 0718: PREVIOUSLY REPORTED AS 10.7 11.9*   RBC 3.31* 3.26* Canceled, Test credited Unsatisfactory specimen - clottedNOTIFIED YOANNA OATES U4E ON 05/19/17 @ 0710 BY DTCORRECTED ON 05/19 AT 0718: PREVIOUSLY REPORTED AS 3.33 3.39*   HGB 10.0* 9.9* Canceled, Test credited Unsatisfactory specimen - " clottedNOTIFIED YOANNA OATES U4E ON 05/19/17 @ 0710 BY DTCORRECTED ON 05/19 AT 0718: PREVIOUSLY REPORTED AS 10.2 10.4*   HCT 31.1* 30.7* Canceled, Test credited Unsatisfactory specimen - clottedNOTIFIED YOANNA OATES U4E ON 05/19/17 @ 0710 BY DTCORRECTED ON 05/19 AT 0718: PREVIOUSLY REPORTED AS 31.5 32.4*   MCV 94 94 Canceled, Test credited Unsatisfactory specimen - clottedNOTIFIED YOANNA OATES U4NANCY ON 05/19/17 @ 0710 BY DTCORRECTED ON 05/19 AT 0718: PREVIOUSLY REPORTED AS 95 96   MCH 30.2 30.4 Canceled, Test credited Unsatisfactory specimen - clottedNOTIFIED YOANNA OATES U4E ON 05/19/17 @ 0710 BY DTCORRECTED ON 05/19 AT 0718: PREVIOUSLY REPORTED AS 30.6 30.7   MCHC 32.2 32.2 Canceled, Test credited Unsatisfactory specimen - clottedNOTIFIED YOANNA OATES U4NANCY ON 05/19/17 @ 0710 BY DTCORRECTED ON 05/19 AT 0718: PREVIOUSLY REPORTED AS 32.4 32.1   RDW 14.6 14.7 Canceled, Test credited Unsatisfactory specimen - clottedNOTIFIED YOANNA OATES UCALI ON 05/19/17 @ 0710 BY DTCORRECTED ON 05/19 AT 0718: PREVIOUSLY REPORTED AS 14.8 14.6   PLT 95* 76* Canceled, Test credited Unsatisfactory specimen - clottedNOTIFIED YOANNA OATES UMartineE ON 05/19/17 @ 0710 BY DTCORRECTED ON 05/19 AT 0718: PREVIOUSLY REPORTED AS 48 This result has been called to YOANNA OATES by Sanjay Junior on 05 19 2017 at 0702, and has been read back. 69*     INR  Recent Labs  Lab 05/22/17  0841 05/21/17  0757 05/20/17  0654 05/19/17  0436   INR 1.53* 1.20* 1.25* 1.29*     No results found for: CKTOTAL, CKMB, TROPN  Cholesterol (mg/dL)   Date Value   04/25/2017 283 (H)     HDL Cholesterol (mg/dL)   Date Value   04/25/2017 39 (L)     LDL Cholesterol Calculated (mg/dL)   Date Value   04/25/2017 209 (H)     EKG:     Tele:         2/2017 ECHO:   Left Ventricle  The left ventricle is normal in size. There is moderate asymmetric septal  hypertrophy. Images and measurments are technically difficult. Left  ventricular systolic  function is normal. The visual ejection fraction is  estimated at 55-60%. Left ventricular diastolic function is indeterminate. E  by E prime ratio is between 8 and 15, which is indeterminate for assessment of  left ventricular filling pressures. No regional wall motion abnormalities  noted.     Right Ventricle  The right ventricle is grossly normal size. The right ventricular systolic  function is normal.     Atria  The left atrium is not well visualized. The left atrium is mildly dilated.  Right atrium not well visualized. Right atrial size is normal. A patent  foramen ovale is suspected.     Mitral Valve  The mitral valve is not well visualized. There is no mitral regurgitation  noted. Trivial mitral stenosis. The mean mitral valve gradient is 1.6 mmHg.     Tricuspid Valve  The tricuspid valve is not well visualized. There is physiologic tricuspid  regurgitation. Right ventricular systolic pressure could not be approximated  due to inadequate tricuspid regurgitation. Normal IVC (1.5-2.5cm) with <50%  respiratory collapse; right atrial pressure is estimated at 10-15mmHg.        Aortic Valve  The aortic valve is not well visualized. There is mild (1+) aortic  regurgitation. The calculated aortic valve are is 0.58 cm^2. The peak AoV  pressure gradient is 91.8 mmHg. The mean AoV pressure gradient is 53.7 mmHg.  Severe valvular aortic stenosis.     Pulmonic Valve  The pulmonic valve is not well visualized. There is no pulmonic valvular  regurgitation. Normal pulmonic valve velocity.     Vessels  The aortic root is normal size. Normal size ascending aorta. The inferior vena  cava is not dilated.     Pericardium  Small pericardial effusion.     Rhythm  The rhythm was normal sinus.  Assessment:   Mr. Chowdary is a 56 year old male who has a past medical history significant for severe AS (?bicuspid) now s/p bioprosthetic AVR (25 mm Trifecta) and septal myectomy on 5/161/7, HLD, DRAKE, glaucoma, and former tobacco use. He has  been recovering well post operatively. 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   EP Recommendations:  We discussed various options for treatment of SVT. This is not a life threatening arrhythmia. Medications such as calcium channel blockers or beta blockers in some cases reduce the frequency and duration of the episodes but they will not cure it. He is already on Metoprolol with increased doses and continues to have frequent SVT episodes. The other option discussed was an electrophysiology study (EPS) and possible ablation. Success rate of ablation 80-90% depending on the mechanism. The procedural risk of EP study and ablation were discussed in detail. Risks discussed include: vascular complications, CVA, AVB, pericardial effusion and tamponade. We favor treatment for SVT given frequency of episodes. After discussion, he would like to pursue ablation. This is not an emergency and can be addressed after further post operative healing. We can arrange for this in the next 4-8 weeks. In the interim, he can continue with therapies without restrictions. If he has prolonged episode, adenosine can be administered.     The patient states understanding and is agreeable with plan.   Thank you much for allowing us to participate in the care of this pleasant patient.     The patient was discussed w/ Dr. Quintana.  The above note reflects our joint plan.    RODRIGUEZ Thompson CNP  Electrophysiology Consult Service  Pager: [phone]    EP STAFF NOTE  I have seen and examined the patient as part of a shared visit with CHRISTOPH Thompson NP.  I agree with the note above. I reviewed today's vital signs and medications. I have reviewed and discussed with the advanced practice provider their physical examination, assessment, and plan.  Briefly, recurrent high burden SVT.  My key history/exam findings are: frequent SVT episodes.   The key management decisions made by  me: SVT ablation after recovery.    Deondre Quintana MD [facility]  Cardiology - Electrophysiology

## 2017-05-22 NOTE — PROGRESS NOTES
"Pt reports feeling a little \"foggy\" after taking potassium pills. The feeling passed after 10-15 minute per pt. Will continue to monitor.   "

## 2017-05-22 NOTE — PLAN OF CARE
Problem: Goal Outcome Summary  Goal: Goal Outcome Summary  Outcome: Improving  D: s/p AVR 5/16. Runs of SVT since 5/20. Episode of confusion overnight.  I: IV lasix per orders. K = 3.3 replaced per protocol. Weaned to RA.   A: A/Ox4 all shift. Continued to have runs of SVT: anywhere from 5 beats to 90 seconds, no SVT since noon. Denies dizziness, acknowledges feeling palpitations occasionally. Up with SBA, Good urine output today. LS clear.  P: Continue to monitor rhythm. EP consult today.

## 2017-05-22 NOTE — PLAN OF CARE
Problem: Individualization  Goal: Patient Preferences  Outcome: Improving  Pt had AVR and is doing well. All tubes and wires out. Incision CDI. Pt denies complaints of pain. Pt's chief complaint is being tired.Pt in SR Pt's FSBG WNL. Pt's lungs clear and diminished in bases. Pt bowel sounds present.Pt has right PIV SL. Pt getting up independently and using sternal precautions. POC anticipate discharge tomorrow.

## 2017-05-22 NOTE — PROGRESS NOTES
"Called to evaluate patient for persistent SVT. Mr. Chowdary is a 57 y/o M with history of severe AS (bicuspid?) s/p bioprosthetic AVR (25 mm Trifecta) and septal myectomy on 5/16/17. Post-op course has been fairly unremarkable. Yesterday (5/20) he began having bursts of an atrial tachyarrhythmia, initially though to be post-op paroxysmal atrial fibrillation. He is asymptomatic. Metoprolol dose was escalated for rate control, from 12.5mg BID initially to 37.5mg BID as of this evening. Tonight, around 10:10 pm he went into an SVT, rate 135 bpm. 12-lead EKG was obtained which revealed wide complex tachycardia (known bifascicular block) with possible short R-P segment, suggestive of AVNRT or atypical AVRT. On telemetry, episodes initiate with a PAC and have abrupt onset of the tachycardia, with rate always 130s. Some episodes break appear to end with a P-wave, which suggests AVNRT. Further review of telemetry reveals that all previous bursts of tachycardia over the last 1-2 days were actually more consistent with AVNRT rather than atrial fibrillation.    Upon interviewing the patient, he stated that he \"feels great\". He denies any chest pain (except when coughing) or palpitations.     Carotid massage was performed, which broke the tachycardia immediately back to sinus bradycardia (HR 50s). However, within 5 minutes he went back into SVT and this persists for the past few hours. He remains asymptomatic.    Will recommend:  --Continue Metoprolol 37.5mg BID; given resting bradycardia probably would not further escalate dose for now  --If he is symptomatic, can give Adenosine 6mg IV push to break AVNRT  --EP consultation in AM for additional discussion regarding the management of post-op SVT (likely AVNRT)    Fariha Phillips MD  Cardiology Fellow  [phone]            "

## 2017-05-22 NOTE — PROGRESS NOTES
CVTS progress note - 5/22/2017   Aortic valve stenosis, unspecified etiology    S: Patient with intermittent SVT overnight and into this morning. Asymptomatic. Otherwise doing well. Pain controlled well. +BM. Maintaining O2 saturations on 2L supplemental O2. No new SOB, chest pain, abdominal pain. No nausea. Ambulating. Patient has no concerns today.    O:   Temp:  [97.4  F (36.3  C)-98.4  F (36.9  C)] 98  F (36.7  C)  Pulse:  [] 136  Heart Rate:  [] 86  Resp:  [18-20] 18  BP: ()/(58-82) 100/60  SpO2:  [92 %-100 %] 100 %    Physical Exam:  Temp: 98  F (36.7  C) Temp src: Oral BP: 100/60 Pulse: 136 Heart Rate: 86 Resp: 18 SpO2: 100 % O2 Device: Nasal cannula Oxygen Delivery: 2 LPM    General: Pleasant, seen up in chair, NAD.  HEENT: BESSY, no sclera icterus, moist mucosa  CV: RRR without M/R/G. Intermittent.  Pulm: No wheeze or rhonchi. Non-labored effort. Symmetrical excursion. All chest tubes removed. Incision C/D/I; small amount of erythema present on sternal wound edges.  Abd: Soft, NT, ND, +BS   : Voiding independently.  Neuro: CN grossly intact.    UOP: Adequate.      Intake/Output Summary (Last 24 hours) at 05/22/17 0928  Last data filed at 05/21/17 1800   Gross per 24 hour   Intake              920 ml   Output              600 ml   Net              320 ml       Resp: 18    BMP  Recent Labs  Lab 05/21/17  1950 05/20/17  0654 05/19/17  0436    135 138   POTASSIUM 3.4 4.0 4.0   CHLORIDE 102 103 104   CO2 22 22 25   ANIONGAP 10 10 8   GLC 92 103* 108*   BUN 27 31* 27   CR 1.09 1.00 1.00   MYRON 8.1* 8.2* 8.3*   MAG 2.3 2.6* 2.8*   PHOS  --  3.4 3.5       LFTNo lab results found in last 7 days.    CBC  Recent Labs  Lab 05/20/17  0654 05/19/17  0745   WBC 8.6 10.0   HGB 10.0* 9.9*   PLT 95* 76*   HCT 31.1* 30.7*       INR  Recent Labs  Lab 05/22/17  0841   INR 1.53*       Arterial Blood Gas  Recent Labs  Lab 05/18/17  0643 05/18/17  0400 05/17/17 2010 05/17/17  1527   PH 7.46* 7.43 7.48*  7.49*   PCO2 35 38 31* 31*   PO2 84 67* 70* 62*   HCO3 25 25 23 24   O2PER 60 45 10.0 8L       Recent imaging personally reviewed.  Labs pending for today.       A/P:  Mr. Chowdary is a 57 y/o M with history of severe AS (bicuspid?) s/p bioprosthetic AVR (25 mm Trifecta) and septal myectomy on 5/16/17. Post-op course has been fairly unremarkable, however has new SVT beginning 5/22. Today is POD #6.    NEURO:   - Tylenol and oxycodone PRN    CV:   - Intermittent SVT beginning overnight. First episode resolved with carotid massage. Metoprolol dose increased from 12.5mg BID to 37.5mg BID for rate control. Awaiting EP recs, appreciate.  - 81mg ASA  - PTA Lipitor    PULM:   - Maintaining sats on 2L NC. Wean as able.   - Encourage pulmonary toilet    FEN/GI:    - Regular diet   - Continue electrolyte replacement protocol  - Bowel regimen    Renal:   - Adequate UOP/hr. Continue to monitor closely. Repeat Mg/BMP tomorrow.  - Lasix 20mg BID through today, then re-assess need.    Heme: Acute blood loss anemia post-op.   - Hgb historically stable although pending today. No concerns for bleeding.  - Follow CBC in AM     ID:   - Janki op ppx complete, afebrile    ENDO:   - Continue SSI    PPx:   - DVT: Ambulation, coumadin (INR 1.53 today)  - GI: Protonix    DISPO: 6C. Discharge currently pending resolve/management of new arrhythmia.    ____  Breanna Meléndez PA-C  Transplant Surgery  Pager: [phone]    9:28 AM   May 22, 2017

## 2017-05-22 NOTE — PLAN OF CARE
Problem: Goal Outcome Summary  Goal: Goal Outcome Summary  PT 6C: Cancel- pt unavailable upon 2 attempts today. Will reschedule.

## 2017-05-22 NOTE — PLAN OF CARE
Problem: Goal Outcome Summary  Goal: Goal Outcome Summary  MAURA: Pt with h/y of aortic stenosis, AVR. Approximately at  10:10 pm, pt developed SVT with HR at  135, pt denies any pain, nausea,pallpitation , ECG ordered, MD notified, Cards MD consulted , notify MD if pt will be symptomatic per verbal MD order,  after carotid massage by MD ,pt converted back to SR with PAC for 3-5 minutes and then converted to SVT again with HR at 133. Pt afebrile, BP stable, denies any pain,nausea,palpitation,fatique. Will continue to monitor pt,HR, and  VS. Potassium 3.4 replaced per replacement protocol. Lactic acid 1.3. At 12:40 a.m pt converted back to SR with PAC.   Around 3:10 a.m pt in SVT at 136 , pt woke up with intermittent confusion,started to cry, talked to the patient and calmed down, MD cross cover notified. Since 4 a.m pt has frequent burst of SVTs, pt.asymptomatic,VSS (see flowsheets).  Plan: Continue to monitor,notify MD with concerns. Notify MD ,if pt on SVT and will be symptomatic.

## 2017-05-22 NOTE — PLAN OF CARE
Problem: Goal Outcome Summary  Goal: Goal Outcome Summary  OT: 6C:  Pt completed 10min on Nustep, pt's pre vitals 117/68, HR 70s O2 mid 90s on RA, Post vitals BP 96/50, HR 77bpm, O2 95% on RA after sitting ~3min /64.  Rec: If pt were to discharge today would recommend TCU as pt is limited by decreased strength/activity tolerance, post-surgical precautions and SOB impacting pt's independence in ADLs/IADLs. 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 Pending LOS may be able to discharge home with OP CR Phase II. Will continue to monitor to facilitate safe discharge.

## 2017-05-23 ENCOUNTER — APPOINTMENT (OUTPATIENT)
Dept: OCCUPATIONAL THERAPY | Facility: CLINIC | Age: 57
DRG: 220 | End: 2017-05-23
Attending: THORACIC SURGERY (CARDIOTHORACIC VASCULAR SURGERY)
Payer: COMMERCIAL

## 2017-05-23 ENCOUNTER — RESULTS ONLY (OUTPATIENT)
Dept: ADMINISTRATIVE | Facility: CLINIC | Age: 57
End: 2017-05-23

## 2017-05-23 DIAGNOSIS — I47.10 PAROXYSMAL SUPRAVENTRICULAR TACHYCARDIA (H): Primary | ICD-10-CM

## 2017-05-23 LAB
GLUCOSE BLDC GLUCOMTR-MCNC: 111 MG/DL (ref 70–99)
GLUCOSE BLDC GLUCOMTR-MCNC: 113 MG/DL (ref 70–99)
GLUCOSE BLDC GLUCOMTR-MCNC: 99 MG/DL (ref 70–99)
INR PPP: 1.75 (ref 0.86–1.14)
INTERPRETATION ECG - MUSE: NORMAL
MAGNESIUM SERPL-MCNC: 2.5 MG/DL (ref 1.6–2.3)
MAGNESIUM SERPL-MCNC: 2.5 MG/DL (ref 1.6–2.3)
POTASSIUM SERPL-SCNC: 3.5 MMOL/L (ref 3.4–5.3)
POTASSIUM SERPL-SCNC: 3.6 MMOL/L (ref 3.4–5.3)

## 2017-05-23 PROCEDURE — 25000132 ZZH RX MED GY IP 250 OP 250 PS 637: Performed by: SURGERY

## 2017-05-23 PROCEDURE — 97535 SELF CARE MNGMENT TRAINING: CPT | Mod: GO | Performed by: OCCUPATIONAL THERAPIST

## 2017-05-23 PROCEDURE — 83735 ASSAY OF MAGNESIUM: CPT | Performed by: PHYSICIAN ASSISTANT

## 2017-05-23 PROCEDURE — 40000275 ZZH STATISTIC RCP TIME EA 10 MIN

## 2017-05-23 PROCEDURE — 36415 COLL VENOUS BLD VENIPUNCTURE: CPT | Performed by: PHYSICIAN ASSISTANT

## 2017-05-23 PROCEDURE — 25000132 ZZH RX MED GY IP 250 OP 250 PS 637

## 2017-05-23 PROCEDURE — 25000132 ZZH RX MED GY IP 250 OP 250 PS 637: Performed by: PHYSICIAN ASSISTANT

## 2017-05-23 PROCEDURE — 00000146 ZZHCL STATISTIC GLUCOSE BY METER IP

## 2017-05-23 PROCEDURE — 36415 COLL VENOUS BLD VENIPUNCTURE: CPT | Performed by: STUDENT IN AN ORGANIZED HEALTH CARE EDUCATION/TRAINING PROGRAM

## 2017-05-23 PROCEDURE — 40000133 ZZH STATISTIC OT WARD VISIT: Performed by: OCCUPATIONAL THERAPIST

## 2017-05-23 PROCEDURE — 93005 ELECTROCARDIOGRAM TRACING: CPT

## 2017-05-23 PROCEDURE — 25000132 ZZH RX MED GY IP 250 OP 250 PS 637: Performed by: THORACIC SURGERY (CARDIOTHORACIC VASCULAR SURGERY)

## 2017-05-23 PROCEDURE — 83735 ASSAY OF MAGNESIUM: CPT | Performed by: STUDENT IN AN ORGANIZED HEALTH CARE EDUCATION/TRAINING PROGRAM

## 2017-05-23 PROCEDURE — 84132 ASSAY OF SERUM POTASSIUM: CPT | Performed by: STUDENT IN AN ORGANIZED HEALTH CARE EDUCATION/TRAINING PROGRAM

## 2017-05-23 PROCEDURE — 84132 ASSAY OF SERUM POTASSIUM: CPT | Performed by: PHYSICIAN ASSISTANT

## 2017-05-23 PROCEDURE — 97110 THERAPEUTIC EXERCISES: CPT | Mod: GO | Performed by: OCCUPATIONAL THERAPIST

## 2017-05-23 PROCEDURE — 93010 ELECTROCARDIOGRAM REPORT: CPT | Performed by: INTERNAL MEDICINE

## 2017-05-23 PROCEDURE — 21400006 ZZH R&B CCU INTERMEDIATE UMMC

## 2017-05-23 PROCEDURE — 85610 PROTHROMBIN TIME: CPT | Performed by: SURGERY

## 2017-05-23 RX ORDER — LIDOCAINE 40 MG/G
CREAM TOPICAL
Status: CANCELLED | OUTPATIENT
Start: 2017-05-23

## 2017-05-23 RX ORDER — POTASSIUM CHLORIDE 750 MG/1
20 TABLET, EXTENDED RELEASE ORAL 2 TIMES DAILY
Status: DISCONTINUED | OUTPATIENT
Start: 2017-05-23 | End: 2017-05-24

## 2017-05-23 RX ORDER — FUROSEMIDE 40 MG
40 TABLET ORAL 2 TIMES DAILY
Status: DISCONTINUED | OUTPATIENT
Start: 2017-05-23 | End: 2017-05-24

## 2017-05-23 RX ORDER — WARFARIN SODIUM 4 MG/1
4 TABLET ORAL
Status: COMPLETED | OUTPATIENT
Start: 2017-05-23 | End: 2017-05-23

## 2017-05-23 RX ORDER — METOPROLOL TARTRATE 50 MG
50 TABLET ORAL 2 TIMES DAILY
Status: DISCONTINUED | OUTPATIENT
Start: 2017-05-23 | End: 2017-05-24 | Stop reason: HOSPADM

## 2017-05-23 RX ADMIN — METOPROLOL TARTRATE 37.5 MG: 25 TABLET, FILM COATED ORAL at 08:12

## 2017-05-23 RX ADMIN — SENNOSIDES AND DOCUSATE SODIUM 1 TABLET: 8.6; 5 TABLET ORAL at 19:43

## 2017-05-23 RX ADMIN — POTASSIUM CHLORIDE 20 MEQ: 750 TABLET, EXTENDED RELEASE ORAL at 13:50

## 2017-05-23 RX ADMIN — ASPIRIN 81 MG CHEWABLE TABLET 81 MG: 81 TABLET CHEWABLE at 08:13

## 2017-05-23 RX ADMIN — ACETAMINOPHEN 650 MG: 325 TABLET, FILM COATED ORAL at 16:06

## 2017-05-23 RX ADMIN — ACETAMINOPHEN 650 MG: 325 TABLET, FILM COATED ORAL at 19:50

## 2017-05-23 RX ADMIN — WARFARIN SODIUM 4 MG: 4 TABLET ORAL at 17:54

## 2017-05-23 RX ADMIN — ACETAMINOPHEN 650 MG: 325 TABLET, FILM COATED ORAL at 02:46

## 2017-05-23 RX ADMIN — ATORVASTATIN CALCIUM 40 MG: 40 TABLET, FILM COATED ORAL at 21:26

## 2017-05-23 RX ADMIN — POTASSIUM CHLORIDE 20 MEQ: 750 TABLET, EXTENDED RELEASE ORAL at 05:48

## 2017-05-23 RX ADMIN — PANTOPRAZOLE SODIUM 40 MG: 40 TABLET, DELAYED RELEASE ORAL at 08:12

## 2017-05-23 RX ADMIN — POTASSIUM CHLORIDE 20 MEQ: 750 TABLET, EXTENDED RELEASE ORAL at 19:43

## 2017-05-23 RX ADMIN — METOPROLOL TARTRATE 50 MG: 50 TABLET, FILM COATED ORAL at 19:43

## 2017-05-23 RX ADMIN — FUROSEMIDE 40 MG: 40 TABLET ORAL at 15:53

## 2017-05-23 NOTE — PLAN OF CARE
Problem: Goal Outcome Summary  Goal: Goal Outcome Summary  PT 6C: Cancel- attempted x2, pt with another provider on first attempt, showering on 2nd attempt, unable to check back. Will reschedule.

## 2017-05-23 NOTE — PLAN OF CARE
Problem: Goal Outcome Summary  Goal: Goal Outcome Summary  OT: REC TCU however pt has been progressing well and after a few more days in this hospital stay may progress to home with outpatient CR. Pt ambulating 570 feet today requiring one standing rest break all SBA no LOB. Pt also on nu-step 11 min, ascend/descend 6 stairs SBA.

## 2017-05-23 NOTE — PROGRESS NOTES
5/23/2017   CVTS Progress Note  Attending provider: Jun Simon,*        S:  No acute issues over night. Patient denies  SOB, CP, fever, chills, sweats. Patient  eating well.  + ambulating in halls. + BM. Continues to have short runs of SVT, asymptomatic.      O:   Vitals:    05/22/17 2356 05/23/17 0450 05/23/17 0550 05/23/17 0722   BP: 105/62 106/85  114/70   BP Location: Left arm Left arm  Left arm   Pulse:    70   Resp: 18 18 18   Temp: 98  F (36.7  C)   97.9  F (36.6  C)   TempSrc: Oral   Oral   SpO2: 92%   94%   Weight:   115.4 kg (254 lb 6.4 oz)    Height:         Vitals:    05/21/17 0355 05/22/17 0049 05/23/17 0550   Weight: 115 kg (253 lb 9.6 oz) 114.7 kg (252 lb 12.8 oz) 115.4 kg (254 lb 6.4 oz)       Intake/Output Summary (Last 24 hours) at 05/23/17 1036  Last data filed at 05/23/17 0700   Gross per 24 hour   Intake              240 ml   Output             1165 ml   Net             -925 ml       General: Pleasant, seen up in chair, NAD.  CV: RRR without M/R/G. Intermittent SVT.  Pulm: CTA, All chest tubes removed. Incision C/D/I; small amount of redness present on sternal wound edges-likely dermatitis from dermabond.  Abd: Soft, NT, ND, +BS   : Voiding independently.  Neuro: CN grossly intact.     Labs:   BMP  Recent Labs  Lab 05/23/17  0445 05/22/17  1543 05/22/17  0841 05/21/17  1950 05/20/17  0654 05/19/17  0436   NA  --   --  134 134 135 138   POTASSIUM 3.6 4.0 3.3* 3.4 4.0 4.0   CHLORIDE  --   --  101 102 103 104   MYRON  --   --  8.1* 8.1* 8.2* 8.3*   CO2  --   --  24 22 22 25   BUN  --   --  24 27 31* 27   CR  --   --  0.92 1.09 1.00 1.00   GLC  --   --  179* 92 103* 108*     CBC  Recent Labs  Lab 05/22/17  0841 05/20/17  0654 05/19/17  0745 05/19/17  0608   WBC 9.2 8.6 10.0 Canceled, Test credited Unsatisfactory specimen - clottedNOTIFIED YOANNA OATES U4E ON 05/19/17 @ 0710 BY DTCORRECTED ON 05/19 AT 0718: PREVIOUSLY REPORTED AS 10.7   RBC 3.43* 3.31* 3.26* Canceled, Test  credited Unsatisfactory specimen - clottedNOTIFIED YOANNA OATES U4E ON 05/19/17 @ 0710 BY DTCORRECTED ON 05/19 AT 0718: PREVIOUSLY REPORTED AS 3.33   HGB 10.4* 10.0* 9.9* Canceled, Test credited Unsatisfactory specimen - clottedNOTIFIED YOANNA OATES U4E ON 05/19/17 @ 0710 BY DTCORRECTED ON 05/19 AT 0718: PREVIOUSLY REPORTED AS 10.2   HCT 32.9* 31.1* 30.7* Canceled, Test credited Unsatisfactory specimen - clottedNOTIFIED YOANNA OATES U4E ON 05/19/17 @ 0710 BY DTCORRECTED ON 05/19 AT 0718: PREVIOUSLY REPORTED AS 31.5   MCV 96 94 94 Canceled, Test credited Unsatisfactory specimen - clottedNOTIFIED YOANNA OATES U4E ON 05/19/17 @ 0710 BY DTCORRECTED ON 05/19 AT 0718: PREVIOUSLY REPORTED AS 95   MCH 30.3 30.2 30.4 Canceled, Test credited Unsatisfactory specimen - clottedNOTIFIED YOANNA OATES U4E ON 05/19/17 @ 0710 BY DTCORRECTED ON 05/19 AT 0718: PREVIOUSLY REPORTED AS 30.6   MCHC 31.6 32.2 32.2 Canceled, Test credited Unsatisfactory specimen - clottedNOTIFIED YOANNA OATES U4E ON 05/19/17 @ 0710 BY DTCORRECTED ON 05/19 AT 0718: PREVIOUSLY REPORTED AS 32.4   RDW 14.6 14.6 14.7 Canceled, Test credited Unsatisfactory specimen - clottedNOTIFIED YOANNA FLOOD4E ON 05/19/17 @ 0710 BY DTCORRECTED ON 05/19 AT 0718: PREVIOUSLY REPORTED AS 14.8   * 95* 76* Canceled, Test credited Unsatisfactory specimen - clottedNOTIFIED YOANNA OATES U4E ON 05/19/17 @ 0710 BY DTCORRECTED ON 05/19 AT 0718: PREVIOUSLY REPORTED AS 48 This result has been called to YOANNA BENNETTKI CATARAI by Sanjay Junior on 05 19 2017 at 0702, and has been read back.     INR  Recent Labs  Lab 05/23/17  7687 05/22/17  0841 05/21/17  0757 05/20/17  0654   INR 1.75* 1.53* 1.20* 1.25*      Hepatic Panel   Lab Results   Component Value Date     05/09/2017     Lab Results   Component Value Date    ALT 79 05/09/2017    ALT 85 05/09/2017     No results found for: BILICONJ   Lab Results   Component Value Date    BILITOTAL  1.2 05/09/2017     Lab Results   Component Value Date    ALBUMIN 3.8 05/09/2017     Lab Results   Component Value Date    PROTTOTAL 8.7 05/09/2017      Lab Results   Component Value Date    ALKPHOS 72 05/09/2017         Recent Labs  Lab 05/23/17  0816 05/22/17  2104 05/22/17  1703 05/22/17  1142 05/22/17  1135 05/22/17  0909 05/22/17  0841 05/21/17  1950  05/20/17  0654  05/19/17  0436 05/18/17  1523  05/18/17  0352   GLC  --   --   --   --   --   --  179* 92  --  103*  --  108* 113*  --  128*   * 98 92 73 70 170*  --   --   < >  --   < >  --   --   < >  --    < > = values in this interval not displayed.      Imaging: none     A/P:  Mr. Chowdary is a 55 y/o M with history of severe AS (bicuspid?) s/p bioprosthetic AVR (25 mm Trifecta) and septal myectomy on 5/16/17. Post-op course has been fairly unremarkable, however has new SVT beginning 5/22. Today is POD #6.    NEURO:   - Tylenol and oxycodone PRN     CV:   - Intermittent SVT-asymptomatic. First episode resolved with carotid massage. Metoprolol dose increased from 12.5mg BID to 37.5mg BID for rate control. Increase metoprolol to 50 mg po bid today.  - Per EP, episodes are non life threatening, self limiting, and patient asymptomatic. Ill discharge home and follow up in clinic for possible ablation in 6-8 weeks. EP with arrange.    - 81mg ASA  - PTA Lipitor     PULM:   - Maintaining sats on RA.   - Encourage pulmonary toilet   FEN/GI:   - Regular diet   - Continue electrolyte replacement protocol  - Bowel regimen   Renal:   - Adequate UOP/hr. Continue to monitor closely. Repeat Mg/BMP tomorrow.  - Lasix 40mg po bid with KCL   Heme: Acute blood loss anemia post-op.   - Hgb stable, No concerns for bleeding.  - Follow CBC in AM    ID:   - Janki op ppx complete, afebrile     ENDO:   - Stop SS  PPx:   - DVT: Ambulation, coumadin for SVT, tissue valve (INR 1.75 today, goal 2-3)  - GI: Protonix     DISPO: 6C, dc to home tomorrow.     Will discuss with  staff    Francia Carrillo PA-C  [phone]  May 23, 2017

## 2017-05-23 NOTE — PLAN OF CARE
Problem: Individualization  Goal: Patient Preferences  Outcome: Improving  D:Patient up walking in halls with cardiac rehab.  Has had no complaints of chest pain or shortness of breath.  Lungs are clear bilaterally.   Sightly diminished in the lower lobes.  Has a productive cough.   Sternal incision intact with dermabond.   Rhythm is NSR with occassional runs of SVT with a rate in the 150's.  Hugo and JOAN uBrks's informed. K+ checked and was 3.5 and was replaced per protacol.  Mg 2.5.  Temp 97.8, Bp 107/68 MAP 86.  O2 sat 93% on room air.  JOAN Feldman indicated to patient a plan to increase metoprolol dose.       A:Is doing well.  Adequate pain control.  Tolerating diet and activity well.  Rhythm is stable.   Afebrile and VSS. O2 sats are normal on room air.   Condition is stable.     P:Continue to assess level of comfort and medicate prn pain.  Assess tolerance to activity.   Assess incision for signs of infection.  Monitor rhythm.  Monitor temp and vital signs.   Monitor electrolytes and replace per protacol.

## 2017-05-23 NOTE — PLAN OF CARE
Problem: Goal Outcome Summary  Goal: Goal Outcome Summary  Outcome: No Change  D: Aortic stenosis S/P bioprosthetic valve replacement and septal myectomy.  I/A: VSS. Denies pain but c/o generalized discomfort, refused Tylenol initially but did end up taking some. Pt also c/o inability to sleep and inability to get comfortable. Initially patient was in sinus rhythm with PACs. As night progressed patient began having increased PACs and PVCs and 12-lead EKG was done, MD notified, K+ and Mg labs ordered. MD came to unit to see patient and shortly thereafter patient began having runs of tachycardia. Pt asymptomatic and vitals remain stable. Awaiting lab results.  P: Continue to monitor. Replace electrolytes as needed.    0650  Per protocol 20 mEq of potassium was given. After a short while the patient's rhythm stabilized back to normal sinus.

## 2017-05-23 NOTE — PROGRESS NOTES
Care Coordinator Progress Note     Admission Date/Time:  2017  Attending MD:  Jun Simon   Data  Chart reviewed, discussed with interdisciplinary team.   Patient was admitted for: Aortic valve stenosis, unspecified etiology.  Pt is now s/p bioprosthetic AVR and septal myectomy on 17.     Concerns with insurance coverage for discharge needs: TBD Pt states that his insurance will change on 17.   Current Living Situation: Patient said that he lives with his mother. (Pt said that his wife  in 2017.)  Support System: Supportive and Involved family.   Services Involved: none prior to this admission.   Transportation: Family or Friend will provide  Barriers to Discharge: post-op recovery.   Coordination of Care and Referrals: Provided patient/family with options for outpt INR and Warfarin monitoring.    Assessment      Per PA, pt will need outpt INR and Warfarin monitoring arranged. Pt said that his PCP is Sam Villatoro PA-C at the [facility]; and pt said that he wants to be followed at that clinic. Sam Villatoro is not available on 17; pt is in agreement with seeing Dr. Melecio Mcgowan instead.       OT is recommending TCU vs OPCR; pt has declined TCU and instead wants to go home and go to OPCR.   Intervention:   Arrangements made with [facility] (Ph: [phone] Fax: [phone]) for INR and Warfarin monitoring (Goal=2-3). Indication for Anticoagulation: Bioprosthetic AVR and Atrial Fibrillation. Expected duration of therapy: 3 months; then reassess. Dr. Melecio Mcgowan to follow.  Appointment made on 17 at 10:10 AM for INR lab draw, Warfarin education reinforcement, and post hospitalization follow up.   Plan  Anticipated Discharge Date:  17  Anticipated Discharge Plan:  Discharge to home.     FRANCOIS HERNANDEZ RN BSN  [phone]

## 2017-05-24 ENCOUNTER — APPOINTMENT (OUTPATIENT)
Dept: OCCUPATIONAL THERAPY | Facility: CLINIC | Age: 57
DRG: 220 | End: 2017-05-24
Attending: THORACIC SURGERY (CARDIOTHORACIC VASCULAR SURGERY)
Payer: COMMERCIAL

## 2017-05-24 ENCOUNTER — HOSPITAL ENCOUNTER (OUTPATIENT)
Facility: CLINIC | Age: 57
End: 2017-05-24
Admitting: INTERNAL MEDICINE

## 2017-05-24 ENCOUNTER — CARE COORDINATION (OUTPATIENT)
Dept: CARE COORDINATION | Facility: CLINIC | Age: 57
End: 2017-05-24

## 2017-05-24 VITALS
OXYGEN SATURATION: 95 % | WEIGHT: 256.4 LBS | SYSTOLIC BLOOD PRESSURE: 105 MMHG | DIASTOLIC BLOOD PRESSURE: 69 MMHG | HEIGHT: 70 IN | RESPIRATION RATE: 16 BRPM | HEART RATE: 81 BPM | BODY MASS INDEX: 36.71 KG/M2 | TEMPERATURE: 97.5 F

## 2017-05-24 LAB
ANION GAP SERPL CALCULATED.3IONS-SCNC: 9 MMOL/L (ref 3–14)
BUN SERPL-MCNC: 19 MG/DL (ref 7–30)
CALCIUM SERPL-MCNC: 8.4 MG/DL (ref 8.5–10.1)
CHLORIDE SERPL-SCNC: 105 MMOL/L (ref 94–109)
CO2 SERPL-SCNC: 24 MMOL/L (ref 20–32)
CREAT SERPL-MCNC: 0.89 MG/DL (ref 0.66–1.25)
ERYTHROCYTE [DISTWIDTH] IN BLOOD BY AUTOMATED COUNT: 14.9 % (ref 10–15)
GFR SERPL CREATININE-BSD FRML MDRD: 88 ML/MIN/1.7M2
GLUCOSE SERPL-MCNC: 95 MG/DL (ref 70–99)
HCT VFR BLD AUTO: 32.1 % (ref 40–53)
HGB BLD-MCNC: 10.8 G/DL (ref 13.3–17.7)
INR PPP: 2.5 (ref 0.86–1.14)
MCH RBC QN AUTO: 31.9 PG (ref 26.5–33)
MCHC RBC AUTO-ENTMCNC: 33.6 G/DL (ref 31.5–36.5)
MCV RBC AUTO: 95 FL (ref 78–100)
PLATELET # BLD AUTO: 139 10E9/L (ref 150–450)
POTASSIUM SERPL-SCNC: 4 MMOL/L (ref 3.4–5.3)
RBC # BLD AUTO: 3.39 10E12/L (ref 4.4–5.9)
SODIUM SERPL-SCNC: 138 MMOL/L (ref 133–144)
WBC # BLD AUTO: 9.2 10E9/L (ref 4–11)

## 2017-05-24 PROCEDURE — 97535 SELF CARE MNGMENT TRAINING: CPT | Mod: GO | Performed by: OCCUPATIONAL THERAPIST

## 2017-05-24 PROCEDURE — 97110 THERAPEUTIC EXERCISES: CPT | Mod: GO | Performed by: OCCUPATIONAL THERAPIST

## 2017-05-24 PROCEDURE — 85027 COMPLETE CBC AUTOMATED: CPT | Performed by: SURGERY

## 2017-05-24 PROCEDURE — 25000132 ZZH RX MED GY IP 250 OP 250 PS 637: Performed by: THORACIC SURGERY (CARDIOTHORACIC VASCULAR SURGERY)

## 2017-05-24 PROCEDURE — 85610 PROTHROMBIN TIME: CPT | Performed by: SURGERY

## 2017-05-24 PROCEDURE — 25000132 ZZH RX MED GY IP 250 OP 250 PS 637: Performed by: PHYSICIAN ASSISTANT

## 2017-05-24 PROCEDURE — 40000275 ZZH STATISTIC RCP TIME EA 10 MIN

## 2017-05-24 PROCEDURE — 36415 COLL VENOUS BLD VENIPUNCTURE: CPT | Performed by: SURGERY

## 2017-05-24 PROCEDURE — 80048 BASIC METABOLIC PNL TOTAL CA: CPT | Performed by: SURGERY

## 2017-05-24 PROCEDURE — 40000133 ZZH STATISTIC OT WARD VISIT: Performed by: OCCUPATIONAL THERAPIST

## 2017-05-24 RX ORDER — WARFARIN SODIUM 3 MG/1
TABLET ORAL
Qty: 30 TABLET | Refills: 0 | Status: SHIPPED | OUTPATIENT
Start: 2017-05-24 | End: 2018-10-12

## 2017-05-24 RX ORDER — POTASSIUM CHLORIDE 750 MG/1
30 TABLET, EXTENDED RELEASE ORAL 2 TIMES DAILY
Status: DISCONTINUED | OUTPATIENT
Start: 2017-05-24 | End: 2017-05-24 | Stop reason: HOSPADM

## 2017-05-24 RX ORDER — METOPROLOL TARTRATE 50 MG
50 TABLET ORAL 2 TIMES DAILY
Qty: 60 TABLET | Refills: 0 | Status: ON HOLD | OUTPATIENT
Start: 2017-05-24 | End: 2019-03-20

## 2017-05-24 RX ORDER — PANTOPRAZOLE SODIUM 40 MG/1
40 TABLET, DELAYED RELEASE ORAL EVERY MORNING
Qty: 30 TABLET | Refills: 0 | Status: SHIPPED | OUTPATIENT
Start: 2017-05-24 | End: 2018-10-12

## 2017-05-24 RX ORDER — POTASSIUM CHLORIDE 1125 MG/1
30 TABLET, EXTENDED RELEASE ORAL 2 TIMES DAILY
Qty: 90 TABLET | Refills: 0 | Status: SHIPPED | OUTPATIENT
Start: 2017-05-24 | End: 2018-10-12

## 2017-05-24 RX ORDER — BUMETANIDE 1 MG/1
1 TABLET ORAL
Qty: 60 TABLET | Refills: 0 | Status: SHIPPED | OUTPATIENT
Start: 2017-05-24 | End: 2018-10-24

## 2017-05-24 RX ORDER — AMOXICILLIN 250 MG
1-2 CAPSULE ORAL 2 TIMES DAILY
Qty: 100 TABLET | Refills: 0 | Status: SHIPPED | OUTPATIENT
Start: 2017-05-24 | End: 2018-04-20

## 2017-05-24 RX ORDER — BUMETANIDE 1 MG/1
1 TABLET ORAL
Status: DISCONTINUED | OUTPATIENT
Start: 2017-05-24 | End: 2017-05-24 | Stop reason: HOSPADM

## 2017-05-24 RX ORDER — WARFARIN SODIUM 3 MG/1
3 TABLET ORAL
Status: DISCONTINUED | OUTPATIENT
Start: 2017-05-24 | End: 2017-05-24 | Stop reason: HOSPADM

## 2017-05-24 RX ORDER — ACETAMINOPHEN 325 MG/1
650 TABLET ORAL EVERY 4 HOURS PRN
Qty: 100 TABLET | Refills: 0 | Status: SHIPPED | OUTPATIENT
Start: 2017-05-24 | End: 2018-10-12

## 2017-05-24 RX ORDER — OXYCODONE HYDROCHLORIDE 5 MG/1
5-10 TABLET ORAL EVERY 4 HOURS PRN
Qty: 80 TABLET | Refills: 0 | Status: SHIPPED | OUTPATIENT
Start: 2017-05-24 | End: 2018-04-20

## 2017-05-24 RX ADMIN — METOPROLOL TARTRATE 50 MG: 50 TABLET, FILM COATED ORAL at 08:14

## 2017-05-24 RX ADMIN — PANTOPRAZOLE SODIUM 40 MG: 40 TABLET, DELAYED RELEASE ORAL at 08:14

## 2017-05-24 RX ADMIN — POTASSIUM CHLORIDE 20 MEQ: 750 TABLET, EXTENDED RELEASE ORAL at 09:48

## 2017-05-24 RX ADMIN — POTASSIUM CHLORIDE 20 MEQ: 750 TABLET, EXTENDED RELEASE ORAL at 09:45

## 2017-05-24 RX ADMIN — BUMETANIDE 1 MG: 1 TABLET ORAL at 09:46

## 2017-05-24 RX ADMIN — ASPIRIN 81 MG CHEWABLE TABLET 81 MG: 81 TABLET CHEWABLE at 08:14

## 2017-05-24 NOTE — PROGRESS NOTES
"[facility]  \"Hello, my name is Susan Ferguson , and I am calling from the [facility].  I want to check in and see how you are doing, after leaving the hospital.  You may also receive a call from your Care Coordinator (care team), but I want to make sure you don t have any urgent needs.  I have a couple questions to review with you:     Post-Discharge Outreach                                                    Gil Chowdary is a 56 year old male     Follow-up Appointments           Adult [facility] Follow-up and recommended labs and tests         Follow up with primary care provider, Sam Simon67Ciarra Villatoro, within 7 days to evaluate medication change, to evaluate treatment change, to evaluate after surgery and for hospital follow- up.  The following labs/tests are recommended: BMP, Hgb and INR. Please have bumex and coumadin adjusted as necessary.    Follow up with CV Surgery on 6/6/2017 at 3:30pm.   Follow up with your primary cardiologist within 4-6 weeks after discharge  to evaluate medication change, to evaluate treatment change and to evaluate after surgery. The following labs/tests are recommended: echo.  Follow-up with electrophysiology in 6-8 weeks to discuss your rhythm and possible ablation. Please call to make this appointment     Appointments on [place] and/or [facility] (with [facility] or [facility] provider or service). Call [phone] if you haven't heard regarding these appointments within 7 days of discharge.               Follow Up and recommended labs and tests         ______________________________________________________     [facility]   [address] [place] [address]   Phone: [phone]   Fax: [phone]     For INR and Warfarin monitoring (Goal=2-3).   Indication for Anticoagulation: Bioprosthetic AVR and Atrial Fibrillation.   Expected duration of therapy: 3 months; then reassess.   Dr. Melecio Mcgowan to follow.  " "     Appointment on 5/26/17 at 10:10 AM for INR lab draw, Warfarin education reinforcement, and post hospitalization follow up.   _______________________________________________________                        Your next 10 appointments already scheduled            May 31, 2017  2:30 PM CDT   Cardiac Evaluation with [facility]   [facility] ([facility])     [address]  [facility] [address] [phone]                  Jun 06, 2017  3:30 PM CDT   (Arrive by 3:15 PM)   Return Visit with  CVTS   [facility] ([facility])     [address]  3rd Floor  [facility] [id number] [phone]                  Jun 19, 2017 11:00 AM CDT   Procedure 1.5 hr with U2A ROOM 10   [address] [facility] ([facility])     [address] [address]                      Jun 19, 2017 12:30 PM CDT   Ep 90 Minute with [id number]   [facility] ([facility])     [address] [id number] [phone]            Care Team:    Patient Care Team       Relationship Specialty Notifications Start End    Sam Villatoro [place] [id number] PCP - General   11/1/16     Fax: [phone]          [place] [address]    Guru Salinas MD MD Cardiology  2/14/17     Phone: [phone] Fax: [phone]         [facility] HEART [address] [place] [address]            Transition of Care Review                                                      Did you have a surgery or procedure during your hospital visit? Yes   If yes, do you have any of the following:     Signs of infection:  NO    Pain:  Yes     Pain Scale \"minimal\"     Location: Surg site    Wound/incision concerns? NO    Do you have all of your medications/refills?  Yes    Are you having " any side effects or questions about your medication(s)? No    Do you have any new or worsening symptoms?  No    Do you have any future appointments scheduled?   Yes             Plan                                                      Thanks for your time.  Your Care Coordinator may follow-up within the next couple days.  In the meantime if you have questions, concerns or problems call your care team.        Susan Ferguson

## 2017-05-24 NOTE — DISCHARGE INSTRUCTIONS
We will call you to arrange your ablation for your SVT (fast heart rhythm).   Cardiovascular Clinic:   [address] [address]  Your Care Team:  EP Cardiology   Telephone Number     Radha Quintana [phone]   Shilpa Singleton RN  [phone]     For scheduling appts or procedures:    Lashonda Joannaantione   [phone]     As always, Thank you for trusting us with your health care needs!          [facility]      AFTER YOU GO HOME FROM YOUR HEART SURGERY    Avoid lifting anything greater than ten pounds for 6 weeks after surgery and then less than 20 pounds for an additional 6 weeks.    No driving for 4 weeks after surgery or while on pain medication. If you had a minimally invasive procedure, you may drive after three weeks if not taking pain medication.      Avoid strenuous activities such as bowling, vacuuming, raking, shoveling, golf or tennis for 12 weeks after your surgery. It is okay to resume sex if you feel comfortable in doing so. You may have to try different positions with your partner.     Splint your chest incision by hugging a pillow or bringing your arms across your chest when coughing or sneezing. Avoid pushing off with your arms when getting up for the first month if you have had your sternum opened.     Shower or wash your incisions daily with soap and water (or as instructed), pat dry. Watch for signs of infection: increased redness, tenderness, warmth or any drainage that appears infected (pus like) or is persistent.  Also a temperature > 100.5 F or chills. Call your surgeon or primary care provider's office immediately. Remove any skin glue left on incisions after 10 days. This will not affect your incision and can speed up healing.    Exercise is very important in your recovery. Please follow the guidelines set up for you in your cardiac rehab classes at the hospital. If outpatient cardiac rehab was ordered  for you, we highly recommend you participate. If you have problems arranging your cardiac rehab, please call [phone].     Avoid sitting for prolonged periods of time, try to walk every hour during the day. If you have a leg incision, elevate your leg often when you are not walking.    Check your weight when you get home from the hospital and continue to check it daily through your recovery for at least a month. If you notice a weight gain of 2-3 pounds in a week, notify your primary care physician, cardiologist or surgeon.    Bowel activity may be slow after surgery. If necessary, you may take an over the counter laxative such as Milk of Magnesia or Miralax. You may have stool softeners prescribed (docusate sodium, Senokot). We recommend using stool softeners while using narcotics for pain (oxycodone/percocet, hydrocodone/vicodin).      DENTAL VISITS AFTER SURGERY  If you have had your heart valve repaired or replaced, we do not recommend having any dental work done for 6 months and you will need to take an antibiotic prior to dental visits from now on.  Please notify your dentist before any procedure for the proper treatment needed. The antibiotic is taken by mouth one hour prior to visit. This includes routine cleanings.    DO NOT SMOKE.  IF YOU NEED HELP QUITTING, PLEASE TALK WITH YOUR CARDIOLOGIST OR PRIMARY DOCTOR.    If you are on a blood thinner, follow the instructions you were given in the hospital and DO NOT SKIP this medication. Try and take it the same time everyday. Your primary care physician or coumadin clinic will manage the dosing.     REGARDING PRESCRIPTION REFILLS.  If you need a refill on your pain medication contact us.  All other medications will be adjusted, discontinued and re-filled by your primary care physician and/or your cardiologist as they were prior to your surgery. We have given you enough for one to three month with possibly one refill.    POST-OPERATIVE CLINIC VISITS  You have  a follow up visit with CVTS Surgery Advance Care Practitioners on 6/6/2017 at 3:30 pm at the [facility].  You will then return to the care of your primary physician and your cardiologist.   It is important to call for an appointment to see your cardiologist in 4-6 weeks after surgery and your primary care physician in 2-4 weeks after surgery. If there is a need to return to see CT Surgery please call our  at [phone].    SURGICAL QUESTIONS  Please call Raine Myers with any surgical questions, her phone number is listed below.  She can assist you with your needs and contact other surgery care team members as indicated.    For general questions or concerns, please call the Cardiothoracic Surgery Department at [phone] 8-4:30 M-F.   On weekends or after hours, please call [phone] and ask the  to   page the Cardiothoracic Surgery fellow on call.      Thank you,    Your Cardiothoracic Surgery Team  Raine Myers RN Care Coordinator-  [phone]   Francia Barnett PA-C

## 2017-05-24 NOTE — PLAN OF CARE
Problem: Goal Outcome Summary  Goal: Goal Outcome Summary  D: s/p AVR 5/16.   I: Scheduled meds. K = 4.0 replaced per protocol.   A: Patient stable, excited to discharge to home. VSS, only short runs (11 episodes) of up to 6 beats SVT in past 24 hours.   P: Discharge to home today.

## 2017-05-24 NOTE — PLAN OF CARE
Problem: Goal Outcome Summary  Goal: Goal Outcome Summary  OT: REC Home with OP CR.  Pt limited by post surgical precautions and decreased activity tolerance and functional endurance.  Pt ambulated a total of 700 ft SBA  with no rest breaks required and no assistive device.  Therapist educated Pt on adapted techniques for dressing and reviewed EC/WS with Pt verbalizing competence with all education.

## 2017-05-24 NOTE — DISCHARGE SUMMARY
[facility], [place]   Cardiothoracic Surgery Hospital Discharge Summary       Gil Chowdary MRN# [id number]   YOB: 1960 Age: 56 year old     Date of Admission:  5/16/2017  Date of Discharge::  5/24/2017  Admitting Physician:  Jun Simon MD  Discharge Physician:  Hugo Barnett PA-C  Primary Care Physician:        Sam Villatoro [id number]          Admission Diagnoses:   Aortic Stenosis   Hypertension  Dyslipidemia          Discharge Diagnosis:   Severe aortic stenosis - s/p AVR  Hypertension  Dyslipidemia  Paroxysmal supraventricular tachycardia         Procedures:   5/16/2017 Dr. Jun Simon  Aortic Valve Replace using 25MM Trifecta Valve with [employer], Septal myectomy        Non-operative procedures:   None performed          Consultations:   NUTRITION SERVICES ADULT IP CONSULT  CARDIAC REHAB IP CONSULT  PHYSICAL THERAPY ADULT IP CONSULT  OCCUPATIONAL THERAPY ADULT IP CONSULT  PHARMACY TO DOSE WARFARIN  VASCULAR ACCESS CARE ADULT IP CONSULT  CARDIOLOGY ELECTROPHYSIOLOGY (EP) IP CONSULT          Brief History of Illness:   Gil Chowdary is a 56 year old male with a past medical history of severe aortic stenosis who presented with shortness of breath. Echocardiogram demonstrated severe AS with mean valve gradient of 53.7 mmHg Patient presented for scheduled outpatient aortic valve replacement on 5/16/2017.            Hospital Course:   Gil Chowdary is a 56 year old male who on 5/16/2017 underwent the above-named procedures.  Patient tolerated the operation well and postoperatively was admitted to the CVICU.  Patient was extubated on POD # 1.  Pressors were weaned off. He was transferred to the surgical floor on POD # 2. Chest tubes were removed when drainage decreased and follow CXR was negative for any significant pneumothorax.    Patient continued to improve post-operatively. Patient was started on ASA 81 mg, lipitor, metoprolol.  Patient developed paroxysmal SVT with short runs of SVT. He remained hemodynamically stable and asymptomatic with these runs. EP consulted and recommended up titration of metoprolol and no need for intervention at this time. He will follow-up with EP in 6-8 weeks for possible SVT ablation. His runs appeared to correlate with potassium levels less than 4, therefore will be sent out with K replacement.    Patient was diuresed with IV lasix 20 mg and was switched to PO lasix 40 mg BID. Patient did not have adequate response and weight went up 2 pounds overnight. Will discharge patient on bumex 1 mg BID and monitor response over the next couple days. He will follow-up on Friday 5/26 with PCP and can have weight and BMP check. Patient did receive one dose of bumex in the hospital and reported better response compared to PO lasix. He remained hemodynamically stable and off oxygen without SOB at time of discharge.       Patient was started on coumadin with INR goal 2-3 for 3 months for his bioprosthetic AVR and SVT. His INR was therapeutic at discharge and he will follow-up with PCP on Friday to recheck INR.     Post-operative pain control included dilaudid, oxycodone and tylenol and will be discharged on oxycodone and tylenol.     Prior to discharge, his pain was controlled well, he was able to perform most ADLs and ambulate without difficulty, and had full return of bowel and bladder function.  On May 24, 2017, he was discharged home in stable condition.    Patient discharged on aspirin:  Yes 81 mg with coumadin  Patient discharged on beta blocker: yes metoprolol 50 mg   Patient discharged on ACE Inhibitor/ARB: no not indicated        Day of Discharge Exam     DAY OF DISCHARGE PHYSICAL EXAM:  Vitals:    05/23/17 2346 05/24/17 0051 05/24/17 0757 05/24/17 1152   BP: 108/63  106/67 105/69   BP Location: Left arm  Left arm Left arm   Pulse:       Resp: 16  18 16   Temp: 97.7  F (36.5  C)  98.3  F (36.8  C) 97.5  F (36.4  C)    TempSrc: Axillary  Oral Oral   SpO2: 96%  95% 95%   Weight:  116.3 kg (256 lb 6.4 oz)     Height:         Vitals:    05/22/17 0049 05/23/17 0550 05/24/17 0051   Weight: 114.7 kg (252 lb 12.8 oz) 115.4 kg (254 lb 6.4 oz) 116.3 kg (256 lb 6.4 oz)     Gen: NAD, conversational  CV: RRR, S1S2 normal, no murmurs, rubs, or gallops  Pulm: Lungs CTA, no rhonchi or wheezes  Abd: +BS, Soft, nondistended, NTTP  Ext: trace to 1+ bilateral lower extremity edema  Neuro: CN II-XII intact, no focal deficits  Incision: Clean, dry, intact, trace erythema around edge of incision by dermabond  Chest Tube sites:  Dressings clean and dry    LABS  BMP    Recent Labs  Lab 05/24/17  0726 05/23/17  1251 05/23/17  0445 05/22/17  1543 05/22/17  0841 05/21/17  1950 05/20/17  0654     --   --   --  134 134 135   POTASSIUM 4.0 3.5 3.6 4.0 3.3* 3.4 4.0   CHLORIDE 105  --   --   --  101 102 103   MYRON 8.4*  --   --   --  8.1* 8.1* 8.2*   CO2 24  --   --   --  24 22 22   BUN 19  --   --   --  24 27 31*   CR 0.89  --   --   --  0.92 1.09 1.00   GLC 95  --   --   --  179* 92 103*     CBC    Recent Labs  Lab 05/24/17  0726 05/22/17  0841 05/20/17  0654 05/19/17  0745   WBC 9.2 9.2 8.6 10.0   RBC 3.39* 3.43* 3.31* 3.26*   HGB 10.8* 10.4* 10.0* 9.9*   HCT 32.1* 32.9* 31.1* 30.7*   MCV 95 96 94 94   MCH 31.9 30.3 30.2 30.4   MCHC 33.6 31.6 32.2 32.2   RDW 14.9 14.6 14.6 14.7   * 133* 95* 76*     INR    Recent Labs  Lab 05/24/17  0726 05/23/17  0445 05/22/17  0841 05/21/17  0757   INR 2.50* 1.75* 1.53* 1.20*      Hepatic Panel   Lab Results   Component Value Date     05/09/2017     Lab Results   Component Value Date    ALT 79 05/09/2017    ALT 85 05/09/2017     No results found for: BILICONJ   Lab Results   Component Value Date    BILITOTAL 1.2 05/09/2017     Lab Results   Component Value Date    ALBUMIN 3.8 05/09/2017     Lab Results   Component Value Date    PROTTOTAL 8.7 05/09/2017      Lab Results   Component Value Date    ALKPHOS  72 05/09/2017        Recent Labs  Lab 05/24/17  0726 05/23/17  1706 05/23/17  1352 05/23/17  0816 05/22/17  2104 05/22/17  1703 05/22/17  1142  05/22/17  0841 05/21/17  1950  05/20/17  0654  05/19/17  0436 05/18/17  1523   GLC 95  --   --   --   --   --   --   --  179* 92  --  103*  --  108* 113*   BGM  --  99 113* 111* 98 92 73  < >  --   --   < >  --   < >  --   --    < > = values in this interval not displayed.         Imaging Studies:   CXR 5/20:  1. Postoperative chest with interval removal of mediastinal drains and  right IJ central venous catheter sheath.  2. Small pleural effusions with associated atelectasis/consolidation.     AXR 5/16:  1. Gastric tube tip and side-port project over the stomach fundus.  2. Nonspecific bowel gas pattern.      Final Pathology/Culture Result:   5/16/2017:  A. Aortic valve leaflets:   - Nodular calcification with fibrosis and myxoid change     B. Myomectomy:        - Focal myocardial hypertrophy with focal replacement, pericellular   and perivascular fibrosis       Drains/tubes Present at Discharge   None         Medications Prior to Admission:     Prescriptions Prior to Admission   Medication Sig Dispense Refill Last Dose     cholecalciferol (VITAMIN D3) 5000 UNITS TABS tablet Take 5,000 Units by mouth every morning   Past Week at Unknown time     atorvastatin (LIPITOR) 40 MG tablet Take 1 tablet (40 mg) by mouth daily (Patient taking differently: Take 40 mg by mouth At Bedtime ) 90 tablet 0 5/15/2017 at Unknown time     Multiple Vitamins-Minerals (CENTRUM ADULTS PO) Take 1 tablet by mouth every morning    Past Week at Unknown time     prednisoLONE acetate (PRED FORTE) 1 % ophthalmic susp Place 1 drop into both eyes as needed    Past Week at Unknown time     timolol (TIMOPTIC) 0.5 % ophthalmic solution 1 drop 1 DROP INTO BOTH EYES 2 TIMES DAILY   Past Week at Unknown time     aspirin 81 MG tablet Take 81 mg by mouth every morning    5/15/2017 at Unknown time     Omega-3 Fatty  Acids (FISH OIL PO) Take 1 g by mouth every morning    Past Week at Unknown time          Discharge Medications:        Review of your medicines      START taking       Dose / Directions    acetaminophen 325 MG tablet   Commonly known as:  TYLENOL   Used for:  S/P AVR (aortic valve replacement), Acute post-operative pain        Dose:  650 mg   Take 2 tablets (650 mg) by mouth every 4 hours as needed for mild pain   Quantity:  100 tablet   Refills:  0       bumetanide 1 MG tablet   Commonly known as:  BUMEX   Used for:  S/P AVR (aortic valve replacement)        Dose:  1 mg   Take 1 tablet (1 mg) by mouth 2 times daily   Quantity:  60 tablet   Refills:  0       metoprolol 50 MG tablet   Commonly known as:  LOPRESSOR   Used for:  Paroxysmal supraventricular tachycardia (H), S/P AVR (aortic valve replacement)        Dose:  50 mg   Take 1 tablet (50 mg) by mouth 2 times daily   Quantity:  60 tablet   Refills:  0       oxyCODONE 5 MG IR tablet   Commonly known as:  ROXICODONE   Used for:  S/P AVR (aortic valve replacement), Acute post-operative pain        Dose:  5-10 mg   Take 1-2 tablets (5-10 mg) by mouth every 4 hours as needed for moderate to severe pain   Quantity:  80 tablet   Refills:  0       pantoprazole 40 MG EC tablet   Commonly known as:  PROTONIX   Used for:  S/P AVR (aortic valve replacement)   Notes to Patient:  Best to take 30 minutes before a meal        Dose:  40 mg   Take 1 tablet (40 mg) by mouth every morning   Quantity:  30 tablet   Refills:  0       Potassium Chloride [place] CR 15 MEQ Tbcr   Used for:  S/P AVR (aortic valve replacement)        Dose:  30 mEq   Take 30 mEq by mouth 2 times daily   Quantity:  90 tablet   Refills:  0       senna-docusate 8.6-50 MG per tablet   Commonly known as:  SENOKOT-S;PERICOLACE   Used for:  S/P AVR (aortic valve replacement)        Dose:  1-2 tablet   Take 1-2 tablets by mouth 2 times daily   Quantity:  100 tablet   Refills:  0       warfarin 3 MG tablet   Commonly  known as:  COUMADIN   Used for:  Paroxysmal supraventricular tachycardia (H), S/P AVR (aortic valve replacement)        Take 1 tab (3 mg) on 5/24 and 5/25 and have INR checked on 5/26 and have dose adjusted   Quantity:  30 tablet   Refills:  0         CONTINUE these medicines which may have CHANGED, or have new prescriptions. If we are uncertain of the size of tablets/capsules you have at home, strength may be listed as something that might have changed.       Dose / Directions    atorvastatin 40 MG tablet   Commonly known as:  LIPITOR   This may have changed:  when to take this   Used for:  Hyperlipidemia with target LDL less than 70        Dose:  40 mg   Take 1 tablet (40 mg) by mouth daily   Quantity:  90 tablet   Refills:  0         CONTINUE these medicines which have NOT CHANGED       Dose / Directions    aspirin 81 MG tablet        Dose:  81 mg   Take 81 mg by mouth every morning   Refills:  0       CENTRUM ADULTS PO        Dose:  1 tablet   Take 1 tablet by mouth every morning   Refills:  0       cholecalciferol 5000 UNITS Tabs tablet   Commonly known as:  vitamin D3        Dose:  5000 Units   Take 5,000 Units by mouth every morning   Refills:  0       FISH OIL PO        Dose:  1 g   Take 1 g by mouth every morning   Refills:  0       prednisoLONE acetate 1 % ophthalmic susp   Commonly known as:  PRED FORTE   Used for:  Aortic valve stenosis, Pre-operative cardiovascular examination        Dose:  1 drop   Place 1 drop into both eyes as needed   Refills:  0       timolol 0.5 % ophthalmic solution   Commonly known as:  TIMOPTIC   Used for:  Aortic valve stenosis, Pre-operative cardiovascular examination        Dose:  1 drop   1 drop 1 DROP INTO BOTH EYES 2 TIMES DAILY   Refills:  0            Where to get your medicines      These medications were sent to [facility] - [place], [place] - [address]  [address] [address]     Phone:  [phone]      acetaminophen  325 MG tablet     bumetanide 1 MG tablet     metoprolol 50 MG tablet     pantoprazole 40 MG EC tablet     Potassium Chloride [place] CR 15 MEQ Tbcr     senna-docusate 8.6-50 MG per tablet     warfarin 3 MG tablet         Some of these will need a paper prescription and others can be bought over the counter. Ask your nurse if you have questions.     Bring a paper prescription for each of these medications      oxyCODONE 5 MG IR tablet                 Discharge Instructions and Follow-Up:   Avoid lifting anything greater than ten pounds for 6 weeks after surgery and then less than 20 pounds for an additional 6 weeks.     No driving for 4 weeks after surgery or while on pain medication. If you had a minimally invasive procedure, you may drive after three weeks if not taking pain medication.       Avoid strenuous activities such as bowling, vacuuming, raking, shoveling, golf or tennis for 12 weeks after your surgery. It is okay to resume sex if you feel comfortable in doing so. You may have to try different positions with your partner.      Splint your chest incision by hugging a pillow or bringing your arms across your chest when coughing or sneezing. Avoid pushing off with your arms when getting up for the first month if you have had your sternum opened.      Shower or wash your incisions daily with soap and water (or as instructed), pat dry. Watch for signs of infection: increased redness, tenderness, warmth or any drainage that appears infected (pus like) or is persistent.  Also a temperature > 100.5 F or chills. Call your surgeon or primary care provider's office immediately. Remove any skin glue left on incisions after 10 days. This will not affect your incision and can speed up healing.     Exercise is very important in your recovery. Please follow the guidelines set up for you in your cardiac rehab classes at the hospital. If outpatient cardiac rehab was ordered for you, we highly recommend you participate. If you  have problems arranging your cardiac rehab, please call [phone].      Avoid sitting for prolonged periods of time, try to walk every hour during the day. If you have a leg incision, elevate your leg often when you are not walking.     Check your weight when you get home from the hospital and continue to check it daily through your recovery for at least a month. If you notice a weight gain of 2-3 pounds in a week, notify your primary care physician, cardiologist or surgeon.     Bowel activity may be slow after surgery. If necessary, you may take an over the counter laxative such as Milk of Magnesia or Miralax. You may have stool softeners prescribed (docusate sodium, Senokot). We recommend using stool softeners while using narcotics for pain (oxycodone/percocet, hydrocodone/vicodin).       DENTAL VISITS AFTER SURGERY  If you have had your heart valve repaired or replaced, we do not recommend having any dental work done for 6 months and you will need to take an antibiotic prior to dental visits from now on.  Please notify your dentist before any procedure for the proper treatment needed. The antibiotic is taken by mouth one hour prior to visit. This includes routine cleanings.     DO NOT SMOKE.  IF YOU NEED HELP QUITTING, PLEASE TALK WITH YOUR CARDIOLOGIST OR PRIMARY DOCTOR.     If you are on a blood thinner, follow the instructions you were given in the hospital and DO NOT SKIP this medication. Try and take it the same time everyday. Your primary care physician or coumadin clinic will manage the dosing.      REGARDING PRESCRIPTION REFILLS.  If you need a refill on your pain medication contact us.  All other medications will be adjusted, discontinued and re-filled by your primary care physician and/or your cardiologist as they were prior to your surgery. We have given you enough for one to three month with possibly one refill.     POST-OPERATIVE CLINIC VISITS  You have a follow up visit with [facility] Surgery Advance  Care Practitioners on 6/6/2017 at 3:30 pm at the [facility].  You will then return to the care of your primary physician and your cardiologist.   It is important to call for an appointment to see your cardiologist in 4-6 weeks after surgery and your primary care physician in 2-4 weeks after surgery. If there is a need to return to see CT Surgery please call our  at [phone].     SURGICAL QUESTIONS  Please call Raine Myers with any surgical questions, her phone number is listed below.  She can assist you with your needs and contact other surgery care team members as indicated.     For general questions or concerns, please call the Cardiothoracic Surgery Department at [phone] 8-4:30 M-F.   On weekends or after hours, please call [phone] and ask the  to   page the Cardiothoracic Surgery fellow on call.        Home Health Care:   Not needed           Discharge Disposition:   Discharged to home      Condition at discharge: Stable    Hugo Barnett PA-C            CC:s  Dr. Melecio Mcgowan

## 2017-05-24 NOTE — PLAN OF CARE
"Problem: Individualization  Goal: Patient Preferences  Outcome: Improving  Pt id POD 6 for AVR and septal myectomy. Pt doing very well. Pt up independently. Pt eating,drinking and voiding. Pt has had BM. Pt has been in SR with occasional short runs of SVT(4-5 beats) correlating with activity. Pt SOB with extended activity but sating well on RA. Pt's lungs clear. Pt showered this evening. Pt's incisions CDI, slightly reddened patient has PIV in left arm,SL. Pt's VSS. Pt denies c/o pain but encouraged pt to take tylenol 650 mg po x 2 and pt stated \"really helped\" . FSBG discontinued. Pt given printed material on all medications, coumadin teaching initiated. POC: anticipate discharge tomorrow.      "

## 2017-05-24 NOTE — PLAN OF CARE
Problem: Goal Outcome Summary  Goal: Goal Outcome Summary  Outcome: No Change  DISCHARGE:     Discharged to:  Home  Via:  Automobile  Accompanied by:  Brother  Discharge Instructions:  diet, activity, medications, follow up appointments, when to call the MD, and what to watchout for (i.e. s/s of infection, increasing SOB, palpitations, chest pain,)  Prescriptions:  To be filled by discharge pharmacy per pt's request; medication list reviewed & sent with pt. Coumadin teaching completed.  Follow Up Appointments:  Arranged; information given  Belongings:  All sent with pt  IV:  Out  Telemetry:  Off  Pt exhibits understanding of above discharge instructions; all questions answered.

## 2017-05-24 NOTE — PHARMACY-ANTICOAGULATION SERVICE
Clinical Pharmacy- Warfarin Discharge Note  This patient is currently on warfarin for the treatment of Bioprosthetic heart valve.  INR Goal= 2-3  Expected length of therapy undetermined.    Anticoagulation Dose History     Recent Dosing and Labs Latest Ref Rng & Units 5/18/2017 5/19/2017 5/20/2017 5/21/2017 5/22/2017 5/23/2017 5/24/2017    Warfarin 4 mg - - - - - - 4 mg -    Warfarin 5 mg - - - 5 mg 5 mg 5 mg - -    INR 0.86 - 1.14 1.44(H) 1.29(H) 1.25(H) 1.20(H) 1.53(H) 1.75(H) 2.50(H)          Vitamin K doses administered during the last 7 days: None  FFP administered during the last 7 days: None  Recommend discharging the patient on a warfarin regimen of 3 mg with a prescription for warfarin 3mg tablets.      The patient should have an INR checked in 2 days. Appointment scheduled by care coordinator in the morning of 5/26

## 2017-05-25 NOTE — PLAN OF CARE
Problem: Goal Outcome Summary  Goal: Goal Outcome Summary  Occupational Therapy Discharge Summary     Reason for therapy discharge:    Discharged to home with outpatient therapy.     Progress towards therapy goal(s). See goals on Care Plan in [facility] electronic [facility] record for goal details.  Goals partially met.  Barriers to achieving goals:   discharge from facility.     Therapy recommendation(s):    Continued therapy is recommended.  Rationale/Recommendations:  OP CR recommended to increase activity tolerance and functional endurance to allow for increased independence with desired occupations.

## 2017-05-31 ENCOUNTER — CARE COORDINATION (OUTPATIENT)
Dept: CARDIOLOGY | Facility: CLINIC | Age: 57
End: 2017-05-31

## 2017-05-31 NOTE — PROGRESS NOTES
"Tell me how you have been doing since you were discharged from the hospital? I am sore and slow but doing ok.    ACTIVITY  How is your activity tolerance? Walking and increasing activity daily    POST OP MONITORING  How is your pain on a 0-10 scale, how are you managing your pain? Minimal pain, not using pain medication only tylenol    Are you still doing sternal precautions? Yes, \"the pillow\" is my best friend.\"    Do you hear any clicking when you are moving or taking a deep breath?  NO    Are you weighing yourself daily?  Yes, weight is mostly stable, some swelling in his ankles, but is limiting fluids to 2 liters, and elevating legs and taking bumex as ordered.        SIGNS AND SYMPTOMS OF INFECTION  1. INCREASE IN PAIN No  2. FEVER No  3. DRAINAGE No    If Yes, color:                 5. REDNESS No    6. SWELLING No    ASSISTANCE  Do you have someone at home to assist you with your daily activities?  Patient's mother is staying with him.       MEDICATIONS  Is someone helping you to set up your medications?  Pt is independent.   Do you have any questions about your medications?  No, reviewed current med list and patient is taking all meds appropriately      Are you on a blood thinner?  Yes  Who is managing your INRs?  PCP was checked late less week and INR was 3.5, coumadin adjusted and re-check will be tomorrow.      FOLLOW UP  Are you scheduled for cardiac rehab? Yes, it starts 06/02      You are scheduled to see our surgery advanced practice provider for post operative follow up on 06/06   You are scheduled to see your cardiologist on: patient is scheduled for ablation for SVT on 06/20 and would like someone to call him to discuss some concerns he has regarding the procedure.  Patient states he is still having some irregular heart beats.  Sent note to Radha Higginbotham asking her to call patient.      You are scheduled to see your primary care physician on: has seen PCP already will see him again tomorrow.     "   CONTACT INFORMATION  Please feel free to call us with any other questions or symptoms that are concerning for you at [phone], if it is after 4:30 in the afternoon, or a weekend please call [phone] and ask for the on call specialist.  We want to do everything we can to help prevent you needing to return to the ED, so please do not hesitate to call us.

## 2017-06-06 ENCOUNTER — OFFICE VISIT (OUTPATIENT)
Dept: CARDIOLOGY | Facility: CLINIC | Age: 57
End: 2017-06-06
Attending: INTERNAL MEDICINE
Payer: COMMERCIAL

## 2017-06-06 VITALS
HEART RATE: 77 BPM | DIASTOLIC BLOOD PRESSURE: 77 MMHG | WEIGHT: 251.1 LBS | OXYGEN SATURATION: 96 % | SYSTOLIC BLOOD PRESSURE: 128 MMHG | HEIGHT: 70 IN | BODY MASS INDEX: 35.95 KG/M2

## 2017-06-06 DIAGNOSIS — E78.5 HYPERLIPIDEMIA WITH TARGET LDL LESS THAN 70: ICD-10-CM

## 2017-06-06 DIAGNOSIS — E78.5 HYPERLIPIDEMIA LDL GOAL <160: ICD-10-CM

## 2017-06-06 DIAGNOSIS — I35.0 NONRHEUMATIC AORTIC VALVE STENOSIS: ICD-10-CM

## 2017-06-06 DIAGNOSIS — I47.10 SVT (SUPRAVENTRICULAR TACHYCARDIA) (H): Primary | ICD-10-CM

## 2017-06-06 LAB
ANION GAP SERPL CALCULATED.3IONS-SCNC: 7 MMOL/L (ref 3–14)
BUN SERPL-MCNC: 12 MG/DL (ref 7–30)
CALCIUM SERPL-MCNC: 8.6 MG/DL (ref 8.5–10.1)
CHLORIDE SERPL-SCNC: 105 MMOL/L (ref 94–109)
CHOLEST SERPL-MCNC: 151 MG/DL
CO2 SERPL-SCNC: 28 MMOL/L (ref 20–32)
CREAT SERPL-MCNC: 0.98 MG/DL (ref 0.66–1.25)
GFR SERPL CREATININE-BSD FRML MDRD: 79 ML/MIN/1.7M2
GLUCOSE SERPL-MCNC: 91 MG/DL (ref 70–99)
HDLC SERPL-MCNC: 40 MG/DL
LDLC SERPL CALC-MCNC: 79 MG/DL
NONHDLC SERPL-MCNC: 112 MG/DL
POTASSIUM SERPL-SCNC: 4.4 MMOL/L (ref 3.4–5.3)
SODIUM SERPL-SCNC: 140 MMOL/L (ref 133–144)
TRIGL SERPL-MCNC: 162 MG/DL

## 2017-06-06 PROCEDURE — 80048 BASIC METABOLIC PNL TOTAL CA: CPT | Performed by: PHYSICIAN ASSISTANT

## 2017-06-06 PROCEDURE — 99212 OFFICE O/P EST SF 10 MIN: CPT

## 2017-06-06 PROCEDURE — 36415 COLL VENOUS BLD VENIPUNCTURE: CPT | Performed by: PHYSICIAN ASSISTANT

## 2017-06-06 PROCEDURE — 93010 ELECTROCARDIOGRAM REPORT: CPT | Mod: ZP | Performed by: INTERNAL MEDICINE

## 2017-06-06 PROCEDURE — 93005 ELECTROCARDIOGRAM TRACING: CPT | Mod: ZF

## 2017-06-06 RX ORDER — ATORVASTATIN CALCIUM 40 MG/1
40 TABLET, FILM COATED ORAL DAILY
Qty: 90 TABLET | Refills: 0 | Status: SHIPPED | OUTPATIENT
Start: 2017-06-06

## 2017-06-06 ASSESSMENT — PAIN SCALES - GENERAL: PAINLEVEL: NO PAIN (0)

## 2017-06-06 NOTE — LETTER
6/6/2017      RE: Gil Chowdary  [address] [place] [id number]       Dear Colleague,    Thank you for the opportunity to participate in the care of your patient, Gil Chowdary, at the [facility]. Please see a copy of my visit note below.    Reason for visit: Post-Op AVR with tissue valve and septal myomectomy on 5/16/17 by Dr. Simon     HPI: Gil Chowdary is a 56 year old male seen in clinic for follow-up appointment after surgery. Patient has past medical history of Severe aortic stenosis, HTN, Paroxysmal SVT. Hospital course was remarkable for short runs of SVT. EP was consulted and recommended ablation as an outpatient. Rate was controlled with metoprolol. Patient asymptomatic during episodes. Electrolytes were replaced. Patient discharged to home instable condition.     Patient has been doing well since discharge and now returns to clinic for postop visit.    Patient reports incision is healing well.  Patient denies to any fever, chills, chest pain, palpitations, edema, SOB, lightheadedness, nausea and vomiting.  Normal bowel movements.  Voiding without problems. Pain is well controlled. He is no longer using his oxycodone.     He will be attending cardiac rehabilitation this coming week. He reports he is sleeping better. He has been walking a lot and feels he is also breathing better and using his C-pap at night. He feels he has a lot more energy.     Patient is on Coumadin and Coumadin is therapeutic.  Weight  Stable and below pre op weight .      PAST MEDICAL HISTORY:  Past Medical History:   Diagnosis Date     Former tobacco use      Glaucoma      Hyperlipidemia LDL goal <160 12/6/2011     Obesity      DRAKE on CPAP      Right bundle branch block      Severe aortic stenosis     On Echo 10/28/16     PAST SURGICAL HISTORY:  Past Surgical History:   Procedure Laterality Date     HEART CATH LEFT HEART CATH  04/07/2017     Diffuse non-obstuctive CAD     REPAIR VALVE AORTIC N/A 5/16/2017    Procedure: REPAIR VALVE AORTIC;  Median Sternotony, CardioPulmonary Bypass, Aortic Valve Replace using 25MM Trifecta Valve with [employer], Septal myectomy;  Surgeon: Jun Simon MD;  Location: [facility] OR     REPLACE VALVE AORTIC N/A 5/16/2017    Procedure: REPLACE VALVE AORTIC;;  Surgeon: Jun Simon MD;  Location: [facility] OR     SINUS SURGERY  2005     CURRENT MEDICATIONS:   Current Outpatient Prescriptions:      atorvastatin (LIPITOR) 40 MG tablet, Take 1 tablet (40 mg) by mouth daily, Disp: 90 tablet, Rfl: 0     oxyCODONE (ROXICODONE) 5 MG IR tablet, Take 1-2 tablets (5-10 mg) by mouth every 4 hours as needed for moderate to severe pain, Disp: 80 tablet, Rfl: 0     acetaminophen (TYLENOL) 325 MG tablet, Take 2 tablets (650 mg) by mouth every 4 hours as needed for mild pain, Disp: 100 tablet, Rfl: 0     metoprolol (LOPRESSOR) 50 MG tablet, Take 1 tablet (50 mg) by mouth 2 times daily, Disp: 60 tablet, Rfl: 0     senna-docusate (SENOKOT-S;PERICOLACE) 8.6-50 MG per tablet, Take 1-2 tablets by mouth 2 times daily, Disp: 100 tablet, Rfl: 0     pantoprazole (PROTONIX) 40 MG EC tablet, Take 1 tablet (40 mg) by mouth every morning, Disp: 30 tablet, Rfl: 0     bumetanide (BUMEX) 1 MG tablet, Take 1 tablet (1 mg) by mouth 2 times daily, Disp: 60 tablet, Rfl: 0     potassium chloride SA 15 MEQ TBCR, Take 30 mEq by mouth 2 times daily, Disp: 90 tablet, Rfl: 0     warfarin (COUMADIN) 3 MG tablet, Take 1 tab (3 mg) on 5/24 and 5/25 and have INR checked on 5/26 and have dose adjusted, Disp: 30 tablet, Rfl: 0     cholecalciferol (VITAMIN D3) 5000 UNITS TABS tablet, Take 5,000 Units by mouth every morning, Disp: , Rfl:      Multiple Vitamins-Minerals (CENTRUM ADULTS PO), Take 1 tablet by mouth every morning , Disp: , Rfl:      prednisoLONE acetate (PRED FORTE) 1 % ophthalmic susp, Place 1 drop into both eyes as needed , Disp: , Rfl:       "timolol (TIMOPTIC) 0.5 % ophthalmic solution, 1 drop 1 DROP INTO BOTH EYES 2 TIMES DAILY, Disp: , Rfl:      aspirin 81 MG tablet, Take 81 mg by mouth every morning , Disp: , Rfl:      Omega-3 Fatty Acids (FISH OIL PO), Take 1 g by mouth every morning , Disp: , Rfl:     ALLERGIES:    Allergies   Allergen Reactions     Amoxicillin      Itchy      Penicillins Hives     LABS:  CBC RESULTS:  Lab Results   Component Value Date    WBC 9.2 05/24/2017    RBC 3.39 (L) 05/24/2017    HGB 10.8 (L) 05/24/2017    HCT 32.1 (L) 05/24/2017    MCV 95 05/24/2017    MCH 31.9 05/24/2017    MCHC 33.6 05/24/2017    RDW 14.9 05/24/2017     (L) 05/24/2017       BMP RESULTS:  Lab Results   Component Value Date     06/06/2017    POTASSIUM 4.4 06/06/2017    CHLORIDE 105 06/06/2017    CO2 28 06/06/2017    ANIONGAP 7 06/06/2017    GLC 91 06/06/2017    BUN 12 06/06/2017    CR 0.98 06/06/2017    GFRESTIMATED 79 06/06/2017    GFRESTBLACK >90   GFR Calc   06/06/2017    MYRON 8.6 06/06/2017        INR RESULTS:  Lab Results   Component Value Date    INR 2.50 (H) 05/24/2017     IMAGING: none     PHYSICAL EXAM:   /77 (BP Location: Left arm, Patient Position: Chair, Cuff Size: Adult Regular)  Pulse 77  Ht 1.765 m (5' 9.5\")  Wt 113.9 kg (251 lb 1.6 oz)  SpO2 96%  BMI 36.55 kg/m2  General: alert and oriented x 3, pleasant, no acute distress, normal mood and affect  CV: S1 S2, no murmurs, rubs or gallops, regular rate and rhythm, trace peripheral edema-improving per patient. NSR on EKG today  Pulm: bilateral breath sounds, clear to auscultation, easy work of breathing  Incision: incision clean dry and intact without erythema, swelling or drainage  Neuro: nonfocal    PROCEDURES: none     ASSESSMENT/PLAN:  Gil is a 56 year old male  Status post AVR-tissue and septal myomectomy on 5/16/17 who returns to clinic for postop visit.    1. Continue coumadin per cardiology for SVT, only needs for 8 weeks from a valve " standpoint. Continue Aspirin 81 mg.   2. Paroxysmal SVT: patient questioning whether he still needs ablation. In sinus rhythm today. I contacted Radha HAWTHORNE NP who confirmed that patient's still needs ablation and this is not something that will resolve on its own. She told me she had contacted patient by phone earlier and discussed this with him. I confirmed with patient again that this procedure is necessary per EP. He is scheduled on June 20th for procedure.   3. Continue current mediations, labs WNL, follow up with PCP within 1 weeks for diuretics and Dr. Salinas from cardiology within 4 weeks.      4. Patient healing well from surgical standpoint. I reviewed restrictions and expectations with patient today.   5. Patient's Lipitor refilled today.     Approximately 45 minutes spent with the patient in clinic at this visit.    Francia Carrillo PA-C    CC  Patient Care Team:  Sam Villatoro [phone] as PCP - General  Mercedes Salinas MD as MD (Cardiology)  MERCEDES SALINAS

## 2017-06-06 NOTE — MR AVS SNAPSHOT
After Visit Summary   6/6/2017    Gil Chowdary    MRN: [id number]           Patient Information     Date Of Birth          1960        Visit Information        Provider Department      6/6/2017 3:30 PM  [facility]        Today's Diagnoses     SVT (supraventricular tachycardia) (H)    -  1    Nonrheumatic aortic valve stenosis        Hyperlipidemia LDL goal <160        Hyperlipidemia with target LDL less than 70           Follow-ups after your visit        Your next 10 appointments already scheduled     Jun 21, 2017  9:00 AM CDT   Cardiac Treatment with Ur Cardiac Rehab 1   [facility] ([facility])    [address]  [facility] [address]   [phone]            Jun 23, 2017  9:00 AM CDT   Cardiac Treatment with Ur Cardiac Rehab 1   [facility] ([facility])    [address]  [facility] [address]   [phone]            Jun 26, 2017  9:00 AM CDT   Cardiac Treatment with Ur Cardiac Rehab 1   [facility] ([facility])    [address]  [facility] [address]   [phone]            Jun 28, 2017  9:00 AM CDT   Cardiac Treatment with Ur Cardiac Rehab 1   [facility] ([facility])    [address]  [facility] [address]   [phone]            Jun 30, 2017  9:00 AM CDT   Cardiac Treatment with Ur Cardiac Rehab 1   [facility] ([facility])    [address]  [facility] [address]   [phone]            Jul 03,  2017  9:00 AM CDT   Cardiac Treatment with Ur Cardiac Rehab 1   [facility], Cardiac Rehabilitation ([facility])    [address]  [facility] [address]   [phone]            Jul 05, 2017  9:00 AM CDT   Cardiac Treatment with Ur Cardiac Rehab 1   [facility] ([facility])    [address] [place] [address]   [phone]            Jul 07, 2017  9:00 AM CDT   Cardiac Treatment with Ur Cardiac Rehab 1   [facility] ([facility])    [address] [address]   [phone]            Jul 10, 2017  9:00 AM CDT   Cardiac Treatment with Ur Cardiac Rehab 1   [facility] ([facility])    [address] [address]   [phone]            Jul 12, 2017  9:00 AM CDT   Cardiac Treatment with Ur Cardiac Rehab 1   [facility] ([facility])    [address] [address]   [phone]              Who to contact     If you have questions or need follow up information about today's clinic visit or your schedule please contact [facility] directly at [phone].  Normal or non-critical lab and imaging results will be communicated to you by MyChart, letter or phone within 4 business days after the clinic has received the results. If you do not hear from us within 7 days, please contact the clinic through MyChart or phone. If you have a critical or abnormal lab result, we will notify you by phone as soon as possible.  Submit refill requests  "through [employer] or call your pharmacy and they will forward the refill request to us. Please allow 3 business days for your refill to be completed.          Additional Information About Your Visit        [email or web address] Information     [employer] gives you secure access to your electronic health record. If you see a primary care provider, you can also send messages to your care team and make appointments. If you have questions, please call your primary care clinic.  If you do not have a primary care provider, please call [phone] and they will assist you.        Care EveryWhere ID     This is your Care EveryWhere ID. This could be used by other organizations to access your [place] medical records  [id number]        Your Vitals Were     Pulse Height Pulse Oximetry BMI (Body Mass Index)          77 1.765 m (5' 9.5\") 96% 36.55 kg/m2         Blood Pressure from Last 3 Encounters:   06/06/17 128/77   05/24/17 105/69   05/09/17 130/87    Weight from Last 3 Encounters:   06/09/17 113.4 kg (250 lb)   06/06/17 113.9 kg (251 lb 1.6 oz)   05/24/17 116.3 kg (256 lb 6.4 oz)              We Performed the Following     Basic metabolic panel     EKG 12-lead, tracing only     Follow-Up with Cardiologist          Today's Medication Changes          These changes are accurate as of: 6/6/17 11:59 PM.  If you have any questions, ask your nurse or doctor.               These medicines have changed or have updated prescriptions.        Dose/Directions    atorvastatin 40 MG tablet   Commonly known as:  LIPITOR   This may have changed:  when to take this   Used for:  Hyperlipidemia with target LDL less than 70        Dose:  40 mg   Take 1 tablet (40 mg) by mouth daily   Quantity:  90 tablet   Refills:  0            Where to get your medicines      These medications were sent to [facility] [address] [place] [address] AT [address] [place] [address] & [address] [address]     Phone:  [phone]     " atorvastatin 40 MG tablet                Primary Care Provider Fax #    Sam Grimes [id number] [place] [phone]       DUPLICATE  [place] [address]        Thank you!     Thank you for choosing [facility]  for your care. Our goal is always to provide you with excellent care. Hearing back from our patients is one way we can continue to improve our services. Please take a few minutes to complete the written survey that you may receive in the mail after your visit with us. Thank you!             Your Updated Medication List - Protect others around you: Learn how to safely use, store and throw away your medicines at www.disposemymeds.org.          This list is accurate as of: 6/6/17 11:59 PM.  Always use your most recent med list.                   Brand Name Dispense Instructions for use    acetaminophen 325 MG tablet    TYLENOL    100 tablet    Take 2 tablets (650 mg) by mouth every 4 hours as needed for mild pain       aspirin 81 MG tablet      Take 81 mg by mouth every morning       atorvastatin 40 MG tablet    LIPITOR    90 tablet    Take 1 tablet (40 mg) by mouth daily       bumetanide 1 MG tablet    BUMEX    60 tablet    Take 1 tablet (1 mg) by mouth 2 times daily       CENTRUM ADULTS PO      Take 1 tablet by mouth every morning       cholecalciferol 5000 UNITS Tabs tablet    vitamin D3     Take 5,000 Units by mouth every morning       FISH OIL PO      Take 1 g by mouth every morning       metoprolol 50 MG tablet    LOPRESSOR    60 tablet    Take 1 tablet (50 mg) by mouth 2 times daily       oxyCODONE 5 MG IR tablet    ROXICODONE    80 tablet    Take 1-2 tablets (5-10 mg) by mouth every 4 hours as needed for moderate to severe pain       pantoprazole 40 MG EC tablet    PROTONIX    30 tablet    Take 1 tablet (40 mg) by mouth every morning       Potassium Chloride [place] CR 15 MEQ Tbcr     90 tablet    Take 30 mEq by mouth 2 times daily       prednisoLONE acetate 1 % ophthalmic susp    PRED FORTE     Place 1 drop  into both eyes as needed       senna-docusate 8.6-50 MG per tablet    SENOKOT-S;PERICOLACE    100 tablet    Take 1-2 tablets by mouth 2 times daily       timolol 0.5 % ophthalmic solution    TIMOPTIC     1 drop 1 DROP INTO BOTH EYES 2 TIMES DAILY       warfarin 3 MG tablet    COUMADIN    30 tablet    Take 1 tab (3 mg) on 5/24 and 5/25 and have INR checked on 5/26 and have dose adjusted

## 2017-06-06 NOTE — OP NOTE
DATE OF PROCEDURE:  05/16/2017      PREOPERATIVE DIAGNOSES:   1.  Severe aortic stenosis.   2.  Left ventricular hypertrophy with septal myocardial hypertrophy visually causing narrowing of the left ventricular outflow tract.   3.  Left ventricular hypertrophy.   4.  Morbid obesity.      POSTOPERATIVE DIAGNOSES:   1.  Severe aortic stenosis.   2.  Left ventricular hypertrophy with septal myocardial hypertrophy visually causing narrowing of the left ventricular outflow tract.   3.  Left ventricular hypertrophy.   4.  Morbid obesity.      SURGICAL PROCEDURES PERFORMED:   1.  Aortic valve replacement with 25 mm Trifecta bovine pericardial valve.   2.  Septal myomectomy.   3.  Placement of ventricular epicardial pacing leads.   4.  Reinforced sternal closure due to patient's obesity.      SURGEON:  Jun Simon MD      FIRST ASSISTANT:  Arnold Flowers MD      SECOND ASSISTANT:  Terrance Mejia MD.        INDICATION FOR PROCEDURE:  Gil Chowdary is a 56-year-old gentleman who is obese.  He was found to have a heart murmur.  Cardiac workup revealed severe aortic stenosis with a calculated valve area less than 1 square cm.  The mean gradient was over 50 mmHg.  Reviewing the echocardiogram also showed LV hypertrophy with septal hypertrophy.  The LV septum is hypertrophied with bulging to the LVOT causing partial obstruction visually on the echocardiogram.  Because of these findings, we recommend patient to undergo aortic valve replacement and septal myomectomy.  Due to the valve choices, mechanical versus  tissue valve were explained to the patient.  Since the patient is physically active and he is very concerned about taking Coumadin and wanted to have a tissue valve.  The benefits and risks of surgery were explained to the patient and the consent was obtained.      DESCRIPTION OF PROCEDURE:  The patient was taken to the operating room.  He was intubated.  General anesthesia was given.  His chest, abdomen and bilateral  lower extremities were prepped and draped in sterile fashion.  We intubated the patient and performed a WALESKA, which showed severe aortic stenosis.  There are also signs of LV hypertrophy with septal myocardial hypertrophy bulging into the LVOT.  The septal thickness in myocardium is at least 1.5 cm by the WALESKA measurement.  Based on these findings, we decided to proceed with aortic valve replacement and septal myomectomy.      We made a median sternotomy.  The chest was opened.  We opened the pericardium.  We gave heparin.  We cannulated the ascending aorta with a 22 Syrian metal tip arterial cannula.  We placed 2-stage venous cannula into the inferior vena cava through the right atrial appendage.  We placed antegrade cardioplegic catheter into the proximal ascending aorta.  We placed a retrograde cardioplegic catheter into the coronary sinus.  We induced cardiopulmonary bypass.  We placed a LV vent from the right superior pulmonary vein.  We applied the aortic crossclamp.  We gave a dose of antegrade cardioplegia, which was followed with a dose of retrograde cardioplegia.  The heart was arrested.      We made a transverse aortotomy.  We exposed the aortic valve and noted the aortic valve leaflets all calcified with significant stenosis.  We excised the calcified aortic valve leaflets.  We used a rongeur to remove the calcium deposits along the aortic annulus.  We irrigated the calcium debris with normal saline.      We inspected the subvalvular structure.  We noted LV septal hypertrophy that is bulging towards the LVOT.  We proceeded with a septal myomectomy.  We inserted a scalpel vertically down to cut into the LV septum.  The septum mass that was cut out was about 6 mm in depth and 1.5 cm in width and with the incision down to the mid ventricle 3 cm from aortic annulus.  Once LV myocardium is resected we used normal saline to irrigate the left ventricular cavity to remove the possible debris.      At this time,  measured the size of the aortic annulus.  A 25-mm Trifecta bovine pericardial valve was found to be a good fit.  We placed multiple, a total of 25 interrupted 2-0 Tevdek sutures without pledgets, across the aortic annulus in an interrupted fashion.  We passed the sutures through the sewing cuff of the Trifecta bovine pericardial valve.  There were no pledgets placed in the annulus.  We pushed the valve down to the aortic annulus.  The sutures were tied with the Cor-knots individually.  We then tested the valve opening and closure and valve functions properly.  We gave retrograde cardioplegia and noted good blood return from both left and right coronary ostial buttons.  We closed the aortotomy with 2 layers of 4-0 Prolene sutures.  We performed aggressive deair measures and released the aortic cross-clamp.  We allowed the heart to be reperfused for a period of time.      The patient's heart soon resumed native sinus rate but heart rate was very slow, so we placed a pair of ventricular epicardial pacing leads to the right ventricle and paced heart rate at a rate of 80 beats per minute.  We were able to wean the patient off the cardiopulmonary bypass after aggressive deair measures.  The patient was weaned off cardiopulmonary bypass without difficulties.  We performed another WALESKA, which showed normal aortic valve function without paravalvular leakage.  There is minimal gradient across the aortic valve.  There is no perivalvular leakage.  The septal myocardial hypertrophy is also significantly reduced morphologically.  LV function is preserved.      We reversed heparin with protamine.  We removed arterial venous cannulas.  The cannulation sites were reinforced with Prolene sutures.  Hemostasis is reassured.  We placed multiple mediastinal drainage tubes around the heart.  The sternum was closed with multiple single and double strain sternal wires.  The subcutaneous tissue and skin were closed with the Vicryl sutures.   The patient tolerated procedure well and was transferred to thecardiovascular ICU.         MARGARET JEFFERS MD             D: 2017 17:52   T: 2017 20:15   MT: QUE      Name:     ABIGAIL MATOS   MRN:      [id number]        Account:        [id number]   :      1960           Procedure Date: 2017      Document: [id number]       cc: Guru Salinas MD, [facility]       Sam FRANCO       [facility] Surgery Billing

## 2017-06-07 LAB — INTERPRETATION ECG - MUSE: NORMAL

## 2017-06-09 ENCOUNTER — DOCUMENTATION ONLY (OUTPATIENT)
Dept: CARDIOLOGY | Facility: CLINIC | Age: 57
End: 2017-06-09

## 2017-06-09 ENCOUNTER — HOSPITAL ENCOUNTER (OUTPATIENT)
Dept: CARDIAC REHAB | Facility: CLINIC | Age: 57
End: 2017-06-09
Attending: PHYSICIAN ASSISTANT
Payer: COMMERCIAL

## 2017-06-09 VITALS — HEIGHT: 69 IN | WEIGHT: 250 LBS | BODY MASS INDEX: 37.03 KG/M2

## 2017-06-09 PROCEDURE — 93798 PHYS/QHP OP CAR RHAB W/ECG: CPT | Performed by: CLINICAL EXERCISE PHYSIOLOGIST

## 2017-06-09 PROCEDURE — 40000575 ZZH STATISTIC OP CARDIAC VISIT #2: Performed by: CLINICAL EXERCISE PHYSIOLOGIST

## 2017-06-09 PROCEDURE — 40000116 ZZH STATISTIC OP CR VISIT: Performed by: CLINICAL EXERCISE PHYSIOLOGIST

## 2017-06-09 PROCEDURE — 93797 PHYS/QHP OP CAR RHAB WO ECG: CPT | Performed by: CLINICAL EXERCISE PHYSIOLOGIST

## 2017-06-09 ASSESSMENT — 6 MINUTE WALK TEST (6MWT)
MALE CALC: 1774.35
PREDICTED: 1785.17
FEMALE CALC: 1475.1
GENDER SELECTION: MALE
TOTAL DISTANCE WALKED (FT): 1167

## 2017-06-09 NOTE — PROGRESS NOTES
06/09/17 0800   Session   Session Initial Evaluation and Exercise Prescription   Certified through this date 07/08/17   Cardiac Rehab Assessment   Cardiac Rehab Assessment Patient went in for a routine check up when his PCP noticed a murmur. Further testing revealed severe aortic stenosis, so patient underwent aortic valve replacement. He had some short runs of SVT while he was an inpatient so he is going to be meeting with an EP to discuss a possible ablation. Patient would benefit from skilled therapy in order to monitor CV response to exercise, educate on stress management, nutrition, and exercise principles, and to help the patient establish an independent exercise program. The patient's history and clinical status including hemodynamics and ECG were evaluated.  The patient was assessed to be stable and appropriate to begin exercise.   The patient's functional capacity and exercise prescription were determined by the completion of the 6 minute walk test.  See results below.  The patient was oriented to the program.  Risk factor profile was completed. Goals and objectives were discussed. CV response was WNL. No symptoms, complaints or pain were reported. Good prognosis for reaching above goals. Skilled therapy is necessary in order to monitor CV response to exercise, to provide education on risk factors and behavior change counseling needed to achieve patient's goals.  Plan to progress to 30-40 minutes of exercise prior to discharge from cardiac rehab.  Initial THR of 20-30 beats above RHR; Effort rating of 4-6.  Initiate muscle conditioning as appropriate.  Provide risk factor education and behavior change counseling.      General Information   Treatment Diagnosis Valve Replacement  (Aortic)   Date of Treatment Diagnosis 05/16/17   Significant Past CV History None   Comorbidities None   Lead up symptoms None   Hospital Location [facility]   Hospital Discharge Date 05/24/17   Signs and Symptoms Post Hospital Discharge  "Fatigue   Outpatient Cardiac Rehab Start Date 06/09/17   Primary Physician Sam Villatoro   Primary Physician Follow Up Scheduled   Surgeon Dr. Simon   Cardiologist Dr. Salinas   Cardiologist Follow Up Completed   Ejection Fraction 55-60   Risk Stratification Low   Summary of Cath Report   Summary of Cath Report Available   Date Performed 04/07/17   Left Main None   LAD Mild   LCX Not significant   OM Not significant   RCA 50%   Living and Work Status    Living Arrangements and Social Status house   Support System Live with an adult   Return to Employment No   Occupation Maintenance   Preventative Medications   CMS recommended medications Anticoagulants;Antiplatelets;Beta Blocker   Falls Screen   Have you fallen two or more times in the past year? No   Have you fallen and had an injury in the past year? No   Referral Initiated to Physical Therapy No   Pain   Patient Currently in Pain Yes   Pain Location Incisional   Pain Description (Discomfort)   Physical Assessments   Incisions WNL   Edema +3 Moderate   Right Lung Sounds normal   Left Lung Sounds diminished   Limitations Surgical   Individualized Treatment Plan   Monitored Sessions Scheduled 24   Monitored Sessions Attended 1   Oxygen   Supplemental Oxygen needed No   Nutrition Management - Weight Management   Assessment Initial Assessment   Age 56   Weight 113.4 kg (250 lb)   Height 1.765 m (5' 9.49\")   BMI (Calculated) 36.48   Initial Rate Your Plate Score. Dietary tool to assess eating patterns. Scores range from 24 to 72. The higher the score the healthier the eating pattern. 64   Nutrition Management - Lipids   Lipids Labs Available   Date 06/06/17   Total Cholesterol 151   Triglycerides 162   HDL 40   LDL 79   Prescribed Lipid Medication No   Nutrition Management - Diabetes   Diabetes No   Nutrition Management Summary   Dietary Recommendations Low Sodium;Anticoagulation Therapy   Stages of Change for Diet Compliance Action   Interventions Planned Attend " Nutrition Education Class(es);Instruct on Label Reading;Educate on Weight Management Principles;Educate on Benefits of Exercise   Patient Goals Goal #1   Goal #1 Description Attend the two nutrition classes.   Goal #1 Target Date 08/09/17   Psychosocial Management   Psychosocial Assessment Initial   Is there history of clinical depression or increased risk of depression? No previous history   Current Level of Stress per Patient Report Moderate    Current Coping Skills Uses Stress Management/Relaxation Techniques  (Walking, locksmithing)   Initial Patient Health Questionnaire -9 Score (PHQ-9) for depression. 5-9 Minimal symptoms, 10-14 Minor depression, 15-19 Major depression, moderately severe, > 20 Major depression, severe  9   Initial [employer] COOP Survey score.  Quality of Life:   If total score > 25 review individual areas where patient rated a 4 or 5.  Consider patients current medical condition and what role that plays on the score.   Adjust treatment protocol to improve areas of concern.  Consider the following:  PHQ9 score, DASI, and re-assessment within the next 30 days to assist with developing treatments.  30   Stages of Change Preparation   Interventions Planned Patient to set goal to reduce stress level/anxiety;Reassess PHQ-9 and/or [employer] COOP Surveys if outside of defined limits;Patient will practice coping/stress management techniques   Patient Goal Yes   Goal Description Attend stress management/ healing process class.   Goal Target Date 08/09/17   Other Core Components - Hypertension   History of or Diagnosis of Hypertension No   Currently taking Anti-Hypertensives Beta blocker;Yes   Other Core Components - Tobacco   History of Tobacco Use Yes   Quit Date or Planned Quit Date 06/09/10   Tobacco Use Status Former (Quit > 6 mo ago)   Tobacco Habit Cigarettes   Tobacco Use per Day (average) 1   Years of Tobacco Use 15   Stages of Change Maintenance   Other Core Components Summary   Interventions  Planned Instruct patient on the DASH diet;List benefits of weight management;Educate on importance of maintaining low sodium diet;None: Patient is in maintenance for smoking cessation   Activity/Exercise History   Activity/Exercise Assessment Initial   Activity/Exercise Status prior to event? Was Physically Active   Number of Days Currently participating in Moderate Physical Activity? 0   Number of Days Currently performing  Aerobic Exercise (including rehab)? 0   Number of Minutes per Session Currently of Aerobic Exercise (average)? 0   Current Stage of Change (Physical Activity) Preparation   Current Stage of Change (Aerobic Exercise) Preparation   Patient Goals Goal #1   Goal #1 Description Establish and maintain a regular aerobic exercise routine of at least 3 days a week for 30 minutes in order to improve tolerance for ADLs such as cooking/maintenance work.    Goal #1 Target Date 08/09/17   Exercise Assessment   6 Minute Walk Predicted - Gender Selection Male   6 Minute Walk Predicted (Male) 1774.35   6 Minute Walk Predicted (Female) 1475.1   Initial 6 Minute Walk Distance (Feet) 1167 ft   Resting HR 73 bpm   Exercise HR 90 bpm   Post Exercise HR 73 bpm   Resting BP 92/62   Exercise /58   Post Exercise BP 92/62   Effort Rating 4   Current MET Level 2.7   MET Level Goal 4.5-5   ECG Rhythm Sinus rhythm   Ectopy None   Current Symptoms Fatigue   Limitations/Restrictions Sternal Precautions   Exercise Prescription   Mode Treadmill;Nustep;Weights   Duration/Time 15-30 min   Frequency 3 daysweek   THR (85% of age predicted max HR) 138.55   OMNI Effort Rating (0-10 Scale) 4-6/10   Progression Progress peak intensity by 1/2 MET per week   Recommended Home Exercise   Type of Exercise Walking   Frequency (days per week) 1-2   Duration (minutes per session) 15-30 min   Effort Rating Recommended 4-6/10   30 Day Exercise Plan Initiate aerobic exercise, muscle conditioning, and stretching   Current Home Exercise   Type  of Exercise None   Frequency (days per week) 0   Duration (minutes per session) 0   Follow-up/On-going Support   Provider follow-up needed on the following No follow-up needed   Learning Assessment   Learner Patient   Primary Language English   Preferred Learning Style Listening;Reading;Demonstration;Pictures/Video   Barriers to Learning No barriers noted   Patient Education   Education recommended Anatomy and Physiology of the Heart;Blood Pressure;Exercise Principles;Medication Overview;Muscle Conditioning;Nutrition;Stress Management;Weight Loss   Physician cosignature/electronic signature indicates approval of this ITP document. I have established, reviewed and made necessary changes to the individualized treatment plan and exercise prescription for this patient.

## 2017-06-09 NOTE — PROGRESS NOTES
Dr. Salinas: Patient called in and would like to know when he should see you with an echocardiogram. He had his aortic valve replaced on May 16 with a 25 mm Trifecta. He is doing well. You initially saw him last December for severe aortic stenosis. He thinks the world of you and passes on his greetings to you. Below is your clinic note. He may undergo SVT ablation at some point at the [facility]-not sure when this will occur. Thanks, Bill        I met Gil Chowdary today.  He is 56.  He has been a remarkably healthy man all of his life and has never really had much in the way of any medical problems.  He does not remember ever being told he had a cardiac murmur, but for all that, Dr. Villatoro appreciated one and sent him for an echocardiogram.  His echocardiogram was done through Dr. Nely Cardona, who interpreted the study showing a calcific aortic valve stenosis, a mean gradient of 42 mmHg, a calculated aortic valve area of 0.8 cm squared and trace aortic insufficiency.  The left ventricle was normal size.  There was mild concentric LVH and, not surprisingly, diastolic dysfunction of the LV.  There was mild left atrial dilation associated with this.       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       SOCIAL HISTORY:  He lives with his wife.  He drives a car.  He does not smoke or drink.  He leads a very active lifestyle, but he does not exercise.       With regards to symptoms, he denies any cardiovascular symptoms at all.  He denies chest pressure, pain, tightness, squeezing, shortness of breath on exertion, palpitations, dizziness or syncope.       REVIEW OF SYSTEMS:  From a review of systems standpoint, which is available in Epic, he has a negative 10-point review except for the fact that he has sleep apnea and uses CPAP.       MEDICATIONS:  Currently are aspirin and vitamin D and  "omega 3 fatty acids.       FAMILY HISTORY:  Noncontributory, although he had a father who had diabetes.  He had a brother who has coronary artery disease and bypass surgery.       ALLERGIES:  Amoxicillin led to some itchiness of the skin.       PHYSICAL EXAMINATION:   GENERAL:  Well-developed, well-nourished male.  Weighs 241 pounds.  He is mildly overweight.   VITAL SIGNS:  Blood pressure 130/80, heart rate 76 beats a minute.   HEAD:  Normal.   NECK:  Free of neck vein distention, mass, bruit or goiter.   HEART:  Regular with a 2 to 3/6 systolic ejection murmur in the aortic area radiating to the left sternal border, no S3 or AI heard.   LUNGS:  Clear.   ABDOMEN:  Soft without organomegaly.   EXTREMITIES:  Show +2 pedal pulses, no edema.   NEUROLOGIC AND CUTANEOUS:  Observations were normal.       Over an hour was spent reviewing Mr. Chowdary's history.  He has severe aortic stenosis.  His ejection fraction of 70%-75%, might be a bit artificially increasing the transvalvular gradients, but nonetheless his valve area of 0.8 and the mean gradient of 41 mmHg supports severe aortic stenosis.  He has trace aortic insufficiency.  His left ventricle is not dilated, but he does have mild LVH and diastolic dysfunction of the left ventricle with mild left atrial enlargement.  He has not been hypertensive.  He is not symptomatic.       I discussed aortic stenosis and the effects on the left ventricle and the heart.  I discussed the effects of aortic stenosis on increasing exercise and activity.  I encouraged him to not do activities that \"take him to the wall\" or lead to significant shortness of breath, and certainly if he were to have any dyspnea, chest pain, palpitations or dizziness that he would let us or Dr. Villatoro know immediately.       I have discussed the possibility of reviewing these issues with CV Surgery.  I gave him the names of Dr. Katz and Dr. Guallpa, CV surgeons at [facility] and the [facility] [place].       He has virtually no symptoms and he has been very tolerant of this degree of AS.  I believe this can be followed by echo and by symptoms, although I think at his relatively young age and his very vigorous lifestyle he may wish to proceed to surgical intervention.  He probably would be a surgical candidate.  He is too young and healthy to be a TAVR candidate.  I did open the door to tissue valve versus a mechanical [place] type valve.       IMPRESSION:   1.  Severe well-tolerated aortic stenosis.   2.  No signs of significant hypertension.   3.  If there is any, we should treat it gingerly with appropriate antihypertensive therapy.   4.  Mildly overweight.   5.  Mild hyperlipidemia.   6.  Positive family history for CAD, no current signs of angina.       I anticipate in the next 6 months to 2 years he will choose to have aortic valve replacement.  He will have this discussion with CV Surgery.  I believe he would have a very successful surgical result, but I do not believe he needs to jump to treatment at this time.  I will see him in 6 months.  I have given him the names of our CV surgeons, and he will likely make an appointment with them to review their surgical perspective.       Prognosis is excellent, but he should not be pushing his body violently with sustained or intense aerobic activity, and he understands that.       IMPRESSION:  Severe symptomatic aortic stenosis.       PLAN:  As above.       Sincerely,

## 2017-06-14 ENCOUNTER — HOSPITAL ENCOUNTER (OUTPATIENT)
Dept: CARDIAC REHAB | Facility: CLINIC | Age: 57
End: 2017-06-14
Attending: PHYSICIAN ASSISTANT
Payer: COMMERCIAL

## 2017-06-14 PROCEDURE — 93798 PHYS/QHP OP CAR RHAB W/ECG: CPT | Performed by: CLINICAL EXERCISE PHYSIOLOGIST

## 2017-06-14 PROCEDURE — 40000116 ZZH STATISTIC OP CR VISIT: Performed by: CLINICAL EXERCISE PHYSIOLOGIST

## 2017-06-15 ENCOUNTER — CARE COORDINATION (OUTPATIENT)
Dept: CARDIOLOGY | Facility: CLINIC | Age: 57
End: 2017-06-15

## 2017-06-15 NOTE — PROGRESS NOTES
Patient was scheduled for ablation due to post op proximal SVT.  Patient called to ask if this was safe to do the ablation so close to having AVR on 05/16/17..  This writer spoke to Dr Simon and he does not want the patient to have an ablation for at least 3 months or longer post op to be sure all the suture lines near the area to be ablated are healed.      Called patient and informed him of Dr Simon's recommendations and patient agreed to plan.  Patient states he will call and cancel the ablation scheduled for 06/20.  Patient denies any symptoms of lightheadedness, chest pain or dizziness, and states he does not feel when he is in a fast rhythm.  Patient states his cardiologist and his PCP both stated he was in NSR during recent clinic visits.      Patient is normally seen by cardiology in [facility], instructed patient to follow up with his cardiologist there and follow their recommendations going forward.  Patient verbalized understanding and will call with any questions or concerns.     Raine Myers, RNCC  Cardiothoracic Surgery   O) [phone]

## 2017-06-16 ENCOUNTER — HOSPITAL ENCOUNTER (OUTPATIENT)
Dept: CARDIAC REHAB | Facility: CLINIC | Age: 57
End: 2017-06-16
Attending: PHYSICIAN ASSISTANT
Payer: COMMERCIAL

## 2017-06-16 PROCEDURE — 40000116 ZZH STATISTIC OP CR VISIT: Performed by: CLINICAL EXERCISE PHYSIOLOGIST

## 2017-06-16 PROCEDURE — 93798 PHYS/QHP OP CAR RHAB W/ECG: CPT | Performed by: CLINICAL EXERCISE PHYSIOLOGIST

## 2017-06-20 ENCOUNTER — APPOINTMENT (OUTPATIENT)
Dept: MEDSURG UNIT | Facility: CLINIC | Age: 57
End: 2017-06-20

## 2017-06-20 NOTE — PROGRESS NOTES
Reason for visit: Post-Op AVR with tissue valve and septal myomectomy on 5/16/17 by Dr. Simon     HPI: Gil Chowdary is a 56 year old male seen in clinic for follow-up appointment after surgery. Patient has past medical history of Severe aortic stenosis, HTN, Paroxysmal SVT. Hospital course was remarkable for short runs of SVT. EP was consulted and recommended ablation as an outpatient. Rate was controlled with metoprolol. Patient asymptomatic during episodes. Electrolytes were replaced. Patient discharged to home instable condition.     Patient has been doing well since discharge and now returns to clinic for postop visit.    Patient reports incision is healing well.  Patient denies to any fever, chills, chest pain, palpitations, edema, SOB, lightheadedness, nausea and vomiting.  Normal bowel movements.  Voiding without problems. Pain is well controlled. He is no longer using his oxycodone.     He will be attending cardiac rehabilitation this coming week. He reports he is sleeping better. He has been walking a lot and feels he is also breathing better and using his C-pap at night. He feels he has a lot more energy.     Patient is on Coumadin and Coumadin is therapeutic.  Weight  Stable and below pre op weight .      PAST MEDICAL HISTORY:  Past Medical History:   Diagnosis Date     Former tobacco use      Glaucoma      Hyperlipidemia LDL goal <160 12/6/2011     Obesity      DRAKE on CPAP      Right bundle branch block      Severe aortic stenosis     On Echo 10/28/16       PAST SURGICAL HISTORY:  Past Surgical History:   Procedure Laterality Date     HEART CATH LEFT HEART CATH  04/07/2017    Diffuse non-obstuctive CAD     REPAIR VALVE AORTIC N/A 5/16/2017    Procedure: REPAIR VALVE AORTIC;  Median Sternotony, CardioPulmonary Bypass, Aortic Valve Replace using 25MM Trifecta Valve with [employer], Septal myectomy;  Surgeon: Jun Simon MD;  Location: [facility] OR     REPLACE VALVE AORTIC N/A 5/16/2017     Procedure: REPLACE VALVE AORTIC;;  Surgeon: Jun Simon MD;  Location: [facility] OR     SINUS SURGERY  2005       CURRENT MEDICATIONS:   Current Outpatient Prescriptions:      atorvastatin (LIPITOR) 40 MG tablet, Take 1 tablet (40 mg) by mouth daily, Disp: 90 tablet, Rfl: 0     oxyCODONE (ROXICODONE) 5 MG IR tablet, Take 1-2 tablets (5-10 mg) by mouth every 4 hours as needed for moderate to severe pain, Disp: 80 tablet, Rfl: 0     acetaminophen (TYLENOL) 325 MG tablet, Take 2 tablets (650 mg) by mouth every 4 hours as needed for mild pain, Disp: 100 tablet, Rfl: 0     metoprolol (LOPRESSOR) 50 MG tablet, Take 1 tablet (50 mg) by mouth 2 times daily, Disp: 60 tablet, Rfl: 0     senna-docusate (SENOKOT-S;PERICOLACE) 8.6-50 MG per tablet, Take 1-2 tablets by mouth 2 times daily, Disp: 100 tablet, Rfl: 0     pantoprazole (PROTONIX) 40 MG EC tablet, Take 1 tablet (40 mg) by mouth every morning, Disp: 30 tablet, Rfl: 0     bumetanide (BUMEX) 1 MG tablet, Take 1 tablet (1 mg) by mouth 2 times daily, Disp: 60 tablet, Rfl: 0     potassium chloride SA 15 MEQ TBCR, Take 30 mEq by mouth 2 times daily, Disp: 90 tablet, Rfl: 0     warfarin (COUMADIN) 3 MG tablet, Take 1 tab (3 mg) on 5/24 and 5/25 and have INR checked on 5/26 and have dose adjusted, Disp: 30 tablet, Rfl: 0     cholecalciferol (VITAMIN D3) 5000 UNITS TABS tablet, Take 5,000 Units by mouth every morning, Disp: , Rfl:      Multiple Vitamins-Minerals (CENTRUM ADULTS PO), Take 1 tablet by mouth every morning , Disp: , Rfl:      prednisoLONE acetate (PRED FORTE) 1 % ophthalmic susp, Place 1 drop into both eyes as needed , Disp: , Rfl:      timolol (TIMOPTIC) 0.5 % ophthalmic solution, 1 drop 1 DROP INTO BOTH EYES 2 TIMES DAILY, Disp: , Rfl:      aspirin 81 MG tablet, Take 81 mg by mouth every morning , Disp: , Rfl:      Omega-3 Fatty Acids (FISH OIL PO), Take 1 g by mouth every morning , Disp: , Rfl:     ALLERGIES:    Allergies   Allergen Reactions      "Amoxicillin      Itchy      Penicillins Hives         LABS:  CBC RESULTS:  Lab Results   Component Value Date    WBC 9.2 05/24/2017    RBC 3.39 (L) 05/24/2017    HGB 10.8 (L) 05/24/2017    HCT 32.1 (L) 05/24/2017    MCV 95 05/24/2017    MCH 31.9 05/24/2017    MCHC 33.6 05/24/2017    RDW 14.9 05/24/2017     (L) 05/24/2017       BMP RESULTS:  Lab Results   Component Value Date     06/06/2017    POTASSIUM 4.4 06/06/2017    CHLORIDE 105 06/06/2017    CO2 28 06/06/2017    ANIONGAP 7 06/06/2017    GLC 91 06/06/2017    BUN 12 06/06/2017    CR 0.98 06/06/2017    GFRESTIMATED 79 06/06/2017    GFRESTBLACK >90   GFR Calc   06/06/2017    MYRON 8.6 06/06/2017        INR RESULTS:  Lab Results   Component Value Date    INR 2.50 (H) 05/24/2017       IMAGING: none     PHYSICAL EXAM:   /77 (BP Location: Left arm, Patient Position: Chair, Cuff Size: Adult Regular)  Pulse 77  Ht 1.765 m (5' 9.5\")  Wt 113.9 kg (251 lb 1.6 oz)  SpO2 96%  BMI 36.55 kg/m2  General: alert and oriented x 3, pleasant, no acute distress, normal mood and affect  CV: S1 S2, no murmurs, rubs or gallops, regular rate and rhythm, trace peripheral edema-improving per patient. NSR on EKG today  Pulm: bilateral breath sounds, clear to auscultation, easy work of breathing  Incision: incision clean dry and intact without erythema, swelling or drainage  Neuro: nonfocal    PROCEDURES: none     ASSESSMENT/PLAN:  Gil is a 56 year old male  Status post AVR-tissue and septal myomectomy on 5/16/17 who returns to clinic for postop visit.    1. Continue coumadin per cardiology for SVT, only needs for 8 weeks from a valve standpoint. Continue Aspirin 81 mg.   2. Paroxysmal SVT: patient questioning whether he still needs ablation. In sinus rhythm today. I contacted Radha HAWTHORNE NP who confirmed that patient's still needs ablation and this is not something that will resolve on its own. She told me she had contacted patient by phone earlier and " discussed this with him. I confirmed with patient again that this procedure is necessary per EP. He is scheduled on June 20th for procedure.   3. Continue current mediations, labs WNL, follow up with PCP within 1 weeks for diuretics and Dr. Salinas from cardiology within 4 weeks.      4. Patient healing well from surgical standpoint. I reviewed restrictions and expectations with patient today.   5. Patient's Lipitor refilled today.     Approximately 45 minutes spent with the patient in clinic at this visit.    Francia Carrillo PA-C    CC  Patient Care Team:  Sam Villatoro [id number] as PCP - General  Mercedes Salinas MD as MD (Cardiology)  MERCEDES SALINAS

## 2017-06-21 ENCOUNTER — TELEPHONE (OUTPATIENT)
Dept: CARDIOLOGY | Facility: CLINIC | Age: 57
End: 2017-06-21

## 2017-06-21 DIAGNOSIS — Z95.2 S/P AVR (AORTIC VALVE REPLACEMENT): Primary | ICD-10-CM

## 2017-06-21 NOTE — TELEPHONE ENCOUNTER
Patient was phone to inform him that an echo and office visit next may to followup on his AVR from may 2017 would be the timeline. Patient is fine with this but knows that he can call us if he wishes this moved up if he is not feeling well. I will place the orders. Leona

## 2017-06-21 NOTE — TELEPHONE ENCOUNTER
----- Message from Guru Salinas MD sent at 6/21/2017  1:42 PM CDT -----  If he's feeling well => 1 year from his surgery.   If not feeling well => see me sooner.   I'll trust your judgement.   [place]

## 2017-06-23 ENCOUNTER — HOSPITAL ENCOUNTER (OUTPATIENT)
Dept: CARDIAC REHAB | Facility: CLINIC | Age: 57
End: 2017-06-23
Attending: PHYSICIAN ASSISTANT
Payer: COMMERCIAL

## 2017-06-23 PROCEDURE — 93798 PHYS/QHP OP CAR RHAB W/ECG: CPT | Performed by: CLINICAL EXERCISE PHYSIOLOGIST

## 2017-06-23 PROCEDURE — 40000116 ZZH STATISTIC OP CR VISIT: Performed by: CLINICAL EXERCISE PHYSIOLOGIST

## 2017-06-26 ENCOUNTER — HOSPITAL ENCOUNTER (OUTPATIENT)
Dept: CARDIAC REHAB | Facility: CLINIC | Age: 57
End: 2017-06-26
Attending: PHYSICIAN ASSISTANT
Payer: COMMERCIAL

## 2017-06-26 VITALS — BODY MASS INDEX: 36.73 KG/M2 | WEIGHT: 248 LBS | HEIGHT: 69 IN

## 2017-06-26 PROCEDURE — 40000116 ZZH STATISTIC OP CR VISIT: Performed by: CLINICAL EXERCISE PHYSIOLOGIST

## 2017-06-26 PROCEDURE — 93798 PHYS/QHP OP CAR RHAB W/ECG: CPT | Performed by: CLINICAL EXERCISE PHYSIOLOGIST

## 2017-06-26 ASSESSMENT — 6 MINUTE WALK TEST (6MWT)
GENDER SELECTION: MALE
TOTAL DISTANCE WALKED (FT): 1167
FEMALE CALC: 1481.93
PREDICTED: 1790.45
MALE CALC: 1779.6

## 2017-06-26 NOTE — PROGRESS NOTES
06/26/17 0900   Session   Session 30 Day Individualized Treatment Plan   Certified through this date 08/02/17   Cardiac Rehab Assessment   Cardiac Rehab Assessment Patient went in for a routine check up when his PCP noticed a murmur. Further testing revealed severe aortic stenosis, so patient underwent aortic valve replacement. He had some short runs of SVT while he was an inpatient so he is going to be meeting with an EP to discuss a possible ablation. Patient would benefit from skilled therapy in order to monitor CV response to exercise, educate on stress management, nutrition, and exercise principles, and to help the patient establish an independent exercise program.6/26 Patient is striving towards his goals of establishing an aerobic exercise program by using the TDM and walking outside of rehab. He also established a new goal of starting a strength training program to get stronger with his ADL's. Patient would benefit from skilled therapy in order to be educated on exercise principles, nutrition, and monitor cv response to exercise.     Therapy services were provided by the student with Samuel Speaker guiding and directing the services and providing the skilled judgment and assessment throughout the session.     General Information   Treatment Diagnosis Valve Replacement  (Aortic)   Date of Treatment Diagnosis 05/16/17   Significant Past CV History None   Comorbidities None   Lead up symptoms None   Hospital Location [facility]   Hospital Discharge Date 05/24/17   Signs and Symptoms Post Hospital Discharge Fatigue   Outpatient Cardiac Rehab Start Date 06/09/17   Primary Physician Sam Villatoro   Primary Physician Follow Up Scheduled   Surgeon Dr. Simon   Cardiologist Dr. Salinas   Cardiologist Follow Up Completed   Ejection Fraction 55-60   Risk Stratification Low   Living and Work Status    Living Arrangements and Social Status house   Support System Live with an adult   Return to Employment No   Occupation  "Maintenance   Preventative Medications   CMS recommended medications Anticoagulants;Antiplatelets;Beta Blocker   Falls Screen   Have you fallen two or more times in the past year? No   Have you fallen and had an injury in the past year? No   Referral Initiated to Physical Therapy No   Pain   Patient Currently in Pain No   Physical Assessments   Incisions WNL   Edema Not assessed   Right Lung Sounds not assessed   Left Lung Sounds not assessed   Limitations Surgical   Individualized Treatment Plan   Monitored Sessions Scheduled 24   Monitored Sessions Attended 6   Oxygen   Supplemental Oxygen needed No   Nutrition Management - Weight Management   Assessment Re-assessment   Age 56   Weight 112.5 kg (248 lb)   Height 1.765 m (5' 9.49\")   BMI (Calculated) 36.19   Initial Rate Your Plate Score. Dietary tool to assess eating patterns. Scores range from 24 to 72. The higher the score the healthier the eating pattern. 64   Nutrition Management - Lipids   Lipids Labs Available   Date 06/06/17   Total Cholesterol 151   Triglycerides 162   HDL 40   LDL 79   Prescribed Lipid Medication No   Nutrition Management - Diabetes   Diabetes No   Nutrition Management Summary   Dietary Recommendations Low Sodium;Anticoagulation Therapy   Stages of Change for Diet Compliance Action   Interventions Planned Attend Nutrition Education Class(es);Instruct on Label Reading;Educate on Weight Management Principles;Educate on Benefits of Exercise   Patient Goals Goal #1   Goal #1 Description Attend the two nutrition classes.    Goal #1 Target Date 08/09/17   Goal #1 Progress Towards Goal 6/26 Patient is still interested in attending the two nutrition classes. He will attend the classes in July.    Psychosocial Management   Psychosocial Assessment Re-assessment   Is there history of clinical depression or increased risk of depression? No previous history   Current Level of Stress per Patient Report Moderate    Current Coping Skills Uses Stress " Management/Relaxation Techniques  (Walking, locksmithing)   Initial Patient Health Questionnaire -9 Score (PHQ-9) for depression. 5-9 Minimal symptoms, 10-14 Minor depression, 15-19 Major depression, moderately severe, > 20 Major depression, severe  9   Initial [employer] COOP Survey score.  Quality of Life:   If total score > 25 review individual areas where patient rated a 4 or 5.  Consider patients current medical condition and what role that plays on the score.   Adjust treatment protocol to improve areas of concern.  Consider the following:  PHQ9 score, DASI, and re-assessment within the next 30 days to assist with developing treatments.  30   Stages of Change Preparation   Interventions Planned Patient to set goal to reduce stress level/anxiety;Reassess PHQ-9 and/or [employer] COOP Surveys if outside of defined limits;Patient will practice coping/stress management techniques   Patient Goal Yes   Goal Description Attend stress management/ healing process class.   Goal Target Date 08/09/17   Progress Towards Goal 6/26 Patient is still interested in attending stress management class. He will attend class in July.    Other Core Components - Hypertension   History of or Diagnosis of Hypertension No   Currently taking Anti-Hypertensives Beta blocker;Yes   Other Core Components - Tobacco   History of Tobacco Use Yes   Quit Date or Planned Quit Date 06/09/10   Tobacco Use Status Former (Quit > 6 mo ago)   Tobacco Habit Cigarettes   Tobacco Use per Day (average) 1   Years of Tobacco Use 15   Stages of Change Maintenance   Other Core Components Summary   Interventions Planned Instruct patient on the DASH diet;List benefits of weight management;Educate on importance of maintaining low sodium diet;None: Patient is in maintenance for smoking cessation   Activity/Exercise History   Activity/Exercise Assessment Re-assessment   Activity/Exercise Status prior to event? Was Physically Active   Number of Days Currently  participating in Moderate Physical Activity? 0   Number of Days Currently performing  Aerobic Exercise (including rehab)? 0   Number of Minutes per Session Currently of Aerobic Exercise (average)? 0   Current Stage of Change (Physical Activity) Preparation   Current Stage of Change (Aerobic Exercise) Preparation   Patient Goals Goal #1;Goal #2   Goal #1 Description Establish and maintain a regular aerobic exercise routine of at least 3 days a week for 30 minutes in order to improve tolerance for ADLs such as cooking/maintenance work.    Goal #1 Target Date 08/09/17   Goal #1 Progress Towards Goal 6/26 Patient is working towards his goal of maintaining a regular aerobic exercise routine by using the TDM and walking around the block. He is working on making his exercise last for at least 30 minutes.    Goal #2 Description Added 6/26: Pt will establish a strength training program that will allow him to improve his ADL's.    Goal #2 Target Date 08/26/17   Goal #2 Progress Towards Goal 6/26 Patient has already starting muscle conditioning three times a week at cardiac rehab, performing both upper and lower body exercises,.   Exercise Assessment   6 Minute Walk Predicted - Gender Selection Male   6 Minute Walk Predicted (Male) 1779.6   6 Minute Walk Predicted (Female) 1481.93   Initial 6 Minute Walk Distance (Feet) 1167 ft   Resting HR 82 bpm   Exercise HR 98 bpm   Post Exercise HR 86 bpm   Resting /64   Exercise /62   Post Exercise /56   Effort Rating 4   Current MET Level 2.9   MET Level Goal 4.5-5   ECG Rhythm Sinus rhythm   Ectopy PVCs   Current Symptoms Fatigue   Limitations/Restrictions Sternal Precautions   Exercise Prescription   Mode Treadmill;Nustep;Weights   Duration/Time 15-30 min   Frequency 3 daysweek   THR (85% of age predicted max HR) 138.55   OMNI Effort Rating (0-10 Scale) 4-6/10   Progression Progress peak intensity by 1/2 MET per week   Recommended Home Exercise   Type of Exercise  Walking   Frequency (days per week) 1-2   Duration (minutes per session) 15-30 min   Effort Rating Recommended 4-6/10   30 Day Exercise Plan Initiate aerobic exercise, muscle conditioning, and stretching   Current Home Exercise   Type of Exercise None   Frequency (days per week) 0   Duration (minutes per session) 0   Follow-up/On-going Support   Provider follow-up needed on the following No follow-up needed   Learning Assessment   Learner Patient   Primary Language English   Preferred Learning Style Listening;Reading;Demonstration;Pictures/Video   Barriers to Learning No barriers noted   Patient Education   Education recommended Anatomy and Physiology of the Heart;Blood Pressure;Exercise Principles;Medication Overview;Muscle Conditioning;Nutrition;Stress Management;Weight Loss   Physician cosignature/electronic signature indicates approval of this ITP document. I have established, reviewed and made necessary changes to the individualized treatment plan and exercise prescription for this patient.

## 2017-06-28 ENCOUNTER — HOSPITAL ENCOUNTER (OUTPATIENT)
Dept: CARDIAC REHAB | Facility: CLINIC | Age: 57
End: 2017-06-28
Attending: PHYSICIAN ASSISTANT
Payer: COMMERCIAL

## 2017-06-28 PROCEDURE — 93798 PHYS/QHP OP CAR RHAB W/ECG: CPT | Performed by: REHABILITATION PRACTITIONER

## 2017-06-28 PROCEDURE — 40000116 ZZH STATISTIC OP CR VISIT: Performed by: REHABILITATION PRACTITIONER

## 2017-06-30 ENCOUNTER — HOSPITAL ENCOUNTER (OUTPATIENT)
Dept: CARDIAC REHAB | Facility: CLINIC | Age: 57
End: 2017-06-30
Attending: PHYSICIAN ASSISTANT
Payer: COMMERCIAL

## 2017-06-30 PROCEDURE — 40000116 ZZH STATISTIC OP CR VISIT: Performed by: CLINICAL EXERCISE PHYSIOLOGIST

## 2017-06-30 PROCEDURE — 93798 PHYS/QHP OP CAR RHAB W/ECG: CPT | Performed by: CLINICAL EXERCISE PHYSIOLOGIST

## 2017-07-02 ENCOUNTER — HOSPITAL ENCOUNTER (EMERGENCY)
Facility: CLINIC | Age: 57
Discharge: HOME | End: 2017-07-02
Attending: EMERGENCY MEDICINE | Admitting: EMERGENCY MEDICINE
Payer: COMMERCIAL

## 2017-07-02 VITALS
OXYGEN SATURATION: 97 % | DIASTOLIC BLOOD PRESSURE: 65 MMHG | WEIGHT: 255 LBS | HEIGHT: 69 IN | HEART RATE: 83 BPM | SYSTOLIC BLOOD PRESSURE: 121 MMHG | BODY MASS INDEX: 37.77 KG/M2 | TEMPERATURE: 98.5 F | RESPIRATION RATE: 16 BRPM

## 2017-07-02 DIAGNOSIS — Z48.89 ENCOUNTER FOR POSTOPERATIVE WOUND CHECK: ICD-10-CM

## 2017-07-02 PROCEDURE — 99283 EMERGENCY DEPT VISIT LOW MDM: CPT

## 2017-07-02 ASSESSMENT — ENCOUNTER SYMPTOMS: WOUND: 1

## 2017-07-02 NOTE — ED AVS SNAPSHOT
Emergency Department    [address] [facility] [address]    Phone:  [phone]    Fax:  [phone]                                       Gil Chowdary   MRN: [id number]    Department:   Emergency Department   Date of Visit:  7/2/2017           After Visit Summary Signature Page     I have received my discharge instructions, and my questions have been answered. I have discussed any challenges I see with this plan with the nurse or doctor.    ..........................................................................................................................................  Patient/Patient Representative Signature      ..........................................................................................................................................  Patient Representative Print Name and Relationship to Patient    ..................................................               ................................................  Date                                            Time    ..........................................................................................................................................  Reviewed by Signature/Title    ...................................................              ..............................................  Date                                                            Time

## 2017-07-02 NOTE — DISCHARGE INSTRUCTIONS
Wound Care  Taking proper care of your wound will help it heal. Your healthcare provider may show you how to clean and dress the wound. He or she will also explain how to tell if the wound is healing normally. If you are unsure of how to take care of the wound, be sure to clarify what dressing to use and how often you should change the bandages. Here are the basic steps.     A wound that's not healing normally may be dark in color or have white streaks.   Wash your hands  Tips for washing your hands include:    Use liquid soap and lather for 2 minutes. Scrub between your fingers and under your nails.    Rinse with warm water, keeping your fingers pointing down.    Use a paper towel to dry your hands and to turn off the faucet.  Remove the used dressing  Here are suggestions for removing the dressing:    If dressing changes cause you pain, be sure to take your pain medicine as prescribed by your healthcare provider 30 minutes before dressing changes.    Set up your supplies.    Put on disposable gloves if you re dressing a wound for someone else or your wound is infected.    Loosen the tape by pulling gently toward the wound.    Gently take off the old dressing. If the dressing is stuck to the wound, moisten it with saline (if available) or clean water.    If you have a drain or tube in the wound, be careful not to pull on it.    Remove the dressing 1 layer at a time and put it in a plastic bag.    Remove your gloves.  Inspect and dress the wound  Check the wound carefully:    Each time you change the dressing, inspect the wound carefully to be sure it s healing normally by making sure your wound appears to be pink and moist, and is free of infection.      Wash your hands again. Put on a new pair of gloves.    Clean and dress the wound as directed by your healthcare provider or nurse. Do not put anything in the wound that is not prescribed or directed by your healthcare provider. If you have a drain or tube, be  careful not to pull on it. Make sure to secure the drain or tube as well.    Put all supplies in a plastic bag. Seal the bag and put it in the trash.    Be sure to wash your hands again.  Call your healthcare provider  Call your healthcare provider if you see any of the following signs of a problem:    Bleeding that soaks the dressing    Pink fluid weeping from the wound    Increased drainage or drainage that is yellow, yellow-green, or foul-smelling    Increased swelling or pain, or redness or swelling in the skin around the wound    A change in the color of the wound, or if streaks develop in a direction away from the wound    The area between any stitches opens up    An increase in the size of the wound    A fever of 100.4 F (38 C) or higher, or as directed by your healthcare provider    Chills, increased fatigue, or a loss of appetite      Date Last Reviewed: 7/30/2015 2000-2017 [employer]. [address] [place] [address]. All rights reserved. This information is not intended as a substitute for professional medical care. Always follow your healthcare professional's instructions.

## 2017-07-02 NOTE — ED PROVIDER NOTES
"  History     Chief Complaint:  Wound Check    HPI   Gil Chowdary is a 56 year old male who presents for wound check. The patient states that he had aortic valve repair surgery on 5/16/17. The patient reports that he bumped into something today and noticed some red drainage from the incision site with surrounding tenderness. The patient is on blood thinners.    Allergies:  Amoxicillin  Penicillin     Medications:    Atorvastatin  Oxycodone  Acetaminophen  Metoprolol  Senna-docusate  Pantoprazole  Bumetanide  K Cl  Warfarin  Cholecalciferol  Multivitamin  Prednisolone  Timolol  Aspirin  Omega 3 fatty acids     Past Medical History:    Former tobacco use  Glaucoma  Hyperlipidemia  Obesity  DRAKE on CPAP  Right bundle branch block  Severe aortic stenosis    Past Surgical History:    Heart cath left  Repair valve aortic  Repair valve aortic  Sinus surgery    Family History:    Father: Diabetes, CABG, coronary artery disease  Brother: Hypertension, Diabetes, heart disease, heart surgery    Social History:  Marital Status:   Presents to the ED alone  Tobacco Use: Quit 2011  PCP: Sam Grimes [id number] [place]     Review of Systems   Skin: Positive for wound (surgical wound to epigastric region).   All other systems reviewed and are negative.    Physical Exam   First Vitals:  BP: 121/65  Pulse: 83  Temp: 98.5  F (36.9  C)  Resp: 16  Height: 175.3 cm (5' 9\")  Weight: 115.7 kg (255 lb)  SpO2: 97 %    Physical Exam  General: No distress.   Head: No signs of trauma.   Mouth/Throat: Oropharynx moist.   Eyes: Conjunctivae are normal. Pupils are equal..   Neck: Normal range of motion.    Resp:No respiratory distress.   MSK: Normal range of motion. No obvious deformity.  Neuro: The patient is alert and interactive. LEY. Speech normal. GCS 15  Skin: 2x0.5 cm scab to epigastric region, right side of scab is not attached to underlying dermis. Serous, whitish discharge to right side of scab. No surrounding erythema or " crepitance.  Psych: normal mood and affect. behavior is normal.    Emergency Department Course   ED Course:  Nursing notes and past medical history reviewed.   I performed a physical examination of the patient as documented above.  I explained the plan with the patient who consents to this.  Findings and plan explained to the patient. Patient discharged home with instructions regarding supportive care, medications, and reasons to return. The importance of close follow-up was reviewed.     Impression & Plan    Medical Decision Making:  Gil Chowdary is a 56 year old male who presents for evaluation of an open wound on the epigastric region.  The most likely etiology of the wound is and inflammatory response to localized trauma.  I will have them see their doctor for recheck if complications persist.  There are no signs of infection at this point and would not start antibiotics.  The wound was cleaned then covered with bacitracin and a sterile dressing. No signs of lymphadenitis, lymphangitis, necrotizing fascitis, osteomyelitis, abscess, cellulitis, etc.    Diagnosis:    ICD-10-CM    1. Encounter for postoperative wound check Z48.89        Disposition:   Discharge to home.      I, Nik Gamble, am serving as a scribe on 7/2/2017 at 2:53 PM to personally document services performed by Brian Rooney MD, based on my observations and the provider's statements to me.       Brian Rooney MD  07/02/17 [id number]

## 2017-07-02 NOTE — ED AVS SNAPSHOT
Emergency Department    [address] [facility] [address]    Phone:  [phone]    Fax:  [phone]                                       Gil Chowdary   MRN: [id number]    Department:   Emergency Department   Date of Visit:  7/2/2017           Patient Information     Date Of Birth          1960        Your diagnoses for this visit were:     Encounter for postoperative wound check        You were seen by Brian Rooney MD.      Follow-up Information     Follow up with Jun Simon MD In 1 day.    Specialty:  Thoracic Diseases    Why:  If symptoms worsen    Contact information:     PHYSICIANS  [address] [facility] [place] [address]  [facility] [address] [phone]          Discharge Instructions         Wound Care  Taking proper care of your wound will help it heal. Your healthcare provider may show you how to clean and dress the wound. He or she will also explain how to tell if the wound is healing normally. If you are unsure of how to take care of the wound, be sure to clarify what dressing to use and how often you should change the bandages. Here are the basic steps.     A wound that's not healing normally may be dark in color or have white streaks.   Wash your hands  Tips for washing your hands include:    Use liquid soap and lather for 2 minutes. Scrub between your fingers and under your nails.    Rinse with warm water, keeping your fingers pointing down.    Use a paper towel to dry your hands and to turn off the faucet.  Remove the used dressing  Here are suggestions for removing the dressing:    If dressing changes cause you pain, be sure to take your pain medicine as prescribed by your healthcare provider 30 minutes before dressing changes.    Set up your supplies.    Put on disposable gloves if you re dressing a wound for someone else or your wound is infected.    Loosen the tape by pulling gently toward the wound.    Gently take off the old dressing. If the dressing  is stuck to the wound, moisten it with saline (if available) or clean water.    If you have a drain or tube in the wound, be careful not to pull on it.    Remove the dressing 1 layer at a time and put it in a plastic bag.    Remove your gloves.  Inspect and dress the wound  Check the wound carefully:    Each time you change the dressing, inspect the wound carefully to be sure it s healing normally by making sure your wound appears to be pink and moist, and is free of infection.      Wash your hands again. Put on a new pair of gloves.    Clean and dress the wound as directed by your healthcare provider or nurse. Do not put anything in the wound that is not prescribed or directed by your healthcare provider. If you have a drain or tube, be careful not to pull on it. Make sure to secure the drain or tube as well.    Put all supplies in a plastic bag. Seal the bag and put it in the trash.    Be sure to wash your hands again.  Call your healthcare provider  Call your healthcare provider if you see any of the following signs of a problem:    Bleeding that soaks the dressing    Pink fluid weeping from the wound    Increased drainage or drainage that is yellow, yellow-green, or foul-smelling    Increased swelling or pain, or redness or swelling in the skin around the wound    A change in the color of the wound, or if streaks develop in a direction away from the wound    The area between any stitches opens up    An increase in the size of the wound    A fever of 100.4 F (38 C) or higher, or as directed by your healthcare provider    Chills, increased fatigue, or a loss of appetite      Date Last Reviewed: 7/30/2015 2000-2017 [employer]. [address] [place] [address]. All rights reserved. This information is not intended as a substitute for professional medical care. Always follow your healthcare professional's instructions.          Future Appointments        Provider Department Dept Phone  [place]    7/3/2017 9:00 AM [facility] [phone] [place]    7/5/2017 9:00 AM [facility] [place], Cardiac Rehabilitation [phone] [place]    7/7/2017 9:00 AM [facility] [phone] [place]    7/10/2017 9:00 AM [facility] [place], Cardiac Rehabilitation [phone] [place]    7/12/2017 9:00 AM [facility] [place], Cardiac Rehabilitation [phone] [place]    7/12/2017 10:00 AM [facility] [place], Cardiac Rehabilitation [phone] [place]    7/14/2017 9:00 AM [facility], Card[facility] Rehabilitation [phone] [place]    7/17/2017 9:00 AM [facility] [phone] [place]    7/19/2017 9:00 AM [facility] [place], Cardiac Rehabilitation [phone] [place]    7/21/2017 9:00 AM [facility] [phone] [place]    7/24/2017 9:00 AM [facility] Card[facility] [phone] [place]    7/26/2017 9:00 AM [facility] Card[facility] Rehabilitation [phone] [place]    7/28/2017 9:00 AM [facility], Cardiac Rehabilitation [phone] [place]    7/31/2017 9:00 AM [facility], Card[facility] [phone] [place]    8/2/2017 9:00 AM [facility], Card[facility] [phone] [place]    8/4/2017 9:00 AM [facility], Card[facility] Rehabilitation [phone] [place]    8/7/2017 9:00 AM [facility] Cardiac Rehabilitation [phone] [place]    9/22/2017 2:30 PM Deondre Quintana MD [facility] [phone] [facility]      24 Hour Appointment Hotline       To make an appointment at any [facility], call [phone] ([phone]). If you don't have a family doctor or clinic, we will help you find one. [facility] clinics are conveniently located to serve the needs of you and your family.             Review of your medicines      Our records show that you are taking the medicines listed below. If these are incorrect, please call your family doctor or clinic.        Dose / Directions Last dose taken    acetaminophen 325 MG tablet   Commonly known as:  TYLENOL   Dose:  650 mg   Quantity:  100 tablet        Take 2 tablets (650 mg) by mouth every 4 hours as needed for mild pain   Refills:  0        aspirin 81 MG tablet   Dose:  81 mg        Take 81 mg by mouth every morning   Refills:  0        atorvastatin 40 MG tablet   Commonly known as:  LIPITOR   Dose:  40 mg   Quantity:  90 tablet        Take 1 tablet (40 mg) by mouth daily   Refills:  0        bumetanide 1 MG tablet   Commonly known as:  BUMEX   Dose:  1 mg   Quantity:  60 tablet        Take 1 tablet (1 mg) by mouth 2 times daily   Refills:  0        CENTRUM ADULTS PO   Dose:  1 tablet        Take 1 tablet by mouth every morning   Refills:  0        cholecalciferol 5000 UNITS Tabs tablet   Commonly known as:  vitamin D3   Dose:  5000 Units        Take 5,000 Units by mouth every morning   Refills:  0        FISH OIL PO   Dose:  1 g        Take 1 g by mouth every morning   Refills:  0        metoprolol 50 MG tablet   Commonly known as:  LOPRESSOR   Dose:  50 mg   Quantity:  60 tablet        Take 1 tablet (50 mg) by mouth 2 times daily   Refills:  0        oxyCODONE 5 MG IR tablet   Commonly known as:  ROXICODONE   Dose:  5-10 mg   Quantity:  80 tablet        Take 1-2 tablets (5-10 mg) by mouth every 4 hours as needed for moderate to severe pain   Refills:  0        pantoprazole 40 MG EC tablet   Commonly known as:  PROTONIX   Dose:  40  mg   Quantity:  30 tablet        Take 1 tablet (40 mg) by mouth every morning   Refills:  0        Potassium Chloride [place] CR 15 MEQ Tbcr   Dose:  30 mEq   Quantity:  90 tablet        Take 30 mEq by mouth 2 times daily   Refills:  0        prednisoLONE acetate 1 % ophthalmic susp   Commonly known as:  PRED FORTE   Dose:  1 drop        Place 1 drop into both eyes as needed   Refills:  0        senna-docusate 8.6-50 MG per tablet   Commonly known as:  SENOKOT-S;PERICOLACE   Dose:  1-2 tablet   Quantity:  100 tablet        Take 1-2 tablets by mouth 2 times daily   Refills:  0        timolol 0.5 % ophthalmic solution   Commonly known as:  TIMOPTIC   Dose:  1 drop        1 drop 1 DROP INTO BOTH EYES 2 TIMES DAILY   Refills:  0        warfarin 3 MG tablet   Commonly known as:  COUMADIN   Quantity:  30 tablet        Take 1 tab (3 mg) on 5/24 and 5/25 and have INR checked on 5/26 and have dose adjusted   Refills:  0                Orders Needing Specimen Collection     None      Pending Results     No orders found from 6/30/2017 to 7/3/2017.            Pending Culture Results     No orders found from 6/30/2017 to 7/3/2017.            Pending Results Instructions     If you had any lab results that were not finalized at the time of your Discharge, you can call the ED Lab Result RN at [phone]. You will be contacted by this team for any positive Lab results or changes in treatment. The nurses are available 7 days a week from 10A to 6:30P.  You can leave a message 24 hours per day and they will return your call.        Test Results From Your Hospital Stay               Clinical Quality Measure: Blood Pressure Screening     Your blood pressure was checked while you were in the emergency department today. The last reading we obtained was  BP: 121/65 . Please read the guidelines below about what these numbers mean and what you should do about them.  If your systolic blood pressure (the top number) is less than 120 and your  diastolic blood pressure (the bottom number) is less than 80, then your blood pressure is normal. There is nothing more that you need to do about it.  If your systolic blood pressure (the top number) is 120-139 or your diastolic blood pressure (the bottom number) is 80-89, your blood pressure may be higher than it should be. You should have your blood pressure rechecked within a year by a primary care provider.  If your systolic blood pressure (the top number) is 140 or greater or your diastolic blood pressure (the bottom number) is 90 or greater, you may have high blood pressure. High blood pressure is treatable, but if left untreated over time it can put you at risk for heart attack, stroke, or kidney failure. You should have your blood pressure rechecked by a primary care provider within the next 4 weeks.  If your provider in the emergency department today gave you specific instructions to follow-up with your doctor or provider even sooner than that, you should follow that instruction and not wait for up to 4 weeks for your follow-up visit.        Thank you for choosing [facility]       Thank you for choosing [facility] for your care. Our goal is always to provide you with excellent care. Hearing back from our patients is one way we can continue to improve our services. Please take a few minutes to complete the written survey that you may receive in the mail after you visit with us. Thank you!        [email or web address] Information     [employer] gives you secure access to your electronic health record. If you see a primary care provider, you can also send messages to your care team and make appointments. If you have questions, please call your primary care clinic.  If you do not have a primary care provider, please call [phone] and they will assist you.        Care EveryWhere ID     This is your Care EveryWhere ID. This could be used by other organizations to access your [facility] medical records  [id number]        Equal  Access to Services     [facility] : Renetta Max, marbin aguilar, qahue garcia. So [facility] [phone].    ATENCIÓN: Si habla español, tiene a cross disposición servicios gratuitos de asistencia lingüística. Llame al [phone].    We comply with applicable federal civil rights laws and [place] laws. We do not discriminate on the basis of race, color, national origin, age, disability sex, sexual orientation or gender identity.            After Visit Summary       This is your record. Keep this with you and show to your community pharmacist(s) and doctor(s) at your next visit.

## 2017-07-03 ENCOUNTER — HOSPITAL ENCOUNTER (OUTPATIENT)
Dept: CARDIAC REHAB | Facility: CLINIC | Age: 57
End: 2017-07-03
Attending: PHYSICIAN ASSISTANT
Payer: COMMERCIAL

## 2017-07-03 PROCEDURE — 93798 PHYS/QHP OP CAR RHAB W/ECG: CPT

## 2017-07-03 PROCEDURE — 40000575 ZZH STATISTIC OP CARDIAC VISIT #2

## 2017-07-03 PROCEDURE — 93797 PHYS/QHP OP CAR RHAB WO ECG: CPT

## 2017-07-03 PROCEDURE — 40000116 ZZH STATISTIC OP CR VISIT

## 2017-07-10 ENCOUNTER — HOSPITAL ENCOUNTER (OUTPATIENT)
Dept: CARDIAC REHAB | Facility: CLINIC | Age: 57
End: 2017-07-10
Attending: PHYSICIAN ASSISTANT
Payer: COMMERCIAL

## 2017-07-10 PROCEDURE — 40000116 ZZH STATISTIC OP CR VISIT: Performed by: CLINICAL EXERCISE PHYSIOLOGIST

## 2017-07-10 PROCEDURE — 93798 PHYS/QHP OP CAR RHAB W/ECG: CPT | Performed by: CLINICAL EXERCISE PHYSIOLOGIST

## 2017-07-12 ENCOUNTER — HOSPITAL ENCOUNTER (OUTPATIENT)
Dept: CARDIAC REHAB | Facility: CLINIC | Age: 57
End: 2017-07-12
Attending: PHYSICIAN ASSISTANT
Payer: COMMERCIAL

## 2017-07-12 VITALS — HEIGHT: 69 IN | BODY MASS INDEX: 36.58 KG/M2 | WEIGHT: 247 LBS

## 2017-07-12 PROCEDURE — 40000575 ZZH STATISTIC OP CARDIAC VISIT #2: Performed by: CLINICAL EXERCISE PHYSIOLOGIST

## 2017-07-12 PROCEDURE — 40000116 ZZH STATISTIC OP CR VISIT: Performed by: CLINICAL EXERCISE PHYSIOLOGIST

## 2017-07-12 PROCEDURE — 93797 PHYS/QHP OP CAR RHAB WO ECG: CPT | Performed by: CLINICAL EXERCISE PHYSIOLOGIST

## 2017-07-12 PROCEDURE — 93798 PHYS/QHP OP CAR RHAB W/ECG: CPT | Performed by: CLINICAL EXERCISE PHYSIOLOGIST

## 2017-07-12 ASSESSMENT — 6 MINUTE WALK TEST (6MWT)
PREDICTED: 1763.14
GENDER SELECTION: MALE
FEMALE CALC: 1477.05
MALE CALC: 1752.46
TOTAL DISTANCE WALKED (FT): 1167

## 2017-07-12 NOTE — PROGRESS NOTES
07/12/17 1000   Session   Session 60 Day Individualized Treatment Plan   Certified through this date 08/30/17   Cardiac Rehab Assessment   Cardiac Rehab Assessment Patient went in for a routine check up when his PCP noticed a murmur. Further testing revealed severe aortic stenosis, so patient underwent aortic valve replacement. He had some short runs of SVT while he was an inpatient so he is going to be meeting with an EP to discuss a possible ablation. Patient would benefit from skilled therapy in order to monitor CV response to exercise, educate on stress management, nutrition, and exercise principles, and to help the patient establish an independent exercise program.6/26 Patient is striving towards his goals of establishing an aerobic exercise program by using the TDM and walking outside of rehab. He also established a new goal of starting a strength training program to get stronger with his ADL's. Patient would benefit from skilled therapy in order to be educated on exercise principles, nutrition, and monitor cv response to exercise.  7/12 Patient has been progressing well at rehab. Added a new goal today regarding weight loss. Skilled therapy is recommended in order to monitor CV response to exercise and to aid patient with weight loss goal.   General Information   Treatment Diagnosis Valve Replacement  (Aortic)   Date of Treatment Diagnosis 05/16/17   Significant Past CV History None   Comorbidities None   Lead up symptoms None   Hospital Location [facility]   Hospital Discharge Date 05/24/17   Signs and Symptoms Post Hospital Discharge Fatigue   Outpatient Cardiac Rehab Start Date 06/09/17   Primary Physician Sam Villatoro   Primary Physician Follow Up Scheduled   Surgeon Dr. Simon   Cardiologist Dr. Salinas   Cardiologist Follow Up Completed   Ejection Fraction 55-60   Risk Stratification Low   Living and Work Status    Living Arrangements and Social Status house   Support System Live with an adult   Return to  "Employment No   Occupation Maintanance   Preventative Medications   CMS recommended medications Anticoagulants;Antiplatelets;Beta Blocker   Falls Screen   Have you fallen two or more times in the past year? No   Have you fallen and had an injury in the past year? No   Referral Initiated to Physical Therapy No   Pain   Patient Currently in Pain No   Physical Assessments   Incisions WNL   Edema Not assessed   Right Lung Sounds not assessed   Left Lung Sounds not assessed   Limitations Surgical   Individualized Treatment Plan   Monitored Sessions Scheduled 24   Monitored Sessions Attended 10   Oxygen   Supplemental Oxygen needed No   Nutrition Management - Weight Management   Assessment Re-assessment   Age 56   Weight 112 kg (247 lb)   Height 1.753 m (5' 9.02\")   BMI (Calculated) 36.53   Initial Rate Your Plate Score. Dietary tool to assess eating patterns. Scores range from 24 to 72. The higher the score the healthier the eating pattern. 64   Nutrition Management - Lipids   Lipids Labs Available   Date 06/06/17   Total Cholesterol 151   Triglycerides 162   HDL 40   LDL 79   Prescribed Lipid Medication No   Nutrition Management - Diabetes   Diabetes No   Nutrition Management Summary   Dietary Recommendations Low Sodium;Anticoagulation Therapy   Stages of Change for Diet Compliance Action   Interventions Planned Attend Nutrition Education Class(es);Instruct on Label Reading;Educate on Weight Management Principles;Educate on Benefits of Exercise   Patient Goals Goal #1;Goal #2   Goal #1 Description Attend the two nutrition classes.    Goal #1 Target Date 08/09/17   Goal #1 Progress Towards Goal 6/26 Patient is still interested in attending the two nutrition classes. He will attend the classes in July.  7/12 Patient has not attended the classes at this time, but still plans to attend.   Goal #2 Description ADDED 7/12: Lose 0.5-1 pound per week by decreasing caloric intake by at least 500 calories per day.   Goal #2 " Progress Towards Goal 7/12 Discussed portion sizes, eating multiple smaller meals, and logging food using an lele such as My Fitness Pal.    Psychosocial Management   Psychosocial Assessment Re-assessment   Is there history of clinical depression or increased risk of depression? No previous history   Current Level of Stress per Patient Report Moderate    Current Coping Skills Uses Stress Management/Relaxation Techniques  (Walking, locksmithing)   Initial Patient Health Questionnaire -9 Score (PHQ-9) for depression. 5-9 Minimal symptoms, 10-14 Minor depression, 15-19 Major depression, moderately severe, > 20 Major depression, severe  9   Initial [employer] COOP Survey score.  Quality of Life:   If total score > 25 review individual areas where patient rated a 4 or 5.  Consider patients current medical condition and what role that plays on the score.   Adjust treatment protocol to improve areas of concern.  Consider the following:  PHQ9 score, DASI, and re-assessment within the next 30 days to assist with developing treatments.  30   Stages of Change Preparation   Interventions Planned Patient to set goal to reduce stress level/anxiety;Reassess PHQ-9 and/or [employer] COOP Surveys if outside of defined limits;Patient will practice coping/stress management techniques   Patient Goal Yes   Goal Description Attend stress management/ healing process class.   Goal Target Date 08/09/17   Progress Towards Goal 6/26 Patient is still interested in attending stress management class. He will attend class in July.  7/12 Patient was not able to attend the class on stress management today due to other an appointment conflict. He would still like to attend this class.   Other Core Components - Hypertension   History of or Diagnosis of Hypertension No   Currently taking Anti-Hypertensives Beta blocker;Yes   Other Core Components - Tobacco   History of Tobacco Use Yes   Quit Date or Planned Quit Date 06/09/10   Tobacco Use Status Former  (Quit > 6 mo ago)   Tobacco Habit Cigarettes   Tobacco Use per Day (average) 1   Years of Tobacco Use 15   Stages of Change Maintenance   Other Core Components Summary   Interventions Planned Instruct patient on the DASH diet;List benefits of weight management;Educate on importance of maintaining low sodium diet;None: Patient is in maintenance for smoking cessation   Activity/Exercise History   Activity/Exercise Assessment Re-assessment   Activity/Exercise Status prior to event? Was Physically Active   Number of Days Currently participating in Moderate Physical Activity? 0   Number of Days Currently performing  Aerobic Exercise (including rehab)? 0   Number of Minutes per Session Currently of Aerobic Exercise (average)? 0   Current Stage of Change (Physical Activity) Preparation   Current Stage of Change (Aerobic Exercise) Preparation   Patient Goals Goal #1;Goal #2   Goal #1 Description Establish and maintain a regular aerobic exercise routine of at least 3 days a week for 30 minutes in order to improve tolerance for ADLs such as cooking/Maintanance work.    Goal #1 Target Date 08/09/17   Goal #1 Progress Towards Goal 6/26 Patient is working towards his goal of maintaining a regular aerobic exercise routine by using the TDM and walking around the block. He is working on making his exercise last for at least 30 minutes.  7/12 Patient continues to attend rehab 3 days a week and then on the weekends he usually uses his treadmill at home or goes walking for the same amount of time as he does at rehab.   Goal #2 Description Added 6/26: Pt will establish a strength training program that will allow him to improve his ADL's.    Goal #2 Target Date 08/26/17   Goal #2 Progress Towards Goal 6/26 Patient has already starting muscle conditioning three times a week at cardiac rehab, performing both upper and lower body exercises,. 7/12 Patient continues to engage in strength training at rehab. He is currently using 5 pound  weights.   Exercise Assessment   6 Minute Walk Predicted - Gender Selection Male   6 Minute Walk Predicted (Male) 1752.46   6 Minute Walk Predicted (Female) 1477.05   Initial 6 Minute Walk Distance (Feet) 1167 ft   Resting HR 69 bpm  (vitals from 7/10)   Exercise HR 96 bpm   Post Exercise HR 76 bpm   Resting /64   Exercise /64   Post Exercise /64   Effort Rating 4   Current MET Level 3.1   MET Level Goal 4.5-5   ECG Rhythm Sinus rhythm   Ectopy PVCs   Current Symptoms Fatigue   Limitations/Restrictions Sternal Precautions   Exercise Prescription   Mode Treadmill;Nustep;Weights   Duration/Time 15-30 min   Frequency 3 daysweek   THR (85% of age predicted max HR) 138.55   OMNI Effort Rating (0-10 Scale) 4-6/10   Progression Progress peak intensity by 1/2 MET per week   Recommended Home Exercise   Type of Exercise Walking   Frequency (days per week) 1-2   Duration (minutes per session) 15-30 min   Effort Rating Recommended 4-6/10   30 Day Exercise Plan Initiate aerobic exercise, muscle conditioning, and stretching   Current Home Exercise   Type of Exercise None   Frequency (days per week) 0   Duration (minutes per session) 0   Follow-up/On-going Support   Provider follow-up needed on the following No follow-up needed   Learning Assessment   Learner Patient   Primary Language English   Preferred Learning Style Listening;Reading;Demonstration;Pictures/Video   Barriers to Learning No barriers noted   Patient Education   Education recommended Anatomy and Physiology of the Heart;Blood Pressure;Exercise Principles;Medication Overview;Muscle Conditioning;Nutrition;Stress Management;Weight Loss   Education classes attended Exercise Principles   Physician cosignature/electronic signature indicates approval of this ITP document. I have established, reviewed and made necessary changes to the individualized treatment plan and exercise prescription for this patient.

## 2017-07-14 ENCOUNTER — HOSPITAL ENCOUNTER (OUTPATIENT)
Dept: CARDIAC REHAB | Facility: CLINIC | Age: 57
End: 2017-07-14
Attending: PHYSICIAN ASSISTANT
Payer: COMMERCIAL

## 2017-07-14 PROCEDURE — 40000116 ZZH STATISTIC OP CR VISIT

## 2017-07-14 PROCEDURE — 93798 PHYS/QHP OP CAR RHAB W/ECG: CPT

## 2017-07-17 ENCOUNTER — HOSPITAL ENCOUNTER (OUTPATIENT)
Dept: CARDIAC REHAB | Facility: CLINIC | Age: 57
End: 2017-07-17
Attending: PHYSICIAN ASSISTANT
Payer: COMMERCIAL

## 2017-07-17 PROCEDURE — 40000116 ZZH STATISTIC OP CR VISIT: Performed by: CLINICAL EXERCISE PHYSIOLOGIST

## 2017-07-17 PROCEDURE — 93798 PHYS/QHP OP CAR RHAB W/ECG: CPT | Performed by: CLINICAL EXERCISE PHYSIOLOGIST

## 2017-07-19 ENCOUNTER — HOSPITAL ENCOUNTER (OUTPATIENT)
Dept: CARDIAC REHAB | Facility: CLINIC | Age: 57
End: 2017-07-19
Attending: PHYSICIAN ASSISTANT
Payer: COMMERCIAL

## 2017-07-19 PROCEDURE — 40000116 ZZH STATISTIC OP CR VISIT: Performed by: CLINICAL EXERCISE PHYSIOLOGIST

## 2017-07-19 PROCEDURE — 93798 PHYS/QHP OP CAR RHAB W/ECG: CPT | Performed by: CLINICAL EXERCISE PHYSIOLOGIST

## 2017-07-21 ENCOUNTER — HOSPITAL ENCOUNTER (OUTPATIENT)
Dept: CARDIAC REHAB | Facility: CLINIC | Age: 57
End: 2017-07-21
Attending: PHYSICIAN ASSISTANT
Payer: COMMERCIAL

## 2017-07-21 PROCEDURE — 93798 PHYS/QHP OP CAR RHAB W/ECG: CPT | Performed by: CLINICAL EXERCISE PHYSIOLOGIST

## 2017-07-21 PROCEDURE — 40000116 ZZH STATISTIC OP CR VISIT: Performed by: CLINICAL EXERCISE PHYSIOLOGIST

## 2017-07-24 ENCOUNTER — HOSPITAL ENCOUNTER (OUTPATIENT)
Dept: CARDIAC REHAB | Facility: CLINIC | Age: 57
End: 2017-07-24
Attending: PHYSICIAN ASSISTANT
Payer: COMMERCIAL

## 2017-07-24 PROCEDURE — 93798 PHYS/QHP OP CAR RHAB W/ECG: CPT | Performed by: CLINICAL EXERCISE PHYSIOLOGIST

## 2017-07-24 PROCEDURE — 40000116 ZZH STATISTIC OP CR VISIT: Performed by: CLINICAL EXERCISE PHYSIOLOGIST

## 2017-07-31 ENCOUNTER — HOSPITAL ENCOUNTER (OUTPATIENT)
Dept: CARDIAC REHAB | Facility: CLINIC | Age: 57
End: 2017-07-31
Attending: PHYSICIAN ASSISTANT
Payer: COMMERCIAL

## 2017-07-31 PROCEDURE — 93798 PHYS/QHP OP CAR RHAB W/ECG: CPT | Performed by: CLINICAL EXERCISE PHYSIOLOGIST

## 2017-07-31 PROCEDURE — 40000116 ZZH STATISTIC OP CR VISIT: Performed by: CLINICAL EXERCISE PHYSIOLOGIST

## 2017-08-02 ENCOUNTER — HOSPITAL ENCOUNTER (OUTPATIENT)
Dept: CARDIAC REHAB | Facility: CLINIC | Age: 57
End: 2017-08-02
Attending: PHYSICIAN ASSISTANT
Payer: COMMERCIAL

## 2017-08-02 PROCEDURE — 40000116 ZZH STATISTIC OP CR VISIT: Performed by: CLINICAL EXERCISE PHYSIOLOGIST

## 2017-08-02 PROCEDURE — 93798 PHYS/QHP OP CAR RHAB W/ECG: CPT | Performed by: CLINICAL EXERCISE PHYSIOLOGIST

## 2017-08-04 ENCOUNTER — HOSPITAL ENCOUNTER (OUTPATIENT)
Dept: CARDIAC REHAB | Facility: CLINIC | Age: 57
End: 2017-08-04
Attending: PHYSICIAN ASSISTANT
Payer: COMMERCIAL

## 2017-08-04 PROCEDURE — 93798 PHYS/QHP OP CAR RHAB W/ECG: CPT | Performed by: CLINICAL EXERCISE PHYSIOLOGIST

## 2017-08-04 PROCEDURE — 40000116 ZZH STATISTIC OP CR VISIT: Performed by: CLINICAL EXERCISE PHYSIOLOGIST

## 2017-08-07 ENCOUNTER — HOSPITAL ENCOUNTER (OUTPATIENT)
Dept: CARDIAC REHAB | Facility: CLINIC | Age: 57
End: 2017-08-07
Attending: PHYSICIAN ASSISTANT
Payer: COMMERCIAL

## 2017-08-07 PROCEDURE — 40000116 ZZH STATISTIC OP CR VISIT

## 2017-08-07 PROCEDURE — 93798 PHYS/QHP OP CAR RHAB W/ECG: CPT

## 2017-08-09 ENCOUNTER — HOSPITAL ENCOUNTER (OUTPATIENT)
Dept: CARDIAC REHAB | Facility: CLINIC | Age: 57
End: 2017-08-09
Attending: PHYSICIAN ASSISTANT
Payer: COMMERCIAL

## 2017-08-09 PROCEDURE — 93798 PHYS/QHP OP CAR RHAB W/ECG: CPT | Performed by: CLINICAL EXERCISE PHYSIOLOGIST

## 2017-08-09 PROCEDURE — 40000116 ZZH STATISTIC OP CR VISIT: Performed by: CLINICAL EXERCISE PHYSIOLOGIST

## 2017-08-14 ENCOUNTER — HOSPITAL ENCOUNTER (OUTPATIENT)
Dept: CARDIAC REHAB | Facility: CLINIC | Age: 57
End: 2017-08-14
Attending: PHYSICIAN ASSISTANT
Payer: COMMERCIAL

## 2017-08-14 VITALS — WEIGHT: 256 LBS | HEIGHT: 69 IN | BODY MASS INDEX: 37.92 KG/M2

## 2017-08-14 PROCEDURE — 93798 PHYS/QHP OP CAR RHAB W/ECG: CPT

## 2017-08-14 PROCEDURE — 93797 PHYS/QHP OP CAR RHAB WO ECG: CPT

## 2017-08-14 PROCEDURE — 40000116 ZZH STATISTIC OP CR VISIT

## 2017-08-14 PROCEDURE — 40000575 ZZH STATISTIC OP CARDIAC VISIT #2

## 2017-08-14 ASSESSMENT — 6 MINUTE WALK TEST (6MWT)
GENDER SELECTION: MALE
TOTAL DISTANCE WALKED (FT): 1216
TOTAL DISTANCE WALKED (FT): 1167
PREDICTED: 1739.38
FEMALE CALC: 1446.32
MALE CALC: 1728.84

## 2017-08-14 NOTE — PROGRESS NOTES
Gil Chowdary  56 year old  Aortic Valve Replacement 08/14/17 1500   Session   Session Discharge Note   Certified through this date 08/30/17   Cardiac Rehab Assessment    Physician cosignature/electronic signature indicates agreements with the ITP document and approval of discharge.   Cardiac Rehab Assessment Patient went in for a routine check up when his PCP noticed a murmur. Further testing revealed severe aortic stenosis, so patient underwent aortic valve replacement. He had some short runs of SVT while he was an inpatient so he is going to be meeting with an EP to discuss a possible ablation. Patient would benefit from skilled therapy in order to monitor CV response to exercise, educate on stress management, nutrition, and exercise principles, and to help the patient establish an independent exercse program.6/26 Patient is striving towards his goals of establishing an aerobic exercise program by using the TDM and walking outside of rehab. He also established a new goal of starting a strength training program to get stronger with his ADL's. Patient would benefit from skilled therapy in order to be educated on exercise principles, nutrition, and monitor cv response to exercise.  7/12 Patient has been progressing well at rehab. Added a new goal today regarding weight loss. Skilled therapy is recommended in order to monitor CV response to exercise and to aid patient with weight loss goal. 8/14/17 Patient discharged from cardiac rehab today. Patient is very pleased with his progress and all of the education he gained while attending cardiac rehab. Patient hoped to achieve weight loss, but unfortunately gained 6# while attending rehab. Spent time discussing weight loss recommendations and going over dietary intake with patient to provide education going forward. Varsha had a stable CV response with exercise and will be able to safely progress to exercising on his own at home.     Pt made significant gains in  exercise tolerance. Initially patient tolerated 35 minutes at 2.7 METs, now tolerating 35 minutes at 3.8 METs. Patient also increased 6-minute walk test by 4%. The PT was given instructions on frequency, intensity, and duration for continued exercise as well as muscle conditioning and stretching exercises.  Your PT plans to continue exercising 6 days per week on treadmill 30-60 minutes with OMNI effort of 4-6/10. He will maintain a muscle conditioning program 3 days per week including 6-8 large muscle group exercises to promote healthy weight loss. Patient will continue to follow a heart healthy diet while trying to control caloric intake/ portion control to also promote weight loss.    General Information   Treatment Diagnosis Valve Replacement  (Aortic)   Date of Treatment Diagnosis 05/16/17   Significant Past CV History None   Comorbidities None   Lead up symptoms None   Hospital Location [facility]   Hospital Discharge Date 05/24/17   Signs and Symptoms Post Hospital Discharge Fatigue   Outpatient Cardiac Rehab Start Date 06/09/17   Primary Physician Sam Villatoro   Primary Physician Follow Up Scheduled   Surgeon Dr. Simon   Cardiologist Dr. Salinas   Cardiologist Follow Up Completed   Ejection Fraction 55-60   Risk Stratification Low   Living and Work Status    Living Arrangements and Social Status house   Support System Live with an adult   Return to Employment No   Occupation Maintanance   Preventative Medications   CMS recommended medications Anticoagulants;Antiplatelets;Beta Blocker   Falls Screen   Have you fallen two or more times in the past year? No   Have you fallen and had an injury in the past year? No   Referral Initiated to Physical Therapy No   Pain   Patient Currently in Pain No   Physical Assessments   Incisions WNL   Edema Not assessed   Right Lung Sounds not assessed   Left Lung Sounds not assessed   Limitations Surgical   Individualized Treatment Plan   Monitored Sessions Scheduled 24   Monitored  "Sessions Attended 21   Oxygen   Supplemental Oxygen needed No   Nutrition Management - Weight Management   Assessment Discharge   Age 56   Weight 116.1 kg (256 lb)   Height 1.753 m (5' 9.02\")   BMI (Calculated) 37.87   Initial Rate Your Plate Score. Dietary tool to assess eating patterns. Scores range from 24 to 72. The higher the score the healthier the eating pattern. 64   Discharge Rate Your Plate Score 66   Nutrition Management - Lipids   Lipids Labs Available   Date 06/06/17   Total Cholesterol 151   Triglycerides 162   HDL 40   LDL 79   Prescribed Lipid Medication No   Nutrition Management - Diabetes   Diabetes No   Nutrition Management Summary   Dietary Recommendations Low Sodium;Anticoagulation Therapy   Stages of Change for Diet Compliance Action   Interventions Planned Attend Nutrition Education Class(es);Instruct on Label Reading;Educate on Weight Management Principles;Educate on Benefits of Exercise   Interventions In Progress or Completed Attended Nutrition Class(es);Understands Weight Management Principles;Understands how to accurately read Food Labels   Patient Goals Goal #1;Goal #2   Goal #1 Description Attend the two nutrition classes.    Goal #1 Target Date 08/09/17   Goal #1 Progress Towards Goal 6/26 Patient is still interested in attending the two nutrition classes. He will attend the classes in July.  7/12 Patient has not attended the classes at this time, but still plans to attend. 8/14/17 Patient attended 1 nutrition class. He did ask a lot of questions during DC for what is recommended for heart healthy and weight loss.    Goal #2 Description ADDED 7/12: Lose 0.5-1 pound per week by decreasing caloric intake by at least 500 calories per day.   Goal #2 Progress Towards Goal 7/12 Discussed portion sizes, eating multiple smaller meals, and logging food using an lele such as [employer].  8/14/17 Patient feels he is eating heart healthy and controlling portion sizes. Patient unfortunately " gained 6# while attending cardiac rehab. Discussed weight loss recommendations and encouraged patient to track dietary intake to gain understanding of how many calories he is eating.   Psychosocial Management   Psychosocial Assessment Discharge   Is there history of clinical depression or increased risk of depression? No previous history   Current Level of Stress per Patient Report Mild   Current Coping Skills Uses Stress Management/Relaxation Techniques  (Walking, locksmithing)   Initial Patient Health Questionnaire -9 Score (PHQ-9) for depression. 5-9 Minimal symptoms, 10-14 Minor depression, 15-19 Major depression, moderately severe, > 20 Major depression, severe  9   Discharge PHQ-9 Score for Depression 3   Initial [employer] COOP Survey score.  Quality of Life:   If total score > 25 review individual areas where patient rated a 4 or 5.  Consider patients current medical condition and what role that plays on the score.   Adjust treatment protocol to improve areas of concern.  Consider the following:  PHQ9 score, DASI, and re-assessment within the next 30 days to assist with developing treatments.  30   Discharge [employer] COOP Survey Score 16   Stages of Change Preparation   Interventions Planned Patient to set goal to reduce stress level/anxiety;Reassess PHQ-9 and/or [employer] COOP Surveys if outside of defined limits;Patient will practice coping/stress management techniques   Patient Goal Yes   Goal Description Attend stress managament/ healing process class.   Goal Target Date 08/09/17   Progress Towards Goal 6/26 Patient is still interested in attending stress management class. He will attend class in July.  7/12 Patient was not able to attend the class on stress management today due to other an appointment conflict. He would still like to attend this class. 8/14/17 Patient did not make it to this education class. Patient reports his depression/ stress levels have drastically improved since coming to cardiac  rehab. He denies need for intervention at this time.   Other Core Components - Hypertension   History of or Diagnosis of Hypertension No   Currently taking Anti-Hypertensives Beta blocker;Yes   Other Core Components - Tobacco   History of Tobacco Use Yes   Quit Date or Planned Quit Date 06/09/10   Tobacco Use Status Former (Quit > 6 mo ago)   Tobacco Habit Cigarettes   Tobacco Use per Day (average) 1   Years of Tobacco Use 15   Stages of Change Maintenance   Other Core Components Summary   Interventions Planned Instruct patient on the DASH diet;List benefits of weight management;Educate on importance of maintaining low sodium diet;None: Patient is in maintenance for smoking cessation   Interventions In Progress or Completed Listed benefits of weight management;Educated on importance of maintaining low sodium diet;None: Patient remains in maintenance for smoking cessation;Instructed on DASH diet   Activity/Exercise History   Activity/Exercise Assessment Discharge   Activity/Exercise Status prior to event? Was Physically Active   Number of Days Currently participating in Moderate Physical Activity? 4-5   Number of Days Currently performing  Aerobic Exercise (including rehab)? 5   Number of Minutes per Session Currently of Aerobic Exercise (average)? 20-35   Current Stage of Change (Physical Activity) Preparation   Current Stage of Change (Aerobic Exercise) Action   Patient Goals Goal #1;Goal #2   Goal #1 Description Establish and maintain a regular aerobic exercise routine of at least 3 days a week for 30 minutes in order to improve tolerance for ADLs such as cooking/maintanence work.    Goal #1 Target Date 08/09/17   Goal #1 Date Met 08/14/17   Goal #1 Progress Towards Goal 6/26 Patient is working towards his goal of maintaining a regular aerobic exercise routine by using the TDM and walking around the block. He is working on making his exercise last for at least 30 minutes.  7/12 Patient continues to attend rehab 3  days a week and then on the weekends he usually uses his treadmill at home or goes walking for the same amount of time as he does at rehab. 8/14/17 Towards the end of rehab, patient was exercising 5 days per week between rehab and home exercise. Goal achieved.    Goal #2 Description Added 6/26: Pt will establish a strength training program that will allow him to improve his ADL's.    Goal #2 Target Date 08/26/17   Goal #2 Date Met 08/14/17   Goal #2 Progress Towards Goal 6/26 Patient has already starting muscle conditioning three times a week at cardiac rehab, performing both upper and lower body exercises,. 7/12 Patient continues to engage in strength training at rehab. He is currently using 5 pound weights. 8/14/17 Patient maintined this goal while attending cardiac rehab. He plans to continue a weight conditioning program at home after DC.   Exercise Assessment   6 Minute Walk Predicted - Gender Selection Male   6 Minute Walk Predicted (Male) 1728.84   6 Minute Walk Predicted (Female) 1446.32   Initial 6 Minute Walk Distance (Feet) 1167 ft   Discharge 6 Minute Walk Distance (Feet) 1216   Resting HR 81 bpm   Exercise  bpm   Post Exercise HR 88 bpm   Resting /60   Exercise /60   Post Exercise BP 96/62   Effort Rating 6   Current MET Level 3.8   MET Level Goal 4.5-5   ECG Rhythm Sinus rhythm   Ectopy None   Current Symptoms Denies symptoms   Limitations/Restrictions Sternal Precautions   Exercise Prescription   Mode Treadmill;Nustep;Weights   Duration/Time 15-30 min   Frequency 3 daysweek   THR (85% of age predicted max HR) 138.55   OMNI Effort Rating (0-10 Scale) 4-6/10   Progression Progress peak intensity by 1/2 MET per week   Recommended Home Exercise   Type of Exercise Walking   Frequency (days per week) 1-2   Duration (minutes per session) 15-30 min   Effort Rating Recommended 4-6/10   30 Day Exercise Plan Initiate aerobic exercise, muscle conditioning, and stretching   Current Home Exercise    Type of Exercise Walking;Treadmill   Frequency (days per week) 2   Duration (minutes per session) 20   Follow-up/On-going Support   Provider follow-up needed on the following No follow-up needed   Learning Assessment   Learner Patient   Primary Language English   Preferred Learning Style Listening;Reading;Demonstration;Pictures/Video   Barriers to Learning No barriers noted   Patient Education   Education recommended Anatomy and Physiology of the Heart;Blood Pressure;Exercise Principles;Medication Overview;Muscle Conditioning;Nutrition;Stress Management;Weight Loss   Education classes attended Exercise Principles;Nutrition

## 2017-09-15 ENCOUNTER — PRE VISIT (OUTPATIENT)
Dept: CARDIOLOGY | Facility: CLINIC | Age: 57
End: 2017-09-15

## 2017-09-15 DIAGNOSIS — I47.10 PAROXYSMAL SUPRAVENTRICULAR TACHYCARDIA (H): Primary | ICD-10-CM

## 2017-09-15 NOTE — TELEPHONE ENCOUNTER
Patient seen in the hospital by Radha Joyce for SVT on 5/22/17    Patient seen in the clinic 6/6/17 by CVTS    Last labs done 6/6/17( BMP,Lipids)

## 2018-01-24 ENCOUNTER — TELEPHONE (OUTPATIENT)
Dept: CARDIOLOGY | Facility: CLINIC | Age: 58
End: 2018-01-24

## 2018-01-24 NOTE — TELEPHONE ENCOUNTER
Pt calling - he is having brain MRI today.  Questions with hx of AVR tissue valve placed last year, are there precautions to be taken.  Informed pt Nothing needed precautionary for cardiac concerns.  Pt also wants to schedule follow up with Dr. Salinas.  Open orders showing need for echo and OV in April/May  Will have scheduling call pt tomorrow per pt request.

## 2018-04-18 ENCOUNTER — HOSPITAL ENCOUNTER (OUTPATIENT)
Dept: CARDIOLOGY | Facility: CLINIC | Age: 58
Discharge: HOME | End: 2018-04-18
Attending: INTERNAL MEDICINE | Admitting: INTERNAL MEDICINE
Payer: COMMERCIAL

## 2018-04-18 DIAGNOSIS — Z95.2 S/P AVR (AORTIC VALVE REPLACEMENT): ICD-10-CM

## 2018-04-18 PROCEDURE — 40000264 ECHO COMPLETE WITH OPTISON

## 2018-04-18 PROCEDURE — 93306 TTE W/DOPPLER COMPLETE: CPT

## 2018-04-18 PROCEDURE — 93306 TTE W/DOPPLER COMPLETE: CPT | Mod: 26 | Performed by: INTERNAL MEDICINE

## 2018-04-18 PROCEDURE — 25500064 ZZH RX 255 OP 636: Performed by: INTERNAL MEDICINE

## 2018-04-18 RX ADMIN — HUMAN ALBUMIN MICROSPHERES AND PERFLUTREN 9 ML: 10; .22 INJECTION, SOLUTION INTRAVENOUS at 10:26

## 2018-04-20 ENCOUNTER — OFFICE VISIT (OUTPATIENT)
Dept: CARDIOLOGY | Facility: CLINIC | Age: 58
End: 2018-04-20
Attending: INTERNAL MEDICINE
Payer: COMMERCIAL

## 2018-04-20 VITALS
HEART RATE: 76 BPM | SYSTOLIC BLOOD PRESSURE: 109 MMHG | HEIGHT: 70 IN | DIASTOLIC BLOOD PRESSURE: 63 MMHG | BODY MASS INDEX: 40.23 KG/M2 | WEIGHT: 281 LBS

## 2018-04-20 DIAGNOSIS — Z95.2 S/P AVR (AORTIC VALVE REPLACEMENT): ICD-10-CM

## 2018-04-20 PROCEDURE — 99214 OFFICE O/P EST MOD 30 MIN: CPT | Performed by: INTERNAL MEDICINE

## 2018-04-20 RX ORDER — CLINDAMYCIN HCL 300 MG
300 CAPSULE ORAL PRN
Qty: 10 CAPSULE | Refills: 3 | Status: SHIPPED | OUTPATIENT
Start: 2018-04-20 | End: 2019-07-30 | Stop reason: DRUGHIGH

## 2018-04-20 NOTE — LETTER
4/20/2018    Asm [place] [id number]  Duplicate  [place] [id number]    RE: Gil Chowdary       Dear Colleague,    I had the pleasure of seeing Gil Chowdary in the [facility].    HPI and Plan:   See dictation        No orders of the defined types were placed in this encounter.    Orders Placed This Encounter   Medications     insulin aspart (NOVOLOG FLEXPEN) 100 UNIT/ML injection     Sig: Inject Subcutaneous 3 times daily (with meals)     Insulin Glargine (BASAGLAR KWIKPEN SC)     clindamycin (CLEOCIN) 300 MG capsule     Sig: Take 1 capsule (300 mg) by mouth as needed     Dispense:  10 capsule     Refill:  3     Take 2 one hour prior to dental proceedures     Medications Discontinued During This Encounter   Medication Reason     Omega-3 Fatty Acids (FISH OIL PO) Stopped by Patient     oxyCODONE (ROXICODONE) 5 MG IR tablet Stopped by Patient     senna-docusate (SENOKOT-S;PERICOLACE) 8.6-50 MG per tablet Stopped by Patient         Encounter Diagnosis   Name Primary?     S/P AVR (aortic valve replacement)        CURRENT MEDICATIONS:  Current Outpatient Prescriptions   Medication Sig Dispense Refill     acetaminophen (TYLENOL) 325 MG tablet Take 2 tablets (650 mg) by mouth every 4 hours as needed for mild pain 100 tablet 0     aspirin 81 MG tablet Take 81 mg by mouth every morning        atorvastatin (LIPITOR) 40 MG tablet Take 1 tablet (40 mg) by mouth daily 90 tablet 0     bumetanide (BUMEX) 1 MG tablet Take 1 tablet (1 mg) by mouth 2 times daily 60 tablet 0     cholecalciferol (VITAMIN D3) 5000 UNITS TABS tablet Take 5,000 Units by mouth every morning       clindamycin (CLEOCIN) 300 MG capsule Take 1 capsule (300 mg) by mouth as needed 10 capsule 3     insulin aspart (NOVOLOG FLEXPEN) 100 UNIT/ML injection Inject Subcutaneous 3 times daily (with meals)       Insulin Glargine (BASAGLAR KWIKPEN SC)        metoprolol (LOPRESSOR) 50 MG tablet Take 1 tablet (50 mg) by mouth 2 times  daily 60 tablet 0     Multiple Vitamins-Minerals (CENTRUM ADULTS PO) Take 1 tablet by mouth every morning        pantoprazole (PROTONIX) 40 MG EC tablet Take 1 tablet (40 mg) by mouth every morning 30 tablet 0     potassium chloride SA 15 MEQ TBCR Take 30 mEq by mouth 2 times daily 90 tablet 0     prednisoLONE acetate (PRED FORTE) 1 % ophthalmic susp Place 1 drop into both eyes as needed        timolol (TIMOPTIC) 0.5 % ophthalmic solution 1 drop 1 DROP INTO BOTH EYES 2 TIMES DAILY       warfarin (COUMADIN) 3 MG tablet Take 1 tab (3 mg) on 5/24 and 5/25 and have INR checked on 5/26 and have dose adjusted 30 tablet 0       ALLERGIES     Allergies   Allergen Reactions     Amoxicillin      Itchy      Penicillins Hives       PAST MEDICAL HISTORY:  Past Medical History:   Diagnosis Date     Former tobacco use      Glaucoma      Hyperlipidemia LDL goal <160 12/6/2011     Obesity      DRAKE on CPAP      Right bundle branch block      Severe aortic stenosis     On Echo 10/28/16       PAST SURGICAL HISTORY:  Past Surgical History:   Procedure Laterality Date     HEART CATH LEFT HEART CATH  04/07/2017    Diffuse non-obstuctive CAD     REPAIR VALVE AORTIC N/A 5/16/2017    Procedure: REPAIR VALVE AORTIC;  Median Sternotony, CardioPulmonary Bypass, Aortic Valve Replace using 25MM Trifecta Valve with [employer], Septal myectomy;  Surgeon: Jun Simon MD;  Location: [facility] OR     REPLACE VALVE AORTIC N/A 5/16/2017    Procedure: REPLACE VALVE AORTIC;;  Surgeon: Jun Simon MD;  Location: [facility]     SINUS SURGERY  2005       FAMILY HISTORY:  Family History   Problem Relation Age of Onset     Family History Negative Mother      DIABETES Father      Heart Surgery Father      CABG     Coronary Artery Disease Father      Hypertension Brother      DIABETES Brother      HEART DISEASE Brother      Heart Surgery Brother      CABG x 3     Family History Negative Sister      DIABETES Brother      History of diabetes,  "but no longer an issue     Family History Negative Brother        SOCIAL HISTORY:  Social History     Social History     Marital status:      Spouse name: N/A     Number of children: N/A     Years of education: N/A     Social History Main Topics     Smoking status: Former Smoker     Packs/day: 1.00     Years: 20.00     Types: Cigarettes, Cigars     Quit date: 10/21/2011     Smokeless tobacco: Never Used     Alcohol use No     Drug use: No     Sexual activity: Not Asked     Other Topics Concern     Parent/Sibling W/ Cabg, Mi Or Angioplasty Before 65f 55m? Yes     brother triple bypass 49     Social History Narrative         Review of Systems:  Skin:  Negative       Eyes:  Positive for glasses    ENT:  Negative      Respiratory:  Positive for CPAP;sleep apnea     Cardiovascular:  Negative      Gastroenterology: Negative      Genitourinary:  Negative      Musculoskeletal:  Negative      Neurologic:  Negative      Psychiatric:  Negative      Heme/Lymph/Imm:  Negative      Endocrine:  Negative        Physical Exam:  Vitals: /63  Pulse 76  Ht 1.765 m (5' 9.5\")  Wt 127.5 kg (281 lb)  BMI 40.9 kg/m2    Constitutional:  cooperative, alert and oriented, well developed, well nourished, in no acute distress overweight      Skin:  warm and dry to the touch, no apparent skin lesions or masses noted          Head:  normocephalic, no masses or lesions        Eyes:  pupils equal and round;sclera white        Lymph:      ENT:  no pallor or cyanosis        Neck:  carotid pulses are full and equal bilaterally;JVP normal;no carotid bruit        Respiratory:  clear to auscultation;healed median sternotomy scar         Cardiac: regular rhythm;no murmurs, gallops or rubs detected                                                         GI:  abdomen soft, non-tender, BS normoactive, no mass, no HSM, no bruits;abdomen soft        Extremities and Muscular Skeletal:  no deformities, clubbing, cyanosis, erythema observed;no " edema              Neurological:  affect appropriate        Psych:  oriented to time, person and place        Recent Lab Results:  LIPID RESULTS:  Lab Results   Component Value Date    CHOL 151 06/06/2017    HDL 40 06/06/2017    LDL 79 06/06/2017    TRIG 162 (H) 06/06/2017       LIVER ENZYME RESULTS:  Lab Results   Component Value Date     (H) 05/09/2017    ALT 79 (H) 05/09/2017    ALT 85 (H) 05/09/2017       CBC RESULTS:  Lab Results   Component Value Date    WBC 9.2 05/24/2017    RBC 3.39 (L) 05/24/2017    HGB 10.8 (L) 05/24/2017    HCT 32.1 (L) 05/24/2017    MCV 95 05/24/2017    MCH 31.9 05/24/2017    MCHC 33.6 05/24/2017    RDW 14.9 05/24/2017     (L) 05/24/2017       BMP RESULTS:  Lab Results   Component Value Date     06/06/2017    POTASSIUM 4.4 06/06/2017    CHLORIDE 105 06/06/2017    CO2 28 06/06/2017    ANIONGAP 7 06/06/2017    GLC 91 06/06/2017    BUN 12 06/06/2017    CR 0.98 06/06/2017    GFRESTIMATED 79 06/06/2017    GFRESTBLACK >90   GFR Calc   06/06/2017    MYRON 8.6 06/06/2017        A1C RESULTS:  Lab Results   Component Value Date    A1C 6.2 (H) 05/17/2017       INR RESULTS:  Lab Results   Component Value Date    INR 2.50 (H) 05/24/2017    INR 1.75 (H) 05/23/2017           CC  Mercedes Salinas MD  [id number]  [place] [id number]                      Thank you for allowing me to participate in the care of your patient.      Sincerely,     MERCEDES SALINAS MD     [facility]    cc:   Mercedes Salinas MD  [id number]  [place] [id number]

## 2018-04-20 NOTE — LETTER
4/20/2018      Sam Villatoro      RE: Gil Chowdary       Dear Colleague,    I had the pleasure of seeing Gil Chowdary in the [facility].    Service Date: 04/20/2018      HISTORY OF PRESENT ILLNESS:  I got together with Gil Chowdary today, who is 57.  In 2016 he presented with critical aortic stenosis and an aortic valve area of 0.5 cm squared.  His peak gradient was 98 mm.  He subsequently had aortic valve replacement.  He surgically received a 25 mm Trifecta tissue valve.  He had a septal myectomy, and he recovered from surgery quite beautifully.      He has always tended to be a big, somewhat overweight gentleman and his weight has jumped up over the past couple of years from 250 to 280 pounds.  This is mostly central body weight and not edema.      Other than that, however, he says he is fully active.  He has no sense of limitation.  He denies chest pain, palpitations, dizziness, syncope, shortness of breath, orthopnea or lower leg edema.        He has gone on to be diabetic and has been forced to take insulin, in part related to, I think, his nutritional indiscretions.      PAST MEDICAL HISTORY:   1.  Severe-critical aortic stenosis.   2.  #25 Tissue aortic valve.   3.  Chronically overweight.   4.  Insulin-dependent diabetes mellitus related to the above.   5.  Treated hyperlipidemia.  Last LDL was 79 on simvastatin 40 mg at bedtime.   6.  Normal renal function.      FAMILY HISTORY:  Noncontributory.      ALLERGIES:  Supposedly, amoxicillin led to itchiness of the skin.        He had a surveillance echo done today to document the status of his cardiac function post-aortic valve replacement.  This was done 2 days ago.  It revealed that his aortic valve was performing wonderfully with a mean gradient of 6.8 and a peak gradient of 13 mmHg and no aortic insufficiency.  Systolic function was normal with an EF of 60%-65%.  LVH was mild.  Previously it had been moderate.   Left atrium was borderline dilated.  Right heart pressures were normal at 25 mmHg plus right atrial pressure.  Relatively, a normal heart post-aortic valve replacement.      PHYSICAL EXAMINATION:   GENERAL:  Overweight male.   VITAL SIGNS:  Blood pressure 110/60, heart rate 70 and regular.  He weighs 281 pounds with his clothes on.   HEAD AND NECK:  Normal.  Carotids were equal, no bruits heard.   HEART:  Regular without gallop, murmur, rub or click.  Sternum is solid and well-healed.   LUNGS:  Clear to auscultation.   ABDOMEN:  Firm, obese, nontender without pain, mass or guarding.     EXTREMITIES:  Show +2 pedal pulses and no edema.      For lack of a better phrase, I read him the riot act with regards to his weight gain, and we talked at length about diet, weight loss and exercise.      I reviewed the results of the echo with what a wonderful surgical result that he has had.  His aortic valve is performing perfectly with a mean gradient of 6 mmHg.      His lipids are under control.  His blood pressure is normal.  His renal function is normal, but he has just crept up to become an insulin-dependent diabetic.  Diet, weight loss and exercise should be mandatory for this relatively young 57-year-old man.  I reviewed this with him at length.  I discussed diet.  I discussed exercise, etc.  He is a smart courtney.  He says he will do better.  Time will tell.  For the time being, I will see him in 2 years with an echo at that time.  Follow up with Sam Villatoro with regards to ongoing medical care and Internal Medicine.      cc:   JOAN Alfaro    [facility]    [address] [place] [address], [address]    [place] [address]       Jun Simon MD    [facility]    [address] [place] [address], [facility] [address]    [place]  [address]         MERCEDES FLOOD MD, [facility]             D: 04/20/2018   T: 04/20/2018   MT: BLANE      Name:     ABIGAIL MATOS   MRN:      [id number]        Account:       [id number]   :      1960           Service Date: 2018      Document: [id number]         Outpatient Encounter Prescriptions as of 2018   Medication Sig Dispense Refill     acetaminophen (TYLENOL) 325 MG tablet Take 2 tablets (650 mg) by mouth every 4 hours as needed for mild pain 100 tablet 0     aspirin 81 MG tablet Take 81 mg by mouth every morning        atorvastatin (LIPITOR) 40 MG tablet Take 1 tablet (40 mg) by mouth daily 90 tablet 0     bumetanide (BUMEX) 1 MG tablet Take 1 tablet (1 mg) by mouth 2 times daily 60 tablet 0     cholecalciferol (VITAMIN D3) 5000 UNITS TABS tablet Take 5,000 Units by mouth every morning       clindamycin (CLEOCIN) 300 MG capsule Take 1 capsule (300 mg) by mouth as needed 10 capsule 3     insulin aspart (NOVOLOG FLEXPEN) 100 UNIT/ML injection Inject Subcutaneous 3 times daily (with meals)       Insulin Glargine (BASAGLAR KWIKPEN SC)        metoprolol (LOPRESSOR) 50 MG tablet Take 1 tablet (50 mg) by mouth 2 times daily 60 tablet 0     Multiple Vitamins-Minerals (CENTRUM ADULTS PO) Take 1 tablet by mouth every morning        pantoprazole (PROTONIX) 40 MG EC tablet Take 1 tablet (40 mg) by mouth every morning 30 tablet 0     potassium chloride SA 15 MEQ TBCR Take 30 mEq by mouth 2 times daily 90 tablet 0     prednisoLONE acetate (PRED FORTE) 1 % ophthalmic susp Place 1 drop into both eyes as needed        timolol (TIMOPTIC) 0.5 % ophthalmic solution 1 drop 1 DROP INTO BOTH EYES 2 TIMES DAILY       warfarin (COUMADIN) 3 MG tablet Take 1 tab (3 mg) on  and  and have INR checked on  and have dose adjusted 30 tablet 0     [DISCONTINUED] Omega-3 Fatty Acids (FISH OIL PO) Take 1 g by mouth every morning        [DISCONTINUED] oxyCODONE (ROXICODONE) 5 MG IR tablet Take 1-2 tablets (5-10 mg) by mouth every 4 hours as needed for moderate to severe pain 80 tablet 0     [DISCONTINUED] senna-docusate (SENOKOT-S;PERICOLACE) 8.6-50 MG per tablet Take 1-2 tablets  by mouth 2 times daily 100 tablet 0     No facility-administered encounter medications on file as of 4/20/2018.        Again, thank you for allowing me to participate in the care of your patient.      Sincerely,    MERCEDES FLOOD MD     [facility]

## 2018-04-20 NOTE — PROGRESS NOTES
HPI and Plan:   See dictation        No orders of the defined types were placed in this encounter.    Orders Placed This Encounter   Medications     insulin aspart (NOVOLOG FLEXPEN) 100 UNIT/ML injection     Sig: Inject Subcutaneous 3 times daily (with meals)     Insulin Glargine (BASAGLAR KWIKPEN SC)     clindamycin (CLEOCIN) 300 MG capsule     Sig: Take 1 capsule (300 mg) by mouth as needed     Dispense:  10 capsule     Refill:  3     Take 2 one hour prior to dental proceedures     Medications Discontinued During This Encounter   Medication Reason     Omega-3 Fatty Acids (FISH OIL PO) Stopped by Patient     oxyCODONE (ROXICODONE) 5 MG IR tablet Stopped by Patient     senna-docusate (SENOKOT-S;PERICOLACE) 8.6-50 MG per tablet Stopped by Patient         Encounter Diagnosis   Name Primary?     S/P AVR (aortic valve replacement)        CURRENT MEDICATIONS:  Current Outpatient Prescriptions   Medication Sig Dispense Refill     acetaminophen (TYLENOL) 325 MG tablet Take 2 tablets (650 mg) by mouth every 4 hours as needed for mild pain 100 tablet 0     aspirin 81 MG tablet Take 81 mg by mouth every morning        atorvastatin (LIPITOR) 40 MG tablet Take 1 tablet (40 mg) by mouth daily 90 tablet 0     bumetanide (BUMEX) 1 MG tablet Take 1 tablet (1 mg) by mouth 2 times daily 60 tablet 0     cholecalciferol (VITAMIN D3) 5000 UNITS TABS tablet Take 5,000 Units by mouth every morning       clindamycin (CLEOCIN) 300 MG capsule Take 1 capsule (300 mg) by mouth as needed 10 capsule 3     insulin aspart (NOVOLOG FLEXPEN) 100 UNIT/ML injection Inject Subcutaneous 3 times daily (with meals)       Insulin Glargine (BASAGLAR KWIKPEN SC)        metoprolol (LOPRESSOR) 50 MG tablet Take 1 tablet (50 mg) by mouth 2 times daily 60 tablet 0     Multiple Vitamins-Minerals (CENTRUM ADULTS PO) Take 1 tablet by mouth every morning        pantoprazole (PROTONIX) 40 MG EC tablet Take 1 tablet (40 mg) by mouth every morning 30 tablet 0      potassium chloride SA 15 MEQ TBCR Take 30 mEq by mouth 2 times daily 90 tablet 0     prednisoLONE acetate (PRED FORTE) 1 % ophthalmic susp Place 1 drop into both eyes as needed        timolol (TIMOPTIC) 0.5 % ophthalmic solution 1 drop 1 DROP INTO BOTH EYES 2 TIMES DAILY       warfarin (COUMADIN) 3 MG tablet Take 1 tab (3 mg) on 5/24 and 5/25 and have INR checked on 5/26 and have dose adjusted 30 tablet 0       ALLERGIES     Allergies   Allergen Reactions     Amoxicillin      Itchy      Penicillins Hives       PAST MEDICAL HISTORY:  Past Medical History:   Diagnosis Date     Former tobacco use      Glaucoma      Hyperlipidemia LDL goal <160 12/6/2011     Obesity      DRAKE on CPAP      Right bundle branch block      Severe aortic stenosis     On Echo 10/28/16       PAST SURGICAL HISTORY:  Past Surgical History:   Procedure Laterality Date     HEART CATH LEFT HEART CATH  04/07/2017    Diffuse non-obstuctive CAD     REPAIR VALVE AORTIC N/A 5/16/2017    Procedure: REPAIR VALVE AORTIC;  Median Sternotony, CardioPulmonary Bypass, Aortic Valve Replace using 25MM Trifecta Valve with [employer], Septal myectomy;  Surgeon: Jun Simon MD;  Location: [facility] OR     REPLACE VALVE AORTIC N/A 5/16/2017    Procedure: REPLACE VALVE AORTIC;;  Surgeon: Jun Simon MD;  Location: [facility] OR     SINUS SURGERY  2005       FAMILY HISTORY:  Family History   Problem Relation Age of Onset     Family History Negative Mother      DIABETES Father      Heart Surgery Father      CABG     Coronary Artery Disease Father      Hypertension Brother      DIABETES Brother      HEART DISEASE Brother      Heart Surgery Brother      CABG x 3     Family History Negative Sister      DIABETES Brother      History of diabetes, but no longer an issue     Family History Negative Brother        SOCIAL HISTORY:  Social History     Social History     Marital status:      Spouse name: N/A     Number of children: N/A     Years of  "education: N/A     Social History Main Topics     Smoking status: Former Smoker     Packs/day: 1.00     Years: 20.00     Types: Cigarettes, Cigars     Quit date: 10/21/2011     Smokeless tobacco: Never Used     Alcohol use No     Drug use: No     Sexual activity: Not Asked     Other Topics Concern     Parent/Sibling W/ Cabg, Mi Or Angioplasty Before 65f 55m? Yes     brother triple bypass 49     Social History Narrative         Review of Systems:  Skin:  Negative       Eyes:  Positive for glasses    ENT:  Negative      Respiratory:  Positive for CPAP;sleep apnea     Cardiovascular:  Negative      Gastroenterology: Negative      Genitourinary:  Negative      Musculoskeletal:  Negative      Neurologic:  Negative      Psychiatric:  Negative      Heme/Lymph/Imm:  Negative      Endocrine:  Negative        Physical Exam:  Vitals: /63  Pulse 76  Ht 1.765 m (5' 9.5\")  Wt 127.5 kg (281 lb)  BMI 40.9 kg/m2    Constitutional:  cooperative, alert and oriented, well developed, well nourished, in no acute distress overweight      Skin:  warm and dry to the touch, no apparent skin lesions or masses noted          Head:  normocephalic, no masses or lesions        Eyes:  pupils equal and round;sclera white        Lymph:      ENT:  no pallor or cyanosis        Neck:  carotid pulses are full and equal bilaterally;JVP normal;no carotid bruit        Respiratory:  clear to auscultation;healed median sternotomy scar         Cardiac: regular rhythm;no murmurs, gallops or rubs detected                                                         GI:  abdomen soft, non-tender, BS normoactive, no mass, no HSM, no bruits;abdomen soft        Extremities and Muscular Skeletal:  no deformities, clubbing, cyanosis, erythema observed;no edema              Neurological:  affect appropriate        Psych:  oriented to time, person and place        Recent Lab Results:  LIPID RESULTS:  Lab Results   Component Value Date    CHOL 151 06/06/2017    HDL " 40 06/06/2017    LDL 79 06/06/2017    TRIG 162 (H) 06/06/2017       LIVER ENZYME RESULTS:  Lab Results   Component Value Date     (H) 05/09/2017    ALT 79 (H) 05/09/2017    ALT 85 (H) 05/09/2017       CBC RESULTS:  Lab Results   Component Value Date    WBC 9.2 05/24/2017    RBC 3.39 (L) 05/24/2017    HGB 10.8 (L) 05/24/2017    HCT 32.1 (L) 05/24/2017    MCV 95 05/24/2017    MCH 31.9 05/24/2017    MCHC 33.6 05/24/2017    RDW 14.9 05/24/2017     (L) 05/24/2017       BMP RESULTS:  Lab Results   Component Value Date     06/06/2017    POTASSIUM 4.4 06/06/2017    CHLORIDE 105 06/06/2017    CO2 28 06/06/2017    ANIONGAP 7 06/06/2017    GLC 91 06/06/2017    BUN 12 06/06/2017    CR 0.98 06/06/2017    GFRESTIMATED 79 06/06/2017    GFRESTBLACK >90   GFR Calc   06/06/2017    MYRON 8.6 06/06/2017        A1C RESULTS:  Lab Results   Component Value Date    A1C 6.2 (H) 05/17/2017       INR RESULTS:  Lab Results   Component Value Date    INR 2.50 (H) 05/24/2017    INR 1.75 (H) 05/23/2017           CC  Guru Salinas MD  [address]  [place] [address]

## 2018-04-20 NOTE — MR AVS SNAPSHOT
"              After Visit Summary   4/20/2018    Gil Chowdary    MRN: [id number]           Patient Information     Date Of Birth          1960        Visit Information        Provider Department      4/20/2018 3:45 PM Guru Salinas MD [facility]        Today's Diagnoses     S/P AVR (aortic valve replacement)           Follow-ups after your visit        Who to contact     If you have questions or need follow up information about today's clinic visit or your schedule please contact [facility] directly at [phone].  Normal or non-critical lab and imaging results will be communicated to you by [employer], letter or phone within 4 business days after the clinic has received the results. If you do not hear from us within 7 days, please contact the clinic through [employer] or phone. If you have a critical or abnormal lab result, we will notify you by phone as soon as possible.  Submit refill requests through [employer] or call your pharmacy and they will forward the refill request to us. Please allow 3 business days for your refill to be completed.          Additional Information About Your Visit        MyChart Information     [employer] gives you secure access to your electronic health record. If you see a primary care provider, you can also send messages to your care team and make appointments. If you have questions, please call your primary care clinic.  If you do not have a primary care provider, please call [phone] and they will assist you.        Care EveryWhere ID     This is your Care EveryWhere ID. This could be used by other organizations to access your [facility] medical records  [id number]        Your Vitals Were     Pulse Height BMI (Body Mass Index)             76 1.765 m (5' 9.5\") 40.9 kg/m2          Blood Pressure from Last 3 Encounters:   04/20/18 109/63   07/02/17 121/65   06/06/17 128/77    Weight from Last 3 " Encounters:   04/20/18 127.5 kg (281 lb)   08/14/17 116.1 kg (256 lb)   07/12/17 112 kg (247 lb)              We Performed the Following     Follow-Up with Cardiologist          Today's Medication Changes          These changes are accurate as of 4/20/18  5:20 PM.  If you have any questions, ask your nurse or doctor.               Start taking these medicines.        Dose/Directions    clindamycin 300 MG capsule   Commonly known as:  CLEOCIN   Used for:  S/P AVR (aortic valve replacement)   Started by:  Guru Salinas MD        Dose:  300 mg   Take 1 capsule (300 mg) by mouth as needed   Quantity:  10 capsule   Refills:  3            Where to get your medicines      These medications were sent to [facility] [address] [place] [address] & [address] [address]     Phone:  [phone]     clindamycin 300 MG capsule                Primary Care Provider Fax #    Sam Grimes [phone] [phone]       [facility] [place] [address]        Equal Access to Services     [facility]: Hadii jovanna ku hadasho Soomaali, waaxda luqadaha, qaybta kaalmada adeegyada, waxroger garcia hayrubi [place] . So [facility] [phone].    ATENCIÓN: Si habla español, tiene a cross disposición servicios gratuitos de asistencia lingüística. Llame al [phone].    We comply with applicable federal civil rights laws and [place] laws. We do not discriminate on the basis of race, color, national origin, age, disability, sex, sexual orientation, or gender identity.            Thank you!     Thank you for choosing [facility]  for your care. Our goal is always to provide you with excellent care. Hearing back from our patients is one way we can continue to improve our services. Please take a few minutes to complete the written survey that you may receive in the mail after your visit with us. Thank you!             Your Updated  Medication List - Protect others around you: Learn how to safely use, store and throw away your medicines at www.disposemymeds.org.          This list is accurate as of 4/20/18  5:20 PM.  Always use your most recent med list.                   Brand Name Dispense Instructions for use Diagnosis    acetaminophen 325 MG tablet    TYLENOL    100 tablet    Take 2 tablets (650 mg) by mouth every 4 hours as needed for mild pain    S/P AVR (aortic valve replacement), Acute post-operative pain       aspirin 81 MG tablet      Take 81 mg by mouth every morning        atorvastatin 40 MG tablet    LIPITOR    90 tablet    Take 1 tablet (40 mg) by mouth daily    Hyperlipidemia with target LDL less than 70       BASAGLAR KWIKPEN SC           bumetanide 1 MG tablet    BUMEX    60 tablet    Take 1 tablet (1 mg) by mouth 2 times daily    S/P AVR (aortic valve replacement)       CENTRUM ADULTS PO      Take 1 tablet by mouth every morning        cholecalciferol 5000 units Tabs tablet    vitamin D3     Take 5,000 Units by mouth every morning        clindamycin 300 MG capsule    CLEOCIN    10 capsule    Take 1 capsule (300 mg) by mouth as needed    S/P AVR (aortic valve replacement)       metoprolol tartrate 50 MG tablet    LOPRESSOR    60 tablet    Take 1 tablet (50 mg) by mouth 2 times daily    Paroxysmal supraventricular tachycardia (H), S/P AVR (aortic valve replacement)       NovoLOG FLEXPEN 100 UNIT/ML injection   Generic drug:  insulin aspart      Inject Subcutaneous 3 times daily (with meals)        pantoprazole 40 MG EC tablet    PROTONIX    30 tablet    Take 1 tablet (40 mg) by mouth every morning    S/P AVR (aortic valve replacement)       Potassium Chloride [place] CR 15 MEQ Tbcr     90 tablet    Take 30 mEq by mouth 2 times daily    S/P AVR (aortic valve replacement)       prednisoLONE acetate 1 % ophthalmic susp    PRED FORTE     Place 1 drop into both eyes as needed        timolol 0.5 % ophthalmic solution    TIMOPTIC     1  drop 1 DROP INTO BOTH EYES 2 TIMES DAILY        warfarin 3 MG tablet    COUMADIN    30 tablet    Take 1 tab (3 mg) on 5/24 and 5/25 and have INR checked on 5/26 and have dose adjusted    Paroxysmal supraventricular tachycardia (H), S/P AVR (aortic valve replacement)

## 2018-04-21 NOTE — PROGRESS NOTES
Service Date: 04/20/2018      HISTORY OF PRESENT ILLNESS:  I got together with Gil Chowdary today, who is 57.  In 2016 he presented with critical aortic stenosis and an aortic valve area of 0.5 cm squared.  His peak gradient was 98 mm.  He subsequently had aortic valve replacement.  He surgically received a 25 mm Trifecta tissue valve.  He had a septal myectomy, and he recovered from surgery quite beautifully.      He has always tended to be a big, somewhat overweight gentleman and his weight has jumped up over the past couple of years from 250 to 280 pounds.  This is mostly central body weight and not edema.      Other than that, however, he says he is fully active.  He has no sense of limitation.  He denies chest pain, palpitations, dizziness, syncope, shortness of breath, orthopnea or lower leg edema.        He has gone on to be diabetic and has been forced to take insulin, in part related to, I think, his nutritional indiscretions.      PAST MEDICAL HISTORY:   1.  Severe-critical aortic stenosis.   2.  #25 Tissue aortic valve.   3.  Chronically overweight.   4.  Insulin-dependent diabetes mellitus related to the above.   5.  Treated hyperlipidemia.  Last LDL was 79 on simvastatin 40 mg at bedtime.   6.  Normal renal function.      FAMILY HISTORY:  Noncontributory.      ALLERGIES:  Supposedly, amoxicillin led to itchiness of the skin.        He had a surveillance echo done today to document the status of his cardiac function post-aortic valve replacement.  This was done 2 days ago.  It revealed that his aortic valve was performing wonderfully with a mean gradient of 6.8 and a peak gradient of 13 mmHg and no aortic insufficiency.  Systolic function was normal with an EF of 60%-65%.  LVH was mild.  Previously it had been moderate.  Left atrium was borderline dilated.  Right heart pressures were normal at 25 mmHg plus right atrial pressure.  Relatively, a normal heart post-aortic valve replacement.      PHYSICAL  EXAMINATION:   GENERAL:  Overweight male.   VITAL SIGNS:  Blood pressure 110/60, heart rate 70 and regular.  He weighs 281 pounds with his clothes on.   HEAD AND NECK:  Normal.  Carotids were equal, no bruits heard.   HEART:  Regular without gallop, murmur, rub or click.  Sternum is solid and well-healed.   LUNGS:  Clear to auscultation.   ABDOMEN:  Firm, obese, nontender without pain, mass or guarding.     EXTREMITIES:  Show +2 pedal pulses and no edema.      For lack of a better phrase, I read him the riot act with regards to his weight gain, and we talked at length about diet, weight loss and exercise.      I reviewed the results of the echo with what a wonderful surgical result that he has had.  His aortic valve is performing perfectly with a mean gradient of 6 mmHg.      His lipids are under control.  His blood pressure is normal.  His renal function is normal, but he has just crept up to become an insulin-dependent diabetic.  Diet, weight loss and exercise should be mandatory for this relatively young 57-year-old man.  I reviewed this with him at length.  I discussed diet.  I discussed exercise, etc.  He is a smart courtney.  He says he will do better.  Time will tell.  For the time being, I will see him in 2 years with an echo at that time.  Follow up with Sam Villatoro with regards to ongoing medical care and Internal Medicine.      cc:   JOAN Alfaro    [facility]    [address], [address]    [place] [address]       Jun Simon MD    [facility]    [address] [place] [address], [facility] [address]    [place] [address]         MERCEDES FLOOD MD, [facility]             D: 2018   T: 2018   MT: BLANE      Name:     ABIGAIL MATOS   MRN:      [id number]        Account:      [id number]   :      1960           Service Date: 2018      Document: [id number]

## 2018-05-15 ENCOUNTER — OFFICE VISIT (OUTPATIENT)
Dept: PODIATRY | Facility: CLINIC | Age: 58
End: 2018-05-15
Payer: COMMERCIAL

## 2018-05-15 VITALS
HEIGHT: 71 IN | SYSTOLIC BLOOD PRESSURE: 110 MMHG | DIASTOLIC BLOOD PRESSURE: 68 MMHG | WEIGHT: 260 LBS | BODY MASS INDEX: 36.4 KG/M2

## 2018-05-15 DIAGNOSIS — I89.0 LYMPHEDEMA OF BOTH LOWER EXTREMITIES: ICD-10-CM

## 2018-05-15 DIAGNOSIS — M79.605 LEG PAIN, BILATERAL: ICD-10-CM

## 2018-05-15 DIAGNOSIS — E11.42 DIABETIC POLYNEUROPATHY ASSOCIATED WITH TYPE 2 DIABETES MELLITUS (H): Primary | ICD-10-CM

## 2018-05-15 DIAGNOSIS — M79.604 LEG PAIN, BILATERAL: ICD-10-CM

## 2018-05-15 PROCEDURE — 99203 OFFICE O/P NEW LOW 30 MIN: CPT | Performed by: PODIATRIST

## 2018-05-15 NOTE — MR AVS SNAPSHOT
After Visit Summary   5/15/2018    iGl Chowdary    MRN: [id number]           Patient Information     Date Of Birth          1960        Visit Information        Provider Department      5/15/2018 9:45 AM Iris Mosley DPM, Podiatry/Foot and Ankle Surgery [facility]        Today's Diagnoses     Diabetic polyneuropathy associated with type 2 diabetes mellitus (H)    -  1    Lymphedema of both lower extremities        Leg pain, bilateral          Care Instructions    Thank you for choosing [facility]!    DR. MOSLEY'S CLINIC SCHEDULE  MONDAY AM - [place] TUESDAY - [place]   [address] [address] [address] [place] [address] [place] [address]   [phone] / [phone] / [phone]       WEDNESDAY - [place] FRIDAY AM - [facility]   [address]   [place] [address] [place] [address]   [phone] / [phone]       FRIDAY PM - [place] SCHEDULE SURGERY: [phone]   [address] BILLING QUESTIONS: [phone]   [place] [address] AFTER HOURS: [phone]   [phone] / [phone] APPOINTMENTS: [phone]     Consumer Price Line (CPL) [phone]       Home Medical Equipment:  1. [facility]    [facility] -[address]  [address]   [place] [phone]     2. [facility] - [address], [place], [phone]      EDEMA (SWELLING)  Swollen ankles and swollen feet are common and usually not cause for concern, particularly if you have been standing or walking a lot. But feet and ankles that stay swollen or are accompanied by other symptoms could signal a serious health problem.     1. Pregnancy complications: Some swelling of the ankles and feet is normal during pregnancy. Sudden or excessive swelling, however,  may be a sign of preeclampsia, a serious condition in which high blood pressure and protein in the urine develop after the 20th week of pregnancy. If you experience severe swelling or swelling accompanied by other symptoms such as abdominal pain, headaches, infrequent urination, nausea and vomiting, or vision changes, call your doctor immediately.    2. Foot or ankle injury or surgery: An injury to the foot or ankle can lead to swelling. The most common is a sprained ankle, which occurs when an injury or misstep causes the ligaments that hold the ankle in place to be stretched beyond their normal range. To reduce the swelling from a foot or ankle injury, rest to avoid walking on the injured ankle or foot, use ice packs, wrap the foot or ankle with compression bandage, and elevate the foot on a stool or pillow.    3. Lymphedema: This is a collection of lymphatic fluid in the tissues that can develop because of the absence of or problems with the lymph vessels or after the removal of lymph nodes. Lymph is a protein-rich fluid that normally travels along an extensive network of vessels and capillaries. It is filtered through the lymph nodes, which trap and destroy unwanted substances, such as bacteria. When there is a problem with the vessels or lymph nodes, however, the fluid's movement can be blocked. Untreated, lymph buildup can impair wound healing and lead to infection and deformity. Lymphedema is common following radiation therapy or removal of the lymph nodes in patients with cancer. Can also be due to certain conditions such as spina bifida, heart disease, kidney disease, liver disease, obesity, or even from medications.    4. Venous insufficiency (Moste Common): Swelling of the ankles and feet is often an early symptom of venous insufficiency, a condition in which blood inadequately moves up the veins from the legs and feet up to the heart. Normally, the veins keep blood flowing upward with one-way valves.  "When these valves become damaged or weakened, the blood leaks back down the vessels and fluid is retained in the soft tissue of the lower legs, especially the ankles and feet. Chronic venous insufficiency can lead to skin changes, skin ulcers, and infection.    TREATMENT  1.  Compression - Either with compression stockings, ace bandages, wraps, or leg pumps. Compression is the most important treatment and usually the 1st step.   2.  Thermal ablation - This is done by a vascular surgeon to the veins.  3.  Phlebectomy - Surgical removal of the veins or varicosities.  Again done by a vascular surgeon.  4.  Scleropathy - Laser removal of veins (also done by vascular surgeon).    PREVENTION  1.  Exercise to improve circulation and fluid distribution.  2.  Eat a healthy diet; too much salt may cause fluid retention, hypertension and swelling.  3.  Interrupt sitting or standing several times a day and elevate the feet and ankles above the heart  4. Lose excess weight to retain less fluid and decrease pressure on muscles and joints  5. Consider using support stockings or hose   6. Examine prescription and other medications; consult with the doctor if medication may be responsible for fluid retention   7. Avoid smoking, alcohol and other substances that can lead to underlying causes of swelling    DIABETES AND YOUR FEET  Diabetes can result in several problems in the feet including ulcers (open sores) and amputations. Two of the most important reasons why people develop foot problems when they have diabetes is : 1. Neuropathy (loss of feeling)  2. Vascular disease (loss or decrease of blood flow).    Neuropathy is a term used to describe a loss of nerve function.  Patients with diabetes are at risk of developing neuropathy if their sugars continue to run high and are above the normal value. One theory for neuropathy is that the \"extra\" sugar in the body enters the nerves and is broken down. These by-products build up in the " nerve causing it to swell and impairing nerve function. Often times, this can be prevented by controlling your sugars, dieting and exercise.    When a person develops neuropathy, they usually begin to feel numbness or tingling in their feet and sometime in their legs.  Other symptoms may include painful burning or hot feet, tingling or feeling like insects or ants are crawling on your feet or legs.  If the diabetes is sever and the sugars run high for long periods of time, neuropathy can also occur in the hands.    Vascular disease  is a term used to describe a loss or decrease in circulation (blood flow). There is a problem in getting blood and oxygen to areas that need it. Similar to neuropathy, sugars can build up in the walls of the arteries (blood vessels) and cause them to become swollen, thickened and hardened. This decreases the amount of blood that can go to an area that needs it. Though this is common in the legs of diabetic patients, it can also affect other arteries (blood vessels) in the body such as in the heart and eyes.    In the legs, vascular disease usually results in cramping. Patients who develop leg cramps after walking the same distance every time (i.e. One block, half a mile, ect.) need to let their doctors know so that their circulation may be checked. Cramps causing severe pain in the feet and/or legs while sleeping and the cramps go away when you stand or hang your legs off the side of the bed, may also be a sign of poor blood circulation.  Occasional cramping in cold weather or on rare occasions with activity may not be due to poor circulation, but you should inform your doctor.    PREVENTION OF THESE DISEASES  The key to prevention is good blood sugar control. Poor blood sugar control is a big reason many of these problems start. Physical activity (exercise) is a very good way to help decrease your blood sugars. Exercise can lower your blood sugar, blood pressure, and cholesterol. It  also reduces your risk for heart disease and stroke, relieves stress, and strengthens your heart, muscles and bones.  In addition, regular activity helps insulin work better, improves your blood circulation, and keeps your joints flexible. If you're trying to lose weight, a combination of exercise and wise food choices can help you reach your target weight and maintain it.      PAIN MANAGEMENT  1.Blood Sugar Control - Most important  2. Medications such as:  Amytriptylline, duloxetine, gabapentin, lyrica, tramadol  3. Nutritional therapy:  Vitamin B6 (100mg daily), Vitamin B12 (75mcg daily), Vitamin D 2000 IU daily), Alpha-Lipoic Acid (600-1800mg daily), Acetyl-L-Carnitine (500-1000mg TID, L-methyl folate (1500mcg daily)    ** Metformin can block Vitamin B6 and B12 so it is important to supplement**    FOOT CARE RECOMMENDATIONS   1. Wash your feet with lukewarm water and a mild soap and then dry them thoroughly, especially between the toes.     2. Examine your feet daily looking for cuts, corns, blisters, cracks, ect, especially after wearing new shoes. Make sure to look between your toes. If you cannot see the bottom of your feet, set a mirror on the floor and hold your foot over it, or ask a spouse, friend or family member to examine your feet for you. Contact your doctor immediately if new problems are noted or if sores are not healing.     3. Immediately apply moisturizer to the tops and bottoms of your feet, avoiding areas between the toes. Hand lotion (Intesive Care, Rona, Eucerin, Neutrogena, Curel, ect) is sufficient unless your doctor prescribes a medicated lotion. Apply sunscreen to your feet when going swimming outside.     4. Use clean comfortable shoes, wear white socks (if you have any bleeding or drainage, you will see it on white socks). Socks should not have thick seams or cut off the circulation around the leg. Break in new shoes slowly and rotate with older shoes until broken in. Check the inside  of your shoes with your hand to look for areas of irritation or objects that may have fallen into your shoes.       5. Keep slippers by the side of your bed for use during the night.     6.  Shoes should be fitted by a professional and should not cause areas of irritation.  Check your feet regularly when wearing a new pair of shoes and replace them as needed.     7.  Talk to your doctor about proper exercise. Exercise and stretching stimulate blood flow to your feet and maintain proper glucose levels.     8.  Monitor your blood glucose level as instructed by your doctor. Notify your doctor immediately if your blood sugar is abnormally high or low.    9. Cut your nails straight across, but then gently round any sharp edges with a cardboard nail file. If you have neuropathy, peripheral vascular disease or cannot see that well to trim your own toenails contact [facility] ([phone]) or [facility] ([phone]).      THINGS TO AVOID DOING   1.  Do not soak your feet if you have an open sore. Use only lukewarm water and always check the temperature with your hand as hot water can easily burn your feet.       2.  Never use a hot water bottle or heating pad on your feet. Also do not apply cold compresses to your feet. With decreased sensation, you could burn or freeze your feet.       3.  Do not apply any of these to your feet:    -  Over the counter medicine for corns or warts    -  Harsh chemicals like boric acid    -  Do not self-treat corns, cuts, blisters or infections. Always consult your doctor.       4.  Do not wear sandals, slippers or walk barefoot, especially on hot sand or concrete or other harsh surfaces.     5.  If you smoke, stop!!!            Body Mass Index (BMI)  Many things can cause foot and ankle problems. Foot structure, activity level, foot mechanics and injuries are common causes of pain.  One very important issue that often goes unmentioned, is body weight. Extra weight can cause increased  stress on muscles, ligaments, bones and tendons.  Sometimes just a few extra pounds is all it takes to put one over her/his threshold. Without reducing that stress, it can be difficult to alleviate pain. Some people are uncomfortable addressing this issue, but we feel it is important for you to think about it. As Foot &  Ankle specialists, our job is addressing the lower extremity problem and possible causes. Regarding extra body weight, we encourage patients to discuss diet and weight management plans with their primary care doctors. It is this team approach that gives you the best opportunity for pain relief and getting you back on your feet.                  Follow-ups after your visit        Additional Services     ORTHOTICS REFERRAL       Please be aware that coverage of these services is subject to the terms and limitations of your health insurance plan.  Call member services at your health plan with any benefit or coverage questions.      Please bring the following to your appointment:    >>   Any x-rays, CTs or MRIs which have been performed.  Contact the facility where they were done to arrange for  prior to your scheduled appointment.  Any new CT, MRI or other procedures ordered by your specialist must be performed at a [facility] facility or coordinated by your clinic's referral office.    >>   List of current medications   >>   This referral request   >>   Any documents/labs given to you for this referral    ==This Referral PRINTS in the [facility] (ORTHOTICS & PROSTHETICS) Central scheduling office ==     The [facility] Central Scheduling staff will contact patient to arrange appointments. Central Scheduling Phone #:  [place]  [phone]     Diabetic shoes and inserts                  Who to contact     If you have questions or need follow up information about today's clinic visit or your schedule please contact [facility] directly at  "[phone].  Normal or non-critical lab and imaging results will be communicated to you by MyChart, letter or phone within 4 business days after the clinic has received the results. If you do not hear from us within 7 days, please contact the clinic through [employer] or phone. If you have a critical or abnormal lab result, we will notify you by phone as soon as possible.  Submit refill requests through [employer] or call your pharmacy and they will forward the refill request to us. Please allow 3 business days for your refill to be completed.          Additional Information About Your Visit        [employer] Information     [employer] gives you secure access to your electronic health record. If you see a primary care provider, you can also send messages to your care team and make appointments. If you have questions, please call your primary care clinic.  If you do not have a primary care provider, please call [phone] and they will assist you.        Care EveryWhere ID     This is your Care EveryWhere ID. This could be used by other organizations to access your [facility] medical records  [id number]        Your Vitals Were     Height BMI (Body Mass Index)                5' 11\" (1.803 m) 36.26 kg/m2           Blood Pressure from Last 3 Encounters:   05/15/18 110/68   04/20/18 109/63   07/02/17 121/65    Weight from Last 3 Encounters:   05/15/18 260 lb (117.9 kg)   04/20/18 281 lb (127.5 kg)   08/14/17 256 lb (116.1 kg)              We Performed the Following     ORTHOTICS REFERRAL        Primary Care Provider Fax #    Sam [place] [phone] [place] [phone]       [facility] [address]        Equal Access to Services     [facility]: Hadii aad ku hadasho Sojunali, waaxda luqadaha, qaybta kaalmada hue kitchen. So [facility] [phone].    ATENCIÓN: Si habla español, tiene a cross disposición servicios gratuitos de asistencia lingüística. Llame al [phone].    We comply with applicable " federal civil rights laws and [place] laws. We do not discriminate on the basis of race, color, national origin, age, disability, sex, sexual orientation, or gender identity.            Thank you!     Thank you for choosing [facility]  for your care. Our goal is always to provide you with excellent care. Hearing back from our patients is one way we can continue to improve our services. Please take a few minutes to complete the written survey that you may receive in the mail after your visit with us. Thank you!             Your Updated Medication List - Protect others around you: Learn how to safely use, store and throw away your medicines at www.disposemymeds.org.          This list is accurate as of 5/15/18  9:51 AM.  Always use your most recent med list.                   Brand Name Dispense Instructions for use Diagnosis    acetaminophen 325 MG tablet    TYLENOL    100 tablet    Take 2 tablets (650 mg) by mouth every 4 hours as needed for mild pain    S/P AVR (aortic valve replacement), Acute post-operative pain       aspirin 81 MG tablet      Take 81 mg by mouth every morning        atorvastatin 40 MG tablet    LIPITOR    90 tablet    Take 1 tablet (40 mg) by mouth daily    Hyperlipidemia with target LDL less than 70       SELENA STERLING SC           bumetanide 1 MG tablet    BUMEX    60 tablet    Take 1 tablet (1 mg) by mouth 2 times daily    S/P AVR (aortic valve replacement)       CENTRUM ADULTS PO      Take 1 tablet by mouth every morning        cholecalciferol 5000 units Tabs tablet    vitamin D3     Take 5,000 Units by mouth every morning        clindamycin 300 MG capsule    CLEOCIN    10 capsule    Take 1 capsule (300 mg) by mouth as needed    S/P AVR (aortic valve replacement)       DONEPEZIL HCL PO      Take 5 mg by mouth    Diabetic polyneuropathy associated with type 2 diabetes mellitus (H), Lymphedema of both lower extremities, Leg pain, bilateral       metoprolol tartrate 50  MG tablet    LOPRESSOR    60 tablet    Take 1 tablet (50 mg) by mouth 2 times daily    Paroxysmal supraventricular tachycardia (H), S/P AVR (aortic valve replacement)       NovoLOG FLEXPEN 100 UNIT/ML injection   Generic drug:  insulin aspart      Inject Subcutaneous 3 times daily (with meals)        pantoprazole 40 MG EC tablet    PROTONIX    30 tablet    Take 1 tablet (40 mg) by mouth every morning    S/P AVR (aortic valve replacement)       Potassium Chloride [place] CR 15 MEQ Tbcr     90 tablet    Take 30 mEq by mouth 2 times daily    S/P AVR (aortic valve replacement)       prednisoLONE acetate 1 % ophthalmic susp    PRED FORTE     Place 1 drop into both eyes as needed        timolol 0.5 % ophthalmic solution    TIMOPTIC     1 drop 1 DROP INTO BOTH EYES 2 TIMES DAILY        warfarin 3 MG tablet    COUMADIN    30 tablet    Take 1 tab (3 mg) on 5/24 and 5/25 and have INR checked on 5/26 and have dose adjusted    Paroxysmal supraventricular tachycardia (H), S/P AVR (aortic valve replacement)

## 2018-05-15 NOTE — PATIENT INSTRUCTIONS
Thank you for choosing [facility]!    DR. RODRIGUEZ'S CLINIC SCHEDULE  MONDAY AM - [place] TUESDAY - [place]   [address] [address] [address] [address] [address] [place] [address]   [phone] / [phone] / [phone]       WEDNESDAY - [place] FRIDAY AM - [facility]   [address]   [place] [address] [place] [address]   [phone] / [phone]       FRIDAY PM - [place] SCHEDULE SURGERY: [phone]   [address] BILLING QUESTIONS: [phone]   [place] [address] AFTER HOURS: [phone]   [phone] / [phone] APPOINTMENTS: [phone]     Consumer Price Line (CPL) [phone]       Home Medical Equipment:  1. [facility]    [facility] -[address]  [address]   [place] [phone]     2. [facility] - [address], [place], [phone]      EDEMA (SWELLING)  Swollen ankles and swollen feet are common and usually not cause for concern, particularly if you have been standing or walking a lot. But feet and ankles that stay swollen or are accompanied by other symptoms could signal a serious health problem.     1. Pregnancy complications: Some swelling of the ankles and feet is normal during pregnancy. Sudden or excessive swelling, however, may be a sign of preeclampsia, a serious condition in which high blood pressure and protein in the urine develop after the 20th week of pregnancy. If you experience severe swelling or swelling accompanied by other symptoms such as abdominal pain, headaches, infrequent urination, nausea and vomiting, or vision changes, call your doctor immediately.    2. Foot or ankle injury or surgery: An injury to the foot or ankle can lead to swelling. The most common is a sprained ankle, which occurs when an injury or  misstep causes the ligaments that hold the ankle in place to be stretched beyond their normal range. To reduce the swelling from a foot or ankle injury, rest to avoid walking on the injured ankle or foot, use ice packs, wrap the foot or ankle with compression bandage, and elevate the foot on a stool or pillow.    3. Lymphedema: This is a collection of lymphatic fluid in the tissues that can develop because of the absence of or problems with the lymph vessels or after the removal of lymph nodes. Lymph is a protein-rich fluid that normally travels along an extensive network of vessels and capillaries. It is filtered through the lymph nodes, which trap and destroy unwanted substances, such as bacteria. When there is a problem with the vessels or lymph nodes, however, the fluid's movement can be blocked. Untreated, lymph buildup can impair wound healing and lead to infection and deformity. Lymphedema is common following radiation therapy or removal of the lymph nodes in patients with cancer. Can also be due to certain conditions such as spina bifida, heart disease, kidney disease, liver disease, obesity, or even from medications.    4. Venous insufficiency (Moste Common): Swelling of the ankles and feet is often an early symptom of venous insufficiency, a condition in which blood inadequately moves up the veins from the legs and feet up to the heart. Normally, the veins keep blood flowing upward with one-way valves. When these valves become damaged or weakened, the blood leaks back down the vessels and fluid is retained in the soft tissue of the lower legs, especially the ankles and feet. Chronic venous insufficiency can lead to skin changes, skin ulcers, and infection.    TREATMENT  1.  Compression - Either with compression stockings, ace bandages, wraps, or leg pumps. Compression is the most important treatment and usually the 1st step.   2.  Thermal ablation - This is done by a vascular surgeon to the veins.  3.   "Phlebectomy - Surgical removal of the veins or varicosities.  Again done by a vascular surgeon.  4.  Scleropathy - Laser removal of veins (also done by vascular surgeon).    PREVENTION  1.  Exercise to improve circulation and fluid distribution.  2.  Eat a healthy diet; too much salt may cause fluid retention, hypertension and swelling.  3.  Interrupt sitting or standing several times a day and elevate the feet and ankles above the heart  4. Lose excess weight to retain less fluid and decrease pressure on muscles and joints  5. Consider using support stockings or hose   6. Examine prescription and other medications; consult with the doctor if medication may be responsible for fluid retention   7. Avoid smoking, alcohol and other substances that can lead to underlying causes of swelling    DIABETES AND YOUR FEET  Diabetes can result in several problems in the feet including ulcers (open sores) and amputations. Two of the most important reasons why people develop foot problems when they have diabetes is : 1. Neuropathy (loss of feeling)  2. Vascular disease (loss or decrease of blood flow).    Neuropathy is a term used to describe a loss of nerve function.  Patients with diabetes are at risk of developing neuropathy if their sugars continue to run high and are above the normal value. One theory for neuropathy is that the \"extra\" sugar in the body enters the nerves and is broken down. These by-products build up in the nerve causing it to swell and impairing nerve function. Often times, this can be prevented by controlling your sugars, dieting and exercise.    When a person develops neuropathy, they usually begin to feel numbness or tingling in their feet and sometime in their legs.  Other symptoms may include painful burning or hot feet, tingling or feeling like insects or ants are crawling on your feet or legs.  If the diabetes is sever and the sugars run high for long periods of time, neuropathy can also occur in the " hands.    Vascular disease  is a term used to describe a loss or decrease in circulation (blood flow). There is a problem in getting blood and oxygen to areas that need it. Similar to neuropathy, sugars can build up in the walls of the arteries (blood vessels) and cause them to become swollen, thickened and hardened. This decreases the amount of blood that can go to an area that needs it. Though this is common in the legs of diabetic patients, it can also affect other arteries (blood vessels) in the body such as in the heart and eyes.    In the legs, vascular disease usually results in cramping. Patients who develop leg cramps after walking the same distance every time (i.e. One block, half a mile, ect.) need to let their doctors know so that their circulation may be checked. Cramps causing severe pain in the feet and/or legs while sleeping and the cramps go away when you stand or hang your legs off the side of the bed, may also be a sign of poor blood circulation.  Occasional cramping in cold weather or on rare occasions with activity may not be due to poor circulation, but you should inform your doctor.    PREVENTION OF THESE DISEASES  The key to prevention is good blood sugar control. Poor blood sugar control is a big reason many of these problems start. Physical activity (exercise) is a very good way to help decrease your blood sugars. Exercise can lower your blood sugar, blood pressure, and cholesterol. It also reduces your risk for heart disease and stroke, relieves stress, and strengthens your heart, muscles and bones.  In addition, regular activity helps insulin work better, improves your blood circulation, and keeps your joints flexible. If you're trying to lose weight, a combination of exercise and wise food choices can help you reach your target weight and maintain it.      PAIN MANAGEMENT  1.Blood Sugar Control - Most important  2. Medications such as:  Amytriptylline, duloxetine, gabapentin, lyrica,  tramadol  3. Nutritional therapy:  Vitamin B6 (100mg daily), Vitamin B12 (75mcg daily), Vitamin D 2000 IU daily), Alpha-Lipoic Acid (600-1800mg daily), Acetyl-L-Carnitine (500-1000mg TID, L-methyl folate (1500mcg daily)    ** Metformin can block Vitamin B6 and B12 so it is important to supplement**    FOOT CARE RECOMMENDATIONS   1. Wash your feet with lukewarm water and a mild soap and then dry them thoroughly, especially between the toes.     2. Examine your feet daily looking for cuts, corns, blisters, cracks, ect, especially after wearing new shoes. Make sure to look between your toes. If you cannot see the bottom of your feet, set a mirror on the floor and hold your foot over it, or ask a spouse, friend or family member to examine your feet for you. Contact your doctor immediately if new problems are noted or if sores are not healing.     3. Immediately apply moisturizer to the tops and bottoms of your feet, avoiding areas between the toes. Hand lotion (Intesive Care, Rona, Eucerin, Neutrogena, Curel, ect) is sufficient unless your doctor prescribes a medicated lotion. Apply sunscreen to your feet when going swimming outside.     4. Use clean comfortable shoes, wear white socks (if you have any bleeding or drainage, you will see it on white socks). Socks should not have thick seams or cut off the circulation around the leg. Break in new shoes slowly and rotate with older shoes until broken in. Check the inside of your shoes with your hand to look for areas of irritation or objects that may have fallen into your shoes.       5. Keep slippers by the side of your bed for use during the night.     6.  Shoes should be fitted by a professional and should not cause areas of irritation.  Check your feet regularly when wearing a new pair of shoes and replace them as needed.     7.  Talk to your doctor about proper exercise. Exercise and stretching stimulate blood flow to your feet and maintain proper glucose  levels.     8.  Monitor your blood glucose level as instructed by your doctor. Notify your doctor immediately if your blood sugar is abnormally high or low.    9. Cut your nails straight across, but then gently round any sharp edges with a cardboard nail file. If you have neuropathy, peripheral vascular disease or cannot see that well to trim your own toenails contact [facility] ([phone]) or [facility] ([phone]).      THINGS TO AVOID DOING   1.  Do not soak your feet if you have an open sore. Use only lukewarm water and always check the temperature with your hand as hot water can easily burn your feet.       2.  Never use a hot water bottle or heating pad on your feet. Also do not apply cold compresses to your feet. With decreased sensation, you could burn or freeze your feet.       3.  Do not apply any of these to your feet:    -  Over the counter medicine for corns or warts    -  Harsh chemicals like boric acid    -  Do not self-treat corns, cuts, blisters or infections. Always consult your doctor.       4.  Do not wear sandals, slippers or walk barefoot, especially on hot sand or concrete or other harsh surfaces.     5.  If you smoke, stop!!!            Body Mass Index (BMI)  Many things can cause foot and ankle problems. Foot structure, activity level, foot mechanics and injuries are common causes of pain.  One very important issue that often goes unmentioned, is body weight. Extra weight can cause increased stress on muscles, ligaments, bones and tendons.  Sometimes just a few extra pounds is all it takes to put one over her/his threshold. Without reducing that stress, it can be difficult to alleviate pain. Some people are uncomfortable addressing this issue, but we feel it is important for you to think about it. As [facility]  Ank[facility] specialists, our job is addressing the lower extremity problem and possible causes. Regarding extra body weight, we encourage patients to discuss diet and weight management  plans with their primary care doctors. It is this team approach that gives you the best opportunity for pain relief and getting you back on your feet.

## 2018-05-15 NOTE — LETTER
5/15/2018         RE: Gil Chowdary  [address] [place] [address]        Dear Colleague,    Thank you for referring your patient, Gil Chowdary, to the [facility]. Please see a copy of my visit note below.    PATIENT HISTORY:  .  Gil Chowdary is a 57 year old male who presents to clinic for swelling to legs. Notes it has been going on since his heart issues a year or two ago.legs get lots of cramps. Has compression socks but can't get them off. Pain is 4/10. Would like to know what can be done to get rid of the swelling.      Review of Systems:  Patient denies fever, chills, rash, wound, stiffness, limping, numbness, weakness, heart burn, blood in stool, chest pain with activity, calf pain when walking, shortness of breath with activity, chronic cough, easy bleeding/bruising,  excessive thirst, fatigue, depression, anxiety.  Patient admits to swelling.     PAST MEDICAL HISTORY:   Past Medical History:   Diagnosis Date     Former tobacco use      Glaucoma      Hyperlipidemia LDL goal <160 12/6/2011     Obesity      DRAKE on CPAP      Right bundle branch block      Severe aortic stenosis     On Echo 10/28/16        PAST SURGICAL HISTORY:   Past Surgical History:   Procedure Laterality Date     HEART CATH LEFT HEART CATH  04/07/2017    Diffuse non-obstuctive CAD     REPAIR VALVE AORTIC N/A 5/16/2017    Procedure: REPAIR VALVE AORTIC;  Median Sternotony, CardioPulmonary Bypass, Aortic Valve Replace using 25MM Trifecta Valve with [employer], Septal myectomy;  Surgeon: Jun Simon MD;  Location:  OR     REPLACE VALVE AORTIC N/A 5/16/2017    Procedure: REPLACE VALVE AORTIC;;  Surgeon: Jun Simon MD;  Location:  OR     SINUS SURGERY  2005        MEDICATIONS:   Current Outpatient Prescriptions:      acetaminophen (TYLENOL) 325 MG tablet, Take 2 tablets (650 mg) by mouth every 4 hours as needed for mild pain, Disp: 100 tablet, Rfl: 0      aspirin 81 MG tablet, Take 81 mg by mouth every morning , Disp: , Rfl:      atorvastatin (LIPITOR) 40 MG tablet, Take 1 tablet (40 mg) by mouth daily, Disp: 90 tablet, Rfl: 0     bumetanide (BUMEX) 1 MG tablet, Take 1 tablet (1 mg) by mouth 2 times daily, Disp: 60 tablet, Rfl: 0     cholecalciferol (VITAMIN D3) 5000 UNITS TABS tablet, Take 5,000 Units by mouth every morning, Disp: , Rfl:      clindamycin (CLEOCIN) 300 MG capsule, Take 1 capsule (300 mg) by mouth as needed, Disp: 10 capsule, Rfl: 3     insulin aspart (NOVOLOG FLEXPEN) 100 UNIT/ML injection, Inject Subcutaneous 3 times daily (with meals), Disp: , Rfl:      Insulin Glargine (BASAGLAR KWIKPEN SC), , Disp: , Rfl:      metoprolol (LOPRESSOR) 50 MG tablet, Take 1 tablet (50 mg) by mouth 2 times daily, Disp: 60 tablet, Rfl: 0     Multiple Vitamins-Minerals (CENTRUM ADULTS PO), Take 1 tablet by mouth every morning , Disp: , Rfl:      pantoprazole (PROTONIX) 40 MG EC tablet, Take 1 tablet (40 mg) by mouth every morning, Disp: 30 tablet, Rfl: 0     potassium chloride SA 15 MEQ TBCR, Take 30 mEq by mouth 2 times daily, Disp: 90 tablet, Rfl: 0     prednisoLONE acetate (PRED FORTE) 1 % ophthalmic susp, Place 1 drop into both eyes as needed , Disp: , Rfl:      timolol (TIMOPTIC) 0.5 % ophthalmic solution, 1 drop 1 DROP INTO BOTH EYES 2 TIMES DAILY, Disp: , Rfl:      warfarin (COUMADIN) 3 MG tablet, Take 1 tab (3 mg) on 5/24 and 5/25 and have INR checked on 5/26 and have dose adjusted, Disp: 30 tablet, Rfl: 0     ALLERGIES:    Allergies   Allergen Reactions     Amoxicillin      Itchy      Penicillins Hives        SOCIAL HISTORY:   Social History     Social History     Marital status:      Spouse name: N/A     Number of children: N/A     Years of education: N/A     Occupational History     Not on file.     Social History Main Topics     Smoking status: Former Smoker     Packs/day: 1.00     Years: 20.00     Types: Cigarettes, Cigars     Quit date: 10/21/2011  "    Smokeless tobacco: Never Used     Alcohol use No     Drug use: No     Sexual activity: Not on file     Other Topics Concern     Parent/Sibling W/ Cabg, Mi Or Angioplasty Before 65f 55m? Yes     brother triple bypass 49     Social History Narrative        FAMILY HISTORY:   Family History   Problem Relation Age of Onset     Family History Negative Mother      DIABETES Father      Heart Surgery Father      CABG     Coronary Artery Disease Father      Hypertension Brother      DIABETES Brother      HEART DISEASE Brother      Heart Surgery Brother      CABG x 3     Family History Negative Sister      DIABETES Brother      History of diabetes, but no longer an issue     Family History Negative Brother         EXAM:Vitals: /68  Ht 1.803 m (5' 11\")  Wt 117.9 kg (260 lb)  BMI 36.26 kg/m2    General appearance: Patient is alert and fully cooperative with history & exam.  No sign of distress is noted during the visit.     Psychiatric: Affect is pleasant & appropriate.  Patient appears motivated to improve health.     Respiratory: Breathing is regular & unlabored while sitting.     HEENT: Hearing is intact to spoken word.  Speech is clear.  No gross evidence of visual impairment that would impact ambulation.     Dermatologic: Skin is intact to both lower extremities without significant lesions, rash or abrasion.  No paronychia or evidence of soft tissue infection is noted.     Vascular: DP & PT pulses are not palpable bilaterally due to edema.  Significant pitting edema to both legs but no varicosities noted.  CFT and skin temperature is normal to both lower extremities.     Neurologic: Lower extremity sensation is diminished to both feet.      Musculoskeletal: Patient is ambulatory without assistive device or brace.  No gross ankle deformity noted.  No foot or ankle joint effusion is noted.    A1C: 6.2 (5/2017)     ASSESSMENT:    Diabetic polyneuropathy associated with type 2 diabetes mellitus (H)  Lymphedema of " both lower extremities  Leg pain, bilateral       PLAN:  [facility] in [facility]. Talked about swelling of legs. Recommend compression socks, he has them and can't get them on.   Was given tensogrip and order for device to help put compression socks on. Discussed lymphedema clinic referral. He will kolby lif he wants one. Also given order for diabetic shoes and inserts.        Iris Mosley DPM, Podiatry/Foot and Ankle Surgery    Weight management plan: Patient was referred to their PCP to discuss a diet and exercise plan.      Again, thank you for allowing me to participate in the care of your patient.        Sincerely,        Iris Mosley DPM, Podiatry/Foot and Ankle Surgery

## 2018-05-15 NOTE — PROGRESS NOTES
PATIENT HISTORY:  .  Gil Chowdary is a 57 year old male who presents to clinic for swelling to legs. Notes it has been going on since his heart issues a year or two ago.legs get lots of cramps. Has compression socks but can't get them off. Pain is 4/10. Would like to know what can be done to get rid of the swelling.      Review of Systems:  Patient denies fever, chills, rash, wound, stiffness, limping, numbness, weakness, heart burn, blood in stool, chest pain with activity, calf pain when walking, shortness of breath with activity, chronic cough, easy bleeding/bruising,  excessive thirst, fatigue, depression, anxiety.  Patient admits to swelling.     PAST MEDICAL HISTORY:   Past Medical History:   Diagnosis Date     Former tobacco use      Glaucoma      Hyperlipidemia LDL goal <160 12/6/2011     Obesity      DRAKE on CPAP      Right bundle branch block      Severe aortic stenosis     On Echo 10/28/16        PAST SURGICAL HISTORY:   Past Surgical History:   Procedure Laterality Date     HEART CATH LEFT HEART CATH  04/07/2017    Diffuse non-obstuctive CAD     REPAIR VALVE AORTIC N/A 5/16/2017    Procedure: REPAIR VALVE AORTIC;  Median Sternotony, CardioPulmonary Bypass, Aortic Valve Replace using 25MM Trifecta Valve with [employer], Septal myectomy;  Surgeon: Jun Simon MD;  Location:  OR     REPLACE VALVE AORTIC N/A 5/16/2017    Procedure: REPLACE VALVE AORTIC;;  Surgeon: Jun Simon MD;  Location:  OR     SINUS SURGERY  2005        MEDICATIONS:   Current Outpatient Prescriptions:      acetaminophen (TYLENOL) 325 MG tablet, Take 2 tablets (650 mg) by mouth every 4 hours as needed for mild pain, Disp: 100 tablet, Rfl: 0     aspirin 81 MG tablet, Take 81 mg by mouth every morning , Disp: , Rfl:      atorvastatin (LIPITOR) 40 MG tablet, Take 1 tablet (40 mg) by mouth daily, Disp: 90 tablet, Rfl: 0     bumetanide (BUMEX) 1 MG tablet, Take 1 tablet (1 mg) by mouth 2 times daily,  Disp: 60 tablet, Rfl: 0     cholecalciferol (VITAMIN D3) 5000 UNITS TABS tablet, Take 5,000 Units by mouth every morning, Disp: , Rfl:      clindamycin (CLEOCIN) 300 MG capsule, Take 1 capsule (300 mg) by mouth as needed, Disp: 10 capsule, Rfl: 3     insulin aspart (NOVOLOG FLEXPEN) 100 UNIT/ML injection, Inject Subcutaneous 3 times daily (with meals), Disp: , Rfl:      Insulin Glargine (BASAGLAR KWIKPEN SC), , Disp: , Rfl:      metoprolol (LOPRESSOR) 50 MG tablet, Take 1 tablet (50 mg) by mouth 2 times daily, Disp: 60 tablet, Rfl: 0     Multiple Vitamins-Minerals (CENTRUM ADULTS PO), Take 1 tablet by mouth every morning , Disp: , Rfl:      pantoprazole (PROTONIX) 40 MG EC tablet, Take 1 tablet (40 mg) by mouth every morning, Disp: 30 tablet, Rfl: 0     potassium chloride SA 15 MEQ TBCR, Take 30 mEq by mouth 2 times daily, Disp: 90 tablet, Rfl: 0     prednisoLONE acetate (PRED FORTE) 1 % ophthalmic susp, Place 1 drop into both eyes as needed , Disp: , Rfl:      timolol (TIMOPTIC) 0.5 % ophthalmic solution, 1 drop 1 DROP INTO BOTH EYES 2 TIMES DAILY, Disp: , Rfl:      warfarin (COUMADIN) 3 MG tablet, Take 1 tab (3 mg) on 5/24 and 5/25 and have INR checked on 5/26 and have dose adjusted, Disp: 30 tablet, Rfl: 0     ALLERGIES:    Allergies   Allergen Reactions     Amoxicillin      Itchy      Penicillins Hives        SOCIAL HISTORY:   Social History     Social History     Marital status:      Spouse name: N/A     Number of children: N/A     Years of education: N/A     Occupational History     Not on file.     Social History Main Topics     Smoking status: Former Smoker     Packs/day: 1.00     Years: 20.00     Types: Cigarettes, Cigars     Quit date: 10/21/2011     Smokeless tobacco: Never Used     Alcohol use No     Drug use: No     Sexual activity: Not on file     Other Topics Concern     Parent/Sibling W/ Cabg, Mi Or Angioplasty Before 65f 55m? Yes     brother triple bypass 49     Social History Narrative       "  FAMILY HISTORY:   Family History   Problem Relation Age of Onset     Family History Negative Mother      DIABETES Father      Heart Surgery Father      CABG     Coronary Artery Disease Father      Hypertension Brother      DIABETES Brother      HEART DISEASE Brother      Heart Surgery Brother      CABG x 3     Family History Negative Sister      DIABETES Brother      History of diabetes, but no longer an issue     Family History Negative Brother         EXAM:Vitals: /68  Ht 1.803 m (5' 11\")  Wt 117.9 kg (260 lb)  BMI 36.26 kg/m2    General appearance: Patient is alert and fully cooperative with history & exam.  No sign of distress is noted during the visit.     Psychiatric: Affect is pleasant & appropriate.  Patient appears motivated to improve health.     Respiratory: Breathing is regular & unlabored while sitting.     HEENT: Hearing is intact to spoken word.  Speech is clear.  No gross evidence of visual impairment that would impact ambulation.     Dermatologic: Skin is intact to both lower extremities without significant lesions, rash or abrasion.  No paronychia or evidence of soft tissue infection is noted.     Vascular: DP & PT pulses are not palpable bilaterally due to edema.  Significant pitting edema to both legs but no varicosities noted.  CFT and skin temperature is normal to both lower extremities.     Neurologic: Lower extremity sensation is diminished to both feet.      Musculoskeletal: Patient is ambulatory without assistive device or brace.  No gross ankle deformity noted.  No foot or ankle joint effusion is noted.    A1C: 6.2 (5/2017)     ASSESSMENT:    Diabetic polyneuropathy associated with type 2 diabetes mellitus (H)  Lymphedema of both lower extremities  Leg pain, bilateral       PLAN:  Reviewed patient's chart in [facility]. Talked about swelling of legs. Recommend compression socks, he has them and can't get them on.   Was given tensogrip and order for device to help put compression socks " on. Discussed lymphedema clinic referral. He will kolby lif he wants one. Also given order for diabetic shoes and inserts.        Iris Mosley DPM, Podiatry/Foot and Ankle Surgery    Weight management plan: Patient was referred to their PCP to discuss a diet and exercise plan.

## 2018-07-15 ENCOUNTER — OFFICE VISIT (OUTPATIENT)
Dept: URGENT CARE | Facility: URGENT CARE | Age: 58
End: 2018-07-15
Payer: COMMERCIAL

## 2018-07-15 VITALS
OXYGEN SATURATION: 99 % | HEART RATE: 73 BPM | BODY MASS INDEX: 36.96 KG/M2 | SYSTOLIC BLOOD PRESSURE: 108 MMHG | WEIGHT: 265 LBS | DIASTOLIC BLOOD PRESSURE: 64 MMHG | TEMPERATURE: 98.4 F

## 2018-07-15 DIAGNOSIS — T21.20XA: Primary | ICD-10-CM

## 2018-07-15 DIAGNOSIS — L08.9 LOCAL INFECTION OF WOUND: ICD-10-CM

## 2018-07-15 DIAGNOSIS — T14.8XXA LOCAL INFECTION OF WOUND: ICD-10-CM

## 2018-07-15 PROCEDURE — 99203 OFFICE O/P NEW LOW 30 MIN: CPT | Performed by: FAMILY MEDICINE

## 2018-07-15 RX ORDER — SILVER SULFADIAZINE 10 MG/G
CREAM TOPICAL 2 TIMES DAILY
Qty: 85 G | Refills: 1 | Status: SHIPPED | OUTPATIENT
Start: 2018-07-15 | End: 2018-07-22

## 2018-07-15 RX ORDER — SULFAMETHOXAZOLE/TRIMETHOPRIM 800-160 MG
1 TABLET ORAL 2 TIMES DAILY
Qty: 20 TABLET | Refills: 0 | Status: SHIPPED | OUTPATIENT
Start: 2018-07-15 | End: 2018-07-25

## 2018-07-15 NOTE — PATIENT INSTRUCTIONS
Apply silvadene cream to burn area twice a day for 7 days.  Take full course of antibiotic - Bactrim - twice a day for 10 days.  Okay to apply telfa dressing (non-sticky pads)     Contact INR nurse for coumadin dosing adjustments.      Second-Degree Burn  A burn occurs when skin is exposed to too much heat, sun, or harsh chemicals. A second-degree burn (partial-thickness burn) is deeper than a first-degree burn (superficial burn). It usually causes a blister to form. The blister may remain intact and gradually go away on its own. Or it may break open. The goal of treatment is to relieve pain and stop infection while the burn heals.  Home care  Use pain medicine as directed. If no pain medicine was prescribed, you may use over-the-counter medicine to control pain. If you have chronic liver or kidney disease, talk with your healthcare provider before using acetaminophen or ibuprofen. Also talk with your provider if you've had a stomach ulcer or GI bleeding.  General care    On the first day, you may put a cool compress on the wound to ease pain. A cool compress is a small towel soaked in cool water.    If you were sent home with the blister intact, don't break the blister. The risk for infection is greater if the blister breaks. If a bandage was applied, change it once a day, unless told otherwise. If the bandage becomes wet or soiled, change it as soon as you can.    Sometimes an infection may occur even with proper treatment. Check the burn daily for the signs of infection listed below.    Eat more calories and protein until your wound is healed.    Wear a hat, sunscreen, and long sleeves while in the sun to protect the skin.    Don't pick or scratch at the wound. Use over-the-counter medicines like diphenhydramine for itching.    Avoid tight-fitting clothes.  To change a bandage:    Wash your hands.    Take off the old bandage. If the bandage sticks, soak it off under warm running water.    Once the bandage is off,  gently wash the burn area with mild soap and warm water to remove any cream, ointment, ooze, or scab. You may do this in a sink, under a tub faucet, or in the shower. Rinse off the soap and gently pat dry with a clean towel.    Check for signs of infection listed below.    Put any prescribed antibiotic cream or ointment on the wound.    Cover the burn with nonstick gauze. Then wrap it with the bandage material.  Follow-up care  Follow up with your healthcare provider, or as advised.  When to seek medical advice  Call your healthcare provider right away if you have any of these signs of infection:    Fever of 100.4 F (38 C) or higher, or as directed by your healthcare provider    Pain that gets worse    Redness or swelling that gets worse    Pus comes from the burn    Red streaks in your skin coming from the burn    Wound doesn't appear to be healing    Nausea or vomiting   Date Last Reviewed: 1/1/2017 2000-2017 [employer]. [address] [place] [address]. All rights reserved. This information is not intended as a substitute for professional medical care. Always follow your healthcare professional's instructions.        Cellulitis  Cellulitis is an infection of the deep layers of skin. A break in the skin, such as a cut or scratch, can let bacteria under the skin. If the bacteria get to deep layers of the skin, it can be serious. If not treated, cellulitis can get into the bloodstream and lymph nodes. The infection can then spread throughout the body. This causes serious illness.  Cellulitis causes the affected skin to become red, swollen, warm, and sore. The reddened areas have a visible border. An open sore may leak fluid (pus). You may have a fever, chills, and pain.  Cellulitis is treated with antibiotics taken for 7 to 10 days. An open sore may be cleaned and covered with cool wet gauze. Symptoms should get better 1 to 2 days after treatment is started. Make sure to take all the  antibiotics for the full number of days until they are gone. Keep taking the medicine even if your symptoms go away.  Home care  Follow these tips:    Limit the use of the part of your body with cellulitis.     If the infection is on your leg, keep your leg raised while sitting. This will help to reduce swelling.    Take all of the antibiotic medicine exactly as directed until it is gone. Do not miss any doses, especially during the first 7 days. Don t stop taking the medicine when your symptoms get better.    Keep the affected area clean and dry.    Wash your hands with soap and warm water before and after touching your skin. Anyone else who touches your skin should also wash his or her hands. Don't share towels.  Follow-up care  Follow up with your healthcare provider, or as advised. If your infection does not go away on the first antibiotic, your healthcare provider will prescribe a different one.  When to seek medical advice  Call your healthcare provider right away if any of these occur:    Red areas that spread    Swelling or pain that gets worse    Fluid leaking from the skin (pus)    Fever higher of 100.4  F (38.0  C) or higher after 2 days on antibiotics  Date Last Reviewed: 9/1/2016 2000-2017 [employer]. [address] [place] [address], [place] [address]. All rights reserved. This information is not intended as a substitute for professional medical care. Always follow your healthcare professional's instructions.

## 2018-07-15 NOTE — PROGRESS NOTES
SUBJECTIVE:  Chief Complaint   Patient presents with     Urgent Care     Pt in clinic to have eval for burn on back that occurred while using heating pad.     Leo Chowdary is a 57 year old male presents with a chief complaint of burn on his back.    Patient was using heating pad and had fallen asleep, did not know that he has sustained a burn to his back until seen by chiropractor on Thursday (3 days ago) and the blister was popped and debrided.  Patient has noted more drainage but was unsure as he has been struggling with water retention.  Patient was using OTC antibiotic ointment to area but worried as getting more bleeding and drainage.    Patient has diabetes, on coumadin for aortic valve replacement    Past Medical History:   Diagnosis Date     Former tobacco use      Glaucoma      Hyperlipidemia LDL goal <160 12/6/2011     Obesity      DRAKE on CPAP      Right bundle branch block      Severe aortic stenosis     On Echo 10/28/16     Current Outpatient Prescriptions   Medication Sig Dispense Refill     acetaminophen (TYLENOL) 325 MG tablet Take 2 tablets (650 mg) by mouth every 4 hours as needed for mild pain 100 tablet 0     aspirin 81 MG tablet Take 81 mg by mouth every morning        atorvastatin (LIPITOR) 40 MG tablet Take 1 tablet (40 mg) by mouth daily 90 tablet 0     bumetanide (BUMEX) 1 MG tablet Take 1 tablet (1 mg) by mouth 2 times daily 60 tablet 0     cholecalciferol (VITAMIN D3) 5000 UNITS TABS tablet Take 5,000 Units by mouth every morning       clindamycin (CLEOCIN) 300 MG capsule Take 1 capsule (300 mg) by mouth as needed 10 capsule 3     DONEPEZIL HCL PO Take 5 mg by mouth       insulin aspart (NOVOLOG FLEXPEN) 100 UNIT/ML injection Inject Subcutaneous 3 times daily (with meals)       Insulin Glargine (BASAGLAR KWIKPEN SC)        metoprolol (LOPRESSOR) 50 MG tablet Take 1 tablet (50 mg) by mouth 2 times daily 60 tablet 0     Multiple Vitamins-Minerals (CENTRUM ADULTS PO) Take 1  tablet by mouth every morning        pantoprazole (PROTONIX) 40 MG EC tablet Take 1 tablet (40 mg) by mouth every morning 30 tablet 0     potassium chloride SA 15 MEQ TBCR Take 30 mEq by mouth 2 times daily 90 tablet 0     prednisoLONE acetate (PRED FORTE) 1 % ophthalmic susp Place 1 drop into both eyes as needed        timolol (TIMOPTIC) 0.5 % ophthalmic solution 1 drop 1 DROP INTO BOTH EYES 2 TIMES DAILY       warfarin (COUMADIN) 3 MG tablet Take 1 tab (3 mg) on 5/24 and 5/25 and have INR checked on 5/26 and have dose adjusted 30 tablet 0     Social History   Substance Use Topics     Smoking status: Former Smoker     Packs/day: 1.00     Years: 20.00     Types: Cigarettes, Cigars     Quit date: 10/21/2011     Smokeless tobacco: Never Used     Alcohol use No       ROS:  Review of systems negative except as stated above.    EXAM:   /64  Pulse 73  Temp 98.4  F (36.9  C) (Tympanic)  Wt 265 lb (120.2 kg)  SpO2 99%  BMI 36.96 kg/m2  Gen: healthy,alert,no distress  SKIN: back - 7X4 cm and 2.5X1 cm ovalish patches of denuded skin with residual ring of skin from blister.  Surrounding erythema from burn wound, slight warmth.  No overt purulent discharge.  Another small area of redden patch with no blister formation.  Mildly dry and scaling skin on back  PSYCH: alert, affect bright    ASSESSMENT/PLAN:   (T21.20XA) Partial thickness burn of trunk, initial encounter  (primary encounter diagnosis)  Comment: back  Plan: silver sulfADIAZINE (SILVADENE) 1 % cream            (T14.8XXA,  L08.9) Local infection of wound  Comment: burn wound  Plan: sulfamethoxazole-trimethoprim (BACTRIM         DS/SEPTRA DS) 800-160 MG per tablet              Discussed 2nd degree burn and safety around heating pads.  Silvadene cream applied to burn, RX silvadene given.  Use Telfa dressing to wound until new skin formation.  Discussed wound infection, RX Bactrim DS given.  Discussed that will need to contact INR clinic for coumadin  adjustment.    Return to clinic if no resolution of symptoms.    Clarence Wyatt MD  July 15, 2018 12:39 PM

## 2018-07-15 NOTE — MR AVS SNAPSHOT
After Visit Summary   7/15/2018    Gil Chowdary    MRN: [id number]           Patient Information     Date Of Birth          1960        Visit Information        Provider Department      7/15/2018 10:10 AM Clarence Wyatt MD [facility]        Today's Diagnoses     Partial thickness burn of trunk, initial encounter    -  1    Local infection of wound          Care Instructions    Apply silvadene cream to burn area twice a day for 7 days.  Take full course of antibiotic - Bactrim - twice a day for 10 days.  Okay to apply telfa dressing (non-sticky pads)     Contact INR nurse for coumadin dosing adjustments.      Second-Degree Burn  A burn occurs when skin is exposed to too much heat, sun, or harsh chemicals. A second-degree burn (partial-thickness burn) is deeper than a first-degree burn (superficial burn). It usually causes a blister to form. The blister may remain intact and gradually go away on its own. Or it may break open. The goal of treatment is to relieve pain and stop infection while the burn heals.  Home care  Use pain medicine as directed. If no pain medicine was prescribed, you may use over-the-counter medicine to control pain. If you have chronic liver or kidney disease, talk with your healthcare provider before using acetaminophen or ibuprofen. Also talk with your provider if you've had a stomach ulcer or GI bleeding.  General care    On the first day, you may put a cool compress on the wound to ease pain. A cool compress is a small towel soaked in cool water.    If you were sent home with the blister intact, don't break the blister. The risk for infection is greater if the blister breaks. If a bandage was applied, change it once a day, unless told otherwise. If the bandage becomes wet or soiled, change it as soon as you can.    Sometimes an infection may occur even with proper treatment. Check the burn daily for the signs of infection listed below.    Eat  more calories and protein until your wound is healed.    Wear a hat, sunscreen, and long sleeves while in the sun to protect the skin.    Don't pick or scratch at the wound. Use over-the-counter medicines like diphenhydramine for itching.    Avoid tight-fitting clothes.  To change a bandage:    Wash your hands.    Take off the old bandage. If the bandage sticks, soak it off under warm running water.    Once the bandage is off, gently wash the burn area with mild soap and warm water to remove any cream, ointment, ooze, or scab. You may do this in a sink, under a tub faucet, or in the shower. Rinse off the soap and gently pat dry with a clean towel.    Check for signs of infection listed below.    Put any prescribed antibiotic cream or ointment on the wound.    Cover the burn with nonstick gauze. Then wrap it with the bandage material.  Follow-up care  Follow up with your healthcare provider, or as advised.  When to seek medical advice  Call your healthcare provider right away if you have any of these signs of infection:    Fever of 100.4 F (38 C) or higher, or as directed by your healthcare provider    Pain that gets worse    Redness or swelling that gets worse    Pus comes from the burn    Red streaks in your skin coming from the burn    Wound doesn't appear to be healing    Nausea or vomiting   Date Last Reviewed: 1/1/2017 2000-2017 [employer]. [address] [place] [address]. All rights reserved. This information is not intended as a substitute for professional medical care. Always follow your healthcare professional's instructions.        Cellulitis  Cellulitis is an infection of the deep layers of skin. A break in the skin, such as a cut or scratch, can let bacteria under the skin. If the bacteria get to deep layers of the skin, it can be serious. If not treated, cellulitis can get into the bloodstream and lymph nodes. The infection can then spread throughout the body. This causes  serious illness.  Cellulitis causes the affected skin to become red, swollen, warm, and sore. The reddened areas have a visible border. An open sore may leak fluid (pus). You may have a fever, chills, and pain.  Cellulitis is treated with antibiotics taken for 7 to 10 days. An open sore may be cleaned and covered with cool wet gauze. Symptoms should get better 1 to 2 days after treatment is started. Make sure to take all the antibiotics for the full number of days until they are gone. Keep taking the medicine even if your symptoms go away.  Home care  Follow these tips:    Limit the use of the part of your body with cellulitis.     If the infection is on your leg, keep your leg raised while sitting. This will help to reduce swelling.    Take all of the antibiotic medicine exactly as directed until it is gone. Do not miss any doses, especially during the first 7 days. Don t stop taking the medicine when your symptoms get better.    Keep the affected area clean and dry.    Wash your hands with soap and warm water before and after touching your skin. Anyone else who touches your skin should also wash his or her hands. Don't share towels.  Follow-up care  Follow up with your healthcare provider, or as advised. If your infection does not go away on the first antibiotic, your healthcare provider will prescribe a different one.  When to seek medical advice  Call your healthcare provider right away if any of these occur:    Red areas that spread    Swelling or pain that gets worse    Fluid leaking from the skin (pus)    Fever higher of 100.4  F (38.0  C) or higher after 2 days on antibiotics  Date Last Reviewed: 9/1/2016 2000-2017 [employer]. [address] [place] [address], [place] [address]. All rights reserved. This information is not intended as a substitute for professional medical care. Always follow your healthcare professional's instructions.                Follow-ups after your visit        Who to contact      If you have questions or need follow up information about today's clinic visit or your schedule please contact [facility] directly at [phone].  Normal or non-critical lab and imaging results will be communicated to you by [email or web address], letter or phone within 4 business days after the clinic has received the results. If you do not hear from us within 7 days, please contact the clinic through [employer] or phone. If you have a critical or abnormal lab result, we will notify you by phone as soon as possible.  Submit refill requests through [employer] or call your pharmacy and they will forward the refill request to us. Please allow 3 business days for your refill to be completed.          Additional Information About Your Visit        [employer] Information     [employer] gives you secure access to your electronic health record. If you see a primary care provider, you can also send messages to your care team and make appointments. If you have questions, please call your primary care clinic.  If you do not have a primary care provider, please call [phone] and they will assist you.        Care EveryWhere ID     This is your Care EveryWhere ID. This could be used by other organizations to access your [facility] medical records  [id number]        Your Vitals Were     Pulse Temperature Pulse Oximetry BMI (Body Mass Index)          73 98.4  F (36.9  C) (Tympanic) 99% 36.96 kg/m2         Blood Pressure from Last 3 Encounters:   07/15/18 108/64   05/15/18 110/68   04/20/18 109/63    Weight from Last 3 Encounters:   07/15/18 265 lb (120.2 kg)   05/15/18 260 lb (117.9 kg)   04/20/18 281 lb (127.5 kg)              Today, you had the following     No orders found for display         Today's Medication Changes          These changes are accurate as of 7/15/18 10:59 AM.  If you have any questions, ask your nurse or doctor.               Start taking these medicines.        Dose/Directions    silver sulfADIAZINE 1  % cream   Commonly known as:  SILVADENE   Used for:  Partial thickness burn of trunk, initial encounter   Started by:  Clarence Wyatt MD        Apply topically 2 times daily for 7 days   Quantity:  85 g   Refills:  1       sulfamethoxazole-trimethoprim 800-160 MG per tablet   Commonly known as:  BACTRIM DS/SEPTRA DS   Used for:  Local infection of wound   Started by:  Clarence Wyatt MD        Dose:  1 tablet   Take 1 tablet by mouth 2 times daily for 10 days   Quantity:  20 tablet   Refills:  0            Where to get your medicines      These medications were sent to [facility] [address] [place] [address] AT [address] [place] [address] & [address] [address]     Phone:  [phone]     silver sulfADIAZINE 1 % cream    sulfamethoxazole-trimethoprim 800-160 MG per tablet                Primary Care Provider Fax #    Sam Grimes [id number] [place] [phone]       [facility] [address]        Equal Access to Services     [facility] : Hadii jovanna suarez hadasho Sojunali, waaxda luqadaha, qaybta kaalmada adeegyada, hue swanson . So [facility] [phone].    ATENCIÓN: Si habla español, tiene a cross disposición servicios gratuitos de asistencia lingüística. Ll[facility]et[facility] [phone].    We comply with applicable federal civil rights laws and [place] laws. We do not discriminate on the basis of race, color, national origin, age, disability, sex, sexual orientation, or gender identity.            Thank you!     Thank you for choosing [facility]  for your care. Our goal is always to provide you with excellent care. Hearing back from our patients is one way we can continue to improve our services. Please take a few minutes to complete the written survey that you may receive in the mail after your visit with us. Thank you!             Your Updated Medication List - Protect others around you: Learn how to safely use, store and throw away your  medicines at www.disposemymeds.org.          This list is accurate as of 7/15/18 10:59 AM.  Always use your most recent med list.                   Brand Name Dispense Instructions for use Diagnosis    acetaminophen 325 MG tablet    TYLENOL    100 tablet    Take 2 tablets (650 mg) by mouth every 4 hours as needed for mild pain    S/P AVR (aortic valve replacement), Acute post-operative pain       aspirin 81 MG tablet      Take 81 mg by mouth every morning        atorvastatin 40 MG tablet    LIPITOR    90 tablet    Take 1 tablet (40 mg) by mouth daily    Hyperlipidemia with target LDL less than 70       BASAGLAR KWAIDAPEN SC           bumetanide 1 MG tablet    BUMEX    60 tablet    Take 1 tablet (1 mg) by mouth 2 times daily    S/P AVR (aortic valve replacement)       CENTRUM ADULTS PO      Take 1 tablet by mouth every morning        cholecalciferol 5000 units Tabs tablet    vitamin D3     Take 5,000 Units by mouth every morning        clindamycin 300 MG capsule    CLEOCIN    10 capsule    Take 1 capsule (300 mg) by mouth as needed    S/P AVR (aortic valve replacement)       DONEPEZIL HCL PO      Take 5 mg by mouth    Diabetic polyneuropathy associated with type 2 diabetes mellitus (H), Lymphedema of both lower extremities, Leg pain, bilateral       metoprolol tartrate 50 MG tablet    LOPRESSOR    60 tablet    Take 1 tablet (50 mg) by mouth 2 times daily    Paroxysmal supraventricular tachycardia (H), S/P AVR (aortic valve replacement)       NovoLOG FLEXPEN 100 UNIT/ML injection   Generic drug:  insulin aspart      Inject Subcutaneous 3 times daily (with meals)        pantoprazole 40 MG EC tablet    PROTONIX    30 tablet    Take 1 tablet (40 mg) by mouth every morning    S/P AVR (aortic valve replacement)       Potassium Chloride [place] CR 15 MEQ Tbcr     90 tablet    Take 30 mEq by mouth 2 times daily    S/P AVR (aortic valve replacement)       prednisoLONE acetate 1 % ophthalmic susp    PRED FORTE     Place 1 drop into  both eyes as needed        silver sulfADIAZINE 1 % cream    SILVADENE    85 g    Apply topically 2 times daily for 7 days    Partial thickness burn of trunk, initial encounter       sulfamethoxazole-trimethoprim 800-160 MG per tablet    BACTRIM DS/SEPTRA DS    20 tablet    Take 1 tablet by mouth 2 times daily for 10 days    Local infection of wound       timolol 0.5 % ophthalmic solution    TIMOPTIC     1 drop 1 DROP INTO BOTH EYES 2 TIMES DAILY        warfarin 3 MG tablet    COUMADIN    30 tablet    Take 1 tab (3 mg) on 5/24 and 5/25 and have INR checked on 5/26 and have dose adjusted    Paroxysmal supraventricular tachycardia (H), S/P AVR (aortic valve replacement)

## 2018-08-16 ENCOUNTER — TRANSFERRED RECORDS (OUTPATIENT)
Dept: HEALTH INFORMATION MANAGEMENT | Facility: CLINIC | Age: 58
End: 2018-08-16

## 2018-10-10 ENCOUNTER — TELEPHONE (OUTPATIENT)
Dept: CARDIOLOGY | Facility: CLINIC | Age: 58
End: 2018-10-10

## 2018-10-10 ENCOUNTER — TRANSFERRED RECORDS (OUTPATIENT)
Dept: HEALTH INFORMATION MANAGEMENT | Facility: CLINIC | Age: 58
End: 2018-10-10

## 2018-10-10 LAB — TSH SERPL-ACNC: 4.03 MCU/ML (ref 0.3–5)

## 2018-10-10 NOTE — TELEPHONE ENCOUNTER
Call received. Stating, I hope you can help me. Weight gain with edema in lower extremities up to his groin. Saw endocrinology clinic today at [address] [facility] by Dr. Yeager. Was told he needed to call cardiology to be seen due to the increased weight gain/ edema.  Sounds short of breath over the phone. When asked if he was winded talking to me, he said no at first, and then did admit that he was. Weight when last seen 4/2018 265#- Today at MD office, weight of 320#, that is a 55# weight gain in 6 months.   Reading over Dr. Salinas's last OV note, denies shortness of breath, lower leg edema, and his weight was felt to be body weight, and not edema. Echo done 4/18/18 showing  mean gradient of 6.8 and a peak gradient of 13 mmHg and no aortic insufficiency.  Systolic function was normal with an EF of 60%-65%.  LVH was mild.  Previously it had been moderate.  Left atrium was borderline dilated.  Right heart pressures were normal at 25 mmHg plus right atrial pressure.  Relatively, a normal heart post-aortic valve replacement. Per Dr. Salinas's note.   Dr. Salinas is out of the office this week. Will see what is available for appointments to be seen this week.     Called Dr. Tejinder Yeager's office, endocrinologist [phone]. Spoke with Dr. Yeager. State he definitely has pitting edema up to proximal thigh area and was short of breath.  Lung sounds were clear to him. Thought his weight 2 weeks ago when he saw PMD Dr. Villatoro was 300#. That would mean he has gained 20# over the past 2 weeks. He did draw some labs, however their labs are sent out and will not have results until tomorrow morning. He was going to update his PMD, was glad patient reached out to cardiology, and thought about sending him to ED himself.    Spoke with Dr. Torres, who is also recommending he go to Ed to get worked up, or she will see him in clinic tomorrow.    Patient called back, states he does sleep in bed, with a couple of pillows.  Admits he does not feel good, and the edema has progressed over the past couple of weeks.   Informed that he should go to Ed to get checked out as he probably needs IV diuretics.   Spent about 20 minutes trying to convince him that he needs to take care of himself. 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 Doesn't know who he can get to come in to help her, if he goes to Ed. Encouraged to make some calls to relatives. At end of conversation, he will attempt to find someone to come to stay with his mother. Will make appointment for tomorrow morning with Dr. Torres to evaluate. Aware that she may send him to the Ed or have him admitted for evaluation.   Will go to [facility] he goes in [facility].

## 2018-10-11 ENCOUNTER — TELEPHONE (OUTPATIENT)
Dept: CARDIOLOGY | Facility: CLINIC | Age: 58
End: 2018-10-11

## 2018-10-11 LAB
ANION GAP SERPL CALCULATED.3IONS-SCNC: ABNORMAL MMOL/L
BUN SERPL-MCNC: 18 MG/DL (ref 7–25)
CALCIUM SERPL-MCNC: 8.6 MG/DL (ref 8.6–10.3)
CHLORIDE SERPLBLD-SCNC: 105 MMOL/L (ref 98–110)
CHOLEST SERPL-MCNC: 126 MG/DL (ref ?–200)
CO2 SERPL-SCNC: 22 MMOL/L (ref 20–32)
CREAT SERPL-MCNC: 1.35 MG/DL (ref 0.7–1.33)
GFR SERPL CREATININE-BSD FRML MDRD: 57 ML/MIN/1.73M2 (ref 60–?)
GLUCOSE SERPL-MCNC: 139 MG/DL (ref 70–99)
HDLC SERPL-MCNC: 23 MG/DL (ref 40–?)
LDLC SERPL CALC-MCNC: 71 MG/DL (ref ?–100)
NONHDLC SERPL-MCNC: 103 MG/DL (ref ?–130)
POTASSIUM SERPL-SCNC: 3.8 MMOL/L (ref 3.5–5.3)
SODIUM SERPL-SCNC: 136 MMOL/L (ref 135–146)
TRIGL SERPL-MCNC: 230 MG/DL (ref ?–150)

## 2018-10-11 NOTE — TELEPHONE ENCOUNTER
Patient states he is unable to keep OV appt 10-11-18 with Dr. Torres due to a family member failed to show up  to care for his mother who is essentially homebound due to several strokes.  Patient will have a neighbor come over to watch his mother so he can go to the ED today.    Called Endocrinology office for lab results BMP that were drawn 10-10-18.

## 2018-10-12 ENCOUNTER — APPOINTMENT (OUTPATIENT)
Dept: GENERAL RADIOLOGY | Facility: CLINIC | Age: 58
DRG: 291 | End: 2018-10-12
Attending: EMERGENCY MEDICINE
Payer: COMMERCIAL

## 2018-10-12 ENCOUNTER — HOSPITAL ENCOUNTER (INPATIENT)
Facility: CLINIC | Age: 58
LOS: 2 days | Discharge: HOME | DRG: 291 | End: 2018-10-14
Attending: EMERGENCY MEDICINE | Admitting: HOSPITALIST
Payer: COMMERCIAL

## 2018-10-12 DIAGNOSIS — R60.1 ANASARCA: ICD-10-CM

## 2018-10-12 DIAGNOSIS — I50.9 CONGESTIVE HEART FAILURE, UNSPECIFIED HF CHRONICITY, UNSPECIFIED HEART FAILURE TYPE (H): Primary | ICD-10-CM

## 2018-10-12 DIAGNOSIS — D61.818 OTHER PANCYTOPENIA (H): ICD-10-CM

## 2018-10-12 DIAGNOSIS — N28.9 RENAL INSUFFICIENCY: ICD-10-CM

## 2018-10-12 LAB
ALBUMIN SERPL-MCNC: 3.6 G/DL (ref 3.4–5)
ALBUMIN UR-MCNC: NEGATIVE MG/DL
ALP SERPL-CCNC: 82 U/L (ref 40–150)
ALT SERPL W P-5'-P-CCNC: 31 U/L (ref 0–70)
ANION GAP SERPL CALCULATED.3IONS-SCNC: 7 MMOL/L (ref 3–14)
APPEARANCE UR: CLEAR
AST SERPL W P-5'-P-CCNC: 55 U/L (ref 0–45)
BASOPHILS # BLD AUTO: 0 10E9/L (ref 0–0.2)
BASOPHILS # BLD AUTO: 0 10E9/L (ref 0–0.2)
BASOPHILS NFR BLD AUTO: 0.2 %
BASOPHILS NFR BLD AUTO: 0.2 %
BILIRUB SERPL-MCNC: 0.3 MG/DL (ref 0.2–1.3)
BILIRUB UR QL STRIP: NEGATIVE
BUN SERPL-MCNC: 25 MG/DL (ref 7–30)
CALCIUM SERPL-MCNC: 8.1 MG/DL (ref 8.5–10.1)
CHLORIDE SERPL-SCNC: 107 MMOL/L (ref 94–109)
CO2 SERPL-SCNC: 26 MMOL/L (ref 20–32)
COLOR UR AUTO: NORMAL
CREAT SERPL-MCNC: 1.4 MG/DL (ref 0.66–1.25)
DIFFERENTIAL METHOD BLD: ABNORMAL
DIFFERENTIAL METHOD BLD: ABNORMAL
EOSINOPHIL # BLD AUTO: 0.2 10E9/L (ref 0–0.7)
EOSINOPHIL # BLD AUTO: 0.2 10E9/L (ref 0–0.7)
EOSINOPHIL NFR BLD AUTO: 3.4 %
EOSINOPHIL NFR BLD AUTO: 4.4 %
ERYTHROCYTE [DISTWIDTH] IN BLOOD BY AUTOMATED COUNT: 16.7 % (ref 10–15)
ERYTHROCYTE [DISTWIDTH] IN BLOOD BY AUTOMATED COUNT: 16.7 % (ref 10–15)
GFR SERPL CREATININE-BSD FRML MDRD: 52 ML/MIN/1.7M2
GLUCOSE BLDC GLUCOMTR-MCNC: 146 MG/DL (ref 70–99)
GLUCOSE SERPL-MCNC: 89 MG/DL (ref 70–99)
GLUCOSE UR STRIP-MCNC: NEGATIVE MG/DL
HBA1C MFR BLD: 6.8 % (ref 0–5.6)
HCT VFR BLD AUTO: 26.6 % (ref 40–53)
HCT VFR BLD AUTO: 26.9 % (ref 40–53)
HGB BLD-MCNC: 8.2 G/DL (ref 13.3–17.7)
HGB BLD-MCNC: 8.2 G/DL (ref 13.3–17.7)
HGB UR QL STRIP: NEGATIVE
IMM GRANULOCYTES # BLD: 0 10E9/L (ref 0–0.4)
IMM GRANULOCYTES # BLD: 0 10E9/L (ref 0–0.4)
IMM GRANULOCYTES NFR BLD: 0 %
IMM GRANULOCYTES NFR BLD: 0.2 %
INR PPP: 2.01 (ref 0.86–1.14)
INTERPRETATION ECG - MUSE: NORMAL
KETONES UR STRIP-MCNC: NEGATIVE MG/DL
LEUKOCYTE ESTERASE UR QL STRIP: NEGATIVE
LYMPHOCYTES # BLD AUTO: 1.3 10E9/L (ref 0.8–5.3)
LYMPHOCYTES # BLD AUTO: 1.4 10E9/L (ref 0.8–5.3)
LYMPHOCYTES NFR BLD AUTO: 25.5 %
LYMPHOCYTES NFR BLD AUTO: 28.4 %
MCH RBC QN AUTO: 25.9 PG (ref 26.5–33)
MCH RBC QN AUTO: 26.2 PG (ref 26.5–33)
MCHC RBC AUTO-ENTMCNC: 30.5 G/DL (ref 31.5–36.5)
MCHC RBC AUTO-ENTMCNC: 30.8 G/DL (ref 31.5–36.5)
MCV RBC AUTO: 85 FL (ref 78–100)
MCV RBC AUTO: 85 FL (ref 78–100)
MONOCYTES # BLD AUTO: 0.4 10E9/L (ref 0–1.3)
MONOCYTES # BLD AUTO: 0.4 10E9/L (ref 0–1.3)
MONOCYTES NFR BLD AUTO: 8 %
MONOCYTES NFR BLD AUTO: 8.8 %
NEUTROPHILS # BLD AUTO: 2.9 10E9/L (ref 1.6–8.3)
NEUTROPHILS # BLD AUTO: 3.1 10E9/L (ref 1.6–8.3)
NEUTROPHILS NFR BLD AUTO: 58.8 %
NEUTROPHILS NFR BLD AUTO: 62.1 %
NITRATE UR QL: NEGATIVE
NRBC # BLD AUTO: 0 10*3/UL
NRBC # BLD AUTO: 0 10*3/UL
NRBC BLD AUTO-RTO: 0 /100
NRBC BLD AUTO-RTO: 0 /100
NT-PROBNP SERPL-MCNC: 902 PG/ML (ref 0–900)
PH UR STRIP: 5 PH (ref 5–7)
PLATELET # BLD AUTO: 109 10E9/L (ref 150–450)
PLATELET # BLD AUTO: 95 10E9/L (ref 150–450)
POTASSIUM SERPL-SCNC: 3.9 MMOL/L (ref 3.4–5.3)
PROT SERPL-MCNC: 8.8 G/DL (ref 6.8–8.8)
RBC # BLD AUTO: 3.13 10E12/L (ref 4.4–5.9)
RBC # BLD AUTO: 3.17 10E12/L (ref 4.4–5.9)
RBC #/AREA URNS AUTO: <1 /HPF (ref 0–2)
RETICS # AUTO: 74.5 10E9/L (ref 25–95)
RETICS/RBC NFR AUTO: 2.4 % (ref 0.5–2)
SODIUM SERPL-SCNC: 140 MMOL/L (ref 133–144)
SOURCE: NORMAL
SP GR UR STRIP: 1.01 (ref 1–1.03)
SQUAMOUS #/AREA URNS AUTO: <1 /HPF (ref 0–1)
TROPONIN I SERPL-MCNC: <0.015 UG/L (ref 0–0.04)
TSH SERPL DL<=0.005 MIU/L-ACNC: 3.47 MU/L (ref 0.4–4)
UROBILINOGEN UR STRIP-MCNC: NORMAL MG/DL (ref 0–2)
WBC # BLD AUTO: 5 10E9/L (ref 4–11)
WBC # BLD AUTO: 5 10E9/L (ref 4–11)
WBC #/AREA URNS AUTO: 1 /HPF (ref 0–5)

## 2018-10-12 PROCEDURE — 84443 ASSAY THYROID STIM HORMONE: CPT | Performed by: HOSPITALIST

## 2018-10-12 PROCEDURE — 99285 EMERGENCY DEPT VISIT HI MDM: CPT | Mod: 25

## 2018-10-12 PROCEDURE — 81001 URINALYSIS AUTO W/SCOPE: CPT | Performed by: EMERGENCY MEDICINE

## 2018-10-12 PROCEDURE — 96374 THER/PROPH/DIAG INJ IV PUSH: CPT

## 2018-10-12 PROCEDURE — 83036 HEMOGLOBIN GLYCOSYLATED A1C: CPT | Performed by: HOSPITALIST

## 2018-10-12 PROCEDURE — 99223 1ST HOSP IP/OBS HIGH 75: CPT | Mod: AI | Performed by: HOSPITALIST

## 2018-10-12 PROCEDURE — 21000001 ZZH R&B HEART CARE

## 2018-10-12 PROCEDURE — 83880 ASSAY OF NATRIURETIC PEPTIDE: CPT | Performed by: EMERGENCY MEDICINE

## 2018-10-12 PROCEDURE — 85025 COMPLETE CBC W/AUTO DIFF WBC: CPT | Performed by: EMERGENCY MEDICINE

## 2018-10-12 PROCEDURE — 85060 BLOOD SMEAR INTERPRETATION: CPT | Performed by: HOSPITALIST

## 2018-10-12 PROCEDURE — 25000132 ZZH RX MED GY IP 250 OP 250 PS 637: Performed by: HOSPITALIST

## 2018-10-12 PROCEDURE — 85610 PROTHROMBIN TIME: CPT | Performed by: EMERGENCY MEDICINE

## 2018-10-12 PROCEDURE — 36415 COLL VENOUS BLD VENIPUNCTURE: CPT | Performed by: HOSPITALIST

## 2018-10-12 PROCEDURE — 40000847 ZZHCL STATISTIC MORPHOLOGY W/INTERP HISTOLOGY TC 85060: Performed by: HOSPITALIST

## 2018-10-12 PROCEDURE — 80053 COMPREHEN METABOLIC PANEL: CPT | Performed by: EMERGENCY MEDICINE

## 2018-10-12 PROCEDURE — 85025 COMPLETE CBC W/AUTO DIFF WBC: CPT | Performed by: HOSPITALIST

## 2018-10-12 PROCEDURE — 00000146 ZZHCL STATISTIC GLUCOSE BY METER IP

## 2018-10-12 PROCEDURE — 85045 AUTOMATED RETICULOCYTE COUNT: CPT | Performed by: HOSPITALIST

## 2018-10-12 PROCEDURE — 86900 BLOOD TYPING SEROLOGIC ABO: CPT | Performed by: EMERGENCY MEDICINE

## 2018-10-12 PROCEDURE — 71046 X-RAY EXAM CHEST 2 VIEWS: CPT

## 2018-10-12 PROCEDURE — 25000128 H RX IP 250 OP 636: Performed by: EMERGENCY MEDICINE

## 2018-10-12 PROCEDURE — 84484 ASSAY OF TROPONIN QUANT: CPT | Performed by: EMERGENCY MEDICINE

## 2018-10-12 PROCEDURE — 93005 ELECTROCARDIOGRAM TRACING: CPT

## 2018-10-12 RX ORDER — ONDANSETRON 2 MG/ML
4 INJECTION INTRAMUSCULAR; INTRAVENOUS EVERY 6 HOURS PRN
Status: DISCONTINUED | OUTPATIENT
Start: 2018-10-12 | End: 2018-10-14 | Stop reason: HOSPADM

## 2018-10-12 RX ORDER — AMOXICILLIN 250 MG
1 CAPSULE ORAL 2 TIMES DAILY PRN
Status: DISCONTINUED | OUTPATIENT
Start: 2018-10-12 | End: 2018-10-14 | Stop reason: HOSPADM

## 2018-10-12 RX ORDER — FUROSEMIDE 10 MG/ML
40 INJECTION INTRAMUSCULAR; INTRAVENOUS ONCE
Status: COMPLETED | OUTPATIENT
Start: 2018-10-12 | End: 2018-10-12

## 2018-10-12 RX ORDER — TIMOLOL MALEATE 5 MG/ML
1 SOLUTION/ DROPS OPHTHALMIC 2 TIMES DAILY
Status: DISCONTINUED | OUTPATIENT
Start: 2018-10-12 | End: 2018-10-14 | Stop reason: HOSPADM

## 2018-10-12 RX ORDER — WARFARIN SODIUM 3 MG/1
3 TABLET ORAL EVERY EVENING
COMMUNITY
End: 2018-11-21

## 2018-10-12 RX ORDER — BUPROPION HYDROCHLORIDE 150 MG/1
150-300 TABLET ORAL EVERY MORNING
COMMUNITY
End: 2018-11-06

## 2018-10-12 RX ORDER — FUROSEMIDE 10 MG/ML
60 INJECTION INTRAMUSCULAR; INTRAVENOUS EVERY 12 HOURS
Status: DISCONTINUED | OUTPATIENT
Start: 2018-10-13 | End: 2018-10-12

## 2018-10-12 RX ORDER — PANTOPRAZOLE SODIUM 40 MG/1
40 TABLET, DELAYED RELEASE ORAL
Status: DISCONTINUED | OUTPATIENT
Start: 2018-10-13 | End: 2018-10-14 | Stop reason: HOSPADM

## 2018-10-12 RX ORDER — POTASSIUM CHLORIDE 750 MG/1
20 TABLET, EXTENDED RELEASE ORAL 2 TIMES DAILY
COMMUNITY
End: 2018-11-21

## 2018-10-12 RX ORDER — WARFARIN SODIUM 3 MG/1
3 TABLET ORAL ONCE
Status: COMPLETED | OUTPATIENT
Start: 2018-10-12 | End: 2018-10-12

## 2018-10-12 RX ORDER — DEXTROSE MONOHYDRATE 25 G/50ML
25-50 INJECTION, SOLUTION INTRAVENOUS
Status: DISCONTINUED | OUTPATIENT
Start: 2018-10-12 | End: 2018-10-14 | Stop reason: HOSPADM

## 2018-10-12 RX ORDER — NALOXONE HYDROCHLORIDE 0.4 MG/ML
.1-.4 INJECTION, SOLUTION INTRAMUSCULAR; INTRAVENOUS; SUBCUTANEOUS
Status: DISCONTINUED | OUTPATIENT
Start: 2018-10-12 | End: 2018-10-14 | Stop reason: HOSPADM

## 2018-10-12 RX ORDER — METOPROLOL TARTRATE 50 MG
50 TABLET ORAL 2 TIMES DAILY
Status: DISCONTINUED | OUTPATIENT
Start: 2018-10-13 | End: 2018-10-14 | Stop reason: HOSPADM

## 2018-10-12 RX ORDER — PANTOPRAZOLE SODIUM 40 MG/1
40 TABLET, DELAYED RELEASE ORAL
COMMUNITY

## 2018-10-12 RX ORDER — ACETAMINOPHEN 325 MG/1
650 TABLET ORAL EVERY 4 HOURS PRN
Status: DISCONTINUED | OUTPATIENT
Start: 2018-10-12 | End: 2018-10-14 | Stop reason: HOSPADM

## 2018-10-12 RX ORDER — NICOTINE POLACRILEX 4 MG
15-30 LOZENGE BUCCAL
Status: DISCONTINUED | OUTPATIENT
Start: 2018-10-12 | End: 2018-10-14 | Stop reason: HOSPADM

## 2018-10-12 RX ORDER — ATORVASTATIN CALCIUM 40 MG/1
40 TABLET, FILM COATED ORAL AT BEDTIME
Status: DISCONTINUED | OUTPATIENT
Start: 2018-10-12 | End: 2018-10-14 | Stop reason: HOSPADM

## 2018-10-12 RX ORDER — POLYETHYLENE GLYCOL 3350 17 G/17G
17 POWDER, FOR SOLUTION ORAL DAILY PRN
Status: DISCONTINUED | OUTPATIENT
Start: 2018-10-12 | End: 2018-10-14 | Stop reason: HOSPADM

## 2018-10-12 RX ORDER — FUROSEMIDE 10 MG/ML
60 INJECTION INTRAMUSCULAR; INTRAVENOUS
Status: DISCONTINUED | OUTPATIENT
Start: 2018-10-13 | End: 2018-10-14

## 2018-10-12 RX ORDER — AMOXICILLIN 250 MG
2 CAPSULE ORAL 2 TIMES DAILY PRN
Status: DISCONTINUED | OUTPATIENT
Start: 2018-10-12 | End: 2018-10-14 | Stop reason: HOSPADM

## 2018-10-12 RX ORDER — ONDANSETRON 4 MG/1
4 TABLET, ORALLY DISINTEGRATING ORAL EVERY 6 HOURS PRN
Status: DISCONTINUED | OUTPATIENT
Start: 2018-10-12 | End: 2018-10-14 | Stop reason: HOSPADM

## 2018-10-12 RX ORDER — BUPROPION HYDROCHLORIDE 150 MG/1
150-300 TABLET ORAL EVERY MORNING
Status: DISCONTINUED | OUTPATIENT
Start: 2018-10-13 | End: 2018-10-13

## 2018-10-12 RX ADMIN — WARFARIN SODIUM 3 MG: 3 TABLET ORAL at 21:43

## 2018-10-12 RX ADMIN — FUROSEMIDE 40 MG: 10 INJECTION, SOLUTION INTRAVENOUS at 19:20

## 2018-10-12 RX ADMIN — ATORVASTATIN CALCIUM 40 MG: 40 TABLET, FILM COATED ORAL at 23:22

## 2018-10-12 ASSESSMENT — ENCOUNTER SYMPTOMS
SHORTNESS OF BREATH: 1
ABDOMINAL PAIN: 0
NUMBNESS: 0
DIZZINESS: 0
WEAKNESS: 0
BACK PAIN: 0

## 2018-10-12 NOTE — TELEPHONE ENCOUNTER
Patient was unable to make OV with  March 10-11-18 due to his brother did not come to take care of there mother.  He was unable to get help yesterday.  He has made arrangements with other family members and will be able to go to ED today 10-12-18.  Continues to have shortness of breath and lower extremity edema.

## 2018-10-12 NOTE — IP AVS SNAPSHOT
MRN:[id number]                      After Visit Summary   10/12/2018    Gil Chowdary    MRN: [id number]           Thank you!     Thank you for choosing [facility] for your care. Our goal is always to provide you with excellent care. Hearing back from our patients is one way we can continue to improve our services. Please take a few minutes to complete the written survey that you may receive in the mail after you visit with us. Thank you!        Patient Information     Date Of Birth          1960        Designated Caregiver       Most Recent Value    Caregiver    Will someone help with your care after discharge? yes    Name of designated caregiver None    Phone number of caregiver None    Caregiver address see chart      About your hospital stay     You were admitted on:  October 12, 2018 You last received care in the:  [facility]    You were discharged on:  October 14, 2018        Reason for your hospital stay       Fluid overload requiring IV diuresis and evaluation.                  Who to Call     For medical emergencies, please call 911.  For non-urgent questions about your medical care, please call your primary care provider or clinic, None          Attending Provider     Provider Specialty    Delfino Pete MD Emergency Medicine    Trierweiler, Chad A, MD Emergency Medicine    Keo Grossman MD Internal Medicine       Primary Care Provider Fax #    [facility] Use [id number] [place] [phone]      After Care Instructions     Activity       Your activity upon discharge: activity as tolerated            Diet       Follow this diet upon discharge: Orders Placed This Encounter      Fluid restriction 1500 ML FLUID      Combination Diet 2715-4006 Calories: Moderate Consistent CHO (4-6 CHO units/meal); 2 gm NA Diet                  Follow-up Appointments     Follow-up and recommended labs and tests        PCP or Cardiologist in 2-3 days with BMP. You will need to  call Monday and schedule this appointment:  [facility] ( [place]) [phone]    Hem/Onc in 3-5 days with CBC. You will need to call Monday and schedule this appointment.   Dr Brien Haddad [facility]/Hematology  ( [place]) [phone]                  Additional Services     Follow-Up with Cardiologist                 Future tests that were ordered for you     Basic metabolic panel           CBC with platelets differential       Last Lab Result: Hemoglobin (g/dL)       Date                     Value                 10/14/2018               8.3 (L)          ----------                  Further instructions from your care team       Heart Failure Instructions for Patients and Families: Please read and check off each of these important instructions as you do them when you get home.     Weight and Symptoms    ___ Put a scale in your bathroom.    ___ Post a weight chart or calendar next to your scale.    ___ Weigh yourself everyday as soon as you get up in the morning (before breakfast).  You should only be wearing your pajamas.  Write your weight on the chart/calendar.    ___ Bring your weight chart/calendar with you to all appointments.    ___ Call your doctor or nurse practitioner if you gain 2 pounds (in 1 day) or 5 pounds in (1 week) from your goal  good  weight.  Your good weight is also called your  dry  weight.  Your doctor or nurse will tell you what your good weight should be.    ___ Call your doctor or nurse practitioner if you have shortness of breath that gets worse over time, leg swelling or fatigue.    Medications and Diet    ___ Make sure to take your medication as prescribed.    ___ Bring a current list of your medication and all of your medicine bottles with you to all appointments.    ___ Limit fluids if you still have swelling or shortness of breath, or if your doctor tells you to do so.      ___ Eat less than 2000 mg of sodium (salt) every day. Read  food labels, and do not add salt to meals. Remember, if you eat less salt you retain less fluid.    ___ Follow a heart healthy diet that is low in saturated fat.         Activity and Suggested Lifestyle Changes   ___ Stay active. Talk to your doctor about an exercise program that is safe for your heart.    ___ Stop smoking. Reduce alcohol use.      ___ Lose weight if you are overweight. Extra weight puts a lot of stress on the heart.    Control for Leg Swelling  ___ Keep your legs elevated (raised) as needed for swelling. If swelling is uncomfortable or elevation doesn t help, ask your doctor about using ACE wraps or support stockings.      What is the [facility]?     Cardiomyopathy  Optimization  Rehabilitation  Education    The [facility] Clinic is a heart failure specialty clinic within [facility].  It is an outpatient disease management program that is based on a phase-by-phase approach, which is tailored to each patient s individual needs.  The cardiologist, nurse practitioner, physician assistant and nurses provide an ongoing outpatient care and treatment plan that guides heart failure and cardiomyopathy patients from evaluation and education to stabilization. This team works with your current primary care doctor and cardiologist to help you:      Avoid hospitalizations    Slow the progression of the disease    Improve length and quality of life    Know who and when to call if heart failure symptoms appear    Receive easy access to quality health care and advice    Better understand your condition and treatment    Decrease the tremendous cost burden of heart failure care    Detect future heart problems before they become life threatening    Your C.O.R.E. Clinic Team will continue to educate you on your heart failure and may adjust medications based on your vital signs, lab work, and how you are feeling.  Therefore, it is very important to bring the following to all C.O.R.E. appointments:    - An  "accurate list of your medications  - Your medicine bottles  - Your weight chart/calendar    [facility] ([place]):    [phone]  [facility] ([place]):    [phone]  [facility] ([place]):  [phone]    Warfarin Instruction     You have started taking a medicine called warfarin. This is a blood-thinning medicine (anticoagulant). It helps prevent and treat blood clots.      Before leaving the hospital, make sure you know how much to take and how long to take it.      You will need regular blood tests to make sure your blood is clotting safely. It is very important to see your doctor for regular blood tests.    Talk to your doctor before taking any new medicine (this includes over-the-counter drugs and herbal products). Many medicines can interact with warfarin. This may cause more bleeding or too much clotting.     Eating a lot of vitamin K--found in green, leafy vegetables--can change the way warfarin works in your body. Do NOT avoid these foods. Instead, try to eat the same amount each day.     Bleeding is the most common side-effect of warfarin. You may notice bleeding gums, a bloody nose, bruises and bleeding longer when you cut yourself. See a doctor at once if:   o You cough up blood  o You find blood in your stool (poop)  o You have a deep cut, or a cut that bleeds longer than 10 minutes   o You have a bad cut, hard fall, accident or hit your head (go to urgent care or the emergency room).    For women who can get pregnant: This medicine can harm an unborn baby. Be very careful not to get pregnant while taking this medicine. If you think you might be pregnant, call your doctor right away.    For more information, read \"Guide to Warfarin Therapy,  the booklet you received in the hospital.        General Recommendations To Control Heart Failure When You Get Home     Heart Failure Instructions for Patients and Families: Please read and check " off each of these important instructions as you do them when you get home.     Weight and Symptoms    ___ Put a scale in your bathroom.    ___ Post a weight chart or calendar next to your scale.    ___ Weigh yourself everyday as soon as you get up in the morning (before breakfast).  You should only be wearing your pajamas.  Write your weight on the chart/calendar.    ___ Bring your weight chart/calendar with you to all appointments.    ___ Call your doctor or nurse practitioner if you gain 2 pounds (in 1 day) or 5 pounds in (1 week) from your goal  good  weight.  Your good weight is also called your  dry  weight.  Your doctor or nurse will tell you what your good weight should be.    ___ Call your doctor or nurse practitioner if you have shortness of breath that gets worse over time, leg swelling or fatigue.    Medications and Diet    ___ Make sure to take your medication as prescribed.    ___ Bring a current list of your medication and all of your medicine bottles with you to all appointments.    ___ Limit fluids if you still have swelling or shortness of breath, or if your doctor tells you to do so.      ___ Eat less than 2000 mg of sodium (salt) every day. Read food labels, and do not add salt to meals. Remember, if you eat less salt you retain less fluid.    ___ Follow a heart healthy diet that is low in saturated fat.         Activity and Suggested Lifestyle Changes   ___ Stay active. Talk to your doctor about an exercise program that is safe for your heart.    ___ Stop smoking. Reduce alcohol use.      ___ Lose weight if you are overweight. Extra weight puts a lot of stress on the heart.    Control for Leg Swelling  ___ Keep your legs elevated (raised) as needed for swelling. If swelling is uncomfortable or elevation doesn t help, ask your doctor about using ACE wraps or support stockings.      What is the [facility]?     Cardiomyopathy  Optimization  Rehabilitation  Education    The [facility] is a  heart failure specialty clinic within [facility].  It is an outpatient disease management program that is based on a phase-by-phase approach, which is tailored to each patient s individual needs.  The cardiologist, nurse practitioner, physician assistant and nurses provide an ongoing outpatient care and treatment plan that guides heart failure and cardiomyopathy patients from evaluation and education to stabilization. This team works with your current primary care doctor and cardiologist to help you:      Avoid hospitalizations    Slow the progression of the disease    Improve length and quality of life    Know who and when to call if heart failure symptoms appear    Receive easy access to quality health care and advice    Better understand your condition and treatment    Decrease the tremendous cost burden of heart failure care    Detect future heart problems before they become life threatening    Your [facility]O.R[facility] Clinic Team will continue to educate you on your heart failure and may adjust medications based on your vital signs, lab work, and how you are feeling.  Therefore, it is very important to bring the following to all C.O.R.E. appointments:    - An accurate list of your medications  - Your medicine bottles  - Your weight chart/calendar    [facility]):    [phone]  [facility]):    [phone]  [facility] ([place]):  [phone]        Pending Results     Date and Time Order Name Status Description    10/13/2018 1538 Soluble transferrin receptor In process     10/13/2018 1538 Protein electrophoresis In process     10/13/2018 1538 Methylmalonic acid In process     10/12/2018 2029 Blood Morphology Pathologist Review In process             Statement of Approval     Ordered          10/14/18 1010  I have reviewed and agree with all the recommendations and orders detailed in this document.  EFFECTIVE NOW     Approved and  "electronically signed by:  Isaac Rose MD           10/14/18 1012  I have reviewed and agree with all the recommendations and orders detailed in this document.  EFFECTIVE NOW     Approved and electronically signed by:  Isaac Rose MD             Admission Information     Date & Time Provider Department Dept. Phone    10/12/2018 Keo Grossman MD [facility] [phone]      Your Vitals Were     Blood Pressure Pulse Temperature Respirations Height Weight    111/57 (BP Location: Left arm) 78 98.1  F (36.7  C) (Oral) 18 1.778 m (5' 10\") 140.4 kg (309 lb 8 oz)    Pulse Oximetry BMI (Body Mass Index)                97% 44.41 kg/m2          MyChart Information     [employer] gives you secure access to your electronic health record. If you see a primary care provider, you can also send messages to your care team and make appointments. If you have questions, please call your primary care clinic.  If you do not have a primary care provider, please call [phone] and they will assist you.        Care EveryWhere ID     This is your Care EveryWhere ID. This could be used by other organizations to access your [facility] medical records  [id number]        Equal Access to Services     SIL COLEMAN : Renetta Max, waarpit aguilar, qagracieta kaaldaniel kitchen, hue fry. So [facility] [phone].    ATENCIÓN: Si habla español, tiene a cross disposición servicios gratuitos de asistencia lingüística. Llnilsa al [phone].    We comply with applicable federal civil rights laws and [place] laws. We do not discriminate on the basis of race, color, national origin, age, disability, sex, sexual orientation, or gender identity.               Review of your medicines      CONTINUE these medicines which have NOT CHANGED        Dose / Directions    aspirin 81 MG tablet        Dose:  81 mg   Take 81 mg by mouth every morning   Refills:  0       " atorvastatin 40 MG tablet   Commonly known as:  LIPITOR   Used for:  Hyperlipidemia with target LDL less than 70        Dose:  40 mg   Take 1 tablet (40 mg) by mouth daily   Quantity:  90 tablet   Refills:  0       BASAGLAR KWIKPEN SC        Dose:  22 Units   Inject 22 Units Subcutaneous every morning   Refills:  0       bumetanide 1 MG tablet   Commonly known as:  BUMEX   Used for:  S/P AVR (aortic valve replacement)        Dose:  1 mg   Take 1 tablet (1 mg) by mouth 2 times daily   Quantity:  60 tablet   Refills:  0       buPROPion 150 MG 24 hr tablet   Commonly known as:  WELLBUTRIN XL        Dose:  150-300 mg   Take 150-300 mg by mouth every morning Starting 10/8/18 150mg daily x7 days, then increase to 300mg daily   Refills:  0       CENTRUM ADULTS PO        Dose:  1 tablet   Take 1 tablet by mouth every morning   Refills:  0       cholecalciferol 5000 units Tabs tablet   Commonly known as:  vitamin D3        Dose:  5000 Units   Take 5,000 Units by mouth every morning   Refills:  0       clindamycin 300 MG capsule   Commonly known as:  CLEOCIN   Used for:  S/P AVR (aortic valve replacement)        Dose:  300 mg   Take 1 capsule (300 mg) by mouth as needed   Quantity:  10 capsule   Refills:  3       metoprolol tartrate 50 MG tablet   Commonly known as:  LOPRESSOR   Used for:  Paroxysmal supraventricular tachycardia (H), S/P AVR (aortic valve replacement)        Dose:  50 mg   Take 1 tablet (50 mg) by mouth 2 times daily   Quantity:  60 tablet   Refills:  0       * NovoLOG FLEXPEN 100 UNIT/ML injection   Generic drug:  insulin aspart        Dose:  3 Units   Inject 3 Units Subcutaneous 2 times daily (with meals) Takes with breakfast and dinner   Refills:  0       * NovoLOG FLEXPEN 100 UNIT/ML injection   Generic drug:  insulin aspart        Dose:  4 Units   Inject 4 Units Subcutaneous daily (with lunch)   Refills:  0       pantoprazole 40 MG EC tablet   Commonly known as:  PROTONIX        Dose:  40 mg   Take 40 mg  by mouth every morning (before breakfast)   Refills:  0       potassium chloride SA 10 MEQ CR tablet   Commonly known as:  K-DUR/KLOR-CON M        Dose:  20 mEq   Take 20 mEq by mouth 2 times daily   Refills:  0       prednisoLONE acetate 1 % ophthalmic susp   Commonly known as:  PRED FORTE        Dose:  1 drop   Place 1 drop into both eyes as needed (glaucoma)   Refills:  0       timolol 0.5 % ophthalmic solution   Commonly known as:  TIMOPTIC        Dose:  1 drop   Place 1 drop into both eyes 2 times daily   Refills:  0       warfarin 3 MG tablet   Commonly known as:  COUMADIN        Dose:  3 mg   Take 3 mg by mouth every evening   Refills:  0       * Notice:  This list has 2 medication(s) that are the same as other medications prescribed for you. Read the directions carefully, and ask your doctor or other care provider to review them with you.             Protect others around you: Learn how to safely use, store and throw away your medicines at www.disposemymeds.org.             Medication List: This is a list of all your medications and when to take them. Check marks below indicate your daily home schedule. Keep this list as a reference.      Medications           Morning Afternoon Evening Bedtime As Needed    aspirin 81 MG tablet   Take 81 mg by mouth every morning                                   atorvastatin 40 MG tablet   Commonly known as:  LIPITOR   Take 1 tablet (40 mg) by mouth daily   Last time this was given:  40 mg on 10/13/2018  9:15 PM                                   BASAGLAR BLUPEN SC   Inject 22 Units Subcutaneous every morning                                   bumetanide 1 MG tablet   Commonly known as:  BUMEX   Take 1 tablet (1 mg) by mouth 2 times daily                                      buPROPion 150 MG 24 hr tablet   Commonly known as:  WELLBUTRIN XL   Take 150-300 mg by mouth every morning Starting 10/8/18 150mg daily x7 days, then increase to 300mg daily   Last time this was given:   150 mg on 10/14/2018  9:28 AM   Next Dose Due:  300 mg  Tomorrow morning                                   CENTRUM ADULTS PO   Take 1 tablet by mouth every morning                                   cholecalciferol 5000 units Tabs tablet   Commonly known as:  vitamin D3   Take 5,000 Units by mouth every morning   Last time this was given:  5,000 Units on 10/14/2018  9:27 AM                                   clindamycin 300 MG capsule   Commonly known as:  CLEOCIN   Take 1 capsule (300 mg) by mouth as needed                                   metoprolol tartrate 50 MG tablet   Commonly known as:  LOPRESSOR   Take 1 tablet (50 mg) by mouth 2 times daily   Last time this was given:  50 mg on 10/14/2018  9:28 AM                                      * NovoLOG FLEXPEN 100 UNIT/ML injection   Inject 3 Units Subcutaneous 2 times daily (with meals) Takes with breakfast and dinner   Generic drug:  insulin aspart                                      * NovoLOG FLEXPEN 100 UNIT/ML injection   Inject 4 Units Subcutaneous daily (with lunch)   Generic drug:  insulin aspart                                   pantoprazole 40 MG EC tablet   Commonly known as:  PROTONIX   Take 40 mg by mouth every morning (before breakfast)   Last time this was given:  40 mg on 10/14/2018  6:37 AM                                   potassium chloride SA 10 MEQ CR tablet   Commonly known as:  K-DUR/KLOR-CON M   Take 20 mEq by mouth 2 times daily                                      prednisoLONE acetate 1 % ophthalmic susp   Commonly known as:  PRED FORTE   Place 1 drop into both eyes as needed (glaucoma)                                   timolol 0.5 % ophthalmic solution   Commonly known as:  TIMOPTIC   Place 1 drop into both eyes 2 times daily   Last time this was given:  1 drop on 10/14/2018  9:32 AM                                      warfarin 3 MG tablet   Commonly known as:  COUMADIN   Take 3 mg by mouth every evening   Last time this was given:  3 mg  on 10/13/2018  4:26 PM                                   * Notice:  This list has 2 medication(s) that are the same as other medications prescribed for you. Read the directions carefully, and ask your doctor or other care provider to review them with you.              More Information        Patient Education    Furosemide Oral solution    Furosemide Oral tablet    Furosemide Solution for injection  Furosemide Oral tablet  What is this medicine?  FUROSEMIDE (fyoor [place] se mide) is a diuretic. It helps you make more urine and to lose salt and excess water from your body. This medicine is used to treat high blood pressure, and edema or swelling from heart, kidney, or liver disease.  This medicine may be used for other purposes; ask your health care provider or pharmacist if you have questions.  What should I tell my health care provider before I take this medicine?  They need to know if you have any of these conditions:    abnormal blood electrolytes    diarrhea or vomiting    gout    heart disease    kidney disease, small amounts of urine, or difficulty passing urine    liver disease    an unusual or allergic reaction to furosemide, sulfa drugs, other medicines, foods, dyes, or preservatives    pregnant or trying to get pregnant    breast-feeding  How should I use this medicine?  Take this medicine by mouth with a glass of water. Follow the directions on the prescription label. You may take this medicine with or without food. If it upsets your stomach, take it with food or milk. Do not take your medicine more often than directed. Remember that you will need to pass more urine after taking this medicine. Do not take your medicine at a time of day that will cause you problems. Do not take at bedtime.  Talk to your pediatrician regarding the use of this medicine in children. While this drug may be prescribed for selected conditions, precautions do apply.  Overdosage: If you think you have taken too much of this medicine  contact a poison control center or emergency room at once.  NOTE: This medicine is only for you. Do not share this medicine with others.  What if I miss a dose?  If you miss a dose, take it as soon as you can. If it is almost time for your next dose, take only that dose. Do not take double or extra doses.  What may interact with this medicine?    aspirin and aspirin-like medicines    certain antibiotics    chloral hydrate    cisplatin    cyclosporine    digoxin    diuretics    laxatives    lithium    medicines for blood pressure    medicines that relax muscles for surgery    methotrexate    NSAIDs, medicines for pain and inflammation like ibuprofen, naproxen, or indomethacin    phenytoin    steroid medicines like prednisone or cortisone    sucralfate  This list may not describe all possible interactions. Give your health care provider a list of all the medicines, herbs, non-prescription drugs, or dietary supplements you use. Also tell them if you smoke, drink alcohol, or use illegal drugs. Some items may interact with your medicine.  What should I watch for while using this medicine?  Visit your doctor or health care professional for regular checks on your progress. Check your blood pressure regularly. Ask your doctor or health care professional what your blood pressure should be, and when you should contact him or her. If you are a diabetic, check your blood sugar as directed.  You may need to be on a special diet while taking this medicine. Check with your doctor. Also, ask how many glasses of fluid you need to drink a day. You must not get dehydrated.  You may get drowsy or dizzy. Do not drive, use machinery, or do anything that needs mental alertness until you know how this drug affects you. Do not stand or sit up quickly, especially if you are an older patient. This reduces the risk of dizzy or fainting spells. Alcohol can make you more drowsy and dizzy. Avoid alcoholic drinks.  This medicine can make you more  sensitive to the sun. Keep out of the sun. If you cannot avoid being in the sun, wear protective clothing and use sunscreen. Do not use sun lamps or tanning beds/booths.  What side effects may I notice from receiving this medicine?  Side effects that you should report to your doctor or health care professional as soon as possible:    blood in urine or stools    dry mouth    fever or chills    hearing loss or ringing in the ears    irregular heartbeat    muscle pain or weakness, cramps    skin rash    stomach upset, pain, or nausea    tingling or numbness in the hands or feet    unusually weak or tired    vomiting or diarrhea    yellowing of the eyes or skin  Side effects that usually do not require medical attention (report to your doctor or health care professional if they continue or are bothersome):    headache    loss of appetite    unusual bleeding or bruising  This list may not describe all possible side effects. Call your doctor for medical advice about side effects. You may report side effects to FDA at 8-616-FDA-5565.  Where should I keep my medicine?  Keep out of the reach of children.  Store at room temperature between 15 and 30 degrees C (59 and 86 degrees F). Protect from light. Throw away any unused medicine after the expiration date.  NOTE:This sheet is a summary. It may not cover all possible information. If you have questions about this medicine, talk to your doctor, pharmacist, or health care provider. Copyright  2016 [employer] Standard                Discharge Instructions for Heart Failure  The heart is a muscle that pumps oxygen-rich blood to all parts of the body. When you have heart failure, the heart is not able to pump as well as it should. Blood and fluid may back up into the lungs (congestive heart failure), and some parts of the body don t get enough oxygen-rich blood to work normally. These problems lead to the symptoms of heart failure. Heart failure can occur due to an injury to the heart  or from natural processes.  You can control symptoms of heart failure with some lifestyle changes and by following your doctor's advice.  Activity  Ask your healthcare provider about an exercise program. You can benefit from simple activities such as walking or gardening. Exercising most days of the week can make you feel better. Don't be discouraged if your progress is slow at first. Rest as needed. Stop activity if you develop symptoms such as chest pain, lightheadedness, or significant shortness of breath. Find activities that you enjoy, such as brisk walking, dancing, swimming, or gardening. These will help you stay active and strengthen your heart.  Diet  Follow a heart healthy diet. And make sure to limit the salt (sodium) in your diet. Salt causes your body to hold water. This makes your heart work harder as there is more fluid for the heart to pump. Limit your salt by doing the following:    Limit canned, dried, packaged, and fast foods.    Don't add salt to your food.    Season foods with herbs instead of salt.    Watch how much liquids you drink. Drinking too much can make heart failure worse. Talk with your health care provider about how much you should drink each day.    Limit the amount of alcohol you drink. It may harm your heart. Women should have no more than 1 drink a day and men should have no more than 2.    When you eat out, request that your meals have no added salt.  Tobacco  If you smoke, it's very important to quit. Smoking increases your chances of having a heart attack by harming the blood vessels that provide oxygen to your heart. This makes heart failure worse. Quitting smoking is the number one thing you can do to improve your health. Enroll in a stop-smoking program to improve your chances of success. Talk with your healthcare provider about medicines or nicotine replacement therapy to help you quit smoking. Ask your healthcare provider about smoking cessation support  groups.  Medicine  Take your medicines exactly as prescribed. Learn the names and purpose of each of your medicines. Keep an accurate medicine list and current dosages with you at all times. Don't skip doses. If you miss a dose of your medicine, take it as soon as you remember. If you miss a dose and it's almost time for your next dose, just wait and take your next dose at the normal time. Don't take a double dose. If you are unsure, call your doctor's office. Make sure not to mix up your medicines or forget what you've taken the same day.  Weight monitoring  Weigh yourself every day. A sudden weight gain can mean your heart failure is getting worse. Weigh yourself at the same time of day and in the same kind of clothes. Ideally, weigh yourself first thing in the morning after you empty your bladder, but before you eat breakfast. Your healthcare provider will show you how to track your weight. He or she will also discuss with you when you should call if you have a sudden, unexpected increase in your weight.  In general, your healthcare provider may ask you to report if your weight goes up by more than 2 pounds in 1 day,  5 pounds in 1 week, or whatever weight gain you were told by your doctor. This is a sign that you are retaining more fluid than you should be. Clues to weight gain include checking your ankles for swelling, or noticing you are short of breath when you lie down.  Follow-up care  Make a follow-up appointment as directed. Depending on the type and severity of heart failure you have, you may need follow-up as early as 7 days from hospital discharge. Keep appointments for checkups and lab tests that are needed to check your medicines and condition.  Recognize that your health and even survival depend on your following medical recommendations.  Symptoms  Heart failure can cause a variety of symptoms, including:    Shortness of breath    Trouble breathing at night, especially when you lie down    Swelling  in the legs and feet or in the belly (abdomen)    Becoming easily fatigued    Irregular or rapid heartbeat    Weakness or lightheadedness    Swelling of the neck veins  It is important to know what to do if symptoms get worse or if you develop signs of worsening heart failure.     When to call your healthcare provider  Call your healthcare provider right away if you have any of these signs of worsening heart failure:    Sudden weight gain (more than 2 pounds in 1 day or 5 pounds in 1 week, or whatever weight gain you were told to report by your doctor)    Trouble breathing not related to being active    New or increased swelling of your legs or ankles    Swelling or pain in your abdomen    Breathing trouble at night (waking up short of breath, needing more pillows to breathe)    Frequent coughing that doesn't go away    Feeling much more tired than usual  Call 911  Call 911 right away if you have:    Severe shortness of breath, such that you can't catch your breath even while resting    Severe chest pain that does not resolve with rest or nitroglycerin    Pink, foamy mucus with cough and shortness of breath    A continuous rapid or irregular heartbeat    Passing out or fainting    Stroke symptoms such as sudden numbness or weakness on one side of your face, arm, or leg or sudden confusion, trouble speaking or vision changes   Date Last Reviewed: 3/21/2016    2508-1157 [employer]. [address] [place] [address], [place] [address]. All rights reserved. This information is not intended as a substitute for professional medical care. Always follow your healthcare professional's instructions.                Healthy Meals for Diabetes     A healthcare provider will help you develop a meal plan that fits your needs.   Ask your healthcare team to help you make a meal plan that fits your needs. Your meal plan tells you when to eat your meals and snacks, what kinds of foods to eat, and how much of each food to eat. You  don t have to give up all the foods you like. But you do need to follow some guidelines.  Choose healthy carbohydrates  Starches, sugars, and fiber are all types of carbohydrates. Fiber can help lower your cholesterol and triglycerides. Fiber is also healthy for your heart. You should have 20 to 35 grams of total fiber each day. Fiber-rich foods include:    Whole-grain breads and cereals    Bulgur wheat    Brown rice       Whole-wheat pasta    Fruits and vegetables    Dry beans, and peas   Keep track of the amount of carbohydrates you eat. This can help you keep the right balance of physical activity and medicine. The amount of carbohydrates needed will vary for each person. It depends on many things such as your health, the medicines you take, and how active you are. Your healthcare team will help you figure out the right amount of carbohydrates for you. You may start with around 45 to 60 grams of carbohydrates per meal, depending on your situation.   Here are some examples of foods containing about 15 grams of carbohydrates (1 serving of carbohydrates):    1/2 cup of canned or frozen fruit    A small piece of fresh fruit (4 ounces)    1 slice of bread    1/2 cup of oatmeal    1/3 cup of rice    4 to 6 crackers    1/2 English muffin    1/2 cup of black beans    1/4 of a large baked potato (3 ounces)    2/3 cup of plain fat-free yogurt    1 cup of soup    1/2 cup of casserole    6 chicken nuggets    2-inch-square brownie or cake without frosting    2 small cookies    1/2 cup of ice cream or sherbet  Choose healthy protein foods  Eating protein that is low in fat can help you control your weight. It also helps keep your heart healthy. Low-fat protein foods include:    Fish    Plant proteins, such as dry beans and peas, nuts, and soy products like tofu and soymilk    Lean meat with all visible fat removed    Poultry with the skin removed    Low-fat or nonfat milk, cheese, and yogurt  Limit unhealthy fats and  sugar  Saturated and trans fats are unhealthy for your heart. They raise LDL (bad) cholesterol. Fat is also high in calories, so it can make you gain weight. To cut down on unhealthy fats and sugar, limit these foods:    Butter or margarine    Palm and palm kernel oils and coconut oil    Cream    Cheese    Harmon    Lunch meats       Ice cream    Sweet bakery goods such as pies, muffins, and donuts    Jams and jellies    Candy bars    Regular sodas   How much to eat  The amount of food you eat affects your blood sugar. It also affects your weight. Your healthcare team will tell you how much of each type of food you should eat.    Use measuring cups and spoons and a food scale to measure serving sizes.    Learn what a correct serving size looks like on your plate. This will help when you are away from home and can t measure your servings.    Eat only the number of servings given on your meal plan for each food. Don t take seconds.    Learn to read food labels. Be sure to look at serving size, total carbohydrates, fiber, calories, sugar, and saturated and trans fats. Look for healthier alternatives to foods that have added sugar.    Plan ahead for parties so you can still have a good time without going overboard with unhealthy food choices. Set a good example yourself by bringing a healthy dish to pot lucks.   Choose healthy snacks  When it comes to snacks, we usually think about foods with added sugar and fats. But there are many other options for healthier snack choices. Here are a few snack ideas to choose from:  Snacks with less than 5 grams of carbohydrates    1 piece of string cheese    3 celery sticks plus 1 tablespoon of peanut butter    5 cherry tomatoes plus 1 tablespoon of ranch dressing    1 hard-boiled egg    1/4 cup of fresh blueberries     5 baby carrots    1 cup of light popcorn    1/2 cup of sugar-free gelatin    15 almonds  Snacks with about 10 to 20 grams of carbohydrates    1/3 cup of hummus plus 1  cup of fresh cut nonstarchy vegetables (carrots, green peppers, broccoli, celery, or a combination)    1/2 cup of fresh or canned fruit plus 1/4 cup of cottage cheese    1/2 cup of tuna salad with 4 crackers    2 rice cakes and a tablespoon of peanut butter    1 small apple or orange    3 cups light popcorn    1/2 of a turkey sandwich (1 slice of whole-wheat bread, 2 ounces of turkey, and mustard)  Portion sizes are important to controlling your blood sugar and staying at a healthy weight. Stock up on healthy snack items so you always have them on hand.  When to eat  Your meal plan will likely include breakfast, lunch, dinner, and some snacks.    Try to eat your meals and snacks at about the same times each day.    Eat all your meals and snacks. Skipping a meal or snack can make your blood sugar drop too low. It can also cause you to eat too much at the next meal or snack. Then your blood sugar could get too high.  Date Last Reviewed: 7/1/2016 2000-2017 [employer]. [address] [place] [address]. All rights reserved. This information is not intended as a substitute for professional medical care. Always follow your healthcare professional's instructions.                Understanding Carbohydrates    A car needs the right type of fuel to run. And you need the right kind of food to function. To keep your energy level up, your body needs food that has carbohydrates. But carbohydrates raise blood sugar levels higher and faster than other kinds of food. Your dietitian will work with you to figure out the amount of carbohydrates you need.  Starches  Starches are found in grains, some vegetables, and beans. Grain products include bread, pasta, cereal, and tortillas. Starchy vegetables include potatoes, peas, corn, lima beans, yams, and squash. Kidney beans, morris beans, black beans, garbanzo beans, and lentils also contain starches.  Sugars  Sugars are found naturally in many foods. Or sugar can  be added. Foods that contain natural sugar include fruits and fruit juices, dairy products, honey, and molasses. Added sugars are found in most desserts, processed foods, candy, regular soda, and fruit drinks. These are very helpful for treating low blood sugar, or hypoglycemia. They provide sugar quickly. Try to keep at least 15 to 20 grams of these simple sugars with you at all times. Eat this if you begin to have low blood sugar symptoms.  Fiber  Fiber comes from plant foods. Most fiber isn t digested by the body. Instead of raising blood sugar levels like other carbohydrates, it actually stops blood sugar from rising too fast. Fiber is found in fruits, vegetables, whole grains, beans, peas, and many nuts.  Carb counting  Keep track of the amount of carbohydrates you eat. This can help you keep the right balance of physical activity and medicine. The amount of carbohydrates needed will vary for each person. It depends on many things such as your health, the medicines you take, and how active you are. Your healthcare team will help you figure out the right amount of carbohydrates for you. You may start with around 45 to 60 grams of carbohydrate per meal, depending on your situation. Carb counting is a system that helps you keep track of the carbohydrates you eat at each meal.  Carbohydrates come from a variety of foods. These include grains, starchy vegetables, fruit, milk, beans, and snack foods. You can either count carbohydrate grams or carbohydrate servings. When you count carbohydrate servings, 1 carbohydrate serving = 15 grams of carbohydrates.  Here are some examples of foods containing about 15 grams of carbohydrates (1 serving of carbohydrates):    1/2 cup of canned or frozen fruit    A small piece of fresh fruit (4 ounces)    1 slice of bread    1/2 cup of oatmeal    1/3 cup of rice    4 to 6 crackers    1/2 English muffin    1/2 cup of black beans    1/4 of a large baked potato (3 ounces)    2/3 cup of  plain fat-free yogurt    1 cup of soup    1/2 cup of casserole    6 chicken nuggets    2-inch-square brownie or cake without frosting    2 small cookies    1/2 cup of ice cream or sherbet  Carb counting is easier when food labels are available. Look at the label to see how many grams of total carbohydrates the food contains. Then you can figure out how much you should eat.  Two very important lines to look at on the label are the serving size and the total carbohydrate amount. Here are some tips for using food labels to count your carbohydrate intake:    Check the serving size. The information on the label is based on that serving size. If you eat more than the listed serving size, you may have to double or triple the other information on the label.     Check the total grams of carbohydrates. Total carbohydrate from the label includes sugar, starch, and fiber. Be sure to use the total carbohydrate number and not sugar alone.    Know how many grams of carbohydrates you can have.  Be familiar with the matching portion sizes.    Compare labels. Compare the labels of different products, looking at serving sizes and total carbohydrates to find the products that work best for you.     Don't forget protein and fat. With all the focus on carb counting, it might be easy to forget protein and fat in your meals. Don't forget to include sources of protein and healthy fat to balance your meals.  It s also important to be consistent with the amount and time you eat when taking a fixed dose of diabetes medicine. Work with your healthcare provider or dietitian if you need additional help. He or she can help you keep track of your carbohydrate intake. He or she can also help you figure out how many grams of carbohydrates you should have.  Date Last Reviewed: 7/1/2016 2000-2017 [employer]. [address] [place] [address], [place] [address]. All rights reserved. This information is not intended as a substitute for  professional medical care. Always follow your healthcare professional's instructions.

## 2018-10-12 NOTE — ED PROVIDER NOTES
History     Chief Complaint:  Leg swelling    HPI   Gil Chowdary is a 58 year old male with a history of aortic valve replacement, left heart cath, diffuse non-obstructive CAD, a right bundle branch block on Coumadin, and diabetes on insulin who presents with leg swelling. In April the patient was seen in Cardiology by Dr. Salinas, post-aortic valve replacement. At that time he weighed 265 lbs and appeared to be doing well post-surgery. Two weeks ago, the patient saw his primary care provider and weighed in at 300 lbs. One week ago the patient saw an endocrinologist for severe edema up to his thighs, increasing shortness of breath and difficulty getting around. At that time he weighed 320 lbs. Today the patient reports to the ED with concerns for increasing edema in his legs and feet that is now becoming testicular edema as well. The patient reports that he has been using compression stockings with minor improvement initially, but no long term success in decreasing the swelling. He has also been experiencing shortness of breath while walking which he attributes to the significant weight gain and leg swelling. He denies any shortness of breath while sleeping or laying down flat. The patient denies any chest pain. Today in the ED the patient was weighed to be 334 lbs.     Of note, the patient states that in the past few weeks his insulin need has decreased.    Allergies:  Amoxicillin  Penicillins     Medications:    Tylenol  Aspirin  Lipitor  Bumex  Vitamin D3  Cleocin  Donepezil HCL PO  Novolog Flexpen  Basaglar Kwikpen  Lopressor  Centrum Adults PO  Protonix  Potassium Chloride SA  Pred Forte  Timoptic  Coumadin    Past Medical History:    Glaucoma  Hyperlipidemia  DRAKE on CPAP  Right bundle branch block  Severe aortic stenosis  Diffuse non-obstructive CAD  Diabetic    Past Surgical History:    Left heart cath  Repair aortic valve  Replace aortic valve  Sinus surgery    Family History:    Diabetes  CABG -  "Father, Brother  CAD -  Father  Hypertension    Social History:  Smoking Status: Former Smoker  Alcohol Use: No  Patient presents alone.  Marital Status:       Review of Systems   Respiratory: Positive for shortness of breath.    Cardiovascular: Positive for leg swelling. Negative for chest pain.   Gastrointestinal: Negative for abdominal pain.   Musculoskeletal: Negative for back pain.   Neurological: Negative for dizziness, weakness and numbness.   All other systems reviewed and are negative.      Physical Exam     Patient Vitals for the past 24 hrs:   BP Temp Temp src Pulse Heart Rate Resp SpO2 Height Weight   10/12/18 1651 119/57 97.9  F (36.6  C) Temporal 73 73 18 97 % 1.778 m (5' 10\") (!) 151.7 kg (334 lb 6.4 oz)     Physical Exam  Eye:  Pupils are equal, round, and reactive.  Extraocular movements intact.    ENT:  No rhinorrhea.  Moist mucus membranes.  Normal tongue and tonsil.    Cardiac:  Regular rate and rhythm.  No murmurs, gallops, or rubs.    Pulmonary:  Clear to auscultation bilaterally. Fine rales in bilateral lung bases.  No wheezes, or rhonchi.    Abdomen:  Positive bowel sounds.  Abdomen is soft and non-distended, without focal tenderness.    Musculoskeletal:  Normal movement of all extremities without evidence for deficit. Zeyad anasarca through the lower extremities and extending into the groin and abdomen.     Skin:  Warm and dry without rashes.    Neurologic:  Non-focal exam without asymmetric weakness or numbness.     Psychiatric:  Normal affect with appropriate interaction with examiner.    Emergency Department Course     ECG (17:42:06):  Rate 69 bpm. NE interval 186 ms. QRS duration 148 ms. QT/QTc 484/518 ms. P-R-T axes 53 93 24. Normal sinus rhythm.  Right bundle branch block (New since 6/6/17)  T wave abnormality, consider lateral ischemia (New since 6/6/17)  Abnormal ECG.  Interpreted at 1743 by Trierweiler, Chad A, MD.    Imaging:  Radiographic findings were communicated with the " patient who voiced understanding of the findings.    XR Chest 2 Views  No acute disease.  As read by Radiology.    Laboratory:  CBC:  HGB 8.2 (L),  (L), RBC 3.17 (L), HCT 26.9 (L), MCH 25.9 (L), MCHC 30.5 (L),  (L) o/w WNL (WBC 5.0)    CMP: Creatinine 1.40 (H), GFR Estimate 52 (L), Calcium 8.1 (L), AST 55 (H) o/w WNL    1736: Troponin I: <0.015    Nt probnp inpatient (BNP) : 902 (H)    INR: Pending    Emergency Department Course:  Past medical records, nursing notes, and vitals reviewed.  1709: I performed an exam of the patient and obtained history, as documented above.     1831:I rechecked the patient. Explained findings to patient.    1848: Rechecked the patient, findings and plan explained to the patient, who consents to admission. Discussed the patient with Dr. Juárez, who will admit the patient to a [facility] inpatient bed for further observation, evaluation, and treatment.    Impression & Plan      Medical Decision Making:  This unfortunate middle-aged gentleman presents to us with marketed swelling to his legs with extensive weight gain over the last several weeks to months.  He has anasarca extending from his legs all the way into his scrotum and abdomen.  His workup in the ER is otherwise unremarkable for overt signs of heart failure.  He has no chest pain or shortness of breath.  His chest x-ray shows no significant pulmonary edema.  There is no elevation of his troponin.  He shows no evidence of renal failure or hepatic failure.  His complete blood count is concerning for a pancytopenia with a drop in his hemoglobin of unclear cause.  He is on Coumadin for his aortic valve replacement.  While his oxygenation and blood pressure is reasonable, this extensive weight gain including 20 pounds over the last week is concerning and the patient is having a difficult time ambulating and caring for himself with this.  Therefore, he will require admission to the hospital.  I spoke with Dr. Grossman of the  hospitalist service will admit the patient for further workup as to the cause of this increase in lower externally edema and to initiate the process of diuresis and probable cardiology consultation.  The patient's questions were answered and he is comfortable with the plan for admission.    Diagnosis:    ICD-10-CM    1. Anasarca R60.1 INR     INR   2. Other pancytopenia (H) D61.818    3. Renal insufficiency N28.9        Disposition:  Admitted to [facility] inpatient bed for further observation, evaluation, and treatment.    Olinda Lance  10/12/2018    EMERGENCY DEPARTMENT  I, Olinda Lance, am serving as a scribe at 5:09 PM on 10/12/2018 to document services personally performed by Trierweiler, Chad A, MD based on my observations and the provider's statements to me.      Trierweiler, Chad A, MD  10/15/18 1712

## 2018-10-12 NOTE — IP AVS SNAPSHOT
[facility] Cardiac [facility]    [address] [place] [address]., [address]    [place] [address]    Phone:  [phone]                                       After Visit Summary   10/12/2018    Gil Chowdary    MRN: [id number]           After Visit Summary Signature Page     I have received my discharge instructions, and my questions have been answered. I have discussed any challenges I see with this plan with the nurse or doctor.    ..........................................................................................................................................  Patient/Patient Representative Signature      ..........................................................................................................................................  Patient Representative Print Name and Relationship to Patient    ..................................................               ................................................  Date                                   Time    ..........................................................................................................................................  Reviewed by Signature/Title    ...................................................              ..............................................  Date                                               Time          22EPIC Rev 08/18

## 2018-10-13 ENCOUNTER — APPOINTMENT (OUTPATIENT)
Dept: ULTRASOUND IMAGING | Facility: CLINIC | Age: 58
DRG: 291 | End: 2018-10-13
Attending: HOSPITALIST
Payer: COMMERCIAL

## 2018-10-13 ENCOUNTER — APPOINTMENT (OUTPATIENT)
Dept: OCCUPATIONAL THERAPY | Facility: CLINIC | Age: 58
DRG: 291 | End: 2018-10-13
Attending: HOSPITALIST
Payer: COMMERCIAL

## 2018-10-13 LAB
ABO + RH BLD: NORMAL
ABO + RH BLD: NORMAL
ANION GAP SERPL CALCULATED.3IONS-SCNC: 6 MMOL/L (ref 3–14)
BASOPHILS # BLD AUTO: 0 10E9/L (ref 0–0.2)
BASOPHILS NFR BLD AUTO: 0.3 %
BLD GP AB SCN SERPL QL: NORMAL
BLOOD BANK CMNT PATIENT-IMP: NORMAL
BUN SERPL-MCNC: 26 MG/DL (ref 7–30)
CALCIUM SERPL-MCNC: 8 MG/DL (ref 8.5–10.1)
CHLORIDE SERPL-SCNC: 109 MMOL/L (ref 94–109)
CO2 SERPL-SCNC: 28 MMOL/L (ref 20–32)
CREAT SERPL-MCNC: 1.29 MG/DL (ref 0.66–1.25)
DIFFERENTIAL METHOD BLD: ABNORMAL
EOSINOPHIL # BLD AUTO: 0.1 10E9/L (ref 0–0.7)
EOSINOPHIL NFR BLD AUTO: 3.3 %
ERYTHROCYTE [DISTWIDTH] IN BLOOD BY AUTOMATED COUNT: 16.8 % (ref 10–15)
FERRITIN SERPL-MCNC: 11 NG/ML (ref 26–388)
FOLATE SERPL-MCNC: 19.5 NG/ML
GFR SERPL CREATININE-BSD FRML MDRD: 57 ML/MIN/1.7M2
GLUCOSE BLDC GLUCOMTR-MCNC: 102 MG/DL (ref 70–99)
GLUCOSE BLDC GLUCOMTR-MCNC: 76 MG/DL (ref 70–99)
GLUCOSE BLDC GLUCOMTR-MCNC: 77 MG/DL (ref 70–99)
GLUCOSE BLDC GLUCOMTR-MCNC: 85 MG/DL (ref 70–99)
GLUCOSE BLDC GLUCOMTR-MCNC: 88 MG/DL (ref 70–99)
GLUCOSE BLDC GLUCOMTR-MCNC: 95 MG/DL (ref 70–99)
GLUCOSE SERPL-MCNC: 89 MG/DL (ref 70–99)
HCT VFR BLD AUTO: 24.7 % (ref 40–53)
HGB BLD-MCNC: 7.7 G/DL (ref 13.3–17.7)
IMM GRANULOCYTES # BLD: 0 10E9/L (ref 0–0.4)
IMM GRANULOCYTES NFR BLD: 0.3 %
INR PPP: 2.28 (ref 0.86–1.14)
IRON SATN MFR SERPL: 8 % (ref 15–46)
IRON SERPL-MCNC: 36 UG/DL (ref 35–180)
LYMPHOCYTES # BLD AUTO: 1.3 10E9/L (ref 0.8–5.3)
LYMPHOCYTES NFR BLD AUTO: 32.2 %
MCH RBC QN AUTO: 26.4 PG (ref 26.5–33)
MCHC RBC AUTO-ENTMCNC: 31.2 G/DL (ref 31.5–36.5)
MCV RBC AUTO: 85 FL (ref 78–100)
MONOCYTES # BLD AUTO: 0.3 10E9/L (ref 0–1.3)
MONOCYTES NFR BLD AUTO: 7.4 %
NEUTROPHILS # BLD AUTO: 2.2 10E9/L (ref 1.6–8.3)
NEUTROPHILS NFR BLD AUTO: 56.5 %
NRBC # BLD AUTO: 0 10*3/UL
NRBC BLD AUTO-RTO: 0 /100
PLATELET # BLD AUTO: 98 10E9/L (ref 150–450)
POTASSIUM SERPL-SCNC: 3.7 MMOL/L (ref 3.4–5.3)
RBC # BLD AUTO: 2.92 10E12/L (ref 4.4–5.9)
SODIUM SERPL-SCNC: 143 MMOL/L (ref 133–144)
SPECIMEN EXP DATE BLD: NORMAL
TIBC SERPL-MCNC: 478 UG/DL (ref 240–430)
VIT B12 SERPL-MCNC: 332 PG/ML (ref 193–986)
WBC # BLD AUTO: 3.9 10E9/L (ref 4–11)

## 2018-10-13 PROCEDURE — 82728 ASSAY OF FERRITIN: CPT | Performed by: INTERNAL MEDICINE

## 2018-10-13 PROCEDURE — 97535 SELF CARE MNGMENT TRAINING: CPT | Mod: GO

## 2018-10-13 PROCEDURE — 36415 COLL VENOUS BLD VENIPUNCTURE: CPT | Performed by: HOSPITALIST

## 2018-10-13 PROCEDURE — 86901 BLOOD TYPING SEROLOGIC RH(D): CPT | Performed by: HOSPITALIST

## 2018-10-13 PROCEDURE — 00000402 ZZHCL STATISTIC TOTAL PROTEIN: Performed by: INTERNAL MEDICINE

## 2018-10-13 PROCEDURE — 83540 ASSAY OF IRON: CPT | Performed by: HOSPITALIST

## 2018-10-13 PROCEDURE — 25000128 H RX IP 250 OP 636: Performed by: HOSPITALIST

## 2018-10-13 PROCEDURE — 82607 VITAMIN B-12: CPT | Performed by: HOSPITALIST

## 2018-10-13 PROCEDURE — 25000132 ZZH RX MED GY IP 250 OP 250 PS 637: Performed by: HOSPITALIST

## 2018-10-13 PROCEDURE — 40000275 ZZH STATISTIC RCP TIME EA 10 MIN

## 2018-10-13 PROCEDURE — 00000146 ZZHCL STATISTIC GLUCOSE BY METER IP

## 2018-10-13 PROCEDURE — 84238 ASSAY NONENDOCRINE RECEPTOR: CPT | Performed by: INTERNAL MEDICINE

## 2018-10-13 PROCEDURE — 85025 COMPLETE CBC W/AUTO DIFF WBC: CPT | Performed by: HOSPITALIST

## 2018-10-13 PROCEDURE — 85610 PROTHROMBIN TIME: CPT | Performed by: HOSPITALIST

## 2018-10-13 PROCEDURE — 99232 SBSQ HOSP IP/OBS MODERATE 35: CPT | Performed by: HOSPITALIST

## 2018-10-13 PROCEDURE — 86850 RBC ANTIBODY SCREEN: CPT | Performed by: HOSPITALIST

## 2018-10-13 PROCEDURE — 97110 THERAPEUTIC EXERCISES: CPT | Mod: GO

## 2018-10-13 PROCEDURE — 99221 1ST HOSP IP/OBS SF/LOW 40: CPT | Performed by: INTERNAL MEDICINE

## 2018-10-13 PROCEDURE — 21000001 ZZH R&B HEART CARE

## 2018-10-13 PROCEDURE — 36415 COLL VENOUS BLD VENIPUNCTURE: CPT | Performed by: INTERNAL MEDICINE

## 2018-10-13 PROCEDURE — 83921 ORGANIC ACID SINGLE QUANT: CPT | Performed by: INTERNAL MEDICINE

## 2018-10-13 PROCEDURE — 86900 BLOOD TYPING SEROLOGIC ABO: CPT | Performed by: HOSPITALIST

## 2018-10-13 PROCEDURE — 83550 IRON BINDING TEST: CPT | Performed by: HOSPITALIST

## 2018-10-13 PROCEDURE — 99222 1ST HOSP IP/OBS MODERATE 55: CPT | Performed by: INTERNAL MEDICINE

## 2018-10-13 PROCEDURE — 76700 US EXAM ABDOM COMPLETE: CPT

## 2018-10-13 PROCEDURE — 25000125 ZZHC RX 250: Performed by: HOSPITALIST

## 2018-10-13 PROCEDURE — 97165 OT EVAL LOW COMPLEX 30 MIN: CPT | Mod: GO

## 2018-10-13 PROCEDURE — 82746 ASSAY OF FOLIC ACID SERUM: CPT | Performed by: HOSPITALIST

## 2018-10-13 PROCEDURE — 40000133 ZZH STATISTIC OT WARD VISIT

## 2018-10-13 PROCEDURE — 84165 PROTEIN E-PHORESIS SERUM: CPT | Performed by: INTERNAL MEDICINE

## 2018-10-13 PROCEDURE — 80048 BASIC METABOLIC PNL TOTAL CA: CPT | Performed by: HOSPITALIST

## 2018-10-13 RX ORDER — WARFARIN SODIUM 3 MG/1
3 TABLET ORAL
Status: COMPLETED | OUTPATIENT
Start: 2018-10-13 | End: 2018-10-13

## 2018-10-13 RX ORDER — PREDNISOLONE ACETATE 10 MG/ML
1 SUSPENSION/ DROPS OPHTHALMIC DAILY PRN
Status: DISCONTINUED | OUTPATIENT
Start: 2018-10-13 | End: 2018-10-14 | Stop reason: HOSPADM

## 2018-10-13 RX ORDER — BUPROPION HYDROCHLORIDE 150 MG/1
300 TABLET ORAL EVERY MORNING
Status: DISCONTINUED | OUTPATIENT
Start: 2018-10-15 | End: 2018-10-14 | Stop reason: HOSPADM

## 2018-10-13 RX ORDER — BUPROPION HYDROCHLORIDE 150 MG/1
150 TABLET ORAL EVERY MORNING
Status: COMPLETED | OUTPATIENT
Start: 2018-10-14 | End: 2018-10-14

## 2018-10-13 RX ADMIN — ATORVASTATIN CALCIUM 40 MG: 40 TABLET, FILM COATED ORAL at 21:15

## 2018-10-13 RX ADMIN — VITAMIN D, TAB 1000IU (100/BT) 5000 UNITS: 25 TAB at 08:54

## 2018-10-13 RX ADMIN — METOPROLOL TARTRATE 50 MG: 50 TABLET ORAL at 08:54

## 2018-10-13 RX ADMIN — FUROSEMIDE 60 MG: 10 INJECTION, SOLUTION INTRAVENOUS at 08:55

## 2018-10-13 RX ADMIN — WARFARIN SODIUM 3 MG: 3 TABLET ORAL at 16:26

## 2018-10-13 RX ADMIN — TIMOLOL MALEATE 1 DROP: 5 SOLUTION OPHTHALMIC at 08:56

## 2018-10-13 RX ADMIN — PANTOPRAZOLE SODIUM 40 MG: 40 TABLET, DELAYED RELEASE ORAL at 08:55

## 2018-10-13 RX ADMIN — BUPROPION HYDROCHLORIDE 150 MG: 150 TABLET, FILM COATED, EXTENDED RELEASE ORAL at 08:54

## 2018-10-13 RX ADMIN — FUROSEMIDE 60 MG: 10 INJECTION, SOLUTION INTRAVENOUS at 16:26

## 2018-10-13 ASSESSMENT — ACTIVITIES OF DAILY LIVING (ADL)
PREVIOUS_RESPONSIBILITIES: MEAL PREP;HOUSEKEEPING;LAUNDRY;SHOPPING;YARDWORK;MEDICATION MANAGEMENT;FINANCES;DRIVING
ADLS_ACUITY_SCORE: 10

## 2018-10-13 NOTE — PROGRESS NOTES
"SPIRITUAL HEALTH SERVICES Progress Note  [facility] Cardiac Special[facility] Care Unit    Initial visit with pt Gil per pt/family request.   Pt Gil addressed that he had a heart surgery a year ago and has swelling legs because of the retention of fluid.   Pt is [employer] and goes to [facility] in [place].  had a lengthy conversation with pt.   Pt lost his wife last year and has one son. Pt was tearful and said, \"I lost my Aurelia last year. I miss her so much.   confirmed pt's sad emotion, and we talked about Aurelia. Pt said, \"My Aurelia is very funny... She was a good cook ... She is still in my heart.\"      provided listening, reading the scripture, and prayer.    I and SH remain available per pt/family request.     Danii Padilla  Chaplain Resident    "

## 2018-10-13 NOTE — H&P
Admitted:     10/12/2018      DATE OF ADMISSION:  10/12/2018      PRIMARY CARE PROVIDER:  JOAN Alfaro      CHIEF COMPLAINT:  Leg edema and shortness of breath.      HISTORY OF PRESENT ILLNESS:  Gil Chowdary is a 58-year-old pleasant male with history of diabetes mellitus type 2, nonobstructive coronary artery disease, severe aortic stenosis status post AVR with bioprosthetic valve, and chronic anemia who presented to the ER due to leg edema and shortness of breath.  I discussed with the patient, reviewed his medical record in Epic, and I discussed with Dr. Trierweiler from ED for further information.      According to the patient, he has gradually increasing leg edema for the last several months.  I noticed his weight was 265 in 07/2018 and is 334 in the ER now.  He had recent PCP and Endocrine visits where the weight was gradually going up.  The patient also reports gradually worsening shortness of breath with activity and now gets dyspneic with walking less than a block or so.  He denies orthopnea, PND or chest pain.  No dizziness, diaphoresis.  He denies any obstructive urinary symptoms.  The patient does report that he watches his salt intake, but he reports he loves drinking water and drinks tons of water to keep his body hydrated, but again there are days when he does not really drink much fluid.      REVIEW OF SYSTEMS:  All 10-point systems were reviewed and is negative other than mentioned in HPI.  The patient specifically denies any hematochezia or melena, though reports blood streaks while wiping a few weeks ago, just one time and has not reoccurred (thinks due to hemorrhoids).  No abdominal pain.  No fever.  No hematuria.      In the ER, the patient was evaluated by Dr. Trierweiler.  His lab workup revealed elevated creatinine from his baseline and also anemia and thrombocytopenia.  Troponin was negative.  ProBNP was 92.  Chest x-ray did not really show pulmonary edema.  EKG showed right  bundle branch block.  He is being admitted for management of anasarca.      PAST MEDICAL HISTORY:   1.  Aortic stenosis, post AVR.   2.  Anemia and thrombocytopenia.   3.  Diabetes mellitus type 2.   4.  Obstructive sleep apnea.     5.  SVT post AVR.   6.  Obesity.   7.  Glaucoma.      SURGICAL HISTORY:     1.  Sinus surgery.     2.  Aortic valve surgery.   3.  Coronary angiogram.      SOCIAL HISTORY:  He is a former smoker, quit 8 years ago.  Denies alcohol or recreational drug use.  He lives with his mom who is 90 years old and is caretaker for her.  His wife passed away last year.  He has 1 son here in [place].        FAMILY HISTORY:  Father is , had coronary artery disease and diabetes.  Mom is alive in her 90s, had stroke.  Brother with diabetes and also coronary artery disease, and needing CABG.      ALLERGIES:  AMOXICILLIN/PENICILLIN.      HOME MEDICATIONS:   Prior to Admission medications    Medication Sig Last Dose Taking? Auth Provider   aspirin 81 MG tablet Take 81 mg by mouth every morning  10/12/2018 at am Yes Reported, Patient   atorvastatin (LIPITOR) 40 MG tablet Take 1 tablet (40 mg) by mouth daily 10/11/2018 at pm Yes Francia Otoole PA-C   cholecalciferol (VITAMIN D3) 5000 UNITS TABS tablet Take 5,000 Units by mouth every morning 10/12/2018 at am Yes Reported, Patient   clindamycin (CLEOCIN) 300 MG capsule Take 1 capsule (300 mg) by mouth as needed prn Yes Guru Salinas MD   insulin aspart (NOVOLOG FLEXPEN) 100 UNIT/ML injection Inject 4 Units Subcutaneous daily (with lunch) 10/12/2018 at Unknown time Yes Unknown, Entered By History   insulin aspart (NOVOLOG FLEXPEN) 100 UNIT/ML injection Inject 3 Units Subcutaneous 2 times daily (with meals) Takes with breakfast and dinner 10/12/2018 at Unknown time Yes Reported, Patient   Insulin Glargine (BASAGLAR KWIKPEN SC) Inject 22 Units Subcutaneous every morning  10/12/2018 at am Yes Reported, Patient   metoprolol (LOPRESSOR) 50 MG tablet  Take 1 tablet (50 mg) by mouth 2 times daily 10/12/2018 at x2 doses Yes Anibal, Hugo Garvey PA-C   Multiple Vitamins-Minerals (CENTRUM ADULTS PO) Take 1 tablet by mouth every morning  10/12/2018 at am Yes Reported, Patient   pantoprazole (PROTONIX) 40 MG EC tablet Take 40 mg by mouth every morning (before breakfast) 10/12/2018 at am Yes Unknown, Entered By History   potassium chloride SA (K-DUR/KLOR-CON M) 10 MEQ CR tablet Take 20 mEq by mouth 2 times daily 10/12/2018 at x2 Yes Unknown, Entered By History   prednisoLONE acetate (PRED FORTE) 1 % ophthalmic susp Place 1 drop into both eyes as needed (glaucoma)  prn Yes Reported, Patient   timolol (TIMOPTIC) 0.5 % ophthalmic solution Place 1 drop into both eyes 2 times daily  10/12/2018 at am Yes Reported, Patient   warfarin (COUMADIN) 3 MG tablet Take 3 mg by mouth every evening 10/11/2018 at pm Yes Unknown, Entered By History   bumetanide (BUMEX) 1 MG tablet Take 3 tablets (3 mg) by mouth 2 times daily   More, Rachna Carrillo PA-C   ferrous sulfate (IRON) 325 (65 Fe) MG tablet Take 1 tablet (325 mg) by mouth 2 times daily   Juan Fletcher MD   spironolactone (ALDACTONE) 25 MG tablet Take 1 tablet (25 mg) by mouth daily   More, Rachna Carrillo PA-C          PHYSICAL EXAMINATION:   GENERAL:  The patient is alert, awake, oriented x 3, does not appear to be in distress.   VITAL SIGNS:  Blood pressure 111/69, heart rate 73, temperature 97.9, respirations 18, pulse ox 99% on room air.   HEENT:  PERRLA, EOMI.  Oral mucosa is moist but pale.   NECK:  Supple.   LUNGS:  Bilateral equal air entry.  Clear to auscultation.  Normal work of breathing. Remains on room air.   CARDIOVASCULAR:  S1, S2 regular.  No murmur, rub or gallop.  No tachycardia.   ABDOMEN:  Soft, obese/distended.  Unable to appreciate organomegaly due to anasarca.  No fluid shift noted.     GENITOURINARY: Extremely swollen scrotal sac and penile skin.   MUSCULOSKELETAL:  Bilateral pitting pedal edema  bilaterally 2-3+, likely has nonpitting component as well.   NEUROLOGIC:  Alert and oriented x 3.  Cranial nerves II-XII intact.      LABORATORY DATA:  WBC 5, hemoglobin 8.2, hematocrit 26.9, platelet 109. INR 2.01.  Troponin less than 0.015.  ProBNP 902. CMP shows creatinine of 1.4, BUN 25, albumin 3.6. AST 55, otherwise LFTs are normal.        DIAGNOSTIC: 12-lead EKG and chest x-ray as above.      ASSESSMENT AND PLAN:  Gil Chowdary is a 58-year-old pleasant male with above-mentioned medical history who drove to the ER by himself due to worsening leg edema.     1.  Anasarca.  I believe this is due to fluid retention from possible heart failure.  His albumin is normal at 3.6.  LFTs are normal except mildly elevated AST.  ProBNP is not significantly elevated and chest x-ray does not show pulmonary edema.  The patient reports excessive p.o. intake.  His echo earlier year showed normal LV function.   -- The patient will be admitted to inpatient.   -- He is getting 40 mg Lasix IV in ER, takes Bumex 1 mg p.o. b.i.d. at home, will continue his Lasix 60 mg IV b.i.d.  His weight was 265 pounds 2 months ago and is 325 pounds in ER.  He is going to need several days of diuresis.  Will get 2D echo tomorrow, consult Cardiology.  He may even need Lasix drip.    -- I will also obtain TSH.   -- Will place him on fluid restriction and monitor intake and output strictly.   -- I will get abdominal ultrasound to evaluate for any extrinsic IVC compression and also to evaluate for his kidneys and hepatobiliary tree.     2.  Aortic valve replacement with bioprosthetic aortic valve. Supraventricular tachycardia postop. Nonobstructive diffuse coronary artery disease.   -- The patient is still on warfarin for his bioprosthetic aortic valve, INR is therapeutic, pharmacy to dose Coumadin.  PTA baby aspirin will be continued.  Also metoprolol 50 mg p.o. b.i.d. will be continued.  Monitor on telemetry.     3.  Anemia and  thrombocytopenia.  His platelet count postop was in the 130s at the time of discharge.  He presents with a platelet count of 109.  His hemoglobin is 8.2.  He is on Coumadin.  Except for 1 episode of blood on his wipes, he denies any colin hematochezia or melena.  I will check iron level, ferritin, B12 and folate, peripheral smear.  I have requested Hematology for consultation and further evaluation.  Transfuse for hemoglobin less than 7.  Conditional transfusion order entered and patient signed transfusion consent.   4.  Acute kidney injury.  His creatinine was 0.8-0.9 last year.  Presents with a creatinine of 1.4.  This could be hepatorenal syndrome, but he has massive scrotal edema and could be dealing with increased urinary retention, though he is not aware of it and he denies lower urinary tract obstructive symptoms.  Bladder scan every shift, though this could be falsely elevated given his anasarca.  Monitor intake and output strictly.  Continue diuresis as above and monitor creatinine.  I prefer him to have a Hairston catheter for above.  I will consider Urology consultation given his scrotal edema if it does not improve with diuresis.     5.  Diabetes mellitus type 2.  He is on insulin glargine 22 units subcu every morning and NovoLog 4 units with lunch and 3 units with breakfast and dinner.  For ultrasound, he will be n.p.o. He already took today's insulin glargine, so for now I will place him on insulin sliding scale only.  Once ultrasound is done tomorrow, his PTA insulin regimen can be continued.  Check hemoglobin A1c.     6.  Obstructive sleep apnea.  CPAP ordered.     7.  Deep venous thrombosis prophylaxis on warfarin.      CODE STATUS:  FULL CODE BY DEFAULT.        DISPOSITION: Care plan discussed with the patient. Anticipate 3-5 days of hospital stay. Inpatient orders entered.         ENOC RENDON MD             D: 10/12/2018   T: 10/13/2018   MT:       Name:     ABIGAIL MATOS   MRN:       [id number]        Account:      [id number]   :      1960        Admitted:     10/12/2018                   Document: [id number]       cc: Sam FRANCO

## 2018-10-13 NOTE — PHARMACY-ANTICOAGULATION SERVICE
Clinical Pharmacy - Warfarin Dosing Consult     Pharmacy has been consulted to manage this patient s warfarin therapy.  Indication: Bioprosthetic Heart Valve  Therapy Goal: INR 2-3  Warfarin Prior to Admission: Yes  Warfarin PTA Regimen: 3mg daily  Significant drug interactions: ASA    INR   Date Value Ref Range Status   10/12/2018 2.01 (H) 0.86 - 1.14 Final   05/24/2017 2.50 (H) 0.86 - 1.14 Final       Recommend warfarin 3 mg today.  Pharmacy will monitor Gil Chowdary daily and order warfarin doses to achieve specified goal.      Please contact pharmacy as soon as possible if the warfarin needs to be held for a procedure or if the warfarin goals change.

## 2018-10-13 NOTE — PLAN OF CARE
Problem: Patient Care Overview  Goal: Plan of Care/Patient Progress Review  Outcome: No Change  Pt up to floor from ED at ~2000. A&Ox4. VSS on RA. Lung sounds diminished. MOTA. Bowel sounds active, passing gas. Voiding in urinal in adequate amounts. Independent in room. NPO since midnight. BGs Q4. 144, 76 and 88 this shift. Dependent edema in BLE. Compression stockings on. Denies any pain. Tele showing NSR. Plan is for US of abdomen this AM, along with cards and hematology consult.

## 2018-10-13 NOTE — PROGRESS NOTES
RECEIVING UNIT ED HANDOFF REVIEW    ED Nurse Handoff Report was reviewed by: Renetta Norwood on October 12, 2018 at 7:42 PM

## 2018-10-13 NOTE — PLAN OF CARE
"Problem: Patient Care Overview  Goal: Plan of Care/Patient Progress Review  Discharge Planner OT   Patient plan for discharge: home  Current status: OT/cardiac rehab eval completed and treatment initiated. Pt admitted with SOB and LE swelling; dx'd CHF. He was up ambulating indep in room, he stated he was feeling \"so much better\" than he did at admit. Tolerated amb x 5min and completed 15 steps while maintaining stable cardiac response, 02 97% on RA, citing mild SOB and fatigue at 1-2/10.  Education completed in CHF S/S and risk factor mgmt (taking daily weights, following low Na+ diet, home walking program, use of STOP sign tool) and pt engaged, asked many appropriate questions and verbalized understanding of all info. Booklet issued for reinforcement. Pt did express concern with some of his meds contributing to weight gain -- encouraged him to discuss with MDs.  OT/CR to see patient for one additional session tomorrow to advance activity. Encouraged him to continue indep amb on floor.  Barriers to return to prior living situation: none noted  Recommendations for discharge: home  Rationale for recommendations: pt at or near baseline level of function       Entered by: Rashi Araya 10/13/2018 12:37 PM         "

## 2018-10-13 NOTE — PLAN OF CARE
Problem: Patient Care Overview  Goal: Plan of Care/Patient Progress Review  Outcome: Improving  Up in the room independent, No SOB with activity, tolerating food, voiding on lasix IV a lot. On fluid restriction. Continue to monitor.  Heart Failure Care Pathway  GOALS TO BE MET BEFORE DISCHARGE:    1. Decrease congestion and/or edema with diuretic therapy to achieve near      optimal volume status.            Dyspnea improved:  Yes            Edema improved:     No, please explain: same        Net I/O and Weights since admission:          09/13 2300 - 10/13 2259  In: 300 [P.O.:300]  Out: 4325 [Urine:4325]  Net: -4025            Vitals:    10/12/18 1651 10/12/18 2026 10/13/18 0500   Weight: (!) 151.7 kg (334 lb 6.4 oz) 145.8 kg (321 lb 6.4 oz) 144 kg (317 lb 6.4 oz)       2.  O2 sats > 92% on RA or at prior home O2 therapy level.          Current oxygenation status:       SpO2: 98 %         O2 Device: None (Room air),            Able to wean O2 this shift to keep sats > 92%:  Yes       Does patient use Home O2? No    3.  Tolerates ambulation and mobility near baseline: Yes        How many times did the patient ambulate with nursing staff this shift? 4    Please review the Heart Failure Care Pathway for additional HF goal outcomes.    Paula Ortiz RN  10/13/2018

## 2018-10-13 NOTE — PROGRESS NOTES
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)   Self-Care   Dominant Hand right   Usual Activity Tolerance moderate   Current Activity Tolerance fair   Regular Exercise no   Equipment Currently Used at Home none   Functional Level Prior   Ambulation 0-->independent   Transferring 0-->independent   Toileting 0-->independent   Bathing 0-->independent   Dressing 0-->independent   Eating 0-->independent   Communication 0-->understands/communicates without difficulty   Swallowing 0-->swallows foods/liquids without difficulty   Cognition 0 - no cognition issues reported   Fall history within last six months no   Prior Functional Level Comment Pt does all household tasks, cooking, laundry, outdoor tasks, helps his mom with transfers and with her when she walks, helps her dress, toilet, bathe   General Information   Onset of Illness/Injury or Date of Surgery - Date 10/12/18   Referring Physician Dr. Grossman   Patient/Family Goals Statement return home   Additional Occupational Profile Info/Pertinent History of Current Problem 58-year-old pleasant male with history of diabetes mellitus type 2, nonobstructive coronary artery disease, severe aortic stenosis status post AVR with bioprosthetic valve, and chronic anemia who presented to the ER due to leg edema and shortness of breath. Dx: CHF   Precautions/Limitations no known precautions/limitations   Cognitive Status Examination   Orientation orientation to person, place and time   Level of Consciousness alert   Able to Follow Commands WNL/WFL   Personal Safety (Cognitive) WNL/WFL   Visual Perception  "  Visual Perception No deficits were identified   Pain Assessment   Patient Currently in Pain No   Range of Motion (ROM)   ROM Quick Adds No deficits were identified   Strength   Manual Muscle Testing Quick Adds No deficits were identified   Coordination   Upper Extremity Coordination No deficits were identified   Mobility   Bed Mobility Comments Independent   Transfer Skills   Transfer Comments independent   Balance   Balance Comments Independent all mobility no AD, up ambulating indep on floor   Lower Body Dressing   Level of [place]: Dress Lower Body independent   Toileting   Level of [place]: Toilet independent   Instrumental Activities of Daily Living (IADL)   Previous Responsibilities meal prep;housekeeping;laundry;shopping;yardwork;medication management;finances;driving   Activities of Daily Living Analysis   Impairments Contributing to Impaired Activities of Daily Living strength decreased  (endurance)   ADL Comments pt reports he feels much better since initiating dieuetics at admit, he is up completing self-cares indep in room   General Therapy Interventions   Planned Therapy Interventions ADL retraining;home program guidelines;progressive activity/exercise;risk factor education   Clinical Impression   Criteria for Skilled Therapeutic Interventions Met yes, treatment indicated   OT Diagnosis decreased ADL/IADL performance   Influenced by the following impairments weakness   Assessment of Occupational Performance 1-3 Performance Deficits   Identified Performance Deficits household mgmt, exercise intolerancef   Clinical Decision Making (Complexity) Low complexity   Therapy Frequency daily   Predicted Duration of Therapy Intervention (days/wks) 2 days   Anticipated Discharge Disposition Home   Risks and Benefits of Treatment have been explained. Yes   Patient, Family & other staff in agreement with plan of care Yes   [facility] AM-PAC TM \"6 Clicks\"   2016, Trustees of [facility], under " "license to [employer].  All rights reserved.   6 Clicks Short Forms Daily Activity Inpatient Short Form   [facility] AM-PAC  \"6 Clicks\" Daily Activity Inpatient Short Form   1. Putting on and taking off regular lower body clothing? 4 - None   2. Bathing (including washing, rinsing, drying)? 4 - None   3. Toileting, which includes using toilet, bedpan or urinal? 4 - None   4. Putting on and taking off regular upper body clothing? 4 - None   5. Taking care of personal grooming such as brushing teeth? 4 - None   6. Eating meals? 4 - None   Daily Activity Raw Score (Score out of 24.Lower scores equate to lower levels of function) 24   Total Evaluation Time   Total Evaluation Time (Minutes) 15     "

## 2018-10-13 NOTE — PROGRESS NOTES
[facility]    Hospitalist Progress Note      Assessment & Plan   Gil Chowdary is a 58-year-old pleasant male with above-mentioned medical history who drove to the ER by himself due to worsening leg edema. He is doing much better today (10/13) and handling ambulation without issue.     1.  Anasarca.   - Due to fluid retention from possible heart failure.  His albumin is normal at 3.6.  LFTs are normal except mildly elevated AST.  ProBNP is not significantly elevated and chest x-ray does not show pulmonary edema.  The patient reports excessive p.o. intake.  His echo earlier year showed normal LV function.   -- He is getting 40 mg Lasix IV in ER, takes Bumex 1 mg p.o. b.i.d. at home, will continue his Lasix 60 mg IV b.i.d.  His weight was 265 pounds 2 months ago and is 325 pounds in ER.  He is going to need several days of diuresis.    -- Cardiology consult placed  -- Echo ordered  -- I will also obtain TSH.   -- Fluid restriction and I/O & weights monitored  -- Abdo U/S for extrinsic IVC compression and also to evaluate for his kidneys and hepatobiliary tree completed - see below.     2.  Aortic valve replacement with bioprosthetic aortic valve.   Supraventricular tachycardia postop.   Nonobstructive diffuse coronary artery disease.   -- The patient is still on warfarin for his bioprosthetic aortic valve, INR is therapeutic, pharmacy to dose Coumadin.    -- PTA baby aspirin will be continued.    -- Also metoprolol 50 mg p.o. b.i.d. will be continued.    -- Monitor on telemetry.     3.  Anemia and thrombocytopenia.    His platelet count postop was in the 130s at the time of discharge.  He presents with a platelet count of 109.  His hemoglobin is 8.2.  He is on Coumadin.  Except for 1 episode of blood on his wipes, he denies any colin hematochezia or melena.   -- Iron level, ferritin, B12 and folate, peripheral smear checked.   -- Hematology for consultation and further evaluation - likely  continue as outpatient    -- Transfuse for hemoglobin less than 7.  Conditional transfusion order entered and patient signed transfusion consent.     4.  Acute kidney injury.    His creatinine was 0.8-0.9 last year.  Presented with a creatinine of 1.4 improved to 1.29 on 10/13.  This could be hepatorenal syndrome, but he has massive scrotal edema and could be dealing with increased urinary retention, though he is not aware of it and he denies lower urinary tract obstructive symptoms.    -- Bladder scan every shift, though this could be falsely elevated given his anasarca. -- Monitor intake and output strictly.    -- Continue diuresis as above and monitor creatinine.    -- Hairston catheter for above.       5.  Diabetes mellitus type 2.    - He is on insulin glargine 22 units subcu every morning and NovoLog 4 units with lunch and 3 units with breakfast and dinner.    --  PTA basal insulin with slight reduction planned for tomorrow - glargine 18 in the morning.    -- Check hemoglobin A1c.     6.  Obstructive sleep apnea.    - CPAP ordered.     DVT Prophylaxis: Warfarin  Code Status: Full Code  Expected discharge: Anticipate 2-3 days of inpatient management ideally.      Addendum: Patient has informed me that he MUST leave tomorrow to take care of his mother so we may need to adjust our plan for discharge and follow up if possible.     Isaac Rose MD    Text Page (7am to 6pm, M-F)    Interval History   Considerable improvement. Decreased edema diffusely. Legs wrapped. Ambulating without issue. No sob.     -Data reviewed today: I reviewed all new labs and imaging results over the last 24 hours. I personally reviewed no images or EKG's today.    Physical Exam   Temp: 97.9  F (36.6  C) Temp src: Oral BP: 112/70 Pulse: 65 Heart Rate: 72 Resp: 18 SpO2: 97 % O2 Device: None (Room air)    Vitals:    10/12/18 1651 10/12/18 2026 10/13/18 0500   Weight: (!) 151.7 kg (334 lb 6.4 oz) 145.8 kg (321 lb 6.4 oz) 144 kg (317 lb 6.4  oz)     Vital Signs with Ranges  Temp:  [97.8  F (36.6  C)-98  F (36.7  C)] 97.9  F (36.6  C)  Pulse:  [65-76] 65  Heart Rate:  [65-76] 72  Resp:  [18] 18  BP: ()/(50-72) 112/70  SpO2:  [95 %-100 %] 97 %  I/O last 3 completed shifts:  In: -   Out: 1550 [Urine:1550]    Constitutional: Awake, alert, cooperative, no apparent distress  Respiratory: Clear to auscultation bilaterally, no crackles or wheezing  Cardiovascular: Regular rate and rhythm, normal S1 and S2, and no murmur noted  GI: Normal bowel sounds, soft, non-distended, non-tender.   Skin/Integumen: No rashes, no cyanosis  Ext: BLE extremity, 1-2+ pitting with wraps in place.   : scrotal edema noted but much improved subjectively    Medications     - MEDICATION INSTRUCTIONS -       - MEDICATION INSTRUCTIONS -       Reason ACE/ARB/ARNI order not selected       Warfarin Therapy Reminder         atorvastatin  40 mg Oral At Bedtime     [START ON 10/14/2018] buPROPion  150 mg Oral QAM     [START ON 10/15/2018] buPROPion  300 mg Oral QAM     cholecalciferol  5,000 Units Oral QAM     furosemide  60 mg Intravenous BID     influenza vaccine adult (product based on age)  0.5 mL Intramuscular Prior to discharge     insulin aspart  1-6 Units Subcutaneous Q4H     metoprolol tartrate  50 mg Oral BID     pantoprazole  40 mg Oral QAM AC     timolol  1 drop Both Eyes BID     warfarin  3 mg Oral ONCE at 18:00       Data     Recent Labs  Lab 10/13/18  0535 10/12/18  2120 10/12/18  1736   WBC 3.9* 5.0 5.0   HGB 7.7* 8.2* 8.2*   MCV 85 85 85   PLT 98* 95* 109*   INR 2.28*  --  2.01*     --  140   POTASSIUM 3.7  --  3.9   CHLORIDE 109  --  107   CO2 28  --  26   BUN 26  --  25   CR 1.29*  --  1.40*   ANIONGAP 6  --  7   MYRON 8.0*  --  8.1*   GLC 89  --  89   ALBUMIN  --   --  3.6   PROTTOTAL  --   --  8.8   BILITOTAL  --   --  0.3   ALKPHOS  --   --  82   ALT  --   --  31   AST  --   --  55*   TROPI  --   --  <0.015       Recent Results (from the past 24 hour(s))   XR  Chest 2 Views    Narrative    CHEST TWO VIEWS 10/12/2018 6:02 PM     HISTORY: Shortness of breath, fluid overload.    COMPARISON: May 20, 2017     FINDINGS: Stable sternotomy changes. There are no acute infiltrates.  The cardiac silhouette is not enlarged. Pulmonary vasculature is  unremarkable.      Impression    IMPRESSION: No acute disease.    CITLALLI MARVIN MD   US Abdomen Complete    Narrative    ULTRASOUND ABDOMEN COMPLETE 10/13/2018 8:41 AM     HISTORY: Acute kidney injury and anasarca, to evaluate for any  extrinsic IVC compression, and to evaluate for hydronephrosis.      COMPARISON: None.    FINDINGS: Liver is unremarkable in echogenicity without focal solid  lesions. Gallbladder demonstrates no cholelithiasis or cholecystitis.  Extrahepatic bile duct is normal in diameter. Pancreas is normal where  visualized. Spleen is normal. Kidneys are normal in size. There is no  hydronephrosis. Proximal abdominal aorta and IVC are nonaneurysmal.      Impression    IMPRESSION: Negative abdominal ultrasound. The entire IVC could not be  visualized.    CITLALLI MARVIN MD

## 2018-10-13 NOTE — CONSULTS
Standard CHF consult received for 2gm Na diet education  Pt admitted during the night  Will plan to see prior to discharge for diet teaching

## 2018-10-13 NOTE — PHARMACY-ADMISSION MEDICATION HISTORY
Admission medication history interview status for the 10/12/2018  admission is complete. See EPIC admission navigator for prior to admission medications     Medication history source reliability:Good, pt interview and bottles    Actions taken by pharmacist (provider contacted, etc):added Wellbutrin, removed Donepezil, apap.modified KCl, insulin     Additional medication history information not noted on PTA med list :None    Medication reconciliation/reorder completed by provider prior to medication history? No    Time spent in this activity: 20 minutes    Prior to Admission medications    Medication Sig Last Dose Taking? Auth Provider   aspirin 81 MG tablet Take 81 mg by mouth every morning  10/12/2018 at am Yes Reported, Patient   atorvastatin (LIPITOR) 40 MG tablet Take 1 tablet (40 mg) by mouth daily 10/11/2018 at pm Yes Francia Otoole PA-C   bumetanide (BUMEX) 1 MG tablet Take 1 tablet (1 mg) by mouth 2 times daily 10/12/2018 at x2 Yes Hugo Barnett PA-C   buPROPion (WELLBUTRIN XL) 150 MG 24 hr tablet Take 150-300 mg by mouth every morning Starting 10/8/18 150mg daily x7 days, then increase to 300mg daily 10/12/2018 at am Yes Unknown, Entered By History   cholecalciferol (VITAMIN D3) 5000 UNITS TABS tablet Take 5,000 Units by mouth every morning 10/12/2018 at am Yes Reported, Patient   clindamycin (CLEOCIN) 300 MG capsule Take 1 capsule (300 mg) by mouth as needed prn Yes Guru Salinas MD   insulin aspart (NOVOLOG FLEXPEN) 100 UNIT/ML injection Inject 4 Units Subcutaneous daily (with lunch) 10/12/2018 at Unknown time Yes Unknown, Entered By History   insulin aspart (NOVOLOG FLEXPEN) 100 UNIT/ML injection Inject 3 Units Subcutaneous 2 times daily (with meals) Takes with breakfast and dinner 10/12/2018 at Unknown time Yes Reported, Patient   Insulin Glargine (BASAGLAR KWIKPEN SC) Inject 22 Units Subcutaneous every morning  10/12/2018 at am Yes Reported, Patient   metoprolol (LOPRESSOR) 50 MG tablet  Take 1 tablet (50 mg) by mouth 2 times daily 10/12/2018 at x2 doses Yes Droyumiko, Hugo Garvey PA-C   Multiple Vitamins-Minerals (CENTRUM ADULTS PO) Take 1 tablet by mouth every morning  10/12/2018 at am Yes Reported, Patient   pantoprazole (PROTONIX) 40 MG EC tablet Take 40 mg by mouth every morning (before breakfast) 10/12/2018 at am Yes Unknown, Entered By History   potassium chloride SA (K-DUR/KLOR-CON M) 10 MEQ CR tablet Take 20 mEq by mouth 2 times daily 10/12/2018 at x2 Yes Unknown, Entered By History   prednisoLONE acetate (PRED FORTE) 1 % ophthalmic susp Place 1 drop into both eyes as needed (glaucoma)  prn Yes Reported, Patient   timolol (TIMOPTIC) 0.5 % ophthalmic solution Place 1 drop into both eyes 2 times daily  10/12/2018 at am Yes Reported, Patient   warfarin (COUMADIN) 3 MG tablet Take 3 mg by mouth every evening 10/11/2018 at pm Yes Unknown, Entered By History

## 2018-10-13 NOTE — ED NOTES
"[facility]  ED Nurse Handoff Report    ED Chief complaint: Leg Swelling (Swelling in the legs bilaterally. Denies SOB or chest pain)      ED Diagnosis:   Final diagnoses:   Anasarca   Other pancytopenia (H)   Renal insufficiency       Code Status: Full Code    Allergies:   Allergies   Allergen Reactions     Amoxicillin      Itchy      Penicillins Hives       Activity level - Baseline/Home:  Independent    Activity Level - Current:   Stand with Assist     Needed?: No    Isolation: No  Infection: Not Applicable  Bariatric?: No    Vital Signs:   Vitals:    10/12/18 1651   BP: 119/57   Pulse: 73   Resp: 18   Temp: 97.9  F (36.6  C)   TempSrc: Temporal   SpO2: 97%   Weight: (!) 151.7 kg (334 lb 6.4 oz)   Height: 1.778 m (5' 10\")       Cardiac Rhythm: ,        Pain level: 0-10 Pain Scale: 0    Is this patient confused?: No   [place] - Suicide Severity Rating Scale Completed?  Yes  If yes, what color did the patient score?  White    Patient Report: Initial Complaint: leg swelling  Focused Assessment: Patient with a history of aortic valve replacement, left heart cath, diffuse non-obstructive CAD, a right bundle branch block on Coumadin, and diabetes on insulin who presents with leg swelling. In April the patient was seen in Cardiology by Dr. Salinas, post-aortic valve replacement. Patient reports worsening leg swelling that moved up to waist line with over 50 lbs of  weigh gain over last few months, been having worsening shortness of breath.    Tests Performed: labs, CXR  Abnormal Results:   Results for orders placed or performed during the hospital encounter of 10/12/18   XR Chest 2 Views    Narrative    CHEST TWO VIEWS 10/12/2018 6:02 PM     HISTORY: Shortness of breath, fluid overload.    COMPARISON: May 20, 2017     FINDINGS: Stable sternotomy changes. There are no acute infiltrates.  The cardiac silhouette is not enlarged. Pulmonary vasculature is  unremarkable.      Impression    " IMPRESSION: No acute disease.    CITLALLI MARVIN MD   CBC with platelets differential   Result Value Ref Range    WBC 5.0 4.0 - 11.0 10e9/L    RBC Count 3.17 (L) 4.4 - 5.9 10e12/L    Hemoglobin 8.2 (L) 13.3 - 17.7 g/dL    Hematocrit 26.9 (L) 40.0 - 53.0 %    MCV 85 78 - 100 fl    MCH 25.9 (L) 26.5 - 33.0 pg    MCHC 30.5 (L) 31.5 - 36.5 g/dL    RDW 16.7 (H) 10.0 - 15.0 %    Platelet Count 109 (L) 150 - 450 10e9/L    Diff Method Automated Method     % Neutrophils 58.8 %    % Lymphocytes 28.4 %    % Monocytes 8.0 %    % Eosinophils 4.4 %    % Basophils 0.2 %    % Immature Granulocytes 0.2 %    Nucleated RBCs 0 0 /100    Absolute Neutrophil 2.9 1.6 - 8.3 10e9/L    Absolute Lymphocytes 1.4 0.8 - 5.3 10e9/L    Absolute Monocytes 0.4 0.0 - 1.3 10e9/L    Absolute Eosinophils 0.2 0.0 - 0.7 10e9/L    Absolute Basophils 0.0 0.0 - 0.2 10e9/L    Abs Immature Granulocytes 0.0 0 - 0.4 10e9/L    Absolute Nucleated RBC 0.0    Comprehensive metabolic panel   Result Value Ref Range    Sodium 140 133 - 144 mmol/L    Potassium 3.9 3.4 - 5.3 mmol/L    Chloride 107 94 - 109 mmol/L    Carbon Dioxide 26 20 - 32 mmol/L    Anion Gap 7 3 - 14 mmol/L    Glucose 89 70 - 99 mg/dL    Urea Nitrogen 25 7 - 30 mg/dL    Creatinine 1.40 (H) 0.66 - 1.25 mg/dL    GFR Estimate 52 (L) >60 mL/min/1.7m2    GFR Estimate If Black 63 >60 mL/min/1.7m2    Calcium 8.1 (L) 8.5 - 10.1 mg/dL    Bilirubin Total 0.3 0.2 - 1.3 mg/dL    Albumin 3.6 3.4 - 5.0 g/dL    Protein Total 8.8 6.8 - 8.8 g/dL    Alkaline Phosphatase 82 40 - 150 U/L    ALT 31 0 - 70 U/L    AST 55 (H) 0 - 45 U/L   Troponin I   Result Value Ref Range    Troponin I ES <0.015 0.000 - 0.045 ug/L   Nt probnp inpatient (BNP)   Result Value Ref Range    N-Terminal Pro BNP Inpatient 902 (H) 0 - 900 pg/mL   EKG 12-lead, tracing only   Result Value Ref Range    Interpretation ECG Click View Image link to view waveform and result        Treatments provided: Lasix    Family Comments: na    OBS brochure/video  discussed/provided to patient/family: N/A              Name of person given brochure if not patient: na              Relationship to patient: na    ED Medications:   Medications   sodium chloride (PF) 0.9% PF flush 3 mL (not administered)   sodium chloride (PF) 0.9% PF flush 3 mL (not administered)   furosemide (LASIX) injection 40 mg (not administered)       Drips infusing?:  No    For the majority of the shift this patient was Green.   Interventions performed were none.    Severe Sepsis OR Septic Shock Diagnosis Present: No    To be done/followed up on inpatient unit:  none    ED NURSE PHONE NUMBER: [phone]

## 2018-10-13 NOTE — CONSULTS
[facility]  HEMATOLOGY ONCOLOGY    Consult Date:  10/13/2018      HEMATOLOGY CONSULTATION       REASON FOR CONSULTATION:  Normocytic anemia and moderate thrombocytopenia.      REFERRING PHYSICIAN:  Dr. Keo Grossman      HISTORY OF PRESENT ILLNESS:  The patient is a 58-year-old gentleman with diabetes, coronary artery disease, valvular heart disease, who presented to the hospital with leg edema and shortness of breath and is undergoing treatment for volume overload related to heart failure.      He was found to have low blood counts during this admission.  Review of his counts indicates a hemoglobin of 7.7 with a normal MCV and platelet count of 98,000.  Review of previous records indicates that he had a normal hemoglobin last year.  He had mild thrombocytopenia since 2017.  His hemoglobin has consistently declined over the course of last 1 year.  He had an ultrasound during this hospitalization, 10/13/2018 which showed normal liver and normal spleen size.      The patient was seen in his room.  He was sitting on a chair.  He denies any previous knowledge of having anemia or thrombocytopenia.  He also denies any issues with bleeding or bruising.      PAST MEDICAL HISTORY:  Aortic stenosis, status post AVR, anemia, thrombocytopenia, diabetes, obstructive sleep apnea, obesity, glaucoma.      MEDICATIONS:  Reviewed.      ALLERGIES:  REVIEWED.      SOCIAL HISTORY:  Former smoker, quit 10 years ago.  At the same time, he stopped drinking alcohol.  He used to work in maintenance.      FAMILY HISTORY:  Reviewed.  There is no family history of blood disorders or cancers.        REVIEW OF SYSTEMS:  A complete review of systems was performed and found to be negative other than pertinent positives mentioned in history of present illness.      PHYSICAL EXAMINATION:   VITAL SIGNS:  His temperature is 97.9, pulse rate 65, blood pressure 112/70.   CONSTITUTIONAL:  Sitting on chair, comfortable.   HEENT:   Pupils equal.  Oropharynx clear.   NECK:  No supraclavicular or cervical lymphadenopathy.   LUNGS:  Clear bilaterally.   HEART:  S1, S2, regular.   GASTROINTESTINAL:  Abdomen is soft, nontender.   MUSCULOSKELETAL:  Warm and well perfused extremities.   NEUROLOGIC:  Alert, awake.   SKIN:  No rash.   LYMPHATICS:  Bilateral massive edema.   PSYCHIATRIC:  Appears anxious.      LABORATORY DATA:  His creatinine is 1.29.  His folate is 19.5.  Iron level is 36, iron saturation is 8.  Vitamin B12 level is 332.  Blood morphology is pending.  Abdominal ultrasound results are reviewed in history of present illness.      ASSESSMENT AND RECOMMENDATIONS:  This is a 58-year-old gentleman with normocytic anemia and moderate thrombocytopenia.  He has multiple comorbidities, including advanced heart disease and CHF, also has diabetes and diabetic nephropathy.      Normocytic anemia in this gentleman is likely multifactorial, primarily anemia of chronic disease due to advanced heart disease.  I will add ferritin and soluble transferrin receptor to his labs.      He has a borderline vitamin B12 level.  I will order methylmalonic acid level as well.  He does not have evidence of hepatosplenomegaly on abdominal ultrasound.      I will also expand the workup and will order serum protein electrophoresis.  He will mostly need anemia and management of his low blood counts as an outpatient and will need outpatient followup with Hematology.         CANDELARIA DAVIS MD             D: 10/13/2018   T: 10/13/2018   MT: EVELINA      Name:     ABIGAIL MATOS   MRN:      [id number]        Account:       [id number]   :      1960           Consult Date:  10/13/2018      Document: [id number]       cc: Sam Grossman MD

## 2018-10-14 VITALS
HEIGHT: 70 IN | WEIGHT: 309.5 LBS | RESPIRATION RATE: 18 BRPM | BODY MASS INDEX: 44.31 KG/M2 | SYSTOLIC BLOOD PRESSURE: 111 MMHG | TEMPERATURE: 98.1 F | DIASTOLIC BLOOD PRESSURE: 57 MMHG | OXYGEN SATURATION: 97 % | HEART RATE: 78 BPM

## 2018-10-14 LAB
ANION GAP SERPL CALCULATED.3IONS-SCNC: 7 MMOL/L (ref 3–14)
BUN SERPL-MCNC: 25 MG/DL (ref 7–30)
CALCIUM SERPL-MCNC: 7.9 MG/DL (ref 8.5–10.1)
CHLORIDE SERPL-SCNC: 104 MMOL/L (ref 94–109)
CO2 SERPL-SCNC: 28 MMOL/L (ref 20–32)
CREAT SERPL-MCNC: 1.39 MG/DL (ref 0.66–1.25)
ERYTHROCYTE [DISTWIDTH] IN BLOOD BY AUTOMATED COUNT: 16.8 % (ref 10–15)
GFR SERPL CREATININE-BSD FRML MDRD: 52 ML/MIN/1.7M2
GLUCOSE BLDC GLUCOMTR-MCNC: 89 MG/DL (ref 70–99)
GLUCOSE BLDC GLUCOMTR-MCNC: 91 MG/DL (ref 70–99)
GLUCOSE SERPL-MCNC: 90 MG/DL (ref 70–99)
HCT VFR BLD AUTO: 26.5 % (ref 40–53)
HGB BLD-MCNC: 8.3 G/DL (ref 13.3–17.7)
INR PPP: 2.19 (ref 0.86–1.14)
MCH RBC QN AUTO: 26.6 PG (ref 26.5–33)
MCHC RBC AUTO-ENTMCNC: 31.3 G/DL (ref 31.5–36.5)
MCV RBC AUTO: 85 FL (ref 78–100)
PLATELET # BLD AUTO: 108 10E9/L (ref 150–450)
POTASSIUM SERPL-SCNC: 3.6 MMOL/L (ref 3.4–5.3)
RBC # BLD AUTO: 3.12 10E12/L (ref 4.4–5.9)
SODIUM SERPL-SCNC: 139 MMOL/L (ref 133–144)
WBC # BLD AUTO: 4.5 10E9/L (ref 4–11)

## 2018-10-14 PROCEDURE — 40000275 ZZH STATISTIC RCP TIME EA 10 MIN

## 2018-10-14 PROCEDURE — 85027 COMPLETE CBC AUTOMATED: CPT | Performed by: HOSPITALIST

## 2018-10-14 PROCEDURE — 80048 BASIC METABOLIC PNL TOTAL CA: CPT | Performed by: HOSPITALIST

## 2018-10-14 PROCEDURE — 99239 HOSP IP/OBS DSCHRG MGMT >30: CPT | Performed by: HOSPITALIST

## 2018-10-14 PROCEDURE — 00000146 ZZHCL STATISTIC GLUCOSE BY METER IP

## 2018-10-14 PROCEDURE — 90682 RIV4 VACC RECOMBINANT DNA IM: CPT | Performed by: HOSPITALIST

## 2018-10-14 PROCEDURE — 85610 PROTHROMBIN TIME: CPT | Performed by: HOSPITALIST

## 2018-10-14 PROCEDURE — 94660 CPAP INITIATION&MGMT: CPT

## 2018-10-14 PROCEDURE — 25000132 ZZH RX MED GY IP 250 OP 250 PS 637: Performed by: HOSPITALIST

## 2018-10-14 PROCEDURE — 36415 COLL VENOUS BLD VENIPUNCTURE: CPT | Performed by: HOSPITALIST

## 2018-10-14 PROCEDURE — 25000128 H RX IP 250 OP 636: Performed by: HOSPITALIST

## 2018-10-14 PROCEDURE — 99232 SBSQ HOSP IP/OBS MODERATE 35: CPT | Performed by: INTERNAL MEDICINE

## 2018-10-14 RX ORDER — BUMETANIDE 1 MG/1
1 TABLET ORAL
Status: DISCONTINUED | OUTPATIENT
Start: 2018-10-14 | End: 2018-10-14

## 2018-10-14 RX ORDER — FUROSEMIDE 80 MG
80 TABLET ORAL 2 TIMES DAILY
Qty: 20 TABLET | Refills: 0 | Status: SHIPPED | OUTPATIENT
Start: 2018-10-14 | End: 2018-10-14

## 2018-10-14 RX ORDER — WARFARIN SODIUM 3 MG/1
3 TABLET ORAL
Status: DISCONTINUED | OUTPATIENT
Start: 2018-10-14 | End: 2018-10-14 | Stop reason: HOSPADM

## 2018-10-14 RX ADMIN — VITAMIN D, TAB 1000IU (100/BT) 5000 UNITS: 25 TAB at 09:27

## 2018-10-14 RX ADMIN — INFLUENZA A VIRUS A/MICHIGAN/45/2015 (H1N1) RECOMBINANT HEMAGGLUTININ ANTIGEN, INFLUENZA A VIRUS A/SINGAPORE/INFIMH-16-0019/2016 (H3N2) RECOMBINANT HEMAGGLUTININ ANTIGEN, INFLUENZA B VIRUS B/MARYLAND/15/2016 RECOMBINANT HEMAGGLUTININ ANTIGEN, AND INFLUENZA B VIRUS B/PHUKET/3073/2013 RECOMBINANT HEMAGGLUTININ ANTIGEN 0.5 ML: 45; 45; 45; 45 INJECTION INTRAMUSCULAR at 09:39

## 2018-10-14 RX ADMIN — PANTOPRAZOLE SODIUM 40 MG: 40 TABLET, DELAYED RELEASE ORAL at 06:37

## 2018-10-14 RX ADMIN — TIMOLOL MALEATE 1 DROP: 5 SOLUTION OPHTHALMIC at 09:32

## 2018-10-14 RX ADMIN — BUPROPION HYDROCHLORIDE 150 MG: 150 TABLET, FILM COATED, EXTENDED RELEASE ORAL at 09:28

## 2018-10-14 RX ADMIN — METOPROLOL TARTRATE 50 MG: 50 TABLET ORAL at 09:28

## 2018-10-14 ASSESSMENT — ACTIVITIES OF DAILY LIVING (ADL)
ADLS_ACUITY_SCORE: 10

## 2018-10-14 NOTE — PLAN OF CARE
Problem: Patient Care Overview  Goal: Plan of Care/Patient Progress Review  Outcome: Adequate for Discharge Date Met: 10/14/18  discharge home. Up in dependent, no chest pain tolerating food, BS below 100.

## 2018-10-14 NOTE — PLAN OF CARE
Problem: Patient Care Overview  Goal: Plan of Care/Patient Progress Review  Outcome: No Change  A&Ox4. VSS on RA. CPAP worn at night. Lung sounds clear. Bowel sounds active. Independent in room. +4 edema in BLE, compression wraps on. Voiding adequately in bathroom. Fluid restriction 1500ml/day. BG was 91 over night. Mod carb diet, tolerating well. Tele showing SR with BBB. Pt needs to discharge home today after Echo done. Will follow up outpatient with hematology and cardiology.

## 2018-10-14 NOTE — CONSULTS
[facility]    Cardiology Consultation     Date of Admission:  10/12/2018    Assessment & Plan    Mr. Chowdary is a very pleasant 58-year-old male with a past medical history of critical aortic stenosis status post AVR with a 25 mm trifecta aortic valve prosthesis who presents for significant lower extremity edema and weight gain, in addition to shortness of breath.  He notes a progressive weight gain over the past several months after the introduction of a new antidepressant medication, but the shortness of breath has increased recently. His most recent transthoracic echocardiogram was in April 2018, which was reassuring in terms of ventricular function and prosthesis function. However, he has noticed a change in his overall functional capacity and additional weight gain.    Most likely, his weight gain is diet related and possibly as a side effect of a previous antidepressant. However, I do note he has a significant amount of lower extremity edema and his JVP is elevated almost to the angle of his job with the patient at a 90 degree angle, consistent with elevated central venous pressures. Also, he is quite anemic on presentation today. Hematology has been consulted for further evaluation regarding the anemia, but hemolytic anemia should be considered. Peripheral smear is currently pending. Although I do not auscultate any thing more than a very soft systolic murmur over the right upper sternal border, I do think that a transthoracic echocardiogram would be reasonable to evaluate the valve, in addition to left ventricular and right ventricular function prior to discharge. Otherwise, I agree with the primary team's plan for diuresis.    Plan:  1. Continue diuresis with 60 mg IV Lasix BID  2. Would recommend outpatient follow-up with Cardiology or PCP for further diuretic titration  3. TTE to reassess aortic valve, left ventricular and right ventricular function    Yadi Quiroz MD    Code Status     Full Code    Reason for Consult   [facility]    Primary Care Physician   *Sam Use [id number] [place]    Chief Complaint   Swelling    History of Present Illness   Gil Chowdary is a 58 year old male who presents with increased weight gain.  Mr. Chowdary has a past medical history of an aortic valve replacement for critical aortic stenosis.  He received a 25 mm trifecta tissue valve, but he has been struggling recently with significant weight gain.  He was last seen in outpatient cardiology in April 2018.  At that time a surveillance echocardiogram was completed which illustrated a relatively normal peak gradient of 13 mmHg, with a mean gradient of 6 mmHg.  Systolic function was completely normal at 60-65%.  Right heart pressures were normal at that time.  He was continued on Bumex 1 mg twice daily for diuretic therapy.    Mr. Chowdary presented today because of progressive lower extremity edema and weight gain.  He tells me that he recently switched antidepressants approximately 1 month ago, and that is when the weight gain  became more substantial.  His weight has increased approximately from 265 pounds to 330 pounds.  He began to experience significant shortness of breath, weakness, and fatigue.  He is being admitted for further evaluation regarding the lower extremity edema and weight gain.  Also of note, his hemoglobin is 7.7 g/dL.  His baseline hemoglobin appears to be closer to 10 g/dL.  Because of the normocytic anemia and mild thrombocytopenia, hematology was consulted.  Blood morphology is pending, although anemia of chronic disease is the most likely culprit.  He has been initiated on Lasix IV 60 mg twice daily, which does appear to be appropriate.  He also continues on metoprolol tartrate 50 mg twice daily in addition to Coumadin.    Past Medical History   I have reviewed this patient's medical history and updated it with pertinent information if needed.   Past Medical History:   Diagnosis Date      Former tobacco use      Glaucoma      Hyperlipidemia LDL goal <160 12/6/2011     Obesity      DRAKE on CPAP      Right bundle branch block      Severe aortic stenosis     On Echo 10/28/16       Past Surgical History   I have reviewed this patient's surgical history and updated it with pertinent information if needed.  Past Surgical History:   Procedure Laterality Date     HEART CATH LEFT HEART CATH  04/07/2017    Diffuse non-obstuctive CAD     REPAIR VALVE AORTIC N/A 5/16/2017    Procedure: REPAIR VALVE AORTIC;  Median Sternotony, CardioPulmonary Bypass, Aortic Valve Replace using 25MM Trifecta Valve with [employer], Septal myectomy;  Surgeon: Jun Simon MD;  Location: [facility] OR     REPLACE VALVE AORTIC N/A 5/16/2017    Procedure: REPLACE VALVE AORTIC;;  Surgeon: Jun Simon MD;  Location: [facility] OR     SINUS SURGERY  2005       Prior to Admission Medications   Prior to Admission Medications   Prescriptions Last Dose Informant Patient Reported? Taking?   Insulin Glargine (BASAGLAR KWIKPEN SC) 10/12/2018 at am  Yes Yes   Sig: Inject 22 Units Subcutaneous every morning    Multiple Vitamins-Minerals (CENTRUM ADULTS PO) 10/12/2018 at am  Yes Yes   Sig: Take 1 tablet by mouth every morning    aspirin 81 MG tablet 10/12/2018 at am  Yes Yes   Sig: Take 81 mg by mouth every morning    atorvastatin (LIPITOR) 40 MG tablet 10/11/2018 at pm  No Yes   Sig: Take 1 tablet (40 mg) by mouth daily   buPROPion (WELLBUTRIN XL) 150 MG 24 hr tablet 10/12/2018 at am  Yes Yes   Sig: Take 150-300 mg by mouth every morning Starting 10/8/18 150mg daily x7 days, then increase to 300mg daily   bumetanide (BUMEX) 1 MG tablet 10/12/2018 at x2  No Yes   Sig: Take 1 tablet (1 mg) by mouth 2 times daily   cholecalciferol (VITAMIN D3) 5000 UNITS TABS tablet 10/12/2018 at am  Yes Yes   Sig: Take 5,000 Units by mouth every morning   clindamycin (CLEOCIN) 300 MG capsule prn  No Yes   Sig: Take 1 capsule (300 mg) by mouth as needed    insulin aspart (NOVOLOG FLEXPEN) 100 UNIT/ML injection 10/12/2018 at Unknown time  Yes Yes   Sig: Inject 3 Units Subcutaneous 2 times daily (with meals) Takes with breakfast and dinner   insulin aspart (NOVOLOG FLEXPEN) 100 UNIT/ML injection 10/12/2018 at Unknown time  Yes Yes   Sig: Inject 4 Units Subcutaneous daily (with lunch)   metoprolol (LOPRESSOR) 50 MG tablet 10/12/2018 at x2 doses  No Yes   Sig: Take 1 tablet (50 mg) by mouth 2 times daily   pantoprazole (PROTONIX) 40 MG EC tablet 10/12/2018 at am  Yes Yes   Sig: Take 40 mg by mouth every morning (before breakfast)   potassium chloride SA (K-DUR/KLOR-CON M) 10 MEQ CR tablet 10/12/2018 at x2  Yes Yes   Sig: Take 20 mEq by mouth 2 times daily   prednisoLONE acetate (PRED FORTE) 1 % ophthalmic susp prn  Yes Yes   Sig: Place 1 drop into both eyes as needed (glaucoma)    timolol (TIMOPTIC) 0.5 % ophthalmic solution 10/12/2018 at am  Yes Yes   Sig: Place 1 drop into both eyes 2 times daily    warfarin (COUMADIN) 3 MG tablet 10/11/2018 at pm  Yes Yes   Sig: Take 3 mg by mouth every evening      Facility-Administered Medications: None     Allergies   Allergies   Allergen Reactions     Amoxicillin      Itchy      Penicillins Hives       Social History   I have reviewed this patient's social history and updated it with pertinent information if needed. Gil BLACK mireya  reports that he quit smoking about 6 years ago. His smoking use included Cigarettes and Cigars. He has a 20.00 pack-year smoking history. He has never used smokeless tobacco. He reports that he does not drink alcohol or use illicit drugs.    Family History   I have reviewed this patient's family history and updated it with pertinent information if needed.   Family History   Problem Relation Age of Onset     Family History Negative Mother      Diabetes Father      Heart Surgery Father      CABG     Coronary Artery Disease Father      Hypertension Brother      Diabetes Brother      HEART DISEASE  Brother      Heart Surgery Brother      CABG x 3     Family History Negative Sister      Diabetes Brother      History of diabetes, but no longer an issue     Family History Negative Brother        Review of Systems   The 10 point Review of Systems is negative other than noted in the HPI or here.     Physical Exam   Temp: 97.8  F (36.6  C) Temp src: Oral BP: 114/69 Pulse: 84 Heart Rate: 63 Resp: 18 SpO2: 98 % O2 Device: None (Room air)    Vital Signs with Ranges  Temp:  [97.8  F (36.6  C)-98  F (36.7  C)] 97.8  F (36.6  C)  Pulse:  [65-84] 84  Heart Rate:  [63-76] 63  Resp:  [18] 18  BP: ()/(50-72) 114/69  SpO2:  [95 %-100 %] 98 %  317 lbs 6.4 oz    Constitutional: No apparent distress.   HEENT: Moist oral mucosa  Respiratory: Clear to auscultation bilaterally. No crackles or wheezes.  Cardiovascular: JVP is elevated to the angle of the jaw with the patient sitting up in a 90 degree angle.  Regular rate and rhythm. Normal S1 and S2. Soft 1 out of 6 systolic murmur heard at the right and left upper sternal border. No extra heart sounds. No JVD.   Abdomen: Protuberant, soft nontender nondistended.  Normoactive bowel sounds.  Extremities: +3 lower extremity edema, bilateral legs wrapped   Neurologic: Moving all extremities. No facial assymmetry.  Psychiatric: Alert and oriented. Answers questions appropriately.      Data   Results for orders placed or performed during the hospital encounter of 10/12/18 (from the past 24 hour(s))   UA with Microscopic   Result Value Ref Range    Color Urine Straw     Appearance Urine Clear     Glucose Urine Negative NEG^Negative mg/dL    Bilirubin Urine Negative NEG^Negative    Ketones Urine Negative NEG^Negative mg/dL    Specific Gravity Urine 1.006 1.003 - 1.035    Blood Urine Negative NEG^Negative    pH Urine 5.0 5.0 - 7.0 pH    Protein Albumin Urine Negative NEG^Negative mg/dL    Urobilinogen mg/dL Normal 0.0 - 2.0 mg/dL    Nitrite Urine Negative NEG^Negative    Leukocyte  Esterase Urine Negative NEG^Negative    Source Midstream Urine     WBC Urine 1 0 - 5 /HPF    RBC Urine <1 0 - 2 /HPF    Squamous Epithelial /HPF Urine <1 0 - 1 /HPF   Nutrition Services Adult IP Consult    Narrative    Susan Watt, RD, LD     10/13/2018  8:33 AM  Standard CHF consult received for 2gm Na diet education  Pt admitted during the night  Will plan to see prior to discharge for diet teaching   Glucose by meter   Result Value Ref Range    Glucose 146 (H) 70 - 99 mg/dL   Hemoglobin A1c   Result Value Ref Range    Hemoglobin A1C 6.8 (H) 0 - 5.6 %   TSH with free T4 reflex   Result Value Ref Range    TSH 3.47 0.40 - 4.00 mU/L   CBC with platelets differential   Result Value Ref Range    WBC 5.0 4.0 - 11.0 10e9/L    RBC Count 3.13 (L) 4.4 - 5.9 10e12/L    Hemoglobin 8.2 (L) 13.3 - 17.7 g/dL    Hematocrit 26.6 (L) 40.0 - 53.0 %    MCV 85 78 - 100 fl    MCH 26.2 (L) 26.5 - 33.0 pg    MCHC 30.8 (L) 31.5 - 36.5 g/dL    RDW 16.7 (H) 10.0 - 15.0 %    Platelet Count 95 (L) 150 - 450 10e9/L    Diff Method Automated Method     % Neutrophils 62.1 %    % Lymphocytes 25.5 %    % Monocytes 8.8 %    % Eosinophils 3.4 %    % Basophils 0.2 %    % Immature Granulocytes 0.0 %    Nucleated RBCs 0 0 /100    Absolute Neutrophil 3.1 1.6 - 8.3 10e9/L    Absolute Lymphocytes 1.3 0.8 - 5.3 10e9/L    Absolute Monocytes 0.4 0.0 - 1.3 10e9/L    Absolute Eosinophils 0.2 0.0 - 0.7 10e9/L    Absolute Basophils 0.0 0.0 - 0.2 10e9/L    Abs Immature Granulocytes 0.0 0 - 0.4 10e9/L    Absolute Nucleated RBC 0.0    Reticulocyte Count   Result Value Ref Range    % Retic 2.4 (H) 0.5 - 2.0 %    Absolute Retic 74.5 25 - 95 10e9/L   Glucose by meter   Result Value Ref Range    Glucose 76 70 - 99 mg/dL   Glucose by meter   Result Value Ref Range    Glucose 88 70 - 99 mg/dL   Basic metabolic panel   Result Value Ref Range    Sodium 143 133 - 144 mmol/L    Potassium 3.7 3.4 - 5.3 mmol/L    Chloride 109 94 - 109 mmol/L    Carbon Dioxide 28 20 - 32  mmol/L    Anion Gap 6 3 - 14 mmol/L    Glucose 89 70 - 99 mg/dL    Urea Nitrogen 26 7 - 30 mg/dL    Creatinine 1.29 (H) 0.66 - 1.25 mg/dL    GFR Estimate 57 (L) >60 mL/min/1.7m2    GFR Estimate If Black 69 >60 mL/min/1.7m2    Calcium 8.0 (L) 8.5 - 10.1 mg/dL   CBC with platelets differential   Result Value Ref Range    WBC 3.9 (L) 4.0 - 11.0 10e9/L    RBC Count 2.92 (L) 4.4 - 5.9 10e12/L    Hemoglobin 7.7 (L) 13.3 - 17.7 g/dL    Hematocrit 24.7 (L) 40.0 - 53.0 %    MCV 85 78 - 100 fl    MCH 26.4 (L) 26.5 - 33.0 pg    MCHC 31.2 (L) 31.5 - 36.5 g/dL    RDW 16.8 (H) 10.0 - 15.0 %    Platelet Count 98 (L) 150 - 450 10e9/L    Diff Method Automated Method     % Neutrophils 56.5 %    % Lymphocytes 32.2 %    % Monocytes 7.4 %    % Eosinophils 3.3 %    % Basophils 0.3 %    % Immature Granulocytes 0.3 %    Nucleated RBCs 0 0 /100    Absolute Neutrophil 2.2 1.6 - 8.3 10e9/L    Absolute Lymphocytes 1.3 0.8 - 5.3 10e9/L    Absolute Monocytes 0.3 0.0 - 1.3 10e9/L    Absolute Eosinophils 0.1 0.0 - 0.7 10e9/L    Absolute Basophils 0.0 0.0 - 0.2 10e9/L    Abs Immature Granulocytes 0.0 0 - 0.4 10e9/L    Absolute Nucleated RBC 0.0    INR   Result Value Ref Range    INR 2.28 (H) 0.86 - 1.14   Iron and iron binding capacity   Result Value Ref Range    Iron 36 35 - 180 ug/dL    Iron Binding Cap 478 (H) 240 - 430 ug/dL    Iron Saturation Index 8 (L) 15 - 46 %   Vitamin B12   Result Value Ref Range    Vitamin B12 332 193 - 986 pg/mL   Folate   Result Value Ref Range    Folate 19.5 >5.4 ng/mL   ABO/Rh type and screen   Result Value Ref Range    ABO A     RH(D) Pos     Antibody Screen Neg     Test Valid Only At [facility]        Specimen Expires 10/16/2018    Glucose by meter   Result Value Ref Range    Glucose 85 70 - 99 mg/dL   US Abdomen Complete    Narrative    ULTRASOUND ABDOMEN COMPLETE 10/13/2018 8:41 AM     HISTORY: Acute kidney injury and anasarca, to evaluate for any  extrinsic IVC compression, and to evaluate for  hydronephrosis.      COMPARISON: None.    FINDINGS: Liver is unremarkable in echogenicity without focal solid  lesions. Gallbladder demonstrates no cholelithiasis or cholecystitis.  Extrahepatic bile duct is normal in diameter. Pancreas is normal where  visualized. Spleen is normal. Kidneys are normal in size. There is no  hydronephrosis. Proximal abdominal aorta and IVC are nonaneurysmal.      Impression    IMPRESSION: Negative abdominal ultrasound. The entire IVC could not be  visualized.    CITLALLI MARVIN MD   Glucose by meter   Result Value Ref Range    Glucose 95 70 - 99 mg/dL   Glucose by meter   Result Value Ref Range    Glucose 77 70 - 99 mg/dL   Ferritin   Result Value Ref Range    Ferritin 11 (L) 26 - 388 ng/mL

## 2018-10-14 NOTE — PLAN OF CARE
Problem: Patient Care Overview  Goal: Plan of Care/Patient Progress Review  Occupational Therapy Discharge Summary    Reason for therapy discharge:    Discharged to home.    Progress towards therapy goal(s). See goals on Care Plan in [facility] electronic health record for goal details.  Goals not met.  Barriers to achieving goals:   discharge from facility.    Therapy recommendation(s):    No further therapy is recommended. Patient in street clothes waiting for discharge on OT/CR attempt.

## 2018-10-14 NOTE — PROGRESS NOTES
[facility]    Cardiology Progress Note     Assessment & Plan   Mr. Chowdary is a very pleasant 58-year-old male with a past medical history of critical aortic stenosis status post AVR with a 25 mm trifecta aortic valve prosthesis who presents for significant lower extremity edema and weight gain, in addition to shortness of breath. He was aggressively diuresed, and had 4.9 L of urine output.    I would recommend Bumex 1 mg BID, with uptitration in the outpatient setting. We will arrange for him to be evaluated in the outpatient setting in approximately 7 days. We did recommend a TTE in hospital prior to discharge, but he requested to have this done in the outpatient setting due to need to get home in order to care for his elderly mother. I did re-iterate the importance of the TTE, which will be arranged in the outpatient setting in the next week. I also discussed a low salt diet with Mr. Chowdary again, and he plans to focus on compliance.     Yadi Quiroz MD    Interval History   Mr. Chowdary feels much better this morning, denying any symptoms of shortness of breath. He is quite anxious to leave the hospital given he is the primary provider for his mother, who suffers from dementia. He prefers to leave earlier this morning, and requests the echocardiogram in the outpatient setting.    Physical Exam   Temp: 98.1  F (36.7  C) Temp src: Oral BP: 111/57 Pulse: 78 Heart Rate: 78 Resp: 18 SpO2: 97 % O2 Device: None (Room air)    Vitals:    10/12/18 2026 10/13/18 0500 10/14/18 0538   Weight: 145.8 kg (321 lb 6.4 oz) 144 kg (317 lb 6.4 oz) 140.4 kg (309 lb 8 oz)     Vital Signs with Ranges  Temp:  [97.8  F (36.6  C)-98.1  F (36.7  C)] 98.1  F (36.7  C)  Pulse:  [78-85] 78  Heart Rate:  [63-78] 78  Resp:  [18] 18  BP: (106-116)/(53-69) 111/57  FiO2 (%):  [25 %] 25 %  SpO2:  [97 %-98 %] 97 %  I/O last 3 completed shifts:  In: 900 [P.O.:900]  Out: 4950 [Urine:4950]  Patient Active Problem List   Diagnosis      Hyperlipidemia LDL goal <160     Aortic valve stenosis     Aortic stenosis     CHF (congestive heart failure) (H)      Constitutional: No apparent distress.   Eyes: No xanthelasma or conjunctivitis  Respiratory: Clear to auscultation bilaterally. No crackles or wheezes.  Cardiovascular: JVP elevated two cm above the clavicle with patient reclined at a 45 degree angle. Regular rate and rhythm. Normal S1 and S2. Soft 1 to 2/6 systolic murmur in the right upper sternal border. No extra heart sounds.   Abdomen: Protuberant but soft, no tenderness; normoactive bowel sounds  Extremities: +3 lower extremity edema   Neurologic: Moving all extremities. No facial assymmetry.  Psychiatric: Alert and oriented. Answers questions appropriately.     Medications     Data   Results for orders placed or performed during the hospital encounter of 10/12/18 (from the past 24 hour(s))   Glucose by meter   Result Value Ref Range    Glucose 77 70 - 99 mg/dL   Ferritin   Result Value Ref Range    Ferritin 11 (L) 26 - 388 ng/mL   Protein electrophoresis   Result Value Ref Range    Albumin Fraction PENDING 3.7 - 5.1 g/dL    Alpha 1 Fraction PENDING 0.2 - 0.4 g/dL    Alpha 2 Fraction PENDING 0.5 - 0.9 g/dL    Beta Fraction PENDING 0.6 - 1.0 g/dL    Gamma Fraction PENDING 0.7 - 1.6 g/dL    Monoclonal Peak PENDING 0.0 g/dL    ELP Interpretation: PENDING    Glucose by meter   Result Value Ref Range    Glucose 102 (H) 70 - 99 mg/dL   Glucose by meter   Result Value Ref Range    Glucose 91 70 - 99 mg/dL   INR   Result Value Ref Range    INR 2.19 (H) 0.86 - 1.14   CBC with platelets   Result Value Ref Range    WBC 4.5 4.0 - 11.0 10e9/L    RBC Count 3.12 (L) 4.4 - 5.9 10e12/L    Hemoglobin 8.3 (L) 13.3 - 17.7 g/dL    Hematocrit 26.5 (L) 40.0 - 53.0 %    MCV 85 78 - 100 fl    MCH 26.6 26.5 - 33.0 pg    MCHC 31.3 (L) 31.5 - 36.5 g/dL    RDW 16.8 (H) 10.0 - 15.0 %    Platelet Count 108 (L) 150 - 450 10e9/L   Basic metabolic panel   Result Value Ref  Range    Sodium 139 133 - 144 mmol/L    Potassium 3.6 3.4 - 5.3 mmol/L    Chloride 104 94 - 109 mmol/L    Carbon Dioxide 28 20 - 32 mmol/L    Anion Gap 7 3 - 14 mmol/L    Glucose 90 70 - 99 mg/dL    Urea Nitrogen 25 7 - 30 mg/dL    Creatinine 1.39 (H) 0.66 - 1.25 mg/dL    GFR Estimate 52 (L) >60 mL/min/1.7m2    GFR Estimate If Black 63 >60 mL/min/1.7m2    Calcium 7.9 (L) 8.5 - 10.1 mg/dL   Glucose by meter   Result Value Ref Range    Glucose 89 70 - 99 mg/dL

## 2018-10-14 NOTE — DISCHARGE SUMMARY
[facility]    Discharge Summary  Hospitalist    Date of Admission:  10/12/2018  Date of Discharge:  10/14/2018  Discharging Provider: Isaac Rose MD  Date of Service (when I saw the patient): 10/14/18    Discharge Diagnoses   1. Anasarca  2. Possible acute systolic heart failure - pending Echocardiogram to confirm  3. History of aortic valve replacement with bioprosthetic valve - on warfarin  4. Anemia and thrombocytopenia, chronic  5. Acute kidney injury    Other diagnoses  1. DM2  2. DRAKE  3. Obesity    History of Present Illness   Per admitting MD:  Gil Chowdary is a 58-year-old pleasant male with history of diabetes mellitus type 2, nonobstructive coronary artery disease, severe aortic stenosis status post AVR with bioprosthetic valve, and chronic anemia who presented to the ER due to leg edema and shortness of breath.  I discussed with the patient, reviewed his medical record in Epic, and I discussed with Dr. Trierweiler from ED for further information.       According to the patient, he has gradually increasing leg edema for the last several months.  I noticed his weight was 265 in 07/2018 and was 334 in the ER.  He had recent PCP and Endocrine visits where the weight was gradually going up.  The patient also reports gradually worsening shortness of breath with activity and now present with walking less than a block or so.  He denies orthopnea, PND or chest pain.  No dizziness, diaphoresis.  He denies any obstructive urinary symptoms.  The patient does report that he watches his salt intake, but he reports he loves drinking water and drinks tons of water to keep his body hydrated, but again there are days when he does not really drink much fluid.     Hospital Course   Gil Chowdary was admitted on 10/12/2018.  The following problems were addressed during his hospitalization:    1.  Anasarca.   - Due to fluid retention from possible heart failure.  His albumin was normal at  3.6.  LFTs are normal except mildly elevated AST.  ProBNP is not significantly elevated and chest x-ray does not show pulmonary edema.  The patient reports excessive p.o. intake.  His echo earlier year showed normal LV function. Cardiology was consulted and will follow up with him after discharge with echocardiogram and BMP.  He was diuresed with IV lasix with considerable weight loss and improvement of his generalized edema. He wished to discharge as soon as possible due to necessity of providing care for his mother.  Abdominal US for extrinsic IVC compression as well as renal and hepatobiliary tree was completed with no acute abnormality noted. He ambulated the unit multiple times without issue prior to discharging. He will continue on PTA bumex at discharge with close Cardiology follow up.     Regarding potential acute systolic heart failure, echocardiogram was not able to be obtained prior to discharge and will be completed upon follow up.  Prior echo earlier this year indicated normal LV function.      2.  Aortic valve replacement with bioprosthetic aortic valve.   Supraventricular tachycardia postop.   Nonobstructive diffuse coronary artery disease.   - Warfarin was continued with INR monitoring. This will continue upon discharge with routine monitoring as he was previously completing. PTA baby aspirin and metoprolol continued. Telemetry monitoring was unremarkable.      3.  Anemia and thrombocytopenia.    His platelet count postop was in the 130s at the time of previous discharge.  He presents with a platelet count of 109.  His hemoglobin is 8.2.  Except for 1 episode of blood on his wipes prior to admission, he denied any colin hematochezia or melena.  Iron level, ferritin, B12 and folate, peripheral smear ordered.  Hematology consultation was initiated and further work up and management will be ongoing as an outpatient when he follows up in a few days.      4.  Acute kidney injury.    His creatinine was  0.8-0.9 last year.  Presented with a creatinine of 1.4 which improved to 1.29 on 10/13.  This could be hepatorenal syndrome, but he has massive scrotal edema and could be dealing with increased urinary retention, though he is not aware of it and he denies lower urinary tract obstructive symptoms.  IV lasix for diuresis resulted in drastic improvement over hospitalization days.  Scrotal edema nearly resolved at the time of discharge and he was able to void uneventfully after initially having a catheter during hospitalization.  Follow up with Cardiology in a few days will include a BMP to ensure that his renal function is not worsening.  Again, patient understood that ideally he stay in the hospital another day or two but given social situation with his mother, he needed to leave.     Other chronic medical conditions were treated with prior to admission regimens and remain unchanged.     Isaac Rose MD    Significant Results and Procedures   See below    Pending Results   These results will be followed up by hematologist.  Unresulted Labs Ordered in the Past 30 Days of this Admission     Date and Time Order Name Status Description    10/13/2018 1538 Soluble transferrin receptor In process     10/13/2018 1538 Protein electrophoresis In process     10/13/2018 1538 Methylmalonic acid In process     10/12/2018 2029 Blood Morphology Pathologist Review In process           Code Status   Full Code       Primary Care Physician   Sam Grimes [id number] [place]    Physical Exam   Temp: 98.1  F (36.7  C) Temp src: Oral BP: 111/57 Pulse: 78 Heart Rate: 78 Resp: 18 SpO2: 97 % O2 Device: None (Room air)    Vitals:    10/12/18 2026 10/13/18 0500 10/14/18 0538   Weight: 145.8 kg (321 lb 6.4 oz) 144 kg (317 lb 6.4 oz) 140.4 kg (309 lb 8 oz)     Vital Signs with Ranges  Temp:  [97.9  F (36.6  C)-98.1  F (36.7  C)] 98.1  F (36.7  C)  Pulse:  [78-85] 78  Heart Rate:  [69-78] 78  Resp:  [18] 18  BP: (106-116)/(53-68) 111/57  FiO2 (%):   [25 %] 25 %  SpO2:  [97 %-98 %] 97 %  I/O last 3 completed shifts:  In: 640 [P.O.:640]  Out: 2475 [Urine:2475]    Constitutional: Awake, alert, cooperative, no apparent distress.  Eyes: Conjunctiva and pupils examined and normal.  HEENT: Moist mucous membranes, normal dentition.  Respiratory: Clear to auscultation bilaterally, no crackles or wheezing.  Cardiovascular: Regular rate and rhythm, normal S1 and S2, and no murmur noted.  GI: Soft, non-distended, non-tender, normal bowel sounds.  Skin: No rashes, no cyanosis, no edema.  Musculoskeletal: No joint swelling, erythema or tenderness.  Neurologic: Cranial nerves 2-12 intact, normal strength and sensation.  Psychiatric: Alert, oriented to person, place and time, no obvious anxiety or depression.  Ext: B/L LE wrapped but continue to have pitting edema 2+ above the knee    Discharge Disposition   Discharged to home  Condition at discharge: Stable    Consultations This Hospital Stay   CORE CLINIC EVALUATION IP CONSULT  CARDIAC REHAB IP CONSULT  CARE COORDINATOR IP CONSULT  NUTRITION SERVICES ADULT IP CONSULT  CARDIOLOGY IP CONSULT  SOCIAL WORK IP CONSULT  HEMATOLOGY & ONCOLOGY IP CONSULT  PHARMACY TO DOSE WARFARIN  CARE TRANSITION RN/SW IP CONSULT    Time Spent on this Encounter   I, Isaac Rose, personally saw the patient today and spent greater than 30 minutes discharging this patient.  Greater than 50% was spent in counseling and coordination of care.     Discharge Orders     Basic metabolic panel     CBC with platelets differential   Last Lab Result: Hemoglobin (g/dL)      Date                     Value                10/14/2018               8.3 (L)          ----------     Follow-Up with Cardiologist     Reason for your hospital stay   Fluid overload requiring IV diuresis and evaluation.     Follow-up and recommended labs and tests    PCP or Cardiologist in 2-3 days with [facility]. You will need to call Monday and schedule this appointment:  [facility]  [facility] ( [place]) [phone]    Hem/Onc in 3-5 days with CBC. You will need to call Monday and schedule this appointment.   Dr Brien Fletcher  [facility]cology/Hematology  ( [place]) [phone]     Activity   Your activity upon discharge: activity as tolerated     Full Code     Diet   Follow this diet upon discharge: Orders Placed This Encounter     Fluid restriction 1500 ML FLUID     Combination Diet [id number] Calories: Moderate Consistent CHO (4-6 CHO units/meal); 2 gm NA Diet       Discharge Medications   Discharge Medication List as of 10/14/2018 11:02 AM      CONTINUE these medications which have NOT CHANGED    Details   aspirin 81 MG tablet Take 81 mg by mouth every morning , Historical      atorvastatin (LIPITOR) 40 MG tablet Take 1 tablet (40 mg) by mouth daily, Disp-90 tablet, R-0, E-Prescribe      bumetanide (BUMEX) 1 MG tablet Take 1 tablet (1 mg) by mouth 2 times daily, Disp-60 tablet, R-0, E-Prescribe      buPROPion (WELLBUTRIN XL) 150 MG 24 hr tablet Take 150-300 mg by mouth every morning Starting 10/8/18 150mg daily x7 days, then increase to 300mg daily, Historical      cholecalciferol (VITAMIN D3) 5000 UNITS TABS tablet Take 5,000 Units by mouth every morning, Historical      clindamycin (CLEOCIN) 300 MG capsule Take 1 capsule (300 mg) by mouth as needed, Disp-10 capsule, R-3, E-PrescribeTake 2 one hour prior to dental proceedures      !! insulin aspart (NOVOLOG FLEXPEN) 100 UNIT/ML injection Inject 4 Units Subcutaneous daily (with lunch), Historical      !! insulin aspart (NOVOLOG FLEXPEN) 100 UNIT/ML injection Inject 3 Units Subcutaneous 2 times daily (with meals) Takes with breakfast and dinner, Historical      Insulin Glargine (BASAGLAR KWIKPEN SC) Inject 22 Units Subcutaneous every morning , Historical      metoprolol (LOPRESSOR) 50 MG tablet Take 1 tablet (50 mg) by mouth 2 times daily, Disp-60 tablet, R-0, E-Prescribe      Multiple Vitamins-Minerals (CENTRUM  ADULTS PO) Take 1 tablet by mouth every morning , Historical      pantoprazole (PROTONIX) 40 MG EC tablet Take 40 mg by mouth every morning (before breakfast), Historical      potassium chloride SA (K-DUR/KLOR-CON M) 10 MEQ CR tablet Take 20 mEq by mouth 2 times daily, Historical      prednisoLONE acetate (PRED FORTE) 1 % ophthalmic susp Place 1 drop into both eyes as needed (glaucoma) , Historical      timolol (TIMOPTIC) 0.5 % ophthalmic solution Place 1 drop into both eyes 2 times daily , Historical      warfarin (COUMADIN) 3 MG tablet Take 3 mg by mouth every evening, Historical       !! - Potential duplicate medications found. Please discuss with provider.        Allergies   Allergies   Allergen Reactions     Amoxicillin      Itchy      Penicillins Hives     Data   Most Recent 3 CBC's:  Recent Labs   Lab Test  10/14/18   0535  10/13/18   0535  10/12/18   2120   WBC  4.5  3.9*  5.0   HGB  8.3*  7.7*  8.2*   MCV  85  85  85   PLT  108*  98*  95*      Most Recent 3 BMP's:  Recent Labs   Lab Test  10/14/18   0535  10/13/18   0535  10/12/18   1736   NA  139  143  140   POTASSIUM  3.6  3.7  3.9   CHLORIDE  104  109  107   CO2  28  28  26   BUN  25  26  25   CR  1.39*  1.29*  1.40*   ANIONGAP  7  6  7   MYRON  7.9*  8.0*  8.1*   GLC  90  89  89     Most Recent 2 LFT's:  Recent Labs   Lab Test  10/12/18   1736  05/09/17   1240   AST  55*  110*   ALT  31  85*  79*   ALKPHOS  82  72   BILITOTAL  0.3  1.2     Most Recent INR's and Anticoagulation Dosing History:  Anticoagulation Dose History     Recent Dosing and Labs Latest Ref Rng & Units 5/21/2017 5/22/2017 5/23/2017 5/24/2017 10/12/2018 10/13/2018 10/14/2018    Warfarin 3 mg - - - - - 3 mg 3 mg -    Warfarin 4 mg - - - 4 mg - - - -    Warfarin 5 mg - 5 mg 5 mg - - - - -    INR 0.86 - 1.14 1.20(H) 1.53(H) 1.75(H) 2.50(H) 2.01(H) 2.28(H) 2.19(H)        Most Recent 3 Troponin's:  Recent Labs   Lab Test  10/12/18   1736   TROPI  <0.015     Most Recent Cholesterol  Panel:  Recent Labs   Lab Test  06/06/17   1648   CHOL  151   LDL  79   HDL  40   TRIG  162*     Most Recent 6 Bacteria Isolates From Any Culture (See EPIC Reports for Culture Details):No lab results found.  Most Recent TSH, T4 and A1c Labs:  Recent Labs   Lab Test  10/12/18   2120   TSH  3.47   A1C  6.8*     Results for orders placed or performed during the hospital encounter of 10/12/18   XR Chest 2 Views    Narrative    CHEST TWO VIEWS 10/12/2018 6:02 PM     HISTORY: Shortness of breath, fluid overload.    COMPARISON: May 20, 2017     FINDINGS: Stable sternotomy changes. There are no acute infiltrates.  The cardiac silhouette is not enlarged. Pulmonary vasculature is  unremarkable.      Impression    IMPRESSION: No acute disease.    CITLALLI MARVIN MD   US Abdomen Complete    Narrative    ULTRASOUND ABDOMEN COMPLETE 10/13/2018 8:41 AM     HISTORY: Acute kidney injury and anasarca, to evaluate for any  extrinsic IVC compression, and to evaluate for hydronephrosis.      COMPARISON: None.    FINDINGS: Liver is unremarkable in echogenicity without focal solid  lesions. Gallbladder demonstrates no cholelithiasis or cholecystitis.  Extrahepatic bile duct is normal in diameter. Pancreas is normal where  visualized. Spleen is normal. Kidneys are normal in size. There is no  hydronephrosis. Proximal abdominal aorta and IVC are nonaneurysmal.      Impression    IMPRESSION: Negative abdominal ultrasound. The entire IVC could not be  visualized.    CITLALLI MARVIN MD

## 2018-10-14 NOTE — DISCHARGE INSTRUCTIONS
Heart Failure Instructions for Patients and Families: Please read and check off each of these important instructions as you do them when you get home.     Weight and Symptoms    ___ Put a scale in your bathroom.    ___ Post a weight chart or calendar next to your scale.    ___ Weigh yourself everyday as soon as you get up in the morning (before breakfast).  You should only be wearing your pajamas.  Write your weight on the chart/calendar.    ___ Bring your weight chart/calendar with you to all appointments.    ___ Call your doctor or nurse practitioner if you gain 2 pounds (in 1 day) or 5 pounds in (1 week) from your goal  good  weight.  Your good weight is also called your  dry  weight.  Your doctor or nurse will tell you what your good weight should be.    ___ Call your doctor or nurse practitioner if you have shortness of breath that gets worse over time, leg swelling or fatigue.    Medications and Diet    ___ Make sure to take your medication as prescribed.    ___ Bring a current list of your medication and all of your medicine bottles with you to all appointments.    ___ Limit fluids if you still have swelling or shortness of breath, or if your doctor tells you to do so.      ___ Eat less than 2000 mg of sodium (salt) every day. Read food labels, and do not add salt to meals. Remember, if you eat less salt you retain less fluid.    ___ Follow a heart healthy diet that is low in saturated fat.         Activity and Suggested Lifestyle Changes   ___ Stay active. Talk to your doctor about an exercise program that is safe for your heart.    ___ Stop smoking. Reduce alcohol use.      ___ Lose weight if you are overweight. Extra weight puts a lot of stress on the heart.    Control for Leg Swelling  ___ Keep your legs elevated (raised) as needed for swelling. If swelling is uncomfortable or elevation doesn t help, ask your doctor about using ACE wraps or support stockings.      What is the [facility]?      Cardiomyopathy  Optimization  Rehabilitation  Education    The [facility] is a heart failure specialty clinic within [facility].  It is an outpatient disease management program that is based on a phase-by-phase approach, which is tailored to each patient s individual needs.  The cardiologist, nurse practitioner, physician assistant and nurses provide an ongoing outpatient care and treatment plan that guides heart failure and cardiomyopathy patients from evaluation and education to stabilization. This team works with your current primary care doctor and cardiologist to help you:      Avoid hospitalizations    Slow the progression of the disease    Improve length and quality of life    Know who and when to call if heart failure symptoms appear    Receive easy access to quality health care and advice    Better understand your condition and treatment    Decrease the tremendous cost burden of heart failure care    Detect future heart problems before they become life threatening    Your [facility]O.R[facility] Clinic Team will continue to educate you on your heart failure and may adjust medications based on your vital signs, lab work, and how you are feeling.  Therefore, it is very important to bring the following to all C.O.R.E. appointments:    - An accurate list of your medications  - Your medicine bottles  - Your weight chart/calendar    [facility]):    [phone]  [facility]):    [phone]  [facility] ([place]):  [phone]

## 2018-10-14 NOTE — PROGRESS NOTES
Patient is on CPAP of 7 and 25% overnight with SpO2 in the 90.  Skin barrier applied. Will continue to follow.  [facility]

## 2018-10-15 ENCOUNTER — TELEPHONE (OUTPATIENT)
Dept: CARDIOLOGY | Facility: CLINIC | Age: 58
End: 2018-10-15

## 2018-10-15 DIAGNOSIS — I50.9 CHF (CONGESTIVE HEART FAILURE) (H): Primary | ICD-10-CM

## 2018-10-15 LAB
ALBUMIN SERPL ELPH-MCNC: 4.1 G/DL (ref 3.7–5.1)
ALPHA1 GLOB SERPL ELPH-MCNC: 0.3 G/DL (ref 0.2–0.4)
ALPHA2 GLOB SERPL ELPH-MCNC: 0.9 G/DL (ref 0.5–0.9)
B-GLOBULIN SERPL ELPH-MCNC: 1.2 G/DL (ref 0.6–1)
COPATH REPORT: NORMAL
GAMMA GLOB SERPL ELPH-MCNC: 2.6 G/DL (ref 0.7–1.6)
M PROTEIN SERPL ELPH-MCNC: 0 G/DL
PROT PATTERN SERPL ELPH-IMP: ABNORMAL
STFR SERPL-SCNC: 7.9 MG/L (ref 2.2–5)

## 2018-10-15 NOTE — TELEPHONE ENCOUNTER
Pt called back and denies any worsening SOB, chest pain, edema, anasarca or lightheadedness. Denies any questions regarding medications. Reminded to check daily weights as below and to call us based on parameters listed. Asking what to do regarding scrotal edema, and encouraged scrotal elevation when at rest, good supporting briefs. Instructed to continue to follow low Na+ diet and 1500 ml fluid restriction as instructed per discharge summary. Verbalized understanding of all instructions. Reviewed f/u OV's as below and instructed pt that scheduling would be calling schedule echo as ordered. KHAI Choudhary RN.

## 2018-10-15 NOTE — TELEPHONE ENCOUNTER
Patient was evaluated by cardiology while inpatient for critical aortic stenosis status post AVR with a 25 mm trifecta aortic valve prosthesis who presents for significant lower extremity edema and weight gain, in addition to shortness of breath. He was aggressively diuresed, and had 4.9 L of urine output. 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 Pt insists on having OP TTE vs IP as recommended. Phone call received from Dr. Nelson requesting pt schedule to have an echo prior to F/U OV scheduled with Rachna More, KARYN on 10/24/18. Order placed. Attempted to call patient to discuss any post hospital d/c questions he may have, review medication changes, and confirm f/u appts, but no answer. VM left to return my phone call. RN will confirm with patient that he has an apt scheduled with KARYN Rachna More on 10/24/18, with labs prior. Will instruct patient to take daily weights and call clinic for a weight gain of 2 lbs overnight or 5 lbs in a week. KHAI Choudhary RN.

## 2018-10-17 ENCOUNTER — HOSPITAL ENCOUNTER (OUTPATIENT)
Dept: CARDIOLOGY | Facility: CLINIC | Age: 58
Discharge: HOME | End: 2018-10-17
Attending: INTERNAL MEDICINE | Admitting: INTERNAL MEDICINE
Payer: COMMERCIAL

## 2018-10-17 DIAGNOSIS — I50.9 CHF (CONGESTIVE HEART FAILURE) (H): ICD-10-CM

## 2018-10-17 LAB — METHYLMALONATE SERPL-SCNC: 0.36 UMOL/L (ref 0–0.4)

## 2018-10-17 PROCEDURE — 25500064 ZZH RX 255 OP 636: Performed by: INTERNAL MEDICINE

## 2018-10-17 PROCEDURE — 93306 TTE W/DOPPLER COMPLETE: CPT | Mod: 26 | Performed by: INTERNAL MEDICINE

## 2018-10-17 PROCEDURE — 40000264 ECHO COMPLETE WITH OPTISON

## 2018-10-17 RX ADMIN — HUMAN ALBUMIN MICROSPHERES AND PERFLUTREN 9 ML: 10; .22 INJECTION, SOLUTION INTRAVENOUS at 11:56

## 2018-10-18 ENCOUNTER — OFFICE VISIT (OUTPATIENT)
Dept: ONCOLOGY | Facility: CLINIC | Age: 58
End: 2018-10-18
Attending: INTERNAL MEDICINE
Payer: COMMERCIAL

## 2018-10-18 VITALS
BODY MASS INDEX: 42.95 KG/M2 | TEMPERATURE: 97.7 F | DIASTOLIC BLOOD PRESSURE: 70 MMHG | OXYGEN SATURATION: 95 % | HEART RATE: 69 BPM | WEIGHT: 300 LBS | HEIGHT: 70 IN | SYSTOLIC BLOOD PRESSURE: 108 MMHG

## 2018-10-18 DIAGNOSIS — D50.8 OTHER IRON DEFICIENCY ANEMIA: Primary | ICD-10-CM

## 2018-10-18 PROCEDURE — G0463 HOSPITAL OUTPT CLINIC VISIT: HCPCS

## 2018-10-18 PROCEDURE — 99214 OFFICE O/P EST MOD 30 MIN: CPT | Performed by: INTERNAL MEDICINE

## 2018-10-18 RX ORDER — FERROUS SULFATE 325(65) MG
325 TABLET ORAL 2 TIMES DAILY
Qty: 100 TABLET | Refills: 3 | Status: SHIPPED | OUTPATIENT
Start: 2018-10-18 | End: 2019-07-15

## 2018-10-18 ASSESSMENT — PAIN SCALES - GENERAL: PAINLEVEL: NO PAIN (0)

## 2018-10-18 NOTE — PROGRESS NOTES
[facility] PHYSICIANS  HEMATOLOGY ONCOLOGY    Visit Date:   10/18/2018      DIAGNOSIS:  Iron deficiency anemia, unclear etiology.  The patient had heart surgery last year and never had a colonoscopy done, so needs a GI workup.  Also a likely component of anemia of chronic disease and chronic thrombocytopenia.      TREATMENT:  Iron sulfate 325 mg twice daily.      SUBJECTIVE:  The patient was seen as a followup today.  His volume status is better.  He is regaining his strength.  We reviewed the results of the recently performed workup.     REVIEW OF SYSTEMS:  A complete review of systems was performed and found to be negative other than pertinent positives mentioned in history of present illness.      Past medical, social histories reviewed.     Meds- Reviewed.     PHYSICAL EXAMINATION:   VITAL SIGNS:  Blood pressure is 108/70, pulse 69, temperature 97.7.   CONSTITUTIONAL:  Sitting comfortably.   NEUROLOGIC:  Alert, awake.   PSYCHIATRIC:  Mood and affect appear normal.      LABORATORY DATA AND IMAGING REVIEWED DURING THIS VISIT:  Recent Labs   Lab Test  10/14/18   0535  10/13/18   0535   05/23/17   1251  05/23/17   0445   05/20/17   0654  05/19/17   0436   NA  139  143   < >   --    --    < >  135  138   POTASSIUM  3.6  3.7   < >  3.5  3.6   < >  4.0  4.0   CHLORIDE  104  109   < >   --    --    < >  103  104   CO2  28  28   < >   --    --    < >  22  25   ANIONGAP  7  6   < >   --    --    < >  10  8   BUN  25  26   < >   --    --    < >  31*  27   CR  1.39*  1.29*   < >   --    --    < >  1.00  1.00   GLC  90  89   < >   --    --    < >  103*  108*   MYRON  7.9*  8.0*   < >   --    --    < >  8.2*  8.3*   MAG   --    --    --   2.5*  2.5*   < >  2.6*  2.8*   PHOS   --    --    --    --    --    --   3.4  3.5    < > = values in this interval not displayed.     Recent Labs   Lab Test  10/14/18   0535  10/13/18   0535  10/12/18   2120  10/12/18   1736   WBC  4.5  3.9*  5.0  5.0   HGB  8.3*  7.7*  8.2*  8.2*    PLT  108*  98*  95*  109*   MCV  85  85  85  85   NEUTROPHIL   --   56.5  62.1  58.8     Recent Labs   Lab Test  10/12/18   1736  17   1240  17   0914 10/22/16   BILITOTAL  0.3  1.2   --   0.3   ALKPHOS  82  72   --   60   ALT  31  85*  79*  52*  79   AST  55*  110*   --   86   ALBUMIN  3.6  3.8   --   4.4      ASSESSMENT:  This is a 58-year-old gentleman with a normocytic anemia and moderate thrombocytopenia, multiple comorbidities, including CHF, diabetes, and diabetic nephropathy.      Labs were reviewed with the patient.  He has evidence of iron deficiency which is quite significant.  He never had a colonoscopy done, and we will have him schedule a colonoscopy.  At this point, I will start him on iron sulfate 325 mg twice daily.  We will watch his counts closely at this point.  Serum protein electrophoresis did not show any monoclonal spike.  His B12 and methylmalonic acid level were normal.  He has a low ferritin and elevated soluble transferrin receptor level.      PLAN:   1.  Start iron sulfate 325 mg twice daily with meals.   2.  Labs in 1 month, CBC with diff.   3.  Return to clinic 2 months, CBC with diff, retic count, and iron studies.   4.  Schedule a colonoscopy.         CANDELARIA DAVIS MD             D: 10/18/2018   T: 10/18/2018   MT: EVELINA      Name:     ABIGAIL MATOS   MRN:      [id number]        Account:      [id number]   :      1960           Visit Date:   10/18/2018      Document: [id number]

## 2018-10-18 NOTE — PATIENT INSTRUCTIONS
1- Start Iron sulfate 325 mg twice daily with meals.   2- Labs in one months- CBC diff  Scheduled/trevor  3- RTC MD 2 months- CBC diff, retic count and iron studies  Scheduled/Trevor  4- Schedule a colonoscopy  Patient will get called to schedule by [facility] law 11/15/18    AVS given to patient/trevor

## 2018-10-18 NOTE — MR AVS SNAPSHOT
After Visit Summary   10/18/2018    Gil Chowdary    MRN: [id number]           Patient Information     Date Of Birth          1960        Visit Information        Provider Department      10/18/2018 10:00 AM Juan Fletcher MD [facility]        Today's Diagnoses     Other iron deficiency anemia    -  1      Care Instructions    1- Start Iron sulfate 325 mg twice daily with meals.   2- Labs in one months- CBC diff  3- RTC MD 2 months- CBC diff, retic count and iron studies  4- Schedule a colonoscopy          Follow-ups after your visit        Additional Services     GASTROENTEROLOGY ADULT REF PROCEDURE ONLY       Last Lab Result: Creatinine (mg/dL)       Date                     Value                 10/14/2018               1.39 (H)         ----------  Body mass index is 43.05 kg/(m^2).     Needed:  No  Language:  English    Patient will be contacted to schedule procedure.     Please be aware that coverage of these services is subject to the terms and limitations of your health insurance plan.  Call member services at your health plan with any benefit or coverage questions.  Any procedures must be performed at a [place] facility OR coordinated by your clinic's referral office.    Please bring the following with you to your appointment:    (1) Any X-Rays, CTs or MRIs which have been performed.  Contact the facility where they were done to arrange for  prior to your scheduled appointment.    (2) List of current medications   (3) This referral request   (4) Any documents/labs given to you for this referral                  Your next 10 appointments already scheduled     Oct 18, 2018 10:00 AM CDT   NEW 59 with Juan Fletcher MD   [facility] ([facility])    [facility]  [address]  [facility] [address]   [phone]            Oct 24, 2018  8:00 AM CDT   LAB with [facility]   [facility]  HE[facility] AT [place] ([facility])    [address]  [facility] [address]   [phone]           Please do not eat 10-12 hours before your appointment if you are coming in fasting for labs on lipids, cholesterol, or glucose (sugar). This does not apply to pregnant women. Water, hot tea and black coffee (with nothing added) are okay. Do not drink other fluids, diet soda or chew gum.            Oct 24, 2018  9:00 AM CDT   Return Visit with Rachna Holt PA-C   [facility] ([facility])    [address]  [facility] [place] [address]   [phone] OPT 2            Dec 12, 2018  8:45 AM CST   Return Visit with  Oncology Nurse   [facility] ([facility])    [facility]  [address]  [facility] [address]   [phone]            Dec 12, 2018  9:45 AM CST   Return Visit with Juan Fletcher MD   [facility] ([facility])    [facility]  [address]  [facility] [address] [phone]              Future tests that were ordered for you today     Open Future Orders        Priority Expected Expires Ordered    Iron and iron binding capacity Routine 12/18/2018 7/18/2019 10/18/2018    Transferrin Routine 12/18/2018 7/18/2019 10/18/2018    Ferritin Routine 12/18/2018 7/18/2019 10/18/2018    CBC with platelets differential Routine 12/18/2018 7/18/2019 10/18/2018    Reticulocyte count Routine 12/18/2018 7/18/2019 10/18/2018    CBC with platelets differential Routine 11/18/2018 10/18/2019 10/18/2018    ECHO COMPLETE WITH OPTISON Routine 10/23/2018 10/15/2019 10/15/2018            Who to contact     If you have questions or need follow up information about today's clinic visit or your schedule please contact [facility] directly at [phone].  Normal or non-critical lab and imaging results will be communicated to  "you by MyChart, letter or phone within 4 business days after the clinic has received the results. If you do not hear from us within 7 days, please contact the clinic through [employer] or phone. If you have a critical or abnormal lab result, we will notify you by phone as soon as possible.  Submit refill requests through [employer] or call your pharmacy and they will forward the refill request to us. Please allow 3 business days for your refill to be completed.          Additional Information About Your Visit        [employer] Information     [employer] gives you secure access to your electronic health record. If you see a primary care provider, you can also send messages to your care team and make appointments. If you have questions, please call your primary care clinic.  If you do not have a primary care provider, please call [phone] and they will assist you.        Care EveryWhere ID     This is your Care EveryWhere ID. This could be used by other organizations to access your [facility] medical records  [id number]        Your Vitals Were     Pulse Temperature Height Pulse Oximetry BMI (Body Mass Index)       69 97.7  F (36.5  C) (Oral) 1.778 m (5' 10\") 95% 43.05 kg/m2        Blood Pressure from Last 3 Encounters:   10/18/18 108/70   10/14/18 111/57   07/15/18 108/64    Weight from Last 3 Encounters:   10/18/18 136.1 kg (300 lb)   10/14/18 140.4 kg (309 lb 8 oz)   07/15/18 120.2 kg (265 lb)              We Performed the Following     GASTROENTEROLOGY ADULT REF PROCEDURE ONLY          Today's Medication Changes          These changes are accurate as of 10/18/18  9:50 AM.  If you have any questions, ask your nurse or doctor.               Start taking these medicines.        Dose/Directions    ferrous sulfate 325 (65 Fe) MG tablet   Commonly known as:  IRON   Used for:  Other iron deficiency anemia   Started by:  Juan Fletcher MD        Dose:  325 mg   Take 1 tablet (325 mg) by mouth 2 times daily   Quantity:  100 tablet "   Refills:  3            Where to get your medicines      These medications were sent to [facility] [address] [place] [address] [facility]  [address] [place], [place] [address] [address]     Phone:  [phone]     ferrous sulfate 325 (65 Fe) MG tablet                Primary Care Provider Office Phone # Fax #    Sam Ramya Villatoro PA-C [phone]       [facility] [address]  [facility] [address]        Equal Access to Services     [facility]: Hadii aad ku hadasho Soomaali, waaxda luqadaha, qaybta kaalmada adeegyada, waxay idiin hayaan adeki swanson . So [facility] [phone].    ATENCIÓN: Si habla español, tiene a cross disposición servicios gratuitos de asistencia lingüística. [facility] [phone].    We comply with applicable federal civil rights laws and [place] laws. We do not discriminate on the basis of race, color, national origin, age, disability, sex, sexual orientation, or gender identity.            Thank you!     Thank you for choosing [facility]  for your care. Our goal is always to provide you with excellent care. Hearing back from our patients is one way we can continue to improve our services. Please take a few minutes to complete the written survey that you may receive in the mail after your visit with us. Thank you!             Your Updated Medication List - Protect others around you: Learn how to safely use, store and throw away your medicines at www.disposemymeds.org.          This list is accurate as of 10/18/18  9:50 AM.  Always use your most recent med list.                   Brand Name Dispense Instructions for use Diagnosis    aspirin 81 MG tablet      Take 81 mg by mouth every morning        atorvastatin 40 MG tablet    LIPITOR    90 tablet    Take 1 tablet (40 mg) by mouth daily    Hyperlipidemia with target LDL less than 70       SELENA STERLING SC      Inject 22 Units Subcutaneous  every morning        bumetanide 1 MG tablet    BUMEX    60 tablet    Take 1 tablet (1 mg) by mouth 2 times daily    S/P AVR (aortic valve replacement)       buPROPion 150 MG 24 hr tablet    WELLBUTRIN XL     Take 150-300 mg by mouth every morning Starting 10/8/18 150mg daily x7 days, then increase to 300mg daily        CENTRUM ADULTS PO      Take 1 tablet by mouth every morning        cholecalciferol 5000 units Tabs tablet    vitamin D3     Take 5,000 Units by mouth every morning        clindamycin 300 MG capsule    CLEOCIN    10 capsule    Take 1 capsule (300 mg) by mouth as needed    S/P AVR (aortic valve replacement)       ferrous sulfate 325 (65 Fe) MG tablet    IRON    100 tablet    Take 1 tablet (325 mg) by mouth 2 times daily    Other iron deficiency anemia       metoprolol tartrate 50 MG tablet    LOPRESSOR    60 tablet    Take 1 tablet (50 mg) by mouth 2 times daily    Paroxysmal supraventricular tachycardia (H), S/P AVR (aortic valve replacement)       * NovoLOG FLEXPEN 100 UNIT/ML injection   Generic drug:  insulin aspart      Inject 3 Units Subcutaneous 2 times daily (with meals) Takes with breakfast and dinner        * NovoLOG FLEXPEN 100 UNIT/ML injection   Generic drug:  insulin aspart      Inject 4 Units Subcutaneous daily (with lunch)        pantoprazole 40 MG EC tablet    PROTONIX     Take 40 mg by mouth every morning (before breakfast)        potassium chloride SA 10 MEQ CR tablet    K-DUR/KLOR-CON M     Take 20 mEq by mouth 2 times daily        prednisoLONE acetate 1 % ophthalmic susp    PRED FORTE     Place 1 drop into both eyes as needed (glaucoma)        timolol 0.5 % ophthalmic solution    TIMOPTIC     Place 1 drop into both eyes 2 times daily        warfarin 3 MG tablet    COUMADIN     Take 3 mg by mouth every evening        * Notice:  This list has 2 medication(s) that are the same as other medications prescribed for you. Read the directions carefully, and ask your doctor or other care  provider to review them with you.

## 2018-10-18 NOTE — LETTER
10/18/2018         RE: Gil Chowdary  [address] [place] [id number]        Dear Colleague,    Thank you for referring your patient, Gil Chowdary, to the [facility]. Please see a copy of my visit note below.    [facility]  HEMATOLOGY ONCOLOGY    Visit Date:   10/18/2018      DIAGNOSIS:  Iron deficiency anemia, unclear etiology.  The patient had heart surgery last year and never had a colonoscopy done, so needs a GI workup.  Also a likely component of anemia of chronic disease and chronic thrombocytopenia.      TREATMENT:  Iron sulfate 325 mg twice daily.      SUBJECTIVE:  The patient was seen as a followup today.  His volume status is better.  He is regaining his strength.  We reviewed the results of the recently performed workup.     REVIEW OF SYSTEMS:  A complete review of systems was performed and found to be negative other than pertinent positives mentioned in history of present illness.      Past medical, social histories reviewed.     Meds- Reviewed.     PHYSICAL EXAMINATION:   VITAL SIGNS:  Blood pressure is 108/70, pulse 69, temperature 97.7.   CONSTITUTIONAL:  Sitting comfortably.   NEUROLOGIC:  Alert, awake.   PSYCHIATRIC:  Mood and affect appear normal.      LABORATORY DATA AND IMAGING REVIEWED DURING THIS VISIT:  Recent Labs   Lab Test  10/14/18   0535  10/13/18   0535   05/23/17   1251  05/23/17   0445   05/20/17   0654  05/19/17   0436   NA  139  143   < >   --    --    < >  135  138   POTASSIUM  3.6  3.7   < >  3.5  3.6   < >  4.0  4.0   CHLORIDE  104  109   < >   --    --    < >  103  104   CO2  28  28   < >   --    --    < >  22  25   ANIONGAP  7  6   < >   --    --    < >  10  8   BUN  25  26   < >   --    --    < >  31*  27   CR  1.39*  1.29*   < >   --    --    < >  1.00  1.00   GLC  90  89   < >   --    --    < >  103*  108*   MYRON  7.9*  8.0*   < >   --    --    < >  8.2*  8.3*   MAG   --    --    --   2.5*  2.5*   < >  2.6*  2.8*    PHOS   --    --    --    --    --    --   3.4  3.5    < > = values in this interval not displayed.     Recent Labs   Lab Test  10/14/18   0535  10/13/18   0535  10/12/18   2120  10/12/18   1736   WBC  4.5  3.9*  5.0  5.0   HGB  8.3*  7.7*  8.2*  8.2*   PLT  108*  98*  95*  109*   MCV  85  85  85  85   NEUTROPHIL   --   56.5  62.1  58.8     Recent Labs   Lab Test  10/12/18   1736  17   1240  17   0914 10/22/16   BILITOTAL  0.3  1.2   --   0.3   ALKPHOS  82  72   --   60   ALT  31  85*  79*  52*  79   AST  55*  110*   --   86   ALBUMIN  3.6  3.8   --   4.4      ASSESSMENT:  This is a 58-year-old gentleman with a normocytic anemia and moderate thrombocytopenia, multiple comorbidities, including CHF, diabetes, and diabetic nephropathy.      Labs were reviewed with the patient.  He has evidence of iron deficiency which is quite significant.  He never had a colonoscopy done, and we will have him schedule a colonoscopy.  At this point, I will start him on iron sulfate 325 mg twice daily.  We will watch his counts closely at this point.  Serum protein electrophoresis did not show any monoclonal spike.  His B12 and methylmalonic acid level were normal.  He has a low ferritin and elevated soluble transferrin receptor level.      PLAN:   1.  Start iron sulfate 325 mg twice daily with meals.   2.  Labs in 1 month, CBC with diff.   3.  Return to clinic 2 months, CBC with diff, retic count, and iron studies.   4.  Schedule a colonoscopy.         JUAN FLETCHER MD             D: 10/18/2018   T: 10/18/2018   MT: EVELINA      Name:     ABIGAIL MATOS   MRN:      [id number]        Account:      [id number]   :      1960           Visit Date:   10/18/2018      Document: [id number]        This clinic visit note has been dictated [id number]      Again, thank you for allowing me to participate in the care of your patient.        Sincerely,        Juan Fletcher MD

## 2018-10-19 ENCOUNTER — DOCUMENTATION ONLY (OUTPATIENT)
Dept: CARDIOLOGY | Facility: CLINIC | Age: 58
End: 2018-10-19

## 2018-10-22 ENCOUNTER — HOSPITAL ENCOUNTER (OUTPATIENT)
Facility: CLINIC | Age: 58
End: 2018-10-22
Attending: INTERNAL MEDICINE | Admitting: INTERNAL MEDICINE
Payer: COMMERCIAL

## 2018-10-23 ENCOUNTER — CARE COORDINATION (OUTPATIENT)
Dept: CARDIOLOGY | Facility: CLINIC | Age: 58
End: 2018-10-23

## 2018-10-23 PROBLEM — N17.9 AKI (ACUTE KIDNEY INJURY) (H): Status: ACTIVE | Noted: 2018-10-23

## 2018-10-23 PROBLEM — R60.0 PERIPHERAL EDEMA: Status: ACTIVE | Noted: 2018-10-23

## 2018-10-23 PROBLEM — E11.9 DIABETES MELLITUS, TYPE 2 (H): Status: ACTIVE | Noted: 2018-10-23

## 2018-10-23 PROBLEM — E66.01 MORBID OBESITY (H): Status: ACTIVE | Noted: 2018-10-23

## 2018-10-23 PROBLEM — D69.6 THROMBOCYTOPENIA (H): Status: ACTIVE | Noted: 2018-10-23

## 2018-10-23 PROBLEM — R60.1 ANASARCA: Status: ACTIVE | Noted: 2018-10-23

## 2018-10-23 PROBLEM — D64.9 ANEMIA: Status: ACTIVE | Noted: 2018-10-23

## 2018-10-23 PROBLEM — G47.33 OSA (OBSTRUCTIVE SLEEP APNEA): Status: ACTIVE | Noted: 2018-10-23

## 2018-10-23 PROBLEM — I50.30 (HFPEF) HEART FAILURE WITH PRESERVED EJECTION FRACTION (H): Status: ACTIVE | Noted: 2018-10-12

## 2018-10-23 NOTE — PROGRESS NOTES
Called pt to remind him of follow-up labs and OV w/ Rachna More PAC tomorrow. Pt appreciative and verbalized appt info.    Karen Stark RN BSN   10:21 AM 10/23/18

## 2018-10-24 ENCOUNTER — OFFICE VISIT (OUTPATIENT)
Dept: CARDIOLOGY | Facility: CLINIC | Age: 58
End: 2018-10-24
Attending: HOSPITALIST
Payer: COMMERCIAL

## 2018-10-24 VITALS
WEIGHT: 309.2 LBS | HEIGHT: 70 IN | HEART RATE: 62 BPM | SYSTOLIC BLOOD PRESSURE: 120 MMHG | BODY MASS INDEX: 44.27 KG/M2 | DIASTOLIC BLOOD PRESSURE: 70 MMHG

## 2018-10-24 DIAGNOSIS — R60.1 ANASARCA: ICD-10-CM

## 2018-10-24 DIAGNOSIS — Z95.2 S/P AVR (AORTIC VALVE REPLACEMENT): ICD-10-CM

## 2018-10-24 DIAGNOSIS — I50.30 (HFPEF) HEART FAILURE WITH PRESERVED EJECTION FRACTION (H): Primary | ICD-10-CM

## 2018-10-24 DIAGNOSIS — D64.9 ANEMIA, UNSPECIFIED TYPE: ICD-10-CM

## 2018-10-24 LAB
ANION GAP SERPL CALCULATED.3IONS-SCNC: 12.9 MMOL/L (ref 6–17)
BASOPHILS # BLD AUTO: 0 10E9/L (ref 0–0.2)
BASOPHILS NFR BLD AUTO: 0.5 %
BUN SERPL-MCNC: 36 MG/DL (ref 7–30)
CALCIUM SERPL-MCNC: 8.6 MG/DL (ref 8.5–10.5)
CHLORIDE SERPL-SCNC: 110 MMOL/L (ref 98–107)
CO2 SERPL-SCNC: 25 MMOL/L (ref 23–29)
CREAT SERPL-MCNC: 1.85 MG/DL (ref 0.7–1.3)
DIFFERENTIAL METHOD BLD: ABNORMAL
EOSINOPHIL # BLD AUTO: 0.2 10E9/L (ref 0–0.7)
EOSINOPHIL NFR BLD AUTO: 4.8 %
ERYTHROCYTE [DISTWIDTH] IN BLOOD BY AUTOMATED COUNT: 17.5 % (ref 10–15)
GFR SERPL CREATININE-BSD FRML MDRD: 38 ML/MIN/1.7M2
GLUCOSE SERPL-MCNC: 110 MG/DL (ref 70–105)
HCT VFR BLD AUTO: 25.5 % (ref 40–53)
HGB BLD-MCNC: 7.5 G/DL (ref 13.3–17.7)
IMM GRANULOCYTES # BLD: 0 10E9/L (ref 0–0.4)
IMM GRANULOCYTES NFR BLD: 0.3 %
LYMPHOCYTES # BLD AUTO: 1.2 10E9/L (ref 0.8–5.3)
LYMPHOCYTES NFR BLD AUTO: 31 %
MCH RBC QN AUTO: 25.5 PG (ref 26.5–33)
MCHC RBC AUTO-ENTMCNC: 29.4 G/DL (ref 31.5–36.5)
MCV RBC AUTO: 87 FL (ref 78–100)
MONOCYTES # BLD AUTO: 0.3 10E9/L (ref 0–1.3)
MONOCYTES NFR BLD AUTO: 8.1 %
NEUTROPHILS # BLD AUTO: 2.2 10E9/L (ref 1.6–8.3)
NEUTROPHILS NFR BLD AUTO: 55.3 %
NRBC # BLD AUTO: 0 10*3/UL
NRBC BLD AUTO-RTO: 0 /100
PLATELET # BLD AUTO: 95 10E9/L (ref 150–450)
POTASSIUM SERPL-SCNC: 3.9 MMOL/L (ref 3.5–5.1)
RBC # BLD AUTO: 2.94 10E12/L (ref 4.4–5.9)
SODIUM SERPL-SCNC: 144 MMOL/L (ref 136–145)
WBC # BLD AUTO: 3.9 10E9/L (ref 4–11)

## 2018-10-24 PROCEDURE — 99214 OFFICE O/P EST MOD 30 MIN: CPT | Performed by: PHYSICIAN ASSISTANT

## 2018-10-24 PROCEDURE — 80048 BASIC METABOLIC PNL TOTAL CA: CPT | Performed by: PHYSICIAN ASSISTANT

## 2018-10-24 PROCEDURE — 36415 COLL VENOUS BLD VENIPUNCTURE: CPT | Performed by: PHYSICIAN ASSISTANT

## 2018-10-24 PROCEDURE — 85025 COMPLETE CBC W/AUTO DIFF WBC: CPT | Performed by: PHYSICIAN ASSISTANT

## 2018-10-24 RX ORDER — BUMETANIDE 1 MG/1
2 TABLET ORAL
Qty: 180 TABLET | Refills: 3 | Status: SHIPPED | OUTPATIENT
Start: 2018-10-24 | End: 2018-11-06

## 2018-10-24 NOTE — PATIENT INSTRUCTIONS
Please call CORE nurse for any questions or concerns:       [phone]    1. Medication changes:  Increase bumex/ bumetinide to 2 mg twice a day.  Take your first dose when you wake up and the second dose at lunch.  The goal is that you'll lose 5-10 pounds by Friday.  If not, we'll adjust the medications.       Try taking iron, just once a day.      2.  Weigh yourself daily and write it down.     3. Call CORE nurse if your weight is up more than 2 pounds in one day, or 5 pounds in one week.    4. Call CORE nurse if you feel more short of breath, have more abdominal bloating or leg swelling.    5. Eat a low sodium diet (less than 2,000mg daily). If you eat less salt, you will retain less fluid.  Drink about 2 liters of fluid a day.      6. Results: kidney numbers are higher- but they should get better with losing fluid.    Component      Latest Ref Rng & Units 10/24/2018   Sodium      136 - 145 mmol/L 144   Potassium      3.5 - 5.1 mmol/L 3.9   Chloride      98 - 107 mmol/L 110 (H)   Carbon Dioxide      23 - 29 mmol/L 25   Anion Gap      6 - 17 mmol/L 12.9   Glucose      70 - 105 mg/dL 110 (H)   Urea Nitrogen      7 - 30 mg/dL 36 (H)   Creatinine      0.70 - 1.30 mg/dL 1.85 (H)   GFR Estimate      >60 mL/min/1.7m2 38 (L)   GFR Estimate If Black      >60 mL/min/1.7m2 46 (L)   Calcium      8.5 - 10.5 mg/dL 8.6     7.  Follow up: with me in 2 weeks with labs at that time.       Scheduling phone number: [phone]  Reminder: Please bring in all current medications, over the counter supplements and vitamin bottles to your next appointment.

## 2018-10-24 NOTE — LETTER
10/24/2018    Sam Villatoro PA-C  [place] [employer] [facility] [address]  [facility] [address]    RE: Gil Chowdary       Dear Colleague,    I had the pleasure of seeing Gil Chowdary in the [facility].    Cardiology Clinic Visit  10/25/2018    Pt: Gil Chowdary  : 1960    Primary Cardiologist: Dr. Salinas    HPI:  Gil Chowdary is a pleasant 58-year-old gentleman who I am meeting today for the first time.  His past medical history is significant for severe aortic stenosis, actually critical aortic stenosis, with aortic valve replacement in 2016 with a 25 mm trifecta tissue valve, type 2 diabetes on insulin, hyperlipidemia, sleep apnea on bipap who was recently hospitalized for heart failure.  He presented to the ER  with progressive edema to his legs thighs abdomen and testicles.  He also had profound shortness of breath, peripheral edema including scrotal edema and anasarca in his abdomen.  Unfortunately he was found to be profoundly anemic with hemoglobin of 8.2 and had gained somewhere along the lines of therapy and is 60 pounds over the last 4 months.He was also found to be in acute renal failure with his baseline creatinine of 1 up to about 1.4 on admission.  He was seen by the fellow on the weekend for cardiology and at that point the recommendation was to continue with ongoing diuresis.  His echocardiogram was not completed prior to discharge but unfortunately that came uncertain fortunately that came back with an EF of 60-65% but is still markedly dilated inferior vena cava and elevating E prime ratio.  This gentleman takes care of his elderly mother who has had a stroke and for that reason he did not want to stay in the hospital for a complete workup for GI bleed nor for ongoing diuresis.  As such as I am seeing him today in clinic follow-up.    He tells me that when he was discharged his weight was about 315 pounds, and the  "lowest lowest it has dropped since and has been 300 pounds he is most recently been stable between 303 130 303 and 305 pounds.  He continues to be fairly short of breath but is trying to walk on the treadmill very slowly just 10 minutes a day.  He no longer has orthopnea or PND.  Continues to have profound edema extending from his feet to his abdomen with scrotum the size of grapefruit.  With this he is having difficult having a difficult time urinating.  He has not noticed any blood in his stools nor has not vomited blood and he does not have any mature hematuria.  He was asked to get a colonoscopy and this is scheduled but not for several weeks.  He was seen by clarissa who initially thought that this may be multifactorial but was then found to be iron deficiency anemia and recommended for the colonoscopy.  He has not had a formal GI evaluation.    Social Hx:  He lives at home taking care of his mother who is in her late 90s.  She has had a stroke and he does always carry her cares.  He has several brothers but he says they do not participate much in her care.  He is a former smoker with a 20-pack-year history quit in 2011 no alcohol use.    Exam:  /70  Pulse 62  Ht 1.778 m (5' 10\")  Wt 140.3 kg (309 lb 3.2 oz)  BMI 44.37 kg/m2  Well-nourished well-developed obese gentleman in no acute distress, skin is pale.  Normocephalic atraumatic.  His heart is regular but mildly tachycardic I do not appreciate murmur rub.  Lungs are diminished in the bases but without wheezes rales or rhonchi.  He has 3+ pitting edema to his upper thighs and his abdomen has pitting edema as well.  I am unable to assess JVP secondary to his body habitus.    Assessment and Plan:  1.  Acute heart failure with preserved EF secondary to acute likely acute blood loss anemia, unfortunately this gentleman has gained somewhere between 30 and 50 pounds over the last several months, I suspect most which is volume.  He has profound anasarca today " and is clearly miserable with this but fortunately is mostly right-sided symptoms.  Unfortunately, his hemoglobin remains low at 7.5.  I have put in a formal GI consult and asked him to move his colonoscopy up to as soon as possible.  He is not interested in going back in the hospital as there is no one to care for his mother at home.  As such, we will do the best we can diurese him at home.  I will increase his Bumex to 2 mg twice a day with expectation that he will lose 5-10 pounds in the next 3 days.  If he has not lost between 5 and 10 pounds and has a weight of 298 or lower I like him to get 2.5 mg dose of metolazone taken 1 hour before his Bumex for the weekend.  I do note that his creatinine is up from baseline of 1.33-1.8 today, this is clearly secondary to hypervolemia and renal congestion.  In addition, we will recheck a hemoglobin on Monday, this should improve with diuresis as long as he is not having massive blood loss.  Although, if it remains low will give him a 1 unit transfusion of blood with 40 mg dose of IV Lasix with it.    I did tell him that this is going to take some time to get rid of the fluid hopefully after the over the next several weeks.  We talked about low sodium diet drinking around 2 L of water and daily weights to help with this progress.  He did meet with our core nurses for education today.  I will see him back in 2 weeks, I expect will be multiple phone calls during the interim to assess his labs and his ongoing diuresis.  If necessary will also need to consider single doses of IV diuretics to assist with things going forward.  We talked about multiple ways to assist with urination that is now difficult for him up to and including depends which she is not willing to try.    I am quite hopeful that we will be able to make a significant impact on this kind gentleman's care I certainly think will be feeling much better a couple months from now one were able to diurese him and get  his hemoglobin near normal and likely will do well long-term.    2.  Aortic valve replacement for severe left ear this is a tissue valve and is functioning well.      3.  Anemia- He is on Coumadin, with hx of brief afib and svt post AVR.  He hasn't had a recent monitor.  But this will be considered especially given recent anemia.  With a tissue aortic valve, coumadin is not required and we may be able to proceed with asa alone.  Depending on his hemoglobin on lab next week we will consider holter vs zio and discontinuation of coumadin.    Thank you for allowing me to participate in this delightful patient's care we will see him back in 2 weeks and follow him closely in the interim.      Rachna Holt PA-C 10/25/2018 3:44 PM      Orders this Visit:  Orders Placed This Encounter   Procedures     Basic metabolic panel     Hemoglobin     N terminal pro BNP outpatient     Hemoglobin     Follow-Up with [facility] - KARYN visit     GASTROENTEROLOGY ADULT REF CONSULT ONLY     Orders Placed This Encounter   Medications     bumetanide (BUMEX) 1 MG tablet     Sig: Take 2 tablets (2 mg) by mouth 2 times daily     Dispense:  180 tablet     Refill:  3     Medications Discontinued During This Encounter   Medication Reason     bumetanide (BUMEX) 1 MG tablet Reorder         Encounter Diagnoses   Name Primary?     Anasarca      S/P AVR (aortic valve replacement)      (HFpEF) heart failure with preserved ejection fraction (H) Yes     Anemia, unspecified type        CURRENT MEDICATIONS:  Current Outpatient Prescriptions   Medication Sig Dispense Refill     aspirin 81 MG tablet Take 81 mg by mouth every morning        atorvastatin (LIPITOR) 40 MG tablet Take 1 tablet (40 mg) by mouth daily 90 tablet 0     bumetanide (BUMEX) 1 MG tablet Take 2 tablets (2 mg) by mouth 2 times daily 180 tablet 3     buPROPion (WELLBUTRIN XL) 150 MG 24 hr tablet Take 150-300 mg by mouth every morning Starting 10/8/18 150mg daily x7 days, then increase to 300mg  daily       cholecalciferol (VITAMIN D3) 5000 UNITS TABS tablet Take 5,000 Units by mouth every morning       clindamycin (CLEOCIN) 300 MG capsule Take 1 capsule (300 mg) by mouth as needed 10 capsule 3     insulin aspart (NOVOLOG FLEXPEN) 100 UNIT/ML injection Inject 4 Units Subcutaneous daily (with lunch)       insulin aspart (NOVOLOG FLEXPEN) 100 UNIT/ML injection Inject 3 Units Subcutaneous 2 times daily (with meals) Takes with breakfast and dinner       Insulin Glargine (BASAGLAR KWIKPEN SC) Inject 22 Units Subcutaneous every morning        metoprolol (LOPRESSOR) 50 MG tablet Take 1 tablet (50 mg) by mouth 2 times daily 60 tablet 0     Multiple Vitamins-Minerals (CENTRUM ADULTS PO) Take 1 tablet by mouth every morning        pantoprazole (PROTONIX) 40 MG EC tablet Take 40 mg by mouth every morning (before breakfast)       potassium chloride SA (K-DUR/KLOR-CON M) 10 MEQ CR tablet Take 20 mEq by mouth 2 times daily       prednisoLONE acetate (PRED FORTE) 1 % ophthalmic susp Place 1 drop into both eyes as needed (glaucoma)        timolol (TIMOPTIC) 0.5 % ophthalmic solution Place 1 drop into both eyes 2 times daily        warfarin (COUMADIN) 3 MG tablet Take 3 mg by mouth every evening       ferrous sulfate (IRON) 325 (65 Fe) MG tablet Take 1 tablet (325 mg) by mouth 2 times daily (Patient not taking: Reported on 10/24/2018) 100 tablet 3       ALLERGIES     Allergies   Allergen Reactions     Amoxicillin      Itchy      Penicillins Hives       PAST MEDICAL HISTORY:  Past Medical History:   Diagnosis Date     Former tobacco use      Glaucoma      Hyperlipidemia LDL goal <160 12/6/2011     Obesity      DRAKE on CPAP      Right bundle branch block      Severe aortic stenosis     On Echo 10/28/16       PAST SURGICAL HISTORY:  Past Surgical History:   Procedure Laterality Date     HEART CATH LEFT HEART CATH  04/07/2017    Diffuse non-obstuctive CAD     REPAIR VALVE AORTIC N/A 5/16/2017    Procedure: REPAIR VALVE AORTIC;   "Median Sternotony, CardioPulmonary Bypass, Aortic Valve Replace using 25MM Trifecta Valve with [employer], Septal myectomy;  Surgeon: Jun Simon MD;  Location: [facility] OR     REPLACE VALVE AORTIC N/A 5/16/2017    Procedure: REPLACE VALVE AORTIC;;  Surgeon: Jun Simon MD;  Location: [facility] OR     SINUS SURGERY  2005       FAMILY HISTORY:  Family History   Problem Relation Age of Onset     Family History Negative Mother      Diabetes Father      Heart Surgery Father      CABG     Coronary Artery Disease Father      Hypertension Brother      Diabetes Brother      HEART DISEASE Brother      Heart Surgery Brother      CABG x 3     Family History Negative Sister      Diabetes Brother      History of diabetes, but no longer an issue     Family History Negative Brother        SOCIAL HISTORY:  Social History     Social History     Marital status:      Spouse name: N/A     Number of children: N/A     Years of education: N/A     Social History Main Topics     Smoking status: Former Smoker     Packs/day: 1.00     Years: 20.00     Types: Cigarettes, Cigars     Quit date: 10/21/2011     Smokeless tobacco: Never Used     Alcohol use No     Drug use: No     Sexual activity: Not Asked     Other Topics Concern     Parent/Sibling W/ Cabg, Mi Or Angioplasty Before 65f 55m? Yes     brother triple bypass 49     Social History Narrative       Review of Systems:  Skin:  Negative     Eyes:  Positive for glasses  ENT:  Negative    Respiratory:  Positive for CPAP;sleep apnea  Cardiovascular:    Positive for;edema (edema in groin area)  Gastroenterology: Negative    Genitourinary:  Negative    Musculoskeletal:  Negative    Neurologic:  Negative    Psychiatric:  Negative    Heme/Lymph/Imm:  Negative    Endocrine:  Positive for diabetes    Physical Exam:  Vitals: /70  Pulse 62  Ht 1.778 m (5' 10\")  Wt 140.3 kg (309 lb 3.2 oz)  BMI 44.37 kg/m2   Please refer to dictation for physical exam    Recent Lab " Results:  LIPID RESULTS:  Lab Results   Component Value Date    CHOL 126 10/10/2018    HDL 23 (A) 10/10/2018    LDL 71 10/10/2018    TRIG 230 (A) 10/10/2018       LIVER ENZYME RESULTS:  Lab Results   Component Value Date    AST 55 (H) 10/12/2018    ALT 31 10/12/2018       CBC RESULTS:  Lab Results   Component Value Date    WBC 3.9 (L) 10/24/2018    RBC 2.94 (L) 10/24/2018    HGB 7.5 (L) 10/24/2018    HCT 25.5 (L) 10/24/2018    MCV 87 10/24/2018    MCH 25.5 (L) 10/24/2018    MCHC 29.4 (L) 10/24/2018    RDW 17.5 (H) 10/24/2018    PLT 95 (L) 10/24/2018       BMP RESULTS:  Lab Results   Component Value Date     10/24/2018    POTASSIUM 3.9 10/24/2018    CHLORIDE 110 (H) 10/24/2018    CO2 25 10/24/2018    ANIONGAP 12.9 10/24/2018     (H) 10/24/2018    BUN 36 (H) 10/24/2018    CR 1.85 (H) 10/24/2018    GFRESTIMATED 38 (L) 10/24/2018    GFRESTBLACK 46 (L) 10/24/2018    MYRON 8.6 10/24/2018        A1C RESULTS:  Lab Results   Component Value Date    A1C 6.8 (H) 10/12/2018       INR RESULTS:  Lab Results   Component Value Date    INR 2.19 (H) 10/14/2018    INR 2.28 (H) 10/13/2018           CC  Keo Grossman MD  [address] [address]          Thank you for allowing me to participate in the care of your patient.      Sincerely,     MAHNAZ ContehC     [facility]    cc:   Keo Grossman MD  [address] [address]

## 2018-10-24 NOTE — MR AVS SNAPSHOT
After Visit Summary   10/24/2018    Gil Chowdary    MRN: [id number]           Patient Information     Date Of Birth          1960        Visit Information        Provider Department      10/24/2018 9:00 AM Rachna Holt PA-C [facility]   Sandra        Today's Diagnoses     (HFpEF) heart failure with preserved ejection fraction (H)    -  1    Anasarca        S/P AVR (aortic valve replacement)          Care Instructions    Please call [facility] nurse for any questions or concerns:       [phone]    1. Medication changes:  Increase bumex/ bumetinide to 2 mg twice a day.  Take your first dose when you wake up and the second dose at lunch.  The goal is that you'll lose 5-10 pounds by Friday.  If not, we'll adjust the medications.       Try taking iron, just once a day.      2.  Weigh yourself daily and write it down.     3. Call [facility] nurse if your weight is up more than 2 pounds in one day, or 5 pounds in one week.    4. Call [facility] nurse if you feel more short of breath, have more abdominal bloating or leg swelling.    5. Eat a low sodium diet (less than 2,000mg daily). If you eat less salt, you will retain less fluid.  Drink about 2 liters of fluid a day.      6. Results: kidney numbers are higher- but they should get better with losing fluid.    Component      Latest Ref Rng & Units 10/24/2018   Sodium      136 - 145 mmol/L 144   Potassium      3.5 - 5.1 mmol/L 3.9   Chloride      98 - 107 mmol/L 110 (H)   Carbon Dioxide      23 - 29 mmol/L 25   Anion Gap      6 - 17 mmol/L 12.9   Glucose      70 - 105 mg/dL 110 (H)   Urea Nitrogen      7 - 30 mg/dL 36 (H)   Creatinine      0.70 - 1.30 mg/dL 1.85 (H)   GFR Estimate      >60 mL/min/1.7m2 38 (L)   GFR Estimate If Black      >60 mL/min/1.7m2 46 (L)   Calcium      8.5 - 10.5 mg/dL 8.6     7.  Follow up: with me in 2 weeks with labs at that time.       Scheduling phone number: [phone]  Reminder: Please  bring in all current medications, over the counter supplements and vitamin bottles to your next appointment.          Follow-ups after your visit        Additional Services     Follow-Up with [facility] - KARYN visit                 Your next 10 appointments already scheduled     Nov 15, 2018   Procedure with Jovanni Joe MD   [facility]    [address]  [place] [address]   [phone]           [facility] is located at [address]            Dec 12, 2018  8:45 AM CST   Return Visit with  Oncology Nurse   [facility]    [facility]  [address]  [place] [address] [phone]            Dec 12, 2018  9:45 AM CST   Return Visit with Juan Fletcher MD   [facility] ([facility]    [facility]  [address]  [place] [address] [phone]              Future tests that were ordered for you today     Open Future Orders        Priority Expected Expires Ordered    Basic metabolic panel Routine 11/7/2018 10/24/2019 10/24/2018    Hemoglobin Routine 11/7/2018 10/24/2019 10/24/2018    N terminal pro BNP outpatient Routine 11/7/2018 10/24/2019 10/24/2018    Follow-Up with [facility] - KARYN visit Routine 11/7/2018 10/24/2019 10/24/2018            Who to contact     If you have questions or need follow up information about today's clinic visit or your schedule please contact [facility]   [facility] directly at [phone].  Normal or non-critical lab and imaging results will be communicated to you by MyChart, letter or phone within 4 business days after the clinic has received the results. If you do not hear from us within 7 days, please contact the clinic through MyChart or phone. If you have a critical or abnormal lab result, we will notify you by phone as soon as  "possible.  Submit refill requests through [employer] or call your pharmacy and they will forward the refill request to us. Please allow 3 business days for your refill to be completed.          Additional Information About Your Visit        STWAharMobile Card Information     [employer] gives you secure access to your electronic health record. If you see a primary care provider, you can also send messages to your care team and make appointments. If you have questions, please call your primary care clinic.  If you do not have a primary care provider, please call [phone] and they will assist you.        Care EveryWhere ID     This is your Care EveryWhere ID. This could be used by other organizations to access your [place] medical records  [id number]        Your Vitals Were     Pulse Height BMI (Body Mass Index)             62 1.778 m (5' 10\") 44.37 kg/m2          Blood Pressure from Last 3 Encounters:   10/24/18 120/70   10/18/18 108/70   10/14/18 111/57    Weight from Last 3 Encounters:   10/24/18 140.3 kg (309 lb 3.2 oz)   10/18/18 136.1 kg (300 lb)   10/14/18 140.4 kg (309 lb 8 oz)              We Performed the Following     Follow-Up with Cardiologist          Today's Medication Changes          These changes are accurate as of 10/24/18  9:40 AM.  If you have any questions, ask your nurse or doctor.               These medicines have changed or have updated prescriptions.        Dose/Directions    bumetanide 1 MG tablet   Commonly known as:  BUMEX   This may have changed:  how much to take   Used for:  S/P AVR (aortic valve replacement)   Changed by:  Rachna Holt PA-C        Dose:  2 mg   Take 2 tablets (2 mg) by mouth 2 times daily   Quantity:  180 tablet   Refills:  3            Where to get your medicines      These medications were sent to [facility] [address] [place] [address] AT [address] [place] [address]     Phone:  [phone]     " bumetanide 1 MG tablet                Primary Care Provider Office Phone # Fax #    Sam Ramya Villatoro PA-C [phone]       [facility] [address]  [facility] [address]        Equal Access to Services     SIL COLEMAN : Hadii aad ku hadludyo Soomaali, waaxda luqadaha, qaybta kaalmada adeegyada, waxay idiin hayaan adeki khsampsonjose fry. So [facility] [phone].    ATENCIÓN: Si habla español, tiene a cross disposición servicios gratuitos de asistencia lingüística. Llame al [phone].    We comply with applicable federal civil rights laws and [place] laws. We do not discriminate on the basis of race, color, national origin, age, disability, sex, sexual orientation, or gender identity.            Thank you!     Thank you for choosing [facility]  for your care. Our goal is always to provide you with excellent care. Hearing back from our patients is one way we can continue to improve our services. Please take a few minutes to complete the written survey that you may receive in the mail after your visit with us. Thank you!             Your Updated Medication List - Protect others around you: Learn how to safely use, store and throw away your medicines at www.disposemymeds.org.          This list is accurate as of 10/24/18  9:40 AM.  Always use your most recent med list.                   Brand Name Dispense Instructions for use Diagnosis    aspirin 81 MG tablet      Take 81 mg by mouth every morning        atorvastatin 40 MG tablet    LIPITOR    90 tablet    Take 1 tablet (40 mg) by mouth daily    Hyperlipidemia with target LDL less than 70       BASAGLAR KWIKPEN SC      Inject 22 Units Subcutaneous every morning        bumetanide 1 MG tablet    BUMEX    180 tablet    Take 2 tablets (2 mg) by mouth 2 times daily    S/P AVR (aortic valve replacement)       buPROPion 150 MG 24 hr tablet    WELLBUTRIN XL     Take 150-300 mg by mouth every morning  Starting 10/8/18 150mg daily x7 days, then increase to 300mg daily        CENTRUM ADULTS PO      Take 1 tablet by mouth every morning        cholecalciferol 5000 units Tabs tablet    vitamin D3     Take 5,000 Units by mouth every morning        clindamycin 300 MG capsule    CLEOCIN    10 capsule    Take 1 capsule (300 mg) by mouth as needed    S/P AVR (aortic valve replacement)       ferrous sulfate 325 (65 Fe) MG tablet    IRON    100 tablet    Take 1 tablet (325 mg) by mouth 2 times daily    Other iron deficiency anemia       metoprolol tartrate 50 MG tablet    LOPRESSOR    60 tablet    Take 1 tablet (50 mg) by mouth 2 times daily    Paroxysmal supraventricular tachycardia (H), S/P AVR (aortic valve replacement)       * NovoLOG FLEXPEN 100 UNIT/ML injection   Generic drug:  insulin aspart      Inject 3 Units Subcutaneous 2 times daily (with meals) Takes with breakfast and dinner        * NovoLOG FLEXPEN 100 UNIT/ML injection   Generic drug:  insulin aspart      Inject 4 Units Subcutaneous daily (with lunch)        pantoprazole 40 MG EC tablet    PROTONIX     Take 40 mg by mouth every morning (before breakfast)        potassium chloride SA 10 MEQ CR tablet    K-DUR/KLOR-CON M     Take 20 mEq by mouth 2 times daily        prednisoLONE acetate 1 % ophthalmic susp    PRED FORTE     Place 1 drop into both eyes as needed (glaucoma)        timolol 0.5 % ophthalmic solution    TIMOPTIC     Place 1 drop into both eyes 2 times daily        warfarin 3 MG tablet    COUMADIN     Take 3 mg by mouth every evening        * Notice:  This list has 2 medication(s) that are the same as other medications prescribed for you. Read the directions carefully, and ask your doctor or other care provider to review them with you.

## 2018-10-24 NOTE — LETTER
10/24/2018    Sam Villatoro PA-C  [facility] [address]  [facility] [id number]    RE: Gil Chowdary       Dear Colleague,    I had the pleasure of seeing Gil Chowdary in the [facility].    Cardiology Clinic Visit  10/25/2018    Pt: Gil Chowdary  : 1960    Primary Cardiologist: Dr. Salinas    HPI:  Gil Chowdary is a pleasant 58-year-old gentleman who I am meeting today for the first time.  His past medical history is significant for severe aortic stenosis, actually critical aortic stenosis, with aortic valve replacement in 2016 with a 25 mm trifecta tissue valve, type 2 diabetes on insulin, hyperlipidemia, sleep apnea on bipap who was recently hospitalized for heart failure.  He presented to the ER  with progressive edema to his legs thighs abdomen and testicles.  He also had profound shortness of breath, peripheral edema including scrotal edema and anasarca in his abdomen.  Unfortunately he was found to be profoundly anemic with hemoglobin of 8.2 and had gained somewhere along the lines of therapy and is 60 pounds over the last 4 months.He was also found to be in acute renal failure with his baseline creatinine of 1 up to about 1.4 on admission.  He was seen by the fellow on the weekend for cardiology and at that point the recommendation was to continue with ongoing diuresis.  His echocardiogram was not completed prior to discharge but unfortunately that came uncertain fortunately that came back with an EF of 60-65% but is still markedly dilated inferior vena cava and elevating E prime ratio.  This gentleman takes care of his elderly mother who has had a stroke and for that reason he did not want to stay in the hospital for a complete workup for GI bleed nor for ongoing diuresis.  As such as I am seeing him today in clinic follow-up.    He tells me that when he was discharged his weight was about 315 pounds, and the  "lowest lowest it has dropped since and has been 300 pounds he is most recently been stable between 303 130 303 and 305 pounds.  He continues to be fairly short of breath but is trying to walk on the treadmill very slowly just 10 minutes a day.  He no longer has orthopnea or PND.  Continues to have profound edema extending from his feet to his abdomen with scrotum the size of grapefruit.  With this he is having difficult having a difficult time urinating.  He has not noticed any blood in his stools nor has not vomited blood and he does not have any mature hematuria.  He was asked to get a colonoscopy and this is scheduled but not for several weeks.  He was seen by clarissa who initially thought that this may be multifactorial but was then found to be iron deficiency anemia and recommended for the colonoscopy.  He has not had a formal GI evaluation.    Social Hx:  He lives at home taking care of his mother who is in her late 90s.  She has had a stroke and he does always carry her cares.  He has several brothers but he says they do not participate much in her care.  He is a former smoker with a 20-pack-year history quit in 2011 no alcohol use.    Exam:  /70  Pulse 62  Ht 1.778 m (5' 10\")  Wt 140.3 kg (309 lb 3.2 oz)  BMI 44.37 kg/m2  Well-nourished well-developed obese gentleman in no acute distress, skin is pale.  Normocephalic atraumatic.  His heart is regular but mildly tachycardic I do not appreciate murmur rub.  Lungs are diminished in the bases but without wheezes rales or rhonchi.  He has 3+ pitting edema to his upper thighs and his abdomen has pitting edema as well.  I am unable to assess JVP secondary to his body habitus.    Assessment and Plan:  1.  Acute heart failure with preserved EF secondary to acute likely acute blood loss anemia, unfortunately this gentleman has gained somewhere between 30 and 50 pounds over the last several months, I suspect most which is volume.  He has profound anasarca today " and is clearly miserable with this but fortunately is mostly right-sided symptoms.  Unfortunately, his hemoglobin remains low at 7.5.  I have put in a formal GI consult and asked him to move his colonoscopy up to as soon as possible.  He is not interested in going back in the hospital as there is no one to care for his mother at home.  As such, we will do the best we can diurese him at home.  I will increase his Bumex to 2 mg twice a day with expectation that he will lose 5-10 pounds in the next 3 days.  If he has not lost between 5 and 10 pounds and has a weight of 298 or lower I like him to get 2.5 mg dose of metolazone taken 1 hour before his Bumex for the weekend.  I do note that his creatinine is up from baseline of 1.33-1.8 today, this is clearly secondary to hypervolemia and renal congestion.  In addition, we will recheck a hemoglobin on Monday, this should improve with diuresis as long as he is not having massive blood loss.  Although, if it remains low will give him a 1 unit transfusion of blood with 40 mg dose of IV Lasix with it.    I did tell him that this is going to take some time to get rid of the fluid hopefully after the over the next several weeks.  We talked about low sodium diet drinking around 2 L of water and daily weights to help with this progress.  He did meet with our core nurses for education today.  I will see him back in 2 weeks, I expect will be multiple phone calls during the interim to assess his labs and his ongoing diuresis.  If necessary will also need to consider single doses of IV diuretics to assist with things going forward.  We talked about multiple ways to assist with urination that is now difficult for him up to and including depends which she is not willing to try.    I am quite hopeful that we will be able to make a significant impact on this kind gentleman's care I certainly think will be feeling much better a couple months from now one were able to diurese him and get  his hemoglobin near normal and likely will do well long-term.    2.  Aortic valve replacement for severe left ear this is a tissue valve and is functioning well.      3.  Anemia- He is on Coumadin, with hx of brief afib and svt post AVR.  He hasn't had a recent monitor.  But this will be considered especially given recent anemia.  With a tissue aortic valve, coumadin is not required and we may be able to proceed with asa alone.  Depending on his hemoglobin on lab next week we will consider holter vs zio and discontinuation of coumadin.    Thank you for allowing me to participate in this delightful patient's care we will see him back in 2 weeks and follow him closely in the interim.      Rachna Holt PA-C 10/25/2018 3:44 PM      Orders this Visit:  Orders Placed This Encounter   Procedures     Basic metabolic panel     Hemoglobin     N terminal pro BNP outpatient     Hemoglobin     Follow-Up with [facility] - KARYN visit     GASTROENTEROLOGY ADULT REF CONSULT ONLY     Orders Placed This Encounter   Medications     bumetanide (BUMEX) 1 MG tablet     Sig: Take 2 tablets (2 mg) by mouth 2 times daily     Dispense:  180 tablet     Refill:  3     Medications Discontinued During This Encounter   Medication Reason     bumetanide (BUMEX) 1 MG tablet Reorder         Encounter Diagnoses   Name Primary?     Anasarca      S/P AVR (aortic valve replacement)      (HFpEF) heart failure with preserved ejection fraction (H) Yes     Anemia, unspecified type        CURRENT MEDICATIONS:  Current Outpatient Prescriptions   Medication Sig Dispense Refill     aspirin 81 MG tablet Take 81 mg by mouth every morning        atorvastatin (LIPITOR) 40 MG tablet Take 1 tablet (40 mg) by mouth daily 90 tablet 0     bumetanide (BUMEX) 1 MG tablet Take 2 tablets (2 mg) by mouth 2 times daily 180 tablet 3     buPROPion (WELLBUTRIN XL) 150 MG 24 hr tablet Take 150-300 mg by mouth every morning Starting 10/8/18 150mg daily x7 days, then increase to 300mg  daily       cholecalciferol (VITAMIN D3) 5000 UNITS TABS tablet Take 5,000 Units by mouth every morning       clindamycin (CLEOCIN) 300 MG capsule Take 1 capsule (300 mg) by mouth as needed 10 capsule 3     insulin aspart (NOVOLOG FLEXPEN) 100 UNIT/ML injection Inject 4 Units Subcutaneous daily (with lunch)       insulin aspart (NOVOLOG FLEXPEN) 100 UNIT/ML injection Inject 3 Units Subcutaneous 2 times daily (with meals) Takes with breakfast and dinner       Insulin Glargine (BASAGLAR KWIKPEN SC) Inject 22 Units Subcutaneous every morning        metoprolol (LOPRESSOR) 50 MG tablet Take 1 tablet (50 mg) by mouth 2 times daily 60 tablet 0     Multiple Vitamins-Minerals (CENTRUM ADULTS PO) Take 1 tablet by mouth every morning        pantoprazole (PROTONIX) 40 MG EC tablet Take 40 mg by mouth every morning (before breakfast)       potassium chloride SA (K-DUR/KLOR-CON M) 10 MEQ CR tablet Take 20 mEq by mouth 2 times daily       prednisoLONE acetate (PRED FORTE) 1 % ophthalmic susp Place 1 drop into both eyes as needed (glaucoma)        timolol (TIMOPTIC) 0.5 % ophthalmic solution Place 1 drop into both eyes 2 times daily        warfarin (COUMADIN) 3 MG tablet Take 3 mg by mouth every evening       ferrous sulfate (IRON) 325 (65 Fe) MG tablet Take 1 tablet (325 mg) by mouth 2 times daily (Patient not taking: Reported on 10/24/2018) 100 tablet 3       ALLERGIES     Allergies   Allergen Reactions     Amoxicillin      Itchy      Penicillins Hives       PAST MEDICAL HISTORY:  Past Medical History:   Diagnosis Date     Former tobacco use      Glaucoma      Hyperlipidemia LDL goal <160 12/6/2011     Obesity      DRAKE on CPAP      Right bundle branch block      Severe aortic stenosis     On Echo 10/28/16       PAST SURGICAL HISTORY:  Past Surgical History:   Procedure Laterality Date     HEART CATH LEFT HEART CATH  04/07/2017    Diffuse non-obstuctive CAD     REPAIR VALVE AORTIC N/A 5/16/2017    Procedure: REPAIR VALVE AORTIC;   "Median Sternotony, CardioPulmonary Bypass, Aortic Valve Replace using 25MM Trifecta Valve with [employer], Septal myectomy;  Surgeon: Jun Simon MD;  Location: [facility] OR     REPLACE VALVE AORTIC N/A 5/16/2017    Procedure: REPLACE VALVE AORTIC;;  Surgeon: Jun Simon MD;  Location: [facility] OR     SINUS SURGERY  2005       FAMILY HISTORY:  Family History   Problem Relation Age of Onset     Family History Negative Mother      Diabetes Father      Heart Surgery Father      CABG     Coronary Artery Disease Father      Hypertension Brother      Diabetes Brother      HEART DISEASE Brother      Heart Surgery Brother      CABG x 3     Family History Negative Sister      Diabetes Brother      History of diabetes, but no longer an issue     Family History Negative Brother      SOCIAL HISTORY:  Social History     Social History     Marital status:      Spouse name: N/A     Number of children: N/A     Years of education: N/A     Social History Main Topics     Smoking status: Former Smoker     Packs/day: 1.00     Years: 20.00     Types: Cigarettes, Cigars     Quit date: 10/21/2011     Smokeless tobacco: Never Used     Alcohol use No     Drug use: No     Sexual activity: Not Asked     Other Topics Concern     Parent/Sibling W/ Cabg, Mi Or Angioplasty Before 65f 55m? Yes     brother triple bypass 49     Social History Narrative     Review of Systems:  Skin:  Negative     Eyes:  Positive for glasses  ENT:  Negative    Respiratory:  Positive for CPAP;sleep apnea  Cardiovascular:    Positive for;edema (edema in groin area)  Gastroenterology: Negative    Genitourinary:  Negative    Musculoskeletal:  Negative    Neurologic:  Negative    Psychiatric:  Negative    Heme/Lymph/Imm:  Negative    Endocrine:  Positive for diabetes    Physical Exam:  Vitals: /70  Pulse 62  Ht 1.778 m (5' 10\")  Wt 140.3 kg (309 lb 3.2 oz)  BMI 44.37 kg/m2   Please refer to dictation for physical exam    Recent Lab " Results:  LIPID RESULTS:  Lab Results   Component Value Date    CHOL 126 10/10/2018    HDL 23 (A) 10/10/2018    LDL 71 10/10/2018    TRIG 230 (A) 10/10/2018     LIVER ENZYME RESULTS:  Lab Results   Component Value Date    AST 55 (H) 10/12/2018    ALT 31 10/12/2018     CBC RESULTS:  Lab Results   Component Value Date    WBC 3.9 (L) 10/24/2018    RBC 2.94 (L) 10/24/2018    HGB 7.5 (L) 10/24/2018    HCT 25.5 (L) 10/24/2018    MCV 87 10/24/2018    MCH 25.5 (L) 10/24/2018    MCHC 29.4 (L) 10/24/2018    RDW 17.5 (H) 10/24/2018    PLT 95 (L) 10/24/2018     BMP RESULTS:  Lab Results   Component Value Date     10/24/2018    POTASSIUM 3.9 10/24/2018    CHLORIDE 110 (H) 10/24/2018    CO2 25 10/24/2018    ANIONGAP 12.9 10/24/2018     (H) 10/24/2018    BUN 36 (H) 10/24/2018    CR 1.85 (H) 10/24/2018    GFRESTIMATED 38 (L) 10/24/2018    GFRESTBLACK 46 (L) 10/24/2018    MYRON 8.6 10/24/2018      A1C RESULTS:  Lab Results   Component Value Date    A1C 6.8 (H) 10/12/2018     INR RESULTS:  Lab Results   Component Value Date    INR 2.19 (H) 10/14/2018    INR 2.28 (H) 10/13/2018     Thank you for allowing me to participate in the care of your patient.    Sincerely,     Rachna Holt, PA-C     [facility]    cc:   Keo Grossman MD  [address] [id number]

## 2018-10-25 NOTE — PROGRESS NOTES
Please let the patient know that his hemoglobin has a further drop. He needs to be seen by GI ASAP, please help him schedule the appointments. He should get a repeat CBC diff in 2 weeks and transfuse red cells for hb below 8.     Thanks    Juan Fletcher MD

## 2018-10-25 NOTE — PROGRESS NOTES
Cardiology Clinic Visit  10/25/2018    Pt: Gil Chowdary  : 1960    Primary Cardiologist: Dr. Salinas    HPI:  Gil Chowdary is a pleasant 58-year-old gentleman who I am meeting today for the first time.  His past medical history is significant for severe aortic stenosis, actually critical aortic stenosis, with aortic valve replacement in 2016 with a 25 mm trifecta tissue valve, type 2 diabetes on insulin, hyperlipidemia, sleep apnea on bipap who was recently hospitalized for heart failure.  He presented to the ER  with progressive edema to his legs thighs abdomen and testicles.  He also had profound shortness of breath, peripheral edema including scrotal edema and anasarca in his abdomen.  Unfortunately he was found to be profoundly anemic with hemoglobin of 8.2 and had gained somewhere along the lines of therapy and is 60 pounds over the last 4 months.He was also found to be in acute renal failure with his baseline creatinine of 1 up to about 1.4 on admission.  He was seen by the fellow on the weekend for cardiology and at that point the recommendation was to continue with ongoing diuresis.  His echocardiogram was not completed prior to discharge but unfortunately that came uncertain fortunately that came back with an EF of 60-65% but is still markedly dilated inferior vena cava and elevating E prime ratio.  This gentleman takes care of his elderly mother who has had a stroke and for that reason he did not want to stay in the hospital for a complete workup for GI bleed nor for ongoing diuresis.  As such as I am seeing him today in clinic follow-up.    He tells me that when he was discharged his weight was about 315 pounds, and the lowest lowest it has dropped since and has been 300 pounds he is most recently been stable between 303 130 303 and 305 pounds.  He continues to be fairly short of breath but is trying to walk on the treadmill very slowly just 10 minutes a day.  He no longer has  "orthopnea or PND.  Continues to have profound edema extending from his feet to his abdomen with scrotum the size of grapefruit.  With this he is having difficult having a difficult time urinating.  He has not noticed any blood in his stools nor has not vomited blood and he does not have any mature hematuria.  He was asked to get a colonoscopy and this is scheduled but not for several weeks.  He was seen by clarissa who initially thought that this may be multifactorial but was then found to be iron deficiency anemia and recommended for the colonoscopy.  He has not had a formal GI evaluation.    Social Hx:  He lives at home taking care of his mother who is in her late 90s.  She has had a stroke and he does always carry her cares.  He has several brothers but he says they do not participate much in her care.  He is a former smoker with a 20-pack-year history quit in 2011 no alcohol use.    Exam:  /70  Pulse 62  Ht 1.778 m (5' 10\")  Wt 140.3 kg (309 lb 3.2 oz)  BMI 44.37 kg/m2  Well-nourished well-developed obese gentleman in no acute distress, skin is pale.  Normocephalic atraumatic.  His heart is regular but mildly tachycardic I do not appreciate murmur rub.  Lungs are diminished in the bases but without wheezes rales or rhonchi.  He has 3+ pitting edema to his upper thighs and his abdomen has pitting edema as well.  I am unable to assess JVP secondary to his body habitus.    Assessment and Plan:  1.  Acute heart failure with preserved EF secondary to acute likely acute blood loss anemia, unfortunately this gentleman has gained somewhere between 30 and 50 pounds over the last several months, I suspect most which is volume.  He has profound anasarca today and is clearly miserable with this but fortunately is mostly right-sided symptoms.  Unfortunately, his hemoglobin remains low at 7.5.  I have put in a formal GI consult and asked him to move his colonoscopy up to as soon as possible.  He is not interested in " going back in the hospital as there is no one to care for his mother at home.  As such, we will do the best we can diurese him at home.  I will increase his Bumex to 2 mg twice a day with expectation that he will lose 5-10 pounds in the next 3 days.  If he has not lost between 5 and 10 pounds and has a weight of 298 or lower I like him to get 2.5 mg dose of metolazone taken 1 hour before his Bumex for the weekend.  I do note that his creatinine is up from baseline of 1.33-1.8 today, this is clearly secondary to hypervolemia and renal congestion.  In addition, we will recheck a hemoglobin on Monday, this should improve with diuresis as long as he is not having massive blood loss.  Although, if it remains low will give him a 1 unit transfusion of blood with 40 mg dose of IV Lasix with it.    I did tell him that this is going to take some time to get rid of the fluid hopefully after the over the next several weeks.  We talked about low sodium diet drinking around 2 L of water and daily weights to help with this progress.  He did meet with our core nurses for education today.  I will see him back in 2 weeks, I expect will be multiple phone calls during the interim to assess his labs and his ongoing diuresis.  If necessary will also need to consider single doses of IV diuretics to assist with things going forward.  We talked about multiple ways to assist with urination that is now difficult for him up to and including depends which she is not willing to try.    I am quite hopeful that we will be able to make a significant impact on this kind gentleman's care I certainly think will be feeling much better a couple months from now one were able to diurese him and get his hemoglobin near normal and likely will do well long-term.    2.  Aortic valve replacement for severe left ear this is a tissue valve and is functioning well.      3.  Anemia- He is on Coumadin, with hx of brief afib and svt post AVR.  He hasn't had a recent  monitor.  But this will be considered especially given recent anemia.  With a tissue aortic valve, coumadin is not required and we may be able to proceed with asa alone.  Depending on his hemoglobin on lab next week we will consider holter vs zio and discontinuation of coumadin.    Thank you for allowing me to participate in this delightful patient's care we will see him back in 2 weeks and follow him closely in the interim.      Rachna Holt PA-C 10/25/2018 3:44 PM      Orders this Visit:  Orders Placed This Encounter   Procedures     Basic metabolic panel     Hemoglobin     N terminal pro BNP outpatient     Hemoglobin     Follow-Up with [facility] - [facility] visit     GASTROENTEROLOGY ADULT REF CONSULT ONLY     Orders Placed This Encounter   Medications     bumetanide (BUMEX) 1 MG tablet     Sig: Take 2 tablets (2 mg) by mouth 2 times daily     Dispense:  180 tablet     Refill:  3     Medications Discontinued During This Encounter   Medication Reason     bumetanide (BUMEX) 1 MG tablet Reorder         Encounter Diagnoses   Name Primary?     Anasarca      S/P AVR (aortic valve replacement)      (HFpEF) heart failure with preserved ejection fraction (H) Yes     Anemia, unspecified type        CURRENT MEDICATIONS:  Current Outpatient Prescriptions   Medication Sig Dispense Refill     aspirin 81 MG tablet Take 81 mg by mouth every morning        atorvastatin (LIPITOR) 40 MG tablet Take 1 tablet (40 mg) by mouth daily 90 tablet 0     bumetanide (BUMEX) 1 MG tablet Take 2 tablets (2 mg) by mouth 2 times daily 180 tablet 3     buPROPion (WELLBUTRIN XL) 150 MG 24 hr tablet Take 150-300 mg by mouth every morning Starting 10/8/18 150mg daily x7 days, then increase to 300mg daily       cholecalciferol (VITAMIN D3) 5000 UNITS TABS tablet Take 5,000 Units by mouth every morning       clindamycin (CLEOCIN) 300 MG capsule Take 1 capsule (300 mg) by mouth as needed 10 capsule 3     insulin aspart (NOVOLOG FLEXPEN) 100 UNIT/ML injection  Inject 4 Units Subcutaneous daily (with lunch)       insulin aspart (NOVOLOG FLEXPEN) 100 UNIT/ML injection Inject 3 Units Subcutaneous 2 times daily (with meals) Takes with breakfast and dinner       Insulin Glargine (BASAGLAR KWIKPEN SC) Inject 22 Units Subcutaneous every morning        metoprolol (LOPRESSOR) 50 MG tablet Take 1 tablet (50 mg) by mouth 2 times daily 60 tablet 0     Multiple Vitamins-Minerals (CENTRUM ADULTS PO) Take 1 tablet by mouth every morning        pantoprazole (PROTONIX) 40 MG EC tablet Take 40 mg by mouth every morning (before breakfast)       potassium chloride SA (K-DUR/KLOR-CON M) 10 MEQ CR tablet Take 20 mEq by mouth 2 times daily       prednisoLONE acetate (PRED FORTE) 1 % ophthalmic susp Place 1 drop into both eyes as needed (glaucoma)        timolol (TIMOPTIC) 0.5 % ophthalmic solution Place 1 drop into both eyes 2 times daily        warfarin (COUMADIN) 3 MG tablet Take 3 mg by mouth every evening       ferrous sulfate (IRON) 325 (65 Fe) MG tablet Take 1 tablet (325 mg) by mouth 2 times daily (Patient not taking: Reported on 10/24/2018) 100 tablet 3       ALLERGIES     Allergies   Allergen Reactions     Amoxicillin      Itchy      Penicillins Hives       PAST MEDICAL HISTORY:  Past Medical History:   Diagnosis Date     Former tobacco use      Glaucoma      Hyperlipidemia LDL goal <160 12/6/2011     Obesity      DRAKE on CPAP      Right bundle branch block      Severe aortic stenosis     On Echo 10/28/16       PAST SURGICAL HISTORY:  Past Surgical History:   Procedure Laterality Date     HEART CATH LEFT HEART CATH  04/07/2017    Diffuse non-obstuctive CAD     REPAIR VALVE AORTIC N/A 5/16/2017    Procedure: REPAIR VALVE AORTIC;  Median Sternotony, CardioPulmonary Bypass, Aortic Valve Replace using 25MM Trifecta Valve with [employer], Septal myectomy;  Surgeon: Jun Simon MD;  Location: [facility] OR     REPLACE VALVE AORTIC N/A 5/16/2017    Procedure: REPLACE VALVE AORTIC;;   "Surgeon: Jun Simon MD;  Location: [facility] OR     SINUS SURGERY  2005       FAMILY HISTORY:  Family History   Problem Relation Age of Onset     Family History Negative Mother      Diabetes Father      Heart Surgery Father      CABG     Coronary Artery Disease Father      Hypertension Brother      Diabetes Brother      HEART DISEASE Brother      Heart Surgery Brother      CABG x 3     Family History Negative Sister      Diabetes Brother      History of diabetes, but no longer an issue     Family History Negative Brother        SOCIAL HISTORY:  Social History     Social History     Marital status:      Spouse name: N/A     Number of children: N/A     Years of education: N/A     Social History Main Topics     Smoking status: Former Smoker     Packs/day: 1.00     Years: 20.00     Types: Cigarettes, Cigars     Quit date: 10/21/2011     Smokeless tobacco: Never Used     Alcohol use No     Drug use: No     Sexual activity: Not Asked     Other Topics Concern     Parent/Sibling W/ Cabg, Mi Or Angioplasty Before 65f 55m? Yes     brother triple bypass 49     Social History Narrative       Review of Systems:  Skin:  Negative     Eyes:  Positive for glasses  ENT:  Negative    Respiratory:  Positive for CPAP;sleep apnea  Cardiovascular:    Positive for;edema (edema in groin area)  Gastroenterology: Negative    Genitourinary:  Negative    Musculoskeletal:  Negative    Neurologic:  Negative    Psychiatric:  Negative    Heme/Lymph/Imm:  Negative    Endocrine:  Positive for diabetes    Physical Exam:  Vitals: /70  Pulse 62  Ht 1.778 m (5' 10\")  Wt 140.3 kg (309 lb 3.2 oz)  BMI 44.37 kg/m2   Please refer to dictation for physical exam    Recent Lab Results:  LIPID RESULTS:  Lab Results   Component Value Date    CHOL 126 10/10/2018    HDL 23 (A) 10/10/2018    LDL 71 10/10/2018    TRIG 230 (A) 10/10/2018       LIVER ENZYME RESULTS:  Lab Results   Component Value Date    AST 55 (H) 10/12/2018    ALT 31 " 10/12/2018       CBC RESULTS:  Lab Results   Component Value Date    WBC 3.9 (L) 10/24/2018    RBC 2.94 (L) 10/24/2018    HGB 7.5 (L) 10/24/2018    HCT 25.5 (L) 10/24/2018    MCV 87 10/24/2018    MCH 25.5 (L) 10/24/2018    MCHC 29.4 (L) 10/24/2018    RDW 17.5 (H) 10/24/2018    PLT 95 (L) 10/24/2018       BMP RESULTS:  Lab Results   Component Value Date     10/24/2018    POTASSIUM 3.9 10/24/2018    CHLORIDE 110 (H) 10/24/2018    CO2 25 10/24/2018    ANIONGAP 12.9 10/24/2018     (H) 10/24/2018    BUN 36 (H) 10/24/2018    CR 1.85 (H) 10/24/2018    GFRESTIMATED 38 (L) 10/24/2018    GFRESTBLACK 46 (L) 10/24/2018    MYRON 8.6 10/24/2018        A1C RESULTS:  Lab Results   Component Value Date    A1C 6.8 (H) 10/12/2018       INR RESULTS:  Lab Results   Component Value Date    INR 2.19 (H) 10/14/2018    INR 2.28 (H) 10/13/2018           CC  Keo Grossman MD  [id number] [place] [id number]

## 2018-10-26 ENCOUNTER — TELEPHONE (OUTPATIENT)
Dept: ONCOLOGY | Facility: CLINIC | Age: 58
End: 2018-10-26

## 2018-10-26 NOTE — TELEPHONE ENCOUNTER
This clinician received referral from [facility] to follow-up with pt today for community resources and support. This clinician called pt today to follow-up and introduce psychosocial services and support. Gil is a  gentleman, whose wife  2017, and is the primary caregiver for 92 year old mother. Gil reported that he is interested in additional resources for caregiver support, emotional support, and community resources that would allow for PCA support of mother and friendly visits to mother. Gil acknowledges that brother in [place] is less involved in mother's care, and sister in [place] will be coming in 2018 for additional support. This clinician provided pt with community resources today, and provided clinician's contact information in case of future need. Pt knows to reach out in the case of distress or concern.     No further SW intervention needed at present time.     RHONDA Armijo, LICSW  Phone: [phone]  Pager: [phone]    [facility]: M, T  *every other Thursday, 8am-4:30pm  [facility]: W, F, *every other Thursday, 8am-4:30pm

## 2018-10-29 ENCOUNTER — TELEPHONE (OUTPATIENT)
Dept: ONCOLOGY | Facility: CLINIC | Age: 58
End: 2018-10-29

## 2018-10-29 ENCOUNTER — CARE COORDINATION (OUTPATIENT)
Dept: CARDIOLOGY | Facility: CLINIC | Age: 58
End: 2018-10-29

## 2018-10-29 DIAGNOSIS — D64.9 ANEMIA, UNSPECIFIED TYPE: ICD-10-CM

## 2018-10-29 LAB — HGB BLD-MCNC: 7.4 G/DL (ref 13.3–17.7)

## 2018-10-29 PROCEDURE — 36415 COLL VENOUS BLD VENIPUNCTURE: CPT | Performed by: PHYSICIAN ASSISTANT

## 2018-10-29 PROCEDURE — 85018 HEMOGLOBIN: CPT | Performed by: PHYSICIAN ASSISTANT

## 2018-10-29 NOTE — PROGRESS NOTES
Incoming call from patient requesting his Hgb results from today:  Component      Latest Ref Rng & Units 10/29/2018   Hemoglobin      13.3 - 17.7 g/dL 7.4 (L)     Patient last by Sadiq on 10/24, per her clinic notes:  ...we will recheck a hemoglobin on Monday, this should improve with diuresis as long as he is not having massive blood loss.  Although, if it remains low will give him a 1 unit transfusion of blood with 40 mg dose of IV Lasix with it.    In addition, it was recommended that he reschedule a colonoscopy to ASAP. Today he stated this has now been rescheduled to Wed 10/31.     I also reviewed w/patient his home weights and sx as his bumex dose was increased d/t ongoing LE swelling/anasarca to his abdomen. Again, per clinic notes of Sadiq:  Acute heart failure with preserved EF secondary to acute likely acute blood loss anemia, unfortunately this gentleman has gained somewhere between 30 and 50 pounds over the last several months, I suspect most which is volume....  I will increase his Bumex to 2 mg twice a day with expectation that he will lose 5-10 pounds in the next 3 days.  If he has not lost between 5 and 10 pounds and has a weight of 298 or lower I like him to get 2.5 mg dose of metolazone taken 1 hour before his Bumex for the weekend    He confirmed that he has been taking the bumex 2mg bid. His home weight today = 297# and he stated that his home weight on the day he saw Sadiq was 311. He feels that his abdomen is less bloated and that his legs are a bit better. He asked multiple times about how he could lose weight and what exercises he should do. I focused the discussion on the use of diuretics and low Na eating to eliminate the excess water he is carrying. I spent a great deal of time reviewing low Na eating and suggested he keep a food diary for the next few days to define the amount of Na he is actually getting. He was vague about telling me exactly what he is eating, but I reinforced that he  get no more than 2000mg Na/day.    Will review w/provider about blood transfusion in light of rescheduled colonoscopy. Will continue to closely monitor.  Jaqueline Miller RN on 10/29/2018 at 12:35 PM

## 2018-10-29 NOTE — TELEPHONE ENCOUNTER
This  contacted staff at Dr Joe's office on Friday, Oct 26,2018 to discuss moving the colonoscopy for Gil to a sooner date due to abnormal lab results.  The office staff stated they would contact patient and work on getting him in for a scope procedure sooner.

## 2018-10-30 ENCOUNTER — HOSPITAL ENCOUNTER (OUTPATIENT)
Facility: CLINIC | Age: 58
End: 2018-10-30
Attending: COLON & RECTAL SURGERY | Admitting: COLON & RECTAL SURGERY
Payer: COMMERCIAL

## 2018-10-30 RX ORDER — ONDANSETRON 2 MG/ML
4 INJECTION INTRAMUSCULAR; INTRAVENOUS
Status: CANCELLED | OUTPATIENT
Start: 2018-10-30

## 2018-10-30 RX ORDER — LIDOCAINE 40 MG/G
CREAM TOPICAL
Status: CANCELLED | OUTPATIENT
Start: 2018-10-30

## 2018-10-30 NOTE — PROGRESS NOTES
Let's await colonoscopy no transfusion now, he's at least stable from last week.  Given colonoscopy tomorrow no metolazone today or tomorrow.  If wt is 297 or higher on Thursday pls have him take metolazone 2.5 mg before pm dose of bumex with a total of 80meq of potassium on Thursday, then back to 20 meq bid.  Rachna Holt PA-C 10/30/2018 1:01 PM

## 2018-10-30 NOTE — PROGRESS NOTES
"Incoming call from patient - he stated that he lost his ride for the colonoscopy tomorrow, resulting in cancelling the procedure. It has been re-scheduled to 11/15.    He stated that his weight today was 290 - yesterday the weight he gave me of 297 was not accurate. He stated \"it was more in the range of 293#.\"     Will update Sadiq for revised plan.  Jaqueline Miller RN on 10/30/2018 at 1:55 PM    "

## 2018-10-30 NOTE — PROGRESS NOTES
It's unfortunate he can't get to the colonoscopy.  Given changes no metolazone, continue bumex 2 mg bid.  If he can arrange transportation I'd like to transfuse him 1 u prbc with 20  Mg IV lasix at that time.  If he absolutely can't and he feels ok, we can just repeat hgb next Monday.  Rachna Holt PA-C 10/30/2018 5:08 PM

## 2018-10-31 NOTE — PROGRESS NOTES
Signed transfusion order faxed to Infusion center. Patient is requesting appt date to be Wed 11/7 in order to arrange for care of his mother. Infusion center notified of this request - they will contact him to schedule. Call back to patient w/plan including direction to continue bumex 2mg bid. Patient verbalized understanding and had no further questions.  Jaqueline Miller RN on 10/31/2018 at 2:56 PM

## 2018-10-31 NOTE — PROGRESS NOTES
Call to infusion center for scheduling patient - LM for call back.  Jaqueline Miller RN on 10/31/2018 at 9:45 AM

## 2018-11-02 RX ORDER — FUROSEMIDE 10 MG/ML
20 INJECTION INTRAMUSCULAR; INTRAVENOUS ONCE
Status: CANCELLED
Start: 2018-11-07 | End: 2018-11-07

## 2018-11-02 NOTE — MR AVS SNAPSHOT
MRN:[id number]                      After Visit Summary   11/2/2018    Charissa Qiu    MRN: [id number]           Thank you!     Thank you for choosing [facility] for your care. Our goal is always to provide you with excellent care. Hearing back from our patients is one way we can continue to improve our services. Please take a few minutes to complete the written survey that you may receive in the mail after you visit with us. Thank you!        Patient Information     Date Of Birth          1955        About your hospital stay     You were admitted on:  November 2, 2018 You last received care in the:  [facility]    You were discharged on:  November 2, 2018       Who to Call     For medical emergencies, please call 911.  For non-urgent questions about your medical care, please call your primary care provider or clinic, [phone]          Attending Provider     Provider Specialty    Gracy Holm MD Family Practice       Primary Care Provider Office Phone # Fax #    Gracy Holm [phone]      Your next 10 appointments already scheduled     Dec 18, 2018 10:00 AM CST   (Arrive by 9:45 AM)   SHORT with Gracy Holm MD   [facility] ([facility] )    [address] [address]   [phone]              Follow-up notes from your care team     Return in about 2 weeks (around 11/16/2018).      Care Instructions    -Keep limiting foods high in carbohydrates - 45 grams/meal.   -Try to get some exercise daily - 30 minutes per day is a good goal.   -Test your glucose 1x/day - alternate fasting when you wake up with 2 hours after supper.   -Target levels are fasting , 2 hours after supper less than 180.   -Follow up in 2 weeks.  Bring your book and meter.   -Call with any concerns - KENDALL Rivera, [phone].          Pending Results     No orders found from 10/31/2018 to 11/3/2018.                         After Visit Summary   2/16/2017    Gil Chowdary    MRN: [id number]           Patient Information     Date Of Birth          1960        Visit Information        Provider Department      2/16/2017 10:00 AM Jun Simon MD [facility]        Care Instructions    You were seen today in the [facility]                     Cardiothoracic Surgery Clinic    Your surgeon was:  Dr Jun Simon      Recommendations:  Dr Jun Simon recommends you have your aortic valve replaced.    Your will need several tests prior to surgery including  Coronary angiogram  CT of the chest  Carotid US  Pulmonary function test  Lab work  EKG  Chest X-ray.    We will call you to schedule these test at your convenience.          Please feel free to call me with any questions or concerns.    Raine Myers RN  Care Coordinator, Cardiothoracic Surgery   [phone]                  Follow-ups after your visit        Who to contact     If you have questions or need follow up information about today's clinic visit or your schedule please contact [facility] directly at [phone].  Normal or non-critical lab and imaging results will be communicated to you by AirKasthart, letter or phone within 4 business days after the clinic has received the results. If you do not hear from us within 7 days, please contact the clinic through AirKasthart or phone. If you have a critical or abnormal lab result, we will notify you by phone as soon as possible.  Submit refill requests through rubberit or call your pharmacy and they will forward the refill request to us. Please allow 3 business days for your refill to be completed.          Additional Information About Your Visit        AirKasthart Information     rubberit gives you secure access to your electronic health record. If you see a primary care provider, you can also send messages to your care team and make appointments. If you have questions,  "  Admission Information     Date & Time Provider Department Dept. Phone    11/2/2018 Gracy Holm MD [facility] [phone]      Your Vitals Were     Blood Pressure Pulse Respirations Height Weight Pulse Oximetry    137/70 60 16 1.499 m (4' 11\") 73.3 kg (161 lb 11.2 oz) 96%    BMI (Body Mass Index)                   32.66 kg/m2           Care EveryWhere ID     This is your Care EveryWhere ID. This could be used by other organizations to access your [facility] medical records  [id number]        Equal Access to Services     [facility]: Hadii lyla morin hadashmahendra Somiguel angel, waaxda luqadaha, qaybta kaalmada vikki, casie renae . So [facility] [phone].    ATENCIÓN: Si habla español, tiene a bearden disposición servicios gratuitos de asistencia lingüística. [facility] [phone].    We comply with applicable federal civil rights laws and [place] laws. We do not discriminate on the basis of race, color, national origin, age, disability, sex, sexual orientation, or gender identity.               Review of your medicines      UNREVIEWED medicines. Ask your doctor about these medicines        Dose / Directions    aspirin 81 MG chewable tablet   Used for:  Type 2 diabetes mellitus without complication, without long-term current use of insulin (H)        Dose:  81 mg   Take 1 tablet (81 mg) by mouth daily   Quantity:  108 tablet   Refills:  3       atenolol 25 MG tablet   Commonly known as:  TENORMIN   Indication:  High Blood Pressure Disorder   Used for:  Benign essential hypertension        Dose:  25 mg   Take 1 tablet (25 mg) by mouth daily   Quantity:  90 tablet   Refills:  0       atorvastatin 40 MG tablet   Commonly known as:  LIPITOR   Used for:  Type 2 diabetes mellitus without complication, without long-term current use of insulin (H)        Dose:  40 mg   Take 1 tablet (40 mg) by mouth daily   Quantity:  30 tablet   Refills:  4       cyclobenzaprine 10 MG tablet   Commonly " "please call your primary care clinic.  If you do not have a primary care provider, please call [phone] and they will assist you.        Care EveryWhere ID     This is your Care EveryWhere ID. This could be used by other organizations to access your [facility] medical records  [id number]        Your Vitals Were     Pulse Height Pulse Oximetry BMI (Body Mass Index)          102 1.803 m (5' 11\") 95% 34 kg/m2         Blood Pressure from Last 3 Encounters:   02/16/17 132/83   12/07/16 134/78   12/06/11 131/81    Weight from Last 3 Encounters:   02/16/17 110.6 kg (243 lb 12.8 oz)   12/07/16 109.3 kg (241 lb)   12/06/11 104.5 kg (230 lb 4.8 oz)              Today, you had the following     No orders found for display       Primary Care Provider Fax #    Sam Grimes [phone] [place] [phone]       DUPLICATE  [place] [address]        Thank you!     Thank you for choosing [facility]  for your care. Our goal is always to provide you with excellent care. Hearing back from our patients is one way we can continue to improve our services. Please take a few minutes to complete the written survey that you may receive in the mail after your visit with us. Thank you!             Your Updated Medication List - Protect others around you: Learn how to safely use, store and throw away your medicines at www.disposemymeds.org.          This list is accurate as of: 2/16/17 11:32 AM.  Always use your most recent med list.                   Brand Name Dispense Instructions for use    aspirin 81 MG tablet      Take 81 mg by mouth daily       FISH OIL PO      Take  by mouth daily.       prednisoLONE acetate 1 % ophthalmic susp    PRED FORTE     INSTILL 1 DROP INTO RIGHT THREE TIME DAILY       timolol 0.5 % ophthalmic solution    TIMOPTIC     1 drop 1 DROP INTO BOTH EYES 2 TIMES DAILY       vitamin D 78289 UNIT capsule    ERGOCALCIFEROL     1 capsule by mouth weekly.         " known as:  FLEXERIL        Dose:  10 mg   Take 1 tablet (10 mg) by mouth 3 times daily as needed for muscle spasms   Quantity:  20 tablet   Refills:  0       levothyroxine 100 MCG tablet   Commonly known as:  SYNTHROID/LEVOTHROID   Used for:  Hypothyroidism, unspecified type        Dose:  100 mcg   Take 1 tablet (100 mcg) by mouth daily   Quantity:  90 tablet   Refills:  0       NONFORMULARY        Medicine for thyroid from [facility], takes once a day   Refills:  0         CONTINUE these medicines which have NOT CHANGED        Dose / Directions    SUNBEAM SOFT TOUCH HEATING PAD Pads        Dose:  1 Package   1 Package 2 times daily   Quantity:  1 each   Refills:  0                Protect others around you: Learn how to safely use, store and throw away your medicines at www.disposemymeds.org.             Medication List: This is a list of all your medications and when to take them. Check marks below indicate your daily home schedule. Keep this list as a reference.      Medications           Morning Afternoon Evening Bedtime As Needed    aspirin 81 MG chewable tablet   Take 1 tablet (81 mg) by mouth daily                                atenolol 25 MG tablet   Commonly known as:  TENORMIN   Take 1 tablet (25 mg) by mouth daily                                atorvastatin 40 MG tablet   Commonly known as:  LIPITOR   Take 1 tablet (40 mg) by mouth daily                                cyclobenzaprine 10 MG tablet   Commonly known as:  FLEXERIL   Take 1 tablet (10 mg) by mouth 3 times daily as needed for muscle spasms                                levothyroxine 100 MCG tablet   Commonly known as:  SYNTHROID/LEVOTHROID   Take 1 tablet (100 mcg) by mouth daily                                NONFORMULARY   Medicine for thyroid from [facility], takes once a day                                SUNBEAM SOFT TOUCH HEATING PAD Pads   1 Package 2 times daily

## 2018-11-06 ENCOUNTER — OFFICE VISIT (OUTPATIENT)
Dept: CARDIOLOGY | Facility: CLINIC | Age: 58
End: 2018-11-06
Payer: COMMERCIAL

## 2018-11-06 VITALS
BODY MASS INDEX: 45.1 KG/M2 | DIASTOLIC BLOOD PRESSURE: 68 MMHG | HEART RATE: 72 BPM | SYSTOLIC BLOOD PRESSURE: 116 MMHG | WEIGHT: 315 LBS | HEIGHT: 70 IN | OXYGEN SATURATION: 99 %

## 2018-11-06 DIAGNOSIS — Z95.2 S/P AVR (AORTIC VALVE REPLACEMENT): ICD-10-CM

## 2018-11-06 DIAGNOSIS — I50.30 (HFPEF) HEART FAILURE WITH PRESERVED EJECTION FRACTION (H): ICD-10-CM

## 2018-11-06 LAB
ANION GAP SERPL CALCULATED.3IONS-SCNC: 10.8 MMOL/L (ref 6–17)
BUN SERPL-MCNC: 30 MG/DL (ref 7–30)
CALCIUM SERPL-MCNC: 8.6 MG/DL (ref 8.5–10.5)
CHLORIDE SERPL-SCNC: 105 MMOL/L (ref 98–107)
CO2 SERPL-SCNC: 26 MMOL/L (ref 23–29)
CREAT SERPL-MCNC: 1.65 MG/DL (ref 0.7–1.3)
GFR SERPL CREATININE-BSD FRML MDRD: 43 ML/MIN/1.7M2
GLUCOSE SERPL-MCNC: 112 MG/DL (ref 70–105)
HGB BLD-MCNC: 7.7 G/DL (ref 13.3–17.7)
NT-PROBNP SERPL-MCNC: 888 PG/ML (ref 0–125)
POTASSIUM SERPL-SCNC: 3.8 MMOL/L (ref 3.5–5.1)
SODIUM SERPL-SCNC: 138 MMOL/L (ref 136–145)

## 2018-11-06 PROCEDURE — 85018 HEMOGLOBIN: CPT | Performed by: PHYSICIAN ASSISTANT

## 2018-11-06 PROCEDURE — 99214 OFFICE O/P EST MOD 30 MIN: CPT | Performed by: PHYSICIAN ASSISTANT

## 2018-11-06 PROCEDURE — 36415 COLL VENOUS BLD VENIPUNCTURE: CPT | Performed by: PHYSICIAN ASSISTANT

## 2018-11-06 PROCEDURE — 83880 ASSAY OF NATRIURETIC PEPTIDE: CPT | Performed by: PHYSICIAN ASSISTANT

## 2018-11-06 PROCEDURE — 80048 BASIC METABOLIC PNL TOTAL CA: CPT | Performed by: PHYSICIAN ASSISTANT

## 2018-11-06 RX ORDER — SPIRONOLACTONE 25 MG/1
25 TABLET ORAL DAILY
Qty: 30 TABLET | Refills: 11 | Status: SHIPPED | OUTPATIENT
Start: 2018-11-06 | End: 2018-11-13

## 2018-11-06 RX ORDER — BUMETANIDE 1 MG/1
3 TABLET ORAL
Qty: 180 TABLET | Refills: 3 | Status: SHIPPED | OUTPATIENT
Start: 2018-11-06 | End: 2018-11-21

## 2018-11-06 NOTE — LETTER
11/6/2018    Sam Villatoro PA-C  [facility] [address]  [place] [address]    RE: Gil Chowdary       Dear Colleague,    I had the pleasure of seeing Gil Chowdary in the [facility].    [id number]  HPI and Plan:   See dictation    Orders this Visit:  Orders Placed This Encounter   Procedures     Basic metabolic panel     Basic metabolic panel     Hemoglobin     Basic metabolic panel     Basic metabolic panel     Hemoglobin     N terminal pro BNP outpatient     Hemoglobin     Follow-Up with [facility] visit     Follow-Up with [facility] visit     Follow-[facility] with [facility] visit     Orders Placed This Encounter   Medications     bumetanide (BUMEX) 1 MG tablet     Sig: Take 3 tablets (3 mg) by mouth 2 times daily     Dispense:  180 tablet     Refill:  3     Do not fill, dose adjustment only     spironolactone (ALDACTONE) 25 MG tablet     Sig: Take 1 tablet (25 mg) by mouth daily     Dispense:  30 tablet     Refill:  11     Medications Discontinued During This Encounter   Medication Reason     bumetanide (BUMEX) 1 MG tablet Reorder     buPROPion (WELLBUTRIN XL) 150 MG 24 hr tablet          Encounter Diagnoses   Name Primary?     (HFpEF) heart failure with preserved ejection fraction (H)      S/P AVR (aortic valve replacement)        CURRENT MEDICATIONS:  Current Outpatient Prescriptions   Medication Sig Dispense Refill     aspirin 81 MG tablet Take 81 mg by mouth every morning        atorvastatin (LIPITOR) 40 MG tablet Take 1 tablet (40 mg) by mouth daily 90 tablet 0     bumetanide (BUMEX) 1 MG tablet Take 3 tablets (3 mg) by mouth 2 times daily 180 tablet 3     cholecalciferol (VITAMIN D3) 5000 UNITS TABS tablet Take 5,000 Units by mouth every morning       clindamycin (CLEOCIN) 300 MG capsule Take 1 capsule (300 mg) by mouth as needed 10 capsule 3     ferrous sulfate (IRON) 325 (65 Fe) MG tablet Take 1 tablet (325 mg) by mouth 2  times daily 100 tablet 3     insulin aspart (NOVOLOG FLEXPEN) 100 UNIT/ML injection Inject 4 Units Subcutaneous daily (with lunch)       insulin aspart (NOVOLOG FLEXPEN) 100 UNIT/ML injection Inject 3 Units Subcutaneous 2 times daily (with meals) Takes with breakfast and dinner       Insulin Glargine (BASAGLAR KWIKPEN SC) Inject 22 Units Subcutaneous every morning        metoprolol (LOPRESSOR) 50 MG tablet Take 1 tablet (50 mg) by mouth 2 times daily 60 tablet 0     Multiple Vitamins-Minerals (CENTRUM ADULTS PO) Take 1 tablet by mouth every morning        pantoprazole (PROTONIX) 40 MG EC tablet Take 40 mg by mouth every morning (before breakfast)       potassium chloride SA (K-DUR/KLOR-CON M) 10 MEQ CR tablet Take 20 mEq by mouth 2 times daily       prednisoLONE acetate (PRED FORTE) 1 % ophthalmic susp Place 1 drop into both eyes as needed (glaucoma)        spironolactone (ALDACTONE) 25 MG tablet Take 1 tablet (25 mg) by mouth daily 30 tablet 11     timolol (TIMOPTIC) 0.5 % ophthalmic solution Place 1 drop into both eyes 2 times daily        warfarin (COUMADIN) 3 MG tablet Take 3 mg by mouth every evening       [DISCONTINUED] bumetanide (BUMEX) 1 MG tablet Take 2 tablets (2 mg) by mouth 2 times daily 180 tablet 3       ALLERGIES     Allergies   Allergen Reactions     Amoxicillin      Itchy      Penicillins Hives       PAST MEDICAL HISTORY:  Past Medical History:   Diagnosis Date     Former tobacco use      Glaucoma      Hyperlipidemia LDL goal <160 12/6/2011     Obesity      DRAKE on CPAP      Right bundle branch block      Severe aortic stenosis     On Echo 10/28/16       PAST SURGICAL HISTORY:  Past Surgical History:   Procedure Laterality Date     HEART CATH LEFT HEART CATH  04/07/2017    Diffuse non-obstuctive CAD     REPAIR VALVE AORTIC N/A 5/16/2017    Procedure: REPAIR VALVE AORTIC;  Median Sternotony, CardioPulmonary Bypass, Aortic Valve Replace using 25MM Trifecta Valve with [employer], Septal myectomy;  " Surgeon: Jun Simon MD;  Location: [facility] OR     REPLACE VALVE AORTIC N/A 5/16/2017    Procedure: REPLACE VALVE AORTIC;;  Surgeon: Jun Simon MD;  Location: [facility] OR     SINUS SURGERY  2005       FAMILY HISTORY:  Family History   Problem Relation Age of Onset     Family History Negative Mother      Diabetes Father      Heart Surgery Father      CABG     Coronary Artery Disease Father      Hypertension Brother      Diabetes Brother      HEART DISEASE Brother      Heart Surgery Brother      CABG x 3     Family History Negative Sister      Diabetes Brother      History of diabetes, but no longer an issue     Family History Negative Brother        SOCIAL HISTORY:  Social History     Social History     Marital status:      Spouse name: N/A     Number of children: N/A     Years of education: N/A     Social History Main Topics     Smoking status: Former Smoker     Packs/day: 1.00     Years: 20.00     Types: Cigarettes, Cigars     Quit date: 10/21/2011     Smokeless tobacco: Never Used     Alcohol use No     Drug use: No     Sexual activity: Not Asked     Other Topics Concern     Parent/Sibling W/ Cabg, Mi Or Angioplasty Before 65f 55m? Yes     brother triple bypass 49     Social History Narrative       Review of Systems:  Skin:  Negative     Eyes:  Positive for glasses  ENT:  Negative    Respiratory:  Positive for CPAP;sleep apnea;dyspnea on exertion  Cardiovascular:    Positive for;edema  Gastroenterology: Negative    Genitourinary:  Negative    Musculoskeletal:  Negative    Neurologic:  Negative    Psychiatric:  Negative    Heme/Lymph/Imm:  Negative    Endocrine:  Positive for diabetes    Physical Exam:  Vitals: /68  Pulse 72  Ht 1.778 m (5' 10\")  Wt 147.4 kg (325 lb)  SpO2 99%  BMI 46.63 kg/m2   Please refer to dictation for physical exam    Recent Lab Results:  LIPID RESULTS:  Lab Results   Component Value Date    CHOL 126 10/10/2018    HDL 23 (A) 10/10/2018    LDL 71 10/10/2018 "    TRIG 230 (A) 10/10/2018       LIVER ENZYME RESULTS:  Lab Results   Component Value Date    AST 55 (H) 10/12/2018    ALT 31 10/12/2018       CBC RESULTS:  Lab Results   Component Value Date    WBC 3.9 (L) 10/24/2018    RBC 2.94 (L) 10/24/2018    HGB 7.7 (L) 2018    HCT 25.5 (L) 10/24/2018    MCV 87 10/24/2018    MCH 25.5 (L) 10/24/2018    MCHC 29.4 (L) 10/24/2018    RDW 17.5 (H) 10/24/2018    PLT 95 (L) 10/24/2018       BMP RESULTS:  Lab Results   Component Value Date     2018    POTASSIUM 3.8 2018    CHLORIDE 105 2018    CO2 26 2018    ANIONGAP 10.8 2018     (H) 2018    BUN 30 2018    CR 1.65 (H) 2018    GFRESTIMATED 43 (L) 2018    GFRESTBLACK 52 (L) 2018    MYRON 8.6 2018        A1C RESULTS:  Lab Results   Component Value Date    A1C 6.8 (H) 10/12/2018       INR RESULTS:  Lab Results   Component Value Date    INR 2.19 (H) 10/14/2018    INR 2.28 (H) 10/13/2018           CC  No referring provider defined for this encounter.        Service Date: 2018      PRIMARY CARDIOLOGIST:  Dr. Salinas.       REASON FOR VISIT:  Heart failure followup.      HISTORY OF PRESENT ILLNESS:  Mr. Chowdary is a delightful 58-year-old gentleman who has a past medical history significant for the followin.  Severe aortic stenosis with aortic valve replacement in 2016 with a 25 mm Trifecta tissue valve.   2.  Type 2 diabetes, on insulin.   3.  Hyperlipidemia.   4.  Sleep apnea on BiPAP.   5.  Recent diagnosis of heart failure with preserved EF.   6.  Recent anemia of unclear etiology, with concern for GI bleed.      I had met him about 2 weeks ago, and he had been admitted with weight gain and shortness of breath.  During that hospitalization, he diuresed about 15 pounds from about 325 down to about 310.  From there he dropped as far down as 300.  When I saw him back, I put him on Bumex 2 mg b.i.d., and initially his weight continued to trend down.   During 1 phone call, he said it was as low as 293.  He then says that he did not like what the scale said and it seemed to be going back up, so he quit weighing.  He is still taking his diuretics.  He also developed a cold during that time and it seems like his weight jumped then.  He today does not feel that shortness of breath but continues to have massive anasarca.  His scrotum continued to be grapefruit size or larger.  He has massive edema into his thighs and abdomen.  He denies colin orthopnea or PND.  The last time he weighed himself was yesterday, and he weighed 317 pounds.  He states he has been taking his medication.  He has not been vomiting any blood.  He has not noticed any blood on the toilet paper.      SOCIAL HISTORY:  He lives at home, taking care of his mother, who is in her late 90s.  She had a stroke and needs significant help, and she also has dementia with this.  He has several brothers but says they do not participate in her care.  He is a former smoker, 20-pack-year history, quit in 2011, no alcohol use.  He has been trying to watch his salt and trying to do a better job of eating less salt and sticking to around 2 liters of fluid.      PHYSICAL EXAMINATION:     GENERAL:  Well-developed, well-nourished, obese gentleman in no acute distress, pale.   HEENT:  Normocephalic, atraumatic.   HEART:  Regular, without murmur, rub or gallop.     LUNGS:  Lungs are diminished with some rhonchi throughout his lung fields.  He does not have wheezes or rales.   EXTREMITIES:  He has pitting edema to his abdomen and certainly 3+ pitting edema to his thighs.   VASCULAR:  I do not appreciate JVP, although difficult to assess secondary to body habitus.      ASSESSMENT AND PLAN:    1.  Recent diagnosis of acute anemia likely precipitating acute heart failure with preserved EF and massive volume overload.  I suspect he has somewhere around 50 pounds of fluid on him.  Initially, his weights trended down with Bumex  2 mg b.i.d., but it then began coming back up, perhaps because he developed a cold or for other reasons.  It is possible he is not doing as good a job on his diet as he explains to us.  Today I offered him several options.  The first would be inpatient with IV diuretics.  I think that would be the best choice, but he is unwilling to do this because he needs to care for his mother.  The second was to do IV diuretics today and again tomorrow with his blood transfusion.  He is unwilling to do it today because again he needs to get home to his mom.  As such, we will increase his Bumex to 3 mg b.i.d. and add spironolactone 25 mg daily.  He needs to take his weights every day because we need to make sure we are adjusting if they go the wrong direction.  He will get 1 unit of packed red blood cells tomorrow and with this a 40 mg IV dose of Lasix.  He should take both his spironolactone and Bumex tomorrow even with this.  His creatinine is improving, and I suspect its elevation overall is due to congestion, and I expect this to improve going forward.  Regardless, this gentleman has massive anasarca and needs diuresis.  He will continue eating a low sodium diet and will follow him closely, actually weekly to further assess.   2.  Aortic valve replacement for severe aortic stenosis.  This is a tissue valve and functioning well.   3.  Anemia, on Coumadin with a history of brief AFib and supraventricular tachycardia, status post aortic valve replacement.  He does not endorse any symptoms of AFib, and he has not had a monitor in some time.  It is difficult for him to come in for appointments.  At this point, we will let him get his colonoscopy but plan on trying to get a monitor on him and if he has no arrhythmias working on discontinuing his Coumadin.  I expect he will need to hold his Coumadin for the colonoscopy regardless.  In addition, he does have followup with Dr. Fletcher for hematology.      I am going to see him weekly for  the next 3 weeks with a BMP and hemoglobin each time.  We will work on diuresing him and if we need to do IV diuretics.  He will be an ideal patient for the Infusion Clinic if we can manage that, especially with caring for his mom.      Thank you for allowing me to participate in his care.      Rachna Holt PA-C      cc:   JOAN Alfaro    [facility]    [address] [place] [address], [address]    [place] [address]         RACHNA HOLT PA-C             D: 2018   T: 2018   MT: BLANE      Name:     ABIGAIL MATOS   MRN:      [id number]        Account:      [id number]   :      1960           Service Date: 2018      Document: [id number]         Thank you for allowing me to participate in the care of your patient.      Sincerely,     Rachna Holt PA-C     [facility]    cc:   No referring provider defined for this encounter.

## 2018-11-06 NOTE — LETTER
2018      Sam Villatoro PA-C  [place] [employer] [facility] [address]  [facility] [address]      RE: Gil Chowdary       Dear Colleague,    I had the pleasure of seeing Gil Chowdary in the [facility].    Service Date: 2018      PRIMARY CARDIOLOGIST:  Dr. Salinas.       REASON FOR VISIT:  Heart failure followup.      HISTORY OF PRESENT ILLNESS:  Mr. Chowdary is a delightful 58-year-old gentleman who has a past medical history significant for the followin.  Severe aortic stenosis with aortic valve replacement in 2016 with a 25 mm Trifecta tissue valve.   2.  Type 2 diabetes, on insulin.   3.  Hyperlipidemia.   4.  Sleep apnea on BiPAP.   5.  Recent diagnosis of heart failure with preserved EF.   6.  Recent anemia of unclear etiology, with concern for GI bleed.      I had met him about 2 weeks ago, and he had been admitted with weight gain and shortness of breath.  During that hospitalization, he diuresed about 15 pounds from about 325 down to about 310.  From there he dropped as far down as 300.  When I saw him back, I put him on Bumex 2 mg b.i.d., and initially his weight continued to trend down.  During 1 phone call, he said it was as low as 293.  He then says that he did not like what the scale said and it seemed to be going back up, so he quit weighing.  He is still taking his diuretics.  He also developed a cold during that time and it seems like his weight jumped then.  He today does not feel that shortness of breath but continues to have massive anasarca.  His scrotum continued to be grapefruit size or larger.  He has massive edema into his thighs and abdomen.  He denies colin orthopnea or PND.  The last time he weighed himself was yesterday, and he weighed 317 pounds.  He states he has been taking his medication.  He has not been vomiting any blood.  He has not noticed any blood on the toilet paper.      SOCIAL HISTORY:  He lives at home,  taking care of his mother, who is in her late 90s.  She had a stroke and needs significant help, and she also has dementia with this.  He has several brothers but says they do not participate in her care.  He is a former smoker, 20-pack-year history, quit in 2011, no alcohol use.  He has been trying to watch his salt and trying to do a better job of eating less salt and sticking to around 2 liters of fluid.      PHYSICAL EXAMINATION:     GENERAL:  Well-developed, well-nourished, obese gentleman in no acute distress, pale.   HEENT:  Normocephalic, atraumatic.   HEART:  Regular, without murmur, rub or gallop.     LUNGS:  Lungs are diminished with some rhonchi throughout his lung fields.  He does not have wheezes or rales.   EXTREMITIES:  He has pitting edema to his abdomen and certainly 3+ pitting edema to his thighs.   VASCULAR:  I do not appreciate JVP, although difficult to assess secondary to body habitus.      ASSESSMENT AND PLAN:    1.  Recent diagnosis of acute anemia likely precipitating acute heart failure with preserved EF and massive volume overload.  I suspect he has somewhere around 50 pounds of fluid on him.  Initially, his weights trended down with Bumex 2 mg b.i.d., but it then began coming back up, perhaps because he developed a cold or for other reasons.  It is possible he is not doing as good a job on his diet as he explains to us.  Today I offered him several options.  The first would be inpatient with IV diuretics.  I think that would be the best choice, but he is unwilling to do this because he needs to care for his mother.  The second was to do IV diuretics today and again tomorrow with his blood transfusion.  He is unwilling to do it today because again he needs to get home to his mom.  As such, we will increase his Bumex to 3 mg b.i.d. and add spironolactone 25 mg daily.  He needs to take his weights every day because we need to make sure we are adjusting if they go the wrong direction.  He  will get 1 unit of packed red blood cells tomorrow and with this a 40 mg IV dose of Lasix.  He should take both his spironolactone and Bumex tomorrow even with this.  His creatinine is improving, and I suspect its elevation overall is due to congestion, and I expect this to improve going forward.  Regardless, this gentleman has massive anasarca and needs diuresis.  He will continue eating a low sodium diet and will follow him closely, actually weekly to further assess.   2.  Aortic valve replacement for severe aortic stenosis.  This is a tissue valve and functioning well.   3.  Anemia, on Coumadin with a history of brief AFib and supraventricular tachycardia, status post aortic valve replacement.  He does not endorse any symptoms of AFib, and he has not had a monitor in some time.  It is difficult for him to come in for appointments.  At this point, we will let him get his colonoscopy but plan on trying to get a monitor on him and if he has no arrhythmias working on discontinuing his Coumadin.  I expect he will need to hold his Coumadin for the colonoscopy regardless.  In addition, he does have followup with Dr. Fletcher for hematology.      I am going to see him weekly for the next 3 weeks with a BMP and hemoglobin each time.  We will work on diuresing him and if we need to do IV diuretics.  He will be an ideal patient for the Infusion Clinic if we can manage that, especially with caring for his mom.      Thank you for allowing me to participate in his care.      Rachna Cao PA-C      cc:   JOAN Alfaro    [facility]    [address] [place] [address], [address]    [place] [address]         RACHNA CAO PA-C             D: 2018   T: 2018   MT: BLANE      Name:     ABIGAIL MATOS   MRN:      [id number]        Account:      [id number]   :      1960           Service Date: 2018      Document: [id number]         Outpatient Encounter Prescriptions as of 2018    Medication Sig Dispense Refill     aspirin 81 MG tablet Take 81 mg by mouth every morning        atorvastatin (LIPITOR) 40 MG tablet Take 1 tablet (40 mg) by mouth daily 90 tablet 0     bumetanide (BUMEX) 1 MG tablet Take 3 tablets (3 mg) by mouth 2 times daily 180 tablet 3     cholecalciferol (VITAMIN D3) 5000 UNITS TABS tablet Take 5,000 Units by mouth every morning       clindamycin (CLEOCIN) 300 MG capsule Take 1 capsule (300 mg) by mouth as needed 10 capsule 3     ferrous sulfate (IRON) 325 (65 Fe) MG tablet Take 1 tablet (325 mg) by mouth 2 times daily 100 tablet 3     insulin aspart (NOVOLOG FLEXPEN) 100 UNIT/ML injection Inject 4 Units Subcutaneous daily (with lunch)       insulin aspart (NOVOLOG FLEXPEN) 100 UNIT/ML injection Inject 3 Units Subcutaneous 2 times daily (with meals) Takes with breakfast and dinner       Insulin Glargine (BASAGLAR KWIKPEN SC) Inject 22 Units Subcutaneous every morning        metoprolol (LOPRESSOR) 50 MG tablet Take 1 tablet (50 mg) by mouth 2 times daily 60 tablet 0     Multiple Vitamins-Minerals (CENTRUM ADULTS PO) Take 1 tablet by mouth every morning        pantoprazole (PROTONIX) 40 MG EC tablet Take 40 mg by mouth every morning (before breakfast)       potassium chloride SA (K-DUR/KLOR-CON M) 10 MEQ CR tablet Take 20 mEq by mouth 2 times daily       prednisoLONE acetate (PRED FORTE) 1 % ophthalmic susp Place 1 drop into both eyes as needed (glaucoma)        spironolactone (ALDACTONE) 25 MG tablet Take 1 tablet (25 mg) by mouth daily 30 tablet 11     timolol (TIMOPTIC) 0.5 % ophthalmic solution Place 1 drop into both eyes 2 times daily        warfarin (COUMADIN) 3 MG tablet Take 3 mg by mouth every evening       [DISCONTINUED] bumetanide (BUMEX) 1 MG tablet Take 2 tablets (2 mg) by mouth 2 times daily 180 tablet 3     [DISCONTINUED] buPROPion (WELLBUTRIN XL) 150 MG 24 hr tablet Take 150-300 mg by mouth every morning Starting 10/8/18 150mg daily x7 days, then increase to  300mg daily       No facility-administered encounter medications on file as of 11/6/2018.        Again, thank you for allowing me to participate in the care of your patient.      Sincerely,    Rachna Holt PA-C     [facility]

## 2018-11-06 NOTE — MR AVS SNAPSHOT
After Visit Summary   11/6/2018    Gil Chowdary    MRN: [id number]           Patient Information     Date Of Birth          1960        Visit Information        Provider Department      11/6/2018 3:10 PM More, Rachna Carrillo PA-C [facility]   [place]        Today's Diagnoses     (HFpEF) heart failure with preserved ejection fraction (H)        S/P AVR (aortic valve replacement)          Care Instructions    Call CORE nurse for any questions or concerns:  [phone]   *If you have concerns after hours, please call [phone], option 2 to speak with on call Cardiologist.    We'll arrange appointments weekly with me and labs.      1. Medication changes from today: Increase bumex to 3 mg twice a day.   Start taking spironolactone 25 mg once a day.    Take a multivitamin.    Increase your iron twice a day.       2. Weigh yourself daily and write it down even if you don't like the number.       3. Call CORE nurse if your weight is up more than 2 pounds in one day or 5 pounds in one week.     4. Call CORE nurse if you feel more short of breath, have more abdominal bloating, or leg swelling.     5. Continue low sodium diet (less than 2000 mg daily). If you eat less salt, you will retain less fluid.     6. Alcohol can weaken your heart further. You should avoid alcohol or limit its use to special times, such as a holiday or birthday.      7. Do NOT take Aleve or ibuprofen without talking to your doctor first.      8. Lab Results: hemoglobin and kidney function are a tiny bit better.       Component      Latest Ref Rng & Units 10/24/2018 10/29/2018 11/6/2018   Sodium      136 - 145 mmol/L 144  138   Potassium      3.5 - 5.1 mmol/L 3.9  3.8   Chloride      98 - 107 mmol/L 110 (H)  105   Carbon Dioxide      23 - 29 mmol/L 25  26   Anion Gap      6 - 17 mmol/L 12.9  10.8   Glucose      70 - 105 mg/dL 110 (H)  112 (H)   Urea Nitrogen      7 - 30 mg/dL 36 (H)  30    Creatinine      0.70 - 1.30 mg/dL 1.85 (H)  1.65 (H)   GFR Estimate      >60 mL/min/1.7m2 38 (L)  43 (L)   GFR Estimate If Black      >60 mL/min/1.7m2 46 (L)  52 (L)   Calcium      8.5 - 10.5 mg/dL 8.6  8.6   Hemoglobin      13.3 - 17.7 g/dL  7.4 (L) 7.7 (L)   N-Terminal Pro Bnp      0 - 125 pg/mL   888 (H)     [facility]: Cardiomyopathy, Optimization, Rehabilitation, Education  The [facility] Clinic is a heart failure specialty clinic within the [facility] where you will work with specialized nurse practitioners, physician assistants, doctors, and registered nurses. They are dedicated to helping patients with heart failure to carefully adjust medications, receive education, and learn who and when to call if symptoms develop. They specialize in helping you better understand your condition, slow the progression of your disease, improve the length and quality of your life, help you detect future heart problems before they become life threatening, and avoid hospitalizations.              Follow-ups after your visit        Additional Services     Follow-Up with [facility] Clinic - KARYN visit           Follow-Up with [facility] Clinic - KARYN visit           Follow-Up with [facility] Clinic - KARYN visit                 Your next 10 appointments already scheduled     Nov 07, 2018  9:00 AM CST   Level 4 with  INFUSION CHAIR 13   [facility] ([facility])    [facility]  [address]  [place] [address]   [phone]            Nov 14, 2018   Procedure with Katja Kohler MD   [facility] ([facility])    [address]  [place] [address]   [phone]           [facility] is located at [address] [place] [place]            Nov 15, 2018   Procedure with Jovanni Joe MD   [facility] ([facility])    [address]  [place] [address]   [phone]            [facility] is located at [address] [place]            Dec 12, 2018  8:45 AM CST   Return Visit with  Oncology Nurse   [facility] ([facility])    [facility] [place]  [address]  [place] [address]   [phone]            Dec 12, 2018  9:45 AM CST   Return Visit with Juan Fletcher MD   [facility] ([facility])    [facility] [place]  [address]  [place] [address]   [phone]              Future tests that were ordered for you today     Open Future Orders        Priority Expected Expires Ordered    Follow-Up with [facility] Clinic - KARYN visit Routine 11/13/2018 11/6/2019 11/6/2018    Basic metabolic panel Routine 11/13/2018 11/6/2019 11/6/2018    Hemoglobin Routine 11/13/2018 11/6/2019 11/6/2018    Basic metabolic panel Routine 11/20/2018 11/6/2019 11/6/2018    Basic metabolic panel Routine 11/27/2018 11/6/2019 11/6/2018    Hemoglobin Routine 11/20/2018 11/6/2019 11/6/2018    N terminal pro BNP outpatient Routine 11/27/2018 11/6/2019 11/6/2018    Hemoglobin Routine 11/27/2018 11/6/2019 11/6/2018    Follow-Up with [facility] - KARYN visit Routine 11/27/2018 11/6/2019 11/6/2018    Follow-Up with [facility] Clinic - KARYN visit Routine 11/20/2018 11/6/2019 11/6/2018    Basic metabolic panel Routine 11/13/2018 11/6/2019 11/6/2018            Who to contact     If you have questions or need follow up information about today's clinic visit or your schedule please contact [facility] directly at [phone].  Normal or non-critical lab and imaging results will be communicated to you by MyChart, letter or phone within 4 business days after the clinic has received the results. If you do not hear from us within 7 days, please contact the clinic through MyChart or phone. If you have a critical or abnormal lab result, we will notify you by phone as soon as  "possible.  Submit refill requests through "[employer]" or call your pharmacy and they will forward the refill request to us. Please allow 3 business days for your refill to be completed.          Additional Information About Your Visit        "[employer]" Information     "[employer]" gives you secure access to your electronic health record. If you see a primary care provider, you can also send messages to your care team and make appointments. If you have questions, please call your primary care clinic.  If you do not have a primary care provider, please call [phone] and they will assist you.        Care EveryWhere ID     This is your Care EveryWhere ID. This could be used by other organizations to access your [place] medical records  [id number]        Your Vitals Were     Pulse Height Pulse Oximetry BMI (Body Mass Index)          72 1.778 m (5' 10\") 99% 46.63 kg/m2         Blood Pressure from Last 3 Encounters:   11/06/18 116/68   10/24/18 120/70   10/18/18 108/70    Weight from Last 3 Encounters:   11/06/18 147.4 kg (325 lb)   10/24/18 140.3 kg (309 lb 3.2 oz)   10/18/18 136.1 kg (300 lb)              We Performed the Following     Follow-Up with [facility] - KARYN visit          Today's Medication Changes          These changes are accurate as of 11/6/18  3:58 PM.  If you have any questions, ask your nurse or doctor.               Start taking these medicines.        Dose/Directions    spironolactone 25 MG tablet   Commonly known as:  ALDACTONE   Used for:  (HFpEF) heart failure with preserved ejection fraction (H)   Started by:  Rachna Holt PA-C        Dose:  25 mg   Take 1 tablet (25 mg) by mouth daily   Quantity:  30 tablet   Refills:  11         These medicines have changed or have updated prescriptions.        Dose/Directions    bumetanide 1 MG tablet   Commonly known as:  BUMEX   This may have changed:  how much to take   Used for:  S/P AVR (aortic valve replacement)   Changed by:  Rachna Holt " SIRENA Carrillo        Dose:  3 mg   Take 3 tablets (3 mg) by mouth 2 times daily   Quantity:  180 tablet   Refills:  3         Stop taking these medicines if you haven't already. Please contact your care team if you have questions.     buPROPion 150 MG 24 hr tablet   Commonly known as:  WELLBUTRIN XL   Stopped by:  Rachna Holt PA-C                Where to get your medicines      These medications were sent to [facility] [address] [place] [address] AT [address] [place] [address] & [address] [address]     Phone:  [phone]     bumetanide 1 MG tablet    spironolactone 25 MG tablet                Primary Care Provider Office Phone # Fax #    Sam Ramya Villatoro PA-C [phone]       [employer] [facility] [address]  [facility] [address]        Equal Access to Services     [facility]: Hadii aad ku hadasho Soomaali, waaxda luqadaha, qaybta kaalmada adeegyada, waxay idiin hayaan ramona swanson . So [facility] [phone].    ATENCIÓN: Si habla español, tiene a cross disposición servicios gratuitos de asistencia lingüística. Rajiv al [phone].    We comply with applicable federal civil rights laws and [place] laws. We do not discriminate on the basis of race, color, national origin, age, disability, sex, sexual orientation, or gender identity.            Thank you!     Thank you for choosing [facility]  for your care. Our goal is always to provide you with excellent care. Hearing back from our patients is one way we can continue to improve our services. Please take a few minutes to complete the written survey that you may receive in the mail after your visit with us. Thank you!             Your Updated Medication List - Protect others around you: Learn how to safely use, store and throw away your medicines at www.disposemymeds.org.          This list is accurate as of 11/6/18  3:58 PM.   Always use your most recent med list.                   Brand Name Dispense Instructions for use Diagnosis    aspirin 81 MG tablet      Take 81 mg by mouth every morning        atorvastatin 40 MG tablet    LIPITOR    90 tablet    Take 1 tablet (40 mg) by mouth daily    Hyperlipidemia with target LDL less than 70       SELENA STERLING SC      Inject 22 Units Subcutaneous every morning        bumetanide 1 MG tablet    BUMEX    180 tablet    Take 3 tablets (3 mg) by mouth 2 times daily    S/P AVR (aortic valve replacement)       CENTRUM ADULTS PO      Take 1 tablet by mouth every morning        cholecalciferol 5000 units Tabs tablet    vitamin D3     Take 5,000 Units by mouth every morning        clindamycin 300 MG capsule    CLEOCIN    10 capsule    Take 1 capsule (300 mg) by mouth as needed    S/P AVR (aortic valve replacement)       ferrous sulfate 325 (65 Fe) MG tablet    IRON    100 tablet    Take 1 tablet (325 mg) by mouth 2 times daily    Other iron deficiency anemia       metoprolol tartrate 50 MG tablet    LOPRESSOR    60 tablet    Take 1 tablet (50 mg) by mouth 2 times daily    Paroxysmal supraventricular tachycardia (H), S/P AVR (aortic valve replacement)       * NovoLOG FLEXPEN 100 UNIT/ML injection   Generic drug:  insulin aspart      Inject 3 Units Subcutaneous 2 times daily (with meals) Takes with breakfast and dinner        * NovoLOG FLEXPEN 100 UNIT/ML injection   Generic drug:  insulin aspart      Inject 4 Units Subcutaneous daily (with lunch)        pantoprazole 40 MG EC tablet    PROTONIX     Take 40 mg by mouth every morning (before breakfast)        potassium chloride SA 10 MEQ CR tablet    K-DUR/KLOR-CON M     Take 20 mEq by mouth 2 times daily        prednisoLONE acetate 1 % ophthalmic susp    PRED FORTE     Place 1 drop into both eyes as needed (glaucoma)        spironolactone 25 MG tablet    ALDACTONE    30 tablet    Take 1 tablet (25 mg) by mouth daily    (HFpEF) heart failure with preserved  ejection fraction (H)       timolol 0.5 % ophthalmic solution    TIMOPTIC     Place 1 drop into both eyes 2 times daily        warfarin 3 MG tablet    COUMADIN     Take 3 mg by mouth every evening        * Notice:  This list has 2 medication(s) that are the same as other medications prescribed for you. Read the directions carefully, and ask your doctor or other care provider to review them with you.

## 2018-11-06 NOTE — PATIENT INSTRUCTIONS
Call CORE nurse for any questions or concerns:  [phone]   *If you have concerns after hours, please call [phone], option 2 to speak with on call Cardiologist.    We'll arrange appointments weekly with me and labs.      1. Medication changes from today: Increase bumex to 3 mg twice a day.   Start taking spironolactone 25 mg once a day.    Take a multivitamin.    Increase your iron twice a day.       2. Weigh yourself daily and write it down even if you don't like the number.       3. Call CORE nurse if your weight is up more than 2 pounds in one day or 5 pounds in one week.     4. Call CORE nurse if you feel more short of breath, have more abdominal bloating, or leg swelling.     5. Continue low sodium diet (less than 2000 mg daily). If you eat less salt, you will retain less fluid.     6. Alcohol can weaken your heart further. You should avoid alcohol or limit its use to special times, such as a holiday or birthday.      7. Do NOT take Aleve or ibuprofen without talking to your doctor first.      8. Lab Results: hemoglobin and kidney function are a tiny bit better.       Component      Latest Ref Rng & Units 10/24/2018 10/29/2018 11/6/2018   Sodium      136 - 145 mmol/L 144  138   Potassium      3.5 - 5.1 mmol/L 3.9  3.8   Chloride      98 - 107 mmol/L 110 (H)  105   Carbon Dioxide      23 - 29 mmol/L 25  26   Anion Gap      6 - 17 mmol/L 12.9  10.8   Glucose      70 - 105 mg/dL 110 (H)  112 (H)   Urea Nitrogen      7 - 30 mg/dL 36 (H)  30   Creatinine      0.70 - 1.30 mg/dL 1.85 (H)  1.65 (H)   GFR Estimate      >60 mL/min/1.7m2 38 (L)  43 (L)   GFR Estimate If Black      >60 mL/min/1.7m2 46 (L)  52 (L)   Calcium      8.5 - 10.5 mg/dL 8.6  8.6   Hemoglobin      13.3 - 17.7 g/dL  7.4 (L) 7.7 (L)   N-Terminal Pro Bnp      0 - 125 pg/mL   888 (H)     [facility]: Cardiomyopathy, Optimization, Rehabilitation, Education  The [facility] is a heart failure specialty clinic within the [facility] where  you will work with specialized nurse practitioners, physician assistants, doctors, and registered nurses. They are dedicated to helping patients with heart failure to carefully adjust medications, receive education, and learn who and when to call if symptoms develop. They specialize in helping you better understand your condition, slow the progression of your disease, improve the length and quality of your life, help you detect future heart problems before they become life threatening, and avoid hospitalizations.

## 2018-11-06 NOTE — PROGRESS NOTES
[id number]  HPI and Plan:   See dictation    Orders this Visit:  Orders Placed This Encounter   Procedures     Basic metabolic panel     Basic metabolic panel     Hemoglobin     Basic metabolic panel     Basic metabolic panel     Hemoglobin     N terminal pro BNP outpatient     Hemoglobin     Follow-Up with [facility] - KARYN visit     Follow-Up with [facility] - KARYN visit     Follow-Up with [facility] - K[facility]N visit     Orders Placed This Encounter   Medications     bumetanide (BUMEX) 1 MG tablet     Sig: Take 3 tablets (3 mg) by mouth 2 times daily     Dispense:  180 tablet     Refill:  3     Do not fill, dose adjustment only     spironolactone (ALDACTONE) 25 MG tablet     Sig: Take 1 tablet (25 mg) by mouth daily     Dispense:  30 tablet     Refill:  11     Medications Discontinued During This Encounter   Medication Reason     bumetanide (BUMEX) 1 MG tablet Reorder     buPROPion (WELLBUTRIN XL) 150 MG 24 hr tablet          Encounter Diagnoses   Name Primary?     (HFpEF) heart failure with preserved ejection fraction (H)      S/P AVR (aortic valve replacement)        CURRENT MEDICATIONS:  Current Outpatient Prescriptions   Medication Sig Dispense Refill     aspirin 81 MG tablet Take 81 mg by mouth every morning        atorvastatin (LIPITOR) 40 MG tablet Take 1 tablet (40 mg) by mouth daily 90 tablet 0     bumetanide (BUMEX) 1 MG tablet Take 3 tablets (3 mg) by mouth 2 times daily 180 tablet 3     cholecalciferol (VITAMIN D3) 5000 UNITS TABS tablet Take 5,000 Units by mouth every morning       clindamycin (CLEOCIN) 300 MG capsule Take 1 capsule (300 mg) by mouth as needed 10 capsule 3     ferrous sulfate (IRON) 325 (65 Fe) MG tablet Take 1 tablet (325 mg) by mouth 2 times daily 100 tablet 3     insulin aspart (NOVOLOG FLEXPEN) 100 UNIT/ML injection Inject 4 Units Subcutaneous daily (with lunch)       insulin aspart (NOVOLOG FLEXPEN) 100 UNIT/ML injection Inject 3 Units Subcutaneous 2 times daily (with meals) Takes with  breakfast and dinner       Insulin Glargine (BASAGLAR KWIKPEN SC) Inject 22 Units Subcutaneous every morning        metoprolol (LOPRESSOR) 50 MG tablet Take 1 tablet (50 mg) by mouth 2 times daily 60 tablet 0     Multiple Vitamins-Minerals (CENTRUM ADULTS PO) Take 1 tablet by mouth every morning        pantoprazole (PROTONIX) 40 MG EC tablet Take 40 mg by mouth every morning (before breakfast)       potassium chloride SA (K-DUR/KLOR-CON M) 10 MEQ CR tablet Take 20 mEq by mouth 2 times daily       prednisoLONE acetate (PRED FORTE) 1 % ophthalmic susp Place 1 drop into both eyes as needed (glaucoma)        spironolactone (ALDACTONE) 25 MG tablet Take 1 tablet (25 mg) by mouth daily 30 tablet 11     timolol (TIMOPTIC) 0.5 % ophthalmic solution Place 1 drop into both eyes 2 times daily        warfarin (COUMADIN) 3 MG tablet Take 3 mg by mouth every evening       [DISCONTINUED] bumetanide (BUMEX) 1 MG tablet Take 2 tablets (2 mg) by mouth 2 times daily 180 tablet 3       ALLERGIES     Allergies   Allergen Reactions     Amoxicillin      Itchy      Penicillins Hives       PAST MEDICAL HISTORY:  Past Medical History:   Diagnosis Date     Former tobacco use      Glaucoma      Hyperlipidemia LDL goal <160 12/6/2011     Obesity      DRAKE on CPAP      Right bundle branch block      Severe aortic stenosis     On Echo 10/28/16       PAST SURGICAL HISTORY:  Past Surgical History:   Procedure Laterality Date     HEART CATH LEFT HEART CATH  04/07/2017    Diffuse non-obstuctive CAD     REPAIR VALVE AORTIC N/A 5/16/2017    Procedure: REPAIR VALVE AORTIC;  Median Sternotony, CardioPulmonary Bypass, Aortic Valve Replace using 25MM Trifecta Valve with [employer], Septal myectomy;  Surgeon: Jun Simon MD;  Location:  OR     REPLACE VALVE AORTIC N/A 5/16/2017    Procedure: REPLACE VALVE AORTIC;;  Surgeon: Jun Simon MD;  Location:  OR     SINUS SURGERY  2005       FAMILY HISTORY:  Family History  "  Problem Relation Age of Onset     Family History Negative Mother      Diabetes Father      Heart Surgery Father      CABG     Coronary Artery Disease Father      Hypertension Brother      Diabetes Brother      HEART DISEASE Brother      Heart Surgery Brother      CABG x 3     Family History Negative Sister      Diabetes Brother      History of diabetes, but no longer an issue     Family History Negative Brother        SOCIAL HISTORY:  Social History     Social History     Marital status:      Spouse name: N/A     Number of children: N/A     Years of education: N/A     Social History Main Topics     Smoking status: Former Smoker     Packs/day: 1.00     Years: 20.00     Types: Cigarettes, Cigars     Quit date: 10/21/2011     Smokeless tobacco: Never Used     Alcohol use No     Drug use: No     Sexual activity: Not Asked     Other Topics Concern     Parent/Sibling W/ Cabg, Mi Or Angioplasty Before 65f 55m? Yes     brother triple bypass 49     Social History Narrative       Review of Systems:  Skin:  Negative     Eyes:  Positive for glasses  ENT:  Negative    Respiratory:  Positive for CPAP;sleep apnea;dyspnea on exertion  Cardiovascular:    Positive for;edema  Gastroenterology: Negative    Genitourinary:  Negative    Musculoskeletal:  Negative    Neurologic:  Negative    Psychiatric:  Negative    Heme/Lymph/Imm:  Negative    Endocrine:  Positive for diabetes    Physical Exam:  Vitals: /68  Pulse 72  Ht 1.778 m (5' 10\")  Wt 147.4 kg (325 lb)  SpO2 99%  BMI 46.63 kg/m2   Please refer to dictation for physical exam    Recent Lab Results:  LIPID RESULTS:  Lab Results   Component Value Date    CHOL 126 10/10/2018    HDL 23 (A) 10/10/2018    LDL 71 10/10/2018    TRIG 230 (A) 10/10/2018       LIVER ENZYME RESULTS:  Lab Results   Component Value Date    AST 55 (H) 10/12/2018    ALT 31 10/12/2018       CBC RESULTS:  Lab Results   Component Value Date    WBC 3.9 (L) 10/24/2018    RBC 2.94 (L) 10/24/2018    " HGB 7.7 (L) 11/06/2018    HCT 25.5 (L) 10/24/2018    MCV 87 10/24/2018    MCH 25.5 (L) 10/24/2018    MCHC 29.4 (L) 10/24/2018    RDW 17.5 (H) 10/24/2018    PLT 95 (L) 10/24/2018       BMP RESULTS:  Lab Results   Component Value Date     11/06/2018    POTASSIUM 3.8 11/06/2018    CHLORIDE 105 11/06/2018    CO2 26 11/06/2018    ANIONGAP 10.8 11/06/2018     (H) 11/06/2018    BUN 30 11/06/2018    CR 1.65 (H) 11/06/2018    GFRESTIMATED 43 (L) 11/06/2018    GFRESTBLACK 52 (L) 11/06/2018    MYRON 8.6 11/06/2018        A1C RESULTS:  Lab Results   Component Value Date    A1C 6.8 (H) 10/12/2018       INR RESULTS:  Lab Results   Component Value Date    INR 2.19 (H) 10/14/2018    INR 2.28 (H) 10/13/2018           CC  No referring provider defined for this encounter.

## 2018-11-07 ENCOUNTER — INFUSION THERAPY VISIT (OUTPATIENT)
Dept: INFUSION THERAPY | Facility: CLINIC | Age: 58
End: 2018-11-07
Attending: PHYSICIAN ASSISTANT
Payer: COMMERCIAL

## 2018-11-07 ENCOUNTER — CARE COORDINATION (OUTPATIENT)
Dept: CARDIOLOGY | Facility: CLINIC | Age: 58
End: 2018-11-07

## 2018-11-07 ENCOUNTER — HOSPITAL ENCOUNTER (OUTPATIENT)
Facility: CLINIC | Age: 58
Setting detail: SPECIMEN
Discharge: HOME | End: 2018-11-07
Attending: PHYSICIAN ASSISTANT | Admitting: PHYSICIAN ASSISTANT
Payer: COMMERCIAL

## 2018-11-07 VITALS
OXYGEN SATURATION: 98 % | TEMPERATURE: 98 F | SYSTOLIC BLOOD PRESSURE: 136 MMHG | RESPIRATION RATE: 18 BRPM | HEART RATE: 67 BPM | DIASTOLIC BLOOD PRESSURE: 68 MMHG

## 2018-11-07 DIAGNOSIS — D64.9 ANEMIA: Primary | ICD-10-CM

## 2018-11-07 LAB
ABO + RH BLD: NORMAL
ABO + RH BLD: NORMAL
BLD GP AB SCN SERPL QL: NORMAL
BLD PROD TYP BPU: NORMAL
BLD PROD TYP BPU: NORMAL
BLD UNIT ID BPU: 0
BLOOD BANK CMNT PATIENT-IMP: NORMAL
BLOOD PRODUCT CODE: NORMAL
BPU ID: NORMAL
NUM BPU REQUESTED: 1
SPECIMEN EXP DATE BLD: NORMAL
TRANSFUSION STATUS PATIENT QL: NORMAL
TRANSFUSION STATUS PATIENT QL: NORMAL

## 2018-11-07 PROCEDURE — P9016 RBC LEUKOCYTES REDUCED: HCPCS | Performed by: PHYSICIAN ASSISTANT

## 2018-11-07 PROCEDURE — 86901 BLOOD TYPING SEROLOGIC RH(D): CPT | Performed by: PHYSICIAN ASSISTANT

## 2018-11-07 PROCEDURE — 86923 COMPATIBILITY TEST ELECTRIC: CPT | Performed by: PHYSICIAN ASSISTANT

## 2018-11-07 PROCEDURE — 36430 TRANSFUSION BLD/BLD COMPNT: CPT

## 2018-11-07 PROCEDURE — 86900 BLOOD TYPING SEROLOGIC ABO: CPT | Performed by: PHYSICIAN ASSISTANT

## 2018-11-07 PROCEDURE — 25000128 H RX IP 250 OP 636: Performed by: PHYSICIAN ASSISTANT

## 2018-11-07 PROCEDURE — 86850 RBC ANTIBODY SCREEN: CPT | Performed by: PHYSICIAN ASSISTANT

## 2018-11-07 RX ORDER — FUROSEMIDE 10 MG/ML
20 INJECTION INTRAMUSCULAR; INTRAVENOUS ONCE
Status: CANCELLED
Start: 2018-11-07 | End: 2018-11-07

## 2018-11-07 RX ORDER — FUROSEMIDE 10 MG/ML
40 INJECTION INTRAMUSCULAR; INTRAVENOUS ONCE
Status: COMPLETED | OUTPATIENT
Start: 2018-11-07 | End: 2018-11-07

## 2018-11-07 RX ORDER — FUROSEMIDE 10 MG/ML
20 INJECTION INTRAMUSCULAR; INTRAVENOUS ONCE
Status: DISCONTINUED | OUTPATIENT
Start: 2018-11-07 | End: 2018-11-07 | Stop reason: DRUGHIGH

## 2018-11-07 RX ADMIN — FUROSEMIDE 40 MG: 10 INJECTION, SOLUTION INTRAMUSCULAR; INTRAVENOUS at 12:28

## 2018-11-07 ASSESSMENT — PAIN SCALES - GENERAL: PAINLEVEL: NO PAIN (0)

## 2018-11-07 NOTE — MR AVS SNAPSHOT
After Visit Summary   11/7/2018    Gil Chowdary    MRN: [id number]           Patient Information     Date Of Birth          1960        Visit Information        Provider Department      11/7/2018 9:00 AM  INFUSION CHAIR 13 [facility]        Today's Diagnoses     Anemia    -  1       Follow-ups after your visit        Your next 10 appointments already scheduled     Nov 13, 2018  1:00 PM CST   LAB with SOTOMAYOR LAB   [facility] ([facility])    [address] [place], [place] [address]  [facility] [address]   [phone]           Please do not eat 10-12 hours before your appointment if you are coming in fasting for labs on lipids, cholesterol, or glucose (sugar). This does not apply to pregnant women. Water, hot tea and black coffee (with nothing added) are okay. Do not drink other fluids, diet soda or chew gum.            Nov 13, 2018  1:50 PM CST   Core Return with Rachna Holt PA-C   [facility] ([facility])    [address] [place], [place] [address]  [facility] [address]   [phone] OPT 2            Nov 14, 2018   Procedure with Katja Kohler MD   [facility] ([facility] [place])    [address]  [facility] [address]   [phone]           [facility] is located at [address] [place]            Nov 15, 2018   Procedure with Jovanni Joe MD   [facility] ([facility] [place])    [address]  [facility] [address]   [phone]           [facility] is located at [address] [place]            Nov 20, 2018  7:30 AM CST   LAB with SOTOMAYOR LAB   [facility] ([facility])    [address] [id number]  [facility] [address] [phone]           Please do not eat 10-12 hours before your appointment if  you are coming in fasting for labs on lipids, cholesterol, or glucose (sugar). This does not apply to pregnant women. Water, hot tea and black coffee (with nothing added) are okay. Do not drink other fluids, diet soda or chew gum.            Nov 20, 2018  8:10 AM CST   Core Return with Rachna Holt PA-C   [facility] ([facility])    [address]  [place] [address]   [phone] OPT 2            Nov 27, 2018  1:00 PM CST   LAB with SOTOMAYOR LAB   [facility] ([facility])    [address]  [place]  [place] [address]   [phone]           Please do not eat 10-12 hours before your appointment if you are coming in fasting for labs on lipids, cholesterol, or glucose (sugar). This does not apply to pregnant women. Water, hot tea and black coffee (with nothing added) are okay. Do not drink other fluids, diet soda or chew gum.            Nov 27, 2018  1:55 PM CST   Core Return with Rachna Holt PA-C   [facility] ([facility])    [address]  [place] [address] [phone] OPT 2            Dec 12, 2018  8:45 AM CST   Return Visit with  Oncology Nurse   [facility] ([facility])    [facility]  [address]  [place] [address] [phone]            Dec 12, 2018  9:45 AM CST   Return Visit with Juan Fletcher MD   [facility] ([facility])    [facility]  [address]  [facility] [address] [phone]              Future tests that were ordered for you today     Open Future Orders        Priority Expected Expires Ordered    Follow-Up with [facility] - KARYN visit Routine 11/13/2018 11/6/2019 11/6/2018    Basic metabolic panel Routine 11/13/2018 11/6/2019 11/6/2018    Hemoglobin Routine  11/13/2018 11/6/2019 11/6/2018    Basic metabolic panel Routine 11/20/2018 11/6/2019 11/6/2018    Basic metabolic panel Routine 11/27/2018 11/6/2019 11/6/2018    Hemoglobin Routine 11/20/2018 11/6/2019 11/6/2018    N terminal pro BNP outpatient Routine 11/27/2018 11/6/2019 11/6/2018    Hemoglobin Routine 11/27/2018 11/6/2019 11/6/2018    Follow-Up with [facility] - KARYN visit Routine 11/27/2018 11/6/2019 11/6/2018    Follow-Up with [facility] - KARYN visit Routine 11/20/2018 11/6/2019 11/6/2018    Basic metabolic panel Routine 11/13/2018 11/6/2019 11/6/2018            Who to contact     If you have questions or need follow up information about today's clinic visit or your schedule please contact [facility] directly at [phone].  Normal or non-critical lab and imaging results will be communicated to you by [employer], letter or phone within 4 business days after the clinic has received the results. If you do not hear from us within 7 days, please contact the clinic through [employer] or phone. If you have a critical or abnormal lab result, we will notify you by phone as soon as possible.  Submit refill requests through [employer] or call your pharmacy and they will forward the refill request to us. Please allow 3 business days for your refill to be completed.          Additional Information About Your Visit        [employer] Information     [employer] gives you secure access to your electronic health record. If you see a primary care provider, you can also send messages to your care team and make appointments. If you have questions, please call your primary care clinic.  If you do not have a primary care provider, please call [phone] and they will assist you.        Care EveryWhere ID     This is your Care EveryWhere ID. This could be used by other organizations to access your [facility] medical records  [id number]        Your Vitals Were     Pulse Temperature Respirations Pulse Oximetry           67 98  F (36.7  C) (Oral) 18 98%         Blood Pressure from Last 3 Encounters:   11/07/18 136/68   11/06/18 116/68   10/24/18 120/70    Weight from Last 3 Encounters:   11/06/18 147.4 kg (325 lb)   10/24/18 140.3 kg (309 lb 3.2 oz)   10/18/18 136.1 kg (300 lb)              We Performed the Following     ABO/Rh type and screen     Blood component     Transfuse red blood cell unit        Primary Care Provider Office Phone # Fax #    Sam Villatoro PA-C [phone]       [facility] [address]  [facility] [address]        Equal Access to Services     SIL COLEMAN : Hadii jovanna tejadao [place], waaxda luqadaha, qaybta kaalmada adeegyada, hue swanson . So [facility] [phone].    ATENCIÓN: Si habla español, tiene a cross disposición servicios gratuitos de asistencia lingüística. [facility] [phone].    We comply with applicable federal civil rights laws and [place] laws. We do not discriminate on the basis of race, color, national origin, age, disability, sex, sexual orientation, or gender identity.            Thank you!     Thank you for choosing [facility]  for your care. Our goal is always to provide you with excellent care. Hearing back from our patients is one way we can continue to improve our services. Please take a few minutes to complete the written survey that you may receive in the mail after your visit with us. Thank you!             Your Updated Medication List - Protect others around you: Learn how to safely use, store and throw away your medicines at www.disposemymeds.org.          This list is accurate as of 11/7/18  1:06 PM.  Always use your most recent med list.                   Brand Name Dispense Instructions for use Diagnosis    aspirin 81 MG tablet      Take 81 mg by mouth every morning        atorvastatin 40 MG tablet    LIPITOR    90 tablet    Take 1 tablet (40 mg) by mouth daily     Hyperlipidemia with target LDL less than 70       SELENA BLUJEREMIAH SC      Inject 22 Units Subcutaneous every morning        bumetanide 1 MG tablet    BUMEX    180 tablet    Take 3 tablets (3 mg) by mouth 2 times daily    S/P AVR (aortic valve replacement)       CENTRUM ADULTS PO      Take 1 tablet by mouth every morning        cholecalciferol 5000 units Tabs tablet    vitamin D3     Take 5,000 Units by mouth every morning        clindamycin 300 MG capsule    CLEOCIN    10 capsule    Take 1 capsule (300 mg) by mouth as needed    S/P AVR (aortic valve replacement)       ferrous sulfate 325 (65 Fe) MG tablet    IRON    100 tablet    Take 1 tablet (325 mg) by mouth 2 times daily    Other iron deficiency anemia       metoprolol tartrate 50 MG tablet    LOPRESSOR    60 tablet    Take 1 tablet (50 mg) by mouth 2 times daily    Paroxysmal supraventricular tachycardia (H), S/P AVR (aortic valve replacement)       * NovoLOG FLEXPEN 100 UNIT/ML injection   Generic drug:  insulin aspart      Inject 3 Units Subcutaneous 2 times daily (with meals) Takes with breakfast and dinner        * NovoLOG FLEXPEN 100 UNIT/ML injection   Generic drug:  insulin aspart      Inject 4 Units Subcutaneous daily (with lunch)        pantoprazole 40 MG EC tablet    PROTONIX     Take 40 mg by mouth every morning (before breakfast)        potassium chloride SA 10 MEQ CR tablet    K-DUR/KLOR-CON M     Take 20 mEq by mouth 2 times daily        prednisoLONE acetate 1 % ophthalmic susp    PRED FORTE     Place 1 drop into both eyes as needed (glaucoma)        spironolactone 25 MG tablet    ALDACTONE    30 tablet    Take 1 tablet (25 mg) by mouth daily    (HFpEF) heart failure with preserved ejection fraction (H)       timolol 0.5 % ophthalmic solution    TIMOPTIC     Place 1 drop into both eyes 2 times daily        warfarin 3 MG tablet    COUMADIN     Take 3 mg by mouth every evening        * Notice:  This list has 2 medication(s) that are the same as  other medications prescribed for you. Read the directions carefully, and ask your doctor or other care provider to review them with you.

## 2018-11-07 NOTE — PROGRESS NOTES
Service Date: 2018      PRIMARY CARDIOLOGIST:  Dr. Salinas.       REASON FOR VISIT:  Heart failure followup.      HISTORY OF PRESENT ILLNESS:  Mr. Chowdary is a delightful 58-year-old gentleman who has a past medical history significant for the followin.  Severe aortic stenosis with aortic valve replacement in 2016 with a 25 mm Trifecta tissue valve.   2.  Type 2 diabetes, on insulin.   3.  Hyperlipidemia.   4.  Sleep apnea on BiPAP.   5.  Recent diagnosis of heart failure with preserved EF.   6.  Recent anemia of unclear etiology, with concern for GI bleed.      I had met him about 2 weeks ago, and he had been admitted with weight gain and shortness of breath.  During that hospitalization, he diuresed about 15 pounds from about 325 down to about 310.  From there he dropped as far down as 300.  When I saw him back, I put him on Bumex 2 mg b.i.d., and initially his weight continued to trend down.  During 1 phone call, he said it was as low as 293.  He then says that he did not like what the scale said and it seemed to be going back up, so he quit weighing.  He is still taking his diuretics.  He also developed a cold during that time and it seems like his weight jumped then.  He today does not feel that shortness of breath but continues to have massive anasarca.  His scrotum continued to be grapefruit size or larger.  He has massive edema into his thighs and abdomen.  He denies colin orthopnea or PND.  The last time he weighed himself was yesterday, and he weighed 317 pounds.  He states he has been taking his medication.  He has not been vomiting any blood.  He has not noticed any blood on the toilet paper.      SOCIAL HISTORY:  He lives at home, taking care of his mother, who is in her late 90s.  She had a stroke and needs significant help, and she also has dementia with this.  He has several brothers but says they do not participate in her care.  He is a former smoker, 20-pack-year history, quit in ,  no alcohol use.  He has been trying to watch his salt and trying to do a better job of eating less salt and sticking to around 2 liters of fluid.      PHYSICAL EXAMINATION:     GENERAL:  Well-developed, well-nourished, obese gentleman in no acute distress, pale.   HEENT:  Normocephalic, atraumatic.   HEART:  Regular, without murmur, rub or gallop.     LUNGS:  Lungs are diminished with some rhonchi throughout his lung fields.  He does not have wheezes or rales.   EXTREMITIES:  He has pitting edema to his abdomen and certainly 3+ pitting edema to his thighs.   VASCULAR:  I do not appreciate JVP, although difficult to assess secondary to body habitus.      ASSESSMENT AND PLAN:    1.  Recent diagnosis of acute anemia likely precipitating acute heart failure with preserved EF and massive volume overload.  I suspect he has somewhere around 50 pounds of fluid on him.  Initially, his weights trended down with Bumex 2 mg b.i.d., but it then began coming back up, perhaps because he developed a cold or for other reasons.  It is possible he is not doing as good a job on his diet as he explains to us.  Today I offered him several options.  The first would be inpatient with IV diuretics.  I think that would be the best choice, but he is unwilling to do this because he needs to care for his mother.  The second was to do IV diuretics today and again tomorrow with his blood transfusion.  He is unwilling to do it today because again he needs to get home to his mom.  As such, we will increase his Bumex to 3 mg b.i.d. and add spironolactone 25 mg daily.  He needs to take his weights every day because we need to make sure we are adjusting if they go the wrong direction.  He will get 1 unit of packed red blood cells tomorrow and with this a 40 mg IV dose of Lasix.  He should take both his spironolactone and Bumex tomorrow even with this.  His creatinine is improving, and I suspect its elevation overall is due to congestion, and I expect  this to improve going forward.  Regardless, this gentleman has massive anasarca and needs diuresis.  He will continue eating a low sodium diet and will follow him closely, actually weekly to further assess.   2.  Aortic valve replacement for severe aortic stenosis.  This is a tissue valve and functioning well.   3.  Anemia, on Coumadin with a history of brief AFib and supraventricular tachycardia, status post aortic valve replacement.  He does not endorse any symptoms of AFib, and he has not had a monitor in some time.  It is difficult for him to come in for appointments.  At this point, we will let him get his colonoscopy but plan on trying to get a monitor on him and if he has no arrhythmias working on discontinuing his Coumadin.  I expect he will need to hold his Coumadin for the colonoscopy regardless.  In addition, he does have followup with Dr. Fletcher for hematology.      I am going to see him weekly for the next 3 weeks with a BMP and hemoglobin each time.  We will work on diuresing him and if we need to do IV diuretics.  He will be an ideal patient for the Infusion Clinic if we can manage that, especially with caring for his mom.      Thank you for allowing me to participate in his care.      Rachna Cao PA-C      cc:   JOAN Alfaro    [facility]    [address] [place] [address], [address]    [place]  [address]         RACHNA CAO PA-C             D: 2018   T: 2018   MT: BLANE      Name:     ABIGAIL MATOS   MRN:      [id number]        Account:      [id number]   :      1960           Service Date: 2018      Document: [id number]

## 2018-11-07 NOTE — NURSING NOTE
The After Visit Summary was reviewed with the patient following their office visit with Sadiq. Patient was educated about any medication changes, the importance of following a low sodium diet, importance of recording daily weights, and when to call [facility]. He stated that he obtained a new scale yesterday - I emphasized the importance of the daily weights and added that we will call him later this week to assess the response to the increased bumex dose. Patient verbalized understanding of the information and agrees to call with any questions or concerns.     Labs: Lab results from today were reviewed with the patient during the office visit. A copy of the results were provided on the After Visit Summary.     Future Appointments  Date Time Provider Department Center   11/13/2018 1:00 PM [facility]   11/13/2018 1:50 PM [facility]   11/20/2018 7:30 AM [facility]   11/20/2018 8:10 AM Rachna Holt PA-C [facility]   11/27/2018 1:00 PM [facility]   11/27/2018 1:55 PM Rachna Holt PA-C [facility]   12/12/2018 8:45 AM Nurse,  Oncology [facility]   12/12/2018 9:45 AM Juan Fletcher MD [facility]     Medication changes:   increase bumex to 3mg bid  Add spironolactone 25mg every day  Patient to receive 1 unit PRBC's tomorrow(11/7) - will send updated order to increased post infusion lasix to 40mg IV      Jaqueline Miller, RN  [facility] RN Care Coordinator  [facility]  [phone]

## 2018-11-07 NOTE — PROGRESS NOTES
Infusion Nursing Note:  Gil Chowdary presents today for transfusion 1u RBCS.    Patient seen by provider today: No   present during visit today: Not Applicable.    Note: N/A.    Intravenous Access:  Peripheral IV placed.    Treatment Conditions:  Blood transfusion consent signed 11/7/18.      Post Infusion Assessment:  Patient tolerated infusion without incident.  Site patent and intact, free from redness, edema or discomfort.  No evidence of extravasations.  Access discontinued per protocol.    Discharge Plan:   Patient and/or family verbalized understanding of discharge instructions and all questions answered.  AVS to patient via [employer].Patient discharged in stable condition accompanied by: self.  Departure Mode: Ambulatory.    Yvrose Villatoro RN                    '

## 2018-11-08 ENCOUNTER — TELEPHONE (OUTPATIENT)
Dept: ONCOLOGY | Facility: CLINIC | Age: 58
End: 2018-11-08

## 2018-11-08 ENCOUNTER — CARE COORDINATION (OUTPATIENT)
Dept: CARDIOLOGY | Facility: CLINIC | Age: 58
End: 2018-11-08

## 2018-11-08 NOTE — PROGRESS NOTES
Incoming message form patient w/concerns about leg cramps - LM for call back.  Jaqueline Miller RN on 11/8/2018 at 1:55 PM

## 2018-11-08 NOTE — TELEPHONE ENCOUNTER
Spoke with Adolfo- He was advised from  Dr. Jose Villatoro ( [phone]) to stop coumadin on 11/8/18 for the 11/14/18 colonoscopy.  This is the office that manages his INR.  He stated that he is supposed to stop 11/8 or 11/9 through the day of the colonoscopy.    I called the clinic number and they stated what was documented was to take the last dose 11/9 and hold from then on until after colonoscopy.    Requested INR nurse to call him again and review last dose and dates to hold.

## 2018-11-08 NOTE — PROGRESS NOTES
I suspect with the increased po and IV lasix yesterday he had significant fluid shift.  Continue current po dosing with spironlactone.  He absolutely must weigh, we can't take care of him well without it.  Rachna Holt PA-C 11/8/2018 4:01 PM

## 2018-11-08 NOTE — PROGRESS NOTES
"Spoke w/patient - he stated he experienced \"horrible cramping this morning in my legs, arms and even my hands and fingers.\"  Onset shortly after he took his am meds and lasted \"a couple hrs.\"  Currently he is pain-free, he attributed resolution to \"walking around.\"      Patient last seen by Sadiq on 11/6. At this appt his bumex increased to 3mg bid and spironolactone 25mg every day added. He also received 1 unit PRBC's yesterday which was followed by lasix 40mg IV. He stated that he has been urinating a significant amount, but has not been weighing himself daily. He stated that he did this am and it was \"around 320#\" - I repeated the conversation I had with him on Tues about the importance of daily weights, particularly when we are increasing the amount of diuretic he is getting. He \"maybe\" is noticing that his feet are a bit smaller, otherwise he is not feeling a significant difference in his swelling.     Will review w/provider.  Jaqueline Miller RN on 11/8/2018 at 2:19 PM        "

## 2018-11-08 NOTE — PROGRESS NOTES
Reviewed during clinic visit.  Please see progress note for plan.  Rachna Holt PA-C 11/8/2018 11:30 AM

## 2018-11-09 NOTE — PROGRESS NOTES
"Call to patient, he stated that today he is feeling \"great.\"  He denied having any muscle cramping and has already taken his am diuretics(bumex and spironolactone). Weight this am 323# - as yesterdays reported weight not accurate, we will start with today. He assured me eh will faithfully get on the scale every am and record the number. I will check in with him Monday am(will send note to board). He sees SMore on Tues 11/13.   "

## 2018-11-12 NOTE — PROGRESS NOTES
Call to patient to assess as per plan below - LM for call back.  Jaqueline Miller RN on 11/12/2018 at 9:57 AM

## 2018-11-12 NOTE — PROGRESS NOTES
"Incoming call from patient - he stated, \"I had some difficulty over the weekend.\"  He continued by stating that he developed an itchy, hive-like rash over his chest. \"When I took the 3 bumex I could tell something wasn't right.\"   He denied any SOB, throat /tongue swelling, but added that the rash was very bothersome. As a result he stopped the spironolactone and decreased the bumex from 3mg bid to 2mg bid - these dose changes started Sun 11/11. Since making those changes he feels that the hives are less, the itching is also less. He did not seek any medical treatment for the rash.   (As of 11/6, Sadiq increased his bumex to 3mg bid and added spironolactone 25mg every day d/t ongoing severe volume overload)    Current home weights:  11/9 323  11/10 320  11/11 315  11/12 318  He stated that he is feeling better. His legs feel less \"spongy\" and he is finding it easier to get around. \"I don't feel as heavy and I can walk easier.\"  He continues to have some occasional cramping of his hands, nothing as severe as he experienced last week.     Future Appointments  Date Time Provider Department Center   11/13/2018 1:00 PM [facility]   11/13/2018 1:50 PM Rachna Holt PA-C [facility]   11/20/2018 7:30 AM [facility]   11/20/2018 8:10 AM Rachna Holt PA-C [facility]   11/27/2018 1:00 PM [facility]   11/27/2018 1:55 PM Rachna Holt PA-C [facility]   12/12/2018 8:45 AM Nurse,  Oncology [facility]   12/12/2018 9:45 AM Juan Fletcher MD [facility]     Will review w/provider.  Jaqueline Miller RN on 11/12/2018 at 11:21 AM    "

## 2018-11-12 NOTE — PROGRESS NOTES
I suspect rash would be from spironolactone.  He should discontinue that.  I'd like him to stay on bumex 3 mg bid.  Ok to take claritin or allegra or zyrtec for the rash, benadryl if severe.    I'm pleased he is weighing, but note that wt went up 3 pounds with him stopping jian and decreased bumex.    Please check in on him again on Thursday.  Rachna Holt PA-C 11/12/2018 11:48 AM

## 2018-11-13 ENCOUNTER — OFFICE VISIT (OUTPATIENT)
Dept: CARDIOLOGY | Facility: CLINIC | Age: 58
End: 2018-11-13
Attending: PHYSICIAN ASSISTANT
Payer: COMMERCIAL

## 2018-11-13 VITALS
HEART RATE: 68 BPM | OXYGEN SATURATION: 100 % | BODY MASS INDEX: 45.1 KG/M2 | HEIGHT: 70 IN | SYSTOLIC BLOOD PRESSURE: 104 MMHG | DIASTOLIC BLOOD PRESSURE: 68 MMHG | WEIGHT: 315 LBS

## 2018-11-13 DIAGNOSIS — I50.30 (HFPEF) HEART FAILURE WITH PRESERVED EJECTION FRACTION (H): ICD-10-CM

## 2018-11-13 DIAGNOSIS — Z95.2 S/P AVR (AORTIC VALVE REPLACEMENT): ICD-10-CM

## 2018-11-13 LAB
ANION GAP SERPL CALCULATED.3IONS-SCNC: 10.9 MMOL/L (ref 6–17)
BUN SERPL-MCNC: 21 MG/DL (ref 7–30)
CALCIUM SERPL-MCNC: 9 MG/DL (ref 8.5–10.5)
CHLORIDE SERPL-SCNC: 104 MMOL/L (ref 98–107)
CO2 SERPL-SCNC: 27 MMOL/L (ref 23–29)
CREAT SERPL-MCNC: 1.5 MG/DL (ref 0.7–1.3)
GFR SERPL CREATININE-BSD FRML MDRD: 48 ML/MIN/1.7M2
GLUCOSE SERPL-MCNC: 93 MG/DL (ref 70–105)
HGB BLD-MCNC: 9.1 G/DL (ref 13.3–17.7)
POTASSIUM SERPL-SCNC: 3.9 MMOL/L (ref 3.5–5.1)
SODIUM SERPL-SCNC: 138 MMOL/L (ref 136–145)

## 2018-11-13 PROCEDURE — 36415 COLL VENOUS BLD VENIPUNCTURE: CPT | Performed by: PHYSICIAN ASSISTANT

## 2018-11-13 PROCEDURE — 80048 BASIC METABOLIC PNL TOTAL CA: CPT | Performed by: PHYSICIAN ASSISTANT

## 2018-11-13 PROCEDURE — 85018 HEMOGLOBIN: CPT | Performed by: PHYSICIAN ASSISTANT

## 2018-11-13 PROCEDURE — 99214 OFFICE O/P EST MOD 30 MIN: CPT | Performed by: PHYSICIAN ASSISTANT

## 2018-11-13 RX ORDER — HYDROCHLOROTHIAZIDE 12.5 MG/1
12.5 TABLET ORAL DAILY
Qty: 90 TABLET | Refills: 3 | Status: SHIPPED | OUTPATIENT
Start: 2018-11-13 | End: 2018-11-21

## 2018-11-13 RX ORDER — LIDOCAINE 40 MG/G
CREAM TOPICAL
Status: CANCELLED | OUTPATIENT
Start: 2018-11-13

## 2018-11-13 RX ORDER — ONDANSETRON 2 MG/ML
4 INJECTION INTRAMUSCULAR; INTRAVENOUS
Status: CANCELLED | OUTPATIENT
Start: 2018-11-13

## 2018-11-13 NOTE — LETTER
2018      Sam Villatoro PA-C  [facility]   [address]  [place] [address]      RE: Gil Chowdary       Dear Colleague,    I had the pleasure of seeing Gil Chowdary in the [facility].    Service Date: 2018      PRIMARY CARDIOLOGIST:  Dr. Guru Salinas.      REASON FOR VISIT:  HFpEF, cor pulmonale.      HISTORY OF PRESENT ILLNESS:  Mr. Chowdary is a delightful 58-year-old gentleman who has a past medical history significant for the followin.  Severe aortic stenosis with aortic valve replacement in 2016 with a 25 mm Trifecta tissue valve.   2.  Type 2 diabetes, on insulin.   3.  Hyperlipidemia.   4.  Sleep apnea, treated.   5.  Recent diagnosis heart failure with preserved EF.   6.  Recent anemia of unclear etiology with concern for GI bleed.      Mr. Chowdary was admitted in October with weight gain and shortness of breath.  He diuresed during that hospitalization from about 325 pounds down to about 310 pounds.  At one point he got below 300 at home, but then his weight continued to increase.  I have been seeing him back serially, and we have had a hard time diuresing him.  He did not want to stay inpatient, as he takes care of his mother who is at home and he does not have family who is willing to jump in.  At his last visit, I added low-dose spironolactone to help with diuresis and he said this was very helpful, except he developed a rash.  This was on his abdomen and his back.  It was small red spots that was initially itchy.  We stopped his spironolactone and continued his Bumex, and the itching has gone away, but the rash has not resolved yet.      He continues to have massive edema and scrotum the size of grapefruits.  It does seem to him, though, that from time to time things get softer and then the fluid shifts and things get more swollen again.  He is elevating his feet at night.  He is urinating frequently.  He feels that  the Bumex is making him urinate a lot and frequently large volumes as well.  He did get a blood transfusion of 1 unit last week with 40 mg of IV Lasix, and with that he did note an increase in energy.  He has his colonoscopy scheduled for tomorrow.  He continues to get some cramping occasionally in his hands and occasional charley horses at night.  This has improved significantly since stopping the spironolactone.      SOCIAL HISTORY:  He is weighing daily, although he has carpet everywhere in his house, he says, including the bathroom and the kitchen.  He is going to try to find a more solid spot to weigh.  He lives at home taking care of his mother, who is in her late 90s.  She had a stroke and needs significant help.  She also has dementia.  He has several brothers, but they do not participate in her care.  He is a former smoker with a 20-pack-year history, quit in 2011.  No alcohol use.  He has been trying to watch his salt and sticking to about 2 liters of fluid.      PHYSICAL EXAMINATION:   GENERAL:  Well-developed, well-nourished gentleman, pale, in no acute distress.   HEENT:  Normocephalic, atraumatic.   HEART:  Regular.  I do not appreciate murmur, rub or gallop.   LUNGS:  Diminished, although improving.  There are no wheezes, rales or rhonchi today.   EXTREMITIES:  Extremities have 3+ pitting edema to his thighs.  His abdomen is distended and has pitting edema.  At 90 degrees I do not appreciate JVP, although it is difficult to assess secondary to body habitus.      LABORATORY:  Hemoglobin 9.1.  BMP is pending.      ASSESSMENT AND PLAN:   1.  Recent diagnosis of acute anemia likely precipitating heart failure with preserved EF and loss of volume overload.  He does not have any protein in his urine, so makes nephrotic syndrome less likely.  I still suspect he has about 50 pounds of fluid on him.  He has declined inpatient admission due to some family commitments taking care of his mom.  Unfortunately, it  appears that he has had an allergic reaction to spironolactone and developed a rash.  At this time, I added it to his allergy list.  It is possible this is Bumex, but he did well on Bumex for several weeks before the rash developed within days of starting spironolactone.  Again, he has massive amounts to diurese.  We are going to add a very small dose of hydrochlorothiazide at 12.5 mg daily and keep him on Bumex 3 mg b.i.d.  We will get his BMP, and if there is any need to change that, we will do so.  He does have a colonoscopy tomorrow, and I will have him hold his Bumex and hydrochlorothiazide before that.  Hopefully, they will find the source of the bleeding.   2.  Aortic valve replacement for severe aortic stenosis, well-functioning on echocardiogram.   3.  Anemia, seen by Heme.  This is a normocytic anemia with thrombocytopenia.  He is getting iron for this.  There is iron deficiency as well.  He will get the colonoscopy tomorrow.  He is on Coumadin.  This is for what appears to have been for brief AFib and SVT post-AVR.  If they are unable to find an easy fix for his anemia, we will put a Holter monitor on him and if no AFib would consider discontinuing his Coumadin and keeping him on aspirin alone.  He does not need Coumadin for a bioprosthetic aortic valve.  In addition, he does have followup with Dr. Fletcher.  I am happy that with 1 unit transfusion he has been able to hold a hemoglobin at about 9.1 here and he also had symptomatic benefit as well.      I will see him weekly, BMP and hemoglobin each time, and I will make adjustments as needed to get him diuresed.  He will call in between with concerns.  Thank you for allowing me to participate in his care.      Rachna Holt PA-C       cc:   JOAN Alfaro    [facility]    [address] [place] [address], [address]    [place] [address]         RACHNA HOLT PA-C             D: 11/13/2018   T: 11/13/2018   MT: BLANE      Name:     SHAWNA  ABIGAIL   MRN:      [id number]        Account:      [id number]   :      1960           Service Date: 2018      Document: [id number]         Outpatient Encounter Prescriptions as of 2018   Medication Sig Dispense Refill     atorvastatin (LIPITOR) 40 MG tablet Take 1 tablet (40 mg) by mouth daily 90 tablet 0     bumetanide (BUMEX) 1 MG tablet Take 3 tablets (3 mg) by mouth 2 times daily 180 tablet 3     cholecalciferol (VITAMIN D3) 5000 UNITS TABS tablet Take 5,000 Units by mouth every morning       clindamycin (CLEOCIN) 300 MG capsule Take 1 capsule (300 mg) by mouth as needed 10 capsule 3     ferrous sulfate (IRON) 325 (65 Fe) MG tablet Take 1 tablet (325 mg) by mouth 2 times daily 100 tablet 3     hydrochlorothiazide 12.5 MG TABS tablet Take 1 tablet (12.5 mg) by mouth daily 90 tablet 3     insulin aspart (NOVOLOG FLEXPEN) 100 UNIT/ML injection Inject 4 Units Subcutaneous daily (with lunch)       insulin aspart (NOVOLOG FLEXPEN) 100 UNIT/ML injection Inject 3 Units Subcutaneous 2 times daily (with meals) Takes with breakfast and dinner       Insulin Glargine (BASAGLAR KWIKPEN SC) Inject 22 Units Subcutaneous every morning        metoprolol (LOPRESSOR) 50 MG tablet Take 1 tablet (50 mg) by mouth 2 times daily 60 tablet 0     Multiple Vitamins-Minerals (CENTRUM ADULTS PO) Take 1 tablet by mouth every morning        pantoprazole (PROTONIX) 40 MG EC tablet Take 40 mg by mouth every morning (before breakfast)       potassium chloride SA (K-DUR/KLOR-CON M) 10 MEQ CR tablet Take 20 mEq by mouth 2 times daily       prednisoLONE acetate (PRED FORTE) 1 % ophthalmic susp Place 1 drop into both eyes as needed (glaucoma)        timolol (TIMOPTIC) 0.5 % ophthalmic solution Place 1 drop into both eyes 2 times daily        aspirin 81 MG tablet Take 81 mg by mouth every morning        warfarin (COUMADIN) 3 MG tablet Take 3 mg by mouth every evening       [DISCONTINUED] spironolactone (ALDACTONE) 25 MG tablet  Take 1 tablet (25 mg) by mouth daily (Patient not taking: Reported on 11/13/2018) 30 tablet 11     No facility-administered encounter medications on file as of 11/13/2018.        Again, thank you for allowing me to participate in the care of your patient.      Sincerely,    Rachna Holt PA-C     [facility]

## 2018-11-13 NOTE — PROGRESS NOTES
[id number]  HPI and Plan:   See dictation    Orders this Visit:  No orders of the defined types were placed in this encounter.    Orders Placed This Encounter   Medications     hydrochlorothiazide 12.5 MG TABS tablet     Sig: Take 1 tablet (12.5 mg) by mouth daily     Dispense:  90 tablet     Refill:  3     Medications Discontinued During This Encounter   Medication Reason     spironolactone (ALDACTONE) 25 MG tablet          Encounter Diagnoses   Name Primary?     (HFpEF) heart failure with preserved ejection fraction (H)      S/P AVR (aortic valve replacement)        CURRENT MEDICATIONS:  Current Outpatient Prescriptions   Medication Sig Dispense Refill     atorvastatin (LIPITOR) 40 MG tablet Take 1 tablet (40 mg) by mouth daily 90 tablet 0     bumetanide (BUMEX) 1 MG tablet Take 3 tablets (3 mg) by mouth 2 times daily 180 tablet 3     cholecalciferol (VITAMIN D3) 5000 UNITS TABS tablet Take 5,000 Units by mouth every morning       clindamycin (CLEOCIN) 300 MG capsule Take 1 capsule (300 mg) by mouth as needed 10 capsule 3     ferrous sulfate (IRON) 325 (65 Fe) MG tablet Take 1 tablet (325 mg) by mouth 2 times daily 100 tablet 3     hydrochlorothiazide 12.5 MG TABS tablet Take 1 tablet (12.5 mg) by mouth daily 90 tablet 3     insulin aspart (NOVOLOG FLEXPEN) 100 UNIT/ML injection Inject 4 Units Subcutaneous daily (with lunch)       insulin aspart (NOVOLOG FLEXPEN) 100 UNIT/ML injection Inject 3 Units Subcutaneous 2 times daily (with meals) Takes with breakfast and dinner       Insulin Glargine (BASAGLAR KWIKPEN SC) Inject 22 Units Subcutaneous every morning        metoprolol (LOPRESSOR) 50 MG tablet Take 1 tablet (50 mg) by mouth 2 times daily 60 tablet 0     Multiple Vitamins-Minerals (CENTRUM ADULTS PO) Take 1 tablet by mouth every morning        pantoprazole (PROTONIX) 40 MG EC tablet Take 40 mg by mouth every morning (before breakfast)       potassium chloride SA (K-DUR/KLOR-CON M) 10 MEQ CR tablet Take 20 mEq by  mouth 2 times daily       prednisoLONE acetate (PRED FORTE) 1 % ophthalmic susp Place 1 drop into both eyes as needed (glaucoma)        timolol (TIMOPTIC) 0.5 % ophthalmic solution Place 1 drop into both eyes 2 times daily        aspirin 81 MG tablet Take 81 mg by mouth every morning        warfarin (COUMADIN) 3 MG tablet Take 3 mg by mouth every evening         ALLERGIES     Allergies   Allergen Reactions     Amoxicillin      Itchy      Penicillins Hives     Spironolactone Rash       PAST MEDICAL HISTORY:  Past Medical History:   Diagnosis Date     Former tobacco use      Glaucoma      Hyperlipidemia LDL goal <160 12/6/2011     Obesity      DRAKE on CPAP      Right bundle branch block      Severe aortic stenosis     On Echo 10/28/16       PAST SURGICAL HISTORY:  Past Surgical History:   Procedure Laterality Date     HEART CATH LEFT HEART CATH  04/07/2017    Diffuse non-obstuctive CAD     REPAIR VALVE AORTIC N/A 5/16/2017    Procedure: REPAIR VALVE AORTIC;  Median Sternotony, CardioPulmonary Bypass, Aortic Valve Replace using 25MM Trifecta Valve with [employer], Septal myectomy;  Surgeon: Jun Simon MD;  Location: [facility] OR     REPLACE VALVE AORTIC N/A 5/16/2017    Procedure: REPLACE VALVE AORTIC;;  Surgeon: Jun Simon MD;  Location: [facility] OR     SINUS SURGERY  2005       FAMILY HISTORY:  Family History   Problem Relation Age of Onset     Family History Negative Mother      Diabetes Father      Heart Surgery Father      CABG     Coronary Artery Disease Father      Hypertension Brother      Diabetes Brother      HEART DISEASE Brother      Heart Surgery Brother      CABG x 3     Family History Negative Sister      Diabetes Brother      History of diabetes, but no longer an issue     Family History Negative Brother        SOCIAL HISTORY:  Social History     Social History     Marital status:      Spouse name: N/A     Number of children: N/A     Years of education: N/A     Social History  "Main Topics     Smoking status: Former Smoker     Packs/day: 1.00     Years: 20.00     Types: Cigarettes, Cigars     Quit date: 10/21/2011     Smokeless tobacco: Never Used     Alcohol use No     Drug use: No     Sexual activity: Not Asked     Other Topics Concern     Parent/Sibling W/ Cabg, Mi Or Angioplasty Before 65f 55m? Yes     brother triple bypass 49     Social History Narrative       Review of Systems:  Skin:  Negative     Eyes:  Positive for glasses  ENT:  Negative    Respiratory:  Positive for CPAP;sleep apnea;dyspnea on exertion  Cardiovascular:    Positive for;edema  Gastroenterology: Negative    Genitourinary:  Negative    Musculoskeletal:  Negative    Neurologic:  Negative    Psychiatric:  Negative    Heme/Lymph/Imm:  Negative    Endocrine:  Positive for diabetes    Physical Exam:  Vitals: /68  Pulse 68  Ht 1.778 m (5' 10\")  Wt 148.8 kg (328 lb)  SpO2 100%  BMI 47.06 kg/m2   Please refer to dictation for physical exam    Recent Lab Results:  LIPID RESULTS:  Lab Results   Component Value Date    CHOL 126 10/10/2018    HDL 23 (A) 10/10/2018    LDL 71 10/10/2018    TRIG 230 (A) 10/10/2018       LIVER ENZYME RESULTS:  Lab Results   Component Value Date    AST 55 (H) 10/12/2018    ALT 31 10/12/2018       CBC RESULTS:  Lab Results   Component Value Date    WBC 3.9 (L) 10/24/2018    RBC 2.94 (L) 10/24/2018    HGB 9.1 (L) 11/13/2018    HCT 25.5 (L) 10/24/2018    MCV 87 10/24/2018    MCH 25.5 (L) 10/24/2018    MCHC 29.4 (L) 10/24/2018    RDW 17.5 (H) 10/24/2018    PLT 95 (L) 10/24/2018       BMP RESULTS:  Lab Results   Component Value Date     11/06/2018    POTASSIUM 3.8 11/06/2018    CHLORIDE 105 11/06/2018    CO2 26 11/06/2018    ANIONGAP 10.8 11/06/2018     (H) 11/06/2018    BUN 30 11/06/2018    CR 1.65 (H) 11/06/2018    GFRESTIMATED 43 (L) 11/06/2018    GFRESTBLACK 52 (L) 11/06/2018    MYRON 8.6 11/06/2018        A1C RESULTS:  Lab Results   Component Value Date    A1C 6.8 (H) " 10/12/2018       INR RESULTS:  Lab Results   Component Value Date    INR 2.19 (H) 10/14/2018    INR 2.28 (H) 10/13/2018           CC  Rachna Holt, PA-C  [address]  [place] [id number]

## 2018-11-13 NOTE — LETTER
11/13/2018    Sam Villatoro PA-C  [facility] [address]  [facility] [place] [address]    RE: Gil Chowdary       Dear Colleague,    I had the pleasure of seeing Gil Chowdary in the [facility].    [id number]  HPI and Plan:   See dictation    Orders this Visit:  No orders of the defined types were placed in this encounter.    Orders Placed This Encounter   Medications     hydrochlorothiazide 12.5 MG TABS tablet     Sig: Take 1 tablet (12.5 mg) by mouth daily     Dispense:  90 tablet     Refill:  3     Medications Discontinued During This Encounter   Medication Reason     spironolactone (ALDACTONE) 25 MG tablet          Encounter Diagnoses   Name Primary?     (HFpEF) heart failure with preserved ejection fraction (H)      S/P AVR (aortic valve replacement)        CURRENT MEDICATIONS:  Current Outpatient Prescriptions   Medication Sig Dispense Refill     atorvastatin (LIPITOR) 40 MG tablet Take 1 tablet (40 mg) by mouth daily 90 tablet 0     bumetanide (BUMEX) 1 MG tablet Take 3 tablets (3 mg) by mouth 2 times daily 180 tablet 3     cholecalciferol (VITAMIN D3) 5000 UNITS TABS tablet Take 5,000 Units by mouth every morning       clindamycin (CLEOCIN) 300 MG capsule Take 1 capsule (300 mg) by mouth as needed 10 capsule 3     ferrous sulfate (IRON) 325 (65 Fe) MG tablet Take 1 tablet (325 mg) by mouth 2 times daily 100 tablet 3     hydrochlorothiazide 12.5 MG TABS tablet Take 1 tablet (12.5 mg) by mouth daily 90 tablet 3     insulin aspart (NOVOLOG FLEXPEN) 100 UNIT/ML injection Inject 4 Units Subcutaneous daily (with lunch)       insulin aspart (NOVOLOG FLEXPEN) 100 UNIT/ML injection Inject 3 Units Subcutaneous 2 times daily (with meals) Takes with breakfast and dinner       Insulin Glargine (BASAGLAR KWIKPEN SC) Inject 22 Units Subcutaneous every morning        metoprolol (LOPRESSOR) 50 MG tablet Take 1 tablet (50 mg) by mouth 2 times daily 60 tablet 0      Multiple Vitamins-Minerals (CENTRUM ADULTS PO) Take 1 tablet by mouth every morning        pantoprazole (PROTONIX) 40 MG EC tablet Take 40 mg by mouth every morning (before breakfast)       potassium chloride SA (K-DUR/KLOR-CON M) 10 MEQ CR tablet Take 20 mEq by mouth 2 times daily       prednisoLONE acetate (PRED FORTE) 1 % ophthalmic susp Place 1 drop into both eyes as needed (glaucoma)        timolol (TIMOPTIC) 0.5 % ophthalmic solution Place 1 drop into both eyes 2 times daily        aspirin 81 MG tablet Take 81 mg by mouth every morning        warfarin (COUMADIN) 3 MG tablet Take 3 mg by mouth every evening         ALLERGIES     Allergies   Allergen Reactions     Amoxicillin      Itchy      Penicillins Hives     Spironolactone Rash       PAST MEDICAL HISTORY:  Past Medical History:   Diagnosis Date     Former tobacco use      Glaucoma      Hyperlipidemia LDL goal <160 12/6/2011     Obesity      DRAKE on CPAP      Right bundle branch block      Severe aortic stenosis     On Echo 10/28/16       PAST SURGICAL HISTORY:  Past Surgical History:   Procedure Laterality Date     HEART CATH LEFT HEART CATH  04/07/2017    Diffuse non-obstuctive CAD     REPAIR VALVE AORTIC N/A 5/16/2017    Procedure: REPAIR VALVE AORTIC;  Median Sternotony, CardioPulmonary Bypass, Aortic Valve Replace using 25MM Trifecta Valve with [employer], Septal myectomy;  Surgeon: Jun Simon MD;  Location: [facility] OR     REPLACE VALVE AORTIC N/A 5/16/2017    Procedure: REPLACE VALVE AORTIC;;  Surgeon: Jun Simon MD;  Location: [facility] OR     SINUS SURGERY  2005       FAMILY HISTORY:  Family History   Problem Relation Age of Onset     Family History Negative Mother      Diabetes Father      Heart Surgery Father      CABG     Coronary Artery Disease Father      Hypertension Brother      Diabetes Brother      HEART DISEASE Brother      Heart Surgery Brother      CABG x 3     Family History Negative Sister      Diabetes Brother       "History of diabetes, but no longer an issue     Family History Negative Brother        SOCIAL HISTORY:  Social History     Social History     Marital status:      Spouse name: N/A     Number of children: N/A     Years of education: N/A     Social History Main Topics     Smoking status: Former Smoker     Packs/day: 1.00     Years: 20.00     Types: Cigarettes, Cigars     Quit date: 10/21/2011     Smokeless tobacco: Never Used     Alcohol use No     Drug use: No     Sexual activity: Not Asked     Other Topics Concern     Parent/Sibling W/ Cabg, Mi Or Angioplasty Before 65f 55m? Yes     brother triple bypass 49     Social History Narrative       Review of Systems:  Skin:  Negative     Eyes:  Positive for glasses  ENT:  Negative    Respiratory:  Positive for CPAP;sleep apnea;dyspnea on exertion  Cardiovascular:    Positive for;edema  Gastroenterology: Negative    Genitourinary:  Negative    Musculoskeletal:  Negative    Neurologic:  Negative    Psychiatric:  Negative    Heme/Lymph/Imm:  Negative    Endocrine:  Positive for diabetes    Physical Exam:  Vitals: /68  Pulse 68  Ht 1.778 m (5' 10\")  Wt 148.8 kg (328 lb)  SpO2 100%  BMI 47.06 kg/m2   Please refer to dictation for physical exam    Recent Lab Results:  LIPID RESULTS:  Lab Results   Component Value Date    CHOL 126 10/10/2018    HDL 23 (A) 10/10/2018    LDL 71 10/10/2018    TRIG 230 (A) 10/10/2018       LIVER ENZYME RESULTS:  Lab Results   Component Value Date    AST 55 (H) 10/12/2018    ALT 31 10/12/2018       CBC RESULTS:  Lab Results   Component Value Date    WBC 3.9 (L) 10/24/2018    RBC 2.94 (L) 10/24/2018    HGB 9.1 (L) 11/13/2018    HCT 25.5 (L) 10/24/2018    MCV 87 10/24/2018    MCH 25.5 (L) 10/24/2018    MCHC 29.4 (L) 10/24/2018    RDW 17.5 (H) 10/24/2018    PLT 95 (L) 10/24/2018       BMP RESULTS:  Lab Results   Component Value Date     11/06/2018    POTASSIUM 3.8 11/06/2018    CHLORIDE 105 11/06/2018    CO2 26 11/06/2018    " ANIONGAP 10.8 11/06/2018     (H) 11/06/2018    BUN 30 11/06/2018    CR 1.65 (H) 11/06/2018    GFRESTIMATED 43 (L) 11/06/2018    GFRESTBLACK 52 (L) 11/06/2018    MYRON 8.6 11/06/2018        A1C RESULTS:  Lab Results   Component Value Date    A1C 6.8 (H) 10/12/2018       INR RESULTS:  Lab Results   Component Value Date    INR 2.19 (H) 10/14/2018    INR 2.28 (H) 10/13/2018           CC  Rachna Holt PA-C  [address]  [place] [address]        Thank you for allowing me to participate in the care of your patient.      Sincerely,     Rachna Holt PA-C     [facility]    cc:   Rachna Holt PA-C  [address]  [place] [address]

## 2018-11-13 NOTE — MR AVS SNAPSHOT
After Visit Summary   11/13/2018    Gil Chowdary    MRN: [id number]           Patient Information     Date Of Birth          1960        Visit Information        Provider Department      11/13/2018 1:50 PM Yanet, Rachna Carrillo PA-C [facility]   [place]        Today's Diagnoses     (HFpEF) heart failure with preserved ejection fraction (H)        S/P AVR (aortic valve replacement)          Care Instructions    Call CORE nurse for any questions or concerns:  [phone]   *If you have concerns after hours, please call [phone], option 2 to speak with on call Cardiologist.    Increase your water intake to about 2.5 liters a day.      1. Medication changes from today:  Stay off of spironolactone.  I suspect this caused the rash.      Start taking hydrochlorothiazide 12.5 mg once a day in the morning.  This will work with the bumex/ bumetinide.  Start this on Thursday.      Continue on your other medications.        2. Weigh yourself daily and write it down.  Try and do this on the thinnest carpet in the house.       3. Call CORE nurse if your weight is up more than 2 pounds in one day or 5 pounds in one week.     4. Call CORE nurse if you feel more short of breath, have more abdominal bloating, or leg swelling.     5. Continue low sodium diet (less than 2000 mg daily). If you eat less salt, you will retain less fluid.     6. Alcohol can weaken your heart further. You should avoid alcohol or limit its use to special times, such as a holiday or birthday.      7. Do NOT take Aleve or ibuprofen without talking to your doctor first.      8. Lab Results:   Your hemoglobin is stable since your blood transfusion.  We'll call with the results of your kidney function.    Component      Latest Ref Rng & Units 11/13/2018   Hemoglobin      13.3 - 17.7 g/dL 9.1 (L)        [facility]: Cardiomyopathy, Optimization, Rehabilitation, Education  The [facility] is a heart  failure specialty clinic within the [facility] where you will work with specialized nurse practitioners, physician assistants, doctors, and registered nurses. They are dedicated to helping patients with heart failure to carefully adjust medications, receive education, and learn who and when to call if symptoms develop. They specialize in helping you better understand your condition, slow the progression of your disease, improve the length and quality of your life, help you detect future heart problems before they become life threatening, and avoid hospitalizations.              Follow-ups after your visit        Your next 10 appointments already scheduled     Nov 14, 2018   Procedure with Katja Kohler MD   [facility] ([facility])    [address]  [facility] [address]   [phone]           [facility] is located at [address] [place]            Nov 15, 2018   Procedure with Jovanni Joe MD   [facility] ([facility])    [address] [address]   [phone]           [facility] is located at [address] [place]            Nov 20, 2018  7:30 AM CST   LAB with SOTOMAYOR LAB   [facility] AT [place] ([facility])    [address]  [facility] [address] [phone]           Please do not eat 10-12 hours before your appointment if you are coming in fasting for labs on lipids, cholesterol, or glucose (sugar). This does not apply to pregnant women. Water, hot tea and black coffee (with nothing added) are okay. Do not drink other fluids, diet soda or chew gum.            Nov 20, 2018  8:10 AM CST   Core Return with Rachna Holt PA-C   [facility] ([facility])    [address]  [facility] [address] [phone] OPT 2            Nov 27,  2018  1:00 PM CST   LAB with SOTOMAYOR LAB   [facility] AT [place] ([facility])    [address]  [place]  [place] [address]   [phone]           Please do not eat 10-12 hours before your appointment if you are coming in fasting for labs on lipids, cholesterol, or glucose (sugar). This does not apply to pregnant women. Water, hot tea and black coffee (with nothing added) are okay. Do not drink other fluids, diet soda or chew gum.            Nov 27, 2018  1:55 PM CST   Core Return with Rachna Holt PA-C   [facility] ([facility])    [address]  [place] [address]   [phone] OPT 2            Dec 12, 2018  8:45 AM CST   Return Visit with  Oncology Nurse   [facility] ([facility])    [facility]  [address]  [facility] [address]   [phone]            Dec 12, 2018  9:45 AM CST   Return Visit with Juan Fletcher MD   [facility] ([facility])    [facility]  [address]  [facility] [address]   [phone]              Who to contact     If you have questions or need follow up information about today's clinic visit or your schedule please contact [facility] directly at [phone].  Normal or non-critical lab and imaging results will be communicated to you by MyChart, letter or phone within 4 business days after the clinic has received the results. If you do not hear from us within 7 days, please contact the clinic through [employer] or phone. If you have a critical or abnormal lab result, we will notify you by phone as soon as possible.  Submit refill requests through [employer] or call your pharmacy and they will forward the refill request to us. Please allow 3 business days for your refill to be completed.          Additional  "Information About Your Visit        MyChart Information     [employer] gives you secure access to your electronic health record. If you see a primary care provider, you can also send messages to your care team and make appointments. If you have questions, please call your primary care clinic.  If you do not have a primary care provider, please call [phone] and they will assist you.        Care EveryWhere ID     This is your Care EveryWhere ID. This could be used by other organizations to access your [facility] medical records  [id number]        Your Vitals Were     Pulse Height Pulse Oximetry BMI (Body Mass Index)          68 1.778 m (5' 10\") 100% 47.06 kg/m2         Blood Pressure from Last 3 Encounters:   11/13/18 104/68   11/07/18 136/68   11/06/18 116/68    Weight from Last 3 Encounters:   11/13/18 148.8 kg (328 lb)   11/06/18 147.4 kg (325 lb)   10/24/18 140.3 kg (309 lb 3.2 oz)              We Performed the Following     Follow-Up with [facility] Clinic - KARYN visit          Today's Medication Changes          These changes are accurate as of 11/13/18  2:29 PM.  If you have any questions, ask your nurse or doctor.               Start taking these medicines.        Dose/Directions    hydrochlorothiazide 12.5 MG Tabs tablet   Used for:  (HFpEF) heart failure with preserved ejection fraction (H)   Started by:  Rachna Holt PA-C        Dose:  12.5 mg   Take 1 tablet (12.5 mg) by mouth daily   Quantity:  90 tablet   Refills:  3         Stop taking these medicines if you haven't already. Please contact your care team if you have questions.     spironolactone 25 MG tablet   Commonly known as:  ALDACTONE   Stopped by:  Rachna Holt PA-C                Where to get your medicines      These medications were sent to [facility] [address] [place] [address] AT [address] [place] [address]     Phone:  [phone]     hydrochlorothiazide " 12.5 MG Tabs tablet                Primary Care Provider Office Phone # Fax #    Sam Ramya Villatoro PA-C [phone]       [facility] [address]  [facility] [address]        Equal Access to Services     SIL COLEMAN : Hadii aad ku hadasho Soomaali, waaxda luqadaha, qaybta kaalmada adeegyada, waxay idiin hayaan adeeg khsampsonjose larachel fry. So [facility] [phone].    ATENCIÓN: Si habla español, tiene a cross disposición servicios gratuitos de asistencia lingüística. Llame al [phone].    We comply with applicable federal civil rights laws and [place] laws. We do not discriminate on the basis of race, color, national origin, age, disability, sex, sexual orientation, or gender identity.            Thank you!     Thank you for choosing [facility]  for your care. Our goal is always to provide you with excellent care. Hearing back from our patients is one way we can continue to improve our services. Please take a few minutes to complete the written survey that you may receive in the mail after your visit with us. Thank you!             Your Updated Medication List - Protect others around you: Learn how to safely use, store and throw away your medicines at www.disposemymeds.org.          This list is accurate as of 11/13/18  2:29 PM.  Always use your most recent med list.                   Brand Name Dispense Instructions for use Diagnosis    aspirin 81 MG tablet      Take 81 mg by mouth every morning        atorvastatin 40 MG tablet    LIPITOR    90 tablet    Take 1 tablet (40 mg) by mouth daily    Hyperlipidemia with target LDL less than 70       BASAGLAR KWIKPEN SC      Inject 22 Units Subcutaneous every morning        bumetanide 1 MG tablet    BUMEX    180 tablet    Take 3 tablets (3 mg) by mouth 2 times daily    S/P AVR (aortic valve replacement)       CENTRUM ADULTS PO      Take 1 tablet by mouth every morning        cholecalciferol 5000 units  Tabs tablet    vitamin D3     Take 5,000 Units by mouth every morning        clindamycin 300 MG capsule    CLEOCIN    10 capsule    Take 1 capsule (300 mg) by mouth as needed    S/P AVR (aortic valve replacement)       ferrous sulfate 325 (65 Fe) MG tablet    IRON    100 tablet    Take 1 tablet (325 mg) by mouth 2 times daily    Other iron deficiency anemia       hydrochlorothiazide 12.5 MG Tabs tablet     90 tablet    Take 1 tablet (12.5 mg) by mouth daily    (HFpEF) heart failure with preserved ejection fraction (H)       metoprolol tartrate 50 MG tablet    LOPRESSOR    60 tablet    Take 1 tablet (50 mg) by mouth 2 times daily    Paroxysmal supraventricular tachycardia (H), S/P AVR (aortic valve replacement)       * NovoLOG FLEXPEN 100 UNIT/ML injection   Generic drug:  insulin aspart      Inject 3 Units Subcutaneous 2 times daily (with meals) Takes with breakfast and dinner        * NovoLOG FLEXPEN 100 UNIT/ML injection   Generic drug:  insulin aspart      Inject 4 Units Subcutaneous daily (with lunch)        pantoprazole 40 MG EC tablet    PROTONIX     Take 40 mg by mouth every morning (before breakfast)        potassium chloride SA 10 MEQ CR tablet    K-DUR/KLOR-CON M     Take 20 mEq by mouth 2 times daily        prednisoLONE acetate 1 % ophthalmic susp    PRED FORTE     Place 1 drop into both eyes as needed (glaucoma)        timolol 0.5 % ophthalmic solution    TIMOPTIC     Place 1 drop into both eyes 2 times daily        warfarin 3 MG tablet    COUMADIN     Take 3 mg by mouth every evening        * Notice:  This list has 2 medication(s) that are the same as other medications prescribed for you. Read the directions carefully, and ask your doctor or other care provider to review them with you.

## 2018-11-13 NOTE — PATIENT INSTRUCTIONS
Call CORE nurse for any questions or concerns:  [phone]   *If you have concerns after hours, please call [phone], option 2 to speak with on call Cardiologist.    Increase your water intake to about 2.5 liters a day.      1. Medication changes from today:  Stay off of spironolactone.  I suspect this caused the rash.      Start taking hydrochlorothiazide 12.5 mg once a day in the morning.  This will work with the bumex/ bumetinide.  Start this on Thursday.      Continue on your other medications.        2. Weigh yourself daily and write it down.  Try and do this on the thinnest carpet in the house.       3. Call CORE nurse if your weight is up more than 2 pounds in one day or 5 pounds in one week.     4. Call CORE nurse if you feel more short of breath, have more abdominal bloating, or leg swelling.     5. Continue low sodium diet (less than 2000 mg daily). If you eat less salt, you will retain less fluid.     6. Alcohol can weaken your heart further. You should avoid alcohol or limit its use to special times, such as a holiday or birthday.      7. Do NOT take Aleve or ibuprofen without talking to your doctor first.      8. Lab Results:   Your hemoglobin is stable since your blood transfusion.  We'll call with the results of your kidney function.    Component      Latest Ref Rng & Units 11/13/2018   Hemoglobin      13.3 - 17.7 g/dL 9.1 (L)        [facility]: Cardiomyopathy, Optimization, Rehabilitation, Education  The [facility] is a heart failure specialty clinic within the [facility] where you will work with specialized nurse practitioners, physician assistants, doctors, and registered nurses. They are dedicated to helping patients with heart failure to carefully adjust medications, receive education, and learn who and when to call if symptoms develop. They specialize in helping you better understand your condition, slow the progression of your disease, improve the length and quality of your  life, help you detect future heart problems before they become life threatening, and avoid hospitalizations.

## 2018-11-13 NOTE — PROGRESS NOTES
Reviewed during clinic visit.  Please see progress note for plan.  Rachna Holt PA-C 11/13/2018 1:56 PM

## 2018-11-14 ENCOUNTER — CARE COORDINATION (OUTPATIENT)
Dept: CARDIOLOGY | Facility: CLINIC | Age: 58
End: 2018-11-14

## 2018-11-14 NOTE — PROGRESS NOTES
Incoming message from patient stating that he has had to cancel the colonoscopy scheduled for 11/14. The care he arranged for his mother has fallen through, he can't leave her alone. He will reschedule when his sister is in town, ~ 2 weeks from now. Jaqueline Miller RN on 11/14/2018 at 9:22 AM

## 2018-11-14 NOTE — PROGRESS NOTES
Service Date: 2018      PRIMARY CARDIOLOGIST:  Dr. Guru Salinas.      REASON FOR VISIT:  HFpEF, cor pulmonale.      HISTORY OF PRESENT ILLNESS:  Mr. Chowdary is a delightful 58-year-old gentleman who has a past medical history significant for the followin.  Severe aortic stenosis with aortic valve replacement in 2016 with a 25 mm Trifecta tissue valve.   2.  Type 2 diabetes, on insulin.   3.  Hyperlipidemia.   4.  Sleep apnea, treated.   5.  Recent diagnosis heart failure with preserved EF.   6.  Recent anemia of unclear etiology with concern for GI bleed.      Mr. Chowdary was admitted in October with weight gain and shortness of breath.  He diuresed during that hospitalization from about 325 pounds down to about 310 pounds.  At one point he got below 300 at home, but then his weight continued to increase.  I have been seeing him back serially, and we have had a hard time diuresing him.  He did not want to stay inpatient, as he takes care of his mother who is at home and he does not have family who is willing to jump in.  At his last visit, I added low-dose spironolactone to help with diuresis and he said this was very helpful, except he developed a rash.  This was on his abdomen and his back.  It was small red spots that was initially itchy.  We stopped his spironolactone and continued his Bumex, and the itching has gone away, but the rash has not resolved yet.      He continues to have massive edema and scrotum the size of grapefruits.  It does seem to him, though, that from time to time things get softer and then the fluid shifts and things get more swollen again.  He is elevating his feet at night.  He is urinating frequently.  He feels that the Bumex is making him urinate a lot and frequently large volumes as well.  He did get a blood transfusion of 1 unit last week with 40 mg of IV Lasix, and with that he did note an increase in energy.  He has his colonoscopy scheduled for tomorrow.  He continues  to get some cramping occasionally in his hands and occasional charley horses at night.  This has improved significantly since stopping the spironolactone.      SOCIAL HISTORY:  He is weighing daily, although he has carpet everywhere in his house, he says, including the bathroom and the kitchen.  He is going to try to find a more solid spot to weigh.  He lives at home taking care of his mother, who is in her late 90s.  She had a stroke and needs significant help.  She also has dementia.  He has several brothers, but they do not participate in her care.  He is a former smoker with a 20-pack-year history, quit in 2011.  No alcohol use.  He has been trying to watch his salt and sticking to about 2 liters of fluid.      PHYSICAL EXAMINATION:   GENERAL:  Well-developed, well-nourished gentleman, pale, in no acute distress.   HEENT:  Normocephalic, atraumatic.   HEART:  Regular.  I do not appreciate murmur, rub or gallop.   LUNGS:  Diminished, although improving.  There are no wheezes, rales or rhonchi today.   EXTREMITIES:  Extremities have 3+ pitting edema to his thighs.  His abdomen is distended and has pitting edema.  At 90 degrees I do not appreciate JVP, although it is difficult to assess secondary to body habitus.      LABORATORY:  Hemoglobin 9.1.  BMP is pending.      ASSESSMENT AND PLAN:   1.  Recent diagnosis of acute anemia likely precipitating heart failure with preserved EF and loss of volume overload.  He does not have any protein in his urine, so makes nephrotic syndrome less likely.  I still suspect he has about 50 pounds of fluid on him.  He has declined inpatient admission due to some family commitments taking care of his mom.  Unfortunately, it appears that he has had an allergic reaction to spironolactone and developed a rash.  At this time, I added it to his allergy list.  It is possible this is Bumex, but he did well on Bumex for several weeks before the rash developed within days of starting  spironolactone.  Again, he has massive amounts to diurese.  We are going to add a very small dose of hydrochlorothiazide at 12.5 mg daily and keep him on Bumex 3 mg b.i.d.  We will get his BMP, and if there is any need to change that, we will do so.  He does have a colonoscopy tomorrow, and I will have him hold his Bumex and hydrochlorothiazide before that.  Hopefully, they will find the source of the bleeding.   2.  Aortic valve replacement for severe aortic stenosis, well-functioning on echocardiogram.   3.  Anemia, seen by Heme.  This is a normocytic anemia with thrombocytopenia.  He is getting iron for this.  There is iron deficiency as well.  He will get the colonoscopy tomorrow.  He is on Coumadin.  This is for what appears to have been for brief AFib and SVT post-AVR.  If they are unable to find an easy fix for his anemia, we will put a Holter monitor on him and if no AFib would consider discontinuing his Coumadin and keeping him on aspirin alone.  He does not need Coumadin for a bioprosthetic aortic valve.  In addition, he does have followup with Dr. Fletcher.  I am happy that with 1 unit transfusion he has been able to hold a hemoglobin at about 9.1 here and he also had symptomatic benefit as well.      I will see him weekly, BMP and hemoglobin each time, and I will make adjustments as needed to get him diuresed.  He will call in between with concerns.  Thank you for allowing me to participate in his care.      Rachna Cao PA-C       cc:   JOAN Alfaro    [facility]    [address] [place] [address], [address]    [place] [address]         RACHNA CAO PA-C             D: 2018   T: 2018   MT: BLANE      Name:     ABIGAIL MATOS   MRN:      [id number]        Account:      [id number]   :      1960           Service Date: 2018      Document: [id number]

## 2018-11-15 NOTE — PROGRESS NOTES
"Call to patient for ongoing follow up. He stated that his colonoscopy has been rescheduled for 11/28 and his sister will be in town to help care for his mother.     He stated that his weight this am 322#. This is 4# higher than Monday but he added that previous weights were done with his scale on thick carpet. There is carpet throughout the house, but the scale is now in the bathroom where the floor is the most firm. He stated, \"I notice that now when I step in the scale the number doesn't jump around like it did.\"  He continues to feel that he is losing water. His legs are \"down\" and his scrotum is \"smaller.\"    He continues to be careful to limit Na consumption and is keeping his liquid intake to ~48-64oz/d. Will continue to call weekly to assess progress, will send reminder note to board.  Jaqueline Miller RN on 11/15/2018 at 9:30 AM      "

## 2018-11-21 ENCOUNTER — OFFICE VISIT (OUTPATIENT)
Dept: CARDIOLOGY | Facility: CLINIC | Age: 58
End: 2018-11-21
Attending: PHYSICIAN ASSISTANT
Payer: COMMERCIAL

## 2018-11-21 VITALS
HEART RATE: 86 BPM | WEIGHT: 315 LBS | HEIGHT: 70 IN | BODY MASS INDEX: 45.1 KG/M2 | DIASTOLIC BLOOD PRESSURE: 75 MMHG | SYSTOLIC BLOOD PRESSURE: 125 MMHG

## 2018-11-21 DIAGNOSIS — Z95.2 S/P AVR (AORTIC VALVE REPLACEMENT): ICD-10-CM

## 2018-11-21 DIAGNOSIS — I50.30 (HFPEF) HEART FAILURE WITH PRESERVED EJECTION FRACTION (H): ICD-10-CM

## 2018-11-21 DIAGNOSIS — I48.0 PAF (PAROXYSMAL ATRIAL FIBRILLATION) (H): Primary | ICD-10-CM

## 2018-11-21 LAB
ANION GAP SERPL CALCULATED.3IONS-SCNC: 11.4 MMOL/L (ref 6–17)
BUN SERPL-MCNC: 24 MG/DL (ref 7–30)
CALCIUM SERPL-MCNC: 8.6 MG/DL (ref 8.5–10.5)
CHLORIDE SERPL-SCNC: 105 MMOL/L (ref 98–107)
CO2 SERPL-SCNC: 28 MMOL/L (ref 23–29)
CREAT SERPL-MCNC: 1.59 MG/DL (ref 0.7–1.3)
GFR SERPL CREATININE-BSD FRML MDRD: 45 ML/MIN/1.7M2
GLUCOSE SERPL-MCNC: 98 MG/DL (ref 70–105)
HGB BLD-MCNC: 8.6 G/DL (ref 13.3–17.7)
POTASSIUM SERPL-SCNC: 3.4 MMOL/L (ref 3.5–5.1)
SODIUM SERPL-SCNC: 141 MMOL/L (ref 136–145)

## 2018-11-21 PROCEDURE — 85018 HEMOGLOBIN: CPT | Performed by: PHYSICIAN ASSISTANT

## 2018-11-21 PROCEDURE — 36415 COLL VENOUS BLD VENIPUNCTURE: CPT | Performed by: PHYSICIAN ASSISTANT

## 2018-11-21 PROCEDURE — 80048 BASIC METABOLIC PNL TOTAL CA: CPT | Performed by: PHYSICIAN ASSISTANT

## 2018-11-21 PROCEDURE — 99214 OFFICE O/P EST MOD 30 MIN: CPT | Performed by: PHYSICIAN ASSISTANT

## 2018-11-21 RX ORDER — POTASSIUM CHLORIDE 750 MG/1
40 TABLET, EXTENDED RELEASE ORAL 2 TIMES DAILY
Qty: 240 TABLET | Refills: 3 | Status: SHIPPED | OUTPATIENT
Start: 2018-11-21 | End: 2018-12-03

## 2018-11-21 RX ORDER — HYDROCHLOROTHIAZIDE 25 MG/1
25 TABLET ORAL DAILY
Qty: 90 TABLET | Refills: 3 | Status: SHIPPED | OUTPATIENT
Start: 2018-11-21 | End: 2019-01-02 | Stop reason: DRUGHIGH

## 2018-11-21 RX ORDER — BUMETANIDE 1 MG/1
4 TABLET ORAL
Qty: 240 TABLET | Refills: 3 | Status: SHIPPED | OUTPATIENT
Start: 2018-11-21 | End: 2018-11-21

## 2018-11-21 RX ORDER — BUMETANIDE 1 MG/1
3 TABLET ORAL
Qty: 180 TABLET | Refills: 3 | Status: SHIPPED | OUTPATIENT
Start: 2018-11-21 | End: 2019-03-28

## 2018-11-21 NOTE — PROGRESS NOTES
Reviewed during clinic visit.  Please see progress note for plan.  Rachna Holt PA-C 11/21/2018 3:04 PM

## 2018-11-21 NOTE — MR AVS SNAPSHOT
After Visit Summary   11/21/2018    Gil Chowdary    MRN: [id number]           Patient Information     Date Of Birth          1960        Visit Information        Provider Department      11/21/2018 1:50 PM Yanet, Rachna Carrillo PA-C [facility]   [place]        Today's Diagnoses     PAF (paroxysmal atrial fibrillation) (H)    -  1    (HFpEF) heart failure with preserved ejection fraction (H)        S/P AVR (aortic valve replacement)          Care Instructions    Please call CORE nurse for any questions or concerns:       [phone]    We'll have you wear a monitor called a ziopatch to see if you're having any irregular heart rhythms.     1. Medication changes:   Stay off the coumadin.      Keep taking a baby aspirin 81 mg once a day- you should always take this- do not stop because of your artificial valve.    Increase hydrochlorothiazide to 25 mg once a day.      Increase your potassium to 4 pills twice a day.      2.  Weigh yourself daily and write it down.     3. Call CORE nurse if your weight is up more than 2 pounds in one day, or 5 pounds in one week.    4. Call CORE nurse if you feel more short of breath, have more abdominal bloating or leg swelling.    5. Eat a low sodium diet (less than 2,000mg daily). If you eat less salt, you will retain less fluid.     6. Results:   Component      Latest Ref Rng & Units 11/21/2018   Sodium      136 - 145 mmol/L 141   Potassium      3.5 - 5.1 mmol/L 3.4 (L)   Chloride      98 - 107 mmol/L 105   Carbon Dioxide      23 - 29 mmol/L 28   Anion Gap      6 - 17 mmol/L 11.4   Glucose      70 - 105 mg/dL 98   Urea Nitrogen      7 - 30 mg/dL 24   Creatinine      0.70 - 1.30 mg/dL 1.59 (H)   GFR Estimate      >60 mL/min/1.7m2 45 (L)   GFR Estimate If Black      >60 mL/min/1.7m2 54 (L)   Calcium      8.5 - 10.5 mg/dL 8.6   Hemoglobin      13.3 - 17.7 g/dL 8.6 (L)     7.  Follow up: with me next week with labs at that time.         Scheduling phone number: [phone]  Reminder: Please bring in all current medications, over the counter supplements and vitamin bottles to your next appointment.            Follow-ups after your visit        Your next 10 appointments already scheduled     Nov 27, 2018 12:50 PM CST   LAB with [facility]   [facility] ([facility])    [address]  [facility] [address]   [phone]           Please do not eat 10-12 hours before your appointment if you are coming in fasting for labs on lipids, cholesterol, or glucose (sugar). This does not apply to pregnant women. Water, hot tea and black coffee (with nothing added) are okay. Do not drink other fluids, diet soda or chew gum.            Nov 27, 2018  1:55 PM CST   Core Return with Rachna Holt PA-C   [facility] ([facility])    [id number]  [facility] [address] [phone] OPT 2            Dec 03, 2018  8:00 AM CST   LAB with [facility]   [facility] ([facility])    [id number]  [facility] [address] [phone]           Please do not eat 10-12 hours before your appointment if you are coming in fasting for labs on lipids, cholesterol, or glucose (sugar). This does not apply to pregnant women. Water, hot tea and black coffee (with nothing added) are okay. Do not drink other fluids, diet soda or chew gum.            Dec 03, 2018  8:50 AM CST   Core Return with Rachna Holt PA-C   [facility] ([facility])    [facility] [address] [phone] OPT 2            Dec 12, 2018  8:45 AM CST   Return Visit with  Oncology Nurse   [facility] ([facility])    [facility]  [address]  [facility] [address]  "  [phone]            Dec 12, 2018  9:45 AM CST   Return Visit with Juan Fletcher MD   [facility] ([facility])    [facility]  [address]  [place] [id number] [phone]              Future tests that were ordered for you today     Open Future Orders        Priority Expected Expires Ordered    Zio Patch Monitor Routine 11/28/2018 11/21/2019 11/21/2018            Who to contact     If you have questions or need follow up information about today's clinic visit or your schedule please contact [facility]   [facility] directly at [phone].  Normal or non-critical lab and imaging results will be communicated to you by [employer], letter or phone within 4 business days after the clinic has received the results. If you do not hear from us within 7 days, please contact the clinic through [employer] or phone. If you have a critical or abnormal lab result, we will notify you by phone as soon as possible.  Submit refill requests through [employer] or call your pharmacy and they will forward the refill request to us. Please allow 3 business days for your refill to be completed.          Additional Information About Your Visit        MyChart Information     [employer] gives you secure access to your electronic health record. If you see a primary care provider, you can also send messages to your care team and make appointments. If you have questions, please call your primary care clinic.  If you do not have a primary care provider, please call [phone] and they will assist you.        Care EveryWhere ID     This is your Care EveryWhere ID. This could be used by other organizations to access your [place] medical records  [id number]        Your Vitals Were     Pulse Height BMI (Body Mass Index)             86 1.778 m (5' 10\") 47.21 kg/m2          Blood Pressure from Last 3 Encounters:   11/21/18 125/75   11/13/18 104/68   11/07/18 136/68 "    Weight from Last 3 Encounters:   11/21/18 149.2 kg (329 lb)   11/13/18 148.8 kg (328 lb)   11/06/18 147.4 kg (325 lb)              We Performed the Following     Follow-Up with [facility] Clinic - KARYN visit          Today's Medication Changes          These changes are accurate as of 11/21/18  2:23 PM.  If you have any questions, ask your nurse or doctor.               Start taking these medicines.        Dose/Directions    bumetanide 1 MG tablet   Commonly known as:  BUMEX   Used for:  S/P AVR (aortic valve replacement)   Started by:  Rachna Holt PA-C        Dose:  3 mg   Take 3 tablets (3 mg) by mouth 2 times daily   Quantity:  180 tablet   Refills:  3         These medicines have changed or have updated prescriptions.        Dose/Directions    hydrochlorothiazide 25 MG tablet   Commonly known as:  HYDRODIURIL   This may have changed:    - medication strength  - how much to take   Used for:  (HFpEF) heart failure with preserved ejection fraction (H)   Changed by:  Rachna Holt PA-C        Dose:  25 mg   Take 1 tablet (25 mg) by mouth daily   Quantity:  90 tablet   Refills:  3       potassium chloride SA 10 MEQ CR tablet   Commonly known as:  K-DUR/KLOR-CON M   This may have changed:  how much to take   Used for:  (HFpEF) heart failure with preserved ejection fraction (H)   Changed by:  Rachna Holt PA-C        Dose:  40 mEq   Take 4 tablets (40 mEq) by mouth 2 times daily   Quantity:  240 tablet   Refills:  3         Stop taking these medicines if you haven't already. Please contact your care team if you have questions.     warfarin 3 MG tablet   Commonly known as:  COUMADIN   Stopped by:  Rachna Holt PA-C                Where to get your medicines      These medications were sent to [facility] [id number] [place] [address] AT [address] [place] [address]     Phone:  [phone]     bumetanide 1 MG  tablet    hydrochlorothiazide 25 MG tablet    potassium chloride SA 10 MEQ CR tablet                Primary Care Provider Office Phone # Fax #    Sam Ramya Villatoro PA-C [phone]       [facility] [address] [place] [address]  [facility] [address]        Equal Access to Services     SIL COLEMAN : Hadii aad ku hadasho Soomaali, waaxda luqadaha, qaybta kaalmada adeegyada, waxay maryin haysolisn ramona leyvasampsonjose swanson . So [facility] [phone].    ATENCIÓN: Si habla español, tiene a cross disposición servicios gratuitos de asistencia lingüística. Rajiv al [phone].    We comply with applicable federal civil rights laws and [place] laws. We do not discriminate on the basis of race, color, national origin, age, disability, sex, sexual orientation, or gender identity.            Thank you!     Thank you for choosing [facility]  for your care. Our goal is always to provide you with excellent care. Hearing back from our patients is one way we can continue to improve our services. Please take a few minutes to complete the written survey that you may receive in the mail after your visit with us. Thank you!             Your Updated Medication List - Protect others around you: Learn how to safely use, store and throw away your medicines at www.disposemymeds.org.          This list is accurate as of 11/21/18  2:23 PM.  Always use your most recent med list.                   Brand Name Dispense Instructions for use Diagnosis    aspirin 81 MG tablet    ASA     Take 81 mg by mouth every morning        atorvastatin 40 MG tablet    LIPITOR    90 tablet    Take 1 tablet (40 mg) by mouth daily    Hyperlipidemia with target LDL less than 70       BASAGLAR KWIKPEN SC      Inject 22 Units Subcutaneous every morning        bumetanide 1 MG tablet    BUMEX    180 tablet    Take 3 tablets (3 mg) by mouth 2 times daily    S/P AVR (aortic valve replacement)       CENTRUM ADULTS PO       Take 1 tablet by mouth every morning        cholecalciferol 5000 units Tabs tablet    vitamin D3     Take 5,000 Units by mouth every morning        clindamycin 300 MG capsule    CLEOCIN    10 capsule    Take 1 capsule (300 mg) by mouth as needed    S/P AVR (aortic valve replacement)       ferrous sulfate 325 (65 Fe) MG tablet    IRON    100 tablet    Take 1 tablet (325 mg) by mouth 2 times daily    Other iron deficiency anemia       hydrochlorothiazide 25 MG tablet    HYDRODIURIL    90 tablet    Take 1 tablet (25 mg) by mouth daily    (HFpEF) heart failure with preserved ejection fraction (H)       metoprolol tartrate 50 MG tablet    LOPRESSOR    60 tablet    Take 1 tablet (50 mg) by mouth 2 times daily    Paroxysmal supraventricular tachycardia (H), S/P AVR (aortic valve replacement)       * NovoLOG FLEXPEN 100 UNIT/ML injection   Generic drug:  insulin aspart      Inject 3 Units Subcutaneous 2 times daily (with meals) Takes with breakfast and dinner        * NovoLOG FLEXPEN 100 UNIT/ML injection   Generic drug:  insulin aspart      Inject 4 Units Subcutaneous daily (with lunch)        pantoprazole 40 MG EC tablet    PROTONIX     Take 40 mg by mouth every morning (before breakfast)        potassium chloride SA 10 MEQ CR tablet    K-DUR/KLOR-CON M    240 tablet    Take 4 tablets (40 mEq) by mouth 2 times daily    (HFpEF) heart failure with preserved ejection fraction (H)       prednisoLONE acetate 1 % ophthalmic susp    PRED FORTE     Place 1 drop into both eyes as needed (glaucoma)        timolol 0.5 % ophthalmic solution    TIMOPTIC     Place 1 drop into both eyes 2 times daily        * Notice:  This list has 2 medication(s) that are the same as other medications prescribed for you. Read the directions carefully, and ask your doctor or other care provider to review them with you.

## 2018-11-21 NOTE — PROGRESS NOTES
[id number]  HPI and Plan:   See dictation    Orders this Visit:  Orders Placed This Encounter   Procedures     Zio Patch Monitor     Orders Placed This Encounter   Medications     hydrochlorothiazide (HYDRODIURIL) 25 MG tablet     Sig: Take 1 tablet (25 mg) by mouth daily     Dispense:  90 tablet     Refill:  3     DISCONTD: bumetanide (BUMEX) 1 MG tablet     Sig: Take 4 tablets (4 mg) by mouth 2 times daily     Dispense:  240 tablet     Refill:  3     bumetanide (BUMEX) 1 MG tablet     Sig: Take 3 tablets (3 mg) by mouth 2 times daily     Dispense:  180 tablet     Refill:  3     potassium chloride SA (K-DUR/KLOR-CON M) 10 MEQ CR tablet     Sig: Take 4 tablets (40 mEq) by mouth 2 times daily     Dispense:  240 tablet     Refill:  3     Medications Discontinued During This Encounter   Medication Reason     warfarin (COUMADIN) 3 MG tablet      hydrochlorothiazide 12.5 MG TABS tablet Reorder     bumetanide (BUMEX) 1 MG tablet Reorder     bumetanide (BUMEX) 1 MG tablet Reorder     potassium chloride SA (K-DUR/KLOR-CON M) 10 MEQ CR tablet Reorder         Encounter Diagnoses   Name Primary?     (HFpEF) heart failure with preserved ejection fraction (H)      S/P AVR (aortic valve replacement)      PAF (paroxysmal atrial fibrillation) (H) Yes       CURRENT MEDICATIONS:  Current Outpatient Prescriptions   Medication Sig Dispense Refill     aspirin 81 MG tablet Take 81 mg by mouth every morning        atorvastatin (LIPITOR) 40 MG tablet Take 1 tablet (40 mg) by mouth daily 90 tablet 0     bumetanide (BUMEX) 1 MG tablet Take 3 tablets (3 mg) by mouth 2 times daily 180 tablet 3     cholecalciferol (VITAMIN D3) 5000 UNITS TABS tablet Take 5,000 Units by mouth every morning       clindamycin (CLEOCIN) 300 MG capsule Take 1 capsule (300 mg) by mouth as needed 10 capsule 3     ferrous sulfate (IRON) 325 (65 Fe) MG tablet Take 1 tablet (325 mg) by mouth 2 times daily 100 tablet 3     hydrochlorothiazide (HYDRODIURIL) 25 MG tablet Take 1  tablet (25 mg) by mouth daily 90 tablet 3     insulin aspart (NOVOLOG FLEXPEN) 100 UNIT/ML injection Inject 4 Units Subcutaneous daily (with lunch)       insulin aspart (NOVOLOG FLEXPEN) 100 UNIT/ML injection Inject 3 Units Subcutaneous 2 times daily (with meals) Takes with breakfast and dinner       Insulin Glargine (BASAGLAR KWIKPEN SC) Inject 22 Units Subcutaneous every morning        metoprolol (LOPRESSOR) 50 MG tablet Take 1 tablet (50 mg) by mouth 2 times daily 60 tablet 0     Multiple Vitamins-Minerals (CENTRUM ADULTS PO) Take 1 tablet by mouth every morning        pantoprazole (PROTONIX) 40 MG EC tablet Take 40 mg by mouth every morning (before breakfast)       potassium chloride SA (K-DUR/KLOR-CON M) 10 MEQ CR tablet Take 4 tablets (40 mEq) by mouth 2 times daily 240 tablet 3     prednisoLONE acetate (PRED FORTE) 1 % ophthalmic susp Place 1 drop into both eyes as needed (glaucoma)        timolol (TIMOPTIC) 0.5 % ophthalmic solution Place 1 drop into both eyes 2 times daily        [DISCONTINUED] bumetanide (BUMEX) 1 MG tablet Take 4 tablets (4 mg) by mouth 2 times daily 240 tablet 3     [DISCONTINUED] bumetanide (BUMEX) 1 MG tablet Take 3 tablets (3 mg) by mouth 2 times daily 180 tablet 3     [DISCONTINUED] hydrochlorothiazide 12.5 MG TABS tablet Take 1 tablet (12.5 mg) by mouth daily 90 tablet 3       ALLERGIES     Allergies   Allergen Reactions     Amoxicillin      Itchy      Penicillins Hives     Spironolactone Rash       PAST MEDICAL HISTORY:  Past Medical History:   Diagnosis Date     Former tobacco use      Glaucoma      Hyperlipidemia LDL goal <160 12/6/2011     Obesity      DRAKE on CPAP      Right bundle branch block      Severe aortic stenosis     On Echo 10/28/16       PAST SURGICAL HISTORY:  Past Surgical History:   Procedure Laterality Date     HEART CATH LEFT HEART CATH  04/07/2017    Diffuse non-obstuctive CAD     REPAIR VALVE AORTIC N/A 5/16/2017    Procedure: REPAIR VALVE AORTIC;  Median  "Sternotony, CardioPulmonary Bypass, Aortic Valve Replace using 25MM Trifecta Valve with [place] [employer], Septal myectomy;  Surgeon: Jun Simon MD;  Location: [facility] OR     REPLACE VALVE AORTIC N/A 5/16/2017    Procedure: REPLACE VALVE AORTIC;;  Surgeon: Jun Simon MD;  Location: [facility] OR     SINUS SURGERY  2005       FAMILY HISTORY:  Family History   Problem Relation Age of Onset     Family History Negative Mother      Diabetes Father      Heart Surgery Father      CABG     Coronary Artery Disease Father      Hypertension Brother      Diabetes Brother      HEART DISEASE Brother      Heart Surgery Brother      CABG x 3     Family History Negative Sister      Diabetes Brother      History of diabetes, but no longer an issue     Family History Negative Brother        SOCIAL HISTORY:  Social History     Social History     Marital status:      Spouse name: N/A     Number of children: N/A     Years of education: N/A     Social History Main Topics     Smoking status: Former Smoker     Packs/day: 1.00     Years: 20.00     Types: Cigarettes, Cigars     Start date: 1979     Quit date: 10/21/2011     Smokeless tobacco: Never Used     Alcohol use No     Drug use: No     Sexual activity: Not Asked     Other Topics Concern     Parent/Sibling W/ Cabg, Mi Or Angioplasty Before 65f 55m? Yes     brother triple bypass 49     Social History Narrative       Review of Systems:  Skin:  Negative     Eyes:  Positive for glasses  ENT:  Negative    Respiratory:  Positive for CPAP;sleep apnea;dyspnea on exertion  Cardiovascular:    Positive for;edema  Gastroenterology: Negative    Genitourinary:  Negative    Musculoskeletal:  Negative    Neurologic:  Negative    Psychiatric:  Negative    Heme/Lymph/Imm:  Negative    Endocrine:  Positive for diabetes    Physical Exam:  Vitals: /75  Pulse 86  Ht 1.778 m (5' 10\")  Wt 149.2 kg (329 lb)  BMI 47.21 kg/m2   Please refer to dictation for physical exam    Recent " Lab Results:  LIPID RESULTS:  Lab Results   Component Value Date    CHOL 126 10/10/2018    HDL 23 (A) 10/10/2018    LDL 71 10/10/2018    TRIG 230 (A) 10/10/2018       LIVER ENZYME RESULTS:  Lab Results   Component Value Date    AST 55 (H) 10/12/2018    ALT 31 10/12/2018       CBC RESULTS:  Lab Results   Component Value Date    WBC 3.9 (L) 10/24/2018    RBC 2.94 (L) 10/24/2018    HGB 8.6 (L) 11/21/2018    HCT 25.5 (L) 10/24/2018    MCV 87 10/24/2018    MCH 25.5 (L) 10/24/2018    MCHC 29.4 (L) 10/24/2018    RDW 17.5 (H) 10/24/2018    PLT 95 (L) 10/24/2018       BMP RESULTS:  Lab Results   Component Value Date     11/21/2018    POTASSIUM 3.4 (L) 11/21/2018    CHLORIDE 105 11/21/2018    CO2 28 11/21/2018    ANIONGAP 11.4 11/21/2018    GLC 98 11/21/2018    BUN 24 11/21/2018    CR 1.59 (H) 11/21/2018    GFRESTIMATED 45 (L) 11/21/2018    GFRESTBLACK 54 (L) 11/21/2018    MYRON 8.6 11/21/2018        A1C RESULTS:  Lab Results   Component Value Date    A1C 6.8 (H) 10/12/2018       INR RESULTS:  Lab Results   Component Value Date    INR 2.19 (H) 10/14/2018    INR 2.28 (H) 10/13/2018           CC  Rachna Holt, PA-C  [address]  [place] [address]

## 2018-11-21 NOTE — LETTER
2018      Sam Villatoro PA-C  [facility] [address]  [facility] [address]      RE: Gil Chowdary       Dear Colleague,    I had the pleasure of seeing Gil Chowdary in the [facility].    Service Date: 2018      PRIMARY CARDIOLOGIST:  Guru Salinas MD.      REASON FOR VISIT:  HFpEF, cor pulmonale.      HISTORY OF PRESENT ILLNESS:  Mr. Chowdary is a delightful 58-year-old gentleman with past medical history significant for the followin.  Severe aortic stenosis with aortic valve replacement in 2017 with a 25 mm Trifecta tissue valve.   2.  Type 2 diabetes, on insulin.   3.  Hyperlipidemia.   4.  Sleep apnea, treated.   5.  Recent diagnosis of heart failure with preserved EF.   6.  Recent anemia with workup ongoing, concern for GI bleed and colonoscopy pending.      Mr. Chowdary was admitted in October with weight gain and shortness of breath.  He diuresed during that hospitalization for a weight of about 325 pounds, down about 310 pounds.  He got below 300 at one point, but then his weight increased.  He left early as he needed to go home and help take care of his mother.  We added low dose spironolactone and this helped with diuresis, but he developed a rash.  Last week we ordered hydrochlorothiazide 12.5 mg daily and he is back here today.  He had to cancel his colonoscopy as he wanted to have his sister here to take care of his mom.  He tells me this week that he seems like he is urinating a lot, he is getting some cramping, but his weight is only down slightly.  Last week at our visit, it was 334 pounds at home.  It is 328 pounds today, but has ranged 327 to 325 in the last 2 days before that.  He says he is urinating a lot and cannot really imagine urinating more.  He is trying to watch his fluid intake.  He is sleeping flat without difficulty, wearing his CPAP.  He is trying to watch his sodium and his p.o. intake.       SOCIAL HISTORY:  He lives at home with his mom.  She needs help and has dementia, so he stays with her.  He has several brothers in town that do not help, but he had a sister and brother-in-law who just flew in today to stay with them and help out.  He is a former smoker, 20-pack-year history, quit in 2011.  No alcohol use.      PHYSICAL EXAMINATION:   GENERAL:  Well-developed, obese gentleman in no acute distress.   HEENT:  Normocephalic, atraumatic.   HEART:  Regular.  I do not appreciate murmur, rub or gallop.   LUNGS:  Diminished but improving without wheezes, rales or rhonchi.   EXTREMITIES:  Anasarca to abdomen with pitting edema to the thighs and abdomen.      LABORATORY DATA:  Lab studies show a creatinine of 1.59, potassium 3.4, sodium 141, BUN 24.  Hemoglobin is 8.6.      ASSESSMENT AND PLAN:     1.  Recent diagnosis of acute anemia likely precipitating heart failure with preserved EF in this gentleman with HFpEF and likely cor pulmonale due to obesity and sleep apnea.  He does not have protein in his urine, so nephrotic syndrome is not likely.  I suspect he has somewhere between 50 and 60 pounds of fluid on him.  We have talked about inpatient admission in the past, but he has declined due to his family responsibilities taking care of his mom.  Now that his sister is in town, this may be a possibility.  At this point, he wants to continue to try for outpatient diuresis and we will do this with increasing the hydrochlorothiazide to 25 mg daily.  In addition, we will increase his potassium to 40 mEq b.i.d.  We initially discussed going up on his Bumex to 4 mg b.i.d., but he is concerned about worsening cramping.  At this point, we will leave it on 3 mg b.i.d and he will continue with daily weights which I expect truthfully his dry weight to be somewhere around 260.  Again, he needs to get his anemia addressed.  He does have a colonoscopy now rescheduled for next week.      2.  Aortic valve replacement,  severe aortic stenosis, well functioning on echocardiogram.  He had a septal myectomy at that time as well for septal hypertrophy.  We will follow.  He does need to remain on aspirin at a minimum at all times.  It should not hold for upcoming colonoscopy.      3.  Anemia with normocytic anemia and thrombocytopenia, getting iron as there is iron deficiency as well.  I took a long time reviewing his chart and he had brief episode of AFib and supraventricular tachycardia post-aortic valve.  For this reason, he is on Coumadin.  The plan had been to see him back and perhaps do SVT ablation, but he did not do this and he has just remained on Coumadin.  Coumadin is now on hold for his colonoscopy and I want him to stay off of that.  We will do a ZIO Patch for 1 week and if there is no AFib or flutter on this he will be able to remain off Coumadin, especially in the near term.      I will continue to see him weekly with a BMP and hemoglobin each time.  We would consider hospital admission the week of 12/10 as I am rounding that week, but we will see how things go during that time.      Thank you for allowing me to participate in his care.         BRETT CAO PA-C             D: 2018   T: 2018   MT: SHIRA      Name:     ABIGAIL MATOS   MRN:      [id number]        Account:      [id number]   :      1960           Service Date: 2018      Document: [id number]         Outpatient Encounter Prescriptions as of 2018   Medication Sig Dispense Refill     aspirin 81 MG tablet Take 81 mg by mouth every morning        atorvastatin (LIPITOR) 40 MG tablet Take 1 tablet (40 mg) by mouth daily 90 tablet 0     bumetanide (BUMEX) 1 MG tablet Take 3 tablets (3 mg) by mouth 2 times daily 180 tablet 3     cholecalciferol (VITAMIN D3) 5000 UNITS TABS tablet Take 5,000 Units by mouth every morning       clindamycin (CLEOCIN) 300 MG capsule Take 1 capsule (300 mg) by mouth as needed 10 capsule 3      ferrous sulfate (IRON) 325 (65 Fe) MG tablet Take 1 tablet (325 mg) by mouth 2 times daily 100 tablet 3     hydrochlorothiazide (HYDRODIURIL) 25 MG tablet Take 1 tablet (25 mg) by mouth daily 90 tablet 3     insulin aspart (NOVOLOG FLEXPEN) 100 UNIT/ML injection Inject 4 Units Subcutaneous daily (with lunch)       insulin aspart (NOVOLOG FLEXPEN) 100 UNIT/ML injection Inject 3 Units Subcutaneous 2 times daily (with meals) Takes with breakfast and dinner       Insulin Glargine (BASAGLAR KWIKPEN SC) Inject 22 Units Subcutaneous every morning        metoprolol (LOPRESSOR) 50 MG tablet Take 1 tablet (50 mg) by mouth 2 times daily 60 tablet 0     Multiple Vitamins-Minerals (CENTRUM ADULTS PO) Take 1 tablet by mouth every morning        pantoprazole (PROTONIX) 40 MG EC tablet Take 40 mg by mouth every morning (before breakfast)       potassium chloride SA (K-DUR/KLOR-CON M) 10 MEQ CR tablet Take 4 tablets (40 mEq) by mouth 2 times daily 240 tablet 3     prednisoLONE acetate (PRED FORTE) 1 % ophthalmic susp Place 1 drop into both eyes as needed (glaucoma)        timolol (TIMOPTIC) 0.5 % ophthalmic solution Place 1 drop into both eyes 2 times daily        [DISCONTINUED] bumetanide (BUMEX) 1 MG tablet Take 4 tablets (4 mg) by mouth 2 times daily 240 tablet 3     [DISCONTINUED] bumetanide (BUMEX) 1 MG tablet Take 3 tablets (3 mg) by mouth 2 times daily 180 tablet 3     [DISCONTINUED] hydrochlorothiazide 12.5 MG TABS tablet Take 1 tablet (12.5 mg) by mouth daily 90 tablet 3     [DISCONTINUED] potassium chloride SA (K-DUR/KLOR-CON M) 10 MEQ CR tablet Take 20 mEq by mouth 2 times daily       [DISCONTINUED] warfarin (COUMADIN) 3 MG tablet Take 3 mg by mouth every evening       No facility-administered encounter medications on file as of 11/21/2018.        Again, thank you for allowing me to participate in the care of your patient.      Sincerely,    Rachna Holt PA-C     [facility]

## 2018-11-21 NOTE — LETTER
11/21/2018    Sam Villatoro PA-C  [facility] [address]  [facility] [address]    RE: Gil Chowdary       Dear Colleague,    I had the pleasure of seeing Gil Chowdary in the [facility].    [id number]  HPI and Plan:   See dictation    Orders this Visit:  Orders Placed This Encounter   Procedures     Zio Patch Monitor     Orders Placed This Encounter   Medications     hydrochlorothiazide (HYDRODIURIL) 25 MG tablet     Sig: Take 1 tablet (25 mg) by mouth daily     Dispense:  90 tablet     Refill:  3     DISCONTD: bumetanide (BUMEX) 1 MG tablet     Sig: Take 4 tablets (4 mg) by mouth 2 times daily     Dispense:  240 tablet     Refill:  3     bumetanide (BUMEX) 1 MG tablet     Sig: Take 3 tablets (3 mg) by mouth 2 times daily     Dispense:  180 tablet     Refill:  3     potassium chloride SA (K-DUR/KLOR-CON M) 10 MEQ CR tablet     Sig: Take 4 tablets (40 mEq) by mouth 2 times daily     Dispense:  240 tablet     Refill:  3     Medications Discontinued During This Encounter   Medication Reason     warfarin (COUMADIN) 3 MG tablet      hydrochlorothiazide 12.5 MG TABS tablet Reorder     bumetanide (BUMEX) 1 MG tablet Reorder     bumetanide (BUMEX) 1 MG tablet Reorder     potassium chloride SA (K-DUR/KLOR-CON M) 10 MEQ CR tablet Reorder         Encounter Diagnoses   Name Primary?     (HFpEF) heart failure with preserved ejection fraction (H)      S/P AVR (aortic valve replacement)      PAF (paroxysmal atrial fibrillation) (H) Yes       CURRENT MEDICATIONS:  Current Outpatient Prescriptions   Medication Sig Dispense Refill     aspirin 81 MG tablet Take 81 mg by mouth every morning        atorvastatin (LIPITOR) 40 MG tablet Take 1 tablet (40 mg) by mouth daily 90 tablet 0     bumetanide (BUMEX) 1 MG tablet Take 3 tablets (3 mg) by mouth 2 times daily 180 tablet 3     cholecalciferol (VITAMIN D3) 5000 UNITS TABS tablet Take 5,000 Units by mouth every morning        clindamycin (CLEOCIN) 300 MG capsule Take 1 capsule (300 mg) by mouth as needed 10 capsule 3     ferrous sulfate (IRON) 325 (65 Fe) MG tablet Take 1 tablet (325 mg) by mouth 2 times daily 100 tablet 3     hydrochlorothiazide (HYDRODIURIL) 25 MG tablet Take 1 tablet (25 mg) by mouth daily 90 tablet 3     insulin aspart (NOVOLOG FLEXPEN) 100 UNIT/ML injection Inject 4 Units Subcutaneous daily (with lunch)       insulin aspart (NOVOLOG FLEXPEN) 100 UNIT/ML injection Inject 3 Units Subcutaneous 2 times daily (with meals) Takes with breakfast and dinner       Insulin Glargine (BASAGLAR KWIKPEN SC) Inject 22 Units Subcutaneous every morning        metoprolol (LOPRESSOR) 50 MG tablet Take 1 tablet (50 mg) by mouth 2 times daily 60 tablet 0     Multiple Vitamins-Minerals (CENTRUM ADULTS PO) Take 1 tablet by mouth every morning        pantoprazole (PROTONIX) 40 MG EC tablet Take 40 mg by mouth every morning (before breakfast)       potassium chloride SA (K-DUR/KLOR-CON M) 10 MEQ CR tablet Take 4 tablets (40 mEq) by mouth 2 times daily 240 tablet 3     prednisoLONE acetate (PRED FORTE) 1 % ophthalmic susp Place 1 drop into both eyes as needed (glaucoma)        timolol (TIMOPTIC) 0.5 % ophthalmic solution Place 1 drop into both eyes 2 times daily        [DISCONTINUED] bumetanide (BUMEX) 1 MG tablet Take 4 tablets (4 mg) by mouth 2 times daily 240 tablet 3     [DISCONTINUED] bumetanide (BUMEX) 1 MG tablet Take 3 tablets (3 mg) by mouth 2 times daily 180 tablet 3     [DISCONTINUED] hydrochlorothiazide 12.5 MG TABS tablet Take 1 tablet (12.5 mg) by mouth daily 90 tablet 3       ALLERGIES     Allergies   Allergen Reactions     Amoxicillin      Itchy      Penicillins Hives     Spironolactone Rash       PAST MEDICAL HISTORY:  Past Medical History:   Diagnosis Date     Former tobacco use      Glaucoma      Hyperlipidemia LDL goal <160 12/6/2011     Obesity      DRAKE on CPAP      Right bundle branch block      Severe aortic stenosis      On Echo 10/28/16       PAST SURGICAL HISTORY:  Past Surgical History:   Procedure Laterality Date     HEART CATH LEFT HEART CATH  04/07/2017    Diffuse non-obstuctive CAD     REPAIR VALVE AORTIC N/A 5/16/2017    Procedure: REPAIR VALVE AORTIC;  Median Sternotony, CardioPulmonary Bypass, Aortic Valve Replace using 25MM Trifecta Valve with [place] [employer], Septal myectomy;  Surgeon: Jun Simon MD;  Location: [facility] OR     REPLACE VALVE AORTIC N/A 5/16/2017    Procedure: REPLACE VALVE AORTIC;;  Surgeon: Jun Simon MD;  Location: [facility] OR     SINUS SURGERY  2005       FAMILY HISTORY:  Family History   Problem Relation Age of Onset     Family History Negative Mother      Diabetes Father      Heart Surgery Father      CABG     Coronary Artery Disease Father      Hypertension Brother      Diabetes Brother      HEART DISEASE Brother      Heart Surgery Brother      CABG x 3     Family History Negative Sister      Diabetes Brother      History of diabetes, but no longer an issue     Family History Negative Brother        SOCIAL HISTORY:  Social History     Social History     Marital status:      Spouse name: N/A     Number of children: N/A     Years of education: N/A     Social History Main Topics     Smoking status: Former Smoker     Packs/day: 1.00     Years: 20.00     Types: Cigarettes, Cigars     Start date: 1979     Quit date: 10/21/2011     Smokeless tobacco: Never Used     Alcohol use No     Drug use: No     Sexual activity: Not Asked     Other Topics Concern     Parent/Sibling W/ Cabg, Mi Or Angioplasty Before 65f 55m? Yes     brother triple bypass 49     Social History Narrative       Review of Systems:  Skin:  Negative     Eyes:  Positive for glasses  ENT:  Negative    Respiratory:  Positive for CPAP;sleep apnea;dyspnea on exertion  Cardiovascular:    Positive for;edema  Gastroenterology: Negative    Genitourinary:  Negative    Musculoskeletal:  Negative    Neurologic:  Negative   "  Psychiatric:  Negative    Heme/Lymph/Imm:  Negative    Endocrine:  Positive for diabetes    Physical Exam:  Vitals: /75  Pulse 86  Ht 1.778 m (5' 10\")  Wt 149.2 kg (329 lb)  BMI 47.21 kg/m2   Please refer to dictation for physical exam    Recent Lab Results:  LIPID RESULTS:  Lab Results   Component Value Date    CHOL 126 10/10/2018    HDL 23 (A) 10/10/2018    LDL 71 10/10/2018    TRIG 230 (A) 10/10/2018       LIVER ENZYME RESULTS:  Lab Results   Component Value Date    AST 55 (H) 10/12/2018    ALT 31 10/12/2018       CBC RESULTS:  Lab Results   Component Value Date    WBC 3.9 (L) 10/24/2018    RBC 2.94 (L) 10/24/2018    HGB 8.6 (L) 11/21/2018    HCT 25.5 (L) 10/24/2018    MCV 87 10/24/2018    MCH 25.5 (L) 10/24/2018    MCHC 29.4 (L) 10/24/2018    RDW 17.5 (H) 10/24/2018    PLT 95 (L) 10/24/2018       BMP RESULTS:  Lab Results   Component Value Date     11/21/2018    POTASSIUM 3.4 (L) 11/21/2018    CHLORIDE 105 11/21/2018    CO2 28 11/21/2018    ANIONGAP 11.4 11/21/2018    GLC 98 11/21/2018    BUN 24 11/21/2018    CR 1.59 (H) 11/21/2018    GFRESTIMATED 45 (L) 11/21/2018    GFRESTBLACK 54 (L) 11/21/2018    MYRON 8.6 11/21/2018        A1C RESULTS:  Lab Results   Component Value Date    A1C 6.8 (H) 10/12/2018       INR RESULTS:  Lab Results   Component Value Date    INR 2.19 (H) 10/14/2018    INR 2.28 (H) 10/13/2018           CC  Rachna oHlt PA-C  [address]  [place] [address]        Thank you for allowing me to participate in the care of your patient.      Sincerely,     Rachna Holt PA-C     [facility]    cc:   Rachna Holt PA-C  [address]  [place] [address]        "

## 2018-11-21 NOTE — PATIENT INSTRUCTIONS
Please call CORE nurse for any questions or concerns:       [phone]    We'll have you wear a monitor called a ziopatch to see if you're having any irregular heart rhythms.     1. Medication changes:   Stay off the coumadin.      Keep taking a baby aspirin 81 mg once a day- you should always take this- do not stop because of your artificial valve.    Increase hydrochlorothiazide to 25 mg once a day.      Increase your potassium to 4 pills twice a day.      2.  Weigh yourself daily and write it down.     3. Call CORE nurse if your weight is up more than 2 pounds in one day, or 5 pounds in one week.    4. Call CORE nurse if you feel more short of breath, have more abdominal bloating or leg swelling.    5. Eat a low sodium diet (less than 2,000mg daily). If you eat less salt, you will retain less fluid.     6. Results:   Component      Latest Ref Rng & Units 11/21/2018   Sodium      136 - 145 mmol/L 141   Potassium      3.5 - 5.1 mmol/L 3.4 (L)   Chloride      98 - 107 mmol/L 105   Carbon Dioxide      23 - 29 mmol/L 28   Anion Gap      6 - 17 mmol/L 11.4   Glucose      70 - 105 mg/dL 98   Urea Nitrogen      7 - 30 mg/dL 24   Creatinine      0.70 - 1.30 mg/dL 1.59 (H)   GFR Estimate      >60 mL/min/1.7m2 45 (L)   GFR Estimate If Black      >60 mL/min/1.7m2 54 (L)   Calcium      8.5 - 10.5 mg/dL 8.6   Hemoglobin      13.3 - 17.7 g/dL 8.6 (L)     7.  Follow up: with me next week with labs at that time.        Scheduling phone number: [phone]  Reminder: Please bring in all current medications, over the counter supplements and vitamin bottles to your next appointment.

## 2018-11-21 NOTE — PROGRESS NOTES
Service Date: 2018      PRIMARY CARDIOLOGIST:  Guru Salinas MD.      REASON FOR VISIT:  HFpEF, cor pulmonale.      HISTORY OF PRESENT ILLNESS:  Mr. Chowdary is a delightful 58-year-old gentleman with past medical history significant for the followin.  Severe aortic stenosis with aortic valve replacement in 2017 with a 25 mm Trifecta tissue valve.   2.  Type 2 diabetes, on insulin.   3.  Hyperlipidemia.   4.  Sleep apnea, treated.   5.  Recent diagnosis of heart failure with preserved EF.   6.  Recent anemia with workup ongoing, concern for GI bleed and colonoscopy pending.      Mr. Chowdary was admitted in October with weight gain and shortness of breath.  He diuresed during that hospitalization for a weight of about 325 pounds, down about 310 pounds.  He got below 300 at one point, but then his weight increased.  He left early as he needed to go home and help take care of his mother.  We added low dose spironolactone and this helped with diuresis, but he developed a rash.  Last week we ordered hydrochlorothiazide 12.5 mg daily and he is back here today.  He had to cancel his colonoscopy as he wanted to have his sister here to take care of his mom.  He tells me this week that he seems like he is urinating a lot, he is getting some cramping, but his weight is only down slightly.  Last week at our visit, it was 334 pounds at home.  It is 328 pounds today, but has ranged 327 to 325 in the last 2 days before that.  He says he is urinating a lot and cannot really imagine urinating more.  He is trying to watch his fluid intake.  He is sleeping flat without difficulty, wearing his CPAP.  He is trying to watch his sodium and his p.o. intake.      SOCIAL HISTORY:  He lives at home with his mom.  She needs help and has dementia, so he stays with her.  He has several brothers in town that do not help, but he had a sister and brother-in-law who just flew in today to stay with them and help out.  He is a former  smoker, 20-pack-year history, quit in 2011.  No alcohol use.      PHYSICAL EXAMINATION:   GENERAL:  Well-developed, obese gentleman in no acute distress.   HEENT:  Normocephalic, atraumatic.   HEART:  Regular.  I do not appreciate murmur, rub or gallop.   LUNGS:  Diminished but improving without wheezes, rales or rhonchi.   EXTREMITIES:  Anasarca to abdomen with pitting edema to the thighs and abdomen.      LABORATORY DATA:  Lab studies show a creatinine of 1.59, potassium 3.4, sodium 141, BUN 24.  Hemoglobin is 8.6.      ASSESSMENT AND PLAN:     1.  Recent diagnosis of acute anemia likely precipitating heart failure with preserved EF in this gentleman with HFpEF and likely cor pulmonale due to obesity and sleep apnea.  He does not have protein in his urine, so nephrotic syndrome is not likely.  I suspect he has somewhere between 50 and 60 pounds of fluid on him.  We have talked about inpatient admission in the past, but he has declined due to his family responsibilities taking care of his mom.  Now that his sister is in town, this may be a possibility.  At this point, he wants to continue to try for outpatient diuresis and we will do this with increasing the hydrochlorothiazide to 25 mg daily.  In addition, we will increase his potassium to 40 mEq b.i.d.  We initially discussed going up on his Bumex to 4 mg b.i.d., but he is concerned about worsening cramping.  At this point, we will leave it on 3 mg b.i.d and he will continue with daily weights which I expect truthfully his dry weight to be somewhere around 260.  Again, he needs to get his anemia addressed.  He does have a colonoscopy now rescheduled for next week.      2.  Aortic valve replacement, severe aortic stenosis, well functioning on echocardiogram.  He had a septal myectomy at that time as well for septal hypertrophy.  We will follow.  He does need to remain on aspirin at a minimum at all times.  It should not hold for upcoming colonoscopy.      3.   Anemia with normocytic anemia and thrombocytopenia, getting iron as there is iron deficiency as well.  I took a long time reviewing his chart and he had brief episode of AFib and supraventricular tachycardia post-aortic valve.  For this reason, he is on Coumadin.  The plan had been to see him back and perhaps do SVT ablation, but he did not do this and he has just remained on Coumadin.  Coumadin is now on hold for his colonoscopy and I want him to stay off of that.  We will do a ZIO Patch for 1 week and if there is no AFib or flutter on this he will be able to remain off Coumadin, especially in the near term.      I will continue to see him weekly with a BMP and hemoglobin each time.  We would consider hospital admission the week of 12/10 as I am rounding that week, but we will see how things go during that time.      Thank you for allowing me to participate in his care.         BRETT CAO PA-C             D: 2018   T: 2018   MT: SHIRA      Name:     ABIGAIL MATOS   MRN:      [id number]        Account:      [id number]   :      1960           Service Date: 2018      Document: [id number]

## 2018-11-26 ENCOUNTER — TRANSFERRED RECORDS (OUTPATIENT)
Dept: HEALTH INFORMATION MANAGEMENT | Facility: CLINIC | Age: 58
End: 2018-11-26

## 2018-11-27 ENCOUNTER — OFFICE VISIT (OUTPATIENT)
Dept: CARDIOLOGY | Facility: CLINIC | Age: 58
End: 2018-11-27
Attending: PHYSICIAN ASSISTANT
Payer: COMMERCIAL

## 2018-11-27 ENCOUNTER — HOSPITAL ENCOUNTER (OUTPATIENT)
Dept: GENERAL RADIOLOGY | Facility: CLINIC | Age: 58
Discharge: HOME | End: 2018-11-27
Attending: PHYSICIAN ASSISTANT | Admitting: PHYSICIAN ASSISTANT
Payer: COMMERCIAL

## 2018-11-27 ENCOUNTER — CARE COORDINATION (OUTPATIENT)
Dept: CARDIOLOGY | Facility: CLINIC | Age: 58
End: 2018-11-27

## 2018-11-27 VITALS
SYSTOLIC BLOOD PRESSURE: 108 MMHG | HEART RATE: 76 BPM | OXYGEN SATURATION: 95 % | BODY MASS INDEX: 45.1 KG/M2 | DIASTOLIC BLOOD PRESSURE: 68 MMHG | WEIGHT: 315 LBS | HEIGHT: 70 IN

## 2018-11-27 DIAGNOSIS — R05.9 COUGH: ICD-10-CM

## 2018-11-27 DIAGNOSIS — I50.30 (HFPEF) HEART FAILURE WITH PRESERVED EJECTION FRACTION (H): ICD-10-CM

## 2018-11-27 DIAGNOSIS — Z95.2 S/P AVR (AORTIC VALVE REPLACEMENT): ICD-10-CM

## 2018-11-27 DIAGNOSIS — R05.9 COUGH: Primary | ICD-10-CM

## 2018-11-27 LAB
ANION GAP SERPL CALCULATED.3IONS-SCNC: 11.7 MMOL/L (ref 6–17)
BUN SERPL-MCNC: 19 MG/DL (ref 7–30)
CALCIUM SERPL-MCNC: 8.8 MG/DL (ref 8.5–10.5)
CHLORIDE SERPL-SCNC: 105 MMOL/L (ref 98–107)
CO2 SERPL-SCNC: 28 MMOL/L (ref 23–29)
CREAT SERPL-MCNC: 1.39 MG/DL (ref 0.7–1.3)
GFR SERPL CREATININE-BSD FRML MDRD: 52 ML/MIN/1.7M2
GLUCOSE SERPL-MCNC: 81 MG/DL (ref 70–105)
HGB BLD-MCNC: 9.2 G/DL (ref 13.3–17.7)
NT-PROBNP SERPL-MCNC: 102 PG/ML (ref 0–125)
POTASSIUM SERPL-SCNC: 3.7 MMOL/L (ref 3.5–5.1)
SODIUM SERPL-SCNC: 141 MMOL/L (ref 136–145)

## 2018-11-27 PROCEDURE — 36415 COLL VENOUS BLD VENIPUNCTURE: CPT | Performed by: PHYSICIAN ASSISTANT

## 2018-11-27 PROCEDURE — 71046 X-RAY EXAM CHEST 2 VIEWS: CPT

## 2018-11-27 PROCEDURE — 80048 BASIC METABOLIC PNL TOTAL CA: CPT | Performed by: PHYSICIAN ASSISTANT

## 2018-11-27 PROCEDURE — 99214 OFFICE O/P EST MOD 30 MIN: CPT | Performed by: PHYSICIAN ASSISTANT

## 2018-11-27 PROCEDURE — 85018 HEMOGLOBIN: CPT | Performed by: PHYSICIAN ASSISTANT

## 2018-11-27 PROCEDURE — 83880 ASSAY OF NATRIURETIC PEPTIDE: CPT | Performed by: PHYSICIAN ASSISTANT

## 2018-11-27 NOTE — PATIENT INSTRUCTIONS
Call [facility] nurse for any questions or concerns:  [phone]   *If you have concerns after hours, please call [phone], option 2 to speak with on call Cardiologist.    For your cold you take niquil, dayquil, cough medicine.   Avoid the cold medicines that have ibuprofen or advil in them.   If they have tylenol/ acetaminophen that's ok.  You can take up to 4,000 mg of tylenol a day.      Chest xray today.      1. Medication changes from today:  Continue current medications.       2. Weigh yourself daily and write it down.     3. Call [facility] nurse if your weight is up more than 2 pounds in one day or 5 pounds in one week.     4. Call [facility] nurse if you feel more short of breath, have more abdominal bloating, or leg swelling.     5. Continue low sodium diet (less than 2000 mg daily). If you eat less salt, you will retain less fluid.     6. Alcohol can weaken your heart further. You should avoid alcohol or limit its use to special times, such as a holiday or birthday.      7. Do NOT take Aleve or ibuprofen without talking to your doctor first.      8. Lab Results: labs look much better.        Component      Latest Ref Rng & Units 11/21/2018 11/27/2018   Sodium      136 - 145 mmol/L 141 141   Potassium      3.5 - 5.1 mmol/L 3.4 (L) 3.7   Chloride      98 - 107 mmol/L 105 105   Carbon Dioxide      23 - 29 mmol/L 28 28   Anion Gap      6 - 17 mmol/L 11.4 11.7   Glucose      70 - 105 mg/dL 98 81   Urea Nitrogen      7 - 30 mg/dL 24 19   Creatinine      0.70 - 1.30 mg/dL 1.59 (H) 1.39 (H)   GFR Estimate      >60 mL/min/1.7m2 45 (L) 52 (L)   GFR Estimate If Black      >60 mL/min/1.7m2 54 (L) 63   Calcium      8.5 - 10.5 mg/dL 8.6 8.8     [facility]: Cardiomyopathy, Optimization, Rehabilitation, Education  The [facility] is a heart failure specialty clinic within the [facility] where you will work with specialized nurse practitioners, physician assistants, doctors, and registered nurses. They are dedicated to  helping patients with heart failure to carefully adjust medications, receive education, and learn who and when to call if symptoms develop. They specialize in helping you better understand your condition, slow the progression of your disease, improve the length and quality of your life, help you detect future heart problems before they become life threatening, and avoid hospitalizations.

## 2018-11-27 NOTE — LETTER
11/27/2018    Sam Villatoro PA-C  [facility] [address]  [facility] [address]    RE: Gil Chowdary       Dear Colleague,    I had the pleasure of seeing Gil Chowdary in the [facility].    [id number]  HPI and Plan:   See dictation    Orders this Visit:  Orders Placed This Encounter   Procedures     X-ray Chest 2 vws*     No orders of the defined types were placed in this encounter.    There are no discontinued medications.      Encounter Diagnoses   Name Primary?     (HFpEF) heart failure with preserved ejection fraction (H)      S/P AVR (aortic valve replacement)      Cough Yes       CURRENT MEDICATIONS:  Current Outpatient Prescriptions   Medication Sig Dispense Refill     aspirin 81 MG tablet Take 81 mg by mouth every morning        atorvastatin (LIPITOR) 40 MG tablet Take 1 tablet (40 mg) by mouth daily 90 tablet 0     bumetanide (BUMEX) 1 MG tablet Take 3 tablets (3 mg) by mouth 2 times daily 180 tablet 3     cholecalciferol (VITAMIN D3) 5000 UNITS TABS tablet Take 5,000 Units by mouth every morning       clindamycin (CLEOCIN) 300 MG capsule Take 1 capsule (300 mg) by mouth as needed 10 capsule 3     ferrous sulfate (IRON) 325 (65 Fe) MG tablet Take 1 tablet (325 mg) by mouth 2 times daily 100 tablet 3     hydrochlorothiazide (HYDRODIURIL) 25 MG tablet Take 1 tablet (25 mg) by mouth daily 90 tablet 3     insulin aspart (NOVOLOG FLEXPEN) 100 UNIT/ML injection Inject 4 Units Subcutaneous daily (with lunch)       insulin aspart (NOVOLOG FLEXPEN) 100 UNIT/ML injection Inject 3 Units Subcutaneous 2 times daily (with meals) Takes with breakfast and dinner       Insulin Glargine (BASAGLAR KWIKPEN SC) Inject 22 Units Subcutaneous every morning        metoprolol (LOPRESSOR) 50 MG tablet Take 1 tablet (50 mg) by mouth 2 times daily 60 tablet 0     Multiple Vitamins-Minerals (CENTRUM ADULTS PO) Take 1 tablet by mouth every morning        pantoprazole  (PROTONIX) 40 MG EC tablet Take 40 mg by mouth every morning (before breakfast)       potassium chloride SA (K-DUR/KLOR-CON M) 10 MEQ CR tablet Take 4 tablets (40 mEq) by mouth 2 times daily 240 tablet 3     prednisoLONE acetate (PRED FORTE) 1 % ophthalmic susp Place 1 drop into both eyes as needed (glaucoma)        timolol (TIMOPTIC) 0.5 % ophthalmic solution Place 1 drop into both eyes 2 times daily          ALLERGIES     Allergies   Allergen Reactions     Amoxicillin      Itchy      Penicillins Hives     Spironolactone Rash       PAST MEDICAL HISTORY:  Past Medical History:   Diagnosis Date     Former tobacco use      Glaucoma      Hyperlipidemia LDL goal <160 12/6/2011     Obesity      DRAKE on CPAP      Right bundle branch block      Severe aortic stenosis     On Echo 10/28/16       PAST SURGICAL HISTORY:  Past Surgical History:   Procedure Laterality Date     HEART CATH LEFT HEART CATH  04/07/2017    Diffuse non-obstuctive CAD     REPAIR VALVE AORTIC N/A 5/16/2017    Procedure: REPAIR VALVE AORTIC;  Median Sternotony, CardioPulmonary Bypass, Aortic Valve Replace using 25MM Trifecta Valve with [employer], Septal myectomy;  Surgeon: Jun Simon MD;  Location: [facility] OR     REPLACE VALVE AORTIC N/A 5/16/2017    Procedure: REPLACE VALVE AORTIC;;  Surgeon: Jun Simon MD;  Location: [facility] OR     SINUS SURGERY  2005       FAMILY HISTORY:  Family History   Problem Relation Age of Onset     Family History Negative Mother      Diabetes Father      Heart Surgery Father      CABG     Coronary Artery Disease Father      Hypertension Brother      Diabetes Brother      HEART DISEASE Brother      Heart Surgery Brother      CABG x 3     Family History Negative Sister      Diabetes Brother      History of diabetes, but no longer an issue     Family History Negative Brother        SOCIAL HISTORY:  Social History     Social History     Marital status:      Spouse name: N/A     Number of children:  "N/A     Years of education: N/A     Social History Main Topics     Smoking status: Former Smoker     Packs/day: 1.00     Years: 20.00     Types: Cigarettes, Cigars     Start date: 1979     Quit date: 10/21/2011     Smokeless tobacco: Never Used     Alcohol use No     Drug use: No     Sexual activity: Not Asked     Other Topics Concern     Parent/Sibling W/ Cabg, Mi Or Angioplasty Before 65f 55m? Yes     brother triple bypass 49     Social History Narrative       Review of Systems:  Skin:  Negative     Eyes:  Positive for glasses  ENT:  Negative    Respiratory:  Positive for CPAP;sleep apnea;dyspnea on exertion  Cardiovascular:    Positive for;edema  Gastroenterology: Negative    Genitourinary:  Negative    Musculoskeletal:  Negative    Neurologic:  Negative    Psychiatric:  Negative    Heme/Lymph/Imm:  Negative    Endocrine:  Positive for diabetes    Physical Exam:  Vitals: /68  Pulse 76  Ht 1.778 m (5' 10\")  Wt 144.5 kg (318 lb 8 oz)  SpO2 95%  BMI 45.7 kg/m2   Please refer to dictation for physical exam    Recent Lab Results:  LIPID RESULTS:  Lab Results   Component Value Date    CHOL 126 10/10/2018    HDL 23 (A) 10/10/2018    LDL 71 10/10/2018    TRIG 230 (A) 10/10/2018       LIVER ENZYME RESULTS:  Lab Results   Component Value Date    AST 55 (H) 10/12/2018    ALT 31 10/12/2018       CBC RESULTS:  Lab Results   Component Value Date    WBC 3.9 (L) 10/24/2018    RBC 2.94 (L) 10/24/2018    HGB 9.2 (L) 11/27/2018    HCT 25.5 (L) 10/24/2018    MCV 87 10/24/2018    MCH 25.5 (L) 10/24/2018    MCHC 29.4 (L) 10/24/2018    RDW 17.5 (H) 10/24/2018    PLT 95 (L) 10/24/2018       BMP RESULTS:  Lab Results   Component Value Date     11/27/2018    POTASSIUM 3.7 11/27/2018    CHLORIDE 105 11/27/2018    CO2 28 11/27/2018    ANIONGAP 11.7 11/27/2018    GLC 81 11/27/2018    BUN 19 11/27/2018    CR 1.39 (H) 11/27/2018    GFRESTIMATED 52 (L) 11/27/2018    GFRESTBLACK 63 11/27/2018    MYRON 8.8 11/27/2018        A1C " RESULTS:  Lab Results   Component Value Date    A1C 6.8 (H) 10/12/2018       INR RESULTS:  Lab Results   Component Value Date    INR 2.19 (H) 10/14/2018    INR 2.28 (H) 10/13/2018           CC  Rachna Holt PA-C  [address]  [place] [id number]        Thank you for allowing me to participate in the care of your patient.      Sincerely,     Rachna Holt PA-C     [facility]    cc:   Rachna Holt PA-C  [address]  [place] [address]

## 2018-11-27 NOTE — PROGRESS NOTES
Received call from pt who reports that he woke up with a scratchy throat, headache and coughing up thick mucus.  Pt confirmed that he had colonoscopy yesterday and started to feel cold-like symptoms when he got home and feels like symptoms are worse this morning.  Pt inquired if there is any over-the-counter medication that he can take for his symptoms.  Advised pt he can take plain Mucinex and Tylenol.  Pt verbalized understanding. He first requested to reschedule appt with Sadiq to tomorrow and this was done.  Pt then called back and states he changed his mind and would prefer to keep appts today- reviewed 12:50pm lab appt and 1:50pm OV with SIRENA Babcock.  Pt verbalized understanding and agrees with this plan.    Routed to Sadiq as MARILOU More RN 9:02 AM 11/27/2018

## 2018-11-27 NOTE — LETTER
2018      Sam Villatoro PA-C  [facility] [address]  [facility] [address]      RE: Gil Chowdary       Dear Colleague,    I had the pleasure of seeing Gil Chowdary in the [facility].    Service Date: 2018      PRIMARY CARDIOLOGIST:  Dr. Guru Salinas.      REASON FOR VISIT:  HFpEF, cor pulmonale.      HISTORY OF PRESENT ILLNESS:  Mr. Chowdary is a delightful 58-year-old gentleman with past cardiac history significant for the followin.  Severe aortic stenosis with aortic valve replacement 2017 with a 25 mm Trifecta tissue valve.   2.  Type 2 diabetes, on insulin.   3.  Hyperlipidemia.   4.  Sleep apnea, treated.   5.  Cor pulmonale versus heart failure with preserved EF.   6.  Recent anemia with a recent colonoscopy negative for bleeding and positive for colon polyps, with Dr. Fletcher being his hematologist.      Mr. Chowdary was admitted in October with weight gain and shortness of breath.  He had diuresed during that hospitalization from a weight of about 325 pounds down to 310, and even got down to about less than 300 at one point at home.  He had been asked to stay longer, but he left early as he needed to take care of his elderly mother with whom he lives.  We have since been struggling to diurese him as an outpatient with p.o. medications.  We switched him to Lasix to Bumex, tried spironolactone, that gave him adverse effect, and then added hydrochlorothiazide.  At this point, he is on 3 mg of Bumex b.i.d. and 25 mg of hydrochlorothiazide daily.  Unfortunately, his weight had been fairly stagnant here and actually had gone up from 310 in late October to a peak of 329 last week.  Finally, this week his weight is down 11 pounds.      Today he says his breathing is overall better, although he has never complained of shortness of breath.  He continues to deny orthopnea or PND, and he is noticing that his abdomen is less swollen.   He has had fairly significant scrotal edema with previously his scrotum being the size of a grapefruit.  Over the last week these have now shrunk too and are more the size of tennis balls.  He is urinating frequently.  He does note, though, that this morning he seems to have developed a cold.  He has chills with a cough that is productive of greenish sputum.  He has not had fevers, but he feels achy and just kind of lousy.  That being said, he does not feel severely ill, and it does feel like a normal cold.  He is able to get out of bed and function.  He continues to deny orthopnea or PND.  He still has some muscle cramping with diuretics, but this seems to have stabilized as well.      SOCIAL HISTORY:  He lives at home with his mom.  She has dementia.  He recently had a sister who came in from [place] who is staying with him and helping out.  He is a former smoker, 20-pack-year history, quit in 2011.  No alcohol use.      PHYSICAL EXAMINATION:   GENERAL:  Well-developed, well-nourished gentleman in no acute distress.   HEENT:  Normocephalic, atraumatic.   HEART:  Regular in rate and rhythm without murmur, rub or gallop.   LUNGS:  Today have coarse rhonchi throughout most of the right lower and middle lobes that do not clear with coughing.  Left lung is clear.  I do not appreciate wheezing or rales.   EXTREMITIES:  He has 2+ pitting edema to his upper thighs and into his abdomen, although this seems softer than previous.      LABORATORY DATA:  Labs today show a creatinine improving to 1.39, potassium 19, potassium 3.7, sodium 141.  Hemoglobin 9.2.  BNP has dropped from 900 two weeks ago to 102.  This is an N-terminal.      ASSESSMENT AND PLAN:   1.  Heart failure with preserved EF, likely secondary to anemia and cor pulmonale due to obesity and sleep apnea.  That being said, he is compliant with his CPAP and really did okay up until he developed this anemia.  The workup for that is ongoing.  Fortunately, we have  finally turned the corner, and he is starting to lose weight as an outpatient.  His N-terminal proBNP is normal, but I suspect this is falsely low due to obesity, as he has clear anasarca on exam.  At this point, we will continue him on his current regimen, which is 3 mg of Bumex b.i.d., 25 mg of hydrochlorothiazide and 40 mEq of potassium b.i.d.   2.  Anemia with normocytic anemia and thrombocytopenia as well as some iron deficiency as well.  He has been on Coumadin for a brief episode of AFib done post-aortic valve replacement over a year and a half ago.  It has not been documented since.  At this point, his Coumadin has been on hold for his colonoscopy, and I am going to continue him off of that.  He will wear a Zio Patch that will be put on next week for 1 week, and if there is no fib or flutter on this, we will keep him off Coumadin, as his heart was likely irritable around the time of aortic valve replacement, and this may have been a short-term issue.  Especially, in light of the fact that he never was intended to be on Coumadin long-term.  His atria are not well visualized on his last echocardiogram, so it is hard to discern if they are dilated, which would affect his chances of going into AFib again.  We will rely on data from Zio Patch.  He did have some documentation of SVT during that hospitalization, but this would not necessarily require Coumadin.   3.  Aortic valve replacement for severe aortic stenosis with a well-functioning bioprosthetic valve.  He needs to remain on aspirin only for this.  He also had a septal myectomy at that time for septal hypertrophy and does not have an LVOT gradient.  I did reinforce to him last time that he should not go off aspirin regardless of what he is told unless it is approved by his cardiologist.      Thank you for allowing me to participate in this delightful patient's care.  I will see him back next week.  If we cannot get him to diurese further, we will bring him  in for an admission the week I am rounding of 12/10.  Please do not hesitate to call with questions or concerns.      cc:   JOAN Alfaro    [facility]    [address] [place] [address], [address]    [place]  [address]         BRETT CAO PA-C             D: 2018   T: 2018   MT: BLANE      Name:     ABIGAIL MATOS   MRN:      [id number]        Account:      [id number]   :      1960           Service Date: 2018      Document: [id number]         Outpatient Encounter Prescriptions as of 2018   Medication Sig Dispense Refill     aspirin 81 MG tablet Take 81 mg by mouth every morning        atorvastatin (LIPITOR) 40 MG tablet Take 1 tablet (40 mg) by mouth daily 90 tablet 0     bumetanide (BUMEX) 1 MG tablet Take 3 tablets (3 mg) by mouth 2 times daily 180 tablet 3     cholecalciferol (VITAMIN D3) 5000 UNITS TABS tablet Take 5,000 Units by mouth every morning       clindamycin (CLEOCIN) 300 MG capsule Take 1 capsule (300 mg) by mouth as needed 10 capsule 3     ferrous sulfate (IRON) 325 (65 Fe) MG tablet Take 1 tablet (325 mg) by mouth 2 times daily 100 tablet 3     hydrochlorothiazide (HYDRODIURIL) 25 MG tablet Take 1 tablet (25 mg) by mouth daily 90 tablet 3     insulin aspart (NOVOLOG FLEXPEN) 100 UNIT/ML injection Inject 4 Units Subcutaneous daily (with lunch)       insulin aspart (NOVOLOG FLEXPEN) 100 UNIT/ML injection Inject 3 Units Subcutaneous 2 times daily (with meals) Takes with breakfast and dinner       Insulin Glargine (BASAGLAR KWIKPEN SC) Inject 22 Units Subcutaneous every morning        metoprolol (LOPRESSOR) 50 MG tablet Take 1 tablet (50 mg) by mouth 2 times daily 60 tablet 0     Multiple Vitamins-Minerals (CENTRUM ADULTS PO) Take 1 tablet by mouth every morning        pantoprazole (PROTONIX) 40 MG EC tablet Take 40 mg by mouth every morning (before breakfast)       prednisoLONE acetate (PRED FORTE) 1 % ophthalmic susp Place 1 drop into both  eyes as needed (glaucoma)        timolol (TIMOPTIC) 0.5 % ophthalmic solution Place 1 drop into both eyes 2 times daily        [DISCONTINUED] potassium chloride SA (K-DUR/KLOR-CON M) 10 MEQ CR tablet Take 4 tablets (40 mEq) by mouth 2 times daily 240 tablet 3     No facility-administered encounter medications on file as of 11/27/2018.        Again, thank you for allowing me to participate in the care of your patient.      Sincerely,    Rachna Holt PA-C     [facility]

## 2018-11-27 NOTE — MR AVS SNAPSHOT
After Visit Summary   11/27/2018    Gil Chowdary    MRN: [id number]           Patient Information     Date Of Birth          1960        Visit Information        Provider Department      11/27/2018 1:50 PM More, Rachna Carrillo PA-C [facility]   [place]        Today's Diagnoses     Cough    -  1    (HFpEF) heart failure with preserved ejection fraction (H)        S/P AVR (aortic valve replacement)          Care Instructions    Call CORE nurse for any questions or concerns:  [phone]   *If you have concerns after hours, please call [phone], option 2 to speak with on call Cardiologist.    For your cold you take niquil, dayquil, cough medicine.   Avoid the cold medicines that have ibuprofen or advil in them.   If they have tylenol/ acetaminophen that's ok.  You can take up to 4,000 mg of tylenol a day.      Chest xray today.      1. Medication changes from today:  Continue current medications.       2. Weigh yourself daily and write it down.     3. Call CORE nurse if your weight is up more than 2 pounds in one day or 5 pounds in one week.     4. Call CORE nurse if you feel more short of breath, have more abdominal bloating, or leg swelling.     5. Continue low sodium diet (less than 2000 mg daily). If you eat less salt, you will retain less fluid.     6. Alcohol can weaken your heart further. You should avoid alcohol or limit its use to special times, such as a holiday or birthday.      7. Do NOT take Aleve or ibuprofen without talking to your doctor first.      8. Lab Results: labs look much better.        Component      Latest Ref Rng & Units 11/21/2018 11/27/2018   Sodium      136 - 145 mmol/L 141 141   Potassium      3.5 - 5.1 mmol/L 3.4 (L) 3.7   Chloride      98 - 107 mmol/L 105 105   Carbon Dioxide      23 - 29 mmol/L 28 28   Anion Gap      6 - 17 mmol/L 11.4 11.7   Glucose      70 - 105 mg/dL 98 81   Urea Nitrogen      7 - 30 mg/dL 24 19    Creatinine      0.70 - 1.30 mg/dL 1.59 (H) 1.39 (H)   GFR Estimate      >60 mL/min/1.7m2 45 (L) 52 (L)   GFR Estimate If Black      >60 mL/min/1.7m2 54 (L) 63   Calcium      8.5 - 10.5 mg/dL 8.6 8.8     [facility]: Cardiomyopathy, Optimization, Rehabilitation, Education  The [facility] is a heart failure specialty clinic within the [facility] where you will work with specialized nurse practitioners, physician assistants, doctors, and registered nurses. They are dedicated to helping patients with heart failure to carefully adjust medications, receive education, and learn who and when to call if symptoms develop. They specialize in helping you better understand your condition, slow the progression of your disease, improve the length and quality of your life, help you detect future heart problems before they become life threatening, and avoid hospitalizations.              Follow-ups after your visit        Your next 10 appointments already scheduled     Nov 29, 2018 10:00 AM CST   ZIOPATCH MONITOR with LILIANA   [facility] Radiology - [facility] ([facility])    [facility]  [facility] [address] [phone]            Dec 03, 2018  8:00 AM CST   LAB with SOTOMAYOR LAB   [facility] HEART AT [place] ([facility])    [address] [place], [place] [address]  [facility] [address] [phone]           Please do not eat 10-12 hours before your appointment if you are coming in fasting for labs on lipids, cholesterol, or glucose (sugar). This does not apply to pregnant women. Water, hot tea and black coffee (with nothing added) are okay. Do not drink other fluids, diet soda or chew gum.            Dec 03, 2018  8:50 AM CST   Core Return with Rachna Holt PA-C   [facility] ([facility])    [address]  [facility] [address] [phone] OPT 2            Dec 12, 2018   8:45 AM CST   Return Visit with  Oncology Nurse   [facility] ([facility])    [facility]  [address]  [place] [id number] [phone]            Dec 12, 2018  9:45 AM CST   Return Visit with Juan Fletcher MD   [facility] ([facility])    [facility] [place]  [address]  [place] [id number] [phone]              Future tests that were ordered for you today     Open Future Orders        Priority Expected Expires Ordered    X-ray Chest 2 vws* Routine 11/27/2018 11/27/2019 11/27/2018            Who to contact     If you have questions or need follow up information about today's clinic visit or your schedule please contact [facility] directly at [phone].  Normal or non-critical lab and imaging results will be communicated to you by ideelihart, letter or phone within 4 business days after the clinic has received the results. If you do not hear from us within 7 days, please contact the clinic through [facility] or phone. If you have a critical or abnormal lab result, we will notify you by phone as soon as possible.  Submit refill requests through Tellwiki or call your pharmacy and they will forward the refill request to us. Please allow 3 business days for your refill to be completed.          Additional Information About Your Visit        ideelihart Information     Tellwiki gives you secure access to your electronic health record. If you see a primary care provider, you can also send messages to your care team and make appointments. If you have questions, please call your primary care clinic.  If you do not have a primary care provider, please call [phone] and they will assist you.        Care EveryWhere ID     This is your Care EveryWhere ID. This could be used by other organizations to access your [facility] medical records  [id number]        Your Vitals  "Were     Pulse Height Pulse Oximetry BMI (Body Mass Index)          76 1.778 m (5' 10\") 95% 45.7 kg/m2         Blood Pressure from Last 3 Encounters:   11/27/18 108/68   11/21/18 125/75   11/13/18 104/68    Weight from Last 3 Encounters:   11/27/18 144.5 kg (318 lb 8 oz)   11/21/18 149.2 kg (329 lb)   11/13/18 148.8 kg (328 lb)              We Performed the Following     Follow-Up with [facility] Clinic - KARYN visit        Primary Care Provider Office Phone # Fax #    Sam Villatoro PA-C [phone]       [place] [facility] [address]  [facility] [address]        Equal Access to Services     SIL COLEMAN : Hadii jovanna suarez hadasho Soomaali, waaxda luqadaha, qaybta kaalmada adeegyada, hue swanson . So [facility] [phone].    ATENCIÓN: Si habla español, tiene a cross disposición servicios gratuitos de asistencia lingüística. Llame al [phone].    We comply with applicable federal civil rights laws and [place] laws. We do not discriminate on the basis of race, color, national origin, age, disability, sex, sexual orientation, or gender identity.            Thank you!     Thank you for choosing [facility]  for your care. Our goal is always to provide you with excellent care. Hearing back from our patients is one way we can continue to improve our services. Please take a few minutes to complete the written survey that you may receive in the mail after your visit with us. Thank you!             Your Updated Medication List - Protect others around you: Learn how to safely use, store and throw away your medicines at www.disposemymeds.org.          This list is accurate as of 11/27/18  2:26 PM.  Always use your most recent med list.                   Brand Name Dispense Instructions for use Diagnosis    aspirin 81 MG tablet    ASA     Take 81 mg by mouth every morning        atorvastatin 40 MG tablet    LIPITOR    90 tablet    Take 1 " tablet (40 mg) by mouth daily    Hyperlipidemia with target LDL less than 70       BASAGLAR KWIKPEN SC      Inject 22 Units Subcutaneous every morning        bumetanide 1 MG tablet    BUMEX    180 tablet    Take 3 tablets (3 mg) by mouth 2 times daily    S/P AVR (aortic valve replacement)       CENTRUM ADULTS PO      Take 1 tablet by mouth every morning        cholecalciferol 5000 units Tabs tablet    vitamin D3     Take 5,000 Units by mouth every morning        clindamycin 300 MG capsule    CLEOCIN    10 capsule    Take 1 capsule (300 mg) by mouth as needed    S/P AVR (aortic valve replacement)       ferrous sulfate 325 (65 Fe) MG tablet    FEROSUL    100 tablet    Take 1 tablet (325 mg) by mouth 2 times daily    Other iron deficiency anemia       hydrochlorothiazide 25 MG tablet    HYDRODIURIL    90 tablet    Take 1 tablet (25 mg) by mouth daily    (HFpEF) heart failure with preserved ejection fraction (H)       metoprolol tartrate 50 MG tablet    LOPRESSOR    60 tablet    Take 1 tablet (50 mg) by mouth 2 times daily    Paroxysmal supraventricular tachycardia (H), S/P AVR (aortic valve replacement)       * NovoLOG FLEXPEN 100 UNIT/ML pen   Generic drug:  insulin aspart      Inject 3 Units Subcutaneous 2 times daily (with meals) Takes with breakfast and dinner        * NovoLOG FLEXPEN 100 UNIT/ML pen   Generic drug:  insulin aspart      Inject 4 Units Subcutaneous daily (with lunch)        pantoprazole 40 MG EC tablet    PROTONIX     Take 40 mg by mouth every morning (before breakfast)        potassium chloride ER 10 MEQ CR tablet    K-DUR/KLOR-CON M    240 tablet    Take 4 tablets (40 mEq) by mouth 2 times daily    (HFpEF) heart failure with preserved ejection fraction (H)       prednisoLONE acetate 1 % ophthalmic suspension    PRED FORTE     Place 1 drop into both eyes as needed (glaucoma)        timolol maleate 0.5 % ophthalmic solution    TIMOPTIC     Place 1 drop into both eyes 2 times daily        * Notice:   This list has 2 medication(s) that are the same as other medications prescribed for you. Read the directions carefully, and ask your doctor or other care provider to review them with you.

## 2018-11-27 NOTE — PROGRESS NOTES
[id number]  HPI and Plan:   See dictation    Orders this Visit:  Orders Placed This Encounter   Procedures     X-ray Chest 2 vws*     No orders of the defined types were placed in this encounter.    There are no discontinued medications.      Encounter Diagnoses   Name Primary?     (HFpEF) heart failure with preserved ejection fraction (H)      S/P AVR (aortic valve replacement)      Cough Yes       CURRENT MEDICATIONS:  Current Outpatient Prescriptions   Medication Sig Dispense Refill     aspirin 81 MG tablet Take 81 mg by mouth every morning        atorvastatin (LIPITOR) 40 MG tablet Take 1 tablet (40 mg) by mouth daily 90 tablet 0     bumetanide (BUMEX) 1 MG tablet Take 3 tablets (3 mg) by mouth 2 times daily 180 tablet 3     cholecalciferol (VITAMIN D3) 5000 UNITS TABS tablet Take 5,000 Units by mouth every morning       clindamycin (CLEOCIN) 300 MG capsule Take 1 capsule (300 mg) by mouth as needed 10 capsule 3     ferrous sulfate (IRON) 325 (65 Fe) MG tablet Take 1 tablet (325 mg) by mouth 2 times daily 100 tablet 3     hydrochlorothiazide (HYDRODIURIL) 25 MG tablet Take 1 tablet (25 mg) by mouth daily 90 tablet 3     insulin aspart (NOVOLOG FLEXPEN) 100 UNIT/ML injection Inject 4 Units Subcutaneous daily (with lunch)       insulin aspart (NOVOLOG FLEXPEN) 100 UNIT/ML injection Inject 3 Units Subcutaneous 2 times daily (with meals) Takes with breakfast and dinner       Insulin Glargine (BASAGLAR KWIKPEN SC) Inject 22 Units Subcutaneous every morning        metoprolol (LOPRESSOR) 50 MG tablet Take 1 tablet (50 mg) by mouth 2 times daily 60 tablet 0     Multiple Vitamins-Minerals (CENTRUM ADULTS PO) Take 1 tablet by mouth every morning        pantoprazole (PROTONIX) 40 MG EC tablet Take 40 mg by mouth every morning (before breakfast)       potassium chloride SA (K-DUR/KLOR-CON M) 10 MEQ CR tablet Take 4 tablets (40 mEq) by mouth 2 times daily 240 tablet 3     prednisoLONE acetate (PRED FORTE) 1 % ophthalmic susp  Place 1 drop into both eyes as needed (glaucoma)        timolol (TIMOPTIC) 0.5 % ophthalmic solution Place 1 drop into both eyes 2 times daily          ALLERGIES     Allergies   Allergen Reactions     Amoxicillin      Itchy      Penicillins Hives     Spironolactone Rash       PAST MEDICAL HISTORY:  Past Medical History:   Diagnosis Date     Former tobacco use      Glaucoma      Hyperlipidemia LDL goal <160 12/6/2011     Obesity      DRAKE on CPAP      Right bundle branch block      Severe aortic stenosis     On Echo 10/28/16       PAST SURGICAL HISTORY:  Past Surgical History:   Procedure Laterality Date     HEART CATH LEFT HEART CATH  04/07/2017    Diffuse non-obstuctive CAD     REPAIR VALVE AORTIC N/A 5/16/2017    Procedure: REPAIR VALVE AORTIC;  Median Sternotony, CardioPulmonary Bypass, Aortic Valve Replace using 25MM Trifecta Valve with [employer], Septal myectomy;  Surgeon: Jun Simon MD;  Location: [facility] OR     REPLACE VALVE AORTIC N/A 5/16/2017    Procedure: REPLACE VALVE AORTIC;;  Surgeon: Jun Simon MD;  Location: [facility] OR     SINUS SURGERY  2005       FAMILY HISTORY:  Family History   Problem Relation Age of Onset     Family History Negative Mother      Diabetes Father      Heart Surgery Father      CABG     Coronary Artery Disease Father      Hypertension Brother      Diabetes Brother      HEART DISEASE Brother      Heart Surgery Brother      CABG x 3     Family History Negative Sister      Diabetes Brother      History of diabetes, but no longer an issue     Family History Negative Brother        SOCIAL HISTORY:  Social History     Social History     Marital status:      Spouse name: N/A     Number of children: N/A     Years of education: N/A     Social History Main Topics     Smoking status: Former Smoker     Packs/day: 1.00     Years: 20.00     Types: Cigarettes, Cigars     Start date: 1979     Quit date: 10/21/2011     Smokeless tobacco: Never Used     Alcohol use No  "    Drug use: No     Sexual activity: Not Asked     Other Topics Concern     Parent/Sibling W/ Cabg, Mi Or Angioplasty Before 65f 55m? Yes     brother triple bypass 49     Social History Narrative       Review of Systems:  Skin:  Negative     Eyes:  Positive for glasses  ENT:  Negative    Respiratory:  Positive for CPAP;sleep apnea;dyspnea on exertion  Cardiovascular:    Positive for;edema  Gastroenterology: Negative    Genitourinary:  Negative    Musculoskeletal:  Negative    Neurologic:  Negative    Psychiatric:  Negative    Heme/Lymph/Imm:  Negative    Endocrine:  Positive for diabetes    Physical Exam:  Vitals: /68  Pulse 76  Ht 1.778 m (5' 10\")  Wt 144.5 kg (318 lb 8 oz)  SpO2 95%  BMI 45.7 kg/m2   Please refer to dictation for physical exam    Recent Lab Results:  LIPID RESULTS:  Lab Results   Component Value Date    CHOL 126 10/10/2018    HDL 23 (A) 10/10/2018    LDL 71 10/10/2018    TRIG 230 (A) 10/10/2018       LIVER ENZYME RESULTS:  Lab Results   Component Value Date    AST 55 (H) 10/12/2018    ALT 31 10/12/2018       CBC RESULTS:  Lab Results   Component Value Date    WBC 3.9 (L) 10/24/2018    RBC 2.94 (L) 10/24/2018    HGB 9.2 (L) 11/27/2018    HCT 25.5 (L) 10/24/2018    MCV 87 10/24/2018    MCH 25.5 (L) 10/24/2018    MCHC 29.4 (L) 10/24/2018    RDW 17.5 (H) 10/24/2018    PLT 95 (L) 10/24/2018       BMP RESULTS:  Lab Results   Component Value Date     11/27/2018    POTASSIUM 3.7 11/27/2018    CHLORIDE 105 11/27/2018    CO2 28 11/27/2018    ANIONGAP 11.7 11/27/2018    GLC 81 11/27/2018    BUN 19 11/27/2018    CR 1.39 (H) 11/27/2018    GFRESTIMATED 52 (L) 11/27/2018    GFRESTBLACK 63 11/27/2018    MYRON 8.8 11/27/2018        A1C RESULTS:  Lab Results   Component Value Date    A1C 6.8 (H) 10/12/2018       INR RESULTS:  Lab Results   Component Value Date    INR 2.19 (H) 10/14/2018    INR 2.28 (H) 10/13/2018           JARETT Holt PA-C  [address]  [place] " [id number]

## 2018-11-28 NOTE — PROGRESS NOTES
Service Date: 2018      PRIMARY CARDIOLOGIST:  Dr. Guru Salinas.      REASON FOR VISIT:  HFpEF, cor pulmonale.      HISTORY OF PRESENT ILLNESS:  Mr. Chowdary is a delightful 58-year-old gentleman with past cardiac history significant for the followin.  Severe aortic stenosis with aortic valve replacement 2017 with a 25 mm Trifecta tissue valve.   2.  Type 2 diabetes, on insulin.   3.  Hyperlipidemia.   4.  Sleep apnea, treated.   5.  Cor pulmonale versus heart failure with preserved EF.   6.  Recent anemia with a recent colonoscopy negative for bleeding and positive for colon polyps, with Dr. Fletcher being his hematologist.      Mr. Chowdary was admitted in October with weight gain and shortness of breath.  He had diuresed during that hospitalization from a weight of about 325 pounds down to 310, and even got down to about less than 300 at one point at home.  He had been asked to stay longer, but he left early as he needed to take care of his elderly mother with whom he lives.  We have since been struggling to diurese him as an outpatient with p.o. medications.  We switched him to Lasix to Bumex, tried spironolactone, that gave him adverse effect, and then added hydrochlorothiazide.  At this point, he is on 3 mg of Bumex b.i.d. and 25 mg of hydrochlorothiazide daily.  Unfortunately, his weight had been fairly stagnant here and actually had gone up from 310 in late October to a peak of 329 last week.  Finally, this week his weight is down 11 pounds.      Today he says his breathing is overall better, although he has never complained of shortness of breath.  He continues to deny orthopnea or PND, and he is noticing that his abdomen is less swollen.  He has had fairly significant scrotal edema with previously his scrotum being the size of a grapefruit.  Over the last week these have now shrunk too and are more the size of tennis balls.  He is urinating frequently.  He does note, though, that this morning he  seems to have developed a cold.  He has chills with a cough that is productive of greenish sputum.  He has not had fevers, but he feels achy and just kind of lousy.  That being said, he does not feel severely ill, and it does feel like a normal cold.  He is able to get out of bed and function.  He continues to deny orthopnea or PND.  He still has some muscle cramping with diuretics, but this seems to have stabilized as well.      SOCIAL HISTORY:  He lives at home with his mom.  She has dementia.  He recently had a sister who came in from [place] who is staying with him and helping out.  He is a former smoker, 20-pack-year history, quit in 2011.  No alcohol use.      PHYSICAL EXAMINATION:   GENERAL:  Well-developed, well-nourished gentleman in no acute distress.   HEENT:  Normocephalic, atraumatic.   HEART:  Regular in rate and rhythm without murmur, rub or gallop.   LUNGS:  Today have coarse rhonchi throughout most of the right lower and middle lobes that do not clear with coughing.  Left lung is clear.  I do not appreciate wheezing or rales.   EXTREMITIES:  He has 2+ pitting edema to his upper thighs and into his abdomen, although this seems softer than previous.      LABORATORY DATA:  Labs today show a creatinine improving to 1.39, potassium 19, potassium 3.7, sodium 141.  Hemoglobin 9.2.  BNP has dropped from 900 two weeks ago to 102.  This is an N-terminal.      ASSESSMENT AND PLAN:   1.  Heart failure with preserved EF, likely secondary to anemia and cor pulmonale due to obesity and sleep apnea.  That being said, he is compliant with his CPAP and really did okay up until he developed this anemia.  The workup for that is ongoing.  Fortunately, we have finally turned the corner, and he is starting to lose weight as an outpatient.  His N-terminal proBNP is normal, but I suspect this is falsely low due to obesity, as he has clear anasarca on exam.  At this point, we will continue him on his current regimen, which is  3 mg of Bumex b.i.d., 25 mg of hydrochlorothiazide and 40 mEq of potassium b.i.d.   2.  Anemia with normocytic anemia and thrombocytopenia as well as some iron deficiency as well.  He has been on Coumadin for a brief episode of AFib done post-aortic valve replacement over a year and a half ago.  It has not been documented since.  At this point, his Coumadin has been on hold for his colonoscopy, and I am going to continue him off of that.  He will wear a Zio Patch that will be put on next week for 1 week, and if there is no fib or flutter on this, we will keep him off Coumadin, as his heart was likely irritable around the time of aortic valve replacement, and this may have been a short-term issue.  Especially, in light of the fact that he never was intended to be on Coumadin long-term.  His atria are not well visualized on his last echocardiogram, so it is hard to discern if they are dilated, which would affect his chances of going into AFib again.  We will rely on data from Zio Patch.  He did have some documentation of SVT during that hospitalization, but this would not necessarily require Coumadin.   3.  Aortic valve replacement for severe aortic stenosis with a well-functioning bioprosthetic valve.  He needs to remain on aspirin only for this.  He also had a septal myectomy at that time for septal hypertrophy and does not have an LVOT gradient.  I did reinforce to him last time that he should not go off aspirin regardless of what he is told unless it is approved by his cardiologist.      Thank you for allowing me to participate in this delightful patient's care.  I will see him back next week.  If we cannot get him to [facility] further, we will bring him in for an admission the week I am rounding of 12/10.  Please do not hesitate to call with questions or concerns.      cc:   JOAN Alfaro    [facility]    [address] [place] [address], [address]    [place]  [address]         BRETT CHIU MORE,  SIRENA             D: 2018   T: 2018   MT: BLANE      Name:     ABIGAIL MATOS   MRN:      [id number]        Account:      [id number]   :      1960           Service Date: 2018      Document: [id number]

## 2018-11-29 ENCOUNTER — HOSPITAL ENCOUNTER (OUTPATIENT)
Dept: CARDIOLOGY | Facility: CLINIC | Age: 58
Discharge: HOME | End: 2018-11-29
Attending: PHYSICIAN ASSISTANT | Admitting: PHYSICIAN ASSISTANT
Payer: COMMERCIAL

## 2018-11-29 DIAGNOSIS — I48.0 PAF (PAROXYSMAL ATRIAL FIBRILLATION) (H): ICD-10-CM

## 2018-11-29 PROCEDURE — 0298T ZZC EXT ECG > 48HR TO 21 DAY REVIEW AND INTERPRETATN: CPT | Performed by: INTERNAL MEDICINE

## 2018-11-29 PROCEDURE — 0296T ZIO PATCH HOLTER: CPT

## 2018-12-03 ENCOUNTER — OFFICE VISIT (OUTPATIENT)
Dept: CARDIOLOGY | Facility: CLINIC | Age: 58
End: 2018-12-03
Payer: COMMERCIAL

## 2018-12-03 VITALS
WEIGHT: 307.9 LBS | OXYGEN SATURATION: 95 % | HEIGHT: 70 IN | DIASTOLIC BLOOD PRESSURE: 62 MMHG | HEART RATE: 68 BPM | BODY MASS INDEX: 44.08 KG/M2 | SYSTOLIC BLOOD PRESSURE: 122 MMHG

## 2018-12-03 DIAGNOSIS — E87.6 HYPOKALEMIA: Primary | ICD-10-CM

## 2018-12-03 DIAGNOSIS — I50.30 (HFPEF) HEART FAILURE WITH PRESERVED EJECTION FRACTION (H): ICD-10-CM

## 2018-12-03 DIAGNOSIS — I50.9 CONGESTIVE HEART FAILURE, UNSPECIFIED HF CHRONICITY, UNSPECIFIED HEART FAILURE TYPE (H): Primary | ICD-10-CM

## 2018-12-03 DIAGNOSIS — I50.9 CONGESTIVE HEART FAILURE, UNSPECIFIED HF CHRONICITY, UNSPECIFIED HEART FAILURE TYPE (H): ICD-10-CM

## 2018-12-03 LAB
ANION GAP SERPL CALCULATED.3IONS-SCNC: ABNORMAL MMOL/L (ref 6–17)
BUN SERPL-MCNC: 21 MG/DL (ref 7–30)
CALCIUM SERPL-MCNC: 8.9 MG/DL (ref 8.5–10.5)
CHLORIDE SERPL-SCNC: 102 MMOL/L (ref 98–107)
CO2 SERPL-SCNC: 31 MMOL/L (ref 23–29)
CREAT SERPL-MCNC: 1.34 MG/DL (ref 0.7–1.3)
GFR SERPL CREATININE-BSD FRML MDRD: 55 ML/MIN/1.7M2
GLUCOSE SERPL-MCNC: 126 MG/DL (ref 70–105)
HGB BLD-MCNC: 9.2 G/DL (ref 13.3–17.7)
NT-PROBNP SERPL-MCNC: 748 PG/ML (ref 0–125)
POTASSIUM SERPL-SCNC: 2.9 MMOL/L (ref 3.5–5.1)
SODIUM SERPL-SCNC: 140 MMOL/L (ref 136–145)

## 2018-12-03 PROCEDURE — 36415 COLL VENOUS BLD VENIPUNCTURE: CPT | Performed by: PHYSICIAN ASSISTANT

## 2018-12-03 PROCEDURE — 83880 ASSAY OF NATRIURETIC PEPTIDE: CPT | Performed by: PHYSICIAN ASSISTANT

## 2018-12-03 PROCEDURE — 99214 OFFICE O/P EST MOD 30 MIN: CPT | Performed by: PHYSICIAN ASSISTANT

## 2018-12-03 PROCEDURE — 85018 HEMOGLOBIN: CPT | Performed by: PHYSICIAN ASSISTANT

## 2018-12-03 PROCEDURE — 80048 BASIC METABOLIC PNL TOTAL CA: CPT | Performed by: PHYSICIAN ASSISTANT

## 2018-12-03 RX ORDER — POTASSIUM CHLORIDE 750 MG/1
60 TABLET, EXTENDED RELEASE ORAL 2 TIMES DAILY
Qty: 360 TABLET | Refills: 3 | Status: SHIPPED | OUTPATIENT
Start: 2018-12-03 | End: 2018-12-27

## 2018-12-03 NOTE — MR AVS SNAPSHOT
After Visit Summary   12/3/2018    Gil Chowdary    MRN: [id number]           Patient Information     Date Of Birth          1960        Visit Information        Provider Department      12/3/2018 8:50 AM More, Rachna Carrillo PA-C [facility]   Sandra        Today's Diagnoses     Hypokalemia    -  1    (HFpEF) heart failure with preserved ejection fraction (H)          Care Instructions    Call CORE nurse for any questions or concerns:  [phone]   *If you have concerns after hours, please call [phone], option 2 to speak with on call Cardiologist.    Keep up the great work on the healthy diet!      1. Medication changes from today:  Take a total of 12 potassium pills a day, however you would like to split it up.    Tomorrow skip only the hydrochlorothiazide.  Go back to taking it on Wednesday daily as ou have been.    Continue other medications.        2. Weigh yourself daily and write it down.     3. Call CORE nurse if your weight is up more than 2 pounds in one day or 5 pounds in one week.     4. Call CORE nurse if you feel more short of breath, have more abdominal bloating, or leg swelling.     5. Continue low sodium diet (less than 2000 mg daily). If you eat less salt, you will retain less fluid.     6. Alcohol can weaken your heart further. You should avoid alcohol or limit its use to special times, such as a holiday or birthday.      7. Do NOT take Aleve or ibuprofen without talking to your doctor first.      8. Lab Results: labs look good with the exception of potassium which is low.       Component      Latest Ref Rng & Units 11/27/2018 12/3/2018   Sodium      136 - 145 mmol/L 141 140   Potassium      3.5 - 5.1 mmol/L 3.7 2.9 (LL)   Chloride      98 - 107 mmol/L 105 102   Carbon Dioxide      23 - 29 mmol/L 28 31 (H)   Anion Gap      6 - 17 mmol/L 11.7 Not Calculated   Glucose      70 - 105 mg/dL 81 126 (H)   Urea Nitrogen      7 - 30 mg/dL 19 21    Creatinine      0.70 - 1.30 mg/dL 1.39 (H) 1.34 (H)   GFR Estimate      >60 mL/min/1.7m2 52 (L) 55 (L)   GFR Estimate If Black      >60 mL/min/1.7m2 63 66   Calcium      8.5 - 10.5 mg/dL 8.8 8.9     [facility]: Cardiomyopathy, Optimization, Rehabilitation, Education  The [facility] Clinic is a heart failure specialty clinic within the [facility] where you will work with specialized nurse practitioners, physician assistants, doctors, and registered nurses. They are dedicated to helping patients with heart failure to carefully adjust medications, receive education, and learn who and when to call if symptoms develop. They specialize in helping you better understand your condition, slow the progression of your disease, improve the length and quality of your life, help you detect future heart problems before they become life threatening, and avoid hospitalizations.            Follow-ups after your visit        Additional Services     Follow-Up with CORE Clinic - KARYN visit           Follow-Up with [facility]ORE Clinic - KARYN visit                 Your next 10 appointments already scheduled     Dec 12, 2018  8:45 AM CST   Return Visit with  Oncology Nurse   [facility]    [facility]  [address]  [facility] [address]   [phone]            Dec 12, 2018  9:45 AM CST   Return Visit with Juan Fletcher MD   [facility]    [facility]  [address]  [facility] [address]   [phone]              Future tests that were ordered for you today     Open Future Orders        Priority Expected Expires Ordered    Follow-Up with [facility] Clinic - KARYN visit Routine 12/17/2018 12/3/2019 12/3/2018    Basic metabolic panel Routine 12/17/2018 12/3/2019 12/3/2018    Potassium Routine 12/6/2018 12/3/2019 12/3/2018    Follow-Up with [facility] Clinic - KARYN visit Routine 12/10/2018 12/3/2019 12/3/2018     "Basic metabolic panel Routine 12/10/2018 12/3/2019 12/3/2018            Who to contact     If you have questions or need follow up information about today's clinic visit or your schedule please contact [facility] directly at [phone].  Normal or non-critical lab and imaging results will be communicated to you by MyChart, letter or phone within 4 business days after the clinic has received the results. If you do not hear from us within 7 days, please contact the clinic through Vollyhart or phone. If you have a critical or abnormal lab result, we will notify you by phone as soon as possible.  Submit refill requests through Datam or call your pharmacy and they will forward the refill request to us. Please allow 3 business days for your refill to be completed.          Additional Information About Your Visit        Vollyhart Information     Datam gives you secure access to your electronic health record. If you see a primary care provider, you can also send messages to your care team and make appointments. If you have questions, please call your primary care clinic.  If you do not have a primary care provider, please call [phone] and they will assist you.        Care EveryWhere ID     This is your Care EveryWhere ID. This could be used by other organizations to access your [place] medical records  [id number]        Your Vitals Were     Pulse Height Pulse Oximetry BMI (Body Mass Index)          68 1.778 m (5' 10\") 95% 44.18 kg/m2         Blood Pressure from Last 3 Encounters:   12/03/18 122/62   11/27/18 108/68   11/21/18 125/75    Weight from Last 3 Encounters:   12/03/18 139.7 kg (307 lb 14.4 oz)   11/27/18 144.5 kg (318 lb 8 oz)   11/21/18 149.2 kg (329 lb)                 Today's Medication Changes          These changes are accurate as of 12/3/18  9:40 AM.  If you have any questions, ask your nurse or doctor.               These medicines have changed or have updated " prescriptions.        Dose/Directions    potassium chloride ER 10 MEQ CR tablet   Commonly known as:  K-DUR/KLOR-CON M   This may have changed:  how much to take   Used for:  (HFpEF) heart failure with preserved ejection fraction (H)   Changed by:  Rachna Holt PA-C        Dose:  60 mEq   Take 6 tablets (60 mEq) by mouth 2 times daily   Quantity:  360 tablet   Refills:  3            Where to get your medicines      These medications were sent to [email or web address] [address] [place] [address] AT [address] [place] [address]     Phone:  [phone]     potassium chloride ER 10 MEQ CR tablet                Primary Care Provider Office Phone # Fax #    Sam Villatoro PA-C [phone]       [facility] [employer] [facility] [facility] [address]  [facility] [address]        Equal Access to Services     [facility] : Hadii aad ku hadasho Soomaali, waaxda luqadaha, qaybta kaalmada adeegyada, waxay maryin haysolisn ramona swanson . So [facility] [phone].    ATENCIÓN: Si habla español, tiene a cross disposición servicios gratuitos de asistencia lingüística. Caitliname al [phone].    We comply with applicable federal civil rights laws and [place] laws. We do not discriminate on the basis of race, color, national origin, age, disability, sex, sexual orientation, or gender identity.            Thank you!     Thank you for choosing [facility]  for your care. Our goal is always to provide you with excellent care. Hearing back from our patients is one way we can continue to improve our services. Please take a few minutes to complete the written survey that you may receive in the mail after your visit with us. Thank you!             Your Updated Medication List - Protect others around you: Learn how to safely use, store and throw away your medicines at www.disposemymeds.org.          This list is accurate as  of 12/3/18  9:40 AM.  Always use your most recent med list.                   Brand Name Dispense Instructions for use Diagnosis    aspirin 81 MG tablet    ASA     Take 81 mg by mouth every morning        atorvastatin 40 MG tablet    LIPITOR    90 tablet    Take 1 tablet (40 mg) by mouth daily    Hyperlipidemia with target LDL less than 70       AXELAGLBHARGAV LUZIKPEN SC      Inject 22 Units Subcutaneous every morning        bumetanide 1 MG tablet    BUMEX    180 tablet    Take 3 tablets (3 mg) by mouth 2 times daily    S/P AVR (aortic valve replacement)       CENTRUM ADULTS PO      Take 1 tablet by mouth every morning        cholecalciferol 5000 units Tabs tablet    vitamin D3     Take 5,000 Units by mouth every morning        clindamycin 300 MG capsule    CLEOCIN    10 capsule    Take 1 capsule (300 mg) by mouth as needed    S/P AVR (aortic valve replacement)       ferrous sulfate 325 (65 Fe) MG tablet    FEROSUL    100 tablet    Take 1 tablet (325 mg) by mouth 2 times daily    Other iron deficiency anemia       hydrochlorothiazide 25 MG tablet    HYDRODIURIL    90 tablet    Take 1 tablet (25 mg) by mouth daily    (HFpEF) heart failure with preserved ejection fraction (H)       metoprolol tartrate 50 MG tablet    LOPRESSOR    60 tablet    Take 1 tablet (50 mg) by mouth 2 times daily    Paroxysmal supraventricular tachycardia (H), S/P AVR (aortic valve replacement)       * NovoLOG FLEXPEN 100 UNIT/ML pen   Generic drug:  insulin aspart      Inject 3 Units Subcutaneous 2 times daily (with meals) Takes with breakfast and dinner        * NovoLOG FLEXPEN 100 UNIT/ML pen   Generic drug:  insulin aspart      Inject 4 Units Subcutaneous daily (with lunch)        pantoprazole 40 MG EC tablet    PROTONIX     Take 40 mg by mouth every morning (before breakfast)        potassium chloride ER 10 MEQ CR tablet    K-DUR/KLOR-CON M    360 tablet    Take 6 tablets (60 mEq) by mouth 2 times daily    (HFpEF) heart failure with preserved  ejection fraction (H)       prednisoLONE acetate 1 % ophthalmic suspension    PRED FORTE     Place 1 drop into both eyes as needed (glaucoma)        timolol maleate 0.5 % ophthalmic solution    TIMOPTIC     Place 1 drop into both eyes 2 times daily        * Notice:  This list has 2 medication(s) that are the same as other medications prescribed for you. Read the directions carefully, and ask your doctor or other care provider to review them with you.

## 2018-12-03 NOTE — PATIENT INSTRUCTIONS
Call CORE nurse for any questions or concerns:  [phone]   *If you have concerns after hours, please call [phone], option 2 to speak with on call Cardiologist.    Keep up the great work on the healthy diet!      1. Medication changes from today:  Take a total of 12 potassium pills a day, however you would like to split it up.    Tomorrow skip only the hydrochlorothiazide.  Go back to taking it on Wednesday daily as ou have been.    Continue other medications.        2. Weigh yourself daily and write it down.     3. Call CORE nurse if your weight is up more than 2 pounds in one day or 5 pounds in one week.     4. Call CORE nurse if you feel more short of breath, have more abdominal bloating, or leg swelling.     5. Continue low sodium diet (less than 2000 mg daily). If you eat less salt, you will retain less fluid.     6. Alcohol can weaken your heart further. You should avoid alcohol or limit its use to special times, such as a holiday or birthday.      7. Do NOT take Aleve or ibuprofen without talking to your doctor first.      8. Lab Results: labs look good with the exception of potassium which is low.       Component      Latest Ref Rng & Units 11/27/2018 12/3/2018   Sodium      136 - 145 mmol/L 141 140   Potassium      3.5 - 5.1 mmol/L 3.7 2.9 (LL)   Chloride      98 - 107 mmol/L 105 102   Carbon Dioxide      23 - 29 mmol/L 28 31 (H)   Anion Gap      6 - 17 mmol/L 11.7 Not Calculated   Glucose      70 - 105 mg/dL 81 126 (H)   Urea Nitrogen      7 - 30 mg/dL 19 21   Creatinine      0.70 - 1.30 mg/dL 1.39 (H) 1.34 (H)   GFR Estimate      >60 mL/min/1.7m2 52 (L) 55 (L)   GFR Estimate If Black      >60 mL/min/1.7m2 63 66   Calcium      8.5 - 10.5 mg/dL 8.8 8.9     [facility]: Cardiomyopathy, Optimization, Rehabilitation, Education  The [facility] is a heart failure specialty clinic within the [facility] where you will work with specialized nurse practitioners, physician assistants, doctors, and  registered nurses. They are dedicated to helping patients with heart failure to carefully adjust medications, receive education, and learn who and when to call if symptoms develop. They specialize in helping you better understand your condition, slow the progression of your disease, improve the length and quality of your life, help you detect future heart problems before they become life threatening, and avoid hospitalizations.

## 2018-12-03 NOTE — PROGRESS NOTES
Service Date: 2018      PRIMARY CARDIOLOGIST:  Guru Salinas MD      REASON FOR VISIT:  HFpEF, cor pulmonale.      HISTORY OF PRESENT ILLNESS:  Mr. Chowdary is a delightful 58-year-old gentleman with a past medical history significant for the followin.  Severe aortic stenosis with aortic valve replacement in 2017 with 25 mm Trifecta tissue valve.   2.  Type 2 diabetes, on insulin.   3.  Hyperlipidemia.   4.  Sleep apnea, treated.   5.  Cor pulmonale versus heart failure with preserved EF.   6.  Recent anemia with colonoscopy negative for bleeding, seeing Dr. Fletcher in followup next week.   7.  History of SVT and paroxysmal atrial fibrillation, postop AVR.      HISTORY OF PRESENT ILLNESS:  Mr. Chowdary was admitted in October with weight gain and shortness of breath.  He diuresed during that hospitalization from about 325 pounds, down to 310, and had been asked to stay longer, but he left as he needed to go home and take care of his elderly mother.  We struggled to diurese him as an outpatient but eventually came to a good combination of Bumex and hydrochlorothiazide.  He did get a rash with spironolactone.  His weight at home peaked up to 329 and dropped 10 pounds between those 2 visits and now is down another 11 pounds today.      He tells me he continues to improve.  His breathing keeps getting easier and his swelling gets better as well.  His abdomen is down quite a bit.  His scrotum previously was as large as softball and is now down to baseball size and with this, he can move a little easier.  He is also now able to bend his legs and can cross his legs at night where previously they were so stiff, he could not do so.  He is also able to get some shoes on a little more easily.  He denies orthopnea or PND.  He denies syncope or presyncope.  He continues to have some cramping but is not serious and is minimal.  He is wearing the ZIO Patch right now.      SOCIAL HISTORY:  He lives at home with his mom  who has dementia.  He previously cared for her.  He now has a sister and brother-in-law who moved here from [place] staying with him helping out.  Former smoker, 20-pack-year history, quit in 2011.  No alcohol use.      PHYSICAL EXAMINATION:   GENERAL:  Well-developed, well-nourished gentleman in no acute distress.   HEENT:  Normocephalic, atraumatic.  Heart is regular in rate and rhythm with 2/6 systolic murmur best at the left sternal border.   LUNGS:  Today are clear, without rhonchi.  There is no wheezing or rales.   EXTREMITIES:  Have 3+ pitting edema into his thighs.  His abdomen is now soft, although he still has some pitting edema in his lower abdomen.  His shoes are tight.  Compression stockings are in place.      Labs today show creatinine 1.34, BUN 21, potassium 2.9, sodium 140.      ASSESSMENT AND PLAN:    1.  Heart failure with preserved EF/cor pulmonale, secondary to a profound anemia.  He does treat his sleep apnea and fortunately has now continued to improve fairly significantly by about 10-11 pounds per week.  At this point, he will remain on Bumex 3 mg b.i.d.  He will also continue his hydrochlorothiazide 25 mg daily.  I do want him to skip this tomorrow to allow him to hold on to a little bit of potassium.  He is markedly hypokalemic today at 2.9 and I will increase his potassium to 60 mEq b.i.d. from 40 mEq b.i.d.  We will repeat a potassium level on Thursday and can adjust from there.  Unfortunately, he cannot tolerate spironolactone due to a rash.   2.  Anemia, with associated thrombocytopenia.  He had been on Coumadin for episodes of AFib and SVT post-AVR; none has been documented since.  His Coumadin has been on hold since colonoscopy and I want him to stay off of it.  He will stay on his ZIO Patch and if there is no fib or flutter, we will keep him off Coumadin.  The intention never was for him to be on Coumadin long-term.  More to come once we get ZIO Patch back.  He does have followup with  Dr. Fletcher next week.  He is taking his p.o. iron.  I will defer to Dr. Fletcher if he thinks IV iron would be helpful as anemia is definitely contributing to his cause.  He has also not had an upper GI study.   3.  Aortic valve replacement for severe aortic stenosis with low functioning bioprosthetic valve.  Remains on aspirin only.  He also has a history of septal myectomy for septal hypertrophy but without an LVOT gradient.  He needs to remain on aspirin even for upcoming procedures.      Thank you for allowing me to participate in this delightful patient's care.  We will see him back in about 10 days and then about every 10 days to 2 weeks thereafter.      SIRENA Saucedo PA-C             D: 2018   T: 2018   MT: al      Name:     ABIGAIL MATOS   MRN:      [id number]        Account:      [id number]   :      1960           Service Date: 2018      Document: [id number]

## 2018-12-03 NOTE — PROGRESS NOTES
[id number]  HPI and Plan:   See dictation    Orders this Visit:  Orders Placed This Encounter   Procedures     Basic metabolic panel     Potassium     Basic metabolic panel     Follow-Up with [facility] - KARYN visit     Follow-Up with [facility] Clinic - KARYN visit     Orders Placed This Encounter   Medications     potassium chloride ER (K-DUR/KLOR-CON M) 10 MEQ CR tablet     Sig: Take 6 tablets (60 mEq) by mouth 2 times daily     Dispense:  360 tablet     Refill:  3     Medications Discontinued During This Encounter   Medication Reason     potassium chloride SA (K-DUR/KLOR-CON M) 10 MEQ CR tablet Reorder         Encounter Diagnoses   Name Primary?     (HFpEF) heart failure with preserved ejection fraction (H)      Hypokalemia Yes       CURRENT MEDICATIONS:  Current Outpatient Prescriptions   Medication Sig Dispense Refill     aspirin 81 MG tablet Take 81 mg by mouth every morning        atorvastatin (LIPITOR) 40 MG tablet Take 1 tablet (40 mg) by mouth daily 90 tablet 0     bumetanide (BUMEX) 1 MG tablet Take 3 tablets (3 mg) by mouth 2 times daily 180 tablet 3     cholecalciferol (VITAMIN D3) 5000 UNITS TABS tablet Take 5,000 Units by mouth every morning       clindamycin (CLEOCIN) 300 MG capsule Take 1 capsule (300 mg) by mouth as needed 10 capsule 3     ferrous sulfate (IRON) 325 (65 Fe) MG tablet Take 1 tablet (325 mg) by mouth 2 times daily 100 tablet 3     hydrochlorothiazide (HYDRODIURIL) 25 MG tablet Take 1 tablet (25 mg) by mouth daily 90 tablet 3     insulin aspart (NOVOLOG FLEXPEN) 100 UNIT/ML injection Inject 4 Units Subcutaneous daily (with lunch)       insulin aspart (NOVOLOG FLEXPEN) 100 UNIT/ML injection Inject 3 Units Subcutaneous 2 times daily (with meals) Takes with breakfast and dinner       Insulin Glargine (BASAGLAR KWIKPEN SC) Inject 22 Units Subcutaneous every morning        metoprolol (LOPRESSOR) 50 MG tablet Take 1 tablet (50 mg) by mouth 2 times daily 60 tablet 0     Multiple Vitamins-Minerals  (CENTRUM ADULTS PO) Take 1 tablet by mouth every morning        pantoprazole (PROTONIX) 40 MG EC tablet Take 40 mg by mouth every morning (before breakfast)       potassium chloride ER (K-DUR/KLOR-CON M) 10 MEQ CR tablet Take 6 tablets (60 mEq) by mouth 2 times daily 360 tablet 3     prednisoLONE acetate (PRED FORTE) 1 % ophthalmic susp Place 1 drop into both eyes as needed (glaucoma)        timolol (TIMOPTIC) 0.5 % ophthalmic solution Place 1 drop into both eyes 2 times daily          ALLERGIES     Allergies   Allergen Reactions     Amoxicillin      Itchy      Penicillins Hives     Spironolactone Rash       PAST MEDICAL HISTORY:  Past Medical History:   Diagnosis Date     Former tobacco use      Glaucoma      Hyperlipidemia LDL goal <160 12/6/2011     Obesity      DRAKE on CPAP      Right bundle branch block      Severe aortic stenosis     On Echo 10/28/16       PAST SURGICAL HISTORY:  Past Surgical History:   Procedure Laterality Date     HEART CATH LEFT HEART CATH  04/07/2017    Diffuse non-obstuctive CAD     REPAIR VALVE AORTIC N/A 5/16/2017    Procedure: REPAIR VALVE AORTIC;  Median Sternotony, CardioPulmonary Bypass, Aortic Valve Replace using 25MM Trifecta Valve with [employer], Septal myectomy;  Surgeon: Jun Simon MD;  Location: [facility] OR     REPLACE VALVE AORTIC N/A 5/16/2017    Procedure: REPLACE VALVE AORTIC;;  Surgeon: Jun Simon MD;  Location:  OR     SINUS SURGERY  2005       FAMILY HISTORY:  Family History   Problem Relation Age of Onset     Family History Negative Mother      Diabetes Father      Heart Surgery Father      CABG     Coronary Artery Disease Father      Hypertension Brother      Diabetes Brother      Heart Disease Brother      Heart Surgery Brother      CABG x 3     Family History Negative Sister      Diabetes Brother      History of diabetes, but no longer an issue     Family History Negative Brother        SOCIAL HISTORY:  Social History     Social  "History     Marital status:      Spouse name: N/A     Number of children: N/A     Years of education: N/A     Social History Main Topics     Smoking status: Former Smoker     Packs/day: 1.00     Years: 20.00     Types: Cigarettes, Cigars     Start date: 1979     Quit date: 10/21/2011     Smokeless tobacco: Never Used     Alcohol use No     Drug use: No     Sexual activity: Not on file     Other Topics Concern     Parent/Sibling W/ Cabg, Mi Or Angioplasty Before 65f 55m? Yes     brother triple bypass 49     Social History Narrative       Review of Systems:  Skin:  Negative     Eyes:  Positive for glasses  ENT:  Negative    Respiratory:  Positive for CPAP;sleep apnea;dyspnea on exertion  Cardiovascular:    Positive for;edema  Gastroenterology: Negative    Genitourinary:  Negative    Musculoskeletal:  Negative    Neurologic:  Negative    Psychiatric:  Negative    Heme/Lymph/Imm:  Negative    Endocrine:  Positive for diabetes    Physical Exam:  Vitals: /62  Pulse 68  Ht 1.778 m (5' 10\")  Wt 139.7 kg (307 lb 14.4 oz)  SpO2 95%  BMI 44.18 kg/m2   Please refer to dictation for physical exam    Recent Lab Results:  LIPID RESULTS:  Lab Results   Component Value Date    CHOL 126 10/10/2018    HDL 23 (A) 10/10/2018    LDL 71 10/10/2018    TRIG 230 (A) 10/10/2018       LIVER ENZYME RESULTS:  Lab Results   Component Value Date    AST 55 (H) 10/12/2018    ALT 31 10/12/2018       CBC RESULTS:  Lab Results   Component Value Date    WBC 3.9 (L) 10/24/2018    RBC 2.94 (L) 10/24/2018    HGB 9.2 (L) 12/03/2018    HCT 25.5 (L) 10/24/2018    MCV 87 10/24/2018    MCH 25.5 (L) 10/24/2018    MCHC 29.4 (L) 10/24/2018    RDW 17.5 (H) 10/24/2018    PLT 95 (L) 10/24/2018       BMP RESULTS:  Lab Results   Component Value Date     12/03/2018    POTASSIUM 2.9 (LL) 12/03/2018    CHLORIDE 102 12/03/2018    CO2 31 (H) 12/03/2018    ANIONGAP Not Calculated 12/03/2018     (H) 12/03/2018    BUN 21 12/03/2018    CR 1.34 (H) " 12/03/2018    GFRESTIMATED 55 (L) 12/03/2018    GFRESTBLACK 66 12/03/2018    MYRON 8.9 12/03/2018        A1C RESULTS:  Lab Results   Component Value Date    A1C 6.8 (H) 10/12/2018       INR RESULTS:  Lab Results   Component Value Date    INR 2.19 (H) 10/14/2018    INR 2.28 (H) 10/13/2018           CC  Sam Villatoro PA-C  [facility] [address]  [place] [address]

## 2018-12-03 NOTE — LETTER
12/3/2018    Sam Villatoro PA-C  [facility] [address]  [facility] [address]    RE: Gil Chowdary       Dear Colleague,    I had the pleasure of seeing Gil Chowdary in the [facility].    [id number]  HPI and Plan:   See dictation    Orders this Visit:  Orders Placed This Encounter   Procedures     Basic metabolic panel     Potassium     Basic metabolic panel     Follow-Up with [facility] - KARYN visit     Follow-Up with [facility] - KARYN visit     Orders Placed This Encounter   Medications     potassium chloride ER (K-DUR/KLOR-CON M) 10 MEQ CR tablet     Sig: Take 6 tablets (60 mEq) by mouth 2 times daily     Dispense:  360 tablet     Refill:  3     Medications Discontinued During This Encounter   Medication Reason     potassium chloride SA (K-DUR/KLOR-CON M) 10 MEQ CR tablet Reorder         Encounter Diagnoses   Name Primary?     (HFpEF) heart failure with preserved ejection fraction (H)      Hypokalemia Yes       CURRENT MEDICATIONS:  Current Outpatient Prescriptions   Medication Sig Dispense Refill     aspirin 81 MG tablet Take 81 mg by mouth every morning        atorvastatin (LIPITOR) 40 MG tablet Take 1 tablet (40 mg) by mouth daily 90 tablet 0     bumetanide (BUMEX) 1 MG tablet Take 3 tablets (3 mg) by mouth 2 times daily 180 tablet 3     cholecalciferol (VITAMIN D3) 5000 UNITS TABS tablet Take 5,000 Units by mouth every morning       clindamycin (CLEOCIN) 300 MG capsule Take 1 capsule (300 mg) by mouth as needed 10 capsule 3     ferrous sulfate (IRON) 325 (65 Fe) MG tablet Take 1 tablet (325 mg) by mouth 2 times daily 100 tablet 3     hydrochlorothiazide (HYDRODIURIL) 25 MG tablet Take 1 tablet (25 mg) by mouth daily 90 tablet 3     insulin aspart (NOVOLOG FLEXPEN) 100 UNIT/ML injection Inject 4 Units Subcutaneous daily (with lunch)       insulin aspart (NOVOLOG FLEXPEN) 100 UNIT/ML injection Inject 3 Units Subcutaneous 2 times daily (with meals)  Takes with breakfast and dinner       Insulin Glargine (BASAGLAR KWIKPEN SC) Inject 22 Units Subcutaneous every morning        metoprolol (LOPRESSOR) 50 MG tablet Take 1 tablet (50 mg) by mouth 2 times daily 60 tablet 0     Multiple Vitamins-Minerals (CENTRUM ADULTS PO) Take 1 tablet by mouth every morning        pantoprazole (PROTONIX) 40 MG EC tablet Take 40 mg by mouth every morning (before breakfast)       potassium chloride ER (K-DUR/KLOR-CON M) 10 MEQ CR tablet Take 6 tablets (60 mEq) by mouth 2 times daily 360 tablet 3     prednisoLONE acetate (PRED FORTE) 1 % ophthalmic susp Place 1 drop into both eyes as needed (glaucoma)        timolol (TIMOPTIC) 0.5 % ophthalmic solution Place 1 drop into both eyes 2 times daily          ALLERGIES     Allergies   Allergen Reactions     Amoxicillin      Itchy      Penicillins Hives     Spironolactone Rash       PAST MEDICAL HISTORY:  Past Medical History:   Diagnosis Date     Former tobacco use      Glaucoma      Hyperlipidemia LDL goal <160 12/6/2011     Obesity      DRAKE on CPAP      Right bundle branch block      Severe aortic stenosis     On Echo 10/28/16       PAST SURGICAL HISTORY:  Past Surgical History:   Procedure Laterality Date     HEART CATH LEFT HEART CATH  04/07/2017    Diffuse non-obstuctive CAD     REPAIR VALVE AORTIC N/A 5/16/2017    Procedure: REPAIR VALVE AORTIC;  Median Sternotony, CardioPulmonary Bypass, Aortic Valve Replace using 25MM Trifecta Valve with [employer], Septal myectomy;  Surgeon: Jun Simon MD;  Location: [facility] OR     REPLACE VALVE AORTIC N/A 5/16/2017    Procedure: REPLACE VALVE AORTIC;;  Surgeon: Jun Simon MD;  Location: [facility] OR     SINUS SURGERY  2005       FAMILY HISTORY:  Family History   Problem Relation Age of Onset     Family History Negative Mother      Diabetes Father      Heart Surgery Father      CABG     Coronary Artery Disease Father      Hypertension Brother      Diabetes Brother      Heart  "Disease Brother      Heart Surgery Brother      CABG x 3     Family History Negative Sister      Diabetes Brother      History of diabetes, but no longer an issue     Family History Negative Brother        SOCIAL HISTORY:  Social History     Social History     Marital status:      Spouse name: N/A     Number of children: N/A     Years of education: N/A     Social History Main Topics     Smoking status: Former Smoker     Packs/day: 1.00     Years: 20.00     Types: Cigarettes, Cigars     Start date: 1979     Quit date: 10/21/2011     Smokeless tobacco: Never Used     Alcohol use No     Drug use: No     Sexual activity: Not on file     Other Topics Concern     Parent/Sibling W/ Cabg, Mi Or Angioplasty Before 65f 55m? Yes     brother triple bypass 49     Social History Narrative       Review of Systems:  Skin:  Negative     Eyes:  Positive for glasses  ENT:  Negative    Respiratory:  Positive for CPAP;sleep apnea;dyspnea on exertion  Cardiovascular:    Positive for;edema  Gastroenterology: Negative    Genitourinary:  Negative    Musculoskeletal:  Negative    Neurologic:  Negative    Psychiatric:  Negative    Heme/Lymph/Imm:  Negative    Endocrine:  Positive for diabetes    Physical Exam:  Vitals: /62  Pulse 68  Ht 1.778 m (5' 10\")  Wt 139.7 kg (307 lb 14.4 oz)  SpO2 95%  BMI 44.18 kg/m2   Please refer to dictation for physical exam    Recent Lab Results:  LIPID RESULTS:  Lab Results   Component Value Date    CHOL 126 10/10/2018    HDL 23 (A) 10/10/2018    LDL 71 10/10/2018    TRIG 230 (A) 10/10/2018       LIVER ENZYME RESULTS:  Lab Results   Component Value Date    AST 55 (H) 10/12/2018    ALT 31 10/12/2018       CBC RESULTS:  Lab Results   Component Value Date    WBC 3.9 (L) 10/24/2018    RBC 2.94 (L) 10/24/2018    HGB 9.2 (L) 12/03/2018    HCT 25.5 (L) 10/24/2018    MCV 87 10/24/2018    MCH 25.5 (L) 10/24/2018    MCHC 29.4 (L) 10/24/2018    RDW 17.5 (H) 10/24/2018    PLT 95 (L) 10/24/2018       BMP " RESULTS:  Lab Results   Component Value Date     12/03/2018    POTASSIUM 2.9 (LL) 12/03/2018    CHLORIDE 102 12/03/2018    CO2 31 (H) 12/03/2018    ANIONGAP Not Calculated 12/03/2018     (H) 12/03/2018    BUN 21 12/03/2018    CR 1.34 (H) 12/03/2018    GFRESTIMATED 55 (L) 12/03/2018    GFRESTBLACK 66 12/03/2018    MYRON 8.9 12/03/2018        A1C RESULTS:  Lab Results   Component Value Date    A1C 6.8 (H) 10/12/2018       INR RESULTS:  Lab Results   Component Value Date    INR 2.19 (H) 10/14/2018    INR 2.28 (H) 10/13/2018           CC  Sam Villatoro PA-C  [facility]  [address]  [place] [address]        Thank you for allowing me to participate in the care of your patient.      Sincerely,     Rachna Holt PA-C     [facility]    cc:   Sam Villatoro PA-C  [facility]  [address]  [place] [address]

## 2018-12-03 NOTE — LETTER
12/3/2018      Sam Villatoro PA-C  [facility] [address]  [facility] [address]      RE: Gil Chowdary       Dear Colleague,    I had the pleasure of seeing Gil Chowdary in the [facility].    Service Date: 2018      PRIMARY CARDIOLOGIST:  Guru Salinas MD      REASON FOR VISIT:  HFpEF, cor pulmonale.      HISTORY OF PRESENT ILLNESS:  Mr. Chowdary is a delightful 58-year-old gentleman with a past medical history significant for the followin.  Severe aortic stenosis with aortic valve replacement in 2017 with 25 mm Trifecta tissue valve.   2.  Type 2 diabetes, on insulin.   3.  Hyperlipidemia.   4.  Sleep apnea, treated.   5.  Cor pulmonale versus heart failure with preserved EF.   6.  Recent anemia with colonoscopy negative for bleeding, seeing Dr. Fletcher in followup next week.   7.  History of SVT and paroxysmal atrial fibrillation, postop AVR.      HISTORY OF PRESENT ILLNESS:  Mr. Chowdary was admitted in October with weight gain and shortness of breath.  He diuresed during that hospitalization from about 325 pounds, down to 310, and had been asked to stay longer, but he left as he needed to go home and take care of his elderly mother.  We struggled to diurese him as an outpatient but eventually came to a good combination of Bumex and hydrochlorothiazide.  He did get a rash with spironolactone.  His weight at home peaked up to 329 and dropped 10 pounds between those 2 visits and now is down another 11 pounds today.      He tells me he continues to improve.  His breathing keeps getting easier and his swelling gets better as well.  His abdomen is down quite a bit.  His scrotum previously was as large as softball and is now down to baseball size and with this, he can move a little easier.  He is also now able to bend his legs and can cross his legs at night where previously they were so stiff, he could not do so.  He is also able to get  some shoes on a little more easily.  He denies orthopnea or PND.  He denies syncope or presyncope.  He continues to have some cramping but is not serious and is minimal.  He is wearing the ZIO Patch right now.      SOCIAL HISTORY:  He lives at home with his mom who has dementia.  He previously cared for her.  He now has a sister and brother-in-law who moved here from [place] staying with him helping out.  Former smoker, 20-pack-year history, quit in 2011.  No alcohol use.      PHYSICAL EXAMINATION:   GENERAL:  Well-developed, well-nourished gentleman in no acute distress.   HEENT:  Normocephalic, atraumatic.  Heart is regular in rate and rhythm with 2/6 systolic murmur best at the left sternal border.   LUNGS:  Today are clear, without rhonchi.  There is no wheezing or rales.   EXTREMITIES:  Have 3+ pitting edema into his thighs.  His abdomen is now soft, although he still has some pitting edema in his lower abdomen.  His shoes are tight.  Compression stockings are in place.      Labs today show creatinine 1.34, BUN 21, potassium 2.9, sodium 140.      ASSESSMENT AND PLAN:    1.  Heart failure with preserved EF/cor pulmonale, secondary to a profound anemia.  He does treat his sleep apnea and fortunately has now continued to improve fairly significantly by about 10-11 pounds per week.  At this point, he will remain on Bumex 3 mg b.i.d.  He will also continue his hydrochlorothiazide 25 mg daily.  I do want him to skip this tomorrow to allow him to hold on to a little bit of potassium.  He is markedly hypokalemic today at 2.9 and I will increase his potassium to 60 mEq b.i.d. from 40 mEq b.i.d.  We will repeat a potassium level on Thursday and can adjust from there.  Unfortunately, he cannot tolerate spironolactone due to a rash.   2.  Anemia, with associated thrombocytopenia.  He had been on Coumadin for episodes of AFib and SVT post-AVR; none has been documented since.  His Coumadin has been on hold since colonoscopy  and I want him to stay off of it.  He will stay on his ZIO Patch and if there is no fib or flutter, we will keep him off Coumadin.  The intention never was for him to be on Coumadin long-term.  More to come once we get ZIO Patch back.  He does have followup with Dr. Fletcher next week.  He is taking his p.o. iron.  I will defer to Dr. Fletcher if he thinks IV iron would be helpful as anemia is definitely contributing to his cause.  He has also not had an upper GI study.   3.  Aortic valve replacement for severe aortic stenosis with low functioning bioprosthetic valve.  Remains on aspirin only.  He also has a history of septal myectomy for septal hypertrophy but without an LVOT gradient.  He needs to remain on aspirin even for upcoming procedures.      Thank you for allowing me to participate in this delightful patient's care.  We will see him back in about 10 days and then about every 10 days to 2 weeks thereafter.      SIRENA Saucedo PA-C             D: 2018   T: 2018   MT: al      Name:     ABIGAIL MATOS   MRN:      [id number]        Account:      [id number]   :      1960           Service Date: 2018      Document: [id number]         Outpatient Encounter Prescriptions as of 12/3/2018   Medication Sig Dispense Refill     aspirin 81 MG tablet Take 81 mg by mouth every morning        atorvastatin (LIPITOR) 40 MG tablet Take 1 tablet (40 mg) by mouth daily 90 tablet 0     bumetanide (BUMEX) 1 MG tablet Take 3 tablets (3 mg) by mouth 2 times daily 180 tablet 3     cholecalciferol (VITAMIN D3) 5000 UNITS TABS tablet Take 5,000 Units by mouth every morning       clindamycin (CLEOCIN) 300 MG capsule Take 1 capsule (300 mg) by mouth as needed 10 capsule 3     ferrous sulfate (IRON) 325 (65 Fe) MG tablet Take 1 tablet (325 mg) by mouth 2 times daily 100 tablet 3     hydrochlorothiazide (HYDRODIURIL) 25 MG tablet Take 1 tablet (25 mg) by mouth daily 90 tablet 3      insulin aspart (NOVOLOG FLEXPEN) 100 UNIT/ML injection Inject 4 Units Subcutaneous daily (with lunch)       insulin aspart (NOVOLOG FLEXPEN) 100 UNIT/ML injection Inject 3 Units Subcutaneous 2 times daily (with meals) Takes with breakfast and dinner       Insulin Glargine (BASAGLAR KWIKPEN SC) Inject 22 Units Subcutaneous every morning        metoprolol (LOPRESSOR) 50 MG tablet Take 1 tablet (50 mg) by mouth 2 times daily 60 tablet 0     Multiple Vitamins-Minerals (CENTRUM ADULTS PO) Take 1 tablet by mouth every morning        pantoprazole (PROTONIX) 40 MG EC tablet Take 40 mg by mouth every morning (before breakfast)       potassium chloride ER (K-DUR/KLOR-CON M) 10 MEQ CR tablet Take 6 tablets (60 mEq) by mouth 2 times daily 360 tablet 3     prednisoLONE acetate (PRED FORTE) 1 % ophthalmic susp Place 1 drop into both eyes as needed (glaucoma)        timolol (TIMOPTIC) 0.5 % ophthalmic solution Place 1 drop into both eyes 2 times daily        [DISCONTINUED] potassium chloride SA (K-DUR/KLOR-CON M) 10 MEQ CR tablet Take 4 tablets (40 mEq) by mouth 2 times daily 240 tablet 3     No facility-administered encounter medications on file as of 12/3/2018.        Again, thank you for allowing me to participate in the care of your patient.      Sincerely,    Rachna Holt PA-C     [facility]

## 2018-12-03 NOTE — PROGRESS NOTES
Reviewed during clinic visit.  Please see progress note for plan.  Rachna Holt PA-C 12/3/2018 5:38 PM

## 2018-12-06 DIAGNOSIS — E87.6 HYPOKALEMIA: ICD-10-CM

## 2018-12-06 LAB — POTASSIUM SERPL-SCNC: 3.4 MMOL/L (ref 3.5–5.1)

## 2018-12-06 PROCEDURE — 84132 ASSAY OF SERUM POTASSIUM: CPT | Performed by: PHYSICIAN ASSISTANT

## 2018-12-06 PROCEDURE — 36415 COLL VENOUS BLD VENIPUNCTURE: CPT | Performed by: PHYSICIAN ASSISTANT

## 2018-12-10 ENCOUNTER — CARE COORDINATION (OUTPATIENT)
Dept: CARDIOLOGY | Facility: CLINIC | Age: 58
End: 2018-12-10

## 2018-12-10 NOTE — PROGRESS NOTES
Call to patient to review latest BMP results/recommendations per [facility]:  Notes Recorded by Rachna Holt PA-C on 12/7/2018 at 8:43 AM  Potassium is improving but still not quite normal.  Pls have him increase potassium to 80 meq bid.  Rachna Holt PA-C 12/7/2018 8:43 AM    He verbalized understanding and had no additional questions. Confirmed upcoming appt for 12/13 w/[facility].  Jaqueline Miller RN on 12/10/2018 at 12:09 PM

## 2018-12-12 ENCOUNTER — ONCOLOGY VISIT (OUTPATIENT)
Dept: ONCOLOGY | Facility: CLINIC | Age: 58
End: 2018-12-12
Attending: INTERNAL MEDICINE
Payer: COMMERCIAL

## 2018-12-12 ENCOUNTER — HOSPITAL ENCOUNTER (OUTPATIENT)
Facility: CLINIC | Age: 58
Setting detail: SPECIMEN
Discharge: HOME | End: 2018-12-12
Attending: INTERNAL MEDICINE | Admitting: INTERNAL MEDICINE
Payer: COMMERCIAL

## 2018-12-12 VITALS
SYSTOLIC BLOOD PRESSURE: 137 MMHG | HEART RATE: 95 BPM | WEIGHT: 298.4 LBS | HEIGHT: 70 IN | TEMPERATURE: 97.8 F | OXYGEN SATURATION: 95 % | DIASTOLIC BLOOD PRESSURE: 77 MMHG | BODY MASS INDEX: 42.72 KG/M2

## 2018-12-12 DIAGNOSIS — D50.8 OTHER IRON DEFICIENCY ANEMIA: ICD-10-CM

## 2018-12-12 DIAGNOSIS — D50.8 OTHER IRON DEFICIENCY ANEMIA: Primary | ICD-10-CM

## 2018-12-12 LAB
BASOPHILS # BLD AUTO: 0 10E9/L (ref 0–0.2)
BASOPHILS NFR BLD AUTO: 0.2 %
DIFFERENTIAL METHOD BLD: ABNORMAL
EOSINOPHIL # BLD AUTO: 0.2 10E9/L (ref 0–0.7)
EOSINOPHIL NFR BLD AUTO: 3.5 %
ERYTHROCYTE [DISTWIDTH] IN BLOOD BY AUTOMATED COUNT: 19.7 % (ref 10–15)
FERRITIN SERPL-MCNC: 49 NG/ML (ref 26–388)
HCT VFR BLD AUTO: 32 % (ref 40–53)
HGB BLD-MCNC: 9.9 G/DL (ref 13.3–17.7)
IMM GRANULOCYTES # BLD: 0.1 10E9/L (ref 0–0.4)
IMM GRANULOCYTES NFR BLD: 1.1 %
IRON SATN MFR SERPL: 46 % (ref 15–46)
IRON SERPL-MCNC: 196 UG/DL (ref 35–180)
LYMPHOCYTES # BLD AUTO: 1.4 10E9/L (ref 0.8–5.3)
LYMPHOCYTES NFR BLD AUTO: 26.1 %
MCH RBC QN AUTO: 26.9 PG (ref 26.5–33)
MCHC RBC AUTO-ENTMCNC: 30.9 G/DL (ref 31.5–36.5)
MCV RBC AUTO: 87 FL (ref 78–100)
MONOCYTES # BLD AUTO: 0.4 10E9/L (ref 0–1.3)
MONOCYTES NFR BLD AUTO: 8.1 %
NEUTROPHILS # BLD AUTO: 3.3 10E9/L (ref 1.6–8.3)
NEUTROPHILS NFR BLD AUTO: 61 %
NRBC # BLD AUTO: 0 10*3/UL
NRBC BLD AUTO-RTO: 0 /100
PLATELET # BLD AUTO: 136 10E9/L (ref 150–450)
RBC # BLD AUTO: 3.68 10E12/L (ref 4.4–5.9)
RETICS # AUTO: 103 10E9/L (ref 25–95)
RETICS/RBC NFR AUTO: 2.8 % (ref 0.5–2)
TIBC SERPL-MCNC: 429 UG/DL (ref 240–430)
TRANSFERRIN SERPL-MCNC: 356 MG/DL (ref 210–360)
WBC # BLD AUTO: 5.5 10E9/L (ref 4–11)

## 2018-12-12 PROCEDURE — 83540 ASSAY OF IRON: CPT | Performed by: INTERNAL MEDICINE

## 2018-12-12 PROCEDURE — 82728 ASSAY OF FERRITIN: CPT | Performed by: INTERNAL MEDICINE

## 2018-12-12 PROCEDURE — G0463 HOSPITAL OUTPT CLINIC VISIT: HCPCS

## 2018-12-12 PROCEDURE — 83550 IRON BINDING TEST: CPT | Performed by: INTERNAL MEDICINE

## 2018-12-12 PROCEDURE — 36415 COLL VENOUS BLD VENIPUNCTURE: CPT

## 2018-12-12 PROCEDURE — 85045 AUTOMATED RETICULOCYTE COUNT: CPT | Performed by: INTERNAL MEDICINE

## 2018-12-12 PROCEDURE — 84466 ASSAY OF TRANSFERRIN: CPT | Performed by: INTERNAL MEDICINE

## 2018-12-12 PROCEDURE — 99213 OFFICE O/P EST LOW 20 MIN: CPT | Performed by: INTERNAL MEDICINE

## 2018-12-12 PROCEDURE — 85025 COMPLETE CBC W/AUTO DIFF WBC: CPT | Performed by: INTERNAL MEDICINE

## 2018-12-12 ASSESSMENT — MIFFLIN-ST. JEOR: SCORE: 2174.78

## 2018-12-12 ASSESSMENT — PAIN SCALES - GENERAL: PAINLEVEL: NO PAIN (0)

## 2018-12-12 NOTE — PATIENT INSTRUCTIONS
1.  Continue iron sulfate 325 mg twice daily with meals.   2.  Return to clinic 4 months, CBC with diff, retic count, and iron studies. Scheduled/ Loretta     AVS printed and given to patient/ Loretta

## 2018-12-12 NOTE — PROGRESS NOTES
DR Kimberly Garcia  Patients insurance will only cover a diagnostic 3D? She said if you could change the diagnosis to be problematic? If not could you change it to a regular screening? Medical Assistant Note:  Gil Chowdary presents today for labs.    Patient seen by provider today: Yes: DR DAVIS.   present during visit today: Not Applicable.    Concerns: No Concerns.    Procedure:  Lab draw site: LEFT HAND, Needle type: BF, Gauge: 21.    Post Assessment:  Labs drawn without difficulty: Yes.    Discharge Plan:  Pt tolerated procedure well. Pt complimented technique, usually not an easy draw. Gauze and coban applied.    Face to Face Time: 8min.    Anna Jackson MA

## 2018-12-12 NOTE — LETTER
"    12/12/2018         RE: Gil Chowdary  [address] [place] [id number]        Dear Colleague,    Thank you for referring your patient, Gil Chowdary, to the [facility]. Please see a copy of my visit note below.    [facility]  HEMATOLOGY ONCOLOGY     DIAGNOSIS:  Iron deficiency anemia, unclear etiology.  The patient had heart surgery an year prior.  Also a likely component of anemia of chronic disease and chronic thrombocytopenia.      TREATMENT:  Iron sulfate 325 mg twice daily.      SUBJECTIVE:  The patient was seen as a followup today. He is feeling better. He is taking his oral iron regularly.     REVIEW OF SYSTEMS:  A complete review of systems was performed and found to be negative other than pertinent positives mentioned in history of present illness.      Past medical, social histories reviewed.     Meds- Reviewed.     PHYSICAL EXAMINATION:   VITAL SIGNS: /77   Pulse 95   Temp 97.8  F (36.6  C) (Oral)   Ht 1.778 m (5' 10\")   Wt 135.4 kg (298 lb 6.4 oz)   SpO2 95%   BMI 42.82 kg/m     CONSTITUTIONAL:  Sitting comfortably.   NEUROLOGIC:  Alert, awake.   PSYCHIATRIC:  Mood and affect appear normal.      LABORATORY DATA AND IMAGING REVIEWED DURING THIS VISIT:  Recent Labs   Lab Test 12/06/18  0750 12/03/18  0814 11/27/18  1259  05/23/17  1251 05/23/17  0445  05/20/17  0654 05/19/17  0436   NA  --  140 141   < >  --   --    < > 135 138   POTASSIUM 3.4* 2.9* 3.7   < > 3.5 3.6   < > 4.0 4.0   CHLORIDE  --  102 105   < >  --   --    < > 103 104   CO2  --  31* 28   < >  --   --    < > 22 25   ANIONGAP  --  Not Calculated 11.7   < >  --   --    < > 10 8   BUN  --  21 19   < >  --   --    < > 31* 27   CR  --  1.34* 1.39*   < >  --   --    < > 1.00 1.00   GLC  --  126* 81   < >  --   --    < > 103* 108*   MYRON  --  8.9 8.8   < >  --   --    < > 8.2* 8.3*   MAG  --   --   --   --  2.5* 2.5*   < > 2.6* 2.8*   PHOS  --   --   --   --   --   --   --  " 3.4 3.5    < > = values in this interval not displayed.     Recent Labs   Lab Test 12/12/18  0910 12/03/18  0814 11/27/18  1259  10/24/18  0812 10/14/18  0535 10/13/18  0535   WBC 5.5  --   --   --  3.9* 4.5 3.9*   HGB 9.9* 9.2* 9.2*   < > 7.5* 8.3* 7.7*   *  --   --   --  95* 108* 98*   MCV 87  --   --   --  87 85 85   NEUTROPHIL 61.0  --   --   --  55.3  --  56.5    < > = values in this interval not displayed.     Recent Labs   Lab Test 10/12/18  1736 05/09/17  1240 04/25/17  0914 10/22/16   BILITOTAL 0.3 1.2  --  0.3   ALKPHOS 82 72  --  60   ALT 31 85*  79* 52* 79   AST 55* 110*  --  86   ALBUMIN 3.6 3.8  --  4.4        ASSESSMENT:  This is a 58-year-old gentleman with a normocytic anemia and moderate thrombocytopenia, multiple comorbidities, including CHF, diabetes, and diabetic nephropathy.     S/P colonoscopy, he had few polyps. I could not find the pathology results, we will order these to be obtained.      Labs were reviewed with the patient.  His hemoglobin has improved. Iron studies are pending.   I will have him continue oral iron for now.      PLAN:   1.  Continue iron sulfate 325 mg twice daily with meals.   2.  Return to clinic 4 months, CBC with diff, retic count, and iron studies.   JUAN FLETCHER MD    12/12/2018      Again, thank you for allowing me to participate in the care of your patient.        Sincerely,        Juan Fletcher MD

## 2018-12-12 NOTE — PROGRESS NOTES
"[facility]  HEMATOLOGY ONCOLOGY     DIAGNOSIS:  Iron deficiency anemia, unclear etiology.  The patient had heart surgery an year prior.  Also a likely component of anemia of chronic disease and chronic thrombocytopenia.      TREATMENT:  Iron sulfate 325 mg twice daily.      SUBJECTIVE:  The patient was seen as a followup today. He is feeling better. He is taking his oral iron regularly.     REVIEW OF SYSTEMS:  A complete review of systems was performed and found to be negative other than pertinent positives mentioned in history of present illness.      Past medical, social histories reviewed.     Meds- Reviewed.     PHYSICAL EXAMINATION:   VITAL SIGNS: /77   Pulse 95   Temp 97.8  F (36.6  C) (Oral)   Ht 1.778 m (5' 10\")   Wt 135.4 kg (298 lb 6.4 oz)   SpO2 95%   BMI 42.82 kg/m    CONSTITUTIONAL:  Sitting comfortably.   NEUROLOGIC:  Alert, awake.   PSYCHIATRIC:  Mood and affect appear normal.      LABORATORY DATA AND IMAGING REVIEWED DURING THIS VISIT:  Recent Labs   Lab Test 12/06/18  0750 12/03/18  0814 11/27/18  1259  05/23/17  1251 05/23/17  0445  05/20/17  0654 05/19/17  0436   NA  --  140 141   < >  --   --    < > 135 138   POTASSIUM 3.4* 2.9* 3.7   < > 3.5 3.6   < > 4.0 4.0   CHLORIDE  --  102 105   < >  --   --    < > 103 104   CO2  --  31* 28   < >  --   --    < > 22 25   ANIONGAP  --  Not Calculated 11.7   < >  --   --    < > 10 8   BUN  --  21 19   < >  --   --    < > 31* 27   CR  --  1.34* 1.39*   < >  --   --    < > 1.00 1.00   GLC  --  126* 81   < >  --   --    < > 103* 108*   MYRON  --  8.9 8.8   < >  --   --    < > 8.2* 8.3*   MAG  --   --   --   --  2.5* 2.5*   < > 2.6* 2.8*   PHOS  --   --   --   --   --   --   --  3.4 3.5    < > = values in this interval not displayed.     Recent Labs   Lab Test 12/12/18  0910 12/03/18  0814 11/27/18  1259  10/24/18  0812 10/14/18  0535 10/13/18  0535   WBC 5.5  --   --   --  3.9* 4.5 3.9*   HGB 9.9* 9.2* 9.2*   < > 7.5* 8.3* 7.7* "   *  --   --   --  95* 108* 98*   MCV 87  --   --   --  87 85 85   NEUTROPHIL 61.0  --   --   --  55.3  --  56.5    < > = values in this interval not displayed.     Recent Labs   Lab Test 10/12/18  1736 05/09/17  1240 04/25/17  0914 10/22/16   BILITOTAL 0.3 1.2  --  0.3   ALKPHOS 82 72  --  60   ALT 31 85*  79* 52* 79   AST 55* 110*  --  86   ALBUMIN 3.6 3.8  --  4.4        ASSESSMENT:  This is a 58-year-old gentleman with a normocytic anemia and moderate thrombocytopenia, multiple comorbidities, including CHF, diabetes, and diabetic nephropathy.     S/P colonoscopy, he had few polyps. I could not find the pathology results, we will order these to be obtained.      Labs were reviewed with the patient.  His hemoglobin has improved. Iron studies are pending.   I will have him continue oral iron for now.      PLAN:   1.  Continue iron sulfate 325 mg twice daily with meals.   2.  Return to clinic 4 months, CBC with diff, retic count, and iron studies.   CANDELARIA DAVIS MD    12/12/2018

## 2018-12-12 NOTE — LETTER
12/12/2018         RE: Gil Chowdary  [address] [place], [place] [id number]        Dear Colleague,    Thank you for referring your patient, Gil Chowdary, to the [facility]. Please see a copy of my visit note below.    Medical Assistant Note:  Gil Chowdary presents today for labs.    Patient seen by provider today: Yes: DR DAVIS.   present during visit today: Not Applicable.    Concerns: No Concerns.    Procedure:  Lab draw site: LEFT HAND, Needle type: BF, Gauge: 21.    Post Assessment:  Labs drawn without difficulty: Yes.    Discharge Plan:  Pt tolerated procedure well. Pt complimented technique, usually not an easy draw. Gauze and coban applied.    Face to Face Time: 8min.    Anna Jackson MA              Again, thank you for allowing me to participate in the care of your patient.        Sincerely,        Oncology Nurse

## 2018-12-13 ENCOUNTER — OFFICE VISIT (OUTPATIENT)
Dept: CARDIOLOGY | Facility: CLINIC | Age: 58
End: 2018-12-13
Attending: PHYSICIAN ASSISTANT
Payer: COMMERCIAL

## 2018-12-13 VITALS
DIASTOLIC BLOOD PRESSURE: 68 MMHG | BODY MASS INDEX: 42.23 KG/M2 | HEIGHT: 70 IN | HEART RATE: 76 BPM | OXYGEN SATURATION: 97 % | WEIGHT: 295 LBS | SYSTOLIC BLOOD PRESSURE: 108 MMHG

## 2018-12-13 DIAGNOSIS — I50.30 (HFPEF) HEART FAILURE WITH PRESERVED EJECTION FRACTION (H): ICD-10-CM

## 2018-12-13 LAB
ANION GAP SERPL CALCULATED.3IONS-SCNC: 12.3 MMOL/L (ref 6–17)
BUN SERPL-MCNC: 24 MG/DL (ref 7–30)
CALCIUM SERPL-MCNC: 9.2 MG/DL (ref 8.5–10.5)
CHLORIDE SERPL-SCNC: 103 MMOL/L (ref 98–107)
CO2 SERPL-SCNC: 26 MMOL/L (ref 23–29)
CREAT SERPL-MCNC: 1.27 MG/DL (ref 0.7–1.3)
GFR SERPL CREATININE-BSD FRML MDRD: 58 ML/MIN/1.7M2
GLUCOSE SERPL-MCNC: 166 MG/DL (ref 70–105)
POTASSIUM SERPL-SCNC: 3.3 MMOL/L (ref 3.5–5.1)
SODIUM SERPL-SCNC: 138 MMOL/L (ref 136–145)

## 2018-12-13 PROCEDURE — 80048 BASIC METABOLIC PNL TOTAL CA: CPT | Performed by: PHYSICIAN ASSISTANT

## 2018-12-13 PROCEDURE — 36415 COLL VENOUS BLD VENIPUNCTURE: CPT | Performed by: PHYSICIAN ASSISTANT

## 2018-12-13 PROCEDURE — 99214 OFFICE O/P EST MOD 30 MIN: CPT | Performed by: PHYSICIAN ASSISTANT

## 2018-12-13 ASSESSMENT — MIFFLIN-ST. JEOR: SCORE: 2159.36

## 2018-12-13 NOTE — RESULT ENCOUNTER NOTE
Reviewed during clinic visit.  Please see progress note for plan.  Rachna Holt PA-C 12/13/2018 4:59 PM

## 2018-12-13 NOTE — LETTER
12/13/2018    Sam Villatoro PA-C  [facility] [address]  [facility] [address]    RE: Gil Chowdary       Dear Colleague,    I had the pleasure of seeing Gil Chowdary in the [facility].    [id number]  HPI and Plan:   See dictation    Orders this Visit:  Orders Placed This Encounter   Procedures     Basic metabolic panel     Hemoglobin     Basic metabolic panel     Follow-Up with [facility] - KARYN visit     Follow-Up with [facility] - KARYN visit     No orders of the defined types were placed in this encounter.    There are no discontinued medications.      Encounter Diagnosis   Name Primary?     (HFpEF) heart failure with preserved ejection fraction (H)        CURRENT MEDICATIONS:  Current Outpatient Medications   Medication Sig Dispense Refill     aspirin 81 MG tablet Take 81 mg by mouth every morning        atorvastatin (LIPITOR) 40 MG tablet Take 1 tablet (40 mg) by mouth daily 90 tablet 0     bumetanide (BUMEX) 1 MG tablet Take 3 tablets (3 mg) by mouth 2 times daily 180 tablet 3     cholecalciferol (VITAMIN D3) 5000 UNITS TABS tablet Take 5,000 Units by mouth every morning       clindamycin (CLEOCIN) 300 MG capsule Take 1 capsule (300 mg) by mouth as needed 10 capsule 3     ferrous sulfate (IRON) 325 (65 Fe) MG tablet Take 1 tablet (325 mg) by mouth 2 times daily 100 tablet 3     hydrochlorothiazide (HYDRODIURIL) 25 MG tablet Take 1 tablet (25 mg) by mouth daily 90 tablet 3     insulin aspart (NOVOLOG FLEXPEN) 100 UNIT/ML injection Inject 4 Units Subcutaneous daily (with lunch)       insulin aspart (NOVOLOG FLEXPEN) 100 UNIT/ML injection Inject 3 Units Subcutaneous 2 times daily (with meals) Takes with breakfast and dinner       Insulin Glargine (BASAGLAR KWIKPEN SC) Inject 22 Units Subcutaneous every morning        metoprolol (LOPRESSOR) 50 MG tablet Take 1 tablet (50 mg) by mouth 2 times daily 60 tablet 0     Multiple Vitamins-Minerals (CENTRUM  ADULTS PO) Take 1 tablet by mouth every morning        pantoprazole (PROTONIX) 40 MG EC tablet Take 40 mg by mouth every morning (before breakfast)       potassium chloride ER (K-DUR/KLOR-CON M) 10 MEQ CR tablet Take 6 tablets (60 mEq) by mouth 2 times daily 360 tablet 3     prednisoLONE acetate (PRED FORTE) 1 % ophthalmic susp Place 1 drop into both eyes as needed (glaucoma)        timolol (TIMOPTIC) 0.5 % ophthalmic solution Place 1 drop into both eyes 2 times daily          ALLERGIES     Allergies   Allergen Reactions     Amoxicillin      Itchy      Penicillins Hives     Spironolactone Rash       PAST MEDICAL HISTORY:  Past Medical History:   Diagnosis Date     Former tobacco use      Glaucoma      Hyperlipidemia LDL goal <160 12/6/2011     Obesity      DRAKE on CPAP      Right bundle branch block      Severe aortic stenosis     On Echo 10/28/16       PAST SURGICAL HISTORY:  Past Surgical History:   Procedure Laterality Date     HEART CATH LEFT HEART CATH  04/07/2017    Diffuse non-obstuctive CAD     REPAIR VALVE AORTIC N/A 5/16/2017    Procedure: REPAIR VALVE AORTIC;  Median Sternotony, CardioPulmonary Bypass, Aortic Valve Replace using 25MM Trifecta Valve with [employer], Septal myectomy;  Surgeon: Jun Simon MD;  Location: [facility]     REPLACE VALVE AORTIC N/A 5/16/2017    Procedure: REPLACE VALVE AORTIC;;  Surgeon: Jun Simon MD;  [facility]     SINUS SURGERY  2005       FAMILY HISTORY:  Family History   Problem Relation Age of Onset     Family History Negative Mother      Diabetes Father      Heart Surgery Father         CABG     Coronary Artery Disease Father      Hypertension Brother      Diabetes Brother      Heart Disease Brother      Heart Surgery Brother         CABG x 3     Family History Negative Sister      Diabetes Brother         History of diabetes, but no longer an issue     Family History Negative Brother        SOCIAL HISTORY:  Social History     Socioeconomic  "History     Marital status:      Spouse name: None     Number of children: None     Years of education: None     Highest education level: None   Social Needs     Financial resource strain: None     Food insecurity - worry: None     Food insecurity - inability: None     Transportation needs - medical: None     Transportation needs - non-medical: None   Occupational History     None   Tobacco Use     Smoking status: Former Smoker     Packs/day: 1.00     Years: 20.00     Pack years: 20.00     Types: Cigarettes, Cigars     Start date:      Last attempt to quit: 10/21/2011     Years since quittin.1     Smokeless tobacco: Never Used   Substance and Sexual Activity     Alcohol use: No     Drug use: No     Sexual activity: None   Other Topics Concern     Parent/sibling w/ CABG, MI or angioplasty before 65F 55M? Yes     Comment: brother triple bypass 49   Social History Narrative     None       Review of Systems:  Skin:  Negative     Eyes:  Positive for glasses  ENT:  Negative    Respiratory:  Negative sleep apnea;CPAP  Cardiovascular:  Negative Positive for;edema  Gastroenterology: Negative    Genitourinary:  Negative    Musculoskeletal:  Negative    Neurologic:  Negative    Psychiatric:  Negative    Heme/Lymph/Imm:  Negative    Endocrine:  Positive for diabetes    Physical Exam:  Vitals: /68   Pulse 76   Ht 1.778 m (5' 10\")   Wt 133.8 kg (295 lb)   SpO2 97%   BMI 42.33 kg/m      Please refer to dictation for physical exam    Recent Lab Results:  LIPID RESULTS:  Lab Results   Component Value Date    CHOL 126 10/10/2018    HDL 23 (A) 10/10/2018    LDL 71 10/10/2018    TRIG 230 (A) 10/10/2018       LIVER ENZYME RESULTS:  Lab Results   Component Value Date    AST 55 (H) 10/12/2018    ALT 31 10/12/2018       CBC RESULTS:  Lab Results   Component Value Date    WBC 5.5 2018    RBC 3.68 (L) 2018    HGB 9.9 (L) 2018    HCT 32.0 (L) 2018    MCV 87 2018    MCH 26.9 2018    " MCHC 30.9 (L) 12/12/2018    RDW 19.7 (H) 12/12/2018     (L) 12/12/2018       BMP RESULTS:  Lab Results   Component Value Date     12/13/2018    POTASSIUM 3.3 (L) 12/13/2018    CHLORIDE 103 12/13/2018    CO2 26 12/13/2018    ANIONGAP 12.3 12/13/2018     (H) 12/13/2018    BUN 24 12/13/2018    CR 1.27 12/13/2018    GFRESTIMATED 58 (L) 12/13/2018    GFRESTBLACK 70 12/13/2018    MYRON 9.2 12/13/2018        A1C RESULTS:  Lab Results   Component Value Date    A1C 6.8 (H) 10/12/2018       INR RESULTS:  Lab Results   Component Value Date    INR 2.19 (H) 10/14/2018    INR 2.28 (H) 10/13/2018           CC  Rachna Holt PA-C  [address]  [place] [address]        Thank you for allowing me to participate in the care of your patient.      Sincerely,     Rachna Holt PA-C     [facility]    cc:   Rachna Holt PA-C  [address]  [place] [address]

## 2018-12-13 NOTE — PROGRESS NOTES
[id number]  HPI and Plan:   See dictation    Orders this Visit:  Orders Placed This Encounter   Procedures     Basic metabolic panel     Hemoglobin     Basic metabolic panel     Follow-Up with [facility] - KARYN visit     Follow-Up with [facility] - KARYN visit     No orders of the defined types were placed in this encounter.    There are no discontinued medications.      Encounter Diagnosis   Name Primary?     (HFpEF) heart failure with preserved ejection fraction (H)        CURRENT MEDICATIONS:  Current Outpatient Medications   Medication Sig Dispense Refill     aspirin 81 MG tablet Take 81 mg by mouth every morning        atorvastatin (LIPITOR) 40 MG tablet Take 1 tablet (40 mg) by mouth daily 90 tablet 0     bumetanide (BUMEX) 1 MG tablet Take 3 tablets (3 mg) by mouth 2 times daily 180 tablet 3     cholecalciferol (VITAMIN D3) 5000 UNITS TABS tablet Take 5,000 Units by mouth every morning       clindamycin (CLEOCIN) 300 MG capsule Take 1 capsule (300 mg) by mouth as needed 10 capsule 3     ferrous sulfate (IRON) 325 (65 Fe) MG tablet Take 1 tablet (325 mg) by mouth 2 times daily 100 tablet 3     hydrochlorothiazide (HYDRODIURIL) 25 MG tablet Take 1 tablet (25 mg) by mouth daily 90 tablet 3     insulin aspart (NOVOLOG FLEXPEN) 100 UNIT/ML injection Inject 4 Units Subcutaneous daily (with lunch)       insulin aspart (NOVOLOG FLEXPEN) 100 UNIT/ML injection Inject 3 Units Subcutaneous 2 times daily (with meals) Takes with breakfast and dinner       Insulin Glargine (BASAGLAR KWIKPEN SC) Inject 22 Units Subcutaneous every morning        metoprolol (LOPRESSOR) 50 MG tablet Take 1 tablet (50 mg) by mouth 2 times daily 60 tablet 0     Multiple Vitamins-Minerals (CENTRUM ADULTS PO) Take 1 tablet by mouth every morning        pantoprazole (PROTONIX) 40 MG EC tablet Take 40 mg by mouth every morning (before breakfast)       potassium chloride ER (K-DUR/KLOR-CON M) 10 MEQ CR tablet Take 6 tablets (60 mEq) by mouth 2 times daily  360 tablet 3     prednisoLONE acetate (PRED FORTE) 1 % ophthalmic susp Place 1 drop into both eyes as needed (glaucoma)        timolol (TIMOPTIC) 0.5 % ophthalmic solution Place 1 drop into both eyes 2 times daily          ALLERGIES     Allergies   Allergen Reactions     Amoxicillin      Itchy      Penicillins Hives     Spironolactone Rash       PAST MEDICAL HISTORY:  Past Medical History:   Diagnosis Date     Former tobacco use      Glaucoma      Hyperlipidemia LDL goal <160 12/6/2011     Obesity      DRAKE on CPAP      Right bundle branch block      Severe aortic stenosis     On Echo 10/28/16       PAST SURGICAL HISTORY:  Past Surgical History:   Procedure Laterality Date     HEART CATH LEFT HEART CATH  04/07/2017    Diffuse non-obstuctive CAD     REPAIR VALVE AORTIC N/A 5/16/2017    Procedure: REPAIR VALVE AORTIC;  Median Sternotony, CardioPulmonary Bypass, Aortic Valve Replace using 25MM Trifecta Valve with [employer], Septal myectomy;  Surgeon: Jun Simon MD;  Location: [facility]     REPLACE VALVE AORTIC N/A 5/16/2017    Procedure: REPLACE VALVE AORTIC;;  Surgeon: Jun Simon MD;  [facility]     SINUS SURGERY  2005       FAMILY HISTORY:  Family History   Problem Relation Age of Onset     Family History Negative Mother      Diabetes Father      Heart Surgery Father         CABG     Coronary Artery Disease Father      Hypertension Brother      Diabetes Brother      Heart Disease Brother      Heart Surgery Brother         CABG x 3     Family History Negative Sister      Diabetes Brother         History of diabetes, but no longer an issue     Family History Negative Brother        SOCIAL HISTORY:  Social History     Socioeconomic History     Marital status:      Spouse name: None     Number of children: None     Years of education: None     Highest education level: None   Social Needs     Financial resource strain: None     Food insecurity - worry: None     Food insecurity -  "inability: None     Transportation needs - medical: None     Transportation needs - non-medical: None   Occupational History     None   Tobacco Use     Smoking status: Former Smoker     Packs/day: 1.00     Years: 20.00     Pack years: 20.00     Types: Cigarettes, Cigars     Start date:      Last attempt to quit: 10/21/2011     Years since quittin.1     Smokeless tobacco: Never Used   Substance and Sexual Activity     Alcohol use: No     Drug use: No     Sexual activity: None   Other Topics Concern     Parent/sibling w/ CABG, MI or angioplasty before 65F 55M? Yes     Comment: brother triple bypass 49   Social History Narrative     None       Review of Systems:  Skin:  Negative     Eyes:  Positive for glasses  ENT:  Negative    Respiratory:  Negative sleep apnea;CPAP  Cardiovascular:  Negative Positive for;edema  Gastroenterology: Negative    Genitourinary:  Negative    Musculoskeletal:  Negative    Neurologic:  Negative    Psychiatric:  Negative    Heme/Lymph/Imm:  Negative    Endocrine:  Positive for diabetes    Physical Exam:  Vitals: /68   Pulse 76   Ht 1.778 m (5' 10\")   Wt 133.8 kg (295 lb)   SpO2 97%   BMI 42.33 kg/m     Please refer to dictation for physical exam    Recent Lab Results:  LIPID RESULTS:  Lab Results   Component Value Date    CHOL 126 10/10/2018    HDL 23 (A) 10/10/2018    LDL 71 10/10/2018    TRIG 230 (A) 10/10/2018       LIVER ENZYME RESULTS:  Lab Results   Component Value Date    AST 55 (H) 10/12/2018    ALT 31 10/12/2018       CBC RESULTS:  Lab Results   Component Value Date    WBC 5.5 2018    RBC 3.68 (L) 2018    HGB 9.9 (L) 2018    HCT 32.0 (L) 2018    MCV 87 2018    MCH 26.9 2018    MCHC 30.9 (L) 2018    RDW 19.7 (H) 2018     (L) 2018       BMP RESULTS:  Lab Results   Component Value Date     2018    POTASSIUM 3.3 (L) 2018    CHLORIDE 103 2018    CO2 26 2018    ANIONGAP 12.3 " 12/13/2018     (H) 12/13/2018    BUN 24 12/13/2018    CR 1.27 12/13/2018    GFRESTIMATED 58 (L) 12/13/2018    GFRESTBLACK 70 12/13/2018    MYRON 9.2 12/13/2018        A1C RESULTS:  Lab Results   Component Value Date    A1C 6.8 (H) 10/12/2018       INR RESULTS:  Lab Results   Component Value Date    INR 2.19 (H) 10/14/2018    INR 2.28 (H) 10/13/2018           CC  Rachna Holt, PA-C  [address]  [place] [address]

## 2018-12-13 NOTE — LETTER
2018      Sam Villatoro PA-C  [facility] [facility] [employer] [facility] [address]  [facility] [address]      RE: Gil Chowdary       Dear Colleague,    I had the pleasure of seeing Gil Chowdary in the [facility].    Service Date: 2018      REASON FOR VISIT:  HFpEF, cor pulmonale followup.      HISTORY OF PRESENT ILLNESS:  Mr. Chowdary is a delightful 58-year-old gentleman with past medical history significant for the followin.  Severe aortic stenosis with aortic valve replacement in 2017 with 25 mm Trifecta tissue valve.   2.  Type 2 diabetes, on insulin.   3.  Hyperlipidemia.   4.  Sleep apnea, treated.   5.  Cor pulmonale versus heart failure with preserved EF.   6.  Recent anemia, iron deficiency, followed by Dr. Fletcher.   7.  History of VSVT and paroxysmal AFib postoperative.      Mr. Chowdary was admitted in October with weight gain and shortness of breath.  He was diuresed from about 325 pounds down to 310 and had asked to stay longer but left to go home and take care of his elderly mother with whom he lives.  We struggled to get him diuresed as an outpatient, but eventually with this combination of Bumex and hydrochlorothiazide he has done well.  Weight peaked at 329 pounds and now has dropped about 10 pounds a week over the last several weeks.      He comes in today and tells me he continues to improve.  He is down another 12 pounds.  He is watching what he eats.  He is trying to exercise and he is taking his diuretics.  He is urinating frequently.  He can move his legs better.  He can sleep flat in bed, although he has never had true orthopnea.  His scrotum, which previously was softball size, is now becoming more normal size, less than a baseball now.  His shoes can get on easier, although he still has pitting edema into his thighs.  He has not had syncope nor presyncope.  He continues to be concerned about his difficulty urination due to his penis  being sunk in with his edema.  This has not yet resolved, and he is concerned that it may or may not.  He is able to urinate okay, does not have any redness or erythema or rashes in that area.  He did send in a Zio Patch last week.      SOCIAL HISTORY:  He lives at home with his mom, who has dementia, previously cared for her but now has a sister and brother-in-law who have moved back from [place] and are staying and helping out.  He is a former smoker, 20-pack-year history, quit in 2011.  No alcohol use.      PHYSICAL EXAMINATION:   GENERAL:  Well-developed, well-nourished gentleman in no acute distress.     HEENT:  Normocephalic, atraumatic.  He does have bilateral black eyes and injected conjunctiva consistent with rupture capillaries.   HEART:  Regular in rate and rhythm.  I do not appreciate a murmur or rub today.  Previously had a bit of a systolic murmur.   LUNGS:  Clear.  They continue to improve.  There is no wheezing or rales.   EXTREMITIES:  Have 3+ pitting edema to his thighs.   ABDOMEN:  Soft but some pitting edema in his lower abdomen.  Shoes are tight.  Compression stockings are in place.       LABORATORY DATA:  Labs today show creatinine of 1.27, BUN 24, potassium 3.3, sodium 138.  Hemoglobin done yesterday is 9.9.      ASSESSMENT AND PLAN:   1.  Cor pulmonale versus heart failure with preserved EF, triggered by profound anemia.  He is treating his sleep apnea and he is obese, but still this his way outside of his norm.  He is diuresing well, about 10-11 pounds a week on Bumex 3 mg b.i.d. with hydrochlorothiazide 25 mg daily.  He is on 60 mEq potassium b.i.d. and even with this potassium is running on the low side at 3.3.  He is not sure he can even swallow any more potassium.  As such, we are going to have him eat 2 high-potassium foods every day and see if this can help.  He had a rash from spironolactone, so cannot add that.  We could cut back his hydrochlorothiazide and he would likely lose less  potassium, but I suspect he still has somewhere along the lines of 30-50 pounds of volume left to lose, and I would hate to go backwards.   2.  Anemia and thrombocytopenia, followed by Dr. Fletcher.  This is iron deficiency anemia, improving up to 9.9 last check.  He had been on Coumadin for episodes of AFib post-AVR.  None has been documented since.  I did have him wear a Zio Patch.  That is not yet read, but if he does not have AFib on that, I would keep him off anticoagulation outside of aspirin only.   3.  History of aortic valve replacement, severe AS with a well-functioning bioprosthetic valve.  He also has a history of septal myectomy for septal hypertrophy without an LVOT gradient.  He will remain on aspirin for upcoming procedures.      I am going to push our visits out to about 2-3 weeks.  If he continues to have problems urinating as his edema resolves, would have him see Urology.  To clarify, he actually urinates fine, but he has found that his penis is difficult to access due to all the edema, if this does not improve would get him in with Urology.      SIRENA Saucedo PA-C             D: 2018   T: 2018   MT: BLANE      Name:     ABIGAIL MATOS   MRN:      [id number]        Account:      [id number]   :      1960           Service Date: 2018      Document: [id number]         Outpatient Encounter Medications as of 2018   Medication Sig Dispense Refill     aspirin 81 MG tablet Take 81 mg by mouth every morning        atorvastatin (LIPITOR) 40 MG tablet Take 1 tablet (40 mg) by mouth daily 90 tablet 0     bumetanide (BUMEX) 1 MG tablet Take 3 tablets (3 mg) by mouth 2 times daily 180 tablet 3     cholecalciferol (VITAMIN D3) 5000 UNITS TABS tablet Take 5,000 Units by mouth every morning       clindamycin (CLEOCIN) 300 MG capsule Take 1 capsule (300 mg) by mouth as needed 10 capsule 3     ferrous sulfate (IRON) 325 (65 Fe) MG tablet Take 1 tablet  (325 mg) by mouth 2 times daily 100 tablet 3     hydrochlorothiazide (HYDRODIURIL) 25 MG tablet Take 1 tablet (25 mg) by mouth daily 90 tablet 3     insulin aspart (NOVOLOG FLEXPEN) 100 UNIT/ML injection Inject 4 Units Subcutaneous daily (with lunch)       insulin aspart (NOVOLOG FLEXPEN) 100 UNIT/ML injection Inject 3 Units Subcutaneous 2 times daily (with meals) Takes with breakfast and dinner       Insulin Glargine (BASAGLAR KWIKPEN SC) Inject 22 Units Subcutaneous every morning        metoprolol (LOPRESSOR) 50 MG tablet Take 1 tablet (50 mg) by mouth 2 times daily 60 tablet 0     Multiple Vitamins-Minerals (CENTRUM ADULTS PO) Take 1 tablet by mouth every morning        pantoprazole (PROTONIX) 40 MG EC tablet Take 40 mg by mouth every morning (before breakfast)       potassium chloride ER (K-DUR/KLOR-CON M) 10 MEQ CR tablet Take 6 tablets (60 mEq) by mouth 2 times daily 360 tablet 3     prednisoLONE acetate (PRED FORTE) 1 % ophthalmic susp Place 1 drop into both eyes as needed (glaucoma)        timolol (TIMOPTIC) 0.5 % ophthalmic solution Place 1 drop into both eyes 2 times daily        No facility-administered encounter medications on file as of 12/13/2018.        Again, thank you for allowing me to participate in the care of your patient.      Sincerely,    Rachna Holt PA-C     [facility]

## 2018-12-13 NOTE — PATIENT INSTRUCTIONS
Please call CORE nurse for any questions or concerns:       [phone]    1. Medication changes:  Continue current medications.  BUT, eat high potassium foods several times a day.      2.  Weigh yourself daily and write it down.     3. Call [facility]ORE nurse if your weight is up more than 2 pounds in one day, or 5 pounds in one week.    4. Call [facility] nurse if you feel more short of breath, have more abdominal bloating or leg swelling.    5. Eat a low sodium diet (less than 2,000mg daily). If you eat less salt, you will retain less fluid.     6. Results: labs look great overall- kidney function continues to get better.    Component      Latest Ref Rng & Units 12/3/2018 12/6/2018 12/13/2018   Sodium      136 - 145 mmol/L 140  138   Potassium      3.5 - 5.1 mmol/L 2.9 (LL) 3.4 (L) 3.3 (L)   Chloride      98 - 107 mmol/L 102  103   Carbon Dioxide      23 - 29 mmol/L 31 (H)  26   Anion Gap      6 - 17 mmol/L Not Calculated  12.3   Glucose      70 - 105 mg/dL 126 (H)  166 (H)   Urea Nitrogen      7 - 30 mg/dL 21  24   Creatinine      0.70 - 1.30 mg/dL 1.34 (H)  1.27   GFR Estimate      >60 mL/min/1.7m2 55 (L)  58 (L)   GFR Estimate If Black      >60 mL/min/1.7m2 66  70   Calcium      8.5 - 10.5 mg/dL 8.9  9.2     7.  Follow up: with me in about 2 weeks, with labs at visit.      Scheduling phone number: [phone]  Reminder: Please bring in all current medications, over the counter supplements and vitamin bottles to your next appointment.

## 2018-12-14 NOTE — PROGRESS NOTES
Service Date: 2018      REASON FOR VISIT:  HFpEF, cor pulmonale followup.      HISTORY OF PRESENT ILLNESS:  Mr. Chowdary is a delightful 58-year-old gentleman with past medical history significant for the followin.  Severe aortic stenosis with aortic valve replacement in 2017 with 25 mm Trifecta tissue valve.   2.  Type 2 diabetes, on insulin.   3.  Hyperlipidemia.   4.  Sleep apnea, treated.   5.  Cor pulmonale versus heart failure with preserved EF.   6.  Recent anemia, iron deficiency, followed by Dr. Fletcher.   7.  History of VSVT and paroxysmal AFib postoperative.      Mr. Chowdary was admitted in October with weight gain and shortness of breath.  He was diuresed from about 325 pounds down to 310 and had asked to stay longer but left to go home and take care of his elderly mother with whom he lives.  We struggled to get him diuresed as an outpatient, but eventually with this combination of Bumex and hydrochlorothiazide he has done well.  Weight peaked at 329 pounds and now has dropped about 10 pounds a week over the last several weeks.      He comes in today and tells me he continues to improve.  He is down another 12 pounds.  He is watching what he eats.  He is trying to exercise and he is taking his diuretics.  He is urinating frequently.  He can move his legs better.  He can sleep flat in bed, although he has never had true orthopnea.  His scrotum, which previously was softball size, is now becoming more normal size, less than a baseball now.  His shoes can get on easier, although he still has pitting edema into his thighs.  He has not had syncope nor presyncope.  He continues to be concerned about his difficulty urination due to his penis being sunk in with his edema.  This has not yet resolved, and he is concerned that it may or may not.  He is able to urinate okay, does not have any redness or erythema or rashes in that area.  He did send in a Zio Patch last week.      SOCIAL HISTORY:  He lives at  home with his mom, who has dementia, previously cared for her but now has a sister and brother-in-law who have moved back from [place] and are staying and helping out.  He is a former smoker, 20-pack-year history, quit in 2011.  No alcohol use.      PHYSICAL EXAMINATION:   GENERAL:  Well-developed, well-nourished gentleman in no acute distress.     HEENT:  Normocephalic, atraumatic.  He does have bilateral black eyes and injected conjunctiva consistent with rupture capillaries.   HEART:  Regular in rate and rhythm.  I do not appreciate a murmur or rub today.  Previously had a bit of a systolic murmur.   LUNGS:  Clear.  They continue to improve.  There is no wheezing or rales.   EXTREMITIES:  Have 3+ pitting edema to his thighs.   ABDOMEN:  Soft but some pitting edema in his lower abdomen.  Shoes are tight.  Compression stockings are in place.       LABORATORY DATA:  Labs today show creatinine of 1.27, BUN 24, potassium 3.3, sodium 138.  Hemoglobin done yesterday is 9.9.      ASSESSMENT AND PLAN:   1.  Cor pulmonale versus heart failure with preserved EF, triggered by profound anemia.  He is treating his sleep apnea and he is obese, but still this his way outside of his norm.  He is diuresing well, about 10-11 pounds a week on Bumex 3 mg b.i.d. with hydrochlorothiazide 25 mg daily.  He is on 60 mEq potassium b.i.d. and even with this potassium is running on the low side at 3.3.  He is not sure he can even swallow any more potassium.  As such, we are going to have him eat 2 high-potassium foods every day and see if this can help.  He had a rash from spironolactone, so cannot add that.  We could cut back his hydrochlorothiazide and he would likely lose less potassium, but I suspect he still has somewhere along the lines of 30-50 pounds of volume left to lose, and I would hate to go backwards.   2.  Anemia and thrombocytopenia, followed by Dr. Fletcher.  This is iron deficiency anemia, improving up to 9.9 last check.  He had  been on Coumadin for episodes of AFib post-AVR.  None has been documented since.  I did have him wear a Zio Patch.  That is not yet read, but if he does not have AFib on that, I would keep him off anticoagulation outside of aspirin only.   3.  History of aortic valve replacement, severe AS with a well-functioning bioprosthetic valve.  He also has a history of septal myectomy for septal hypertrophy without an LVOT gradient.  He will remain on aspirin for upcoming procedures.      I am going to push our visits out to about 2-3 weeks.  If he continues to have problems urinating as his edema resolves, would have him see Urology.  To clarify, he actually urinates fine, but he has found that his penis is difficult to access due to all the edema, if this does not improve would get him in with Urology.      SIRENA Saucedo PA-C             D: 2018   T: 2018   MT: BLANE      Name:     ABIGAIL MATOS   MRN:      [id number]        Account:      [id number]   :      1960           Service Date: 2018      Document: [id number]

## 2018-12-18 ENCOUNTER — TELEPHONE (OUTPATIENT)
Dept: CARDIOLOGY | Facility: CLINIC | Age: 58
End: 2018-12-18

## 2018-12-18 NOTE — TELEPHONE ENCOUNTER
Central Prior Authorization Team   Phone: [phone]    Prior Authorization Retail Medication Request    Medication/Dose: potassium chloride ER (K-DUR/KLOR-CON M) 10 MEQ CR tablet  ICD code (if different than what is on RX):  (HFpEF) heart failure with preserved ejection fraction (H) [I50.30]   Previously Tried and Failed:    Rationale:      Insurance Name:  [facility]  Insurance ID:  [id number]      Pharmacy Information (if different than what is on RX)  Name:  [facility]  Phone:  [phone]

## 2018-12-20 DIAGNOSIS — I50.30 (HFPEF) HEART FAILURE WITH PRESERVED EJECTION FRACTION (H): ICD-10-CM

## 2018-12-20 LAB
ANION GAP SERPL CALCULATED.3IONS-SCNC: 11.2 MMOL/L (ref 6–17)
BUN SERPL-MCNC: 22 MG/DL (ref 7–30)
CALCIUM SERPL-MCNC: 8.8 MG/DL (ref 8.5–10.5)
CHLORIDE SERPL-SCNC: 102 MMOL/L (ref 98–107)
CO2 SERPL-SCNC: 29 MMOL/L (ref 23–29)
CREAT SERPL-MCNC: 1.3 MG/DL (ref 0.7–1.3)
GFR SERPL CREATININE-BSD FRML MDRD: 57 ML/MIN/{1.73_M2}
GLUCOSE SERPL-MCNC: 99 MG/DL (ref 70–105)
HGB BLD-MCNC: 10.6 G/DL (ref 13.3–17.7)
POTASSIUM SERPL-SCNC: 3.2 MMOL/L (ref 3.5–5.1)
SODIUM SERPL-SCNC: 139 MMOL/L (ref 136–145)

## 2018-12-20 PROCEDURE — 36415 COLL VENOUS BLD VENIPUNCTURE: CPT | Performed by: PHYSICIAN ASSISTANT

## 2018-12-20 PROCEDURE — 85018 HEMOGLOBIN: CPT | Performed by: PHYSICIAN ASSISTANT

## 2018-12-20 PROCEDURE — 80048 BASIC METABOLIC PNL TOTAL CA: CPT | Performed by: PHYSICIAN ASSISTANT

## 2018-12-21 ENCOUNTER — CARE COORDINATION (OUTPATIENT)
Dept: LAB | Facility: CLINIC | Age: 58
End: 2018-12-21

## 2018-12-21 DIAGNOSIS — I50.9 ACUTE HEART FAILURE, UNSPECIFIED HEART FAILURE TYPE (H): Primary | ICD-10-CM

## 2018-12-21 NOTE — PROGRESS NOTES
Call to patient to review recent lab results and recommendations for medication adjustments per SM:  Notes recorded by Rachna Holt PA-C on 12/21/2018 at 6:42 AM CST  Hgb is much improved at 10.6 but potassium still remains low.  He is already taking tons of K replacement.  Let's cut hydrochlorothiazide back to every other day.  If his weight does not continue to drop he needs to call, may slow from about 12 pounds per week to about 5-7 but I don't want much less than that.  Repeat bmp in 1 week.  Rachna Holt PA-C 12/21/2018 6:42 AM    Patient verbalized understanding and will start today hydrochlorothiazide every other day. Recheck BMP order entered and he is scheduled in the lab on 12/26 @ 7:40am. Will continue to closely monitor.  Jaqueline Miller RN on 12/21/2018 at 3:01 PM

## 2018-12-26 DIAGNOSIS — I50.9 ACUTE HEART FAILURE, UNSPECIFIED HEART FAILURE TYPE (H): ICD-10-CM

## 2018-12-26 LAB
ANION GAP SERPL CALCULATED.3IONS-SCNC: 11.2 MMOL/L (ref 6–17)
BUN SERPL-MCNC: 21 MG/DL (ref 7–30)
CALCIUM SERPL-MCNC: 8.9 MG/DL (ref 8.5–10.5)
CHLORIDE SERPL-SCNC: 104 MMOL/L (ref 98–107)
CO2 SERPL-SCNC: 29 MMOL/L (ref 23–29)
CREAT SERPL-MCNC: 1.3 MG/DL (ref 0.7–1.3)
GFR SERPL CREATININE-BSD FRML MDRD: 57 ML/MIN/{1.73_M2}
GLUCOSE SERPL-MCNC: 141 MG/DL (ref 70–105)
POTASSIUM SERPL-SCNC: 3.2 MMOL/L (ref 3.5–5.1)
SODIUM SERPL-SCNC: 141 MMOL/L (ref 136–145)

## 2018-12-26 PROCEDURE — 36415 COLL VENOUS BLD VENIPUNCTURE: CPT | Performed by: PHYSICIAN ASSISTANT

## 2018-12-26 PROCEDURE — 80048 BASIC METABOLIC PNL TOTAL CA: CPT | Performed by: PHYSICIAN ASSISTANT

## 2018-12-27 ENCOUNTER — TELEPHONE (OUTPATIENT)
Dept: CARDIOLOGY | Facility: CLINIC | Age: 58
End: 2018-12-27

## 2018-12-27 DIAGNOSIS — I50.30 (HFPEF) HEART FAILURE WITH PRESERVED EJECTION FRACTION (H): ICD-10-CM

## 2018-12-27 DIAGNOSIS — I50.30 HEART FAILURE WITH PRESERVED EJECTION FRACTION, NYHA CLASS I (H): Primary | ICD-10-CM

## 2018-12-27 RX ORDER — POTASSIUM CHLORIDE 750 MG/1
TABLET, EXTENDED RELEASE ORAL
Qty: 360 TABLET | Refills: 3 | Status: SHIPPED | OUTPATIENT
Start: 2018-12-27 | End: 2018-12-27

## 2018-12-27 RX ORDER — POTASSIUM CHLORIDE 750 MG/1
TABLET, EXTENDED RELEASE ORAL
Qty: 360 TABLET | Refills: 3 | Status: SHIPPED | OUTPATIENT
Start: 2018-12-27 | End: 2019-01-02

## 2018-12-27 RX ORDER — EPLERENONE 25 MG/1
25 TABLET, FILM COATED ORAL DAILY
Qty: 30 TABLET | Refills: 0 | Status: SHIPPED | OUTPATIENT
Start: 2018-12-27 | End: 2019-01-02

## 2018-12-27 NOTE — TELEPHONE ENCOUNTER
Reviewed chart with DR Krause Pt continues to have low potassium levels with 3 x a day replacement of potassium. Per DR Krause will try Inspra. Did send RX to pharmacy, and Pt has labs and OV 1/2/19. Reviewed med with Pt , side effects, need and dosing. RX sent to pharmacy. SABA Bahena RN

## 2018-12-27 NOTE — TELEPHONE ENCOUNTER
Spoke with Pt he says he is doing very well, and weight stable 295-300. Pt denies any increased edema. Labs show potassium level of 3.2 yet with decreasing hydrochlorothiazide every other day. Pt is taking potassium tablets 3 time a day  10 meq tablets-5 tablets AM, 6 at lunch and 5 in evening.      Rachna BECERRA is out of office this week, and DR Salinas also. Will dicuss case with another provider. Pt was unable to take spironolactone developed rash. SABA Bahena RN

## 2018-12-28 NOTE — TELEPHONE ENCOUNTER
Called Pt he did  Inspra and is using it today. Pt says he is a little foggy I head this AM. Pt did eat breakfast and will drink a bit of water. Pt does not have BP cuff. Will discuss with DR Krause. SABA Bahena RN

## 2018-12-28 NOTE — TELEPHONE ENCOUNTER
Spoke with Pt this afternoon, and after 2-3 glasses of water feeling fine. Pt will call if issue returns. Pt has OV 1/2/18. SABA Bahena RN

## 2018-12-31 DIAGNOSIS — I50.30 (HFPEF) HEART FAILURE WITH PRESERVED EJECTION FRACTION (H): Primary | ICD-10-CM

## 2019-01-02 ENCOUNTER — OFFICE VISIT (OUTPATIENT)
Dept: CARDIOLOGY | Facility: CLINIC | Age: 59
End: 2019-01-02
Attending: PHYSICIAN ASSISTANT
Payer: COMMERCIAL

## 2019-01-02 VITALS
DIASTOLIC BLOOD PRESSURE: 57 MMHG | HEART RATE: 65 BPM | SYSTOLIC BLOOD PRESSURE: 112 MMHG | BODY MASS INDEX: 43.19 KG/M2 | WEIGHT: 301.7 LBS | HEIGHT: 70 IN | OXYGEN SATURATION: 100 %

## 2019-01-02 DIAGNOSIS — I50.30 HEART FAILURE WITH PRESERVED EJECTION FRACTION, NYHA CLASS I (H): ICD-10-CM

## 2019-01-02 DIAGNOSIS — I50.30 (HFPEF) HEART FAILURE WITH PRESERVED EJECTION FRACTION (H): ICD-10-CM

## 2019-01-02 LAB
ANION GAP SERPL CALCULATED.3IONS-SCNC: 9.6 MMOL/L (ref 6–17)
BUN SERPL-MCNC: 22 MG/DL (ref 7–30)
CALCIUM SERPL-MCNC: 9.3 MG/DL (ref 8.5–10.5)
CHLORIDE SERPL-SCNC: 105 MMOL/L (ref 98–107)
CO2 SERPL-SCNC: 28 MMOL/L (ref 23–29)
CREAT SERPL-MCNC: 1.35 MG/DL (ref 0.7–1.3)
GFR SERPL CREATININE-BSD FRML MDRD: 54 ML/MIN/{1.73_M2}
GLUCOSE SERPL-MCNC: 115 MG/DL (ref 70–105)
HGB BLD-MCNC: 11.2 G/DL (ref 13.3–17.7)
POTASSIUM SERPL-SCNC: 3.6 MMOL/L (ref 3.5–5.1)
SODIUM SERPL-SCNC: 139 MMOL/L (ref 136–145)

## 2019-01-02 PROCEDURE — 80048 BASIC METABOLIC PNL TOTAL CA: CPT | Performed by: INTERNAL MEDICINE

## 2019-01-02 PROCEDURE — 99214 OFFICE O/P EST MOD 30 MIN: CPT | Performed by: PHYSICIAN ASSISTANT

## 2019-01-02 PROCEDURE — 85018 HEMOGLOBIN: CPT | Performed by: INTERNAL MEDICINE

## 2019-01-02 PROCEDURE — 36415 COLL VENOUS BLD VENIPUNCTURE: CPT | Performed by: INTERNAL MEDICINE

## 2019-01-02 RX ORDER — POTASSIUM CHLORIDE 750 MG/1
40 TABLET, EXTENDED RELEASE ORAL 3 TIMES DAILY
Qty: 360 TABLET | Refills: 3 | Status: ON HOLD | OUTPATIENT
Start: 2019-01-02 | End: 2019-01-09

## 2019-01-02 RX ORDER — EPLERENONE 50 MG/1
50 TABLET, FILM COATED ORAL DAILY
Qty: 30 TABLET | Refills: 11 | Status: ON HOLD | OUTPATIENT
Start: 2019-01-02 | End: 2019-01-09

## 2019-01-02 RX ORDER — HYDROCHLOROTHIAZIDE 25 MG/1
25 TABLET ORAL EVERY OTHER DAY
Status: ON HOLD | COMMUNITY
End: 2019-01-23

## 2019-01-02 ASSESSMENT — MIFFLIN-ST. JEOR: SCORE: 2189.75

## 2019-01-02 NOTE — NURSING NOTE
The After Visit Summary was reviewed with the patient following their office visit with Rachna Holt PA-C. Patient was educated about any medication changes, the importance of following a low sodium diet, importance of recording daily weights, and when to call [facility] clinic. Patient verbalized understanding of the information and agrees to call with any questions or concerns.     Labs: Lab results from today were reviewed with the patient during the office visit. A copy of the results were provided on the After Visit Summary.     Return appointment: Patient is already scheduled for [facility] OV with Dr. Ashley on 1/10/19.  Pt states he may not be able to make that appt and will call by Friday, 1/4/19, if he needs to reschedule.  Appt with Dr. Ashley on hold for 1/21/19 at 9:45am if pt needs to reschedule.  Reminder sent to [facility] board to take hold off appt on 1/21/19 on 1/7 if pt has not called to reschedule by then.    Medication changes reviewed with pt:    -Decrease potassium to 12 pills total a day or 120 mEq.   -Increased Inspira/ epleronone 50 mg once a day. Reviewed with pt that updated 50mg Rx was sent to pharmacy but he can use 2 25mg tabs daily until they run out before picking up 50mg Rx.      We'll plan on the infusion clinic for IV bumex next Wednesday.    -On Wednesday, 1/9/19, take all morning medications except bumex.   -Plan to check-in at 10:00am on 1/9/19 at the [address] [place] desk on the first floor.     Sun Walker RN  [facility] Care Coordinator  [phone]

## 2019-01-02 NOTE — RESULT ENCOUNTER NOTE
Reviewed during clinic visit.  Please see progress note for plan.  Rachna Holt PA-C 1/2/2019 5:15 PM

## 2019-01-02 NOTE — PROGRESS NOTES
[id number]  HPI and Plan:   See dictation    Orders this Visit:  No orders of the defined types were placed in this encounter.    Orders Placed This Encounter   Medications     hydrochlorothiazide (HYDRODIURIL) 25 MG tablet     Sig: Take 25 mg by mouth every other day     potassium chloride ER (K-DUR/KLOR-CON M) 10 MEQ CR tablet     Sig: Take 4 tablets (40 mEq) by mouth 3 times daily     Dispense:  360 tablet     Refill:  3     eplerenone (INSPRA) 50 MG tablet     Sig: Take 1 tablet (50 mg) by mouth daily     Dispense:  30 tablet     Refill:  11     Medications Discontinued During This Encounter   Medication Reason     cholecalciferol (VITAMIN D3) 5000 UNITS TABS tablet Stopped by Patient     hydrochlorothiazide (HYDRODIURIL) 25 MG tablet Dose adjustment     potassium chloride ER (K-DUR/KLOR-CON M) 10 MEQ CR tablet      eplerenone (INSPRA) 25 MG tablet          Encounter Diagnoses   Name Primary?     (HFpEF) heart failure with preserved ejection fraction (H)      Heart failure with preserved ejection fraction, NYHA class I (H)        CURRENT MEDICATIONS:  Current Outpatient Medications   Medication Sig Dispense Refill     aspirin 81 MG tablet Take 81 mg by mouth every morning        atorvastatin (LIPITOR) 40 MG tablet Take 1 tablet (40 mg) by mouth daily 90 tablet 0     bumetanide (BUMEX) 1 MG tablet Take 3 tablets (3 mg) by mouth 2 times daily 180 tablet 3     clindamycin (CLEOCIN) 300 MG capsule Take 1 capsule (300 mg) by mouth as needed 10 capsule 3     eplerenone (INSPRA) 50 MG tablet Take 1 tablet (50 mg) by mouth daily 30 tablet 11     ferrous sulfate (IRON) 325 (65 Fe) MG tablet Take 1 tablet (325 mg) by mouth 2 times daily 100 tablet 3     hydrochlorothiazide (HYDRODIURIL) 25 MG tablet Take 25 mg by mouth every other day       insulin aspart (NOVOLOG FLEXPEN) 100 UNIT/ML injection Inject 4 Units Subcutaneous daily (with lunch)       insulin aspart (NOVOLOG FLEXPEN) 100 UNIT/ML injection Inject 3 Units  Subcutaneous 2 times daily (with meals) Takes with breakfast and dinner       Insulin Glargine (BASAGLAR KWIKPEN SC) Inject 22 Units Subcutaneous every morning        metoprolol (LOPRESSOR) 50 MG tablet Take 1 tablet (50 mg) by mouth 2 times daily 60 tablet 0     Multiple Vitamins-Minerals (CENTRUM ADULTS PO) Take 1 tablet by mouth every morning        pantoprazole (PROTONIX) 40 MG EC tablet Take 40 mg by mouth every morning (before breakfast)       potassium chloride ER (K-DUR/KLOR-CON M) 10 MEQ CR tablet Take 4 tablets (40 mEq) by mouth 3 times daily 360 tablet 3     prednisoLONE acetate (PRED FORTE) 1 % ophthalmic susp Place 1 drop into both eyes as needed (glaucoma)        timolol (TIMOPTIC) 0.5 % ophthalmic solution Place 1 drop into both eyes 2 times daily          ALLERGIES     Allergies   Allergen Reactions     Amoxicillin      Itchy      Penicillins Hives     Spironolactone Rash       PAST MEDICAL HISTORY:  Past Medical History:   Diagnosis Date     Former tobacco use      Glaucoma      Hyperlipidemia LDL goal <160 12/6/2011     Obesity      DRAKE on CPAP      Right bundle branch block      Severe aortic stenosis     On Echo 10/28/16       PAST SURGICAL HISTORY:  Past Surgical History:   Procedure Laterality Date     HEART CATH LEFT HEART CATH  04/07/2017    Diffuse non-obstuctive CAD     REPAIR VALVE AORTIC N/A 5/16/2017    Procedure: REPAIR VALVE AORTIC;  Median Sternotony, CardioPulmonary Bypass, Aortic Valve Replace using 25MM Trifecta Valve with [employer], Septal myectomy;  Surgeon: Jun Simon MD;  Location: [facility] OR     REPLACE VALVE AORTIC N/A 5/16/2017    Procedure: REPLACE VALVE AORTIC;;  Surgeon: Jun Simon MD;  Location: [facility] OR     SINUS SURGERY  2005       FAMILY HISTORY:  Family History   Problem Relation Age of Onset     Family History Negative Mother      Diabetes Father      Heart Surgery Father         CABG     Coronary Artery Disease Father      Hypertension  "Brother      Diabetes Brother      Heart Disease Brother      Heart Surgery Brother         CABG x 3     Family History Negative Sister      Diabetes Brother         History of diabetes, but no longer an issue     Family History Negative Brother        SOCIAL HISTORY:  Social History     Socioeconomic History     Marital status:      Spouse name: None     Number of children: None     Years of education: None     Highest education level: None   Social Needs     Financial resource strain: None     Food insecurity - worry: None     Food insecurity - inability: None     Transportation needs - medical: None     Transportation needs - non-medical: None   Occupational History     None   Tobacco Use     Smoking status: Former Smoker     Packs/day: 1.00     Years: 20.00     Pack years: 20.00     Types: Cigarettes, Cigars     Start date:      Last attempt to quit: 10/21/2011     Years since quittin.2     Smokeless tobacco: Never Used   Substance and Sexual Activity     Alcohol use: No     Drug use: No     Sexual activity: None   Other Topics Concern     Parent/sibling w/ CABG, MI or angioplasty before 65F 55M? Yes     Comment: brother triple bypass 49   Social History Narrative     None       Review of Systems:  Skin:  Positive for     Eyes:  Positive for glasses  ENT:  Negative    Respiratory:  Positive for sleep apnea;CPAP  Cardiovascular:    Positive for;edema  Gastroenterology: Negative    Genitourinary:  Negative    Musculoskeletal:  Negative    Neurologic:  Positive for memory problems;involuntary movement  Psychiatric:  Negative    Heme/Lymph/Imm:  Positive for allergies  Endocrine:  Positive for diabetes    Physical Exam:  Vitals: /57   Pulse 65   Ht 1.778 m (5' 10\")   Wt 136.9 kg (301 lb 11.2 oz)   SpO2 100%   BMI 43.29 kg/m     Please refer to dictation for physical exam    Recent Lab Results:  LIPID RESULTS:  Lab Results   Component Value Date    CHOL 126 10/10/2018    HDL 23 (A) " 10/10/2018    LDL 71 10/10/2018    TRIG 230 (A) 10/10/2018       LIVER ENZYME RESULTS:  Lab Results   Component Value Date    AST 55 (H) 10/12/2018    ALT 31 10/12/2018       CBC RESULTS:  Lab Results   Component Value Date    WBC 5.5 12/12/2018    RBC 3.68 (L) 12/12/2018    HGB 11.2 (L) 01/02/2019    HCT 32.0 (L) 12/12/2018    MCV 87 12/12/2018    MCH 26.9 12/12/2018    MCHC 30.9 (L) 12/12/2018    RDW 19.7 (H) 12/12/2018     (L) 12/12/2018       BMP RESULTS:  Lab Results   Component Value Date     01/02/2019    POTASSIUM 3.6 01/02/2019    CHLORIDE 105 01/02/2019    CO2 28 01/02/2019    ANIONGAP 9.6 01/02/2019     (H) 01/02/2019    BUN 22 01/02/2019    CR 1.35 (H) 01/02/2019    GFRESTIMATED 54 (L) 01/02/2019    GFRESTBLACK 65 01/02/2019    MYRON 9.3 01/02/2019        A1C RESULTS:  Lab Results   Component Value Date    A1C 6.8 (H) 10/12/2018       INR RESULTS:  Lab Results   Component Value Date    INR 2.19 (H) 10/14/2018    INR 2.28 (H) 10/13/2018           JARETT Holt PA-C  [address]  [place] [address]

## 2019-01-02 NOTE — LETTER
1/2/2019    Sam Villatoro PA-C  [facility] [address]  [facility] [address]    RE: Gil Chowdary       Dear Colleague,    I had the pleasure of seeing Gil Chowdary in the [facility].    [id number]  HPI and Plan:   See dictation    Orders this Visit:  No orders of the defined types were placed in this encounter.    Orders Placed This Encounter   Medications     hydrochlorothiazide (HYDRODIURIL) 25 MG tablet     Sig: Take 25 mg by mouth every other day     potassium chloride ER (K-DUR/KLOR-CON M) 10 MEQ CR tablet     Sig: Take 4 tablets (40 mEq) by mouth 3 times daily     Dispense:  360 tablet     Refill:  3     eplerenone (INSPRA) 50 MG tablet     Sig: Take 1 tablet (50 mg) by mouth daily     Dispense:  30 tablet     Refill:  11     Medications Discontinued During This Encounter   Medication Reason     cholecalciferol (VITAMIN D3) 5000 UNITS TABS tablet Stopped by Patient     hydrochlorothiazide (HYDRODIURIL) 25 MG tablet Dose adjustment     potassium chloride ER (K-DUR/KLOR-CON M) 10 MEQ CR tablet      eplerenone (INSPRA) 25 MG tablet          Encounter Diagnoses   Name Primary?     (HFpEF) heart failure with preserved ejection fraction (H)      Heart failure with preserved ejection fraction, NYHA class I (H)        CURRENT MEDICATIONS:  Current Outpatient Medications   Medication Sig Dispense Refill     aspirin 81 MG tablet Take 81 mg by mouth every morning        atorvastatin (LIPITOR) 40 MG tablet Take 1 tablet (40 mg) by mouth daily 90 tablet 0     bumetanide (BUMEX) 1 MG tablet Take 3 tablets (3 mg) by mouth 2 times daily 180 tablet 3     clindamycin (CLEOCIN) 300 MG capsule Take 1 capsule (300 mg) by mouth as needed 10 capsule 3     eplerenone (INSPRA) 50 MG tablet Take 1 tablet (50 mg) by mouth daily 30 tablet 11     ferrous sulfate (IRON) 325 (65 Fe) MG tablet Take 1 tablet (325 mg) by mouth 2 times daily 100 tablet 3     hydrochlorothiazide  (HYDRODIURIL) 25 MG tablet Take 25 mg by mouth every other day       insulin aspart (NOVOLOG FLEXPEN) 100 UNIT/ML injection Inject 4 Units Subcutaneous daily (with lunch)       insulin aspart (NOVOLOG FLEXPEN) 100 UNIT/ML injection Inject 3 Units Subcutaneous 2 times daily (with meals) Takes with breakfast and dinner       Insulin Glargine (BASAGLAR KWIKPEN SC) Inject 22 Units Subcutaneous every morning        metoprolol (LOPRESSOR) 50 MG tablet Take 1 tablet (50 mg) by mouth 2 times daily 60 tablet 0     Multiple Vitamins-Minerals (CENTRUM ADULTS PO) Take 1 tablet by mouth every morning        pantoprazole (PROTONIX) 40 MG EC tablet Take 40 mg by mouth every morning (before breakfast)       potassium chloride ER (K-DUR/KLOR-CON M) 10 MEQ CR tablet Take 4 tablets (40 mEq) by mouth 3 times daily 360 tablet 3     prednisoLONE acetate (PRED FORTE) 1 % ophthalmic susp Place 1 drop into both eyes as needed (glaucoma)        timolol (TIMOPTIC) 0.5 % ophthalmic solution Place 1 drop into both eyes 2 times daily          ALLERGIES     Allergies   Allergen Reactions     Amoxicillin      Itchy      Penicillins Hives     Spironolactone Rash       PAST MEDICAL HISTORY:  Past Medical History:   Diagnosis Date     Former tobacco use      Glaucoma      Hyperlipidemia LDL goal <160 12/6/2011     Obesity      DRAKE on CPAP      Right bundle branch block      Severe aortic stenosis     On Echo 10/28/16       PAST SURGICAL HISTORY:  Past Surgical History:   Procedure Laterality Date     HEART CATH LEFT HEART CATH  04/07/2017    Diffuse non-obstuctive CAD     REPAIR VALVE AORTIC N/A 5/16/2017    Procedure: REPAIR VALVE AORTIC;  Median Sternotony, CardioPulmonary Bypass, Aortic Valve Replace using 25MM Trifecta Valve with [employer], Septal myectomy;  Surgeon: Jun Simon MD;  Location:  OR     REPLACE VALVE AORTIC N/A 5/16/2017    Procedure: REPLACE VALVE AORTIC;;  Surgeon: Jun Simon MD;  Location:   OR     SINUS SURGERY         FAMILY HISTORY:  Family History   Problem Relation Age of Onset     Family History Negative Mother      Diabetes Father      Heart Surgery Father         CABG     Coronary Artery Disease Father      Hypertension Brother      Diabetes Brother      Heart Disease Brother      Heart Surgery Brother         CABG x 3     Family History Negative Sister      Diabetes Brother         History of diabetes, but no longer an issue     Family History Negative Brother        SOCIAL HISTORY:  Social History     Socioeconomic History     Marital status:      Spouse name: None     Number of children: None     Years of education: None     Highest education level: None   Social Needs     Financial resource strain: None     Food insecurity - worry: None     Food insecurity - inability: None     Transportation needs - medical: None     Transportation needs - non-medical: None   Occupational History     None   Tobacco Use     Smoking status: Former Smoker     Packs/day: 1.00     Years: 20.00     Pack years: 20.00     Types: Cigarettes, Cigars     Start date:      Last attempt to quit: 10/21/2011     Years since quittin.2     Smokeless tobacco: Never Used   Substance and Sexual Activity     Alcohol use: No     Drug use: No     Sexual activity: None   Other Topics Concern     Parent/sibling w/ CABG, MI or angioplasty before 65F 55M? Yes     Comment: brother triple bypass 49   Social History Narrative     None       Review of Systems:  Skin:  Positive for     Eyes:  Positive for glasses  ENT:  Negative    Respiratory:  Positive for sleep apnea;CPAP  Cardiovascular:    Positive for;edema  Gastroenterology: Negative    Genitourinary:  Negative    Musculoskeletal:  Negative    Neurologic:  Positive for memory problems;involuntary movement  Psychiatric:  Negative    Heme/Lymph/Imm:  Positive for allergies  Endocrine:  Positive for diabetes    Physical Exam:  Vitals: /57   Pulse 65   Ht  "1.778 m (5' 10\")   Wt 136.9 kg (301 lb 11.2 oz)   SpO2 100%   BMI 43.29 kg/m      Please refer to dictation for physical exam    Recent Lab Results:  LIPID RESULTS:  Lab Results   Component Value Date    CHOL 126 10/10/2018    HDL 23 (A) 10/10/2018    LDL 71 10/10/2018    TRIG 230 (A) 10/10/2018       LIVER ENZYME RESULTS:  Lab Results   Component Value Date    AST 55 (H) 10/12/2018    ALT 31 10/12/2018       CBC RESULTS:  Lab Results   Component Value Date    WBC 5.5 2018    RBC 3.68 (L) 2018    HGB 11.2 (L) 2019    HCT 32.0 (L) 2018    MCV 87 2018    MCH 26.9 2018    MCHC 30.9 (L) 2018    RDW 19.7 (H) 2018     (L) 2018       BMP RESULTS:  Lab Results   Component Value Date     2019    POTASSIUM 3.6 2019    CHLORIDE 105 2019    CO2 28 2019    ANIONGAP 9.6 2019     (H) 2019    BUN 22 2019    CR 1.35 (H) 2019    GFRESTIMATED 54 (L) 2019    GFRESTBLACK 65 2019    MYRON 9.3 2019        A1C RESULTS:  Lab Results   Component Value Date    A1C 6.8 (H) 10/12/2018       INR RESULTS:  Lab Results   Component Value Date    INR 2.19 (H) 10/14/2018    INR 2.28 (H) 10/13/2018           CC  Rachna Holt PA-C  [address]  [place] [address]        Service Date: 2019      PRIMARY CARDIOLOGIST:  Will be Dr. Ashley, previously was followed with Dr. Salinas.      REASON FOR VISIT:  HFpEF.  Cor pulmonale.      HISTORY OF PRESENT ILLNESS:  Mr. Chowdary is a delightful 58-year-old gentleman with past medical history significant for the followin.  Severe aortic stenosis with aortic valve replacement in 2017 with 25 mm Trifecta tissue valve.   2.  Type 2 diabetes, on insulin.   3.  Hyperlipidemia.   4.  Treated sleep apnea.   5.  Cor pulmonale versus heart failure with preserved EF.   6.  Recent iron deficiency anemia followed by abbi Vasquez.   7.  History of SVT " and paroxysmal AFib, postoperative.  None on recent Zio Patch.      Mr. Chowdary was admitted in October with weight gain and shortness of breath and was diuresed from about 325 pounds to 310 pounds but left early as he needed to take care of his elderly mother with whom he lives.  We struggled to get him diuresed as an outpatient.  His weight eventually peaked at 329 pounds but then started dropping about 10 pounds a week with the addition of hydrochlorothiazide.  This has been complicated by the fact that he is wasting a lot of potassium, requiring massive potassium supplementation.  He had hives reaction to the spironolactone and was started on eplerenone last week by Dr. Krause as his potassium continued to run low.      Today, he says he thinks the fluid continues to improve, although his weight at home is stable at 296 pounds.  It is even up 2 pounds from last week.  He has noticed though that his scrotum is back down to normal size and he can urinate more easily.  He continues to have fairly significant edema in his legs, but he is moving around better.  His biggest complaints today are trembling in both hands, where he cannot hold things very well.  This lasts for some time and then resolves.  He also complains that his memory has gotten worse over the last 6 weeks or so.  We cannot say exactly why or how.  It is more short-term.  He notes that if he goes somewhere, he cannot remember what he was going to get.  He also has had some sores on his legs, but they have had minimal erythema.  He denies any weakness or slurring of his words or any loss of ability to find words.  He is very sick of taking pills and ideally he would like to not have to take so many; in particular, he is concerned about taking so much potassium.      SOCIAL HISTORY:  He lives at home with his mom, who has dementia.  He has a sister and brother-in-law who are back from [place] and are staying and helping out with the family, so he can get  medical care.  He is a former smoker, just a 20-pack-year history, quit in 2011.  No alcohol use.      PHYSICAL EXAMINATION:   GENERAL:  Well-developed, well-nourished, obese gentleman, in no acute distress.   HEENT:  Normocephalic, atraumatic.   HEART:  Regular in rate and rhythm.  I do not appreciate murmur or rub today.   LUNGS:  Clear, slightly diminished in bases, but without wheezes or rales.   EXTREMITIES:  He continues to have 3+ pitting edema to his thighs.   ABDOMEN:  Soft.  He does have some very small healing wounds on his bilateral shins without significant erythema.      LABORATORY DATA:  Today show a creatinine of 1.35, BUN 22, potassium 3.6, sodium 139.  Hemoglobin 11.2.      ASSESSMENT AND PLAN:   1.  Cor pulmonale versus heart failure with preserved EF that was likely triggered by profound anemia.  He has treated sleep apnea and is obese but this recent difficulty really triggered things.  At this point, his diuresis is fairly flat and he just did better really when he was diuresing on hydrochlorothiazide every day, but we are unable to maintain his potassium levels.  Today will increase his eplerenone to 50 mg daily.  We will continue the hydrochlorothiazide 25 mg every other day and continue the Bumex at 3 mg b.i.d.  I will decrease his potassium to 40 mEq t.i.d. for a total of 120 mEq.  In order to give him a little break and continue to up his diuresis, I am going to send him to Infusion Clinic.  That day, he will take everything but his Bumex and will give him a 2 mg Bumex IV push followed by 1 mg per hour.  With this being intravenous, hopefully will get an excellent response and be able to get him a little bit of a head start.  This may be something we do for several weeks to get him back in line.   2.  Anemia and thrombocytopenia followed by Dr. Fletcher, on iron with excellent improvement in his hemoglobin up to 11.2 today.  He had previously been on Coumadin for episodes of AFib post-AVR, but  none has been documented since on his Zio Patch.  At this point, we will keep him on aspirin only.   3.  Aortic valve replacement with a well-functioning bioprosthetic aortic valve with also septal myectomy for septal hypertrophy without LVOT gradient.  Remains on aspirin.      We will see him back next week in Infusion Clinic.  I will also get a magnesium drawn at that time.      Thank you for allowing me to participate in this delightful patient's care.      SIRENA Saucedo PA-C             D: 2019   T: 2019   MT: al      Name:     ABIGAIL MATOS   MRN:      [id number]        Account:      [id number]   :      1960           Service Date: 2019      Document: [id number]         Thank you for allowing me to participate in the care of your patient.      Sincerely,     Rachna Holt PA-C     [facility]    cc:   Rachna Holt PA-C  [address]  [place] [address]

## 2019-01-02 NOTE — PATIENT INSTRUCTIONS
Please call CORE nurse for any questions or concerns:       [phone]    1. Medication changes:    -Decrease potassium to 12 pills total a day or 120 mEq.   -Increased Inspira/ epleronone 50 mg once a day.      We'll plan on the infusion clinic for IV bumex next Wednesday.    -On Wednesday, 1/9/19, take all morning medications except bumex.   -Plan to check-in at 10:00am [facility]9/19 [facility].       2.  Weigh yourself daily and write it down.     3. Call CORE nurse if your weight is up more than 2 pounds in one day, or 5 pounds in one week.    4. Call CORE nurse if you feel more short of breath, have more abdominal bloating or leg swelling.    5. Eat a low sodium diet (less than 2,000mg daily). If you eat less salt, you will retain less fluid.     6. Results: labs look good.    Component      Latest Ref Rng & Units 1/2/2019   Sodium      136 - 145 mmol/L 139   Potassium      3.5 - 5.1 mmol/L 3.6   Chloride      98 - 107 mmol/L 105   Carbon Dioxide      23 - 29 mmol/L 28   Anion Gap      6 - 17 mmol/L 9.6   Glucose      70 - 105 mg/dL 115 (H)   Urea Nitrogen      7 - 30 mg/dL 22   Creatinine      0.70 - 1.30 mg/dL 1.35 (H)   GFR Estimate      >60 mL/min/1.73:m2 54 (L)   GFR Estimate If Black      >60 mL/min/1.73:m2 65   Calcium      8.5 - 10.5 mg/dL 9.3   Hemoglobin      13.3 - 17.7 g/dL 11.2 (L)       Scheduling phone number: [phone]  Reminder: Please bring in all current medications, over the counter supplements and vitamin bottles to your next appointment.

## 2019-01-02 NOTE — LETTER
2019      Sam Villatoro PA-C  [facility] [address]  [facility] [facility] [address]      RE: Gil Chowdary       Dear Colleague,    I had the pleasure of seeing Gil Chowdary in the [facility].    Service Date: 2019      PRIMARY CARDIOLOGIST:  Will be Dr. Ashley, previously was followed with Dr. Salinas.      REASON FOR VISIT:  HFpEF.  Cor pulmonale.      HISTORY OF PRESENT ILLNESS:  Mr. Chowdary is a delightful 58-year-old gentleman with past medical history significant for the followin.  Severe aortic stenosis with aortic valve replacement in 2017 with 25 mm Trifecta tissue valve.   2.  Type 2 diabetes, on insulin.   3.  Hyperlipidemia.   4.  Treated sleep apnea.   5.  Cor pulmonale versus heart failure with preserved EF.   6.  Recent iron deficiency anemia followed by abbi aVsquez.   7.  History of SVT and paroxysmal AFib, postoperative.  None on recent Zio Patch.      Mr. Chowdary was admitted in October with weight gain and shortness of breath and was diuresed from about 325 pounds to 310 pounds but left early as he needed to take care of his elderly mother with whom he lives.  We struggled to get him diuresed as an outpatient.  His weight eventually peaked at 329 pounds but then started dropping about 10 pounds a week with the addition of hydrochlorothiazide.  This has been complicated by the fact that he is wasting a lot of potassium, requiring massive potassium supplementation.  He had hives reaction to the spironolactone and was started on eplerenone last week by Dr. Krause as his potassium continued to run low.      Today, he says he thinks the fluid continues to improve, although his weight at home is stable at 296 pounds.  It is even up 2 pounds from last week.  He has noticed though that his scrotum is back down to normal size and he can urinate more easily.  He continues to have fairly significant edema in his legs, but he is  moving around better.  His biggest complaints today are trembling in both hands, where he cannot hold things very well.  This lasts for some time and then resolves.  He also complains that his memory has gotten worse over the last 6 weeks or so.  We cannot say exactly why or how.  It is more short-term.  He notes that if he goes somewhere, he cannot remember what he was going to get.  He also has had some sores on his legs, but they have had minimal erythema.  He denies any weakness or slurring of his words or any loss of ability to find words.  He is very sick of taking pills and ideally he would like to not have to take so many; in particular, he is concerned about taking so much potassium.      SOCIAL HISTORY:  He lives at home with his mom, who has dementia.  He has a sister and brother-in-law who are back from [place] and are staying and helping out with the family, so he can get medical care.  He is a former smoker, just a 20-pack-year history, quit in 2011.  No alcohol use.      PHYSICAL EXAMINATION:   GENERAL:  Well-developed, well-nourished, obese gentleman, in no acute distress.   HEENT:  Normocephalic, atraumatic.   HEART:  Regular in rate and rhythm.  I do not appreciate murmur or rub today.   LUNGS:  Clear, slightly diminished in bases, but without wheezes or rales.   EXTREMITIES:  He continues to have 3+ pitting edema to his thighs.   ABDOMEN:  Soft.  He does have some very small healing wounds on his bilateral shins without significant erythema.      LABORATORY DATA:  Today show a creatinine of 1.35, BUN 22, potassium 3.6, sodium 139.  Hemoglobin 11.2.      ASSESSMENT AND PLAN:   1.  Cor pulmonale versus heart failure with preserved EF that was likely triggered by profound anemia.  He has treated sleep apnea and is obese but this recent difficulty really triggered things.  At this point, his diuresis is fairly flat and he just did better really when he was diuresing on hydrochlorothiazide every day,  but we are unable to maintain his potassium levels.  Today will increase his eplerenone to 50 mg daily.  We will continue the hydrochlorothiazide 25 mg every other day and continue the Bumex at 3 mg b.i.d.  I will decrease his potassium to 40 mEq t.i.d. for a total of 120 mEq.  In order to give him a little break and continue to up his diuresis, I am going to send him to Infusion Clinic.  That day, he will take everything but his Bumex and will give him a 2 mg Bumex IV push followed by 1 mg per hour.  With this being intravenous, hopefully will get an excellent response and be able to get him a little bit of a head start.  This may be something we do for several weeks to get him back in line.   2.  Anemia and thrombocytopenia followed by Dr. Fletcher, on iron with excellent improvement in his hemoglobin up to 11.2 today.  He had previously been on Coumadin for episodes of AFib post-AVR, but none has been documented since on his Zio Patch.  At this point, we will keep him on aspirin only.   3.  Aortic valve replacement with a well-functioning bioprosthetic aortic valve with also septal myectomy for septal hypertrophy without LVOT gradient.  Remains on aspirin.      We will see him back next week in Infusion Clinic.  I will also get a magnesium drawn at that time.      Thank you for allowing me to participate in this delightful patient's care.      SIRENA Saucedo PA-C             D: 2019   T: 2019   MT: al      Name:     ABIGAIL MATOS   MRN:      [id number]        Account:      [id number]   :      1960           Service Date: 2019      Document: [id number]         Outpatient Encounter Medications as of 2019   Medication Sig Dispense Refill     aspirin 81 MG tablet Take 81 mg by mouth every morning        atorvastatin (LIPITOR) 40 MG tablet Take 1 tablet (40 mg) by mouth daily 90 tablet 0     bumetanide (BUMEX) 1 MG tablet Take 3 tablets (3 mg) by mouth 2  times daily 180 tablet 3     clindamycin (CLEOCIN) 300 MG capsule Take 1 capsule (300 mg) by mouth as needed 10 capsule 3     eplerenone (INSPRA) 50 MG tablet Take 1 tablet (50 mg) by mouth daily 30 tablet 11     ferrous sulfate (IRON) 325 (65 Fe) MG tablet Take 1 tablet (325 mg) by mouth 2 times daily 100 tablet 3     hydrochlorothiazide (HYDRODIURIL) 25 MG tablet Take 25 mg by mouth every other day       insulin aspart (NOVOLOG FLEXPEN) 100 UNIT/ML injection Inject 4 Units Subcutaneous daily (with lunch)       insulin aspart (NOVOLOG FLEXPEN) 100 UNIT/ML injection Inject 3 Units Subcutaneous 2 times daily (with meals) Takes with breakfast and dinner       Insulin Glargine (BASAGLAR KWIKPEN SC) Inject 22 Units Subcutaneous every morning        metoprolol (LOPRESSOR) 50 MG tablet Take 1 tablet (50 mg) by mouth 2 times daily 60 tablet 0     Multiple Vitamins-Minerals (CENTRUM ADULTS PO) Take 1 tablet by mouth every morning        pantoprazole (PROTONIX) 40 MG EC tablet Take 40 mg by mouth every morning (before breakfast)       potassium chloride ER (K-DUR/KLOR-CON M) 10 MEQ CR tablet Take 4 tablets (40 mEq) by mouth 3 times daily 360 tablet 3     prednisoLONE acetate (PRED FORTE) 1 % ophthalmic susp Place 1 drop into both eyes as needed (glaucoma)        timolol (TIMOPTIC) 0.5 % ophthalmic solution Place 1 drop into both eyes 2 times daily        [DISCONTINUED] cholecalciferol (VITAMIN D3) 5000 UNITS TABS tablet Take 5,000 Units by mouth every morning       [DISCONTINUED] eplerenone (INSPRA) 25 MG tablet Take 1 tablet (25 mg) by mouth daily 30 tablet 0     [DISCONTINUED] hydrochlorothiazide (HYDRODIURIL) 25 MG tablet Take 1 tablet (25 mg) by mouth daily 90 tablet 3     [DISCONTINUED] potassium chloride ER (K-DUR/KLOR-CON M) 10 MEQ CR tablet Pt says he takes 50 meq in AM, 60 meq at lunch, and 50 meq at supper 360 tablet 3     No facility-administered encounter medications on file as of 1/2/2019.        Again, thank  you for allowing me to participate in the care of your patient.      Sincerely,    Rachna Holt PA-C     [facility]

## 2019-01-02 NOTE — PROGRESS NOTES
Service Date: 2019      PRIMARY CARDIOLOGIST:  Will be Dr. Ashley, previously was followed with Dr. Salinas.      REASON FOR VISIT:  HFpEF.  Cor pulmonale.      HISTORY OF PRESENT ILLNESS:  Mr. Chowdary is a delightful 58-year-old gentleman with past medical history significant for the followin.  Severe aortic stenosis with aortic valve replacement in 2017 with 25 mm Trifecta tissue valve.   2.  Type 2 diabetes, on insulin.   3.  Hyperlipidemia.   4.  Treated sleep apnea.   5.  Cor pulmonale versus heart failure with preserved EF.   6.  Recent iron deficiency anemia followed by abbi Vasquez.   7.  History of SVT and paroxysmal AFib, postoperative.  None on recent Zio Patch.      Mr. Chowdary was admitted in October with weight gain and shortness of breath and was diuresed from about 325 pounds to 310 pounds but left early as he needed to take care of his elderly mother with whom he lives.  We struggled to get him diuresed as an outpatient.  His weight eventually peaked at 329 pounds but then started dropping about 10 pounds a week with the addition of hydrochlorothiazide.  This has been complicated by the fact that he is wasting a lot of potassium, requiring massive potassium supplementation.  He had hives reaction to the spironolactone and was started on eplerenone last week by Dr. Krause as his potassium continued to run low.      Today, he says he thinks the fluid continues to improve, although his weight at home is stable at 296 pounds.  It is even up 2 pounds from last week.  He has noticed though that his scrotum is back down to normal size and he can urinate more easily.  He continues to have fairly significant edema in his legs, but he is moving around better.  His biggest complaints today are trembling in both hands, where he cannot hold things very well.  This lasts for some time and then resolves.  He also complains that his memory has gotten worse over the last 6 weeks or so.  We cannot say  exactly why or how.  It is more short-term.  He notes that if he goes somewhere, he cannot remember what he was going to get.  He also has had some sores on his legs, but they have had minimal erythema.  He denies any weakness or slurring of his words or any loss of ability to find words.  He is very sick of taking pills and ideally he would like to not have to take so many; in particular, he is concerned about taking so much potassium.      SOCIAL HISTORY:  He lives at home with his mom, who has dementia.  He has a sister and brother-in-law who are back from [place] and are staying and helping out with the family, so he can get medical care.  He is a former smoker, just a 20-pack-year history, quit in 2011.  No alcohol use.      PHYSICAL EXAMINATION:   GENERAL:  Well-developed, well-nourished, obese gentleman, in no acute distress.   HEENT:  Normocephalic, atraumatic.   HEART:  Regular in rate and rhythm.  I do not appreciate murmur or rub today.   LUNGS:  Clear, slightly diminished in bases, but without wheezes or rales.   EXTREMITIES:  He continues to have 3+ pitting edema to his thighs.   ABDOMEN:  Soft.  He does have some very small healing wounds on his bilateral shins without significant erythema.      LABORATORY DATA:  Today show a creatinine of 1.35, BUN 22, potassium 3.6, sodium 139.  Hemoglobin 11.2.      ASSESSMENT AND PLAN:   1.  Cor pulmonale versus heart failure with preserved EF that was likely triggered by profound anemia.  He has treated sleep apnea and is obese but this recent difficulty really triggered things.  At this point, his diuresis is fairly flat and he just did better really when he was diuresing on hydrochlorothiazide every day, but we are unable to maintain his potassium levels.  Today will increase his eplerenone to 50 mg daily.  We will continue the hydrochlorothiazide 25 mg every other day and continue the Bumex at 3 mg b.i.d.  I will decrease his potassium to 40 mEq t.i.d. for a  total of 120 mEq.  In order to give him a little break and continue to up his diuresis, I am going to send him to Infusion Clinic.  That day, he will take everything but his Bumex and will give him a 2 mg Bumex IV push followed by 1 mg per hour.  With this being intravenous, hopefully will get an excellent response and be able to get him a little bit of a head start.  This may be something we do for several weeks to get him back in line.   2.  Anemia and thrombocytopenia followed by Dr. Fletcher, on iron with excellent improvement in his hemoglobin up to 11.2 today.  He had previously been on Coumadin for episodes of AFib post-AVR, but none has been documented since on his Zio Patch.  At this point, we will keep him on aspirin only.   3.  Aortic valve replacement with a well-functioning bioprosthetic aortic valve with also septal myectomy for septal hypertrophy without LVOT gradient.  Remains on aspirin.      We will see him back next week in Infusion Clinic.  I will also get a magnesium drawn at that time.      Thank you for allowing me to participate in this delightful patient's care.      SIRENA Saucedo PA-C             D: 2019   T: 2019   MT: al      Name:     ABIGAIL MATOS   MRN:      [id number]        Account:      [id number]   :      1960           Service Date: 2019      Document: [id number]

## 2019-01-03 ENCOUNTER — CARE COORDINATION (OUTPATIENT)
Dept: CARDIOLOGY | Facility: CLINIC | Age: 59
End: 2019-01-03

## 2019-01-03 NOTE — PROGRESS NOTES
Pt is scheduled for diuretic infusion clinic in the [facility] on 1/9/19 with check-in at 10:00am.  Pt was advised yesterday at office visit regarding check-in time and location.  Called and updated [facility] staff regarding diuretic infusion date/time/pt name.  Order set for diuretic infusion placed in [facility] encounter.  Confirmed with SIRENA Babcock that plan is for bumex 2mg IV push and bumex infusion 1mg/hour for 4 hours.      YOANNA More 9:42 AM 1/3/2019

## 2019-01-09 ENCOUNTER — HOSPITAL ENCOUNTER (OUTPATIENT)
Facility: CLINIC | Age: 59
Discharge: HOME | End: 2019-01-09
Admitting: INTERNAL MEDICINE
Payer: COMMERCIAL

## 2019-01-09 VITALS
HEART RATE: 66 BPM | BODY MASS INDEX: 42.86 KG/M2 | HEIGHT: 70 IN | TEMPERATURE: 96.9 F | WEIGHT: 299.38 LBS | DIASTOLIC BLOOD PRESSURE: 48 MMHG | OXYGEN SATURATION: 99 % | RESPIRATION RATE: 18 BRPM | SYSTOLIC BLOOD PRESSURE: 104 MMHG

## 2019-01-09 DIAGNOSIS — I50.30 (HFPEF) HEART FAILURE WITH PRESERVED EJECTION FRACTION (H): ICD-10-CM

## 2019-01-09 DIAGNOSIS — I50.30 HEART FAILURE WITH PRESERVED EJECTION FRACTION, NYHA CLASS I (H): ICD-10-CM

## 2019-01-09 LAB
BUN SERPL-MCNC: 20 MG/DL (ref 7–30)
CREAT SERPL-MCNC: 1.1 MG/DL (ref 0.66–1.25)
GFR SERPL CREATININE-BSD FRML MDRD: 73 ML/MIN/{1.73_M2}
MAGNESIUM SERPL-MCNC: 2.6 MG/DL (ref 1.6–2.3)
POTASSIUM SERPL-SCNC: 3.7 MMOL/L (ref 3.4–5.3)
POTASSIUM SERPL-SCNC: 3.9 MMOL/L (ref 3.4–5.3)
SODIUM SERPL-SCNC: 140 MMOL/L (ref 133–144)

## 2019-01-09 PROCEDURE — 82565 ASSAY OF CREATININE: CPT | Performed by: INTERNAL MEDICINE

## 2019-01-09 PROCEDURE — 83735 ASSAY OF MAGNESIUM: CPT | Performed by: INTERNAL MEDICINE

## 2019-01-09 PROCEDURE — 36415 COLL VENOUS BLD VENIPUNCTURE: CPT | Performed by: INTERNAL MEDICINE

## 2019-01-09 PROCEDURE — 96366 THER/PROPH/DIAG IV INF ADDON: CPT

## 2019-01-09 PROCEDURE — 25000125 ZZHC RX 250: Performed by: PHYSICIAN ASSISTANT

## 2019-01-09 PROCEDURE — 99215 OFFICE O/P EST HI 40 MIN: CPT | Performed by: PHYSICIAN ASSISTANT

## 2019-01-09 PROCEDURE — 84132 ASSAY OF SERUM POTASSIUM: CPT | Performed by: INTERNAL MEDICINE

## 2019-01-09 PROCEDURE — 96365 THER/PROPH/DIAG IV INF INIT: CPT

## 2019-01-09 PROCEDURE — 40000859 ZZH STATISTIC IV OP-OVERFLOW

## 2019-01-09 PROCEDURE — 25000128 H RX IP 250 OP 636: Performed by: PHYSICIAN ASSISTANT

## 2019-01-09 PROCEDURE — 84132 ASSAY OF SERUM POTASSIUM: CPT | Performed by: PHYSICIAN ASSISTANT

## 2019-01-09 PROCEDURE — 84295 ASSAY OF SERUM SODIUM: CPT | Performed by: INTERNAL MEDICINE

## 2019-01-09 PROCEDURE — 84520 ASSAY OF UREA NITROGEN: CPT | Performed by: INTERNAL MEDICINE

## 2019-01-09 PROCEDURE — 96374 THER/PROPH/DIAG INJ IV PUSH: CPT

## 2019-01-09 RX ORDER — POTASSIUM CHLORIDE 7.45 MG/ML
10 INJECTION INTRAVENOUS
Status: DISCONTINUED | OUTPATIENT
Start: 2019-01-09 | End: 2019-01-09 | Stop reason: HOSPADM

## 2019-01-09 RX ORDER — POTASSIUM CHLORIDE 1500 MG/1
20-40 TABLET, EXTENDED RELEASE ORAL
Status: DISCONTINUED | OUTPATIENT
Start: 2019-01-09 | End: 2019-01-09 | Stop reason: HOSPADM

## 2019-01-09 RX ORDER — LIDOCAINE 40 MG/G
CREAM TOPICAL
Status: DISCONTINUED | OUTPATIENT
Start: 2019-01-09 | End: 2019-01-09 | Stop reason: HOSPADM

## 2019-01-09 RX ORDER — MAGNESIUM SULFATE HEPTAHYDRATE 40 MG/ML
4 INJECTION, SOLUTION INTRAVENOUS EVERY 4 HOURS PRN
Status: DISCONTINUED | OUTPATIENT
Start: 2019-01-09 | End: 2019-01-09 | Stop reason: HOSPADM

## 2019-01-09 RX ORDER — POTASSIUM CHLORIDE 750 MG/1
30 TABLET, EXTENDED RELEASE ORAL 2 TIMES DAILY
Qty: 180 TABLET | Refills: 0 | Status: SHIPPED | OUTPATIENT
Start: 2019-01-09 | End: 2019-03-15 | Stop reason: DRUGHIGH

## 2019-01-09 RX ORDER — POTASSIUM CL/LIDO/0.9 % NACL 10MEQ/0.1L
10 INTRAVENOUS SOLUTION, PIGGYBACK (ML) INTRAVENOUS
Status: DISCONTINUED | OUTPATIENT
Start: 2019-01-09 | End: 2019-01-09 | Stop reason: HOSPADM

## 2019-01-09 RX ORDER — MAGNESIUM SULFATE HEPTAHYDRATE 40 MG/ML
2 INJECTION, SOLUTION INTRAVENOUS DAILY PRN
Status: DISCONTINUED | OUTPATIENT
Start: 2019-01-09 | End: 2019-01-09 | Stop reason: HOSPADM

## 2019-01-09 RX ORDER — POTASSIUM CHLORIDE 1.5 G/1.58G
20-40 POWDER, FOR SOLUTION ORAL
Status: DISCONTINUED | OUTPATIENT
Start: 2019-01-09 | End: 2019-01-09 | Stop reason: HOSPADM

## 2019-01-09 RX ORDER — BUMETANIDE 0.25 MG/ML
2 INJECTION INTRAMUSCULAR; INTRAVENOUS ONCE
Status: COMPLETED | OUTPATIENT
Start: 2019-01-09 | End: 2019-01-09

## 2019-01-09 RX ORDER — EPLERENONE 50 MG/1
50 TABLET, FILM COATED ORAL 2 TIMES DAILY
Qty: 60 TABLET | Refills: 11 | Status: SHIPPED | OUTPATIENT
Start: 2019-01-09 | End: 2020-01-31

## 2019-01-09 RX ORDER — POTASSIUM CHLORIDE 29.8 MG/ML
20 INJECTION INTRAVENOUS
Status: DISCONTINUED | OUTPATIENT
Start: 2019-01-09 | End: 2019-01-09 | Stop reason: HOSPADM

## 2019-01-09 RX ADMIN — BUMETANIDE 2 MG: 0.25 INJECTION INTRAMUSCULAR; INTRAVENOUS at 12:01

## 2019-01-09 RX ADMIN — BUMETANIDE 1 MG/HR: 0.25 INJECTION INTRAMUSCULAR; INTRAVENOUS at 12:08

## 2019-01-09 ASSESSMENT — MIFFLIN-ST. JEOR
SCORE: 2179.25
SCORE: 2200.18

## 2019-01-09 NOTE — PROGRESS NOTES
Pt here for Bumex IV infusion.     0900 Pre weight - 137.89  kg.    1031 Pre labs drawn -K+ level 3.9 & Mg level 2.6     1208:   2 mg IV Bumex bolus given & infusion gtt at 1 mg/kg/hr  initiated.  ------------------------------------------------    1500 Report received from YOANNA Lagunas.     Bumex IV gtt cont to infuse w/o difficulty. Pt sitting in cardiac chair. No complaints.  Educated pt re: strict I/O, daily weight, watch for sx of HF exacerbation.     1500 K + drawn: hemolyted    1551 Repeat K+ level - 3.7    1627 Post weight - 135.8  Kg.  Total U/O:  3025 ml,   Intake: 480 ml    JOAN Wade here to see pt.   Plan of care discussed with pt, written instruction sent with pt.     1650 Pt discharged per w/c to private vehicle. All personal belongings sent with pt.

## 2019-01-09 NOTE — PROGRESS NOTES
Care Suites Arrival    Reason for Visit: IV Bumex bolus and gtt    A/O. Denies pain. Labs pending.  All questions & concerns addressed.     Pt resting in recliner chair, denies additional needs at this time, call light within reach.

## 2019-01-09 NOTE — IP AVS SNAPSHOT
[place] [address] [place] [address]  Phone:  [phone]                                    After Visit Summary   1/9/2019    Gil Chowdary    MRN: [id number]           After Visit Summary Signature Page    I have received my discharge instructions, and my questions have been answered. I have discussed any challenges I see with this plan with the nurse or doctor.    ..........................................................................................................................................  Patient/Patient Representative Signature      ..........................................................................................................................................  Patient Representative Print Name and Relationship to Patient    ..................................................               ................................................  Date                                   Time    ..........................................................................................................................................  Reviewed by Signature/Title    ...................................................              ..............................................  Date                                               Time          22EPIC Rev 08/18

## 2019-01-09 NOTE — DISCHARGE INSTRUCTIONS
Increase Eplerenone to 50 mg twice a day  Decrease potassium to 30 mEq (3 pills) twice a day  Keep your appointment tomorrow with Dr. Ashley.    Weigh yourself when you get home, this will be your new baseline weight    Keep you fluid intake less than 64 ounces a day

## 2019-01-09 NOTE — IP AVS SNAPSHOT
MRN:[id number]                      After Visit Summary   1/9/2019    Gil Chowdary    MRN: [id number]           Visit Information        Department      1/9/2019  9:38 AM [facility]          Review of your medicines      CONTINUE these medicines which may have CHANGED, or have new prescriptions. If we are uncertain of the size of tablets/capsules you have at home, strength may be listed as something that might have changed.       Dose / Directions   eplerenone 50 MG tablet  Commonly known as:  INSPRA  This may have changed:  when to take this  Used for:  Heart failure with preserved ejection fraction, NYHA class I (H)      Dose:  50 mg  Take 1 tablet (50 mg) by mouth 2 times daily  Quantity:  60 tablet  Refills:  11     potassium chloride ER 10 MEQ CR tablet  Commonly known as:  K-DUR/KLOR-CON M  This may have changed:    how much to take  when to take this  Used for:  (HFpEF) heart failure with preserved ejection fraction (H)      Dose:  30 mEq  Take 3 tablets (30 mEq) by mouth 2 times daily  Quantity:  180 tablet  Refills:  0        CONTINUE these medicines which have NOT CHANGED       Dose / Directions   aspirin 81 MG tablet  Commonly known as:  ASA      Dose:  81 mg  Take 81 mg by mouth every morning  Refills:  0     atorvastatin 40 MG tablet  Commonly known as:  LIPITOR  Used for:  Hyperlipidemia with target LDL less than 70      Dose:  40 mg  Take 1 tablet (40 mg) by mouth daily  Quantity:  90 tablet  Refills:  0     BASAGLAR KWIKPEN SC      Dose:  22 Units  Inject 22 Units Subcutaneous every morning  Refills:  0     bumetanide 1 MG tablet  Commonly known as:  BUMEX  Used for:  S/P AVR (aortic valve replacement)      Dose:  3 mg  Take 3 tablets (3 mg) by mouth 2 times daily  Quantity:  180 tablet  Refills:  3     CENTRUM ADULTS PO      Dose:  1 tablet  Take 1 tablet by mouth every morning  Refills:  0     clindamycin 300 MG capsule  Commonly known as:  CLEOCIN  Used  for:  S/P AVR (aortic valve replacement)      Dose:  300 mg  Take 1 capsule (300 mg) by mouth as needed  Quantity:  10 capsule  Refills:  3     ferrous sulfate 325 (65 Fe) MG tablet  Commonly known as:  FEROSUL  Used for:  Other iron deficiency anemia      Dose:  325 mg  Take 1 tablet (325 mg) by mouth 2 times daily  Quantity:  100 tablet  Refills:  3     hydrochlorothiazide 25 MG tablet  Commonly known as:  HYDRODIURIL      Dose:  25 mg  Take 25 mg by mouth every other day  Refills:  0     metoprolol tartrate 50 MG tablet  Commonly known as:  LOPRESSOR  Used for:  Paroxysmal supraventricular tachycardia (H), S/P AVR (aortic valve replacement)      Dose:  50 mg  Take 1 tablet (50 mg) by mouth 2 times daily  Quantity:  60 tablet  Refills:  0     * NovoLOG FLEXPEN 100 UNIT/ML pen  Generic drug:  insulin aspart      Dose:  3 Units  Inject 3 Units Subcutaneous 2 times daily (with meals) Takes with breakfast and dinner  Refills:  0     * NovoLOG FLEXPEN 100 UNIT/ML pen  Generic drug:  insulin aspart      Dose:  4 Units  Inject 4 Units Subcutaneous daily (with lunch)  Refills:  0     pantoprazole 40 MG EC tablet  Commonly known as:  PROTONIX      Dose:  40 mg  Take 40 mg by mouth every morning (before breakfast)  Refills:  0     prednisoLONE acetate 1 % ophthalmic suspension  Commonly known as:  PRED FORTE      Dose:  1 drop  Place 1 drop into both eyes as needed (glaucoma)  Refills:  0     timolol maleate 0.5 % ophthalmic solution  Commonly known as:  TIMOPTIC      Dose:  1 drop  Place 1 drop into both eyes 2 times daily  Refills:  0         * This list has 2 medication(s) that are the same as other medications prescribed for you. Read the directions carefully, and ask your doctor or other care provider to review them with you.               Where to get your medicines      These medications were sent to [facility] [id number] [place] [address] AT [address] [place] [address],  [facility] [address]    Phone:  [phone]   eplerenone 50 MG tablet  potassium chloride ER 10 MEQ CR tablet           Prescriptions were sent or printed at these locations (2 Prescriptions)            [place] [address] [address] & [address] [address]    Telephone:  [phone]   Fax:  [phone]   Hours:                  E-Prescribed (2 of 2)         eplerenone (INSPRA) 50 MG tablet               potassium chloride ER (K-DUR/KLOR-CON M) 10 MEQ CR tablet                Protect others around you: Learn how to safely use, store and throw away your medicines at www.disposemymeds.org.       Follow-ups after your visit       Your next 10 appointments already scheduled    Donte 10, 2019  2:45 PM CST  Core MD Horan with Yves Ashley MD  [facility] ([facility]) [address] [place] [address] [address]  [facility] [address]  [phone] OPT 2   Apr 03, 2019  9:30 AM CDT  Return Visit with  LAB DRAW 1  [facility] ([facility]) [facility]  [address]  [facility] [address]  [phone]   Apr 10, 2019  9:45 AM CDT  Return Visit with Juan Flecther MD  [facility] ([facility]) [facility]  [address]  [facility] [address]  [phone]      Care Instructions       Further instructions from your care team       Increase Eplerenone to 50 mg twice a day  Decrease potassium to 30 mEq (3 pills) twice a day  Keep your appointment tomorrow with Dr. Ashley.    Weigh yourself when you get home, this will be your new baseline weight    Keep you fluid intake less than 64 ounces a day          Statement of Approval (From admission, onward)    Ordered          01/09/19 4494  I have reviewed and agree with all the recommendations and orders detailed in this document.  " EFFECTIVE NOW     Approved and electronically signed by:  Rachna Holt PA-C             Additional Information About Your Visit       MyChart Information    [employer] gives you secure access to your electronic health record. If you see a primary care provider, you can also send messages to your care team and make appointments. If you have questions, please call your primary care clinic.  If you do not have a primary care provider, please call [phone] and they will assist you.       Care EveryWhere ID    This is your Care EveryWhere ID. This could be used by other organizations to access your [facility] medical records  [id number]       Your Vitals Were     Blood Pressure   104/48 (BP Location: Right arm)          Pulse   66          Temperature   96.9  F (36.1  C) (Oral)          Respirations   18          Height   1.778 m (5' 10\")             Weight   135.8 kg (299 lb 6.2 oz)    Pulse Oximetry   99%    BMI (Body Mass Index)   42.96 kg/m           Primary Care Provider Office Phone # Fax #    Sam Ramya Villatoro PA-C [phone]      Equal Access to Services    [facility]: Hadii aad ku hadasho Soomaali, waaxda luqadaha, qaybta kaalmada adeegyada, waxroger swanson . So [facility] [phone].    ATENCIÓN: Si habla español, tiene a cross disposición servicios gratuitos de asistencia lingüística. [facility] [phone].    We comply with applicable federal civil rights laws and [place] laws. We do not discriminate on the basis of race, color, national origin, age, disability, sex, sexual orientation, or gender identity.           Thank you!    Thank you for choosing [facility] for your care. Our goal is always to provide you with excellent care. Hearing back from our patients is one way we can continue to improve our services. Please take a few minutes to complete the written survey that you may receive in the mail after you visit with us. Thank you!            Medication " List      Medications          Morning Afternoon Evening Bedtime As Needed    aspirin 81 MG tablet  Also known as:  ASA  INSTRUCTIONS:  Take 81 mg by mouth every morning                     atorvastatin 40 MG tablet  Also known as:  LIPITOR  INSTRUCTIONS:  Take 1 tablet (40 mg) by mouth daily                     BASAGLBHARGAV STERLING SC  INSTRUCTIONS:  Inject 22 Units Subcutaneous every morning                     bumetanide 1 MG tablet  Also known as:  BUMEX  INSTRUCTIONS:  Take 3 tablets (3 mg) by mouth 2 times daily  LAST TAKEN:  Ask your nurse or doctor                     CENTRUM ADULTS PO  INSTRUCTIONS:  Take 1 tablet by mouth every morning                     clindamycin 300 MG capsule  Also known as:  CLEOCIN  INSTRUCTIONS:  Take 1 capsule (300 mg) by mouth as needed  Doctor's comments:  Take 2 one hour prior to dental proceedures                     eplerenone 50 MG tablet  Also known as:  INSPRA  INSTRUCTIONS:  Take 1 tablet (50 mg) by mouth 2 times daily  What changed:  when to take this                     ferrous sulfate 325 (65 Fe) MG tablet  Also known as:  FEROSUL  INSTRUCTIONS:  Take 1 tablet (325 mg) by mouth 2 times daily                     hydrochlorothiazide 25 MG tablet  Also known as:  HYDRODIURIL  INSTRUCTIONS:  Take 25 mg by mouth every other day                     metoprolol tartrate 50 MG tablet  Also known as:  LOPRESSOR  INSTRUCTIONS:  Take 1 tablet (50 mg) by mouth 2 times daily                     * NovoLOG FLEXPEN 100 UNIT/ML pen  INSTRUCTIONS:  Inject 3 Units Subcutaneous 2 times daily (with meals) Takes with breakfast and dinner  Generic drug:  insulin aspart                     * NovoLOG FLEXPEN 100 UNIT/ML pen  INSTRUCTIONS:  Inject 4 Units Subcutaneous daily (with lunch)  Generic drug:  insulin aspart                     pantoprazole 40 MG EC tablet  Also known as:  PROTONIX  INSTRUCTIONS:  Take 40 mg by mouth every morning (before breakfast)                     potassium chloride  ER 10 MEQ CR tablet  Also known as:  K-DUR/KLOR-CON M  INSTRUCTIONS:  Take 3 tablets (30 mEq) by mouth 2 times daily  What changed:    how much to take  when to take this                     prednisoLONE acetate 1 % ophthalmic suspension  Also known as:  PRED FORTE  INSTRUCTIONS:  Place 1 drop into both eyes as needed (glaucoma)                     timolol maleate 0.5 % ophthalmic solution  Also known as:  TIMOPTIC  INSTRUCTIONS:  Place 1 drop into both eyes 2 times daily                        * This list has 2 medication(s) that are the same as other medications prescribed for you. Read the directions carefully, and ask your doctor or other care provider to review them with you.

## 2019-01-09 NOTE — PROGRESS NOTES
CORE DIURETIC INFUSION VISIT  Gil Chowdary  : 1960    Date: 2019  Primary Cardiologist: will be Dr. Ashley      HPI:  Mr. Chowdary is a delightful 58-year-old gentleman with past medical history significant for the followin.  Severe aortic stenosis with aortic valve replacement in 2017 with 25 mm Trifecta tissue valve.   2.  Type 2 diabetes, on insulin.   3.  Hyperlipidemia.   4.  Treated sleep apnea.   5.  Cor pulmonale versus heart failure with preserved EF.   6.  Recent iron deficiency anemia followed by abbi Vasquez.   7.  History of SVT and paroxysmal AFib, postoperative.  None on recent Zio Patch.   8.  Hx of myectomy at time of AVR, but no clear dx of HOCM     Mr. Chowdary was admitted in October with weight gain and shortness of breath and at the same time was found to have significant anemia.  He was diuresed from about 325 pounds to 310 pounds but left early as he needed to take care of his elderly mother with whom he lives.  We struggled to get him diuresed as an outpatient.  His weight eventually peaked at 329 pounds but then started dropping about 10 pounds a week with the addition of hydrochlorothiazide.  This has been complicated by the fact that he is wasting a lot of potassium, requiring massive potassium supplementation.  He had hives reaction to the spironolactone and was started on eplerenone by Dr. Krause over the holidays as his potassium continued to run low.  Given he was wasting so much potassium we had to stop his daily hydrochlorothiazide and switch him to qod.  With this his weights flatten off.  Last week his weight was flat and 196 pounds.  With that, I increased his eplerenone to 50 mg daily in hopes that he would diurese some, but realizing that he has still somewhere along the lines of 30 pounds on the set him up for infusion clinic today.    Unfortunately since last week his weight has not budged at all he weighed 296 pounds at home today.  He admits today  "that he has not been as conscientious with his diet as he was in the first months that I met him and is likely been eating more salt and calories and he should be.  He has been taking his medication as directed.  She overall feels okay he denies chest pain shortness of breath orthopnea or PND.  He continues to have massive pedal and thigh edema but this is improved from previous.  He continues to complain of tremors in his bilateral hands these are not any better even with normalization of his potassium.  He also continues to note that his memory is worse.  He actually thinks this is worsened with his recent treatment and diuresis.  We reviewed today that he has been worked up by neurology including apparently the neuropsych testing this was done by Dr. Lambert in [place] [facility].  He apparently was tried on a couple different medications to help with his memory none of them seem to make a difference.  He also noted that after he had his aortic valve replaced his memory seemed to drop off significantly at that time.  He is concerned at this point that he cannot work given his memory issues.  Outside of his tremors and edema and memory issues he feels like he is stable.  He has not had a cough syncope or presyncope.  He is urinating frequently large amounts.      SOCIAL HISTORY:  He lives at home with his mom, who has dementia.  He has a sister and brother-in-law who are back from [place] and are staying and helping out with the family, so he can get medical care.  He is a former smoker, just a 20-pack-year history, quit in 2011.  No alcohol use.     Physical Exam:  Vitals: /59 (BP Location: Right arm)   Pulse 66   Temp 96.9  F (36.1  C) (Oral)   Resp 18   Ht 1.778 m (5' 10\")   Wt 137.9 kg (304 lb)   SpO2 100%   BMI 43.62 kg/m     GENERAL:  Well-developed, well-nourished, obese gentleman, in no acute distress.   HEENT:  Normocephalic, atraumatic.   HEART:  Regular in rate and rhythm.  I do not " appreciate murmur or rub today.   LUNGS:  Clear, slightly diminished in bases, but without wheezes or rales.   EXTREMITIES:  He continues to have 3+ pitting edema to his thighs.   ABDOMEN:  Soft.  He does have some very small healing wounds on his bilateral shins without significant erythema.       ASSESSMENT & PLAN  1.  Cor pulmonale versus heart failure with preserved EF that was likely triggered by profound anemia.  He has treated sleep apnea and is obese but this recent anemia really seem to trigger this recent volume overload.  Fortunately this is being managed by Dr. Fletcher and improving.  He diuresed fairly well initially on every day hydrochlorothiazide wheeze to do much potassium as we have cut that back to every other day.  For this reason we bring him into the infusion clinic today.  His home weight on admission was 296 pounds.  He received a 2 mg IV bolus and is getting 1 mg IV Bumex an hour for 4 hours, in addition he took his eplerenone at home this morning but no p.o. Bumex.  My hope is we can get 2 or 3 pounds of him today Helping him get restarted on an ongoing past diuresis.  I am sure intestinal edema is contributing to all of the above.  I have checked his protein levels and they are reasonable.  In addition, I have offered him lymphedema clinic as I know some of his edema simply is lymphatic.  He has declined this as he is not interested in coming into so many clinic visits.  We will continue to negotiate this point.      2.  Anemia and thrombocytopenia followed by Dr. Fletcher, on iron with excellent improvement in his hemoglobin to 11.2 at last check.  He had previously been on Coumadin for episodes of AFib post-AVR, but none has been documented since on his Zio Patch.  At this point, we will keep him on aspirin only.     3.  Aortic valve replacement with a well-functioning bioprosthetic aortic valve with also septal myectomy for septal hypertrophy without LVOT gradient.  Remains on aspirin.     4.   Memory loss, likely in some part due to on pump avr, although some of this preceded his surgery.  Has been seen by neuropsych.  Defer additional w/u to pcp.      Rachna Holt 1/9/2019 1:39 PM    ADDENDUM:  Pt given a 2 mg IV bumex bolus, then 1 mg gtt per hour x 4.5 hours.  No adverse effects- no cramping.      Vitals:    01/09/19 0900 01/09/19 1627   Weight: 137.9 kg (304 lb) 135.8 kg (299 lb 6.2 oz)       Intake/Output Summary (Last 24 hours) at 1/9/2019 1649  Last data filed at 1/9/2019 1626  Gross per 24 hour   Intake 500 ml   Output 3025 ml   Net -2525 ml       PLAN:  Increase Eplerenone to 50 mg bid primarily to retain potassium.   Decrease potassium to 30 mEQ bid for a total of 60 mEq daily.    Weigh when he gets home.    Continue other meds  F/u with Dr. Ashley tomorrow, I'll see in about 1-2 weeks.      Rachna Holt 4:49 PM           Orders this Visit:  Orders Placed This Encounter   Procedures     Urea nitrogen     Creatinine     Potassium     Sodium     Magnesium     Potassium     Vital Signs     Pulse oximetry nursing     Weigh patient     Measure urine output     Patient Teaching: Heart Failure     Peripheral IV catheter     Activity: Up ad mitchell     Notify Provider     Notify Provider     May discharge when: other (specify in comments) May be discharged after the completion of the diuretic infusion: IF the CORE Congestive Heart Failure Provider has given consent to discharge AND Systolic blood pressure is greater than or equal to 9...     Discharge planning     Oxygen: Nasal cannula     Orders Placed This Encounter   Medications     lidocaine 1 % 1 mL     lidocaine (LMX4) cream     sodium chloride (PF) 0.9% PF flush 3 mL     sodium chloride (PF) 0.9% PF flush 3 mL     potassium chloride ER (K-DUR/KLOR-CON M) CR tablet 20-40 mEq     potassium chloride (KLOR-CON) Packet 20-40 mEq     potassium chloride 10 mEq in 100 mL sterile water intermittent infusion (premix)     potassium chloride 10 mEq in 100 mL  intermittent infusion with 10 mg lidocaine     potassium chloride 20 mEq in 50 mL intermittent infusion     magnesium sulfate 2 g in water intermittent infusion     magnesium sulfate 4 g in 100 mL sterile water (premade)     bumetanide (BUMEX) injection 2 mg     bumetanide (BUMEX) 0.25 mg/mL infusion     There are no discontinued medications.      CURRENT MEDICATIONS:  Medications Prior to Admission   Medication Sig Dispense Refill Last Dose     aspirin 81 MG tablet Take 81 mg by mouth every morning    1/9/2019 at Unknown time     atorvastatin (LIPITOR) 40 MG tablet Take 1 tablet (40 mg) by mouth daily 90 tablet 0 1/8/2019 at Unknown time     bumetanide (BUMEX) 1 MG tablet Take 3 tablets (3 mg) by mouth 2 times daily 180 tablet 3 1/8/2019 at Unknown time     eplerenone (INSPRA) 50 MG tablet Take 1 tablet (50 mg) by mouth daily 30 tablet 11 1/8/2019 at Unknown time     ferrous sulfate (IRON) 325 (65 Fe) MG tablet Take 1 tablet (325 mg) by mouth 2 times daily 100 tablet 3 1/8/2019 at Unknown time     hydrochlorothiazide (HYDRODIURIL) 25 MG tablet Take 25 mg by mouth every other day   1/8/2019 at Unknown time     insulin aspart (NOVOLOG FLEXPEN) 100 UNIT/ML injection Inject 4 Units Subcutaneous daily (with lunch)   1/8/2019 at Unknown time     insulin aspart (NOVOLOG FLEXPEN) 100 UNIT/ML injection Inject 3 Units Subcutaneous 2 times daily (with meals) Takes with breakfast and dinner   1/9/2019 at Unknown time     Insulin Glargine (BASAGLAR KWIKPEN SC) Inject 22 Units Subcutaneous every morning    1/9/2019 at Unknown time     metoprolol (LOPRESSOR) 50 MG tablet Take 1 tablet (50 mg) by mouth 2 times daily 60 tablet 0 1/9/2019 at Unknown time     Multiple Vitamins-Minerals (CENTRUM ADULTS PO) Take 1 tablet by mouth every morning    1/8/2019 at Unknown time     pantoprazole (PROTONIX) 40 MG EC tablet Take 40 mg by mouth every morning (before breakfast)   1/9/2019 at Unknown time     potassium chloride ER (K-DUR/KLOR-CON  M) 10 MEQ CR tablet Take 4 tablets (40 mEq) by mouth 3 times daily 360 tablet 3 1/9/2019 at Unknown time     timolol (TIMOPTIC) 0.5 % ophthalmic solution Place 1 drop into both eyes 2 times daily    1/9/2019 at Unknown time     clindamycin (CLEOCIN) 300 MG capsule Take 1 capsule (300 mg) by mouth as needed 10 capsule 3 Unknown at Unknown time     prednisoLONE acetate (PRED FORTE) 1 % ophthalmic susp Place 1 drop into both eyes as needed (glaucoma)    More than a month at Unknown time       ALLERGIES     Allergies   Allergen Reactions     Amoxicillin      Itchy      Penicillins Hives     Spironolactone Rash       PAST MEDICAL HISTORY:  Past Medical History:   Diagnosis Date     Former tobacco use      Glaucoma      Hyperlipidemia LDL goal <160 12/6/2011     Obesity      DRAKE on CPAP      Right bundle branch block      Severe aortic stenosis     On Echo 10/28/16       PAST SURGICAL HISTORY:  Past Surgical History:   Procedure Laterality Date     HEART CATH LEFT HEART CATH  04/07/2017    Diffuse non-obstuctive CAD     REPAIR VALVE AORTIC N/A 5/16/2017    Procedure: REPAIR VALVE AORTIC;  Median Sternotony, CardioPulmonary Bypass, Aortic Valve Replace using 25MM Trifecta Valve with [employer], Septal myectomy;  Surgeon: Jun Simon MD;  Location: [facility]     REPLACE VALVE AORTIC N/A 5/16/2017    Procedure: REPLACE VALVE AORTIC;;  Surgeon: Jun Simon MD;  Location:  OR     SINUS SURGERY  2005       FAMILY HISTORY:  Family History   Problem Relation Age of Onset     Family History Negative Mother      Diabetes Father      Heart Surgery Father         CABG     Coronary Artery Disease Father      Hypertension Brother      Diabetes Brother      Heart Disease Brother      Heart Surgery Brother         CABG x 3     Family History Negative Sister      Diabetes Brother         History of diabetes, but no longer an issue     Family History Negative Brother        SOCIAL HISTORY:  Social History      Socioeconomic History     Marital status:      Spouse name: Not on file     Number of children: Not on file     Years of education: Not on file     Highest education level: Not on file   Social Needs     Financial resource strain: Not on file     Food insecurity - worry: Not on file     Food insecurity - inability: Not on file     Transportation needs - medical: Not on file     Transportation needs - non-medical: Not on file   Occupational History     Not on file   Tobacco Use     Smoking status: Former Smoker     Packs/day: 1.00     Years: 20.00     Pack years: 20.00     Types: Cigarettes, Cigars     Start date:      Last attempt to quit: 10/21/2011     Years since quittin.2     Smokeless tobacco: Never Used   Substance and Sexual Activity     Alcohol use: No     Drug use: No     Sexual activity: Not on file   Other Topics Concern     Parent/sibling w/ CABG, MI or angioplasty before 65F 55M? Yes     Comment: brother triple bypass 49   Social History Narrative     Not on file       Review of Systems:  10 point review of systems negative except as listed in HPI.    Recent Lab Results:  LIPID RESULTS:  Lab Results   Component Value Date    CHOL 126 10/10/2018    HDL 23 (A) 10/10/2018    LDL 71 10/10/2018    TRIG 230 (A) 10/10/2018       LIVER ENZYME RESULTS:  Lab Results   Component Value Date    AST 55 (H) 10/12/2018    ALT 31 10/12/2018       CBC RESULTS:  Lab Results   Component Value Date    WBC 5.5 2018    RBC 3.68 (L) 2018    HGB 11.2 (L) 2019    HCT 32.0 (L) 2018    MCV 87 2018    MCH 26.9 2018    MCHC 30.9 (L) 2018    RDW 19.7 (H) 2018     (L) 2018       BMP RESULTS:  Lab Results   Component Value Date     2019    POTASSIUM 3.9 2019    CHLORIDE 105 2019    CO2 28 2019    ANIONGAP 9.6 2019     (H) 2019    BUN 20 2019    CR 1.10 2019    GFRESTIMATED 73 2019     GFRESTBLACK 85 01/09/2019    MYRON 9.3 01/02/2019        A1C RESULTS:  Lab Results   Component Value Date    A1C 6.8 (H) 10/12/2018       INR RESULTS:  Lab Results   Component Value Date    INR 2.19 (H) 10/14/2018    INR 2.28 (H) 10/13/2018         CC  No referring provider defined for this encounter.

## 2019-01-10 ENCOUNTER — OFFICE VISIT (OUTPATIENT)
Dept: CARDIOLOGY | Facility: CLINIC | Age: 59
End: 2019-01-10
Payer: COMMERCIAL

## 2019-01-10 VITALS
HEART RATE: 68 BPM | OXYGEN SATURATION: 96 % | HEIGHT: 70 IN | WEIGHT: 301.6 LBS | DIASTOLIC BLOOD PRESSURE: 69 MMHG | SYSTOLIC BLOOD PRESSURE: 118 MMHG | BODY MASS INDEX: 43.18 KG/M2

## 2019-01-10 DIAGNOSIS — R25.2 MUSCLE CRAMP: ICD-10-CM

## 2019-01-10 DIAGNOSIS — I50.30 (HFPEF) HEART FAILURE WITH PRESERVED EJECTION FRACTION (H): Primary | ICD-10-CM

## 2019-01-10 DIAGNOSIS — I50.30 HEART FAILURE WITH PRESERVED EJECTION FRACTION, NYHA CLASS I (H): ICD-10-CM

## 2019-01-10 PROCEDURE — 99214 OFFICE O/P EST MOD 30 MIN: CPT | Performed by: INTERNAL MEDICINE

## 2019-01-10 RX ORDER — MAGNESIUM OXIDE 400 MG/1
400 TABLET ORAL DAILY
Qty: 90 TABLET | Refills: 3 | Status: SHIPPED | OUTPATIENT
Start: 2019-01-10 | End: 2020-01-29

## 2019-01-10 ASSESSMENT — MIFFLIN-ST. JEOR: SCORE: 2189.3

## 2019-01-10 NOTE — LETTER
1/10/2019    Sam Villatoro PA-C  [employer] [facility] [address]  [place] [id number]    RE: Gil Chowdary       Dear Colleague,    I had the pleasure of seeing Gil Chowdary in the [facility].    CARDIOLOGY NEW CORE CLINIC CONSULTATION    REASON FOR CONSULT: Diastolic heart failure    PRIMARY CARE PHYSICIAN:  Sam Villatoro    HISTORY OF PRESENT ILLNESS:    I had the pleasure of seeing Mr. Chowdary in cardiology outpatient heart failure clinic.  He has been followed by Dr. Salinas in the past and will now resume his care with me.  He is a pleasant 58-year-old gentleman with past medical history of severe aortic stenosis with aortic valve replacement in 2017 with right 25 mm tissue valve, type 2 diabetes on insulin, hyperlipidemia, sleep apnea on CPAP, heart failure with preserved ejection fraction, recent iron deficiency anemia for which she is being followed by Dr. Fletcher, history of SVT and paroxysmal A. fib with no recurrence on recent patch.     The patient was admitted with exacerbation of diastolic heart failure in October 2018.   The patient cares for his elderly mother and did not want to stay in the hospital for a long period of time and had to be discharged before becoming euvolemic.  Since then they have been following him on an outpatient basis and trying to get him euvolemic.  This is necessitated weekly clinic visits with titration of medication and monitoring of his weight.    He was recently seen in the clinic on 1/2/2019 and during that visit the lesion was increased to 50 mg daily and his potassium dose was decreased.  He was scheduled to undergo blood transfusion with IV Bumex to keep him euvolemic.  The patient underwent these procedures yesterday with a good clinical response.  He reports that he diuresed well and urinated over 5 L. However interestingly his weight remains unchanged from his last visit on 1/2/2018.  However he does  feelbetter and his edema is also not as severe as before.  He however does report cramping in his hands and legs occasionally.  He tries to restrict his salt in diet and water intake.  He plans to exercise and eat healthy.  He has been taking his medications as prescribed      PAST MEDICAL HISTORY:  Past Medical History:   Diagnosis Date     Former tobacco use      Glaucoma      Hyperlipidemia LDL goal <160 12/6/2011     Obesity      DRAKE on CPAP      Right bundle branch block      Severe aortic stenosis     On Echo 10/28/16       MEDICATIONS:  Current Outpatient Medications   Medication     aspirin 81 MG tablet     atorvastatin (LIPITOR) 40 MG tablet     bumetanide (BUMEX) 1 MG tablet     clindamycin (CLEOCIN) 300 MG capsule     eplerenone (INSPRA) 50 MG tablet     ferrous sulfate (IRON) 325 (65 Fe) MG tablet     hydrochlorothiazide (HYDRODIURIL) 25 MG tablet     insulin aspart (NOVOLOG FLEXPEN) 100 UNIT/ML injection     insulin aspart (NOVOLOG FLEXPEN) 100 UNIT/ML injection     Insulin Glargine (BASAGLAR KWIKPEN SC)     magnesium oxide (MAG-OX) 400 MG tablet     metoprolol (LOPRESSOR) 50 MG tablet     Multiple Vitamins-Minerals (CENTRUM ADULTS PO)     pantoprazole (PROTONIX) 40 MG EC tablet     potassium chloride ER (K-DUR/KLOR-CON M) 10 MEQ CR tablet     prednisoLONE acetate (PRED FORTE) 1 % ophthalmic susp     timolol (TIMOPTIC) 0.5 % ophthalmic solution     No current facility-administered medications for this visit.        ALLERGIES:  Allergies   Allergen Reactions     Amoxicillin      Itchy      Penicillins Hives     Spironolactone Rash       SOCIAL HISTORY:  I have reviewed this patient's social history and updated it with pertinent information if needed. Gil Chowdary  reports that he quit smoking about 7 years ago. His smoking use included cigarettes and cigars. He started smoking about 40 years ago. He has a 20.00 pack-year smoking history. he has never used smokeless tobacco. He reports that he does  not drink alcohol or use drugs.    FAMILY HISTORY:  I have reviewed this patient's family history and updated it with pertinent information if needed.   Family History   Problem Relation Age of Onset     Family History Negative Mother      Diabetes Father      Heart Surgery Father         CABG     Coronary Artery Disease Father      Hypertension Brother      Diabetes Brother      Heart Disease Brother      Heart Surgery Brother         CABG x 3     Family History Negative Sister      Diabetes Brother         History of diabetes, but no longer an issue     Family History Negative Brother        REVIEW OF SYSTEMS:  Skin:  Positive for     Eyes:  Positive for glasses  ENT:  Negative    Respiratory:  Positive for sleep apnea;CPAP  Cardiovascular:    Positive for;edema  Gastroenterology: Negative    Genitourinary:  Negative    Musculoskeletal:  Negative    Neurologic:  Positive for memory problems;involuntary movement  Psychiatric:  Negative    Heme/Lymph/Imm:  Positive for allergies  Endocrine:  Positive for diabetes      PHYSICAL EXAM:      BP: 118/69 Pulse: 68     SpO2: 96 %      Vital Signs with Ranges  Pulse:  [66-68] 68  Resp:  [18] 18  BP: (104-118)/(48-69) 118/69  SpO2:  [96 %-99 %] 96 %  301 lbs 9.6 oz    Constitutional: awake, alert, no distress  Head: normocephalic,atraumatic   Eyes: PERRL, sclera nonicteric  ENT: trachea midline, neck supple  Respiratory:  Normal bilateral breath sounds in all respiratory fields.  No wheezing or crackles.  Cardiac:  S1-S2 were heard, there is 1 out of 6 systolic ejection murmur at the right upper sternal border which does not any changes with respiration.  2+ pulses all extremities                                       GI: nondistended, nontender, bowel sounds present  Skin: dry, no rash  Musculoskeletal: good muscle tone, strength 5/5 in upper and lower extremities, no clubbing  1+ bilateral pitting edema  Neurologic: no focal deficits  Neuropsychiatric: appropriate  affact    DATA:  Labs: Pertinent cardiac labs reviewed  Admission on 01/09/2019, Discharged on 01/09/2019   Component Date Value Ref Range Status     Urea Nitrogen 01/09/2019 20  7 - 30 mg/dL Final     Creatinine 01/09/2019 1.10  0.66 - 1.25 mg/dL Final     GFR Estimate 01/09/2019 73  >60 mL/min/[1.73_m2] Final    Comment: Non  GFR Calc  Starting 12/18/2018, serum creatinine based estimated GFR (eGFR) will be   calculated using the Chronic Kidney Disease Epidemiology Collaboration   (CKD-EPI) equation.       GFR Estimate If Black 01/09/2019 85  >60 mL/min/clinical next week she is that she try to do is treat impression of the great that was thank you so much thank you Final    Comment:  GFR Calc  Starting 12/18/2018, serum creatinine based estimated GFR (eGFR) will be   calculated using the Chronic Kidney Disease Epidemiology Collaboration   (CKD-EPI) equation.       Potassium 01/09/2019 3.9  3.4 - 5.3 mmol/L Final     Sodium 01/09/2019 140  133 - 144 mmol/L Final     Magnesium 01/09/2019 2.6* 1.6 - 2.3 mg/dL Final     Potassium 01/09/2019 3.7  3.4 - 5.3 mmol/L Final       ASSESSMENT:  This is a 58-year-old gentleman with past medical history as stated above who is being followed in our heart failure clinic for management of diastolic heart failure which has been difficult to manage due to inability to tolerate diuretics.  We have made some changes to his diuretics and he currently seems to be moving the right direction.     There is any heart failure with volume overload  - I will continue the current medications at the current doses.  We will not make any changes to his diuretic regimen today.  - I will have him come back and see Yvrose Oskar in clinic in a week and he will follow-up with me in a month.  We will consider increasing/changing his diuretics regimen if there are signs of worsening CHF.  - Will continue current dose of potassium. Will add magnesium for his muscle cramps  although it is less likely to benefit him since his Mg level is normal. We will disconti Mg during his visit with Yvrose Schmitt if there is no benefit.    - Discussed the need for weight checks and sodium restricted diet.  Bring his weight diary to his next clinic appointment.    Aortic valve replacement  - The aortic valve seems to be functioning well based on findings of physical examination.  His last echocardiogram, a few months ago was consistent with these findings.  We will continue to monitor for now.    Anemia and thrombocytopenia  -He is being followed by Dr. Fletcher and is currently on iron supplementation.  -He is currently on aspirin only and his Coumadin has been stopped.        Thank you for allowing me to participate in the care of your patient.    Sincerely,     Jeremie Mckeon MD     [facility]

## 2019-01-10 NOTE — PROGRESS NOTES
CARDIOLOGY NEW CORE CLINIC CONSULTATION    REASON FOR CONSULT: Diastolic heart failure    PRIMARY CARE PHYSICIAN:  Sam Villatoro    HISTORY OF PRESENT ILLNESS:    I had the pleasure of seeing Mr. Chowdary in cardiology outpatient heart failure clinic.  He has been followed by Dr. Salinas in the past and will now resume his care with me.  He is a pleasant 58-year-old gentleman with past medical history of severe aortic stenosis with aortic valve replacement in 2017 with right 25 mm tissue valve, type 2 diabetes on insulin, hyperlipidemia, sleep apnea on CPAP, heart failure with preserved ejection fraction, recent iron deficiency anemia for which she is being followed by Dr. Fletcher, history of SVT and paroxysmal A. fib with no recurrence on recent patch.     The patient was admitted with exacerbation of diastolic heart failure in October 2018.   The patient cares for his elderly mother and did not want to stay in the hospital for a long period of time and had to be discharged before becoming euvolemic.  Since then he has been followed on an outpatient basis to manage CHF symptoms/Volume overload.  This is necessitated weekly clinic visits with titration of medication and several sessions of IV Bumex with good response.    He was recently seen in the clinic on 1/2/2019 and during that visit the Metoprolol was increased to 50 mg daily and potassium dose was decreased.  He was scheduled to undergo IV Bumex to keep him euvolemic.  The patient underwent these procedures yesterday with a good clinical response.  He reports that he diuresed well and urinated over 5 L. However, interestingly his weight remains unchanged from his last visit on 1/2/2018.  However he does feelbetter and his edema is also not as severe as before.  He however does report cramping in his hands and legs occasionally.  He tries to restrict his salt in diet and water intake.  He plans to exercise and eat healthy.  He has been taking his medications as  prescribed      PAST MEDICAL HISTORY:  Past Medical History:   Diagnosis Date     Former tobacco use      Glaucoma      Hyperlipidemia LDL goal <160 12/6/2011     Obesity      DRAKE on CPAP      Right bundle branch block      Severe aortic stenosis     On Echo 10/28/16       MEDICATIONS:  Current Outpatient Medications   Medication     aspirin 81 MG tablet     atorvastatin (LIPITOR) 40 MG tablet     bumetanide (BUMEX) 1 MG tablet     clindamycin (CLEOCIN) 300 MG capsule     eplerenone (INSPRA) 50 MG tablet     ferrous sulfate (IRON) 325 (65 Fe) MG tablet     hydrochlorothiazide (HYDRODIURIL) 25 MG tablet     insulin aspart (NOVOLOG FLEXPEN) 100 UNIT/ML injection     insulin aspart (NOVOLOG FLEXPEN) 100 UNIT/ML injection     Insulin Glargine (BASAGLAR KWIKPEN SC)     magnesium oxide (MAG-OX) 400 MG tablet     metoprolol (LOPRESSOR) 50 MG tablet     Multiple Vitamins-Minerals (CENTRUM ADULTS PO)     pantoprazole (PROTONIX) 40 MG EC tablet     potassium chloride ER (K-DUR/KLOR-CON M) 10 MEQ CR tablet     prednisoLONE acetate (PRED FORTE) 1 % ophthalmic susp     timolol (TIMOPTIC) 0.5 % ophthalmic solution     No current facility-administered medications for this visit.        ALLERGIES:  Allergies   Allergen Reactions     Amoxicillin      Itchy      Penicillins Hives     Spironolactone Rash       SOCIAL HISTORY:  I have reviewed this patient's social history and updated it with pertinent information if needed. Gil BLACK mireya  reports that he quit smoking about 7 years ago. His smoking use included cigarettes and cigars. He started smoking about 40 years ago. He has a 20.00 pack-year smoking history. he has never used smokeless tobacco. He reports that he does not drink alcohol or use drugs.    FAMILY HISTORY:  I have reviewed this patient's family history and updated it with pertinent information if needed.   Family History   Problem Relation Age of Onset     Family History Negative Mother      Diabetes Father       Heart Surgery Father         CABG     Coronary Artery Disease Father      Hypertension Brother      Diabetes Brother      Heart Disease Brother      Heart Surgery Brother         CABG x 3     Family History Negative Sister      Diabetes Brother         History of diabetes, but no longer an issue     Family History Negative Brother        REVIEW OF SYSTEMS:  Skin:  Positive for     Eyes:  Positive for glasses  ENT:  Negative    Respiratory:  Positive for sleep apnea;CPAP  Cardiovascular:    Positive for;edema  Gastroenterology: Negative    Genitourinary:  Negative    Musculoskeletal:  Negative    Neurologic:  Positive for memory problems;involuntary movement  Psychiatric:  Negative    Heme/Lymph/Imm:  Positive for allergies  Endocrine:  Positive for diabetes      PHYSICAL EXAM:      BP: 118/69 Pulse: 68     SpO2: 96 %      Vital Signs with Ranges  Pulse:  [66-68] 68  Resp:  [18] 18  BP: (104-118)/(48-69) 118/69  SpO2:  [96 %-99 %] 96 %  301 lbs 9.6 oz    Constitutional: awake, alert, no distress  Head: normocephalic,atraumatic   Eyes: PERRL, sclera nonicteric  ENT: trachea midline, neck supple  Respiratory:  Normal bilateral breath sounds in all respiratory fields.  No wheezing or crackles.  Cardiac:  S1-S2 were heard, there is 1 out of 6 systolic ejection murmur at the right upper sternal border which does not any changes with respiration.  2+ pulses all extremities                                       GI: nondistended, nontender, bowel sounds present  Skin: dry, no rash  Musculoskeletal: good muscle tone, strength 5/5 in upper and lower extremities, no clubbing  1+ bilateral pitting edema  Neurologic: no focal deficits  Neuropsychiatric: appropriate affact    DATA:  Labs: Pertinent cardiac labs reviewed  Admission on 01/09/2019, Discharged on 01/09/2019   Component Date Value Ref Range Status     Urea Nitrogen 01/09/2019 20  7 - 30 mg/dL Final     Creatinine 01/09/2019 1.10  0.66 - 1.25 mg/dL Final     GFR  Estimate 01/09/2019 73  >60 mL/min/[1.73_m2] Final    Comment: Non  GFR Calc  Starting 12/18/2018, serum creatinine based estimated GFR (eGFR) will be   calculated using the Chronic Kidney Disease Epidemiology Collaboration   (CKD-EPI) equation.       GFR Estimate If Black 01/09/2019 85  >60 mL/min/clinical next week she is that she try to do is treat impression of the great that was thank you so much thank you Final    Comment:  GFR Calc  Starting 12/18/2018, serum creatinine based estimated GFR (eGFR) will be   calculated using the Chronic Kidney Disease Epidemiology Collaboration   (CKD-EPI) equation.       Potassium 01/09/2019 3.9  3.4 - 5.3 mmol/L Final     Sodium 01/09/2019 140  133 - 144 mmol/L Final     Magnesium 01/09/2019 2.6* 1.6 - 2.3 mg/dL Final     Potassium 01/09/2019 3.7  3.4 - 5.3 mmol/L Final       ASSESSMENT:  This is a 58-year-old gentleman with past medical history as stated above who is being followed in our heart failure clinic for management of diastolic heart failure which has been difficult to manage due to inability to tolerate diuretics.  We have made some changes to his diuretics and he currently seems to be moving the right direction.     CHF with volume overload  - I will continue the current medications at the current doses.  We will not make any changes to his diuretic regimen today since it seems he derived signifncant benefit from the IV infusion of Bumex.   - I will have him come back and see Yvrose Schmitt in clinic in a week and he will follow-up with me in a month.  We will consider increasing/changing his diuretics regimen if there are signs of worsening CHF.  - Will continue current dose of potassium. Will add magnesium for his muscle cramps although it is less likely to benefit him since his Mg level is normal. We will disconti Mg during his visit with Yvrose Schmitt if there is no benefit.    - Discussed the need for weight checks and sodium  restricted diet.  Bring his weight diary to his next clinic appointment.    Aortic valve replacement  - The aortic valve seems to be functioning well based on findings of physical examination.  His last echocardiogram, a few months ago was consistent with these findings.  We will continue to monitor for now.    Anemia and thrombocytopenia  -He is being followed by Dr. Fletcher and is currently on iron supplementation.  -He is currently on aspirin only and his Coumadin has been stopped.    Jeremie Mckeon MD

## 2019-01-10 NOTE — LETTER
1/10/2019    Sam Villatoro PA-C  [facility] [address]  [place] [address]    RE: Gil Chowdary       Dear Colleague,    I had the pleasure of seeing Gil Chowdary in the [facility].    CARDIOLOGY NEW CORE CLINIC CONSULTATION    REASON FOR CONSULT: Diastolic heart failure    PRIMARY CARE PHYSICIAN:  Sam Villatoro    HISTORY OF PRESENT ILLNESS:    I had the pleasure of seeing Mr. Chowdary in cardiology outpatient heart failure clinic.  He has been followed by Dr. Salinas in the past and will now resume his care with me.  He is a pleasant 58-year-old gentleman with past medical history of severe aortic stenosis with aortic valve replacement in 2017 with right 25 mm tissue valve, type 2 diabetes on insulin, hyperlipidemia, sleep apnea on CPAP, heart failure with preserved ejection fraction, recent iron deficiency anemia for which she is being followed by Dr. Fletcher, history of SVT and paroxysmal A. fib with no recurrence on recent patch.     The patient was admitted with exacerbation of diastolic heart failure in October 2018.   The patient cares for his elderly mother and did not want to stay in the hospital for a long period of time and had to be discharged before becoming euvolemic.  Since then they have been following him on an outpatient basis and trying to get him euvolemic.  This is necessitated weekly clinic visits with titration of medication and monitoring of his weight.    He was recently seen in the clinic on 1/2/2019 and during that visit the lesion was increased to 50 mg daily and his potassium dose was decreased.  He was scheduled to undergo blood transfusion with IV Bumex to keep him euvolemic.  The patient underwent these procedures yesterday with a good clinical response.  He reports that he diuresed well and urinated over 5 L. However interestingly his weight remains unchanged from his last visit on 1/2/2018.  However he does  feelbetter and his edema is also not as severe as before.  He however does report cramping in his hands and legs occasionally.  He tries to restrict his salt in diet and water intake.  He plans to exercise and eat healthy.  He has been taking his medications as prescribed      PAST MEDICAL HISTORY:  Past Medical History:   Diagnosis Date     Former tobacco use      Glaucoma      Hyperlipidemia LDL goal <160 12/6/2011     Obesity      DRAKE on CPAP      Right bundle branch block      Severe aortic stenosis     On Echo 10/28/16       MEDICATIONS:  Current Outpatient Medications   Medication     aspirin 81 MG tablet     atorvastatin (LIPITOR) 40 MG tablet     bumetanide (BUMEX) 1 MG tablet     clindamycin (CLEOCIN) 300 MG capsule     eplerenone (INSPRA) 50 MG tablet     ferrous sulfate (IRON) 325 (65 Fe) MG tablet     hydrochlorothiazide (HYDRODIURIL) 25 MG tablet     insulin aspart (NOVOLOG FLEXPEN) 100 UNIT/ML injection     insulin aspart (NOVOLOG FLEXPEN) 100 UNIT/ML injection     Insulin Glargine (BASAGLAR KWIKPEN SC)     magnesium oxide (MAG-OX) 400 MG tablet     metoprolol (LOPRESSOR) 50 MG tablet     Multiple Vitamins-Minerals (CENTRUM ADULTS PO)     pantoprazole (PROTONIX) 40 MG EC tablet     potassium chloride ER (K-DUR/KLOR-CON M) 10 MEQ CR tablet     prednisoLONE acetate (PRED FORTE) 1 % ophthalmic susp     timolol (TIMOPTIC) 0.5 % ophthalmic solution     No current facility-administered medications for this visit.        ALLERGIES:  Allergies   Allergen Reactions     Amoxicillin      Itchy      Penicillins Hives     Spironolactone Rash       SOCIAL HISTORY:  I have reviewed this patient's social history and updated it with pertinent information if needed. Gil Chowdary  reports that he quit smoking about 7 years ago. His smoking use included cigarettes and cigars. He started smoking about 40 years ago. He has a 20.00 pack-year smoking history. he has never used smokeless tobacco. He reports that he does  not drink alcohol or use drugs.    FAMILY HISTORY:  I have reviewed this patient's family history and updated it with pertinent information if needed.   Family History   Problem Relation Age of Onset     Family History Negative Mother      Diabetes Father      Heart Surgery Father         CABG     Coronary Artery Disease Father      Hypertension Brother      Diabetes Brother      Heart Disease Brother      Heart Surgery Brother         CABG x 3     Family History Negative Sister      Diabetes Brother         History of diabetes, but no longer an issue     Family History Negative Brother        REVIEW OF SYSTEMS:  Skin:  Positive for     Eyes:  Positive for glasses  ENT:  Negative    Respiratory:  Positive for sleep apnea;CPAP  Cardiovascular:    Positive for;edema  Gastroenterology: Negative    Genitourinary:  Negative    Musculoskeletal:  Negative    Neurologic:  Positive for memory problems;involuntary movement  Psychiatric:  Negative    Heme/Lymph/Imm:  Positive for allergies  Endocrine:  Positive for diabetes      PHYSICAL EXAM:      BP: 118/69 Pulse: 68     SpO2: 96 %      Vital Signs with Ranges  Pulse:  [66-68] 68  Resp:  [18] 18  BP: (104-118)/(48-69) 118/69  SpO2:  [96 %-99 %] 96 %  301 lbs 9.6 oz    Constitutional: awake, alert, no distress  Head: normocephalic,atraumatic   Eyes: PERRL, sclera nonicteric  ENT: trachea midline, neck supple  Respiratory:  Normal bilateral breath sounds in all respiratory fields.  No wheezing or crackles.  Cardiac:  S1-S2 were heard, there is 1 out of 6 systolic ejection murmur at the right upper sternal border which does not any changes with respiration.  2+ pulses all extremities                                       GI: nondistended, nontender, bowel sounds present  Skin: dry, no rash  Musculoskeletal: good muscle tone, strength 5/5 in upper and lower extremities, no clubbing  1+ bilateral pitting edema  Neurologic: no focal deficits  Neuropsychiatric: appropriate  affact    DATA:  Labs: Pertinent cardiac labs reviewed  Admission on 01/09/2019, Discharged on 01/09/2019   Component Date Value Ref Range Status     Urea Nitrogen 01/09/2019 20  7 - 30 mg/dL Final     Creatinine 01/09/2019 1.10  0.66 - 1.25 mg/dL Final     GFR Estimate 01/09/2019 73  >60 mL/min/[1.73_m2] Final    Comment: Non  GFR Calc  Starting 12/18/2018, serum creatinine based estimated GFR (eGFR) will be   calculated using the Chronic Kidney Disease Epidemiology Collaboration   (CKD-EPI) equation.       GFR Estimate If Black 01/09/2019 85  >60 mL/min/clinical next week she is that she try to do is treat impression of the great that was thank you so much thank you Final    Comment:  GFR Calc  Starting 12/18/2018, serum creatinine based estimated GFR (eGFR) will be   calculated using the Chronic Kidney Disease Epidemiology Collaboration   (CKD-EPI) equation.       Potassium 01/09/2019 3.9  3.4 - 5.3 mmol/L Final     Sodium 01/09/2019 140  133 - 144 mmol/L Final     Magnesium 01/09/2019 2.6* 1.6 - 2.3 mg/dL Final     Potassium 01/09/2019 3.7  3.4 - 5.3 mmol/L Final       ASSESSMENT:  This is a 58-year-old gentleman with past medical history as stated above who is being followed in our heart failure clinic for management of diastolic heart failure which has been difficult to manage due to inability to tolerate diuretics.  We have made some changes to his diuretics and he currently seems to be moving the right direction.     There is any heart failure with volume overload  - I will continue the current medications at the current doses.  We will not make any changes to his diuretic regimen today.  - I will have him come back and see Yvrose Oskar in clinic in a week and he will follow-up with me in a month.  We will consider increasing/changing his diuretics regimen if there are signs of worsening CHF.  - Will continue current dose of potassium. Will add magnesium for his muscle cramps  although it is less likely to benefit him since his Mg level is normal. We will disconti Mg during his visit with Yvrose Oskar if there is no benefit.    - Discussed the need for weight checks and sodium restricted diet.  Bring his weight diary to his next clinic appointment.    Aortic valve replacement  - The aortic valve seems to be functioning well based on findings of physical examination.  His last echocardiogram, a few months ago was consistent with these findings.  We will continue to monitor for now.    Anemia and thrombocytopenia  -He is being followed by Dr. Fletcher and is currently on iron supplementation.  -He is currently on aspirin only and his Coumadin has been stopped.    Jeremie Mckeon MD      Thank you for allowing me to participate in the care of your patient.      Sincerely,     Jeremie Mckeon MD     [facility], [place]    cc:   No referring provider defined for this encounter.

## 2019-01-14 ENCOUNTER — CARE COORDINATION (OUTPATIENT)
Dept: CARDIOLOGY | Facility: CLINIC | Age: 59
End: 2019-01-14

## 2019-01-14 DIAGNOSIS — I50.30 (HFPEF) HEART FAILURE WITH PRESERVED EJECTION FRACTION (H): Primary | ICD-10-CM

## 2019-01-14 NOTE — PROGRESS NOTES
Spoke w/ Adolfo. He reported his wt is down to 293#, which is down from Infusion Clinic wts and home wt of 296# reported 1/2.       He told me his LE edema is improving, shoes fit better and he's voiding frequently. He is able to sleep flat in bed w/ cpap and denied any dyspnea.     He agreed to labs/OV w/ Rachna More PAC on 1/16. FYI to Rachna. Told pt CORE RN would call him tomorrow if addnl recs prior to follow-up OV. Adolfo wanted to express his gratitude for the care he's received from Rachna and CORE team.     He had several questions re: medications/timing. He confirmed correct dosages of diuretics/KCL. I suggested we meet after his 1/16 OV to make a breakfast/lunch/dinner/HS schedule to simplify his med routine.         Karen Stark RN BSN   4:28 PM 01/14/19

## 2019-01-14 NOTE — PROGRESS NOTES
"Adolfo completed [facility]fusion [facility] 1/9 for 2mg IVP bumex, then addnl 1mg x 4.5 hrs.         He was instructed that day to increase eplerenone to 50mg BID and decrease KCL to 30meq BID.    He then saw Dr. Mckeon 1/10 who said pt had \"good clinical response\" though wt unchanged, w/ pt reporting improvement in edema. That day, no changes to diuretic plan were made, Magnesium 400mg daily added, and he was asked to follow-up w/ Rachna More PAC this wk.     He's currently scheduled to see Dr. Mckeon 2/12 (placed order for this), no current CORE KARYN follow-up. He does have orders for KARYN follow-up this wk.     Called pt to review wt/sx and assist w/ scheduling CORE follow-up this wk. No answer, no consent to communicate on file. Left non detailed  requesting call back. I did hold a slot on Rachna's schedule for 1/16/19 and this time was ok'd w/ Rachna should be be available. Rachna noted that pt may need addnl Infusion Clinic pending assessment.     Karen Stark RN BSN   2:39 PM 01/14/19    Pt returned my call and LVM. I called him and had to leave .     Karen Stark RN BSN   3:05 PM 01/14/19        "

## 2019-01-16 ENCOUNTER — OFFICE VISIT (OUTPATIENT)
Dept: CARDIOLOGY | Facility: CLINIC | Age: 59
End: 2019-01-16
Payer: COMMERCIAL

## 2019-01-16 ENCOUNTER — TRANSFERRED RECORDS (OUTPATIENT)
Dept: HEALTH INFORMATION MANAGEMENT | Facility: CLINIC | Age: 59
End: 2019-01-16

## 2019-01-16 ENCOUNTER — DOCUMENTATION ONLY (OUTPATIENT)
Dept: CARDIOLOGY | Facility: CLINIC | Age: 59
End: 2019-01-16

## 2019-01-16 VITALS
WEIGHT: 294 LBS | SYSTOLIC BLOOD PRESSURE: 120 MMHG | BODY MASS INDEX: 42.09 KG/M2 | HEART RATE: 68 BPM | DIASTOLIC BLOOD PRESSURE: 60 MMHG | HEIGHT: 70 IN

## 2019-01-16 DIAGNOSIS — I50.30 (HFPEF) HEART FAILURE WITH PRESERVED EJECTION FRACTION (H): ICD-10-CM

## 2019-01-16 DIAGNOSIS — I48.0 PAF (PAROXYSMAL ATRIAL FIBRILLATION) (H): Primary | ICD-10-CM

## 2019-01-16 LAB
ANION GAP SERPL CALCULATED.3IONS-SCNC: 10.9 MMOL/L (ref 6–17)
BUN SERPL-MCNC: 25 MG/DL (ref 7–30)
CALCIUM SERPL-MCNC: 9.3 MG/DL (ref 8.5–10.5)
CHLORIDE SERPL-SCNC: 103 MMOL/L (ref 98–107)
CO2 SERPL-SCNC: 27 MMOL/L (ref 23–29)
CREAT SERPL-MCNC: 1.4 MG/DL (ref 0.7–1.3)
GFR SERPL CREATININE-BSD FRML MDRD: 52 ML/MIN/{1.73_M2}
GLUCOSE SERPL-MCNC: 231 MG/DL (ref 70–105)
POTASSIUM SERPL-SCNC: 3.9 MMOL/L (ref 3.5–5.1)
SODIUM SERPL-SCNC: 137 MMOL/L (ref 136–145)

## 2019-01-16 PROCEDURE — 36415 COLL VENOUS BLD VENIPUNCTURE: CPT | Performed by: INTERNAL MEDICINE

## 2019-01-16 PROCEDURE — 80048 BASIC METABOLIC PNL TOTAL CA: CPT | Performed by: INTERNAL MEDICINE

## 2019-01-16 PROCEDURE — 99214 OFFICE O/P EST MOD 30 MIN: CPT | Performed by: PHYSICIAN ASSISTANT

## 2019-01-16 ASSESSMENT — MIFFLIN-ST. JEOR
SCORE: 2154.83
SCORE: 2172.06

## 2019-01-16 NOTE — LETTER
2019    Sam Villatoro PA-C  [facility] [address]  [facility] [address]    RE: Gil Chowdary       Dear Colleague,    I had the pleasure of seeing Gil Chowdary in the [facility].        Th[facility]iology Clinic Visit  2019    Pt: Gil Chowdary  : 1960    Primary Cardiologist: Dr. Mckeon    HPI:  Mr. Chowdary is a delightful 58-year-old gentleman with past medical history significant for the followin.  Severe aortic stenosis with aortic valve replacement in 2017 with 25 mm Trifecta tissue valve.   2.  Type 2 diabetes, on insulin.   3.  Hyperlipidemia.   4.  Treated sleep apnea.   5.  Cor pulmonale versus heart failure with preserved EF.   6.  Recent iron deficiency anemia followed by abbi Vasquez.   7.  History of SVT and paroxysmal AFib, postoperative.  None on recent Zio Patch.   8.  Hx of myectomy at time of AVR, but no clear dx of HOCM     Mr. Chowdary was admitted in October with weight gain and shortness of breath and at the same time was found to have significant anemia.  He was diuresed from about 325 pounds to 310 pounds but left early as he needed to take care of his elderly mother with whom he lives.  We struggled to get him diuresed as an outpatient.  His weight eventually peaked at 329 pounds but then started dropping about 10 pounds a week with the addition of hydrochlorothiazide.  This has been complicated by the fact that he is wasting a lot of potassium, requiring massive potassium supplementation.  He had hives reaction to the spironolactone and was started on eplerenone by Dr. Krause over the holidays as his potassium continued to run low.  Given he was wasting so much potassium we had to stop his daily hydrochlorothiazide and switch him to qod.  With this his weights flatten off.        Because of this we brought him in to infusion clinic last week.  When he arrived his weight was 304 pounds after a 2  "mg IV bolus then 1 mg/h for 4-1/2 hours his weight had dropped to 299 pounds and he had 3 L of urine output with a net I/O of -2.5 L.  After his infusion he was seen by Dr. Butch Guallpa who did not make any pain changes.    He comes in today for follow-up.  He says that with the infusions his legs have softened quite a bit, this is the first time we have gotten his legs to really start softening and he is noticed this in his upper thighs.  His weight at home when he returned was 296 pounds and is down to 294 pounds today.  He notes that he did pretty well with his low-salt diet but one day he cheated and he said he just did not terribly and 8 at time he refuses to tell me what he actually ate.  He continues to deny orthopnea or PND he does feel like his scrotum is smaller and he continues to improve.  He says in general his weights are up and down over the days.  His bigger concern is he feels like his memory is poor and is perhaps worsened over the last several months lately he described having a difficult time finding words and more difficulty comprehending things as well as feeling out of balance and tipsy.  Today these things both seem better but they were bad 3-4 days prior to this.  He and past has had a workup for memory but he does not remember his last MRI.  He is willing to go see his primary care for this.    Social Hx:  He lives at home with his mom, who has dementia.  He has a sister and brother-in-law who are back from [place] and are staying and helping out with the family, so he can get medical care.  He is a former smoker, just a 20-pack-year history, quit in 2011.  No alcohol use.     Exam:  /60   Pulse 68   Ht 1.778 m (5' 10\")   Wt 133.4 kg (294 lb)   BMI 42.18 kg/m     GENERAL:  Well-developed, well-nourished, obese gentleman, in no acute distress.   HEENT:  Normocephalic, atraumatic.   HEART:  Regular in rate and rhythm.  I do not appreciate murmur or rub today.   LUNGS:  Clear, slightly " diminished in bases, but without wheezes or rales.   EXTREMITIES:  He continues to have 3+ pitting edema to his thighs although legs are softening.   ABDOMEN:  Soft.  He does have some very small healing wounds on his bilateral shins without significant erythema.         Assessment and Plan:  1.  Cor pulmonale versus heart failure with preserved EF that was likely triggered by profound anemia.  He has treated sleep apnea and is obese but this recent anemia really seem to trigger this recent volume overload.  Fortunately this is being managed by Dr. Fletcher and improving.  He diuresed fairly well initially on every day hydrochlorothiazide but required too much potassium as we have cut that back to every other day.     Infusion clinic last week and he dropped 5 pounds during that afternoon.  This is very effective and since then he is down another 2 pounds at home over the last 1 week.  This is less than we had hoped particularly because he did have some sodium noncompliance.  That being said he continues to make progress and mobilize fluid.  Creatinine is mildly elevated today to 1.4 but potassium remains normal.  At this point will continue current medications.  As I suspect he still has somewhere around 30 pounds of peripheral edema, may be 20 we will bring him back in for an infusion next week.  He will get a 2 mg IV bolus and then 1 mg of Bumex an hour for 4 hours.  He will continue with low-sodium diet and daily weights       2.  Anemia and thrombocytopenia followed by Dr. Fletcher, on iron with excellent improvement in his hemoglobin to 11.2 at last check.  He had previously been on Coumadin for episodes of AFib post-AVR, but none has been documented since on his Zio Patch.  At this point, we will keep him on aspirin only.      3.  Aortic valve replacement with a well-functioning bioprosthetic aortic valve with also septal myectomy for septal hypertrophy without LVOT gradient.  Remains on aspirin.      4.  Memory  loss, likely in some part due to on pump avr, although some of this preceded his surgery, but this is now worsening and he complains of problems finding words and increased confusion.  I have asked him to follow-up with his primary care provider Dr. Villatoro this week.  He has no acute signs of stroke.  His electrolytes are also normal so I do not think this is the cause.    5.  Sleep apnea, will need to get him rescheduled for sleep study to make sure his mask is working.  At this point is has many appointments and he would like to defer this will follow up next time.    Thank you for allowing me to participate in this patient's care will continue to follow him closely in clinic.      Rachna Holt PA-C 1/16/2019 11:18 AM      Orders this Visit:  Orders Placed This Encounter   Procedures     Basic metabolic panel     No orders of the defined types were placed in this encounter.    There are no discontinued medications.      Encounter Diagnoses   Name Primary?     (HFpEF) heart failure with preserved ejection fraction (H)      PAF (paroxysmal atrial fibrillation) (H) Yes       CURRENT MEDICATIONS:  Current Outpatient Medications   Medication Sig Dispense Refill     aspirin 81 MG tablet Take 81 mg by mouth every morning        atorvastatin (LIPITOR) 40 MG tablet Take 1 tablet (40 mg) by mouth daily 90 tablet 0     bumetanide (BUMEX) 1 MG tablet Take 3 tablets (3 mg) by mouth 2 times daily 180 tablet 3     clindamycin (CLEOCIN) 300 MG capsule Take 1 capsule (300 mg) by mouth as needed 10 capsule 3     eplerenone (INSPRA) 50 MG tablet Take 1 tablet (50 mg) by mouth 2 times daily 60 tablet 11     ferrous sulfate (IRON) 325 (65 Fe) MG tablet Take 1 tablet (325 mg) by mouth 2 times daily 100 tablet 3     hydrochlorothiazide (HYDRODIURIL) 25 MG tablet Take 25 mg by mouth every other day       insulin aspart (NOVOLOG FLEXPEN) 100 UNIT/ML injection Inject 4 Units Subcutaneous daily (with lunch)       insulin aspart (NOVOLOG  FLEXPEN) 100 UNIT/ML injection Inject 3 Units Subcutaneous 2 times daily (with meals) Takes with breakfast and dinner       Insulin Glargine (BASAGLAR KWIKPEN SC) Inject 22 Units Subcutaneous every morning        magnesium oxide (MAG-OX) 400 MG tablet Take 1 tablet (400 mg) by mouth daily 90 tablet 3     metoprolol (LOPRESSOR) 50 MG tablet Take 1 tablet (50 mg) by mouth 2 times daily 60 tablet 0     Multiple Vitamins-Minerals (CENTRUM ADULTS PO) Take 1 tablet by mouth every morning        pantoprazole (PROTONIX) 40 MG EC tablet Take 40 mg by mouth every morning (before breakfast)       potassium chloride ER (K-DUR/KLOR-CON M) 10 MEQ CR tablet Take 3 tablets (30 mEq) by mouth 2 times daily 180 tablet 0     prednisoLONE acetate (PRED FORTE) 1 % ophthalmic susp Place 1 drop into both eyes as needed (glaucoma)        timolol (TIMOPTIC) 0.5 % ophthalmic solution Place 1 drop into both eyes 2 times daily          ALLERGIES     Allergies   Allergen Reactions     Amoxicillin      Itchy      Penicillins Hives     Spironolactone Rash       PAST MEDICAL HISTORY:  Past Medical History:   Diagnosis Date     Former tobacco use      Glaucoma      Hyperlipidemia LDL goal <160 12/6/2011     Obesity      DRAKE on CPAP      Right bundle branch block      Severe aortic stenosis     On Echo 10/28/16       PAST SURGICAL HISTORY:  Past Surgical History:   Procedure Laterality Date     HEART CATH LEFT HEART CATH  04/07/2017    Diffuse non-obstuctive CAD     REPAIR VALVE AORTIC N/A 5/16/2017    Procedure: REPAIR VALVE AORTIC;  Median Sternotony, CardioPulmonary Bypass, Aortic Valve Replace using 25MM Trifecta Valve with [employer], Septal myectomy;  Surgeon: Jun Simon MD;  Location:  OR     REPLACE VALVE AORTIC N/A 5/16/2017    Procedure: REPLACE VALVE AORTIC;;  Surgeon: Jun Simon MD;  Location:  OR     SINUS SURGERY  2005       FAMILY HISTORY:  Family History   Problem Relation Age of Onset     Family  "History Negative Mother      Diabetes Father      Heart Surgery Father         CABG     Coronary Artery Disease Father      Hypertension Brother      Diabetes Brother      Heart Disease Brother      Heart Surgery Brother         CABG x 3     Family History Negative Sister      Diabetes Brother         History of diabetes, but no longer an issue     Family History Negative Brother        SOCIAL HISTORY:  Social History     Socioeconomic History     Marital status:      Spouse name: None     Number of children: None     Years of education: None     Highest education level: None   Social Needs     Financial resource strain: None     Food insecurity - worry: None     Food insecurity - inability: None     Transportation needs - medical: None     Transportation needs - non-medical: None   Occupational History     None   Tobacco Use     Smoking status: Former Smoker     Packs/day: 1.00     Years: 20.00     Pack years: 20.00     Types: Cigarettes, Cigars     Start date:      Last attempt to quit: 10/21/2011     Years since quittin.2     Smokeless tobacco: Never Used   Substance and Sexual Activity     Alcohol use: No     Drug use: No     Sexual activity: None   Other Topics Concern     Parent/sibling w/ CABG, MI or angioplasty before 65F 55M? Yes     Comment: brother triple bypass 49   Social History Narrative     None       Review of Systems:  Skin:  Negative     Eyes:  Positive for glasses  ENT:  Negative    Respiratory:  not assessed sleep apnea;CPAP  Cardiovascular:    edema;Positive for;fatigue  Gastroenterology: Positive for heartburn  Genitourinary:  Negative    Musculoskeletal:  Positive for arthritis;joint pain  Neurologic:  Positive for headaches;memory problems  Psychiatric:  Positive for excessive stress;anxiety;depression  Heme/Lymph/Imm:  Negative    Endocrine:  Positive for diabetes    Physical Exam:  Vitals: /60   Pulse 68   Ht 1.778 m (5' 10\")   Wt 133.4 kg (294 lb)   BMI 42.18 " kg/m      Please refer to dictation for physical exam    Recent Lab Results:  LIPID RESULTS:  Lab Results   Component Value Date    CHOL 126 10/10/2018    HDL 23 (A) 10/10/2018    LDL 71 10/10/2018    TRIG 230 (A) 10/10/2018       LIVER ENZYME RESULTS:  Lab Results   Component Value Date    AST 55 (H) 10/12/2018    ALT 31 10/12/2018       CBC RESULTS:  Lab Results   Component Value Date    WBC 5.5 12/12/2018    RBC 3.68 (L) 12/12/2018    HGB 11.2 (L) 01/02/2019    HCT 32.0 (L) 12/12/2018    MCV 87 12/12/2018    MCH 26.9 12/12/2018    MCHC 30.9 (L) 12/12/2018    RDW 19.7 (H) 12/12/2018     (L) 12/12/2018       BMP RESULTS:  Lab Results   Component Value Date     01/16/2019    POTASSIUM 3.9 01/16/2019    CHLORIDE 103 01/16/2019    CO2 27 01/16/2019    ANIONGAP 10.9 01/16/2019     (H) 01/16/2019    BUN 25 01/16/2019    CR 1.40 (H) 01/16/2019    GFRESTIMATED 52 (L) 01/16/2019    GFRESTBLACK 63 01/16/2019    MYRON 9.3 01/16/2019        A1C RESULTS:  Lab Results   Component Value Date    A1C 6.8 (H) 10/12/2018       INR RESULTS:  Lab Results   Component Value Date    INR 2.19 (H) 10/14/2018    INR 2.28 (H) 10/13/2018           CC  Jeremie Mckeon MD  [address]  [place] [id number]      ank you for allowing me to participate in the care of your patient.      Sincerely,     MAHNAZ ContehC     [facility]    cc:   Jeremie Mckeon MD  [address]  [place] [id number]

## 2019-01-16 NOTE — PROGRESS NOTES
Cardiology Clinic Visit  2019    Pt: Gil Chowdary  : 1960    Primary Cardiologist: Dr. Mckeon    HPI:  Mr. Chowdary is a delightful 58-year-old gentleman with past medical history significant for the followin.  Severe aortic stenosis with aortic valve replacement in 2017 with 25 mm Trifecta tissue valve.   2.  Type 2 diabetes, on insulin.   3.  Hyperlipidemia.   4.  Treated sleep apnea.   5.  Cor pulmonale versus heart failure with preserved EF.   6.  Recent iron deficiency anemia followed by abbi aVsquez.   7.  History of SVT and paroxysmal AFib, postoperative.  None on recent Zio Patch.   8.  Hx of myectomy at time of AVR, but no clear dx of HOCM     Mr. Chowdary was admitted in October with weight gain and shortness of breath and at the same time was found to have significant anemia.  He was diuresed from about 325 pounds to 310 pounds but left early as he needed to take care of his elderly mother with whom he lives.  We struggled to get him diuresed as an outpatient.  His weight eventually peaked at 329 pounds but then started dropping about 10 pounds a week with the addition of hydrochlorothiazide.  This has been complicated by the fact that he is wasting a lot of potassium, requiring massive potassium supplementation.  He had hives reaction to the spironolactone and was started on eplerenone by Dr. Krause over the holidays as his potassium continued to run low.  Given he was wasting so much potassium we had to stop his daily hydrochlorothiazide and switch him to qod.  With this his weights flatten off.        Because of this we brought him in to infusion clinic last week.  When he arrived his weight was 304 pounds after a 2 mg IV bolus then 1 mg/h for 4-1/2 hours his weight had dropped to 299 pounds and he had 3 L of urine output with a net I/O of -2.5 L.  After his infusion he was seen by Dr. Butch Guallpa who did not make any pain changes.    He comes in today for follow-up.  He says  "that with the infusions his legs have softened quite a bit, this is the first time we have gotten his legs to really start softening and he is noticed this in his upper thighs.  His weight at home when he returned was 296 pounds and is down to 294 pounds today.  He notes that he did pretty well with his low-salt diet but one day he cheated and he said he just did not terribly and 8 at time he refuses to tell me what he actually ate.  He continues to deny orthopnea or PND he does feel like his scrotum is smaller and he continues to improve.  He says in general his weights are up and down over the days.  His bigger concern is he feels like his memory is poor and is perhaps worsened over the last several months lately he described having a difficult time finding words and more difficulty comprehending things as well as feeling out of balance and tipsy.  Today these things both seem better but they were bad 3-4 days prior to this.  He and past has had a workup for memory but he does not remember his last MRI.  He is willing to go see his primary care for this.    Social Hx:  He lives at home with his mom, who has dementia.  He has a sister and brother-in-law who are back from [place] and are staying and helping out with the family, so he can get medical care.  He is a former smoker, just a 20-pack-year history, quit in 2011.  No alcohol use.     Exam:  /60   Pulse 68   Ht 1.778 m (5' 10\")   Wt 133.4 kg (294 lb)   BMI 42.18 kg/m    GENERAL:  Well-developed, well-nourished, obese gentleman, in no acute distress.   HEENT:  Normocephalic, atraumatic.   HEART:  Regular in rate and rhythm.  I do not appreciate murmur or rub today.   LUNGS:  Clear, slightly diminished in bases, but without wheezes or rales.   EXTREMITIES:  He continues to have 3+ pitting edema to his thighs although legs are softening.   ABDOMEN:  Soft.  He does have some very small healing wounds on his bilateral shins without significant erythema. "         Assessment and Plan:  1.  Cor pulmonale versus heart failure with preserved EF that was likely triggered by profound anemia.  He has treated sleep apnea and is obese but this recent anemia really seem to trigger this recent volume overload.  Fortunately this is being managed by Dr. Fletcher and improving.  He diuresed fairly well initially on every day hydrochlorothiazide but required too much potassium as we have cut that back to every other day.    Infusion clinic last week and he dropped 5 pounds during that afternoon.  This is very effective and since then he is down another 2 pounds at home over the last 1 week.  This is less than we had hoped particularly because he did have some sodium noncompliance.  That being said he continues to make progress and mobilize fluid.  Creatinine is mildly elevated today to 1.4 but potassium remains normal.  At this point will continue current medications.  As I suspect he still has somewhere around 30 pounds of peripheral edema, may be 20 we will bring him back in for an infusion next week.  He will get a 2 mg IV bolus and then 1 mg of Bumex an hour for 4 hours.  He will continue with low-sodium diet and daily weights       2.  Anemia and thrombocytopenia followed by Dr. Fletcher, on iron with excellent improvement in his hemoglobin to 11.2 at last check.  He had previously been on Coumadin for episodes of AFib post-AVR, but none has been documented since on his Zio Patch.  At this point, we will keep him on aspirin only.      3.  Aortic valve replacement with a well-functioning bioprosthetic aortic valve with also septal myectomy for septal hypertrophy without LVOT gradient.  Remains on aspirin.      4.  Memory loss, likely in some part due to on pump avr, although some of this preceded his surgery, but this is now worsening and he complains of problems finding words and increased confusion.  I have asked him to follow-up with his primary care provider Dr. Villatoro this week.   He has no acute signs of stroke.  His electrolytes are also normal so I do not think this is the cause.    5.  Sleep apnea, will need to get him rescheduled for sleep study to make sure his mask is working.  At this point is has many appointments and he would like to defer this will follow up next time.    Thank you for allowing me to participate in this patient's care will continue to follow him closely in clinic.      Rachna Holt PA-C 1/16/2019 11:18 AM      Orders this Visit:  Orders Placed This Encounter   Procedures     Basic metabolic panel     No orders of the defined types were placed in this encounter.    There are no discontinued medications.      Encounter Diagnoses   Name Primary?     (HFpEF) heart failure with preserved ejection fraction (H)      PAF (paroxysmal atrial fibrillation) (H) Yes       CURRENT MEDICATIONS:  Current Outpatient Medications   Medication Sig Dispense Refill     aspirin 81 MG tablet Take 81 mg by mouth every morning        atorvastatin (LIPITOR) 40 MG tablet Take 1 tablet (40 mg) by mouth daily 90 tablet 0     bumetanide (BUMEX) 1 MG tablet Take 3 tablets (3 mg) by mouth 2 times daily 180 tablet 3     clindamycin (CLEOCIN) 300 MG capsule Take 1 capsule (300 mg) by mouth as needed 10 capsule 3     eplerenone (INSPRA) 50 MG tablet Take 1 tablet (50 mg) by mouth 2 times daily 60 tablet 11     ferrous sulfate (IRON) 325 (65 Fe) MG tablet Take 1 tablet (325 mg) by mouth 2 times daily 100 tablet 3     hydrochlorothiazide (HYDRODIURIL) 25 MG tablet Take 25 mg by mouth every other day       insulin aspart (NOVOLOG FLEXPEN) 100 UNIT/ML injection Inject 4 Units Subcutaneous daily (with lunch)       insulin aspart (NOVOLOG FLEXPEN) 100 UNIT/ML injection Inject 3 Units Subcutaneous 2 times daily (with meals) Takes with breakfast and dinner       Insulin Glargine (BASAGLAR KWIKPEN SC) Inject 22 Units Subcutaneous every morning        magnesium oxide (MAG-OX) 400 MG tablet Take 1 tablet (400 mg)  by mouth daily 90 tablet 3     metoprolol (LOPRESSOR) 50 MG tablet Take 1 tablet (50 mg) by mouth 2 times daily 60 tablet 0     Multiple Vitamins-Minerals (CENTRUM ADULTS PO) Take 1 tablet by mouth every morning        pantoprazole (PROTONIX) 40 MG EC tablet Take 40 mg by mouth every morning (before breakfast)       potassium chloride ER (K-DUR/KLOR-CON M) 10 MEQ CR tablet Take 3 tablets (30 mEq) by mouth 2 times daily 180 tablet 0     prednisoLONE acetate (PRED FORTE) 1 % ophthalmic susp Place 1 drop into both eyes as needed (glaucoma)        timolol (TIMOPTIC) 0.5 % ophthalmic solution Place 1 drop into both eyes 2 times daily          ALLERGIES     Allergies   Allergen Reactions     Amoxicillin      Itchy      Penicillins Hives     Spironolactone Rash       PAST MEDICAL HISTORY:  Past Medical History:   Diagnosis Date     Former tobacco use      Glaucoma      Hyperlipidemia LDL goal <160 12/6/2011     Obesity      DRAKE on CPAP      Right bundle branch block      Severe aortic stenosis     On Echo 10/28/16       PAST SURGICAL HISTORY:  Past Surgical History:   Procedure Laterality Date     HEART CATH LEFT HEART CATH  04/07/2017    Diffuse non-obstuctive CAD     REPAIR VALVE AORTIC N/A 5/16/2017    Procedure: REPAIR VALVE AORTIC;  Median Sternotony, CardioPulmonary Bypass, Aortic Valve Replace using 25MM Trifecta Valve with [employer], Septal myectomy;  Surgeon: Jun Simon MD;  Location:  OR     REPLACE VALVE AORTIC N/A 5/16/2017    Procedure: REPLACE VALVE AORTIC;;  Surgeon: Jun Simon MD;  Location:  OR     SINUS SURGERY  2005       FAMILY HISTORY:  Family History   Problem Relation Age of Onset     Family History Negative Mother      Diabetes Father      Heart Surgery Father         CABG     Coronary Artery Disease Father      Hypertension Brother      Diabetes Brother      Heart Disease Brother      Heart Surgery Brother         CABG x 3     Family History Negative Sister   "    Diabetes Brother         History of diabetes, but no longer an issue     Family History Negative Brother        SOCIAL HISTORY:  Social History     Socioeconomic History     Marital status:      Spouse name: None     Number of children: None     Years of education: None     Highest education level: None   Social Needs     Financial resource strain: None     Food insecurity - worry: None     Food insecurity - inability: None     Transportation needs - medical: None     Transportation needs - non-medical: None   Occupational History     None   Tobacco Use     Smoking status: Former Smoker     Packs/day: 1.00     Years: 20.00     Pack years: 20.00     Types: Cigarettes, Cigars     Start date:      Last attempt to quit: 10/21/2011     Years since quittin.2     Smokeless tobacco: Never Used   Substance and Sexual Activity     Alcohol use: No     Drug use: No     Sexual activity: None   Other Topics Concern     Parent/sibling w/ CABG, MI or angioplasty before 65F 55M? Yes     Comment: brother triple bypass 49   Social History Narrative     None       Review of Systems:  Skin:  Negative     Eyes:  Positive for glasses  ENT:  Negative    Respiratory:  not assessed sleep apnea;CPAP  Cardiovascular:    edema;Positive for;fatigue  Gastroenterology: Positive for heartburn  Genitourinary:  Negative    Musculoskeletal:  Positive for arthritis;joint pain  Neurologic:  Positive for headaches;memory problems  Psychiatric:  Positive for excessive stress;anxiety;depression  Heme/Lymph/Imm:  Negative    Endocrine:  Positive for diabetes    Physical Exam:  Vitals: /60   Pulse 68   Ht 1.778 m (5' 10\")   Wt 133.4 kg (294 lb)   BMI 42.18 kg/m     Please refer to dictation for physical exam    Recent Lab Results:  LIPID RESULTS:  Lab Results   Component Value Date    CHOL 126 10/10/2018    HDL 23 (A) 10/10/2018    LDL 71 10/10/2018    TRIG 230 (A) 10/10/2018       LIVER ENZYME RESULTS:  Lab Results   Component " Value Date    AST 55 (H) 10/12/2018    ALT 31 10/12/2018       CBC RESULTS:  Lab Results   Component Value Date    WBC 5.5 12/12/2018    RBC 3.68 (L) 12/12/2018    HGB 11.2 (L) 01/02/2019    HCT 32.0 (L) 12/12/2018    MCV 87 12/12/2018    MCH 26.9 12/12/2018    MCHC 30.9 (L) 12/12/2018    RDW 19.7 (H) 12/12/2018     (L) 12/12/2018       BMP RESULTS:  Lab Results   Component Value Date     01/16/2019    POTASSIUM 3.9 01/16/2019    CHLORIDE 103 01/16/2019    CO2 27 01/16/2019    ANIONGAP 10.9 01/16/2019     (H) 01/16/2019    BUN 25 01/16/2019    CR 1.40 (H) 01/16/2019    GFRESTIMATED 52 (L) 01/16/2019    GFRESTBLACK 63 01/16/2019    MYRON 9.3 01/16/2019        A1C RESULTS:  Lab Results   Component Value Date    A1C 6.8 (H) 10/12/2018       INR RESULTS:  Lab Results   Component Value Date    INR 2.19 (H) 10/14/2018    INR 2.28 (H) 10/13/2018           CC  Jeremie Mckeon MD  [id number]  [place] [id number]

## 2019-01-16 NOTE — NURSING NOTE
The After Visit Summary was reviewed with the patient following their office visit with Rachna Holt PA-C. Patient was provided a medication schedule (copy scanned into Epic). Patient verbalized understanding of the information and agrees to call with any questions or concerns.     Labs: Lab results from today were reviewed with the patient during the office visit. A copy of the results were provided on the After Visit Summary.     Return appointment: Patient was scheduled for non-fasting labs on 9:40am on 1/23/19 and Diuretic Infusion Clinic at 10:00am on 1/23/19. See 1/16/19 documentation only encounter for further information regarding Diuretic Infusion on 1/23/19.        Sun Walker RN  [facility] Care Coordinator  [phone]

## 2019-01-16 NOTE — LETTER
2019    Sam Villatoro PA-C  [facility] [address]  [facility] [address]    RE: Gil Chowdary       Dear Colleague,    I had the pleasure of seeing Gil Chowdary in the [facility].    Cardiology Clinic Visit  2019    Pt: Gil Chowdary  : 1960    Primary Cardiologist: Dr. Mckeon    HPI:  Mr. Chowdary is a delightful 58-year-old gentleman with past medical history significant for the followin.  Severe aortic stenosis with aortic valve replacement in 2017 with 25 mm Trifecta tissue valve.   2.  Type 2 diabetes, on insulin.   3.  Hyperlipidemia.   4.  Treated sleep apnea.   5.  Cor pulmonale versus heart failure with preserved EF.   6.  Recent iron deficiency anemia followed by abbi Vasquez.   7.  History of SVT and paroxysmal AFib, postoperative.  None on recent Zio Patch.   8.  Hx of myectomy at time of AVR, but no clear dx of HOCM     Mr. Chowdary was admitted in October with weight gain and shortness of breath and at the same time was found to have significant anemia.  He was diuresed from about 325 pounds to 310 pounds but left early as he needed to take care of his elderly mother with whom he lives.  We struggled to get him diuresed as an outpatient.  His weight eventually peaked at 329 pounds but then started dropping about 10 pounds a week with the addition of hydrochlorothiazide.  This has been complicated by the fact that he is wasting a lot of potassium, requiring massive potassium supplementation.  He had hives reaction to the spironolactone and was started on eplerenone by Dr. Krause over the holidays as his potassium continued to run low.  Given he was wasting so much potassium we had to stop his daily hydrochlorothiazide and switch him to qod.  With this his weights flatten off.        Because of this we brought him in to infusion clinic last week.  When he arrived his weight was 304 pounds after a 2 mg IV  "bolus then 1 mg/h for 4-1/2 hours his weight had dropped to 299 pounds and he had 3 L of urine output with a net I/O of -2.5 L.  After his infusion he was seen by Dr. Butch Guallpa who did not make any pain changes.    He comes in today for follow-up.  He says that with the infusions his legs have softened quite a bit, this is the first time we have gotten his legs to really start softening and he is noticed this in his upper thighs.  His weight at home when he returned was 296 pounds and is down to 294 pounds today.  He notes that he did pretty well with his low-salt diet but one day he cheated and he said he just did not terribly and 8 at time he refuses to tell me what he actually ate.  He continues to deny orthopnea or PND he does feel like his scrotum is smaller and he continues to improve.  He says in general his weights are up and down over the days.  His bigger concern is he feels like his memory is poor and is perhaps worsened over the last several months lately he described having a difficult time finding words and more difficulty comprehending things as well as feeling out of balance and tipsy.  Today these things both seem better but they were bad 3-4 days prior to this.  He and past has had a workup for memory but he does not remember his last MRI.  He is willing to go see his primary care for this.    Social Hx:  He lives at home with his mom, who has dementia.  He has a sister and brother-in-law who are back from [place] and are staying and helping out with the family, so he can get medical care.  He is a former smoker, just a 20-pack-year history, quit in 2011.  No alcohol use.     Exam:  /60   Pulse 68   Ht 1.778 m (5' 10\")   Wt 133.4 kg (294 lb)   BMI 42.18 kg/m     GENERAL:  Well-developed, well-nourished, obese gentleman, in no acute distress.   HEENT:  Normocephalic, atraumatic.   HEART:  Regular in rate and rhythm.  I do not appreciate murmur or rub today.   LUNGS:  Clear, slightly " diminished in bases, but without wheezes or rales.   EXTREMITIES:  He continues to have 3+ pitting edema to his thighs although legs are softening.   ABDOMEN:  Soft.  He does have some very small healing wounds on his bilateral shins without significant erythema.         Assessment and Plan:  1.  Cor pulmonale versus heart failure with preserved EF that was likely triggered by profound anemia.  He has treated sleep apnea and is obese but this recent anemia really seem to trigger this recent volume overload.  Fortunately this is being managed by Dr. Fletcher and improving.  He diuresed fairly well initially on every day hydrochlorothiazide but required too much potassium as we have cut that back to every other day.     Infusion clinic last week and he dropped 5 pounds during that afternoon.  This is very effective and since then he is down another 2 pounds at home over the last 1 week.  This is less than we had hoped particularly because he did have some sodium noncompliance.  That being said he continues to make progress and mobilize fluid.  Creatinine is mildly elevated today to 1.4 but potassium remains normal.  At this point will continue current medications.  As I suspect he still has somewhere around 30 pounds of peripheral edema, may be 20 we will bring him back in for an infusion next week.  He will get a 2 mg IV bolus and then 1 mg of Bumex an hour for 4 hours.  He will continue with low-sodium diet and daily weights       2.  Anemia and thrombocytopenia followed by Dr. Fletcher, on iron with excellent improvement in his hemoglobin to 11.2 at last check.  He had previously been on Coumadin for episodes of AFib post-AVR, but none has been documented since on his Zio Patch.  At this point, we will keep him on aspirin only.      3.  Aortic valve replacement with a well-functioning bioprosthetic aortic valve with also septal myectomy for septal hypertrophy without LVOT gradient.  Remains on aspirin.      4.  Memory  loss, likely in some part due to on pump avr, although some of this preceded his surgery, but this is now worsening and he complains of problems finding words and increased confusion.  I have asked him to follow-up with his primary care provider Dr. Villatoro this week.  He has no acute signs of stroke.  His electrolytes are also normal so I do not think this is the cause.    5.  Sleep apnea, will need to get him rescheduled for sleep study to make sure his mask is working.  At this point is has many appointments and he would like to defer this will follow up next time.    Thank you for allowing me to participate in this patient's care will continue to follow him closely in clinic.      Rachna Holt PA-C 1/16/2019 11:18 AM      Orders this Visit:  Orders Placed This Encounter   Procedures     Basic metabolic panel     No orders of the defined types were placed in this encounter.    There are no discontinued medications.      Encounter Diagnoses   Name Primary?     (HFpEF) heart failure with preserved ejection fraction (H)      PAF (paroxysmal atrial fibrillation) (H) Yes       CURRENT MEDICATIONS:  Current Outpatient Medications   Medication Sig Dispense Refill     aspirin 81 MG tablet Take 81 mg by mouth every morning        atorvastatin (LIPITOR) 40 MG tablet Take 1 tablet (40 mg) by mouth daily 90 tablet 0     bumetanide (BUMEX) 1 MG tablet Take 3 tablets (3 mg) by mouth 2 times daily 180 tablet 3     clindamycin (CLEOCIN) 300 MG capsule Take 1 capsule (300 mg) by mouth as needed 10 capsule 3     eplerenone (INSPRA) 50 MG tablet Take 1 tablet (50 mg) by mouth 2 times daily 60 tablet 11     ferrous sulfate (IRON) 325 (65 Fe) MG tablet Take 1 tablet (325 mg) by mouth 2 times daily 100 tablet 3     hydrochlorothiazide (HYDRODIURIL) 25 MG tablet Take 25 mg by mouth every other day       insulin aspart (NOVOLOG FLEXPEN) 100 UNIT/ML injection Inject 4 Units Subcutaneous daily (with lunch)       insulin aspart (NOVOLOG  FLEXPEN) 100 UNIT/ML injection Inject 3 Units Subcutaneous 2 times daily (with meals) Takes with breakfast and dinner       Insulin Glargine (BASAGLAR KWIKPEN SC) Inject 22 Units Subcutaneous every morning        magnesium oxide (MAG-OX) 400 MG tablet Take 1 tablet (400 mg) by mouth daily 90 tablet 3     metoprolol (LOPRESSOR) 50 MG tablet Take 1 tablet (50 mg) by mouth 2 times daily 60 tablet 0     Multiple Vitamins-Minerals (CENTRUM ADULTS PO) Take 1 tablet by mouth every morning        pantoprazole (PROTONIX) 40 MG EC tablet Take 40 mg by mouth every morning (before breakfast)       potassium chloride ER (K-DUR/KLOR-CON M) 10 MEQ CR tablet Take 3 tablets (30 mEq) by mouth 2 times daily 180 tablet 0     prednisoLONE acetate (PRED FORTE) 1 % ophthalmic susp Place 1 drop into both eyes as needed (glaucoma)        timolol (TIMOPTIC) 0.5 % ophthalmic solution Place 1 drop into both eyes 2 times daily          ALLERGIES     Allergies   Allergen Reactions     Amoxicillin      Itchy      Penicillins Hives     Spironolactone Rash       PAST MEDICAL HISTORY:  Past Medical History:   Diagnosis Date     Former tobacco use      Glaucoma      Hyperlipidemia LDL goal <160 12/6/2011     Obesity      DRAKE on CPAP      Right bundle branch block      Severe aortic stenosis     On Echo 10/28/16       PAST SURGICAL HISTORY:  Past Surgical History:   Procedure Laterality Date     HEART CATH LEFT HEART CATH  04/07/2017    Diffuse non-obstuctive CAD     REPAIR VALVE AORTIC N/A 5/16/2017    Procedure: REPAIR VALVE AORTIC;  Median Sternotony, CardioPulmonary Bypass, Aortic Valve Replace using 25MM Trifecta Valve with [employer], Septal myectomy;  Surgeon: Jun Simon MD;  Location:  OR     REPLACE VALVE AORTIC N/A 5/16/2017    Procedure: REPLACE VALVE AORTIC;;  Surgeon: Jun Simon MD;  Location:  OR     SINUS SURGERY  2005       FAMILY HISTORY:  Family History   Problem Relation Age of Onset     Family  "History Negative Mother      Diabetes Father      Heart Surgery Father         CABG     Coronary Artery Disease Father      Hypertension Brother      Diabetes Brother      Heart Disease Brother      Heart Surgery Brother         CABG x 3     Family History Negative Sister      Diabetes Brother         History of diabetes, but no longer an issue     Family History Negative Brother        SOCIAL HISTORY:  Social History     Socioeconomic History     Marital status:      Spouse name: None     Number of children: None     Years of education: None     Highest education level: None   Social Needs     Financial resource strain: None     Food insecurity - worry: None     Food insecurity - inability: None     Transportation needs - medical: None     Transportation needs - non-medical: None   Occupational History     None   Tobacco Use     Smoking status: Former Smoker     Packs/day: 1.00     Years: 20.00     Pack years: 20.00     Types: Cigarettes, Cigars     Start date:      Last attempt to quit: 10/21/2011     Years since quittin.2     Smokeless tobacco: Never Used   Substance and Sexual Activity     Alcohol use: No     Drug use: No     Sexual activity: None   Other Topics Concern     Parent/sibling w/ CABG, MI or angioplasty before 65F 55M? Yes     Comment: brother triple bypass 49   Social History Narrative     None       Review of Systems:  Skin:  Negative     Eyes:  Positive for glasses  ENT:  Negative    Respiratory:  not assessed sleep apnea;CPAP  Cardiovascular:    edema;Positive for;fatigue  Gastroenterology: Positive for heartburn  Genitourinary:  Negative    Musculoskeletal:  Positive for arthritis;joint pain  Neurologic:  Positive for headaches;memory problems  Psychiatric:  Positive for excessive stress;anxiety;depression  Heme/Lymph/Imm:  Negative    Endocrine:  Positive for diabetes    Physical Exam:  Vitals: /60   Pulse 68   Ht 1.778 m (5' 10\")   Wt 133.4 kg (294 lb)   BMI 42.18 " kg/m      Please refer to dictation for physical exam    Recent Lab Results:  LIPID RESULTS:  Lab Results   Component Value Date    CHOL 126 10/10/2018    HDL 23 (A) 10/10/2018    LDL 71 10/10/2018    TRIG 230 (A) 10/10/2018       LIVER ENZYME RESULTS:  Lab Results   Component Value Date    AST 55 (H) 10/12/2018    ALT 31 10/12/2018       CBC RESULTS:  Lab Results   Component Value Date    WBC 5.5 12/12/2018    RBC 3.68 (L) 12/12/2018    HGB 11.2 (L) 01/02/2019    HCT 32.0 (L) 12/12/2018    MCV 87 12/12/2018    MCH 26.9 12/12/2018    MCHC 30.9 (L) 12/12/2018    RDW 19.7 (H) 12/12/2018     (L) 12/12/2018       BMP RESULTS:  Lab Results   Component Value Date     01/16/2019    POTASSIUM 3.9 01/16/2019    CHLORIDE 103 01/16/2019    CO2 27 01/16/2019    ANIONGAP 10.9 01/16/2019     (H) 01/16/2019    BUN 25 01/16/2019    CR 1.40 (H) 01/16/2019    GFRESTIMATED 52 (L) 01/16/2019    GFRESTBLACK 63 01/16/2019    MYRON 9.3 01/16/2019        A1C RESULTS:  Lab Results   Component Value Date    A1C 6.8 (H) 10/12/2018       INR RESULTS:  Lab Results   Component Value Date    INR 2.19 (H) 10/14/2018    INR 2.28 (H) 10/13/2018                 Thank you for allowing me to participate in the care of your patient.    Sincerely,     Rachna Holt PAGerardoC     [facility]

## 2019-01-16 NOTE — PATIENT INSTRUCTIONS
Please call CORE nurse for any questions or concerns:       [phone]      Please make an appointment with Dr. Villatoro to talk more about your memory and imbalance- you may need a scan of your head.      We'll plan on an infusion next Wednesday.  We'll do labs in our clinic before you go downstairs.       1. Medication changes:  Continue current medications.      2.  Weigh yourself daily and write it down.     3. Call CORE nurse if your weight is up more than 2 pounds in one day, or 5 pounds in one week.    4. Call CORE nurse if you feel more short of breath, have more abdominal bloating or leg swelling.    5. Eat a low sodium diet (less than 2,000mg daily). If you eat less salt, you will retain less fluid.     6. Results:   Component      Latest Ref Rng & Units 1/16/2019   Sodium      136 - 145 mmol/L 137   Potassium      3.5 - 5.1 mmol/L 3.9   Chloride      98 - 107 mmol/L 103   Carbon Dioxide      23 - 29 mmol/L 27   Anion Gap      6 - 17 mmol/L 10.9   Glucose      70 - 105 mg/dL 231 (H)   Urea Nitrogen      7 - 30 mg/dL 25   Creatinine      0.70 - 1.30 mg/dL 1.40 (H)   GFR Estimate      >60 mL/min/1.73:m2 52 (L)   GFR Estimate If Black      >60 mL/min/1.73:m2 63   Calcium      8.5 - 10.5 mg/dL 9.3       7.  Follow up: next week in infusion clinic.      Scheduling phone number: [phone]  Reminder: Please bring in all current medications, over the counter supplements and vitamin bottles to your next appointment.

## 2019-01-16 NOTE — PROGRESS NOTES
Called care suites and informed [facility] that pt has been added to Diuretic Infusion Clinic at 10:00am on 1/23/19, per SIRENA Babcock's recommendation after OV today.      Will plan to enter orders in care suites encounter for buemx 2mg IV push and bumex infusion 1mg/hour for 4 hours once care suites encounter has been created.      Reminder sent to CORE board to call pt on 1/22 and remind pt of plan for lab appt and infusion appt on 1/23 and to remind pt to take all AM medications except for bumex, as he was instructed to do prior to last diuretic infusion on 1/9/19.     Argenis RN 4:42 PM 1/16/2019

## 2019-01-17 DIAGNOSIS — I50.30 HEART FAILURE WITH PRESERVED EJECTION FRACTION (H): Primary | ICD-10-CM

## 2019-01-22 ENCOUNTER — DOCUMENTATION ONLY (OUTPATIENT)
Dept: CARDIOLOGY | Facility: CLINIC | Age: 59
End: 2019-01-22

## 2019-01-22 NOTE — PROGRESS NOTES
Received Social Security disability paperwork from [employer] [facility] requesting SIRENA Babcock to complete and sign.     Faxed completed and signed paperwork to [phone] and copy of paperwork scanned into [facility].    YOANNA More 8:54 AM 1/22/2019

## 2019-01-22 NOTE — PROGRESS NOTES
Received callback from pt confirming he is planning to come for 9:40am appt at [facility] tomorrow and will then check-in at [facility] for 10:00am diuretic infusion clinic.  Pt confirms he will take all AM meds tomorrow AM except bumex.      YOANNA More 9:37 AM 1/22/2019

## 2019-01-22 NOTE — PROGRESS NOTES
Called pt to remind him of non-fasting lab appt at [facility] at 9:40am tomorrow and 10:00am infusion appt at [facility].  Also reminded pt to take all AM medications tomorrow except bumex.  Requested return call to confirm he received VM and instructions understood.    YOANNA More 9:03 AM 1/22/2019

## 2019-01-23 ENCOUNTER — HOSPITAL ENCOUNTER (OUTPATIENT)
Facility: CLINIC | Age: 59
Discharge: HOME | End: 2019-01-23
Attending: INTERNAL MEDICINE | Admitting: INTERNAL MEDICINE
Payer: COMMERCIAL

## 2019-01-23 VITALS
OXYGEN SATURATION: 99 % | HEIGHT: 71 IN | HEART RATE: 64 BPM | DIASTOLIC BLOOD PRESSURE: 55 MMHG | WEIGHT: 291 LBS | BODY MASS INDEX: 40.74 KG/M2 | SYSTOLIC BLOOD PRESSURE: 111 MMHG | RESPIRATION RATE: 18 BRPM | TEMPERATURE: 97.2 F

## 2019-01-23 DIAGNOSIS — I50.30 (HFPEF) HEART FAILURE WITH PRESERVED EJECTION FRACTION (H): ICD-10-CM

## 2019-01-23 DIAGNOSIS — I50.30 (HFPEF) HEART FAILURE WITH PRESERVED EJECTION FRACTION (H): Primary | ICD-10-CM

## 2019-01-23 DIAGNOSIS — R25.2 MUSCLE CRAMP: ICD-10-CM

## 2019-01-23 DIAGNOSIS — I50.30 HEART FAILURE WITH PRESERVED EJECTION FRACTION (H): ICD-10-CM

## 2019-01-23 LAB
ANION GAP SERPL CALCULATED.3IONS-SCNC: 10.7 MMOL/L (ref 6–17)
BUN SERPL-MCNC: 28 MG/DL (ref 7–30)
CALCIUM SERPL-MCNC: 9.5 MG/DL (ref 8.5–10.5)
CHLORIDE SERPL-SCNC: 101 MMOL/L (ref 98–107)
CO2 SERPL-SCNC: 28 MMOL/L (ref 23–29)
CREAT SERPL-MCNC: 1.58 MG/DL (ref 0.7–1.3)
GFR SERPL CREATININE-BSD FRML MDRD: 45 ML/MIN/{1.73_M2}
GLUCOSE SERPL-MCNC: 135 MG/DL (ref 70–105)
MAGNESIUM SERPL-MCNC: 2.7 MG/DL (ref 1.6–2.3)
POTASSIUM SERPL-SCNC: 3.7 MMOL/L (ref 3.4–5.3)
POTASSIUM SERPL-SCNC: 3.7 MMOL/L (ref 3.5–5.1)
SODIUM SERPL-SCNC: 136 MMOL/L (ref 136–145)

## 2019-01-23 PROCEDURE — 96366 THER/PROPH/DIAG IV INF ADDON: CPT

## 2019-01-23 PROCEDURE — 84132 ASSAY OF SERUM POTASSIUM: CPT | Performed by: INTERNAL MEDICINE

## 2019-01-23 PROCEDURE — 36415 COLL VENOUS BLD VENIPUNCTURE: CPT | Performed by: INTERNAL MEDICINE

## 2019-01-23 PROCEDURE — 96365 THER/PROPH/DIAG IV INF INIT: CPT

## 2019-01-23 PROCEDURE — 25000125 ZZHC RX 250: Performed by: PHYSICIAN ASSISTANT

## 2019-01-23 PROCEDURE — 96367 TX/PROPH/DG ADDL SEQ IV INF: CPT

## 2019-01-23 PROCEDURE — 96375 TX/PRO/DX INJ NEW DRUG ADDON: CPT

## 2019-01-23 PROCEDURE — 25000128 H RX IP 250 OP 636: Performed by: PHYSICIAN ASSISTANT

## 2019-01-23 PROCEDURE — 40000859 ZZH STATISTIC IV OP-OVERFLOW

## 2019-01-23 PROCEDURE — 83735 ASSAY OF MAGNESIUM: CPT | Performed by: PHYSICIAN ASSISTANT

## 2019-01-23 PROCEDURE — 80048 BASIC METABOLIC PNL TOTAL CA: CPT | Performed by: PHYSICIAN ASSISTANT

## 2019-01-23 PROCEDURE — 99215 OFFICE O/P EST HI 40 MIN: CPT | Performed by: PHYSICIAN ASSISTANT

## 2019-01-23 RX ORDER — POTASSIUM CHLORIDE 1.5 G/1.58G
20-40 POWDER, FOR SOLUTION ORAL
Status: DISCONTINUED | OUTPATIENT
Start: 2019-01-23 | End: 2019-01-23 | Stop reason: HOSPADM

## 2019-01-23 RX ORDER — POTASSIUM CL/LIDO/0.9 % NACL 10MEQ/0.1L
10 INTRAVENOUS SOLUTION, PIGGYBACK (ML) INTRAVENOUS
Status: DISCONTINUED | OUTPATIENT
Start: 2019-01-23 | End: 2019-01-23 | Stop reason: HOSPADM

## 2019-01-23 RX ORDER — BUMETANIDE 0.25 MG/ML
2 INJECTION INTRAMUSCULAR; INTRAVENOUS ONCE
Status: COMPLETED | OUTPATIENT
Start: 2019-01-23 | End: 2019-01-23

## 2019-01-23 RX ORDER — POTASSIUM CHLORIDE 29.8 MG/ML
20 INJECTION INTRAVENOUS
Status: DISCONTINUED | OUTPATIENT
Start: 2019-01-23 | End: 2019-01-23 | Stop reason: HOSPADM

## 2019-01-23 RX ORDER — POTASSIUM CHLORIDE 7.45 MG/ML
10 INJECTION INTRAVENOUS
Status: DISCONTINUED | OUTPATIENT
Start: 2019-01-23 | End: 2019-01-23 | Stop reason: HOSPADM

## 2019-01-23 RX ORDER — POTASSIUM CHLORIDE 1500 MG/1
20-40 TABLET, EXTENDED RELEASE ORAL
Status: DISCONTINUED | OUTPATIENT
Start: 2019-01-23 | End: 2019-01-23 | Stop reason: HOSPADM

## 2019-01-23 RX ORDER — MAGNESIUM SULFATE HEPTAHYDRATE 40 MG/ML
4 INJECTION, SOLUTION INTRAVENOUS EVERY 4 HOURS PRN
Status: DISCONTINUED | OUTPATIENT
Start: 2019-01-23 | End: 2019-01-23 | Stop reason: HOSPADM

## 2019-01-23 RX ORDER — MAGNESIUM SULFATE HEPTAHYDRATE 40 MG/ML
2 INJECTION, SOLUTION INTRAVENOUS DAILY PRN
Status: DISCONTINUED | OUTPATIENT
Start: 2019-01-23 | End: 2019-01-23 | Stop reason: HOSPADM

## 2019-01-23 RX ORDER — LIDOCAINE 40 MG/G
CREAM TOPICAL
Status: DISCONTINUED | OUTPATIENT
Start: 2019-01-23 | End: 2019-01-23 | Stop reason: HOSPADM

## 2019-01-23 RX ADMIN — BUMETANIDE 2 MG: 0.25 INJECTION INTRAMUSCULAR; INTRAVENOUS at 10:06

## 2019-01-23 RX ADMIN — BUMETANIDE 1 MG/HR: 0.25 INJECTION INTRAMUSCULAR; INTRAVENOUS at 10:24

## 2019-01-23 ASSESSMENT — MIFFLIN-ST. JEOR
SCORE: 2157.1
SCORE: 2164.81

## 2019-01-23 NOTE — DISCHARGE INSTRUCTIONS
Stop taking hydrochlorothiazide.     Keep eating healthy and walking on the treadmill.      Let's check labs in 1 week to make sure your kidney function is stable and potassium is ok.      Keep weighing yourself everyday.  If your weight is going up at ALL, please call!

## 2019-01-23 NOTE — IP AVS SNAPSHOT
[place] [address] [place] [address]  Phone:  [phone]                                    After Visit Summary   1/23/2019    Gil Chowdary    MRN: [id number]           After Visit Summary Signature Page    I have received my discharge instructions, and my questions have been answered. I have discussed any challenges I see with this plan with the nurse or doctor.    ..........................................................................................................................................  Patient/Patient Representative Signature      ..........................................................................................................................................  Patient Representative Print Name and Relationship to Patient    ..................................................               ................................................  Date                                   Time    ..........................................................................................................................................  Reviewed by Signature/Title    ...................................................              ..............................................  Date                                               Time          22EPIC Rev 08/18

## 2019-01-23 NOTE — PROGRESS NOTES
1522 Reviewed AVS with pt. States understanding. Pt has question regarding whether he should take his nightly dose of Bumex. He forgot to ask and cant remember if he was told. Call to Rachna Schmitt, will update pt and write on AVS.  1545 Discussed with Rachna Schmitt regarding pts evening dose of Bumex. Pt is to take this dose. Pt informed and written on pts AVS. States understanding. Ready for discharge awaiting cab.

## 2019-01-23 NOTE — PROGRESS NOTES
CORE DIURETIC INFUSION VISIT  Gil Chowdary  : 1960    Date: 2019  Primary Cardiologist: Dr. Mckeon      HPI:  Mr. Chowdary is a delightful 58-year-old gentleman with past medical history significant for the followin.  Severe aortic stenosis with aortic valve replacement in 2017 with 25 mm Trifecta tissue valve.   2.  Type 2 diabetes, on insulin.   3.  Hyperlipidemia.   4.  Treated sleep apnea.   5.  Cor pulmonale versus heart failure with preserved EF.   6.  Recent iron deficiency anemia followed by abbi Vasquez.   7.  History of SVT and paroxysmal AFib, postoperative.  None on recent Zio Patch.   8.  Hx of myectomy at time of AVR, but no clear dx of HOCM     Mr. Chowdary was admitted in October with weight gain and shortness of breath and at the same time was found to have significant anemia.  He was diuresed from about 325 pounds to 310 pounds but left early as he needed to take care of his elderly mother with whom he lives.  We struggled to get him diuresed as an outpatient.  His weight eventually peaked at 329 pounds but then started dropping about 10 pounds a week with the addition of hydrochlorothiazide.  This has been complicated by the fact that he is wasting a lot of potassium, requiring massive potassium supplementation.  He had hives reaction to the spironolactone and was started on eplerenone by Dr. Krause over the holidays as his potassium continued to run low.  Given he was wasting so much potassium we had to stop his daily hydrochlorothiazide and switch him to qod.  With this his weights flatten off.         Because of this we brought him in to infusion clinic , he had 3L of uop and dropped 5 pounds from 304 to 299.      After his last visit his weight dropped only 2 pounds in about 10 days in spite of high dose diuretics.   In addition, he had some sodium indiscretion during this time.  Given his platuea we elected to bring him back in for infusion clinic today.   "    He overall feels ok, legs are getting softer, breathing keeps getting better even though he did not know was bad.  He has worked on the treadmill a little bit but also had some sodium indiscretion.  He continues to struggle with memory issues and tells me that his primary told him to go to a psychologist for memory training.  He also complains of tremors in his hands.  These seem to come and go may be around for minutes or hours and are not necessarily at the same time of day.  He is not Very good track of what they are.  He denies orthopnea or PND he has not had chest pain.       Social Hx:  He lives at home with his mom, who has dementia.  He has a sister and brother-in-law who are back from [place] and are staying and helping out with the family, so he can get medical care.  He is a former smoker, just a 20-pack-year history, quit in 2011.  No alcohol use.     Physical Exam:  Vitals: /55   Pulse 64   Temp 97.2  F (36.2  C) (Oral)   Resp 18   Ht 1.803 m (5' 11\")   Wt 132 kg (291 lb)   SpO2 99%   BMI 40.59 kg/m     GENERAL:  Well-developed, well-nourished, obese gentleman, in no acute distress.   HEENT:  Normocephalic, atraumatic.   HEART:  Regular in rate and rhythm.  I do not appreciate murmur or rub today.   LUNGS:  Clear, slightly diminished in bases, but without wheezes or rales.   EXTREMITIES:  He continues to have 3+ pitting edema to his thighs although legs are softening.   ABDOMEN:  Soft.  He does have some very small healing wounds on his bilateral shins without significant erythema.     ASSESSMENT & PLAN  1.  Cor pulmonale versus heart failure with preserved EF that was likely triggered by profound anemia.  He has treated sleep apnea and is obese but this recent anemia really seem to trigger this recent volume overload.  Fortunately this is being managed by Dr. Fletcher and improving.  As he successfully diuresed with infusion clinic last week and plateaued in between we elected to bring him " in for infusion clinic today.  He received a 2 mg IV Bumex bolus and then 1 mg an hour for 4 hours.  With this his weight dropped 2 pounds and he had urine output of approximately he diuresed fairly well with 2.6 L uop, net I/0 of - 1.9 L and wt dropped approximately 2 pounds.  This is not quite as impressive as his initial diuresis but I think it will still be helpful.  I still suspect he has about 20 pounds of edema left to remove although it will likely get more more difficult in order to mobilize it.  His creatinine has been climbing and as such I am going to decrease his outpatient diuretics.  He will stop hydrochlorothiazide completely remain on Bumex 3 mg twice daily and eplerenone 50 mg twice daily.  This is always been the goal that we do simply are known as an adjunct instead of the hydrochlorothiazide and that way he will require less potassium.  Potassium did remain stable today and if possible will work on discontinuing over the next coming weeks.  We will continue with daily weights, low-sodium food and increase his exercise as he is able.     2.  Anemia and thrombocytopenia followed by Dr. Fletcher, on iron with excellent improvement in his hemoglobin to 11.2 at last check.  He had previously been on Coumadin for episodes of AFib post-AVR, but none has been documented since on his Zio Patch.  At this point, we will keep him on aspirin only.      3.  Aortic valve replacement with a well-functioning bioprosthetic aortic valve with also septal myectomy for septal hypertrophy without LVOT gradient.  Remains on aspirin.      4.  Memory loss, likely in some part due to on pump avr, although some of this preceded his surgery, but this is now worsening and he complains of problems finding words and increased confusion. He tells me he has followed up with Dr. Rashid and was recommended for psychology and memory training.       5.  Sleep apnea, will need to get him rescheduled for sleep study to make sure his mask  is working.  At this point is has many appointments and he would like to defer this will follow up next time.     Given change of medications and increase in creatinine will repeat a BMP next week he will then follow-up as already scheduled with Dr. Mckeon.  Thank you for allowing me to participate in this patient's care will continue to follow him closely in clinic.     Infusion details:  Bumex bolus 2mg then 1 mg/h gtt x 5 hours for a total of 7 mg total.      Vitals:    01/23/19 0918 01/23/19 1429   Weight: 132.8 kg (292 lb 11.2 oz) 132 kg (291 lb)       Intake/Output Summary (Last 24 hours) at 1/23/2019 1643  Last data filed at 1/23/2019 1450  Gross per 24 hour   Intake 630 ml   Output 2575 ml   Net -1945 ml       Rachna Eliz More 1/23/2019 2:46 PM      Orders this Visit:  Orders Placed This Encounter   Procedures     Potassium     Magnesium     Magnesium     Potassium     Vital Signs     Pulse oximetry nursing     Weigh patient     Measure urine output     Patient Teaching: Heart Failure     Peripheral IV catheter     Activity: Up ad mitchell     Notify Provider     Notify Provider     May discharge when: other (specify in comments) May be discharged after the completion of the diuretic infusion: IF the CORE Congestive Heart Failure Provider has given consent to discharge AND Systolic blood pressure is greater than or equal to 9...     Discharge planning     I have reviewed and agree with all the recommendations and orders detailed in this document.     Oxygen: Nasal cannula     Discharge patient     Orders Placed This Encounter   Medications     lidocaine 1 % 1 mL     lidocaine (LMX4) cream     sodium chloride (PF) 0.9% PF flush 3 mL     sodium chloride (PF) 0.9% PF flush 3 mL     potassium chloride ER (K-DUR/KLOR-CON M) CR tablet 20-40 mEq     potassium chloride (KLOR-CON) Packet 20-40 mEq     potassium chloride 10 mEq in 100 mL sterile water intermittent infusion (premix)     potassium chloride 10 mEq in 100 mL  intermittent infusion with 10 mg lidocaine     potassium chloride 20 mEq in 50 mL intermittent infusion     magnesium sulfate 2 g in water intermittent infusion     magnesium sulfate 4 g in 100 mL sterile water (premade)     bumetanide (BUMEX) injection 2 mg     bumetanide (BUMEX) 0.25 mg/mL infusion     Medications Discontinued During This Encounter   Medication Reason     hydrochlorothiazide (HYDRODIURIL) 25 MG tablet Stop at Discharge         CURRENT MEDICATIONS:  Current Facility-Administered Medications   Medication     lidocaine (LMX4) cream     lidocaine 1 % 1 mL     magnesium sulfate 2 g in water intermittent infusion     magnesium sulfate 4 g in 100 mL sterile water (premade)     potassium chloride (KLOR-CON) Packet 20-40 mEq     potassium chloride 10 mEq in 100 mL intermittent infusion with 10 mg lidocaine     potassium chloride 10 mEq in 100 mL sterile water intermittent infusion (premix)     potassium chloride 20 mEq in 50 mL intermittent infusion     potassium chloride ER (K-DUR/KLOR-CON M) CR tablet 20-40 mEq     sodium chloride (PF) 0.9% PF flush 3 mL     sodium chloride (PF) 0.9% PF flush 3 mL     Current Outpatient Medications   Medication Sig     aspirin 81 MG tablet Take 81 mg by mouth every morning      atorvastatin (LIPITOR) 40 MG tablet Take 1 tablet (40 mg) by mouth daily     bumetanide (BUMEX) 1 MG tablet Take 3 tablets (3 mg) by mouth 2 times daily     eplerenone (INSPRA) 50 MG tablet Take 1 tablet (50 mg) by mouth 2 times daily     ferrous sulfate (IRON) 325 (65 Fe) MG tablet Take 1 tablet (325 mg) by mouth 2 times daily     insulin aspart (NOVOLOG FLEXPEN) 100 UNIT/ML injection Inject 3 Units Subcutaneous 2 times daily (with meals) Takes with breakfast and dinner     Insulin Glargine (BASAGLAR KWIKPEN SC) Inject 22 Units Subcutaneous every morning      magnesium oxide (MAG-OX) 400 MG tablet Take 1 tablet (400 mg) by mouth daily     metoprolol (LOPRESSOR) 50 MG tablet Take 1 tablet (50 mg)  by mouth 2 times daily     Multiple Vitamins-Minerals (CENTRUM ADULTS PO) Take 1 tablet by mouth every morning      pantoprazole (PROTONIX) 40 MG EC tablet Take 40 mg by mouth every morning (before breakfast)     potassium chloride ER (K-DUR/KLOR-CON M) 10 MEQ CR tablet Take 3 tablets (30 mEq) by mouth 2 times daily     prednisoLONE acetate (PRED FORTE) 1 % ophthalmic susp Place 1 drop into both eyes as needed (glaucoma)      timolol (TIMOPTIC) 0.5 % ophthalmic solution Place 1 drop into both eyes 2 times daily      clindamycin (CLEOCIN) 300 MG capsule Take 1 capsule (300 mg) by mouth as needed     insulin aspart (NOVOLOG FLEXPEN) 100 UNIT/ML injection Inject 4 Units Subcutaneous daily (with lunch)       ALLERGIES     Allergies   Allergen Reactions     Amoxicillin      Itchy      Penicillins Hives     Spironolactone Rash       PAST MEDICAL HISTORY:  Past Medical History:   Diagnosis Date     Former tobacco use      Glaucoma      Hyperlipidemia LDL goal <160 12/6/2011     Obesity      DRAKE on CPAP      Right bundle branch block      Severe aortic stenosis     On Echo 10/28/16       PAST SURGICAL HISTORY:  Past Surgical History:   Procedure Laterality Date     HEART CATH LEFT HEART CATH  04/07/2017    Diffuse non-obstuctive CAD     REPAIR VALVE AORTIC N/A 5/16/2017    Procedure: REPAIR VALVE AORTIC;  Median Sternotony, CardioPulmonary Bypass, Aortic Valve Replace using 25MM Trifecta Valve with [employer], Septal myectomy;  Surgeon: Jun Simon MD;  Location: [facility] OR     REPLACE VALVE AORTIC N/A 5/16/2017    Procedure: REPLACE VALVE AORTIC;;  Surgeon: Jun Simon MD;  Location: [facility] OR     SINUS SURGERY  2005       FAMILY HISTORY:  Family History   Problem Relation Age of Onset     Family History Negative Mother      Diabetes Father      Heart Surgery Father         CABG     Coronary Artery Disease Father      Hypertension Brother      Diabetes Brother      Heart Disease Brother      Heart  Surgery Brother         CABG x 3     Family History Negative Sister      Diabetes Brother         History of diabetes, but no longer an issue     Family History Negative Brother        SOCIAL HISTORY:  Social History     Socioeconomic History     Marital status:      Spouse name: Not on file     Number of children: Not on file     Years of education: Not on file     Highest education level: Not on file   Social Needs     Financial resource strain: Not on file     Food insecurity - worry: Not on file     Food insecurity - inability: Not on file     Transportation needs - medical: Not on file     Transportation needs - non-medical: Not on file   Occupational History     Not on file   Tobacco Use     Smoking status: Former Smoker     Packs/day: 1.00     Years: 20.00     Pack years: 20.00     Types: Cigarettes, Cigars     Start date:      Last attempt to quit: 10/21/2011     Years since quittin.2     Smokeless tobacco: Never Used   Substance and Sexual Activity     Alcohol use: No     Drug use: No     Sexual activity: Not on file   Other Topics Concern     Parent/sibling w/ CABG, MI or angioplasty before 65F 55M? Yes     Comment: brother triple bypass 49   Social History Narrative     Not on file       Review of Systems:  10 point review of systems negative except as listed in HPI.    Recent Lab Results:  LIPID RESULTS:  Lab Results   Component Value Date    CHOL 126 10/10/2018    HDL 23 (A) 10/10/2018    LDL 71 10/10/2018    TRIG 230 (A) 10/10/2018       LIVER ENZYME RESULTS:  Lab Results   Component Value Date    AST 55 (H) 10/12/2018    ALT 31 10/12/2018       CBC RESULTS:  Lab Results   Component Value Date    WBC 5.5 2018    RBC 3.68 (L) 2018    HGB 11.2 (L) 2019    HCT 32.0 (L) 2018    MCV 87 2018    MCH 26.9 2018    MCHC 30.9 (L) 2018    RDW 19.7 (H) 2018     (L) 2018       BMP RESULTS:  Lab Results   Component Value Date      01/23/2019    POTASSIUM 3.7 01/23/2019    CHLORIDE 101 01/23/2019    CO2 28 01/23/2019    ANIONGAP 10.7 01/23/2019     (H) 01/23/2019    BUN 28 01/23/2019    CR 1.58 (H) 01/23/2019    GFRESTIMATED 45 (L) 01/23/2019    GFRESTBLACK 55 (L) 01/23/2019    MYRON 9.5 01/23/2019        A1C RESULTS:  Lab Results   Component Value Date    A1C 6.8 (H) 10/12/2018       INR RESULTS:  Lab Results   Component Value Date    INR 2.19 (H) 10/14/2018    INR 2.28 (H) 10/13/2018         CC  No referring provider defined for this encounter.

## 2019-01-23 NOTE — IP AVS SNAPSHOT
MRN:[id number]                      After Visit Summary   1/23/2019    Gil Chowdary    MRN: [id number]           Visit Information        Department      1/23/2019  9:12 AM [facility]          Review of your medicines      CONTINUE these medicines which have NOT CHANGED       Dose / Directions   aspirin 81 MG tablet  Commonly known as:  ASA      Dose:  81 mg  Take 81 mg by mouth every morning  Refills:  0     atorvastatin 40 MG tablet  Commonly known as:  LIPITOR  Used for:  Hyperlipidemia with target LDL less than 70      Dose:  40 mg  Take 1 tablet (40 mg) by mouth daily  Quantity:  90 tablet  Refills:  0     BASAGLAR KWIKPEN SC      Dose:  22 Units  Inject 22 Units Subcutaneous every morning  Refills:  0     bumetanide 1 MG tablet  Commonly known as:  BUMEX  Used for:  S/P AVR (aortic valve replacement)      Dose:  3 mg  Take 3 tablets (3 mg) by mouth 2 times daily  Quantity:  180 tablet  Refills:  3     CENTRUM ADULTS PO      Dose:  1 tablet  Take 1 tablet by mouth every morning  Refills:  0     clindamycin 300 MG capsule  Commonly known as:  CLEOCIN  Used for:  S/P AVR (aortic valve replacement)      Dose:  300 mg  Take 1 capsule (300 mg) by mouth as needed  Quantity:  10 capsule  Refills:  3     eplerenone 50 MG tablet  Commonly known as:  INSPRA  Used for:  Heart failure with preserved ejection fraction, NYHA class I (H)      Dose:  50 mg  Take 1 tablet (50 mg) by mouth 2 times daily  Quantity:  60 tablet  Refills:  11     ferrous sulfate 325 (65 Fe) MG tablet  Commonly known as:  FEROSUL  Used for:  Other iron deficiency anemia      Dose:  325 mg  Take 1 tablet (325 mg) by mouth 2 times daily  Quantity:  100 tablet  Refills:  3     magnesium oxide 400 MG tablet  Commonly known as:  MAG-OX  Used for:  Muscle cramp      Dose:  400 mg  Take 1 tablet (400 mg) by mouth daily  Quantity:  90 tablet  Refills:  3     metoprolol tartrate 50 MG tablet  Commonly known as:   LOPRESSOR  Used for:  Paroxysmal supraventricular tachycardia (H), S/P AVR (aortic valve replacement)      Dose:  50 mg  Take 1 tablet (50 mg) by mouth 2 times daily  Quantity:  60 tablet  Refills:  0     * NovoLOG FLEXPEN 100 UNIT/ML pen  Generic drug:  insulin aspart      Dose:  3 Units  Inject 3 Units Subcutaneous 2 times daily (with meals) Takes with breakfast and dinner  Refills:  0     * NovoLOG FLEXPEN 100 UNIT/ML pen  Generic drug:  insulin aspart      Dose:  4 Units  Inject 4 Units Subcutaneous daily (with lunch)  Refills:  0     pantoprazole 40 MG EC tablet  Commonly known as:  PROTONIX      Dose:  40 mg  Take 40 mg by mouth every morning (before breakfast)  Refills:  0     potassium chloride ER 10 MEQ CR tablet  Commonly known as:  K-DUR/KLOR-CON M  Used for:  (HFpEF) heart failure with preserved ejection fraction (H)      Dose:  30 mEq  Take 3 tablets (30 mEq) by mouth 2 times daily  Quantity:  180 tablet  Refills:  0     prednisoLONE acetate 1 % ophthalmic suspension  Commonly known as:  PRED FORTE      Dose:  1 drop  Place 1 drop into both eyes as needed (glaucoma)  Refills:  0     timolol maleate 0.5 % ophthalmic solution  Commonly known as:  TIMOPTIC      Dose:  1 drop  Place 1 drop into both eyes 2 times daily  Refills:  0         * This list has 2 medication(s) that are the same as other medications prescribed for you. Read the directions carefully, and ask your doctor or other care provider to review them with you.            STOP taking    hydrochlorothiazide 25 MG tablet  Commonly known as:  HYDRODIURIL                 Protect others around you: Learn how to safely use, store and throw away your medicines at www.disposemymeds.org.       Follow-ups after your visit       Your next 10 appointments already scheduled    Feb 12, 2019 10:15 AM CST  Return Visit with Jeremie Mckeon MD  [facility] ([facility]) [address] [place] [address]  "[id number]  [phone] OPT 2   Apr 03, 2019  9:30 AM CDT  Return Visit with  LAB DRAW 1  [facility] ([facility]) Formerly [facility] [place] Ivana Levine[address]  [place] [id number]  [phone]   Apr 10, 2019  9:45 AM CDT  Return Visit with Juan Fletcher MD  [facility] ([facility]) [facility]ly [facility] Catie Lev[address]  [place] [address]  [phone]      Future tests that were ordered for you     Basic metabolic panel  (Ca, Cl, CO2, Creat, Gluc, K, Na, BUN)        Care Instructions       Further instructions from your care team       Stop taking hydrochlorothiazide.     Keep eating healthy and walking on the treadmill.      Let's check labs in 1 week to make sure your kidney function is stable and potassium is ok.      Keep weighing yourself everyday.  If your weight is going up at ALL, please call!      Statement of Approval (From admission, onward)    Ordered          01/23/19 1827  I have reviewed and agree with all the recommendations and orders detailed in this document.  EFFECTIVE NOW     Approved and electronically signed by:  Rachna Holt PA-C             Additional Information About Your Visit       [facility] Information    [facility] gives you secure access to your electronic health record. If you see a primary care provider, you can also send messages to your care team and make appointments. If you have questions, please call your primary care clinic.  If you do not have a primary care provider, please call [phone] and they will assist you.       Care EveryWhere ID    This is your Care EveryWhere ID. This could be used by other organizations to access your [place] medical records  [id number]       Your Vitals Were     Blood Pressure   111/55          Pulse   64          Temperature   97.2  F (36.2  C) (Oral)          Respirations   18          Height   1.803 m (5' 11\")       "       Weight   132 kg (291 lb)    Pulse Oximetry   99%    BMI (Body Mass Index)   40.59 kg/m           Primary Care Provider Office Phone # Fax #    Sam Villatoro PA-C [phone]      Equal Access to Services    [place] : Hadii aad ku hadludyo Soomaali, waaxda luqadaha, qaybta kaalmada adeegyada, waxay idiin haysolisn adeki christiansen la'soliskaitlyn . So [facility] [phone].    ATENCIÓN: Si habla español, tiene a cross disposición servicios gratuitos de asistencia lingüística. Llame al [phone].    We comply with applicable federal civil rights laws and [place] laws. We do not discriminate on the basis of race, color, national origin, age, disability, sex, sexual orientation, or gender identity.           Thank you!    Thank you for choosing [facility] for your care. Our goal is always to provide you with excellent care. Hearing back from our patients is one way we can continue to improve our services. Please take a few minutes to complete the written survey that you may receive in the mail after you visit with us. Thank you!            Medication List      Medications       Morning Afternoon Evening Bedtime As Needed   aspirin 81 MG tablet  Also known as:  ASA  INSTRUCTIONS:  Take 81 mg by mouth every morning                    atorvastatin 40 MG tablet  Also known as:  LIPITOR  INSTRUCTIONS:  Take 1 tablet (40 mg) by mouth daily                    BASAGLAR KWIKPEN SC  INSTRUCTIONS:  Inject 22 Units Subcutaneous every morning                    bumetanide 1 MG tablet  Also known as:  BUMEX  INSTRUCTIONS:  Take 3 tablets (3 mg) by mouth 2 times daily  LAST TAKEN:  Ask your nurse or doctor                    CENTRUM ADULTS PO  INSTRUCTIONS:  Take 1 tablet by mouth every morning                    clindamycin 300 MG capsule  Also known as:  CLEOCIN  INSTRUCTIONS:  Take 1 capsule (300 mg) by mouth as needed  Doctor's comments:  Take 2 one hour prior to dental proceedures                    eplerenone 50 MG  tablet  Also known as:  INSPRA  INSTRUCTIONS:  Take 1 tablet (50 mg) by mouth 2 times daily                    ferrous sulfate 325 (65 Fe) MG tablet  Also known as:  FEROSUL  INSTRUCTIONS:  Take 1 tablet (325 mg) by mouth 2 times daily                    magnesium oxide 400 MG tablet  Also known as:  MAG-OX  INSTRUCTIONS:  Take 1 tablet (400 mg) by mouth daily                    metoprolol tartrate 50 MG tablet  Also known as:  LOPRESSOR  INSTRUCTIONS:  Take 1 tablet (50 mg) by mouth 2 times daily                    * NovoLOG FLEXPEN 100 UNIT/ML pen  INSTRUCTIONS:  Inject 3 Units Subcutaneous 2 times daily (with meals) Takes with breakfast and dinner  Generic drug:  insulin aspart                    * NovoLOG FLEXPEN 100 UNIT/ML pen  INSTRUCTIONS:  Inject 4 Units Subcutaneous daily (with lunch)  Generic drug:  insulin aspart                    pantoprazole 40 MG EC tablet  Also known as:  PROTONIX  INSTRUCTIONS:  Take 40 mg by mouth every morning (before breakfast)                    potassium chloride ER 10 MEQ CR tablet  Also known as:  K-DUR/KLOR-CON M  INSTRUCTIONS:  Take 3 tablets (30 mEq) by mouth 2 times daily                    prednisoLONE acetate 1 % ophthalmic suspension  Also known as:  PRED FORTE  INSTRUCTIONS:  Place 1 drop into both eyes as needed (glaucoma)                    timolol maleate 0.5 % ophthalmic solution  Also known as:  TIMOPTIC  INSTRUCTIONS:  Place 1 drop into both eyes 2 times daily                        * This list has 2 medication(s) that are the same as other medications prescribed for you. Read the directions carefully, and ask your doctor or other care provider to review them with you.

## 2019-01-25 ENCOUNTER — CARE COORDINATION (OUTPATIENT)
Dept: LAB | Facility: CLINIC | Age: 59
End: 2019-01-25

## 2019-01-25 DIAGNOSIS — I50.9 CHF (CONGESTIVE HEART FAILURE) (H): Primary | ICD-10-CM

## 2019-01-25 NOTE — PROGRESS NOTES
"Incoming call from patient requesting lab appt for BMP. He was treated in the infusion clinic on 1/23 and seen by QUIN Saucedo. Per her plan notes:  Given change of medications and increase in creatinine will repeat a BMP next week he will then follow-up as already scheduled with Dr. Mckeon.    He will have the BMP done on Tues 1/29 - appt scheduled and order entered.     Reviewed progress since infusion clinic. Per Epic review Rachna Holt notes from that appt:   He received a 2 mg IV Bumex bolus and then 1 mg an hour for 4 hours.  With this his weight dropped 2 pounds and he had urine output of approximately he diuresed fairly well with 2.6 L uop, net I/0 of - 1.9 L and wt dropped approximately 2 pounds.  This is not quite as impressive as his initial diuresis but I think it will still be helpful.  I still suspect he has about 20 pounds of edema left to remove although it will likely get more more difficult in order to mobilize it.  His creatinine has been climbing and as such I am going to decrease his outpatient diuretics.  He will stop hydrochlorothiazide completely remain on Bumex 3 mg twice daily and eplerenone 50 mg twice daily.     Patient stated that he is feeling well. He feels that his legs continues to improve, \"they are not as tight as they once were.\" He added that his scrotum is \"nearly back to normal.\" I confirmed with him the medication adjustments as per note above.   Recent home weights:       1/25 288#  1/24 290#  1/23 193#    Will send note to board to watch for BMP results on 1/29. Patient verbalized understanding and had no other questions.  Jaqueline Miller RN on 1/25/2019 at 3:43 PM            "

## 2019-01-29 NOTE — PROGRESS NOTES
Incoming call from Adolfo. He's unable to come for BMP today d/t extreme cold weather/car won't start.     He told me he feels well, his edema is slowing improving. He denied any dyspnea and said he can comfortably exert himself w/o issue. Wt yesterday he thinks was 286#.    I asked him to call if he has wt gain >3# in 24 hours, or increase in swelling, new dyspnea, dizziness, dry mouth, or if wt reaches 281# or less. We scheduled him for BMP next wk 2/5-reminder sent to RN board. He agreed to plan.    Karen Stark RN BSN   8:00 AM 01/29/19

## 2019-01-29 NOTE — LETTER
2/16/2017      RE: Gil Chowdary  [address] [place] [address]       Dear Colleague,    Thank you for the opportunity to participate in the care of your patient, Gil Chowdary, at the [facility] at [facility]. Please see a copy of my visit note below.    ASKED BY REFERRING PHYSICIAN: Dr Guru herrera see patient in consultation of surgical treatment of AS.     CHIEF COMPLAINT: activity tolerance diminishing, SOB and fatigue     HPI:   Gil is a 56 year old male who on recent echocardiogram study showed a calcific aortic valve stenosis, a mean gradient of 53 mmHg, a calculated aortic valve area of 0.8 cm squared and trace aortic insufficiency. The left ventricle was normal size. There was mild concentric LVH and, not surprisingly, diastolic dysfunction of the LV. There was mild left atrial dilation associated with this.     He tells me that he had been noticing his activity tolerance diminishing to some degree, though he was not really describing any chest pain or shortness of breath per se.  He did not have any lightheadedness or dizziness, no palpitations, orthopnea, or paroxysmal nocturnal dyspnea.  He was not complaining of any lower extremity edema at all either.       PAST MEDICAL HISTORY:  Past Medical History   Diagnosis Date     Heart murmur previously undiagnosed      Hyperlipidemia LDL goal <160 12/6/2011     Right bundle branch block      Severe aortic stenosis      On Echo 10/28/16       PAST SURGICAL HISTORY:  No past surgical history on file.    FAMILY HISTORY:   Family History   Problem Relation Age of Onset     DIABETES Father      Hypertension Brother      DIABETES Brother      HEART DISEASE Brother 49     TRIPLE BYPASS       SOCIAL HISTORY:  Social History     Social History     Marital status:      Spouse name: N/A     Number of children: N/A     Years of education: N/A     Occupational History     Not on file.  "    Social History Main Topics     Smoking status: Former Smoker     Packs/day: 1.00     Years: 20.00     Types: Cigarettes, Cigars     Quit date: 10/21/2011     Smokeless tobacco: Never Used     Alcohol use No     Drug use: No     Sexual activity: Yes     Partners: Female     Other Topics Concern     Parent/Sibling W/ Cabg, Mi Or Angioplasty Before 65f 55m? Yes     brother triple bypass 49     Social History Narrative        ALLERGIES:   Allergies   Allergen Reactions     Amoxicillin      Itchy      Penicillins Hives       CURRENT MEDICATIONS:   Prescription Medications as of 2/16/2017             prednisoLONE acetate (PRED FORTE) 1 % ophthalmic susp INSTILL 1 DROP INTO RIGHT THREE TIME DAILY    timolol (TIMOPTIC) 0.5 % ophthalmic solution 1 drop 1 DROP INTO BOTH EYES 2 TIMES DAILY    aspirin 81 MG tablet Take 81 mg by mouth daily    vitamin D (ERGOCALCIFEROL) 36999 UNIT capsule 1 capsule by mouth weekly.    Omega-3 Fatty Acids (FISH OIL PO) Take  by mouth daily.          ROS:  Constitutional: No fever, chills, or sweats. No weight gain/loss.   HEENT: No visual disturbance, ear ache, epistaxis, sore throat.   Allergies/Immunologic: Negative.   Respiratory: No cough, hemoptysis.   Cardiovascular: As per HPI.   GI: No nausea, vomiting, hematemesis, melena, or hematochezia.   : No urinary frequency, dysuria, or hematuria.   Integument: No rash.   Psychiatric: No anxiety / depression.   Neuro: No speech disturbance, focal sensory or motor deficit.   Endocrinology: No polyuria / polyphagia.   Musculoskeletal: No myalgia.      PHYSICAL EXAMINATION:   /83 (BP Location: Right arm, Patient Position: Chair, Cuff Size: Adult Large)  Pulse 102  Ht 1.803 m (5' 11\")  Wt 110.6 kg (243 lb 12.8 oz)  SpO2 95%  BMI 34 kg/m2  General: alert and oriented x 3, pleasant, no acute distress  CV: S1 S2, there is systolic murmur; no rubs or gallops, regular rate and rhythm, no peripheral edema, no carotid or abdomenal bruits, pulses " in upper and lower extremities palpable  Pulm: bilateral breath sounds, clear to auscultation, easy work of breathing  GI: (+) bowel sounds, soft non-tender and non-distended  : voiding without problems  MS: moves all extremities x 4,  5+/5+ equal strength bilaterally  Neuro: pupils equal round and reactive to light, cranial nerves, II-XII grossly intact, no gross neurologic deficits noted    LABS:  BMP RESULTS:  Lab Results   Component Value Date     10/22/2016    POTASSIUM 4.5 10/22/2016    CHLORIDE 99 10/22/2016    GLC 87 10/22/2016    BUN 11 10/22/2016    CR 0.80 10/22/2016    MYRON 9.0 10/22/2016        CBC RESULTS:  No results found for: WBC, RBC, HGB, HCT, MCV, MCH, MCHC, RDW, PLT    No results found for: INR  No results found for: PTT  No results found for: UA  No results found for: A1C    PROCEDURES/IMAGING:  ECHOCARDIOGRAM: 02/10/17  Interpretation Summary     Severe valvular aortic stenosis.  Left ventricular systolic function is normal.  The study was technically difficult.     Left Ventricle  The left ventricle is normal in size. There is moderate asymmetric septal hypertrophy. Images and measurments are technically difficult. Left ventricular systolic function is normal. The visual ejection fraction is estimated at 55-60%. Left ventricular diastolic function is indeterminate. E by E prime ratio is between 8 and 15, which is indeterminate for assessment of left ventricular filling pressures. No regional wall motion abnormalities noted.     Right Ventricle  The right ventricle is grossly normal size. The right ventricular systolic function is normal.     Atria  The left atrium is not well visualized. The left atrium is mildly dilated. Right atrium not well visualized. Right atrial size is normal. A patent foramen ovale is suspected.     Mitral Valve  The mitral valve is not well visualized. There is no mitral regurgitation noted. Trivial mitral stenosis. The mean mitral valve gradient is 1.6  72 year-old female with h/o HTN, hemorrhagic stroke(2009) with residual R-sided hemiplegia, expressive aphasia presenting from [place] with fever, diarrhea, abdominal pain. Sepsis workup. Start treatment for C-Diff, send stool. mmHg.     Tricuspid Valve  The tricuspid valve is not well visualized. There is physiologic tricuspid regurgitation. Right ventricular systolic pressure could not be approximated due to inadequate tricuspid regurgitation. Normal IVC (1.5-2.5cm) with <50% respiratory collapse; right atrial pressure is estimated at 10-15mmHg.      Aortic Valve  The aortic valve is not well visualized. There is mild (1+) aortic regurgitation. The calculated aortic valve are is 0.58 cm^2. The peak AoV pressure gradient is 91.8 mmHg. The mean AoV pressure gradient is 53.7 mmHg. Severe valvular aortic stenosis.      Pulmonic Valve  The pulmonic valve is not well visualized. There is no pulmonic valvular  regurgitation. Normal pulmonic valve velocity.     Vessels  The aortic root is normal size. Normal size ascending aorta. The inferior vena cava is not dilated.     Pericardium  Small pericardial effusion.     Rhythm  The rhythm was normal sinus.     MMode/2D Measurements & Calculations  IVSd: 1.5 cm  LVIDd: 3.8 cm  LVIDs: 2.6 cm  LVPWd: 1.0 cm  FS: 32.6 %  EDV(Teich): 63.8 ml  ESV(Teich): 24.4 ml  LV mass(C)d: 165.8 grams  Ao root diam: 3.2 cm  LA dimension: 3.6 cm  asc Aorta Diam: 3.0 cm  LA/Ao: 1.1  LVOT diam: 2.0 cm  LVOT area: 3.0 cm2    Doppler Measurements & Calculations  MV E max magali: 76.1 cm/sec  MV A max magali: 76.8 cm/sec  MV E/A: 0.99  MV max PG: 3.4 mmHg  MV mean P.6 mmHg  MV V2 VTI: 25.5 cm  MVA(VTI): 2.3 cm2  MV dec time: 0.21 sec  Ao V2 max: 479.2 cm/sec  Ao max P.8 mmHg  Ao V2 mean: 349.9 cm/sec  Ao mean P.7 mmHg  Ao V2 VTI: 102.3 cm  MACARENA(I,D): 0.58 cm2  MACARENA(V,D): 0.63 cm2  AI P1/2t: 615.1 msec  LV V1 max P.0 mmHg  LV V1 max: 99.8 cm/sec  LV V1 VTI: 19.4 cm  SV(LVOT): 59.0 ml  PA acc time: 0.12 sec  MACARENA Index (cm2/m2): 0.26  Lateral E/e': 10.9  Medial E/e': 11.2     ECHOCARDIOGRAM: 10/28/16  Scanned into chart      ASSESSMENT AND PLAN:    Gil is a 56 year old male presents with activity intolerance  diminishing and fatigue.  Cardiac work up revealed severe AS with symptoms.    Recommend surgical AVR.     Valve choices were discussed with the patient, mechanical and bioprosthetic. Risks and benefits of both explained. Patient accepts these and prefers a bioprosthetic valve.     Risks and benefits of surgery were discussed with patient. Patient accepts these risks and is willing to proceed with surgery.      Approximately 50 minutes spent with this case including review of the clinical history and data; discussion with the patient and his family and coordination of the care     Thank you for including me in the care of this kind patient. Do not hesitate to contact me with any questions.     Dr. Jun Simon   Cardiothoracic Surgery  [phone] pager  [phone] office        CC  Patient Care Team:  Sam Villatoro [id number] as PCP - General  Guru Salinas MD as MD (Cardiology)

## 2019-02-04 NOTE — PROGRESS NOTES
Received VM from pt asking to reschedule lab appt for tomorrow as he needs to take his mom to an appt at same time. Note pt is scheduled for BMP after completing HF infusion clinic on 1/23/19. He was unable to come last wk d/t extreme cold weather.    Called pt. LVM requesting call back to reschedule lab appt to today.     Note he has follow-up scheduled for 2/12 w/ Dr. Mckeon.    Karen Stark RN BSN   10:42 AM 02/04/19

## 2019-02-04 NOTE — PROGRESS NOTES
Spoke w/ pt. He agreed to reschedule BMP on 2/7/19. I reminded him of 2/12 OV w/ Dr. Mckeon.     Pt told me he's feeling quite well, has been getting regular exercise using treadmill. He told me he has no trouble breathing and thinks his swelling has improved. He told me his wt today is 290#. When I told him last wk his wt was 286#, he said that may not have been accurate and it was an outlier, readings have been generally 290-292# at home in last wk. When he last did infusion clinic 1/23, his discharge weight was 291#.     Will await 2/7 BMP results--reminder sent to RN board.    Karen Stark RN BSN   4:05 PM 02/04/19

## 2019-02-07 NOTE — PROGRESS NOTES
Lab rescheduled for 9:20am on 2/12 prior to his appt w/ Dr Tomlinson. LM w/date and time of this rescheduled appt.  Jaqueline Miller RN on 2/7/2019 at 8:56 AM

## 2019-02-07 NOTE — PROGRESS NOTES
Incoming message from patient stating that he is unable to some in today for his BMP d/t insurance issues. He will reschedule this lab for 2/12, the day he is scheduled to see Dr Mckeon.  Jaqueline Miller, YOANNA on 2/7/2019 at 8:12 AM

## 2019-02-21 ENCOUNTER — OFFICE VISIT (OUTPATIENT)
Dept: CARDIOLOGY | Facility: CLINIC | Age: 59
End: 2019-02-21
Attending: INTERNAL MEDICINE
Payer: MEDICAID

## 2019-02-21 VITALS
WEIGHT: 297.5 LBS | SYSTOLIC BLOOD PRESSURE: 104 MMHG | HEART RATE: 57 BPM | HEIGHT: 69 IN | DIASTOLIC BLOOD PRESSURE: 67 MMHG | BODY MASS INDEX: 44.06 KG/M2

## 2019-02-21 DIAGNOSIS — I50.30 (HFPEF) HEART FAILURE WITH PRESERVED EJECTION FRACTION (H): ICD-10-CM

## 2019-02-21 PROCEDURE — 99214 OFFICE O/P EST MOD 30 MIN: CPT | Performed by: INTERNAL MEDICINE

## 2019-02-21 ASSESSMENT — MIFFLIN-ST. JEOR: SCORE: 2154.83

## 2019-02-21 NOTE — LETTER
2/21/2019    Sam Villatoro PA-C  [facility] [address]  [facility] [address]    RE: Gil Chowdary       Dear Colleague,    I had the pleasure of seeing Gil Chowdary in the [facility].    CARDIOLOGY NEW CORE CLINIC CONSULTATION    REASON FOR CONSULT: Diastolic heart failure    PRIMARY CARE PHYSICIAN:  Sam Villatoro    HISTORY OF PRESENT ILLNESS:    I had the pleasure of seeing Mr. Chowdary in cardiology outpatient heart failure clinic.  He has been followed by Dr. Salinas in the past and will now resume his care with me.  He is a pleasant 58-year-old gentleman with past medical history of severe aortic stenosis with aortic valve replacement in 2017 with right 25 mm tissue valve, type 2 diabetes on insulin, hyperlipidemia, sleep apnea on CPAP, heart failure with preserved ejection fraction, recent iron deficiency anemia for which she is being followed by Dr. Fletcher, history of SVT and paroxysmal A. fib with no recurrence on recent patch.     The patient was admitted with exacerbation of diastolic heart failure in October 2018.   The patient cares for his elderly mother and did not want to stay in the hospital for a long period of time and had to be discharged before becoming euvolemic.  Since then he has been followed on an outpatient basis to manage CHF symptoms/Volume overload.  This is necessitated weekly clinic visits with titration of medication and several sessions of IV Bumex with good response.    He was recently seen in the clinic on 1/2/2019 and during that visit the Metoprolol was increased to 50 mg daily and potassium dose was decreased.  He was scheduled to undergo IV Bumex to keep him euvolemic.  The patient underwent these procedures yesterday with a good clinical response.  He reports that he diuresed well and urinated over 5 L. However, interestingly his weight remains unchanged from his last visit on 1/2/2018.  However he does  feelbetter and his edema is also not as severe as before.  He however does report cramping in his hands and legs occasionally.  He tries to restrict his salt in diet and water intake.  He plans to exercise and eat healthy.  He has been taking his medications as prescribed      PAST MEDICAL HISTORY:  Past Medical History:   Diagnosis Date     Former tobacco use      Glaucoma      Hyperlipidemia LDL goal <160 12/6/2011     Obesity      DRAKE on CPAP      Right bundle branch block      Severe aortic stenosis     On Echo 10/28/16       MEDICATIONS:  Current Outpatient Medications   Medication     aspirin 81 MG tablet     atorvastatin (LIPITOR) 40 MG tablet     bumetanide (BUMEX) 1 MG tablet     clindamycin (CLEOCIN) 300 MG capsule     eplerenone (INSPRA) 50 MG tablet     ferrous sulfate (IRON) 325 (65 Fe) MG tablet     insulin aspart (NOVOLOG FLEXPEN) 100 UNIT/ML injection     insulin aspart (NOVOLOG FLEXPEN) 100 UNIT/ML injection     Insulin Glargine (BASAGLAR KWIKPEN SC)     magnesium oxide (MAG-OX) 400 MG tablet     metoprolol (LOPRESSOR) 50 MG tablet     Multiple Vitamins-Minerals (CENTRUM ADULTS PO)     pantoprazole (PROTONIX) 40 MG EC tablet     prednisoLONE acetate (PRED FORTE) 1 % ophthalmic susp     timolol (TIMOPTIC) 0.5 % ophthalmic solution     No current facility-administered medications for this visit.        ALLERGIES:  Allergies   Allergen Reactions     Amoxicillin      Itchy      Penicillins Hives     Spironolactone Rash       SOCIAL HISTORY:  I have reviewed this patient's social history and updated it with pertinent information if needed. Gil BLACK mireya  reports that he quit smoking about 7 years ago. His smoking use included cigarettes and cigars. He started smoking about 40 years ago. He has a 20.00 pack-year smoking history. he has never used smokeless tobacco. He reports that he does not drink alcohol or use drugs.    FAMILY HISTORY:  I have reviewed this patient's family history and updated it  with pertinent information if needed.   Family History   Problem Relation Age of Onset     Family History Negative Mother      Diabetes Father      Heart Surgery Father         CABG     Coronary Artery Disease Father      Hypertension Brother      Diabetes Brother      Heart Disease Brother      Heart Surgery Brother         CABG x 3     Family History Negative Sister      Diabetes Brother         History of diabetes, but no longer an issue     Family History Negative Brother        REVIEW OF SYSTEMS:  Skin:  Negative     Eyes:  Positive for glasses;glaucoma  ENT:  Negative    Respiratory:  Positive for sleep apnea;CPAP  Cardiovascular:  Negative for;palpitations;chest pain;exercise intolerance;fatigue;lightheadedness;dizziness edema  Gastroenterology: Positive for heartburn  Genitourinary:  Negative    Musculoskeletal:  Positive for arthritis;joint pain  Neurologic:  Positive for memory problems  Psychiatric:  Positive for excessive stress;anxiety;depression  Heme/Lymph/Imm:  Negative allergies  Endocrine:  Positive for diabetes      PHYSICAL EXAM:                     Vital Signs with Ranges     297 lbs 8 oz    Constitutional: awake, alert, no distress  Head: normocephalic,atraumatic   Eyes: PERRL, sclera nonicteric  ENT: trachea midline, neck supple  Respiratory:  Normal bilateral breath sounds in all respiratory fields.  No wheezing or crackles.  Cardiac:  S1-S2 were heard, there is 1 out of 6 systolic ejection murmur at the right upper sternal border which does not any changes with respiration.  2+ pulses all extremities                                       GI: nondistended, nontender, bowel sounds present  Skin: dry, no rash  Musculoskeletal: good muscle tone, strength 5/5 in upper and lower extremities, no clubbing  1+ bilateral pitting edema  Neurologic: no focal deficits  Neuropsychiatric: appropriate affact    DATA:  Labs: Pertinent cardiac labs reviewed  Admission on 01/09/2019, Discharged on 01/09/2019    Component Date Value Ref Range Status     Urea Nitrogen 01/09/2019 20  7 - 30 mg/dL Final     Creatinine 01/09/2019 1.10  0.66 - 1.25 mg/dL Final     GFR Estimate 01/09/2019 73  >60 mL/min/[1.73_m2] Final    Comment: Non  GFR Calc  Starting 12/18/2018, serum creatinine based estimated GFR (eGFR) will be   calculated using the Chronic Kidney Disease Epidemiology Collaboration   (CKD-EPI) equation.       GFR Estimate If Black 01/09/2019 85  >60 mL/min/clinical next week she is that she try to do is treat impression of the great that was thank you so much thank you Final    Comment:  GFR Calc  Starting 12/18/2018, serum creatinine based estimated GFR (eGFR) will be   calculated using the Chronic Kidney Disease Epidemiology Collaboration   (CKD-EPI) equation.       Potassium 01/09/2019 3.9  3.4 - 5.3 mmol/L Final     Sodium 01/09/2019 140  133 - 144 mmol/L Final     Magnesium 01/09/2019 2.6* 1.6 - 2.3 mg/dL Final     Potassium 01/09/2019 3.7  3.4 - 5.3 mmol/L Final       ASSESSMENT:  This is a 58-year-old gentleman with past medical history as stated above who is being followed in our heart failure clinic for management of diastolic heart failure which has been difficult to manage due to inability to tolerate diuretics.  We have made some changes to his diuretics and he currently seems to be moving the right direction.     CHF with volume overload  - I will continue the current medications at the current doses.  We will not make any changes to his diuretic regimen today since it seems he derived signifncant benefit from the IV infusion of Bumex.   - I will have him come back and see Yvrose Oskar in clinic in a week and he will follow-up with me in a month.  We will consider increasing/changing his diuretics regimen if there are signs of worsening CHF.  - Will continue current dose of potassium. Will add magnesium for his muscle cramps although it is less likely to benefit him since his  Mg level is normal. We will disconti Mg during his visit with Yvrose Schmitt if there is no benefit.    - Discussed the need for weight checks and sodium restricted diet.  Bring his weight diary to his next clinic appointment.    Aortic valve replacement  - The aortic valve seems to be functioning well based on findings of physical examination.  His last echocardiogram, a few months ago was consistent with these findings.  We will continue to monitor for now.    Anemia and thrombocytopenia  -He is being followed by Dr. Fletcher and is currently on iron supplementation.  -He is currently on aspirin only and his Coumadin has been stopped.        Thank you for allowing me to participate in the care of your patient.    Sincerely,     Jeremie Mckeon MD     [facility]

## 2019-02-21 NOTE — LETTER
2/21/2019    Sam Villatoro PA-C  [place] [facility] [address]  [facility] [address]    RE: Gil Chowdary       Dear Colleague,    I had the pleasure of seeing Gil Chowdary in the [facility].    CARDIOLOGY NEW CORE CLINIC CONSULTATION    REASON FOR CONSULT: Diastolic heart failure    PRIMARY CARE PHYSICIAN:  Sma Villatoro    HISTORY OF PRESENT ILLNESS:    I had the pleasure of seeing Mr. Chowdary in cardiology outpatient heart failure clinic.  He has been followed by Dr. Salinas in the past and will now resume his care with me.  He is a pleasant 58-year-old gentleman with past medical history of severe aortic stenosis with aortic valve replacement in 2017 with right 25 mm tissue valve, type 2 diabetes on insulin, hyperlipidemia, sleep apnea on CPAP, heart failure with preserved ejection fraction, recent iron deficiency anemia for which she is being followed by Dr. Fletcher, history of SVT and paroxysmal A. fib with no recurrence on recent patch.     The patient was admitted with exacerbation of diastolic heart failure in October 2018.   The patient cares for his elderly mother and did not want to stay in the hospital for a long period of time and had to be discharged before becoming euvolemic.  Since then he has been followed on an outpatient basis to manage CHF symptoms/Volume overload.  This is necessitated weekly clinic visits with titration of medication and several sessions of IV Bumex with good response.    He was recently seen in the clinic on 1/2/2019 and during that visit the Metoprolol was increased to 50 mg daily and potassium dose was decreased.  He was scheduled to undergo IV Bumex to keep him euvolemic.  The patient underwent these procedures yesterday with a good clinical response.  He reports that he diuresed well and urinated over 5 L. However, interestingly his weight remains unchanged from his last visit on 1/2/2018.  However he does  feelbetter and his edema is also not as severe as before.  He however does report cramping in his hands and legs occasionally.  He tries to restrict his salt in diet and water intake.  He plans to exercise and eat healthy.  He has been taking his medications as prescribed      PAST MEDICAL HISTORY:  Past Medical History:   Diagnosis Date     Former tobacco use      Glaucoma      Hyperlipidemia LDL goal <160 12/6/2011     Obesity      DRAKE on CPAP      Right bundle branch block      Severe aortic stenosis     On Echo 10/28/16       MEDICATIONS:  Current Outpatient Medications   Medication     aspirin 81 MG tablet     atorvastatin (LIPITOR) 40 MG tablet     bumetanide (BUMEX) 1 MG tablet     clindamycin (CLEOCIN) 300 MG capsule     eplerenone (INSPRA) 50 MG tablet     ferrous sulfate (IRON) 325 (65 Fe) MG tablet     insulin aspart (NOVOLOG FLEXPEN) 100 UNIT/ML injection     insulin aspart (NOVOLOG FLEXPEN) 100 UNIT/ML injection     Insulin Glargine (BASAGLAR KWIKPEN SC)     magnesium oxide (MAG-OX) 400 MG tablet     metoprolol (LOPRESSOR) 50 MG tablet     Multiple Vitamins-Minerals (CENTRUM ADULTS PO)     pantoprazole (PROTONIX) 40 MG EC tablet     prednisoLONE acetate (PRED FORTE) 1 % ophthalmic susp     timolol (TIMOPTIC) 0.5 % ophthalmic solution     No current facility-administered medications for this visit.        ALLERGIES:  Allergies   Allergen Reactions     Amoxicillin      Itchy      Penicillins Hives     Spironolactone Rash       SOCIAL HISTORY:  I have reviewed this patient's social history and updated it with pertinent information if needed. Gil BLACK mireya  reports that he quit smoking about 7 years ago. His smoking use included cigarettes and cigars. He started smoking about 40 years ago. He has a 20.00 pack-year smoking history. he has never used smokeless tobacco. He reports that he does not drink alcohol or use drugs.    FAMILY HISTORY:  I have reviewed this patient's family history and updated it  with pertinent information if needed.   Family History   Problem Relation Age of Onset     Family History Negative Mother      Diabetes Father      Heart Surgery Father         CABG     Coronary Artery Disease Father      Hypertension Brother      Diabetes Brother      Heart Disease Brother      Heart Surgery Brother         CABG x 3     Family History Negative Sister      Diabetes Brother         History of diabetes, but no longer an issue     Family History Negative Brother        REVIEW OF SYSTEMS:  Skin:  Negative     Eyes:  Positive for glasses;glaucoma  ENT:  Negative    Respiratory:  Positive for sleep apnea;CPAP  Cardiovascular:  Negative for;palpitations;chest pain;exercise intolerance;fatigue;lightheadedness;dizziness edema  Gastroenterology: Positive for heartburn  Genitourinary:  Negative    Musculoskeletal:  Positive for arthritis;joint pain  Neurologic:  Positive for memory problems  Psychiatric:  Positive for excessive stress;anxiety;depression  Heme/Lymph/Imm:  Negative allergies  Endocrine:  Positive for diabetes      PHYSICAL EXAM:                     Vital Signs with Ranges     297 lbs 8 oz    Constitutional: awake, alert, no distress  Head: normocephalic,atraumatic   Eyes: PERRL, sclera nonicteric  ENT: trachea midline, neck supple  Respiratory:  Normal bilateral breath sounds in all respiratory fields.  No wheezing or crackles.  Cardiac:  S1-S2 were heard, there is 1 out of 6 systolic ejection murmur at the right upper sternal border which does not any changes with respiration.  2+ pulses all extremities                                       GI: nondistended, nontender, bowel sounds present  Skin: dry, no rash  Musculoskeletal: good muscle tone, strength 5/5 in upper and lower extremities, no clubbing  1+ bilateral pitting edema  Neurologic: no focal deficits  Neuropsychiatric: appropriate affact    DATA:  Labs: Pertinent cardiac labs reviewed  Admission on 01/09/2019, Discharged on 01/09/2019    Component Date Value Ref Range Status     Urea Nitrogen 01/09/2019 20  7 - 30 mg/dL Final     Creatinine 01/09/2019 1.10  0.66 - 1.25 mg/dL Final     GFR Estimate 01/09/2019 73  >60 mL/min/[1.73_m2] Final    Comment: Non  GFR Calc  Starting 12/18/2018, serum creatinine based estimated GFR (eGFR) will be   calculated using the Chronic Kidney Disease Epidemiology Collaboration   (CKD-EPI) equation.       GFR Estimate If Black 01/09/2019 85  >60 mL/min/clinical next week she is that she try to do is treat impression of the great that was thank you so much thank you Final    Comment:  GFR Calc  Starting 12/18/2018, serum creatinine based estimated GFR (eGFR) will be   calculated using the Chronic Kidney Disease Epidemiology Collaboration   (CKD-EPI) equation.       Potassium 01/09/2019 3.9  3.4 - 5.3 mmol/L Final     Sodium 01/09/2019 140  133 - 144 mmol/L Final     Magnesium 01/09/2019 2.6* 1.6 - 2.3 mg/dL Final     Potassium 01/09/2019 3.7  3.4 - 5.3 mmol/L Final       ASSESSMENT:  This is a 58-year-old gentleman with past medical history as stated above who is being followed in our heart failure clinic for management of diastolic heart failure which has been difficult to manage due to inability to tolerate diuretics.  We have made some changes to his diuretics and he currently seems to be moving the right direction.     CHF with volume overload  - I will continue the current medications at the current doses.  We will not make any changes to his diuretic regimen today since it seems he derived signifncant benefit from the IV infusion of Bumex.   - I will have him come back and see Yvrose Oskar in clinic in a week and he will follow-up with me in a month.  We will consider increasing/changing his diuretics regimen if there are signs of worsening CHF.  - Will continue current dose of potassium. Will add magnesium for his muscle cramps although it is less likely to benefit him since his  Mg level is normal. We will disconti Mg during his visit with Yvrose Schmitt if there is no benefit.    - Discussed the need for weight checks and sodium restricted diet.  Bring his weight diary to his next clinic appointment.    Aortic valve replacement  - The aortic valve seems to be functioning well based on findings of physical examination.  His last echocardiogram, a few months ago was consistent with these findings.  We will continue to monitor for now.    Anemia and thrombocytopenia  -He is being followed by Dr. Fletcher and is currently on iron supplementation.  -He is currently on aspirin only and his Coumadin has been stopped.    Jeremie Mckeon MD      Thank you for allowing me to participate in the care of your patient.      Sincerely,     Jeremie Mckeon MD     [facility] [place]    cc:   Jeremie Mckeon MD  [address] [facility] [address]  [place] [address]

## 2019-03-15 ENCOUNTER — OFFICE VISIT (OUTPATIENT)
Dept: CARDIOLOGY | Facility: CLINIC | Age: 59
End: 2019-03-15
Payer: COMMERCIAL

## 2019-03-15 ENCOUNTER — APPOINTMENT (OUTPATIENT)
Dept: GENERAL RADIOLOGY | Facility: CLINIC | Age: 59
End: 2019-03-15
Attending: EMERGENCY MEDICINE
Payer: COMMERCIAL

## 2019-03-15 ENCOUNTER — CARE COORDINATION (OUTPATIENT)
Dept: CARDIOLOGY | Facility: CLINIC | Age: 59
End: 2019-03-15

## 2019-03-15 ENCOUNTER — HOSPITAL ENCOUNTER (INPATIENT)
Facility: CLINIC | Age: 59
LOS: 5 days | Discharge: HOME HEALTH | End: 2019-03-20
Attending: EMERGENCY MEDICINE | Admitting: HOSPITALIST
Payer: COMMERCIAL

## 2019-03-15 VITALS
HEIGHT: 69 IN | BODY MASS INDEX: 45.96 KG/M2 | SYSTOLIC BLOOD PRESSURE: 110 MMHG | DIASTOLIC BLOOD PRESSURE: 68 MMHG | WEIGHT: 310.3 LBS | OXYGEN SATURATION: 97 % | HEART RATE: 61 BPM

## 2019-03-15 DIAGNOSIS — E11.22 TYPE 2 DIABETES MELLITUS WITH STAGE 3 CHRONIC KIDNEY DISEASE, WITH LONG-TERM CURRENT USE OF INSULIN (H): ICD-10-CM

## 2019-03-15 DIAGNOSIS — I50.30 (HFPEF) HEART FAILURE WITH PRESERVED EJECTION FRACTION (H): ICD-10-CM

## 2019-03-15 DIAGNOSIS — Z79.4 TYPE 2 DIABETES MELLITUS WITH STAGE 3 CHRONIC KIDNEY DISEASE, WITH LONG-TERM CURRENT USE OF INSULIN (H): ICD-10-CM

## 2019-03-15 DIAGNOSIS — Z95.2 S/P AVR (AORTIC VALVE REPLACEMENT): ICD-10-CM

## 2019-03-15 DIAGNOSIS — R60.1 ANASARCA: Primary | ICD-10-CM

## 2019-03-15 DIAGNOSIS — I50.9 ACUTE ON CHRONIC CONGESTIVE HEART FAILURE, UNSPECIFIED HEART FAILURE TYPE (H): ICD-10-CM

## 2019-03-15 DIAGNOSIS — I50.33 ACUTE ON CHRONIC DIASTOLIC HEART FAILURE (H): Primary | ICD-10-CM

## 2019-03-15 DIAGNOSIS — I50.33 ACUTE ON CHRONIC DIASTOLIC HEART FAILURE (H): ICD-10-CM

## 2019-03-15 DIAGNOSIS — I47.10 PAROXYSMAL SUPRAVENTRICULAR TACHYCARDIA (H): ICD-10-CM

## 2019-03-15 DIAGNOSIS — N18.30 TYPE 2 DIABETES MELLITUS WITH STAGE 3 CHRONIC KIDNEY DISEASE, WITH LONG-TERM CURRENT USE OF INSULIN (H): ICD-10-CM

## 2019-03-15 DIAGNOSIS — E66.01 MORBID OBESITY (H): ICD-10-CM

## 2019-03-15 DIAGNOSIS — R60.0 PERIPHERAL EDEMA: ICD-10-CM

## 2019-03-15 LAB
ANION GAP SERPL CALCULATED.3IONS-SCNC: 12.4 MMOL/L (ref 6–17)
BASOPHILS # BLD AUTO: 0 10E9/L (ref 0–0.2)
BASOPHILS NFR BLD AUTO: 0.2 %
BUN SERPL-MCNC: 19 MG/DL (ref 7–30)
CALCIUM SERPL-MCNC: 8.6 MG/DL (ref 8.5–10.5)
CHLORIDE SERPL-SCNC: 105 MMOL/L (ref 98–107)
CO2 SERPL-SCNC: 27 MMOL/L (ref 23–29)
CREAT SERPL-MCNC: 1.7 MG/DL (ref 0.7–1.3)
DIFFERENTIAL METHOD BLD: ABNORMAL
EOSINOPHIL # BLD AUTO: 0.2 10E9/L (ref 0–0.7)
EOSINOPHIL NFR BLD AUTO: 4.3 %
ERYTHROCYTE [DISTWIDTH] IN BLOOD BY AUTOMATED COUNT: 19.2 % (ref 10–15)
GFR SERPL CREATININE-BSD FRML MDRD: 41 ML/MIN/{1.73_M2}
GLUCOSE BLDC GLUCOMTR-MCNC: 124 MG/DL (ref 70–99)
GLUCOSE SERPL-MCNC: 161 MG/DL (ref 70–105)
HCT VFR BLD AUTO: 29.5 % (ref 40–53)
HGB BLD-MCNC: 9.3 G/DL (ref 13.3–17.7)
IMM GRANULOCYTES # BLD: 0 10E9/L (ref 0–0.4)
IMM GRANULOCYTES NFR BLD: 0.5 %
INR PPP: 1.28 (ref 0.86–1.14)
LYMPHOCYTES # BLD AUTO: 1.4 10E9/L (ref 0.8–5.3)
LYMPHOCYTES NFR BLD AUTO: 32.4 %
MCH RBC QN AUTO: 31.8 PG (ref 26.5–33)
MCHC RBC AUTO-ENTMCNC: 31.5 G/DL (ref 31.5–36.5)
MCV RBC AUTO: 102 FL (ref 78–100)
MONOCYTES # BLD AUTO: 0.4 10E9/L (ref 0–1.3)
MONOCYTES NFR BLD AUTO: 9.5 %
NEUTROPHILS # BLD AUTO: 2.3 10E9/L (ref 1.6–8.3)
NEUTROPHILS NFR BLD AUTO: 53.1 %
NRBC # BLD AUTO: 0 10*3/UL
NRBC BLD AUTO-RTO: 0 /100
NT-PROBNP SERPL-MCNC: 635 PG/ML (ref 0–125)
PLATELET # BLD AUTO: 79 10E9/L (ref 150–450)
POTASSIUM SERPL-SCNC: 4.4 MMOL/L (ref 3.5–5.1)
RBC # BLD AUTO: 2.89 10E12/L (ref 4.4–5.9)
SODIUM SERPL-SCNC: 140 MMOL/L (ref 136–145)
WBC # BLD AUTO: 4.2 10E9/L (ref 4–11)

## 2019-03-15 PROCEDURE — 71046 X-RAY EXAM CHEST 2 VIEWS: CPT

## 2019-03-15 PROCEDURE — 99203 OFFICE O/P NEW LOW 30 MIN: CPT | Performed by: INTERNAL MEDICINE

## 2019-03-15 PROCEDURE — 99285 EMERGENCY DEPT VISIT HI MDM: CPT | Mod: 25

## 2019-03-15 PROCEDURE — 85025 COMPLETE CBC W/AUTO DIFF WBC: CPT | Performed by: PHYSICIAN ASSISTANT

## 2019-03-15 PROCEDURE — 99223 1ST HOSP IP/OBS HIGH 75: CPT | Mod: AI | Performed by: HOSPITALIST

## 2019-03-15 PROCEDURE — 93005 ELECTROCARDIOGRAM TRACING: CPT

## 2019-03-15 PROCEDURE — 00000146 ZZHCL STATISTIC GLUCOSE BY METER IP

## 2019-03-15 PROCEDURE — 96374 THER/PROPH/DIAG INJ IV PUSH: CPT

## 2019-03-15 PROCEDURE — 36415 COLL VENOUS BLD VENIPUNCTURE: CPT | Performed by: EMERGENCY MEDICINE

## 2019-03-15 PROCEDURE — 25000128 H RX IP 250 OP 636: Performed by: EMERGENCY MEDICINE

## 2019-03-15 PROCEDURE — 83880 ASSAY OF NATRIURETIC PEPTIDE: CPT | Performed by: PHYSICIAN ASSISTANT

## 2019-03-15 PROCEDURE — 85610 PROTHROMBIN TIME: CPT | Performed by: EMERGENCY MEDICINE

## 2019-03-15 PROCEDURE — 25000132 ZZH RX MED GY IP 250 OP 250 PS 637: Performed by: HOSPITALIST

## 2019-03-15 PROCEDURE — 80048 BASIC METABOLIC PNL TOTAL CA: CPT | Performed by: PHYSICIAN ASSISTANT

## 2019-03-15 PROCEDURE — 21000001 ZZH R&B HEART CARE

## 2019-03-15 PROCEDURE — 25000125 ZZHC RX 250: Performed by: HOSPITALIST

## 2019-03-15 PROCEDURE — 94660 CPAP INITIATION&MGMT: CPT

## 2019-03-15 PROCEDURE — 40000275 ZZH STATISTIC RCP TIME EA 10 MIN

## 2019-03-15 RX ORDER — ONDANSETRON 4 MG/1
4 TABLET, ORALLY DISINTEGRATING ORAL EVERY 6 HOURS PRN
Status: DISCONTINUED | OUTPATIENT
Start: 2019-03-15 | End: 2019-03-20 | Stop reason: HOSPADM

## 2019-03-15 RX ORDER — TIMOLOL MALEATE 5 MG/ML
1 SOLUTION/ DROPS OPHTHALMIC 2 TIMES DAILY
Status: DISCONTINUED | OUTPATIENT
Start: 2019-03-15 | End: 2019-03-20 | Stop reason: HOSPADM

## 2019-03-15 RX ORDER — DONEPEZIL HYDROCHLORIDE 5 MG/1
5 TABLET, FILM COATED ORAL AT BEDTIME
COMMUNITY
End: 2019-05-28

## 2019-03-15 RX ORDER — ATORVASTATIN CALCIUM 40 MG/1
40 TABLET, FILM COATED ORAL DAILY
Status: DISCONTINUED | OUTPATIENT
Start: 2019-03-16 | End: 2019-03-20 | Stop reason: HOSPADM

## 2019-03-15 RX ORDER — BUMETANIDE 0.25 MG/ML
4 INJECTION INTRAMUSCULAR; INTRAVENOUS ONCE
Status: COMPLETED | OUTPATIENT
Start: 2019-03-15 | End: 2019-03-15

## 2019-03-15 RX ORDER — ASPIRIN 81 MG/1
81 TABLET ORAL EVERY MORNING
Status: DISCONTINUED | OUTPATIENT
Start: 2019-03-16 | End: 2019-03-19

## 2019-03-15 RX ORDER — POLYETHYLENE GLYCOL 3350 17 G/17G
17 POWDER, FOR SOLUTION ORAL DAILY PRN
Status: DISCONTINUED | OUTPATIENT
Start: 2019-03-15 | End: 2019-03-20 | Stop reason: HOSPADM

## 2019-03-15 RX ORDER — METOPROLOL TARTRATE 50 MG
50 TABLET ORAL 2 TIMES DAILY
Status: DISCONTINUED | OUTPATIENT
Start: 2019-03-15 | End: 2019-03-18

## 2019-03-15 RX ORDER — PROCHLORPERAZINE 25 MG
25 SUPPOSITORY, RECTAL RECTAL EVERY 12 HOURS PRN
Status: DISCONTINUED | OUTPATIENT
Start: 2019-03-15 | End: 2019-03-20 | Stop reason: HOSPADM

## 2019-03-15 RX ORDER — DONEPEZIL HYDROCHLORIDE 5 MG/1
5 TABLET, FILM COATED ORAL AT BEDTIME
Status: DISCONTINUED | OUTPATIENT
Start: 2019-03-15 | End: 2019-03-20 | Stop reason: HOSPADM

## 2019-03-15 RX ORDER — DOCUSATE SODIUM 100 MG/1
100 CAPSULE, LIQUID FILLED ORAL 2 TIMES DAILY
Status: DISCONTINUED | OUTPATIENT
Start: 2019-03-15 | End: 2019-03-20 | Stop reason: HOSPADM

## 2019-03-15 RX ORDER — DEXTROSE MONOHYDRATE 25 G/50ML
25-50 INJECTION, SOLUTION INTRAVENOUS
Status: DISCONTINUED | OUTPATIENT
Start: 2019-03-15 | End: 2019-03-20 | Stop reason: HOSPADM

## 2019-03-15 RX ORDER — PROCHLORPERAZINE MALEATE 5 MG
10 TABLET ORAL EVERY 6 HOURS PRN
Status: DISCONTINUED | OUTPATIENT
Start: 2019-03-15 | End: 2019-03-20 | Stop reason: HOSPADM

## 2019-03-15 RX ORDER — ACETAMINOPHEN 325 MG/1
650 TABLET ORAL EVERY 4 HOURS PRN
Status: DISCONTINUED | OUTPATIENT
Start: 2019-03-15 | End: 2019-03-20

## 2019-03-15 RX ORDER — ONDANSETRON 2 MG/ML
4 INJECTION INTRAMUSCULAR; INTRAVENOUS EVERY 6 HOURS PRN
Status: DISCONTINUED | OUTPATIENT
Start: 2019-03-15 | End: 2019-03-20 | Stop reason: HOSPADM

## 2019-03-15 RX ORDER — POTASSIUM CHLORIDE 750 MG/1
30 TABLET, EXTENDED RELEASE ORAL 3 TIMES DAILY
COMMUNITY
End: 2019-07-17

## 2019-03-15 RX ORDER — PREDNISOLONE ACETATE 10 MG/ML
1 SUSPENSION/ DROPS OPHTHALMIC DAILY PRN
Status: DISCONTINUED | OUTPATIENT
Start: 2019-03-15 | End: 2019-03-20 | Stop reason: HOSPADM

## 2019-03-15 RX ORDER — PANTOPRAZOLE SODIUM 40 MG/1
40 TABLET, DELAYED RELEASE ORAL
Status: DISCONTINUED | OUTPATIENT
Start: 2019-03-16 | End: 2019-03-20 | Stop reason: HOSPADM

## 2019-03-15 RX ORDER — NALOXONE HYDROCHLORIDE 0.4 MG/ML
.1-.4 INJECTION, SOLUTION INTRAMUSCULAR; INTRAVENOUS; SUBCUTANEOUS
Status: DISCONTINUED | OUTPATIENT
Start: 2019-03-15 | End: 2019-03-19

## 2019-03-15 RX ORDER — NICOTINE POLACRILEX 4 MG
15-30 LOZENGE BUCCAL
Status: DISCONTINUED | OUTPATIENT
Start: 2019-03-15 | End: 2019-03-20 | Stop reason: HOSPADM

## 2019-03-15 RX ADMIN — TIMOLOL MALEATE 1 DROP: 5 SOLUTION/ DROPS OPHTHALMIC at 22:55

## 2019-03-15 RX ADMIN — METOPROLOL TARTRATE 50 MG: 50 TABLET ORAL at 22:47

## 2019-03-15 RX ADMIN — DONEPEZIL HYDROCHLORIDE 5 MG: 5 TABLET ORAL at 22:47

## 2019-03-15 RX ADMIN — POTASSIUM CHLORIDE 30 MEQ: 1500 TABLET, EXTENDED RELEASE ORAL at 22:47

## 2019-03-15 RX ADMIN — BUMETANIDE 4 MG: 0.25 INJECTION INTRAMUSCULAR; INTRAVENOUS at 18:30

## 2019-03-15 RX ADMIN — BUMETANIDE 0.5 MG/HR: 0.25 INJECTION INTRAMUSCULAR; INTRAVENOUS at 20:28

## 2019-03-15 ASSESSMENT — ACTIVITIES OF DAILY LIVING (ADL)
BATHING: 0-->INDEPENDENT
TOILETING: 0-->INDEPENDENT
RETIRED_COMMUNICATION: 0-->UNDERSTANDS/COMMUNICATES WITHOUT DIFFICULTY
ADLS_ACUITY_SCORE: 11
SWALLOWING: 0-->SWALLOWS FOODS/LIQUIDS WITHOUT DIFFICULTY
RETIRED_EATING: 0-->INDEPENDENT
DRESS: 0-->INDEPENDENT

## 2019-03-15 ASSESSMENT — ENCOUNTER SYMPTOMS
ACTIVITY CHANGE: 1
ABDOMINAL PAIN: 0
FEVER: 0
VOMITING: 0

## 2019-03-15 ASSESSMENT — MIFFLIN-ST. JEOR
SCORE: 2208.81
SCORE: 2212.89
SCORE: 2227.4

## 2019-03-15 NOTE — LETTER
3/15/2019    Sam Villatoro PA-C  [facility] [address]  [place] [address]    RE: Gil Chowdary       Dear Colleague,    I had the pleasure of seeing Gil Chowdary in the [facility].    CARDIOLOGY CLINIC CONSULTATION    REASON FOR CONSULT: Marked volume overload    PRIMARY CARE PHYSICIAN:  Sam Villatoro    HISTORY OF PRESENT ILLNESS:  Mr. Chowdary is a delightful 58-year-old gentleman with past medical history significant for the followin.  Severe aortic stenosis with aortic valve replacement in 2017 with 25 mm Trifecta tissue valve.   2.  Type 2 diabetes, on insulin.   3.  Hyperlipidemia.   4.  Treated sleep apnea.   5.  Cor pulmonale versus heart failure with preserved EF.   6.  Recent iron deficiency anemia followed by abbi Vasquez.   7.  History of SVT and paroxysmal AFib, postoperative.  None on recent Zio Patch.   8.  Hx of myectomy at time of AVR, but no clear dx of HOCM    Patient of Dr. Mckeon and Rachna More repeatedly seen over the last few months for refractory volume overload in the setting of severe diastolic heart failure.  Currently the patient takes bumetanide 3 mg twice daily and hydrochlorothiazide 25 mg daily in addition to eplerenone.  He was last seen by cardiology clinic 3 weeks ago on .  Subsequently he has continued to gain a lot of weight.  Today presents to clinic because of more than 15 pound weight gain.  He appears anasarca could.  Although he says his dyspnea is not a problem he does get significantly winded walking from clinic to outside of the clinic.  Denies chest pain palpitations syncope presyncope.    PAST MEDICAL HISTORY:  Past Medical History:   Diagnosis Date     Former tobacco use      Glaucoma      Hyperlipidemia LDL goal <160 2011     Obesity      DRAKE on CPAP      Right bundle branch block      Severe aortic stenosis     On Echo 10/28/16       MEDICATIONS:  Current  Outpatient Medications   Medication     aspirin 81 MG tablet     atorvastatin (LIPITOR) 40 MG tablet     bumetanide (BUMEX) 1 MG tablet     donepezil (ARICEPT) 5 MG tablet     eplerenone (INSPRA) 50 MG tablet     ferrous sulfate (IRON) 325 (65 Fe) MG tablet     Insulin Glargine (BASAGLAR KWIKPEN SC)     magnesium oxide (MAG-OX) 400 MG tablet     metoprolol (LOPRESSOR) 50 MG tablet     Multiple Vitamins-Minerals (CENTRUM ADULTS PO)     pantoprazole (PROTONIX) 40 MG EC tablet     potassium chloride ER (K-DUR/KLOR-CON M) 10 MEQ CR tablet     prednisoLONE acetate (PRED FORTE) 1 % ophthalmic susp     timolol (TIMOPTIC) 0.5 % ophthalmic solution     clindamycin (CLEOCIN) 300 MG capsule     insulin aspart (NOVOLOG FLEXPEN) 100 UNIT/ML injection     insulin aspart (NOVOLOG FLEXPEN) 100 UNIT/ML injection     No current facility-administered medications for this visit.        ALLERGIES:  Allergies   Allergen Reactions     Amoxicillin      Itchy      Penicillins Hives     Spironolactone Rash       SOCIAL HISTORY:  I have reviewed this patient's social history and updated it with pertinent information if needed. Gil BLACK Carminejaye  reports that he quit smoking about 7 years ago. His smoking use included cigarettes and cigars. He started smoking about 40 years ago. He has a 20.00 pack-year smoking history. he has never used smokeless tobacco. He reports that he does not drink alcohol or use drugs.    FAMILY HISTORY:  I have reviewed this patient's family history and updated it with pertinent information if needed.   Family History   Problem Relation Age of Onset     Family History Negative Mother      Diabetes Father      Heart Surgery Father         CABG     Coronary Artery Disease Father      Hypertension Brother      Diabetes Brother      Heart Disease Brother      Heart Surgery Brother         CABG x 3     Family History Negative Sister      Diabetes Brother         History of diabetes, but no longer an issue     Family  History Negative Brother        REVIEW OF SYSTEMS:  Skin:  Negative     Eyes:  Positive for glasses;glaucoma  ENT:  Negative    Respiratory:  Positive for sleep apnea;CPAP  Cardiovascular:  Negative for;palpitations;chest pain;exercise intolerance;fatigue;lightheadedness;dizziness edema;fatigue  Gastroenterology: Positive for heartburn  Genitourinary:  Negative    Musculoskeletal:  Positive for arthritis;joint pain  Neurologic:  Positive for memory problems  Psychiatric:  Positive for excessive stress;anxiety;depression  Heme/Lymph/Imm:  Negative allergies  Endocrine:  Positive for diabetes      PHYSICAL EXAM:      BP: 110/68 Pulse: 61     SpO2: 97 %      Vital Signs with Ranges  Pulse:  [61] 61  BP: (110)/(68) 110/68  SpO2:  [97 %] 97 %  310 lbs 4.8 oz    Constitutional: awake, alert, no distress  Respiratory: Mild bibasilar rales but otherwise clear to auscultation bilaterally  Cardiovascular: Regular, ejection systolic murmur, loud S2  GI: nondistended, nontender, bowel sounds present  Extremities have 3+ bilateral pitting edema with evidence of almost anasarca    DATA:  Labs: Pertinent cardiac labs reviewed    ASSESSMENT:  58-year-old male with diastolic heart failure with evidence of marked volume overload on physical exam.  He is at least 20-30 pounds fluid overloaded.  Patient says he but he drinks fluid whenever he gets thirsty and there is no way he measures it.  Drinks about 2 L of fluid a day.  He has clear-cut diuretic resistance alternatively he might be just ingesting a lot of fluid intake unknowingly.  I do not think we have more room on up titration of diuretic therapy. I recommend inpatient admission for aggressive intravenous diuresis.     RECOMMENDATIONS:  1. Closer monitoring of renal function daily, perhaps BID.  I recommend 4 mg IV Bumex bolus followed by 0.5-1 mg infusion per hour.  Nephrology and cardiology consultations. PRN Diuril.  Depending on his response plan on right heart  catheterization along with possible LVEDP assessment to evaluate volume and possibility of constriction next week and potentially cardiomems as an outpatient.  2. I believe we should get a  cardiac MRI without contrast and perhaps even a CT without contrast to look for pericardial thickening and possibility of constrictive heart disease here given cardiac surgery history.  He is obese and his echo windows are poor.  Not sure he would fit in the MRI machine but we can explore that option.  It is unclear why does he have marked diuretic resistance like this.  3. Check albumin assess liver function and perhaps liver ultrasound as well.  4. Outpatient follow up with Dr. Mckeon and Rachna Holt in Core clinic at discharge.     ALBERT Almendarez, [facility]  Cardiology - [facility]  March 15, 2019            Thank you for allowing me to participate in the care of your patient.      Sincerely,     Yves Ashley MD     [facility]

## 2019-03-15 NOTE — PHARMACY-ADMISSION MEDICATION HISTORY
Admission medication history interview status for the 3/15/2019  admission is complete. See EPIC admission navigator for prior to admission medications     Medication history source reliability:Good, pt interview and personal med list, and sure scripts info    Actions taken by pharmacist (provider contacted, etc): Per pt, modified Novolog and Basaglar doses. Modified K dose     Additional medication history information not noted on PTA med list :None    Medication reconciliation/reorder completed by provider prior to medication history? No    Time spent in this activity: 15 minutes    Prior to Admission medications    Medication Sig Last Dose Taking? Auth Provider   aspirin 81 MG tablet Take 81 mg by mouth every morning  3/15/2019 at am Yes Reported, Patient   atorvastatin (LIPITOR) 40 MG tablet Take 1 tablet (40 mg) by mouth daily 3/15/2019 at am Yes Francia Otoole PA-C   bumetanide (BUMEX) 1 MG tablet Take 3 tablets (3 mg) by mouth 2 times daily 3/15/2019 at am Yes More, Rachna Carrillo PA-C   donepezil (ARICEPT) 5 MG tablet Take 5 mg by mouth At Bedtime 3/14/2019 at hs Yes Reported, Patient   eplerenone (INSPRA) 50 MG tablet Take 1 tablet (50 mg) by mouth 2 times daily 3/15/2019 at am Yes More, Rachna Carrillo PA-C   ferrous sulfate (IRON) 325 (65 Fe) MG tablet Take 1 tablet (325 mg) by mouth 2 times daily 3/15/2019 at am Yes Juan Fletcher MD   insulin aspart (NOVOLOG FLEXPEN) 100 UNIT/ML injection Inject 3 Units Subcutaneous daily (with dinner) Takes if blood glucose > 150  Yes Unknown, Entered By History   insulin aspart (NOVOLOG FLEXPEN) 100 UNIT/ML injection Inject 2 Units Subcutaneous 2 times daily (with meals) Takes with breakfast and lunch if blood glucose > 150  Yes Reported, Patient   Insulin Glargine (BASAGLAR KWIKPEN SC) Inject 20 Units Subcutaneous every morning  3/15/2019 at 0700 Yes Reported, Patient   magnesium oxide (MAG-OX) 400 MG tablet Take 1 tablet (400 mg) by mouth daily 3/15/2019 at am  Yes Jeremie Mckeon MD   metoprolol (LOPRESSOR) 50 MG tablet Take 1 tablet (50 mg) by mouth 2 times daily 3/15/2019 at am Yes Hugo Barnett PA-C   Multiple Vitamins-Minerals (CENTRUM ADULTS PO) Take 1 tablet by mouth every morning  3/15/2019 at am Yes Reported, Patient   pantoprazole (PROTONIX) 40 MG EC tablet Take 40 mg by mouth every morning (before breakfast) 3/15/2019 at am Yes Unknown, Entered By History   potassium chloride ER (K-DUR/KLOR-CON M) 10 MEQ CR tablet Take 30 mEq by mouth 3 times daily  3/15/2019 at x2 doses Yes Reported, Patient   prednisoLONE acetate (PRED FORTE) 1 % ophthalmic susp Place 1 drop into both eyes as needed (eye pain)  prn Yes Reported, Patient   timolol (TIMOPTIC) 0.5 % ophthalmic solution Place 1 drop into both eyes 2 times daily  3/15/2019 at am Yes Reported, Patient   clindamycin (CLEOCIN) 300 MG capsule Take 1 capsule (300 mg) by mouth as needed  Patient not taking: Reported on 3/15/2019 prn  Guru Salinas MD

## 2019-03-15 NOTE — PROGRESS NOTES
Called spoke with pt.  Reviewed that SIRNEA Babcock recommends he come in for OV today with Dr. Ashley at 3:45pm and non-fasting labs prior at 3:00pm.  Pt verbalized understanding and agrees with this plan.      YOANNA More 1:03 PM 3/15/2019

## 2019-03-15 NOTE — PROGRESS NOTES
I'm not sure why he didn't call, although he does have some memory issues.  Definitely 16 pounds up.  At a min needs labs today.  If Yves is able to see today that would be best, otherwise infusion next week.    If pt can't come in would trial metolazone 2.5 mg 1 hour before bumex tomorrow- although hasn't been very successful in the past.

## 2019-03-15 NOTE — PROGRESS NOTES
CARDIOLOGY CLINIC CONSULTATION    REASON FOR CONSULT: Marked volume overload    PRIMARY CARE PHYSICIAN:  Sam Villatoro    HISTORY OF PRESENT ILLNESS:  Mr. Chowdary is a delightful 58-year-old gentleman with past medical history significant for the followin.  Severe aortic stenosis with aortic valve replacement in 2017 with 25 mm Trifecta tissue valve.   2.  Type 2 diabetes, on insulin.   3.  Hyperlipidemia.   4.  Treated sleep apnea.   5.  Cor pulmonale versus heart failure with preserved EF.   6.  Recent iron deficiency anemia followed by abbi Vasquez.   7.  History of SVT and paroxysmal AFib, postoperative.  None on recent Zio Patch.   8.  Hx of myectomy at time of AVR, but no clear dx of HOCM    Patient of Dr. Mckeon and Rachna More repeatedly seen over the last few months for refractory volume overload in the setting of severe diastolic heart failure.  Currently the patient takes bumetanide 3 mg twice daily and hydrochlorothiazide 25 mg daily in addition to eplerenone.  He was last seen by cardiology clinic 3 weeks ago on .  Subsequently he has continued to gain a lot of weight.  Today presents to clinic because of more than 15 pound weight gain.  He appears anasarca could.  Although he says his dyspnea is not a problem he does get significantly winded walking from clinic to outside of the clinic.  Denies chest pain palpitations syncope presyncope.    PAST MEDICAL HISTORY:  Past Medical History:   Diagnosis Date     Former tobacco use      Glaucoma      Hyperlipidemia LDL goal <160 2011     Obesity      DRAKE on CPAP      Right bundle branch block      Severe aortic stenosis     On Echo 10/28/16       MEDICATIONS:  Current Outpatient Medications   Medication     aspirin 81 MG tablet     atorvastatin (LIPITOR) 40 MG tablet     bumetanide (BUMEX) 1 MG tablet     donepezil (ARICEPT) 5 MG tablet     eplerenone (INSPRA) 50 MG tablet     ferrous sulfate (IRON) 325 (65 Fe) MG tablet      Insulin Glargine (BASAGLAR KWIKPEN SC)     magnesium oxide (MAG-OX) 400 MG tablet     metoprolol (LOPRESSOR) 50 MG tablet     Multiple Vitamins-Minerals (CENTRUM ADULTS PO)     pantoprazole (PROTONIX) 40 MG EC tablet     potassium chloride ER (K-DUR/KLOR-CON M) 10 MEQ CR tablet     prednisoLONE acetate (PRED FORTE) 1 % ophthalmic susp     timolol (TIMOPTIC) 0.5 % ophthalmic solution     clindamycin (CLEOCIN) 300 MG capsule     insulin aspart (NOVOLOG FLEXPEN) 100 UNIT/ML injection     insulin aspart (NOVOLOG FLEXPEN) 100 UNIT/ML injection     No current facility-administered medications for this visit.        ALLERGIES:  Allergies   Allergen Reactions     Amoxicillin      Itchy      Penicillins Hives     Spironolactone Rash       SOCIAL HISTORY:  I have reviewed this patient's social history and updated it with pertinent information if needed. Gil Chowdary  reports that he quit smoking about 7 years ago. His smoking use included cigarettes and cigars. He started smoking about 40 years ago. He has a 20.00 pack-year smoking history. he has never used smokeless tobacco. He reports that he does not drink alcohol or use drugs.    FAMILY HISTORY:  I have reviewed this patient's family history and updated it with pertinent information if needed.   Family History   Problem Relation Age of Onset     Family History Negative Mother      Diabetes Father      Heart Surgery Father         CABG     Coronary Artery Disease Father      Hypertension Brother      Diabetes Brother      Heart Disease Brother      Heart Surgery Brother         CABG x 3     Family History Negative Sister      Diabetes Brother         History of diabetes, but no longer an issue     Family History Negative Brother        REVIEW OF SYSTEMS:  Skin:  Negative     Eyes:  Positive for glasses;glaucoma  ENT:  Negative    Respiratory:  Positive for sleep apnea;CPAP  Cardiovascular:  Negative for;palpitations;chest pain;exercise  intolerance;fatigue;lightheadedness;dizziness edema;fatigue  Gastroenterology: Positive for heartburn  Genitourinary:  Negative    Musculoskeletal:  Positive for arthritis;joint pain  Neurologic:  Positive for memory problems  Psychiatric:  Positive for excessive stress;anxiety;depression  Heme/Lymph/Imm:  Negative allergies  Endocrine:  Positive for diabetes      PHYSICAL EXAM:      BP: 110/68 Pulse: 61     SpO2: 97 %      Vital Signs with Ranges  Pulse:  [61] 61  BP: (110)/(68) 110/68  SpO2:  [97 %] 97 %  310 lbs 4.8 oz    Constitutional: awake, alert, no distress  Respiratory: Mild bibasilar rales but otherwise clear to auscultation bilaterally  Cardiovascular: Regular, ejection systolic murmur, loud S2  GI: nondistended, nontender, bowel sounds present  Extremities have 3+ bilateral pitting edema with evidence of almost anasarca    DATA:  Labs: Pertinent cardiac labs reviewed    ASSESSMENT:  58-year-old male with diastolic heart failure with evidence of marked volume overload on physical exam.  He is at least 20-30 pounds fluid overloaded.  Patient says he but he drinks fluid whenever he gets thirsty and there is no way he measures it.  Drinks about 2 L of fluid a day.  He has clear-cut diuretic resistance alternatively he might be just ingesting a lot of fluid intake unknowingly.  I do not think we have more room on up titration of diuretic therapy. I recommend inpatient admission for aggressive intravenous diuresis.     RECOMMENDATIONS:  1. Closer monitoring of renal function daily, perhaps BID.  I recommend 4 mg IV Bumex bolus followed by 0.5-1 mg infusion per hour.  Nephrology and cardiology consultations. PRN Diuril.  Depending on his response plan on right heart catheterization along with possible LVEDP assessment to evaluate volume and possibility of constriction next week and potentially cardiomems as an outpatient.  2. I believe we should get a  cardiac MRI without contrast and perhaps even a CT without  contrast to look for pericardial thickening and possibility of constrictive heart disease here given cardiac surgery history.  He is obese and his echo windows are poor.  Not sure he would fit in the MRI machine but we can explore that option.  It is unclear why does he have marked diuretic resistance like this.  3. Check albumin assess liver function and perhaps liver ultrasound as well.  4. Outpatient follow up with Dr. Mckeon and Rachna Holt in [facility] clinic at discharge.     ALBERT Almendarez, [facility]  Cardiology - [facility]  March 15, 2019

## 2019-03-15 NOTE — ED PROVIDER NOTES
History     Chief Complaint:  leg swelling     HPI   Gil Chowdary is a 58 year old male with a history of diabetes mellitus 2, heart failure, morbid obesity, among others who presents with leg swelling. The patient was at [facility] and was sent to the ED due to increased leg swelling and the need for diuretics. The patient's leg swelling was improving prior to 3 weeks ago but has been worsening since. The patient has noticed a gain of 12 pounds over the past couple weeks. Due to concern, the patient has visited the ED for evaluation and treatment. Upon arrival, the patient reports shortness of breath with exertion which he attributes to leg swelling. The patient denies abdominal pain, chest pain, fever, or vomiting. The patient is currently on bumetanide (3 mgs twice a day).    [facility] 3/15/2019:  CBC: WBC: 4.2, HGB: N/A, PLT: 79  BMP: Glucose 161, creatinine 1.70, GFR 41, o/w WNL  Nt Probnp inpatient (BNP): 635  Hemoglobin: 9.3    Allergies:  amoxicillin  Penicillin  Spironolactone    Medications:    lipitor  bumex  Cleocin  aricept  inspra  novolog  basaglar  Lopressor   protonix   Potassium chloride   Prednisolone  timoptic        Past Medical History:    former tobacco use  Glaucoma  Hyperlipidemia  Obesity  DRAKE on CPAP  Right bundle branch block   Severe aortic stenosis   Anasarca  Acute kidney injury   Thrombocytopenia  Diabetes mellitus 2  Morbid obesity   Aortic valve stenosis   Heart failure     Past Surgical History:    heart cath left heart  Repair valve aortic  Replace aortic valve  Sinus surgery     Family History:    diabetes mellitus  Heart surgery   Coronary artery disease  Hypertension  Heart disease    Social History:  Marital Status:     Smoker:   Former   Smokeless:   Never   Alcohol:   No   Drugs:   No     Review of Systems   Constitutional: Positive for activity change. Negative for fever.   Cardiovascular:  "Positive for leg swelling. Negative for chest pain.   Gastrointestinal: Negative for abdominal pain and vomiting.   All other systems reviewed and are negative.    Physical Exam     Patient Vitals for the past 24 hrs:   BP Temp Temp src Pulse Heart Rate Resp SpO2 Height Weight   03/15/19 2000 114/55 -- -- -- -- -- -- 1.803 m (5' 11\") 137.2 kg (302 lb 6.4 oz)   03/15/19 1948 112/52 98.1  F (36.7  C) Oral -- 65 16 100 % -- --   03/15/19 1917 114/61 -- -- 64 63 -- -- -- --   03/15/19 1915 -- -- -- -- 64 15 99 % -- --   03/15/19 1900 -- -- -- -- 73 17 95 % -- --   03/15/19 1845 -- -- -- -- 63 17 97 % -- --   03/15/19 1821 -- -- -- -- 63 15 97 % -- --   03/15/19 1820 109/54 -- -- 63 64 -- -- -- --   03/15/19 1818 109/54 -- -- 63 65 11 -- -- --   03/15/19 1650 116/47 98.4  F (36.9  C) Oral 69 -- 20 96 % 1.778 m (5' 10\") 140.6 kg (310 lb)     Physical Exam  VS: Reviewed per above  HENT: Mucous membranes moist  EYES: sclera anicteric  CV: Rate as noted, regular rhythm.   RESP: Effort normal. Breath sounds are normal bilaterally.  GI: no tenderness, not distended.  NEURO: Alert, moving all extremities  MSK: Significant swelling of the bilateral lower extremities that is symmetric and extends up to the thigh.  There is no overlying warmth or tenderness or significant erythema.  SKIN: Warm and dry, no rash.      Emergency Department Course   ECG:  Indication: leg swelling  Time: 1819  Vent. Rate 63  bpm. SD interval 156. QRS duration 142. QT/QTc 506/517. P-R-T axis 16 81 40. Normal sinus rhythm. Right bundle branch block. Abnormal ECG. Read time: 1822    Imaging:  Radiographic findings were communicated with the patient who voiced understanding of the findings.    XR Chest 2 views:   Prior midline sternotomy. Cardiomegaly is present along  with some mild pulmonary vascular prominence. No infiltrates or  effusions. As per radiology.     Laboratory:  INR: 1.28     Interventions:  1830: Bumex 4 mg IV    Emergency Department " Course:  (1815) I performed an exam of the patient as documented above.     (1845)  I consulted with Dr. Rose of the hospitalist services. They are in agreement to accept the patient for admission.    Findings and plan explained to the Patient who consents to admission. Discussed the patient with Dr. Rose, who will admit the patient to a [facility] bed for further monitoring, evaluation, and treatment.    Impression & Plan    Medical Decision Making:  Patient presents to the ER for evaluation of subacute lower extremity swelling.  Initial vital signs are unrevealing.  Exam reveals significant symmetric bilateral lower extremity edema.  There is no overlying warmth or redness or tenderness to suggest occult skin or soft tissue infection.  I reviewed the chart and see that the patient was referred from cardiology clinic for admission for diuresis.  I did consider DVT versus PE although due to symmetric nature of edema as well as history of heart failure with similar presentation, this seems less likely.  Chest x-ray did not reveal significant pulmonary edema.  BNP was similar to prior.  EKG did not reveal new concerning ischemic changes and there is no chest pain to suggest ACS.  Patient was given 4 mg of Bumex while in the ER prior to admission.    Diagnosis:    ICD-10-CM    1. Acute on chronic diastolic heart failure (H) I50.33 INR     Glucose by meter     Glucose by meter     CANCELED: CBC with platelets differential     Disposition:  Admitted to [facility]    Scribe Disclosure:  I, Dusty Oquendo, am serving as a scribe on 3/15/2019 at 6:15 PM to personally document services performed by Cedric Ashton MD based on my observations and the provider's statements to me.     Dusty Oquendo  3/15/2019    EMERGENCY DEPARTMENT       Cedric Ashton MD  03/15/19 2012

## 2019-03-15 NOTE — PROGRESS NOTES
"Received call from pt who reports that he saw his PMD, Dr. Villatoro, today who advised him to call cardiology in order to get in for another diuretic infusion in order to \"drain fluid out of his legs\".      Per chart review, pt came for diuretic infusion clinic most recently on 1/23/19 and was seen in clinic most recently by Dr. Mckeon on 2/21/19 and OV note states:  \"ASSESSMENT:  This is a 58-year-old gentleman with past medical history as stated above who is being followed in our heart failure clinic for management of diastolic heart failure which has been difficult to manage due to inability to tolerate diuretics.  We have made some changes to his diuretics and he currently seems to be moving the right direction.      CHF with volume overload  - I will continue the current medications at the current doses.  We will not make any changes to his diuretic regimen today since it seems he derived signifncant benefit from the IV infusion of Bumex.   - I will have him come back and see Yvrose Schmitt in clinic in a week and he will follow-up with me in a month.  We will consider increasing/changing his diuretics regimen if there are signs of worsening CHF.  - Will continue current dose of potassium. Will add magnesium for his muscle cramps although it is less likely to benefit him since his Mg level is normal. We will disconti Mg during his visit with Yvrose Schmitt if there is no benefit.    - Discussed the need for weight checks and sodium restricted diet.  Bring his weight diary to his next clinic appointment.\"      Pt is scheduled for OV with Dr. Mckeon on 3/29/19; however, it does not appear he was ever scheduled for CORE follow up with SIRENA Babcock, as above note suggests/recommends.      Pt states he understood plan at last OV was to follow up with Dr. Mckeon.      Pt states home wt today is 306#, which is up from last documented home wt in Epic of 290# on 2/4/19.  Inquired if wt gain has been gradual or if he has noticed " any 3# wt gain overnight or 5# in a week.  He states he is not sure but he admits he has not been keeping track of sodium intake very closely.  He denies increased SOB/PND/orthopneao or abdominal bloating.  He reports increased swelling in bilateral legs.  He confirms he is taking bumex 3mg BID; however, he is unsure if he is taking Inspra 50mg BID- he thinks he might be taking Inspra daily instead of twice daily but is not currently at home to verify this.     He reports he did not have any labs done at PMD clinic today.     Discussed with pt that will review with SIRENA Babcock and call back with recommendations- he agrees with this plan.    Routed to SIRENA Babcock RN 11:37 AM 3/15/2019       ___________________________________________  Received call back from pt now that he is home.  He confirms he is taking bumex 3mg BID and Inspra 50mg BID.  He reports he is taking KCL 40mEq TID.    *Of note, current Epic med list states KCL dose is 30mEq BID, which was changed from 40mEq TID on 1/9/19, date of pt's first diuretic infusion clinic.      Pt also reports that swelling in bilateral legs goes up to his calf, not past his knees.  He also reports that scrotal swelling has not returned.      Discussed with pt that will review SIRENA Babcock and call back with recommendations.     YOANNA More 12:15 PM 3/15/2019

## 2019-03-15 NOTE — ED NOTES
"[facility]  ED Nurse Handoff Report    ED Chief complaint: Shortness of Breath (was sent over from [place] [facility] for increased LE swelling. MOTA. pt is up at least 15lb water weight . denies any CP )      ED Diagnosis:   Final diagnoses:   Acute on chronic diastolic heart failure (H)       Code Status: Full Code    Allergies:   Allergies   Allergen Reactions     Amoxicillin      Itchy      Penicillins Hives     Spironolactone Rash       Activity level - Baseline/Home:  Independent    Activity Level - Current:   Independent     Needed?: No    Isolation: No  Infection: Not Applicable  Bariatric?: Yes    Vital Signs:   Vitals:    03/15/19 1818 03/15/19 1820 03/15/19 1821 03/15/19 1845   BP: 109/54 109/54     Pulse: 63 63     Resp: 11  15 17   Temp:       TempSrc:       SpO2:   97% 97%   Weight:       Height:           Cardiac Rhythm: ,        Pain level: 0-10 Pain Scale: 0    Is this patient confused?: No   Does this patient have a guardian?  No         If yes, is there guardianship documents in the Epic \"Code/ACP\" activity?  N/A         Guardian Notified?  N/A  [place] - Suicide Severity Rating Scale Completed?  Yes  If yes, what color did the patient score?  White    Patient Report: Initial Complaint: at cardiology today; weight gain of 15 lb in past week  Focused Assessment: increasing edema, SOB on exertion, denies CP  Tests Performed: ekg, labs at clinic, cxr  Abnormal Results: proBNP 635, creat 1.7, hgb 9.3  Treatments provided: bumex 4mg IV    Family Comments: none present    OBS brochure/video discussed/provided to patient/family: N/A      ED Medications:   Medications   bumetanide (BUMEX) injection 4 mg (4 mg Intravenous Given 3/15/19 1830)       Drips infusing?:  No    For the majority of the shift this patient was Green.   Interventions performed were care and rounding.    Severe Sepsis OR Septic Shock Diagnosis Present: No    To be done/followed up on inpatient unit:      ED NURSE " PHONE NUMBER: *[phone]

## 2019-03-16 ENCOUNTER — APPOINTMENT (OUTPATIENT)
Dept: ULTRASOUND IMAGING | Facility: CLINIC | Age: 59
End: 2019-03-16
Attending: INTERNAL MEDICINE
Payer: COMMERCIAL

## 2019-03-16 LAB
ANION GAP SERPL CALCULATED.3IONS-SCNC: 6 MMOL/L (ref 3–14)
BUN SERPL-MCNC: 22 MG/DL (ref 7–30)
CALCIUM SERPL-MCNC: 8.4 MG/DL (ref 8.5–10.1)
CHLORIDE SERPL-SCNC: 107 MMOL/L (ref 94–109)
CO2 SERPL-SCNC: 26 MMOL/L (ref 20–32)
CREAT SERPL-MCNC: 1.35 MG/DL (ref 0.66–1.25)
GFR SERPL CREATININE-BSD FRML MDRD: 57 ML/MIN/{1.73_M2}
GLUCOSE BLDC GLUCOMTR-MCNC: 84 MG/DL (ref 70–99)
GLUCOSE BLDC GLUCOMTR-MCNC: 96 MG/DL (ref 70–99)
GLUCOSE BLDC GLUCOMTR-MCNC: 96 MG/DL (ref 70–99)
GLUCOSE BLDC GLUCOMTR-MCNC: 98 MG/DL (ref 70–99)
GLUCOSE SERPL-MCNC: 108 MG/DL (ref 70–99)
HBA1C MFR BLD: 5.4 % (ref 0–5.6)
INTERPRETATION ECG - MUSE: NORMAL
POTASSIUM SERPL-SCNC: 3.7 MMOL/L (ref 3.4–5.3)
SODIUM SERPL-SCNC: 139 MMOL/L (ref 133–144)

## 2019-03-16 PROCEDURE — 93970 EXTREMITY STUDY: CPT

## 2019-03-16 PROCEDURE — 80048 BASIC METABOLIC PNL TOTAL CA: CPT | Performed by: HOSPITALIST

## 2019-03-16 PROCEDURE — 21000001 ZZH R&B HEART CARE

## 2019-03-16 PROCEDURE — 25000132 ZZH RX MED GY IP 250 OP 250 PS 637: Performed by: HOSPITALIST

## 2019-03-16 PROCEDURE — 36415 COLL VENOUS BLD VENIPUNCTURE: CPT | Performed by: HOSPITALIST

## 2019-03-16 PROCEDURE — 25000131 ZZH RX MED GY IP 250 OP 636 PS 637: Performed by: HOSPITALIST

## 2019-03-16 PROCEDURE — 94660 CPAP INITIATION&MGMT: CPT

## 2019-03-16 PROCEDURE — 99221 1ST HOSP IP/OBS SF/LOW 40: CPT | Performed by: INTERNAL MEDICINE

## 2019-03-16 PROCEDURE — 40000275 ZZH STATISTIC RCP TIME EA 10 MIN

## 2019-03-16 PROCEDURE — 83036 HEMOGLOBIN GLYCOSYLATED A1C: CPT | Performed by: HOSPITALIST

## 2019-03-16 PROCEDURE — 00000146 ZZHCL STATISTIC GLUCOSE BY METER IP

## 2019-03-16 PROCEDURE — 99233 SBSQ HOSP IP/OBS HIGH 50: CPT | Performed by: HOSPITALIST

## 2019-03-16 RX ORDER — EPLERENONE 25 MG/1
50 TABLET, FILM COATED ORAL 2 TIMES DAILY
Status: DISCONTINUED | OUTPATIENT
Start: 2019-03-16 | End: 2019-03-20 | Stop reason: HOSPADM

## 2019-03-16 RX ORDER — EPLERENONE 50 MG/1
50 TABLET, FILM COATED ORAL 2 TIMES DAILY
Status: DISCONTINUED | OUTPATIENT
Start: 2019-03-16 | End: 2019-03-16

## 2019-03-16 RX ADMIN — INSULIN GLARGINE 16 UNITS: 100 INJECTION, SOLUTION SUBCUTANEOUS at 08:28

## 2019-03-16 RX ADMIN — EPLERENONE 50 MG: 25 TABLET ORAL at 21:13

## 2019-03-16 RX ADMIN — TIMOLOL MALEATE 1 DROP: 5 SOLUTION/ DROPS OPHTHALMIC at 21:13

## 2019-03-16 RX ADMIN — ASPIRIN 81 MG: 81 TABLET, COATED ORAL at 08:28

## 2019-03-16 RX ADMIN — POTASSIUM CHLORIDE 30 MEQ: 1500 TABLET, EXTENDED RELEASE ORAL at 21:13

## 2019-03-16 RX ADMIN — TIMOLOL MALEATE 1 DROP: 5 SOLUTION/ DROPS OPHTHALMIC at 08:27

## 2019-03-16 RX ADMIN — DOCUSATE SODIUM 100 MG: 100 CAPSULE, LIQUID FILLED ORAL at 21:13

## 2019-03-16 RX ADMIN — POTASSIUM CHLORIDE 30 MEQ: 1500 TABLET, EXTENDED RELEASE ORAL at 17:37

## 2019-03-16 RX ADMIN — METOPROLOL TARTRATE 50 MG: 50 TABLET ORAL at 08:29

## 2019-03-16 RX ADMIN — DONEPEZIL HYDROCHLORIDE 5 MG: 5 TABLET ORAL at 21:13

## 2019-03-16 RX ADMIN — METOPROLOL TARTRATE 50 MG: 50 TABLET ORAL at 21:13

## 2019-03-16 RX ADMIN — PANTOPRAZOLE SODIUM 40 MG: 40 TABLET, DELAYED RELEASE ORAL at 07:45

## 2019-03-16 RX ADMIN — ATORVASTATIN CALCIUM 40 MG: 40 TABLET, FILM COATED ORAL at 08:29

## 2019-03-16 RX ADMIN — POTASSIUM CHLORIDE 30 MEQ: 1500 TABLET, EXTENDED RELEASE ORAL at 08:29

## 2019-03-16 ASSESSMENT — ACTIVITIES OF DAILY LIVING (ADL)
ADLS_ACUITY_SCORE: 11

## 2019-03-16 ASSESSMENT — MIFFLIN-ST. JEOR: SCORE: 2192.48

## 2019-03-16 NOTE — PROVIDER NOTIFICATION
MD Notification    Notified Person: MD    Notified Person Name:     Notification Date/Time: 3/16/2019    Notification Interaction:    Purpose of Notification:  BP89/43    Orders Received: No new orders received    Comments:     Attending Attestation (For Attendings USE Only)...

## 2019-03-16 NOTE — CONSULTS
Full dictated consult note to follow     59 yo M w known HFpEF, admitted with volume overload. Appears to be diuresing. Continue Bumex drip.

## 2019-03-16 NOTE — PLAN OF CARE
VSS. Monitor remains Sinus rhythm. Pt. Denies pain. Bumex drip continues at 0.5 mg/hr per order. Diuresing well.

## 2019-03-16 NOTE — PROGRESS NOTES
"SPIRITUAL HEALTH SERVICES Progress Note  [facility]    Visited pt Gil per pt's request. Pt reports that he feels better.     Pt remembered  and welcomed her.  talked about last time visit; pt talked about her wife(Aurelia) and shared his sadness with ; Pt lost his wife, Aurelia and misses her so much.     Pt cried and shared his distress that he has been taking care of his mother alone in his house, and it has been very difficult for him. Pt said that he has health issues which are related with his heart and can't work anymore. Pt lost his job. Pt said, \"I lost too much.... I need time for myself.\" Currently, his sister who lives in [place] came and is taking care of their mom.   Pt was teary and shared his sadness and frustration with .  acknowledged his sorrow and frustration and provided emotional support.     Pt was teary and shared his loneliness that he has one son, but hasn't seen his son for a long time.   Pt has three brothers who live in [place] and one sister who lives in [place]. Pt said that his three brothers, they don't care about their mom.     Pt is Jainism and used to go [employer]. Pt states that he trusts God, and his karol is important to him.    Pt expects to go back home on Monday.  offered reading scripture and prayer, and pt welcomed it. Pt appreciated the visit.      provided a reflective conversation, empathetic listening, emotional support, reading scripture, and prayer.     I and SH remain available if needs arise.     Danii Padilla  Chaplain Resident    "

## 2019-03-16 NOTE — PROGRESS NOTES
[facility]    Medicine Progress Note - Hospitalist Service       Date of Admission:  3/15/2019  Assessment & Plan   Gil Chowdary is a 58 year old male with history of severe aortic valve stenosis status post replacement in 2017,  heart failure with preserved ejection fraction, morbid obesity, obstructive sleep apnea, anemia and thrombocytopenia, type 2 diabetes, and former tobacco use who presented to the ED from cardiology clinic with complaints of weight gain. He has gained about 15 lbs in the last few weeks. Cardiology has started bumex drip and recommended nephrology consultation for question of diuretic resistance.    Exacerbation of heart failure with preserved ejection fraction  History of severe aortic stenosis s/p tissue valve replacement 2017  15 pounds up in ~3 weeks despite reported compliance with meds - he does not routinely monitor his amount of fluid intake  NT Pro ; last echo October 2018 - EF 60-65%, diastolic dysfunction noted  PTA ASA 81, metoprolol tartrate 50 bid continued with holding parameters   PTA eplerenone held given bumex drip  Today his weight is down from 140.6Kg to 135.5Kg  Continue intravenous bumetanide   Monitor weight, electrolytes and renal function     Possible acute on chronic renal failure  Creatinine is around baseline today at 1.3  Continue to monitor      Type 2 diabetes mellitus   October 2018 A1c 6.8%  POC QID with sliding scale insulin  PTA glargine (20u qam) adjusted to 16 units every morning     DRAKE on CPAP  Continue CPAP     Iron deficiency anemia and thrombocytopenia  Hemoglobin 9.3g, appears his baseline as of December  Platelets  79K, around his baseline  Continue to monitor      History of hyperlipidemia  PTA statin continued     History of GERD  PTA pantoprazole continued    Diet: Combination Diet 8831-9294 Calories: Moderate Consistent CHO (4-6 CHO units/meal); Low Saturated Fat Na <2400mg Diet    DVT Prophylaxis: pneumatic  compression device   [place] Catheter: not present  Code Status: Full Code      Disposition Plan   Expected discharge: 2 - 3 days, recommended to prior living arrangement once adequate diuresis has been obtained.  Entered: Norm Washington MD 03/16/2019, 10:10 AM     The patient's care was discussed with the Patient.    Norm Washington MD  Hospitalist Service  [facility]    ______________________________________________________________________    Interval History   No new complaints. Legs feel less swollen.    Data reviewed today: I reviewed all medications, new labs and imaging results over the last 24 hours. I personally reviewed no images or EKG's today.    Physical Exam   Vital Signs: Temp: 98.1  F (36.7  C) Temp src: Oral BP: 104/53 Pulse: 64 Heart Rate: 59 Resp: 16 SpO2: 98 % O2 Device: None (Room air)    Weight: 298 lbs 12.8 oz  Constitutional: obese, awake, alert, cooperative, no apparent distress, and appears stated age  Respiratory: No increased work of breathing, good air exchange, clear to auscultation bilaterally, no crackles or wheezing  Cardiovascular:  regular rate and rhythm, normal S1 and S2, no S3 or S4, and no murmur noted; 3+ lower extremity edema bilaterally  GI: soft nontender and nondistended   Skin: no rashes and no jaundice  Musculoskeletal: There is no redness, warmth, or swelling of the joints.  Full range of motion noted.   Neurologic: Awake, alert, oriented to name, place and time.  Cranial nerves II-XII are grossly intact.  Motor is 5 out of 5 bilaterally  Neuropsychiatric: General: normal, calm and normal eye contact    Data   Recent Labs   Lab 03/16/19  0614 03/15/19  1919 03/15/19  1444   WBC  --   --  4.2   HGB  --   --  9.3*   MCV  --   --  102*   PLT  --   --  79*   INR  --  1.28*  --      --  140   POTASSIUM 3.7  --  4.4   CHLORIDE 107  --  105   CO2 26  --  27   BUN 22  --  19   CR 1.35*  --  1.70*   ANIONGAP 6  --  12.4   MYRON 8.4*  --  8.6    *  --  161*     Recent Results (from the past 24 hour(s))   XR Chest 2 Views    Narrative    CHEST TWO VIEWS  3/15/2019 6:01 PM     HISTORY: Dyspnea on exertion.    COMPARISON: 11/27/2018      Impression    IMPRESSION: Prior midline sternotomy. Cardiomegaly is present along  with some mild pulmonary vascular prominence. No infiltrates or  effusions.    BRAD PÉREZ MD

## 2019-03-16 NOTE — CONSULTS
"[facility]    Nephrology Consultation     Date of Admission:  3/15/2019    Assessment & Plan      Gil Chowdary is a 58 year old male who was admitted on 3/15/2019.     1) Baseline Variable Kidney Function.  Cr 1.3-1.8 and GFR 40-70.      2) Structural Heart Disease:  AVR, Cor Pulmonale +/- HFpEF.    3) Cardiorenal Syndrome:  Cr 1.7 to 1.35 since admission with diuresis.    4) Massive LE edema - seems out of proportion to heart abnormalities.    5) Likely high sodium diet.    6) Probable Increased tubular absorption of sodium outside Loop of [place].    7) Proteinuria ?    8) Multiple Social Stressors - Please see Chaplain Danii Padilla's note of 3/16/19.      9) Cirrhosis ?  GI eval ?    10) Anemia:  Needs evaluation.        Suggest:     Cont same diuretic for now.  Lymphedema Rx  U P/C ratio  Nutrition Services Consult for pt education on low sodium diet  Venous Doppler BLE R/O DVT  PT Consult   GI Eval ?  Nutritional anemia studies.    Sanjay Gonzales MD  [facility] - Nephrology  [phone]    Reason for Consult      I was asked to see the patient for \"diuretic resistance.\"    Primary Care Physician      Sam Villatoro    Chief Complaint      How can I keep this fluid off.      History is obtained from the patient and chart review.      History of Present Illness      Gil Chowdary is a 58 year old male who presents with fluid weight gain.    He was admitted via ED after presenting with BLE swelling.  He was seen in [facility] clinic and sent to ED due to refractory weight gain. He indicates that his fluid weight goes up and down.  He has gained about 15 # over the last couple of weeks accompanied by MOTA.      He describes a diet that may not be low in sodium.  \"It's in everything.\"      He says he takes his medicines.    He has never had lymphedema Rx.    Not known to be nephrotic.    There is a suggestion of cirrhosis on his CT chest of 2017.  He also has splenomegaly " and thrombocytopenia which can go with portal htn and cirrhosis.      No much calcium in aorta to suggest possibility of renal artery stenosis.      Chaplain Danii Padilla's note is very important/illuminating about his home life and why he does not/cannot focus on his own health.      Past Medical History   I have reviewed this patient's medical history and updated it with pertinent information if needed.   Past Medical History:   Diagnosis Date     Former tobacco use      Glaucoma      Hyperlipidemia LDL goal <160 12/6/2011     Obesity      DRAKE on CPAP      Right bundle branch block      Severe aortic stenosis     On Echo 10/28/16       Past Surgical History   I have reviewed this patient's surgical history and updated it with pertinent information if needed.  Past Surgical History:   Procedure Laterality Date     HEART CATH LEFT HEART CATH  04/07/2017    Diffuse non-obstuctive CAD     REPAIR VALVE AORTIC N/A 5/16/2017    Procedure: REPAIR VALVE AORTIC;  Median Sternotony, CardioPulmonary Bypass, Aortic Valve Replace using 25MM Trifecta Valve with [employer], Septal myectomy;  Surgeon: Jun Simon MD;  Location: [facility] OR     REPLACE VALVE AORTIC N/A 5/16/2017    Procedure: REPLACE VALVE AORTIC;;  Surgeon: Jun Simon MD;  Location: [facility] OR     SINUS SURGERY  2005       Prior to Admission Medications   Prior to Admission Medications   Prescriptions Last Dose Informant Patient Reported? Taking?   Insulin Glargine (BASAGLAR KWIKPEN SC) 3/15/2019 at 0700  Yes Yes   Sig: Inject 20 Units Subcutaneous every morning    Multiple Vitamins-Minerals (CENTRUM ADULTS PO) 3/15/2019 at am  Yes Yes   Sig: Take 1 tablet by mouth every morning    aspirin 81 MG tablet 3/15/2019 at am  Yes Yes   Sig: Take 81 mg by mouth every morning    atorvastatin (LIPITOR) 40 MG tablet 3/15/2019 at am  No Yes   Sig: Take 1 tablet (40 mg) by mouth daily   bumetanide (BUMEX) 1 MG tablet 3/15/2019 at am  No Yes   Sig: Take 3  tablets (3 mg) by mouth 2 times daily   clindamycin (CLEOCIN) 300 MG capsule prn  No No   Sig: Take 1 capsule (300 mg) by mouth as needed   Patient not taking: Reported on 3/15/2019   donepezil (ARICEPT) 5 MG tablet 3/14/2019 at hs  Yes Yes   Sig: Take 5 mg by mouth At Bedtime   eplerenone (INSPRA) 50 MG tablet 3/15/2019 at am  No Yes   Sig: Take 1 tablet (50 mg) by mouth 2 times daily   ferrous sulfate (IRON) 325 (65 Fe) MG tablet 3/15/2019 at am  No Yes   Sig: Take 1 tablet (325 mg) by mouth 2 times daily   insulin aspart (NOVOLOG FLEXPEN) 100 UNIT/ML injection   Yes Yes   Sig: Inject 2 Units Subcutaneous 2 times daily (with meals) Takes with breakfast and lunch if blood glucose > 150   insulin aspart (NOVOLOG FLEXPEN) 100 UNIT/ML injection   Yes Yes   Sig: Inject 3 Units Subcutaneous daily (with dinner) Takes if blood glucose > 150   magnesium oxide (MAG-OX) 400 MG tablet 3/15/2019 at am  No Yes   Sig: Take 1 tablet (400 mg) by mouth daily   metoprolol (LOPRESSOR) 50 MG tablet 3/15/2019 at am  No Yes   Sig: Take 1 tablet (50 mg) by mouth 2 times daily   pantoprazole (PROTONIX) 40 MG EC tablet 3/15/2019 at am  Yes Yes   Sig: Take 40 mg by mouth every morning (before breakfast)   potassium chloride ER (K-DUR/KLOR-CON M) 10 MEQ CR tablet 3/15/2019 at x2 doses  Yes Yes   Sig: Take 30 mEq by mouth 3 times daily    prednisoLONE acetate (PRED FORTE) 1 % ophthalmic susp prn  Yes Yes   Sig: Place 1 drop into both eyes as needed (eye pain)    timolol (TIMOPTIC) 0.5 % ophthalmic solution 3/15/2019 at am  Yes Yes   Sig: Place 1 drop into both eyes 2 times daily       Facility-Administered Medications: None     Allergies   Allergies   Allergen Reactions     Amoxicillin      Itchy      Penicillins Hives     Spironolactone Rash       Social History   I have reviewed this patient's social history and updated it with pertinent information if needed. Gil WAYNE Chowdary  reports that he quit smoking about 7 years ago. His smoking  use included cigarettes and cigars. He started smoking about 40 years ago. He has a 20.00 pack-year smoking history. he has never used smokeless tobacco. He reports that he does not drink alcohol or use drugs.    Family History   I have reviewed this patient's family history and updated it with pertinent information if needed.   Family History   Problem Relation Age of Onset     Family History Negative Mother      Diabetes Father      Heart Surgery Father         CABG     Coronary Artery Disease Father      Hypertension Brother      Diabetes Brother      Heart Disease Brother      Heart Surgery Brother         CABG x 3     Family History Negative Sister      Diabetes Brother         History of diabetes, but no longer an issue     Family History Negative Brother        Review of Systems   The 10 point Review of Systems is negative other than noted in the HPI.      Physical Exam   Temp: 98.1  F (36.7  C) Temp src: Oral BP: 107/50 Pulse: 64 Heart Rate: 57 Resp: 16 SpO2: 100 % O2 Device: None (Room air)    Vital Signs with Ranges  Temp:  [98.1  F (36.7  C)-98.4  F (36.9  C)] 98.1  F (36.7  C)  Pulse:  [61-69] 64  Heart Rate:  [57-73] 57  Resp:  [11-20] 16  BP: ()/(43-68) 107/50  SpO2:  [94 %-100 %] 100 %  298 lbs 12.8 oz    GENERAL: alert, NAD  HEENT:  Normocephalic. No gross abnormalities.  Pupils equal.  MMM.  Dentition is ok.  CV: RRR,  murmurs, no clicks, gallops, or rubs, 4+ BLE  edema, no carotid bruits  RESP: Clear bilaterally with good efforts  GI: Abdomen soft/nt/nd, BS normal. No masses, organomegaly  MUSCULOSKELETAL: extremities nl - no gross deformities noted  SKIN: venous stasis changes in skin on LE  NEURO:  Strength normal and symmetric.   PSYCH: mood good, affect appropriate  LYMPH: No palpable ant/post cervical and supraclavicular adenopathy    Data   BMP  Recent Labs   Lab 03/16/19  0614 03/15/19  1444    140   POTASSIUM 3.7 4.4   CHLORIDE 107 105   MYRON 8.4* 8.6   CO2 26 27   BUN 22 19   CR  1.35* 1.70*   * 161*     Phos@[email or web address](phos:4)  CBC)  Recent Labs   Lab 03/15/19  1444   WBC 4.2   HGB 9.3*   HCT 29.5*   *   PLT 79*     No lab results found in last 7 days.    Invalid input(s): BILIRUBININDIRECT  Recent Labs   Lab 03/15/19  1919   INR 1.28*     No results found for: D2VIT, D3VIT, DTOT  Recent Labs   Lab 03/15/19  1444   HGB 9.3*   HCT 29.5*   *     No results for input(s): PTHI in the last 168 hours.

## 2019-03-16 NOTE — H&P
[facility]    History and Physical - Hospitalist Service       Date of Admission:  3/15/2019    Assessment & Plan   Gil Chowdary is a 58 year old male with history of severe aortic valve stenosis status post replacement in 2017,  heart failure with preserved ejection fraction, morbid obesity, obstructive sleep apnea, anemia and thrombocytopenia, type 2 diabetes, and former tobacco use who presented to the ED from cardiology clinic with complaints of weight gain. He has gained about 15 lbs in the last few weeks. Cardiology has started bumex drip and recommended nephrology consultation for question of diuretic resistance.    Exacerbation of heart failure with preserved ejection fraction  History of severe aortic stenosis s/p tissue valve replacement 2017  - 15 pounds up in ~3 weeks despite reported compliance with meds - he does not routinely monitor his amount of fluid intake  - NT Pro ; last echo October 2018 - EF 60-65%, diastolic dysfunction noted  - likely repeat echocardiogram tomorrow, but I will defer to cardiology  - per cardiology, bumex bolus given in ED, bumex gtt ordered  - follow renal function closely, Nephrology consult placed - ?diurectic resistance  - further plans of possible right heart cath etc deferred to Cardiology  - per cardiology, possible cardiac MRI / CT w/o to investigate possible constrictive heart disease  - PTA ASA 81, metoprolol tartrate 50 bid continued with holding parameters   - PTA eplerenone held given bumex drip    Possible acute on chronic renal failure  - on adm, creat 1.7, it appears he has varied from 1.1-1.8 in the last several months  - as above, Nephrology consult requested  - continue to monitor closely especially with aggressive diuresis    T2DM  - October 2018 A1c 6.8%  - POC QID with sliding scale insulin  - PTA glargine (20u qam) adjusted to 16 units every morning    DRAKE on CPAP  - continue CPAP    Iron deficiency anemia and  thrombocytopenia  - monitor, no signs of bleeding   - hgb 9.3, appears his baseline as of December  - plts 79, around his baseline  - PTA iron can continue at home    History of hyperlipidemia  - PTA statin continued    History of GERD  - PTA pantoprazole continued       Diet: diabetic and heart healthy  DVT Prophylaxis: Pneumatic Compression Devices (platelets 79k)  [place] Catheter: not present  Code Status: Full code     Disposition Plan   Expected discharge: 2-4 days pending diuresis and clinical course  Entered: Isaac Rose MD 03/15/2019, 7:34 PM     The patient's care was discussed with the Patient and ED Team.    Isaac Rose MD  [facility]    ______________________________________________________________________    Chief Complaint   Weight gain    History is obtained from the patient    History of Present Illness   Gil Chowdary is a 58 year old male with history of severe aortic valve stenosis status post replacement in 2017,  heart failure with preserved ejection fraction, morbid obesity, obstructive sleep apnea, anemia and thrombocytopenia, type 2 diabetes, and former tobacco use who presented to the ED from cardiology clinic with complaints of weight gain.  Appears he has gained about 15 pounds since 3 weeks ago when he was last seen in cardiology clinic.  He really denies any other symptoms-no chest pain, palpitations, or worsening of shortness of breath.  He has been compliant with medications but does admit to not watching his fluid intake and what sounds like most days of the week.  Otherwise she denies any recent illnesses-no fevers, chills, nausea, vomiting, diarrhea, or other new complaints.  He has had no recent travel-no long car rides or flights.    In the ED he is afebrile hemodynamically stable saturating normally on room air and is overall asymptomatic.  Recommendations from cardiology are noted and include Bumex bolus infusion which she has received a Bumex  continuous infusion which I have ordered.  Recheck of his BMP scheduled for the morning and at the request of cardiology nephrology consultation has been placed.    Review of Systems    The 10 point Review of Systems is negative other than noted in the HPI or here.     Past Medical History    I have reviewed this patient's medical history and updated it with pertinent information if needed.   Past Medical History:   Diagnosis Date     Former tobacco use      Glaucoma      Hyperlipidemia LDL goal <160 2011     Obesity      DRAKE on CPAP      Right bundle branch block      Severe aortic stenosis     On Echo 10/28/16       Past Surgical History   I have reviewed this patient's surgical history and updated it with pertinent information if needed.  Past Surgical History:   Procedure Laterality Date     HEART CATH LEFT HEART CATH  2017    Diffuse non-obstuctive CAD     REPAIR VALVE AORTIC N/A 2017    Procedure: REPAIR VALVE AORTIC;  Median Sternotony, CardioPulmonary Bypass, Aortic Valve Replace using 25MM Trifecta Valve with [employer] Technology, Septal myectomy;  Surgeon: Jun Simon MD;  Location: [facility] OR     REPLACE VALVE AORTIC N/A 2017    Procedure: REPLACE VALVE AORTIC;;  Surgeon: Jun Simon MD;  Location: [facility] OR     SINUS SURGERY         Social History   I have reviewed this patient's social history and updated it with pertinent information if needed.  Social History     Tobacco Use     Smoking status: Former Smoker     Packs/day: 1.00     Years: 20.00     Pack years: 20.00     Types: Cigarettes, Cigars     Start date:      Last attempt to quit: 10/21/2011     Years since quittin.4     Smokeless tobacco: Never Used   Substance Use Topics     Alcohol use: No     Drug use: No       Family History   I have reviewed this patient's family history and updated it with pertinent information if needed.   Family History   Problem Relation Age of Onset     Family History  Negative Mother      Diabetes Father      Heart Surgery Father         CABG     Coronary Artery Disease Father      Hypertension Brother      Diabetes Brother      Heart Disease Brother      Heart Surgery Brother         CABG x 3     Family History Negative Sister      Diabetes Brother         History of diabetes, but no longer an issue     Family History Negative Brother        Prior to Admission Medications   Prior to Admission Medications   Prescriptions Last Dose Informant Patient Reported? Taking?   Insulin Glargine (BASAGLAR KWIKPEN SC) 3/15/2019 at 0700  Yes Yes   Sig: Inject 20 Units Subcutaneous every morning    Multiple Vitamins-Minerals (CENTRUM ADULTS PO) 3/15/2019 at am  Yes Yes   Sig: Take 1 tablet by mouth every morning    aspirin 81 MG tablet 3/15/2019 at am  Yes Yes   Sig: Take 81 mg by mouth every morning    atorvastatin (LIPITOR) 40 MG tablet 3/15/2019 at am  No Yes   Sig: Take 1 tablet (40 mg) by mouth daily   bumetanide (BUMEX) 1 MG tablet 3/15/2019 at am  No Yes   Sig: Take 3 tablets (3 mg) by mouth 2 times daily   clindamycin (CLEOCIN) 300 MG capsule prn  No No   Sig: Take 1 capsule (300 mg) by mouth as needed   Patient not taking: Reported on 3/15/2019   donepezil (ARICEPT) 5 MG tablet 3/14/2019 at hs  Yes Yes   Sig: Take 5 mg by mouth At Bedtime   eplerenone (INSPRA) 50 MG tablet 3/15/2019 at am  No Yes   Sig: Take 1 tablet (50 mg) by mouth 2 times daily   ferrous sulfate (IRON) 325 (65 Fe) MG tablet 3/15/2019 at am  No Yes   Sig: Take 1 tablet (325 mg) by mouth 2 times daily   insulin aspart (NOVOLOG FLEXPEN) 100 UNIT/ML injection   Yes Yes   Sig: Inject 2 Units Subcutaneous 2 times daily (with meals) Takes with breakfast and lunch if blood glucose > 150   insulin aspart (NOVOLOG FLEXPEN) 100 UNIT/ML injection   Yes Yes   Sig: Inject 3 Units Subcutaneous daily (with dinner) Takes if blood glucose > 150   magnesium oxide (MAG-OX) 400 MG tablet 3/15/2019 at am  No Yes   Sig: Take 1 tablet  (400 mg) by mouth daily   metoprolol (LOPRESSOR) 50 MG tablet 3/15/2019 at am  No Yes   Sig: Take 1 tablet (50 mg) by mouth 2 times daily   pantoprazole (PROTONIX) 40 MG EC tablet 3/15/2019 at am  Yes Yes   Sig: Take 40 mg by mouth every morning (before breakfast)   potassium chloride ER (K-DUR/KLOR-CON M) 10 MEQ CR tablet 3/15/2019 at x2 doses  Yes Yes   Sig: Take 30 mEq by mouth 3 times daily    prednisoLONE acetate (PRED FORTE) 1 % ophthalmic susp prn  Yes Yes   Sig: Place 1 drop into both eyes as needed (eye pain)    timolol (TIMOPTIC) 0.5 % ophthalmic solution 3/15/2019 at am  Yes Yes   Sig: Place 1 drop into both eyes 2 times daily       Facility-Administered Medications: None     Allergies   Allergies   Allergen Reactions     Amoxicillin      Itchy      Penicillins Hives     Spironolactone Rash       Physical Exam   Vital Signs: Temp: 98.4  F (36.9  C) Temp src: Oral BP: 114/61 Pulse: 64 Heart Rate: 63 Resp: 15 SpO2: 99 % O2 Device: None (Room air)    Weight: 310 lbs 0 oz    General Appearance: No acute distress, pleasant  HEENT: Normocephalic mucous membranes are moist EOMI  Respiratory: No wheezing or crackles appreciated normal work of respiration  Cardiovascular: S1-S2 heard, regular rate, 3+ pitting edema bilateral extremities knees  GI: Soft nontender nondistended positive bowel sounds  Musculoskeletal: Calves nontender, perfusion appears adequate  Neurologic: Awake alert and oriented x3 no focal deficits cranial nerves grossly intact moving all extremities equally    Data   Data reviewed today: I reviewed all medications, new labs and imaging results over the last 24 hours. I personally reviewed no images or EKG's today.    Recent Labs   Lab 03/15/19  1919 03/15/19  1444   WBC  --  4.2   HGB  --  9.3*   MCV  --  102*   PLT  --  79*   INR 1.28*  --    NA  --  140   POTASSIUM  --  4.4   CHLORIDE  --  105   CO2  --  27   BUN  --  19   CR  --  1.70*   ANIONGAP  --  12.4   MYRON  --  8.6   GLC  --  161*      Recent Results (from the past 24 hour(s))   XR Chest 2 Views    Narrative    CHEST TWO VIEWS  3/15/2019 6:01 PM     HISTORY: Dyspnea on exertion.    COMPARISON: 11/27/2018      Impression    IMPRESSION: Prior midline sternotomy. Cardiomegaly is present along  with some mild pulmonary vascular prominence. No infiltrates or  effusions.    BRAD PÉREZ MD

## 2019-03-16 NOTE — PLAN OF CARE
Pt A/O x4, Denies pain, cont with Bumex gtt, strict I & O. CHF teaching started, pt received CHF packet including c.o.r.e. clinc, diet with sodium restrictions and reading labels, along with stop sign and daily wts. Plan LE US and lymphedema consult. Pt appreciative of cares.

## 2019-03-16 NOTE — PLAN OF CARE
Pt stable over night, VSS, no c/o Chest pain or sob, Pt on a Bumex gtt over night with good success.  Pt up to void without issues.  This mornings Potassium was 3.7 down from 4.4.  Pt was on CPAP over night.  Will continue to monitor.

## 2019-03-16 NOTE — CONSULTS
Consult Date:  03/16/2019      REFERRAL SOURCE:  Dr. Isaac Rose      CHIEF COMPLAINT:  Heart failure exacerbation.      HISTORY OF PRESENT ILLNESS:  Mr. Chowdary is a 58-year-old man admitted with volume overload in the setting of known chronic heart failure with preserved ejection fraction.  He has been followed in the clinic as an outpatient.  Dr. Ashley has seen him most recently just yesterday afternoon.  He has also been followed by Dr. Mckeon and Rachna More over the last few months.  Please see Dr. Ashley's recent note for a more thorough history.      Briefly, the patient is admitted for volume overload.  He has heart failure with preserved EF in the setting of a history of aortic valve stenosis with aortic valve replacement in 2017 with a bioprosthetic valve.  He also has obesity with a BMI of 42, obstructive sleep apnea and paroxysmal atrial fibrillation.      It is unclear why the patient's volume has increased, but he has gained about 15 pounds over the last 3 weeks.  He has not been following a rigid diet but does say he has been taking his medications appropriately.  There was some question of if he may be resistant to his diuretics.  He was admitted to be tried on IV bumetanide.      The patient is currently in the hospital.  He feels well.  He denies any chest pain.  He says that his peripheral edema seems to be already improving.  He does have dyspnea on exertion.      REVIEW OF SYSTEMS:  Ten-point review of systems negative except as per the HPI.      PAST MEDICAL HISTORY:  As per HPI.      SOCIAL HISTORY:  The patient smoked in the past and quit about 7 years ago.      PHYSICAL EXAMINATION:   VITAL SIGNS:  Pulse of 64, blood pressure 107/50, satting 100% on room air.   GENERAL:  No apparent distress.  Obese.   HEART:  Regular rate and rhythm, no murmurs appreciated.   LUNGS:  Crackles in the bases.   ABDOMEN:  Soft, obese.   EXTREMITIES:  3+ peripheral edema.   SKIN:  Stasis dermatitis.   MENTAL:   Alert and oriented, normal conversation.   NEUROLOGIC:  Moving all extremities.      ASSESSMENT AND PLAN:   1.  Acute exacerbation of chronic heart failure with preserved ejection fraction.   2.  Status post bioprosthetic aortic valve replacement in 2017 for severe aortic stenosis.   3.  Type 2 diabetes.   4.  Obstructive sleep apnea.   5.  Hyperlipidemia.   6.  History of paroxysmal atrial fibrillation.      Mr. Matos is clearly volume overloaded and has severe peripheral edema.  He has a BNP that is only at 635 but that is likely decreased in the setting of his obesity.  He has some chronic kidney disease, but his creatinine today at 1.35 is better than what we have seen in the recent past for him.      He has been started on IV bumetanide at a drip rate of 0.5 mg per hour after a bolus of 4 mg.  His I's and O's show that he has been making a significant amount of urine and the patient has noticed this as well.  He feels that his peripheral edema is already improving.      We can continue with the IV Bumex for now.  We can wrap the patient's legs.  We can continue  all of his home medications.  I will reorder his eplerenone as I do not see that it was continued.      Thank you for the consult.  We will continue to follow.         ALLIE MCKEON MD             D: 2019   T: 2019   MT: MIRIAN      Name:     ABIGAIL MATOS   MRN:      [id number]        Account:       [id number]   :      1960           Consult Date:  2019      Document: [id number]

## 2019-03-17 ENCOUNTER — APPOINTMENT (OUTPATIENT)
Dept: PHYSICAL THERAPY | Facility: CLINIC | Age: 59
End: 2019-03-17
Attending: INTERNAL MEDICINE
Payer: COMMERCIAL

## 2019-03-17 LAB
ALBUMIN SERPL-MCNC: 3.3 G/DL (ref 3.4–5)
ANION GAP SERPL CALCULATED.3IONS-SCNC: 7 MMOL/L (ref 3–14)
BUN SERPL-MCNC: 26 MG/DL (ref 7–30)
CALCIUM SERPL-MCNC: 8.3 MG/DL (ref 8.5–10.1)
CHLORIDE SERPL-SCNC: 107 MMOL/L (ref 94–109)
CO2 SERPL-SCNC: 27 MMOL/L (ref 20–32)
CREAT SERPL-MCNC: 1.58 MG/DL (ref 0.66–1.25)
FERRITIN SERPL-MCNC: 40 NG/ML (ref 26–388)
FOLATE SERPL-MCNC: 25.4 NG/ML
GFR SERPL CREATININE-BSD FRML MDRD: 47 ML/MIN/{1.73_M2}
GLUCOSE BLDC GLUCOMTR-MCNC: 104 MG/DL (ref 70–99)
GLUCOSE BLDC GLUCOMTR-MCNC: 79 MG/DL (ref 70–99)
GLUCOSE BLDC GLUCOMTR-MCNC: 87 MG/DL (ref 70–99)
GLUCOSE BLDC GLUCOMTR-MCNC: 94 MG/DL (ref 70–99)
GLUCOSE BLDC GLUCOMTR-MCNC: 94 MG/DL (ref 70–99)
GLUCOSE SERPL-MCNC: 92 MG/DL (ref 70–99)
IRON SATN MFR SERPL: 18 % (ref 15–46)
IRON SERPL-MCNC: 69 UG/DL (ref 35–180)
PHOSPHATE SERPL-MCNC: 4.7 MG/DL (ref 2.5–4.5)
POTASSIUM SERPL-SCNC: 3.9 MMOL/L (ref 3.4–5.3)
SODIUM SERPL-SCNC: 141 MMOL/L (ref 133–144)
TIBC SERPL-MCNC: 390 UG/DL (ref 240–430)
VIT B12 SERPL-MCNC: 506 PG/ML (ref 193–986)

## 2019-03-17 PROCEDURE — 97110 THERAPEUTIC EXERCISES: CPT | Mod: GP

## 2019-03-17 PROCEDURE — 82607 VITAMIN B-12: CPT | Performed by: INTERNAL MEDICINE

## 2019-03-17 PROCEDURE — 82746 ASSAY OF FOLIC ACID SERUM: CPT | Performed by: INTERNAL MEDICINE

## 2019-03-17 PROCEDURE — 36415 COLL VENOUS BLD VENIPUNCTURE: CPT | Performed by: INTERNAL MEDICINE

## 2019-03-17 PROCEDURE — 94660 CPAP INITIATION&MGMT: CPT

## 2019-03-17 PROCEDURE — 97161 PT EVAL LOW COMPLEX 20 MIN: CPT | Mod: GP

## 2019-03-17 PROCEDURE — 99207 ZZC CDG-MDM COMPONENT: MEETS MODERATE - UP CODED: CPT | Performed by: HOSPITALIST

## 2019-03-17 PROCEDURE — 97116 GAIT TRAINING THERAPY: CPT | Mod: GP

## 2019-03-17 PROCEDURE — 83550 IRON BINDING TEST: CPT | Performed by: INTERNAL MEDICINE

## 2019-03-17 PROCEDURE — 25000125 ZZHC RX 250: Performed by: HOSPITALIST

## 2019-03-17 PROCEDURE — 00000146 ZZHCL STATISTIC GLUCOSE BY METER IP

## 2019-03-17 PROCEDURE — 40000275 ZZH STATISTIC RCP TIME EA 10 MIN

## 2019-03-17 PROCEDURE — 99233 SBSQ HOSP IP/OBS HIGH 50: CPT | Performed by: HOSPITALIST

## 2019-03-17 PROCEDURE — 25000132 ZZH RX MED GY IP 250 OP 250 PS 637: Performed by: HOSPITALIST

## 2019-03-17 PROCEDURE — 83540 ASSAY OF IRON: CPT | Performed by: INTERNAL MEDICINE

## 2019-03-17 PROCEDURE — 80069 RENAL FUNCTION PANEL: CPT | Performed by: INTERNAL MEDICINE

## 2019-03-17 PROCEDURE — 99232 SBSQ HOSP IP/OBS MODERATE 35: CPT | Performed by: INTERNAL MEDICINE

## 2019-03-17 PROCEDURE — 82728 ASSAY OF FERRITIN: CPT | Performed by: INTERNAL MEDICINE

## 2019-03-17 PROCEDURE — 21000001 ZZH R&B HEART CARE

## 2019-03-17 PROCEDURE — 25000131 ZZH RX MED GY IP 250 OP 636 PS 637: Performed by: HOSPITALIST

## 2019-03-17 RX ADMIN — DONEPEZIL HYDROCHLORIDE 5 MG: 5 TABLET ORAL at 21:26

## 2019-03-17 RX ADMIN — METOPROLOL TARTRATE 50 MG: 50 TABLET ORAL at 08:28

## 2019-03-17 RX ADMIN — EPLERENONE 50 MG: 25 TABLET ORAL at 21:26

## 2019-03-17 RX ADMIN — TIMOLOL MALEATE 1 DROP: 5 SOLUTION/ DROPS OPHTHALMIC at 21:30

## 2019-03-17 RX ADMIN — POTASSIUM CHLORIDE 30 MEQ: 1500 TABLET, EXTENDED RELEASE ORAL at 08:29

## 2019-03-17 RX ADMIN — INSULIN GLARGINE 16 UNITS: 100 INJECTION, SOLUTION SUBCUTANEOUS at 08:27

## 2019-03-17 RX ADMIN — BUMETANIDE 0.5 MG/HR: 0.25 INJECTION INTRAMUSCULAR; INTRAVENOUS at 09:28

## 2019-03-17 RX ADMIN — POTASSIUM CHLORIDE 30 MEQ: 1500 TABLET, EXTENDED RELEASE ORAL at 16:54

## 2019-03-17 RX ADMIN — PANTOPRAZOLE SODIUM 40 MG: 40 TABLET, DELAYED RELEASE ORAL at 08:28

## 2019-03-17 RX ADMIN — ATORVASTATIN CALCIUM 40 MG: 40 TABLET, FILM COATED ORAL at 08:28

## 2019-03-17 RX ADMIN — ASPIRIN 81 MG: 81 TABLET, COATED ORAL at 08:28

## 2019-03-17 RX ADMIN — POTASSIUM CHLORIDE 30 MEQ: 1500 TABLET, EXTENDED RELEASE ORAL at 21:27

## 2019-03-17 RX ADMIN — TIMOLOL MALEATE 1 DROP: 5 SOLUTION/ DROPS OPHTHALMIC at 08:30

## 2019-03-17 RX ADMIN — EPLERENONE 50 MG: 25 TABLET ORAL at 08:28

## 2019-03-17 ASSESSMENT — ACTIVITIES OF DAILY LIVING (ADL)
ADLS_ACUITY_SCORE: 11

## 2019-03-17 ASSESSMENT — MIFFLIN-ST. JEOR: SCORE: 2164.35

## 2019-03-17 NOTE — PLAN OF CARE
VSS. Monitor remains Sinus rhythm with BBB. Pt. Denies pain. Bumex drip continues at 0.5 mg/hr. Continue to monitor.

## 2019-03-17 NOTE — PLAN OF CARE
"Discharge Planner PT   Patient plan for discharge: home  Current status: PT consult received for lack of mobility and lymphedema. Chart reviewed. PT evaluation and treatment initiated. 59y/o M admitted with acute exacerbation of chronic heart failure with preserved EF, possible acute on chronic renal failure with hx of bioprosthetic AVR 2017 for severe aortic stenosis, DM II. PLOF: lives with 93y/o mother and assists her, Pt IND with all mobility without assistive device. Stairs to enter home, all needs on main level.     Pt reports edema present in legs on/off since his AVR in 2017. Presents with 3+ pitting edema bilateral LE, with visual assessment increased girth of left foot vs. Right, no open wounds observed, dry flaky skin. Noted pt currently on IV bumetanide and reports decreased LE swelling from admission. Will plan to initiate LE lymph wrapping tomorrow as pt will be past 48 hours of diuretics. Discussed with RN and Dr. Washington.    Supine<>sit SBA with elevated HOB and railings. Low BP initially with sitting at EOB, however improved with ambulation, asymptomatic-see vitals flowsheet. Amb 20ft initially without IV pole, however had 1 LOB required min A to correct, demos WBOS, decreased step length/height, slightly guarded without assistive device. Amb another 175ft with CGA for safety and IV pole for support. Pt encouraged to amb 3-4x/day with staff. Instructed pt with seated and supine LE ex to assist with circulation/blood flow and swelling. Pt fatigue with ex, however frequently reporting \"that felt really good\". Also discussed with pt for LE elevation in the bed (above heart) to assist with swelling.   Barriers to return to prior living situation: balance  Recommendations for discharge: home  Rationale for recommendations: Anticipate pt's mobility with improve with continued IP PT and increased mobility during hospitalization with staff for return home. Pt also reports his sister will be staying with him " to care for his mother at time of discharge.        Entered by: Ilene Graff 03/17/2019 11:31 AM

## 2019-03-17 NOTE — PLAN OF CARE
"Pt A/O. VSS. Up with sba. Tele shows sb c bbb. Pt denies any CP or SOB. Very edematous. Bumex gtt infusing. Good output.     /54   Pulse 55   Temp 97.5  F (36.4  C) (Oral)   Resp 18   Ht 1.803 m (5' 11\")   Wt 132.7 kg (292 lb 9.6 oz)   SpO2 98%   BMI 40.81 kg/m      Heart Failure Care Pathway  GOALS TO BE MET BEFORE DISCHARGE:    1. Decrease congestion and/or edema with diuretic therapy to achieve near      optimal volume status.            Dyspnea improved:  Yes            Edema improved:     No, please explain: +4 edema         Net I/O and Weights since admission:          02/15 2300 - 03/17 2259  In: 3320 [P.O.:3320]  Out: 79729 [Urine:06451]  Net: -6880            Vitals:    03/15/19 1650 03/15/19 2000 03/16/19 0624 03/17/19 0619   Weight: 140.6 kg (310 lb) 137.2 kg (302 lb 6.4 oz) 135.5 kg (298 lb 12.8 oz) 132.7 kg (292 lb 9.6 oz)       2.  O2 sats > 92% on RA or at prior home O2 therapy level.          Current oxygenation status:       SpO2: 98 %         O2 Device: None (Room air),            Able to wean O2 this shift to keep sats > 92%:  RA        Does patient use Home O2? No    3.  Tolerates ambulation and mobility near baseline: jayne         How many times did the patient ambulate with nursing staff this shift? 0    Please review the Heart Failure Care Pathway for additional HF goal outcomes.    Andry Bob RN  3/17/2019         "

## 2019-03-17 NOTE — PROGRESS NOTES
No DVT  Diuresing well.  Weight down 8 kg.  Cr up a little 1.58.  Agree needs IV diuretic one more day.    Additional recommendations as in my note 3/16/19.      Sanjay Gonzales MD

## 2019-03-17 NOTE — PROGRESS NOTES
[facility]    Medicine Progress Note - Hospitalist Service       Date of Admission:  3/15/2019  Assessment & Plan   Gil Chowdary is a 58 year old male with history of severe aortic valve stenosis status post replacement in 2017,  heart failure with preserved ejection fraction, morbid obesity, obstructive sleep apnea, anemia and thrombocytopenia, type 2 diabetes, and former tobacco use who presented to the ED from cardiology clinic with complaints of weight gain. He has gained about 15 lbs in the last few weeks. Cardiology has started bumex drip and recommended nephrology consultation for question of diuretic resistance.    Exacerbation of heart failure with preserved ejection fraction  History of severe aortic stenosis s/p tissue valve replacement 2017  15 pounds up in ~3 weeks despite reported compliance with meds - he does not routinely monitor his amount of fluid intake  NT Pro ; last echo October 2018 - EF 60-65%, diastolic dysfunction noted  PTA ASA 81, metoprolol tartrate 50 bid continued with holding parameters   PTA eplerenone held given bumex drip  3/16-Today his weight is down from 140.6Kg to 135.5Kg  Continue intravenous bumetanide   Monitor weight, electrolytes and renal function  3/17-weight is down to 132.7Kg- creatinine around 1.5 HCO3 27- would continue intravenous diuretic one more day. Lower extremity  ultrasound was negative for deep venous thrombosis.     Possible acute on chronic renal failure  Creatinine is around 1.5- this appears to be around his baseline- continue to monitor     Type 2 diabetes mellitus   October 2018 A1c 6.8%  POC QID with sliding scale insulin  PTA glargine (20u qam) adjusted to 16 units every morning  3/17- glucometers in the 80's to 90's - decrease glargine     DRAKE on CPAP  Continue CPAP     Iron deficiency anemia and thrombocytopenia  Hemoglobin 9.3g, appears his baseline as of December  Platelets  79K, around his baseline  Continue to  monitor      History of hyperlipidemia  PTA statin continued     History of GERD  PTA pantoprazole continued    Diet: Combination Diet [id number] Calories: Moderate Consistent CHO (4-6 CHO units/meal); Low Saturated Fat Na <2400mg Diet    DVT Prophylaxis: pneumatic compression device   [place]on Catheter: not present  Code Status: Full Code      Disposition Plan   Expected discharge: 1-2 days, recommended to prior living arrangement once adequate diuresis has been obtained.  Entered: Norm Washington MD 03/17/2019, 11:35 AM     The patient's care was discussed with the Patient.    Norm Washington MD  Hospitalist Service  [facility]    ______________________________________________________________________    Interval History   No new complaints    Data reviewed today: I reviewed all medications, new labs and imaging results over the last 24 hours. I personally reviewed no images or EKG's today.    Physical Exam   Vital Signs: Temp: 98  F (36.7  C) Temp src: Oral BP: 101/54 Pulse: 58 Heart Rate: 65 Resp: 16 SpO2: 93 % O2 Device: None (Room air)    Weight: 292 lbs 9.6 oz  Constitutional: obese, awake, alert, cooperative, no apparent distress, and appears stated age  Respiratory: No increased work of breathing, good air exchange, clear to auscultation bilaterally, no crackles or wheezing  Cardiovascular:  regular rate and rhythm, normal S1 and S2, no S3 or S4, and no murmur noted; 2-3+ lower extremity edema bilaterally  GI: soft nontender and nondistended   Skin: no rashes and no jaundice  Musculoskeletal: There is no redness, warmth, or swelling of the joints.  Full range of motion noted.   Neurologic: Awake, alert, oriented to name, place and time.  Cranial nerves II-XII are grossly intact.  Motor is 5 out of 5 bilaterally  Neuropsychiatric: General: normal, calm and normal eye contact    Data   Recent Labs   Lab 03/17/19  0535 03/16/19  0614 03/15/19  1919 03/15/19  1444   WBC  --   --   --   4.2   HGB  --   --   --  9.3*   MCV  --   --   --  102*   PLT  --   --   --  79*   INR  --   --  1.28*  --     139  --  140   POTASSIUM 3.9 3.7  --  4.4   CHLORIDE 107 107  --  105   CO2 27 26  --  27   BUN 26 22  --  19   CR 1.58* 1.35*  --  1.70*   ANIONGAP 7 6  --  12.4   MYRON 8.3* 8.4*  --  8.6   GLC 92 108*  --  161*   ALBUMIN 3.3*  --   --   --      Recent Results (from the past 24 hour(s))   US Lower Extremity Venous Duplex Bilateral    Narrative    US LOWER EXTREMITY VENOUS DUPLEX BILATERAL   3/16/2019 8:57 PM     HISTORY: Edema, evaluate for DVT.    COMPARISON: None.    FINDINGS: Gray-scale, color and Doppler spectral analysis ultrasound  was performed of the bilateral lower extremities. Compression and  augmentation imaging was performed.    The deep venous systems of the bilateral lower extremities are fully  compressible.  Spectral Doppler demonstrates normal phasicity and  excellent augmentation with calf compression.  Visualized greater  saphenous veins are patent.    Calf veins are difficult to see due to edema in the lower extremities.  No obvious thrombosis of the deep calf vessels is seen.         Impression    IMPRESSION: No evidence for DVT within either lower extremity.  Evaluation of the calf veins is difficult due to edema in the lower  legs, according to the technologist.       LIZZY GATICA MD

## 2019-03-17 NOTE — PROGRESS NOTES
03/17/19 1040   Quick Adds   Type of Visit Initial PT Evaluation   Living Environment   Lives With parent(s)  (lives with Mom who is 91y/o )   Living Arrangements house   Home Accessibility stairs to enter home   Number of Stairs, Main Entrance 4   Stair Railings, Main Entrance railing on left side (ascending)   Living Environment Comment all needs in main level, except laundry in the basement   Self-Care   Usual Activity Tolerance good   Current Activity Tolerance moderate   Regular Exercise No   Equipment Currently Used at Home none   Functional Level Prior   Ambulation 0-->independent   Transferring 0-->independent   Toileting 0-->independent   Bathing 0-->independent   Communication 0-->understands/communicates without difficulty   Swallowing 0-->swallows foods/liquids without difficulty   Cognition 0 - no cognition issues reported   Fall history within last six months no   Which of the above functional risks had a recent onset or change? none   Prior Functional Level Comment Pt does all household tasks, cooking, laundry, outdoor tasks, helps his mom with transfers and with her when she walks, helps her dress, toilet, bathe   General Information   Onset of Illness/Injury or Date of Surgery - Date 03/17/19   Referring Physician Sanjay Gonzales MD   Patient/Family Goals Statement to return home, start exercising   Pertinent History of Current Problem (include personal factors and/or comorbidities that impact the POC) 59y/o M admitted with acute exacerbation of chronic heart failure with preserved EF, possible acute on chronic renal failure with hx of bioprosthetic AVR 2017 for severe aortic stenosis, DM II.    Precautions/Limitations fall precautions   General Observations signifianct LE edema bilaterally. Resting comfortably in bed, NAD   Cognitive Status Examination   Orientation orientation to person, place and time   Level of Consciousness alert   Follows Commands and Answers Questions 100% of the time    Personal Safety and Judgment intact   Pain Assessment   Patient Currently in Pain No   Integumentary/Edema   Integumentary/Edema Comments Pt reports edema present in legs on/off since his AVR in 2017. Presents with 3+ pitting edema bilateral LE, with visual assessment increased girth of left foot vs. Right, no open wounds observed, dry flaky skin. Noted pt currently on IV bumetanide and reports decreased LE swelling from admission. Will plan to initiate LE lymph wrapping tomorrow as pt will be past 48 hours of diuretics. Discussed with RN and Dr. Washington.   Range of Motion (ROM)   ROM Quick Adds No deficits were identified   Strength   Manual Muscle Testing Quick Adds No deficits were identified   Strength Comments 5/5 bilateral UE/LE with seated MMT   Bed Mobility   Bed Mobility Comments Supine<>sit SBA with elevated HOB and railings.    Transfer Skills   Transfer Comments SBA with sit to stand x 2 reps utilized IV pole for balance on second stand   Gait   Gait Comments Amb 20ft initially without IV pole, however had 1 LOB required min A to correct, demos WBOS, decreased step length/height, slightly guarded without assistive device.    Balance   Balance Comments SBA with static standing balance with 1 UE support on IV pole   Sensory Examination   Sensory Perception Comments pt denies numbness/tingling bilateral LEs   Coordination   Coordination no deficits were identified   Muscle Tone   Muscle Tone no deficits were identified   General Therapy Interventions   Planned Therapy Interventions manual therapy;balance training;bed mobility training;gait training;strengthening;transfer training   Clinical Impression   Criteria for Skilled Therapeutic Intervention yes, treatment indicated   PT Diagnosis impaired gait   Influenced by the following impairments impaired balance, LE edema, decreased activity tolerance   Functional limitations due to impairments decreased independence with ambulation   Clinical Presentation  "Stable/Uncomplicated   Clinical Presentation Rationale based on clinical presentation   Clinical Decision Making (Complexity) Low complexity   Therapy Frequency` daily   Predicted Duration of Therapy Intervention (days/wks) 4 days   Anticipated Equipment Needs at Discharge (possible cane?, will continue to assess)   Anticipated Discharge Disposition Home with Assist   Risk & Benefits of therapy have been explained Yes   Patient, Family & other staff in agreement with plan of care Yes   [facility] TM \"6 Clicks\"   2016, Trustees of [facility], under license to [employer].  All rights reserved.   6 Clicks Short Forms Basic Mobility Inpatient Short Form   [facility]  \"6 Clicks\" V.2 Basic Mobility Inpatient Short Form   1. Turning from your back to your side while in a flat bed without using bedrails? 4 - None   2. Moving from lying on your back to sitting on the side of a flat bed without using bedrails? 4 - None   3. Moving to and from a bed to a chair (including a wheelchair)? 3 - A Little   4. Standing up from a chair using your arms (e.g., wheelchair, or bedside chair)? 3 - A Little   5. To walk in hospital room? 3 - A Little   6. Climbing 3-5 steps with a railing? 3 - A Little   Basic Mobility Raw Score (Score out of 24.Lower scores equate to lower levels of function) 20   Total Evaluation Time   Total Evaluation Time (Minutes) 9     "

## 2019-03-17 NOTE — PLAN OF CARE
Vitals stable through night, A&O. Rested on CPAP through night. Up with standby. Bumex gtt infusing. Ultra sound of lower extremities completely last night.

## 2019-03-17 NOTE — PROGRESS NOTES
[facility]    Cardiology Progress Note    Date of Service (when I saw the patient): 03/17/2019    Assessment & Plan   Gil Chowdary is a 58 year old male who was admitted on 3/15/2019.    1.  Acute exacerbation of chronic heart failure with preserved ejection fraction.   2.  Status post bioprosthetic aortic valve replacement in 2017 for severe aortic stenosis.   3.  Type 2 diabetes.   4.  Obstructive sleep apnea.   5.  Hyperlipidemia.   6.  History of paroxysmal atrial fibrillation.    -Very good urine output with obvious clinical improvement  -Cr increased some but still in the range of what it has been recently  -Continue with IV Bumex drip at current dose, if Cr increases more tomorrow will need to decrease Bumex  -Continue other cardiac meds  -Leg wraps    Code Status: Full Code    Chance Kevin Angel MD    Interval History   Cr increased some to 1.58. Other labs ok. Pt feels well. Notes a lot of urination and improvement in breathing and peripheral edema.     -Data reviewed today: I reviewed all new labs and imaging results over the last 24 hours.     Physical Exam   Temp: 98  F (36.7  C) Temp src: Oral BP: 96/51 Pulse: 58 Heart Rate: 58 Resp: 20 SpO2: 98 % O2 Device: None (Room air)    Vitals:    03/15/19 2000 03/16/19 0624 03/17/19 0619   Weight: 137.2 kg (302 lb 6.4 oz) 135.5 kg (298 lb 12.8 oz) 132.7 kg (292 lb 9.6 oz)     Vital Signs with Ranges  Temp:  [98  F (36.7  C)] 98  F (36.7  C)  Pulse:  [58] 58  Heart Rate:  [53-65] 58  Resp:  [16-20] 20  BP: ()/(44-58) 96/51  SpO2:  [93 %-98 %] 98 %  I/O last 3 completed shifts:  In: 1320 [P.O.:1320]  Out: 4375 [Urine:4375]    Constitutional: No apparent distress.   Eyes: No xanthelasma or conjunctivitis  Respiratory: Faint crackles.  Cardiovascular: Regular, quiet.  Extremities: 2+ peripheral edema. Stasis dermatitis.  Neurologic: Moving all extremities. No facial assymmetry.  Psychiatric: Alert and oriented. Answers questions  appropriately.     Medications     bumetanide 0.5 mg/hr (03/17/19 0928)       aspirin  81 mg Oral QAM     atorvastatin  40 mg Oral Daily     docusate sodium  100 mg Oral BID     donepezil  5 mg Oral At Bedtime     eplerenone  50 mg Oral BID     insulin aspart  1-7 Units Subcutaneous TID AC     insulin aspart  1-5 Units Subcutaneous At Bedtime     [START ON 3/18/2019] insulin glargine  12 Units Subcutaneous QAM AC     metoprolol tartrate  50 mg Oral BID     pantoprazole  40 mg Oral QAM AC     potassium chloride ER  30 mEq Oral TID     timolol maleate  1 drop Both Eyes BID       Data   Recent Labs   Lab 03/17/19  0535 03/16/19  0614 03/15/19  1919 03/15/19  1444   WBC  --   --   --  4.2   HGB  --   --   --  9.3*   MCV  --   --   --  102*   PLT  --   --   --  79*   INR  --   --  1.28*  --     139  --  140   POTASSIUM 3.9 3.7  --  4.4   CHLORIDE 107 107  --  105   CO2 27 26  --  27   BUN 26 22  --  19   CR 1.58* 1.35*  --  1.70*   ANIONGAP 7 6  --  12.4   MYRON 8.3* 8.4*  --  8.6   GLC 92 108*  --  161*   ALBUMIN 3.3*  --   --   --        Recent Results (from the past 24 hour(s))   US Lower Extremity Venous Duplex Bilateral    Narrative    US LOWER EXTREMITY VENOUS DUPLEX BILATERAL   3/16/2019 8:57 PM     HISTORY: Edema, evaluate for DVT.    COMPARISON: None.    FINDINGS: Gray-scale, color and Doppler spectral analysis ultrasound  was performed of the bilateral lower extremities. Compression and  augmentation imaging was performed.    The deep venous systems of the bilateral lower extremities are fully  compressible.  Spectral Doppler demonstrates normal phasicity and  excellent augmentation with calf compression.  Visualized greater  saphenous veins are patent.    Calf veins are difficult to see due to edema in the lower extremities.  No obvious thrombosis of the deep calf vessels is seen.         Impression    IMPRESSION: No evidence for DVT within either lower extremity.  Evaluation of the calf veins is difficult  due to edema in the lower  legs, according to the technologist.       LIZZY GATICA MD

## 2019-03-17 NOTE — CONSULTS
"REASON FOR ASSESSMENT:  CHF Consult for 2 gm NA Diet Education    NUTRITION HISTORY:    Information obtained from:  Patient     Living situation:   Lives with mother and sister.  He has been caring for his mother on his own (buying food and meal prep) but his sister has recently moved back from [place] and she is now helping out.  His sister has owned a restaurant for years and is a good cook.     Grocery shopping:  Patient and now sister (new)    Meal preparation:  Patient and now sister (new)    Breakfast:  Oatmeal - uses both packets and cannister  Egg - seasons with Mrs. Muñoz  Cereal - Raisin Bran  Toast - uses various brands  Coffee     Lunch:  Soup and sandwich   Will use canned soups and likes turkey and chicken lunch meat  \"I'm a chicken freak\"  Also likes turkey burgers     Dinner:   Sister has been preparing, ie. Roast, mashed potatoes, vegetables (fresh or frozen, never canned)  Salads with garbanzo beans (rinsed), craisins     Patient states that he has gotten to really like Mrs. Muñoz for seasoning, never uses salt anymore     Previous diet instructions:  \"I've been told but I'm one of those people that didn't listen\"      CURRENT DIET:  Moderate CHO, 2400 mg Na, LSF     NUTRITION DIAGNOSIS:  Food- and nutrition-related knowledge deficit R/t no recent low sodium diet education AEB above history       INTERVENTIONS:    Nutrition Prescription:  2000 mg Na or less per day     Implementation:    Assessed learning needs, learning preferences, and willingness to learn    Nutrition Education (Content):  a) Provided handouts:  1) Tips for Low Na Diet  2) Label Reading  3) Low Na Foods/Drinks  4) Seasoning Your Food Without Salt  5) Low Na Recipe Booklet  b) Discussed rational for limiting Na for CHF and stressed importance of following 2 gm Na guidelines   c) Encouraged patient to keep a daily food record    Nutrition Education (Application):  a) Discussed current eating habits and recommended alternative food " choices    Anticipated fair-good compliance --> Patient expresses sincere interest in making changes     Diet Education - refer to Education Flowsheet    Goals:    Patient verbalizes understanding of diet by asking appropriate questions     All of the above goals met during the education session    Follow Up:    Provided RD contact information for future questions.    Recommend Out-Patient Nutrition Referral, if further diet instructions are needed.    Sandra Lopez RD, LD, CNSC   Clinical Dietitian - [facility]

## 2019-03-18 ENCOUNTER — APPOINTMENT (OUTPATIENT)
Dept: PHYSICAL THERAPY | Facility: CLINIC | Age: 59
End: 2019-03-18
Payer: COMMERCIAL

## 2019-03-18 LAB
ALBUMIN SERPL-MCNC: 3.5 G/DL (ref 3.4–5)
ALP SERPL-CCNC: 72 U/L (ref 40–150)
ALT SERPL W P-5'-P-CCNC: 44 U/L (ref 0–70)
ANION GAP SERPL CALCULATED.3IONS-SCNC: 7 MMOL/L (ref 3–14)
AST SERPL W P-5'-P-CCNC: 88 U/L (ref 0–45)
BILIRUB DIRECT SERPL-MCNC: 0.2 MG/DL (ref 0–0.2)
BILIRUB SERPL-MCNC: 0.9 MG/DL (ref 0.2–1.3)
BUN SERPL-MCNC: 26 MG/DL (ref 7–30)
CALCIUM SERPL-MCNC: 8.4 MG/DL (ref 8.5–10.1)
CHLORIDE SERPL-SCNC: 103 MMOL/L (ref 94–109)
CO2 SERPL-SCNC: 28 MMOL/L (ref 20–32)
CREAT SERPL-MCNC: 1.5 MG/DL (ref 0.66–1.25)
ERYTHROCYTE [DISTWIDTH] IN BLOOD BY AUTOMATED COUNT: 18.2 % (ref 10–15)
GFR SERPL CREATININE-BSD FRML MDRD: 50 ML/MIN/{1.73_M2}
GLUCOSE BLDC GLUCOMTR-MCNC: 106 MG/DL (ref 70–99)
GLUCOSE BLDC GLUCOMTR-MCNC: 129 MG/DL (ref 70–99)
GLUCOSE BLDC GLUCOMTR-MCNC: 137 MG/DL (ref 70–99)
GLUCOSE BLDC GLUCOMTR-MCNC: 65 MG/DL (ref 70–99)
GLUCOSE BLDC GLUCOMTR-MCNC: 68 MG/DL (ref 70–99)
GLUCOSE BLDC GLUCOMTR-MCNC: 89 MG/DL (ref 70–99)
GLUCOSE SERPL-MCNC: 96 MG/DL (ref 70–99)
HCT VFR BLD AUTO: 32.1 % (ref 40–53)
HGB BLD-MCNC: 10.2 G/DL (ref 13.3–17.7)
MCH RBC QN AUTO: 31.8 PG (ref 26.5–33)
MCHC RBC AUTO-ENTMCNC: 31.8 G/DL (ref 31.5–36.5)
MCV RBC AUTO: 100 FL (ref 78–100)
PLATELET # BLD AUTO: 79 10E9/L (ref 150–450)
POTASSIUM SERPL-SCNC: 3.7 MMOL/L (ref 3.4–5.3)
PROT SERPL-MCNC: 8.2 G/DL (ref 6.8–8.8)
RBC # BLD AUTO: 3.21 10E12/L (ref 4.4–5.9)
SODIUM SERPL-SCNC: 138 MMOL/L (ref 133–144)
WBC # BLD AUTO: 3.8 10E9/L (ref 4–11)

## 2019-03-18 PROCEDURE — 40000275 ZZH STATISTIC RCP TIME EA 10 MIN

## 2019-03-18 PROCEDURE — 99233 SBSQ HOSP IP/OBS HIGH 50: CPT | Performed by: HOSPITALIST

## 2019-03-18 PROCEDURE — 99207 ZZC CDG-MDM COMPONENT: MEETS MODERATE - UP CODED: CPT | Performed by: HOSPITALIST

## 2019-03-18 PROCEDURE — 36415 COLL VENOUS BLD VENIPUNCTURE: CPT | Performed by: HOSPITALIST

## 2019-03-18 PROCEDURE — 25000132 ZZH RX MED GY IP 250 OP 250 PS 637: Performed by: HOSPITALIST

## 2019-03-18 PROCEDURE — 94660 CPAP INITIATION&MGMT: CPT

## 2019-03-18 PROCEDURE — 80076 HEPATIC FUNCTION PANEL: CPT | Performed by: HOSPITALIST

## 2019-03-18 PROCEDURE — 25000125 ZZHC RX 250: Performed by: HOSPITALIST

## 2019-03-18 PROCEDURE — 99233 SBSQ HOSP IP/OBS HIGH 50: CPT | Performed by: INTERNAL MEDICINE

## 2019-03-18 PROCEDURE — 21000001 ZZH R&B HEART CARE

## 2019-03-18 PROCEDURE — 80048 BASIC METABOLIC PNL TOTAL CA: CPT | Performed by: HOSPITALIST

## 2019-03-18 PROCEDURE — 97140 MANUAL THERAPY 1/> REGIONS: CPT | Mod: GP | Performed by: PHYSICAL THERAPIST

## 2019-03-18 PROCEDURE — 25000131 ZZH RX MED GY IP 250 OP 636 PS 637: Performed by: HOSPITALIST

## 2019-03-18 PROCEDURE — 97110 THERAPEUTIC EXERCISES: CPT | Mod: GP | Performed by: PHYSICAL THERAPIST

## 2019-03-18 PROCEDURE — 85027 COMPLETE CBC AUTOMATED: CPT | Performed by: HOSPITALIST

## 2019-03-18 PROCEDURE — 00000146 ZZHCL STATISTIC GLUCOSE BY METER IP

## 2019-03-18 RX ORDER — METOPROLOL TARTRATE 25 MG/1
25 TABLET, FILM COATED ORAL 2 TIMES DAILY
Status: DISCONTINUED | OUTPATIENT
Start: 2019-03-18 | End: 2019-03-20 | Stop reason: HOSPADM

## 2019-03-18 RX ADMIN — EPLERENONE 50 MG: 25 TABLET ORAL at 08:31

## 2019-03-18 RX ADMIN — POTASSIUM CHLORIDE 30 MEQ: 1500 TABLET, EXTENDED RELEASE ORAL at 08:31

## 2019-03-18 RX ADMIN — TIMOLOL MALEATE 1 DROP: 5 SOLUTION/ DROPS OPHTHALMIC at 08:29

## 2019-03-18 RX ADMIN — POTASSIUM CHLORIDE 30 MEQ: 1500 TABLET, EXTENDED RELEASE ORAL at 15:38

## 2019-03-18 RX ADMIN — METOPROLOL TARTRATE 50 MG: 50 TABLET ORAL at 08:31

## 2019-03-18 RX ADMIN — PANTOPRAZOLE SODIUM 40 MG: 40 TABLET, DELAYED RELEASE ORAL at 08:32

## 2019-03-18 RX ADMIN — ATORVASTATIN CALCIUM 40 MG: 40 TABLET, FILM COATED ORAL at 08:29

## 2019-03-18 RX ADMIN — DONEPEZIL HYDROCHLORIDE 5 MG: 5 TABLET ORAL at 21:23

## 2019-03-18 RX ADMIN — TIMOLOL MALEATE 1 DROP: 5 SOLUTION/ DROPS OPHTHALMIC at 21:32

## 2019-03-18 RX ADMIN — POTASSIUM CHLORIDE 30 MEQ: 1500 TABLET, EXTENDED RELEASE ORAL at 21:21

## 2019-03-18 RX ADMIN — INSULIN GLARGINE 12 UNITS: 100 INJECTION, SOLUTION SUBCUTANEOUS at 08:28

## 2019-03-18 RX ADMIN — ASPIRIN 81 MG: 81 TABLET, COATED ORAL at 08:32

## 2019-03-18 RX ADMIN — EPLERENONE 50 MG: 25 TABLET ORAL at 21:23

## 2019-03-18 ASSESSMENT — ACTIVITIES OF DAILY LIVING (ADL)
ADLS_ACUITY_SCORE: 11

## 2019-03-18 ASSESSMENT — MIFFLIN-ST. JEOR: SCORE: 2141.68

## 2019-03-18 NOTE — PROGRESS NOTES
[facility]    Medicine Progress Note - Hospitalist Service       Date of Admission:  3/15/2019  Assessment & Plan   Gil Chowdary is a 58 year old male with history of severe aortic valve stenosis status post replacement in 2017,  heart failure with preserved ejection fraction, morbid obesity, obstructive sleep apnea, anemia and thrombocytopenia, type 2 diabetes, and former tobacco use who presented to the ED from cardiology clinic with complaints of weight gain. He has gained about 15 lbs in the last few weeks. Cardiology has started bumex drip and recommended nephrology consultation for question of diuretic resistance.    Exacerbation of heart failure with preserved ejection fraction  History of severe aortic stenosis s/p tissue valve replacement 2017  15 pounds up in ~3 weeks despite reported compliance with meds - he does not routinely monitor his amount of fluid intake  NT Pro ; last echo October 2018 - EF 60-65%, diastolic dysfunction noted  PTA ASA 81, metoprolol tartrate 50 bid continued with holding parameters   PTA eplerenone held given bumex drip  3/16-Today his weight is down from 140.6Kg to 135.5Kg  Continue intravenous bumetanide   Monitor weight, electrolytes and renal function  3/17-weight is down to 132.7Kg- creatinine around 1.5 HCO3 27- would continue intravenous diuretic one more day. Lower extremity  ultrasound was negative for deep venous thrombosis.  3/18- weight 130.5Kg and renal function is stable- continue intravenous bumetanide infusion     Possible acute on chronic renal failure  Creatinine is stable and around baseline     Type 2 diabetes mellitus   October 2018 A1c 6.8%  POC QID with sliding scale insulin  PTA glargine (20u qam) adjusted to 16 units every morning  3/17- glucometers in the 80's to 90's - decrease glargine  3/18- glucometers 89/96/106- continue same insulin dosing today     DRAKE on CPAP  Continue CPAP     Iron deficiency anemia and  thrombocytopenia  Hemoglobin 9.3g, appears his baseline as of December  Platelets  79K, around his baseline  Continue to monitor   3/18- platelet count stable today     History of hyperlipidemia  PTA statin continued     History of GERD  PTA pantoprazole continued    Diet: Combination Diet 9067-7114 Calories: Moderate Consistent CHO (4-6 CHO units/meal); Low Saturated Fat Na <2400mg Diet  NPO per Anesthesia Guidelines for Procedure/Surgery Except for: Meds    DVT Prophylaxis: pneumatic compression device   [place]on Catheter: not present  Code Status: Full Code      Disposition Plan   Expected discharge: 1-2 days, recommended to prior living arrangement once adequate diuresis has been obtained.  Entered: Norm Washington MD 03/18/2019, 11:03 AM     The patient's care was discussed with the Patient.    Norm Washington MD  Hospitalist Service  [facility]    ______________________________________________________________________    Interval History   No new complaints    Data reviewed today: I reviewed all medications, new labs and imaging results over the last 24 hours. I personally reviewed no images or EKG's today.    Physical Exam   Vital Signs: Temp: 97.8  F (36.6  C) Temp src: Oral BP: 104/55 Pulse: 55 Heart Rate: 62 Resp: 16 SpO2: 97 % O2 Device: None (Room air)    Weight: 287 lbs 9.6 oz  Constitutional: obese, awake, alert, cooperative, no apparent distress, and appears stated age  Respiratory: No increased work of breathing, good air exchange, clear to auscultation bilaterally, no crackles or wheezing  Cardiovascular:  regular rate and rhythm, normal S1 and S2, no S3 or S4, and no murmur noted; 2+ lower extremity edema bilaterally  GI: soft nontender and nondistended   Skin: no rashes and no jaundice  Musculoskeletal: There is no redness, warmth, or swelling of the joints.  Full range of motion noted.   Neurologic: Awake, alert, oriented to name, place and time.  Cranial nerves II-XII  are grossly intact.  Motor is 5 out of 5 bilaterally  Neuropsychiatric: General: normal, calm and normal eye contact    Data   Recent Labs   Lab 03/18/19  0615 03/17/19  0535 03/16/19  0614 03/15/19  1919 03/15/19  1444   WBC 3.8*  --   --   --  4.2   HGB 10.2*  --   --   --  9.3*     --   --   --  102*   PLT 79*  --   --   --  79*   INR  --   --   --  1.28*  --     141 139  --  140   POTASSIUM 3.7 3.9 3.7  --  4.4   CHLORIDE 103 107 107  --  105   CO2 28 27 26  --  27   BUN 26 26 22  --  19   CR 1.50* 1.58* 1.35*  --  1.70*   ANIONGAP 7 7 6  --  12.4   MYRON 8.4* 8.3* 8.4*  --  8.6   GLC 96 92 108*  --  161*   ALBUMIN 3.5 3.3*  --   --   --    PROTTOTAL 8.2  --   --   --   --    BILITOTAL 0.9  --   --   --   --    ALKPHOS 72  --   --   --   --    ALT 44  --   --   --   --    AST 88*  --   --   --   --      No results found for this or any previous visit (from the past 24 hour(s)).

## 2019-03-18 NOTE — PLAN OF CARE
"Discharge Planner PT   Patient plan for discharge: Home. States his sister is here from [place] and will be available to help as needed, including wrap application for edema.  Current status: Lymphedema evaluation completed. Treatment and education initiated. Pt very receptive to edema treatment, including follow up with OP edema clinic.   B LEs present with stage 2 lymphedema. Pt reports edema presence since 2017 following his AVR. As visualized the L LE is larger than the R from foot to knees. Dorsum of the feet puffy with ankle folds and deep creasing B. Abnormal contour of the ankles with medial ankles tennis ball like in size and shape. Soft pitting edema remains ankles up through the thighs, with greater edema knee and down. Pt admits to some scrotal edema, but states it has subsided with hospital stay and diuretics.   Gradient compression bandages applied to B LEs, foot to knee. PT to return 3/19 at 10:30 am to reassess and wrap prn. Nursing please remove wraps by 10 am, wash, dry and apply lotion prior to PT arrival.     *Please remove the wraps should pt become SOB, report numbness in the feet/toes, if toes become white or blue in color, or if the wraps become soiled*  Pt should change position frequently and avoid sitting in a \"V\" shape in bed for prolonged periods as this inhibits the lymph flow via the groin.     Pt completes bed mobility and transfers with independence. Up ambulating in room with independence.     Barriers to return to prior living situation: None  Recommendations for discharge: home with OP edema services.  Rationale for recommendations: Anticipate pt's mobility with improve with continued IP PT and increased mobility during hospitalization with staff for return home. Pt also reports his sister will be staying with him to care for his mother at time of discharge.          Entered by: Madina Espinosa 03/18/2019 3:15 PM       "

## 2019-03-18 NOTE — PROGRESS NOTES
Assessment and Plan:   CKD: Cr stable , currently 1.50. Net neg fluid balance since adm. Lytes show K 3.7 and HCO3 28.   On eplerenone 50 mg bid, metoprolol 25 mg bid, KCL 30 meq tid. On bumex drip.     Cont bumex one more day and then switch to oral diuretics. Discussed significance of CKD with pt. Will need follow up in our clinic after discharge.             Interval History:   CHF: AVR, cor pulmonale, CHF. BP is low: avg 97/51.   Anemia :    Hgb 10.2.     DM  DRAKE on CPAP  Obesity           Review of Systems:   Feels improved. Notes continued edema. Not SOB. Ambulating well, taking po well with no N or V.           Medications:       aspirin  81 mg Oral QAM     atorvastatin  40 mg Oral Daily     docusate sodium  100 mg Oral BID     donepezil  5 mg Oral At Bedtime     eplerenone  50 mg Oral BID     insulin aspart  1-7 Units Subcutaneous TID AC     insulin aspart  1-5 Units Subcutaneous At Bedtime     insulin glargine  12 Units Subcutaneous QAM AC     metoprolol tartrate  25 mg Oral BID     pantoprazole  40 mg Oral QAM AC     potassium chloride ER  30 mEq Oral TID     timolol maleate  1 drop Both Eyes BID       bumetanide 0.5 mg/hr (03/18/19 0835)     Current active medications and PTA medications reviewed, see medication list for details.            Physical Exam:   Vitals were reviewed  Patient Vitals for the past 24 hrs:   BP Temp Temp src Pulse Heart Rate Resp SpO2 Weight   03/18/19 0831 104/55 97.8  F (36.6  C) Oral -- 62 16 97 % --   03/18/19 0405 99/54 -- -- -- 66 -- -- 130.5 kg (287 lb 9.6 oz)   03/18/19 0400 (!) 87/48 98.7  F (37.1  C) Oral -- 65 18 96 % --   03/17/19 2100 94/45 97.3  F (36.3  C) Oral -- 59 18 93 % --   03/17/19 1645 102/54 97.5  F (36.4  C) Oral 55 -- 18 98 % --   03/17/19 1245 96/51 98  F (36.7  C) Oral -- 58 20 98 % --       Temp:  [97.3  F (36.3  C)-98.7  F (37.1  C)] 97.8  F (36.6  C)  Pulse:  [55] 55  Heart Rate:  [58-66] 62  Resp:  [16-20] 16  BP: ()/(45-55)  104/55  SpO2:  [93 %-98 %] 97 %    Temperatures:  Current - Temp: 97.8  F (36.6  C); Max - Temp  Av.9  F (36.6  C)  Min: 97.3  F (36.3  C)  Max: 98.7  F (37.1  C)  Respiration range: Resp  Av  Min: 16  Max: 20  Pulse range: Pulse  Av  Min: 55  Max: 55  Blood pressure range: Systolic (24hrs), Av , Min:87 , Max:104   ; Diastolic (24hrs), Av, Min:45, Max:55    Pulse oximetry range: SpO2  Av.4 %  Min: 93 %  Max: 98 %    I/O last 3 completed shifts:  In: 1200 [P.O.:1200]  Out: 5325 [Urine:5325]      Intake/Output Summary (Last 24 hours) at 3/18/2019 1219  Last data filed at 3/18/2019 0923  Gross per 24 hour   Intake 720 ml   Output 4325 ml   Net -3605 ml       Alert, obese  LE with 2+ edema  Cor RRR nl S1 S2 no M  Lungs clear ant BS    I/O 1200/3750, net neg 9.4 kg since adm.   Wt down 10 kg since adm     Wt Readings from Last 4 Encounters:   19 130.5 kg (287 lb 9.6 oz)   03/15/19 140.8 kg (310 lb 4.8 oz)   19 134.9 kg (297 lb 8 oz)   19 132 kg (291 lb)          Data:          Lab Results   Component Value Date     2019     2019     2019      Lab Results   Component Value Date    CHLORIDE 103 2019    CHLORIDE 107 2019    CHLORIDE 107 2019    Lab Results   Component Value Date    BUN 26 2019    BUN 26 2019    BUN 22 2019      Lab Results   Component Value Date    POTASSIUM 3.7 2019    POTASSIUM 3.9 2019    POTASSIUM 3.7 2019    Lab Results   Component Value Date    CO2 28 2019    CO2 27 2019    CO2 26 2019    Lab Results   Component Value Date    CR 1.50 2019    CR 1.58 2019    CR 1.35 2019        Recent Labs   Lab Test 19  0615 03/15/19  1444 19  1145  18  0910   WBC 3.8* 4.2  --   --  5.5   HGB 10.2* 9.3* 11.2*   < > 9.9*   HCT 32.1* 29.5*  --   --  32.0*    102*  --   --  87   PLT 79* 79*  --   --  136*    < > = values in  this interval not displayed.     Recent Labs   Lab Test 03/18/19  0615 10/12/18  1736 05/09/17  1240   AST 88* 55* 110*   ALT 44 31 85*  79*   ALKPHOS 72 82 72   BILITOTAL 0.9 0.3 1.2       Recent Labs   Lab Test 01/23/19  0847 01/09/19  1031 05/23/17  1251   MAG 2.7* 2.6* 2.5*     Recent Labs   Lab Test 03/17/19  0535 05/20/17  0654 05/19/17  0436   PHOS 4.7* 3.4 3.5     Recent Labs   Lab Test 03/18/19  0615 03/17/19  0535 03/16/19  0614   MYRON 8.4* 8.3* 8.4*     Lab Results   Component Value Date    MYRON 8.4 (L) 03/18/2019     Lab Results   Component Value Date    WBC 3.8 (L) 03/18/2019    HGB 10.2 (L) 03/18/2019    HCT 32.1 (L) 03/18/2019     03/18/2019    PLT 79 (L) 03/18/2019     Lab Results   Component Value Date     03/18/2019    POTASSIUM 3.7 03/18/2019    CHLORIDE 103 03/18/2019    CO2 28 03/18/2019    GLC 96 03/18/2019     Lab Results   Component Value Date    BUN 26 03/18/2019    CR 1.50 (H) 03/18/2019     Lab Results   Component Value Date    MAG 2.7 (H) 01/23/2019     Lab Results   Component Value Date    PHOS 4.7 (H) 03/17/2019       Creatinine   Date Value Ref Range Status   03/18/2019 1.50 (H) 0.66 - 1.25 mg/dL Final   03/17/2019 1.58 (H) 0.66 - 1.25 mg/dL Final   03/16/2019 1.35 (H) 0.66 - 1.25 mg/dL Final   03/15/2019 1.70 (H) 0.70 - 1.30 mg/dL Final   01/23/2019 1.58 (H) 0.70 - 1.30 mg/dL Final   01/16/2019 1.40 (H) 0.70 - 1.30 mg/dL Final       Attestation:  I have reviewed today's vital signs, notes, medications, labs and imaging.     Stuart Gregorio MD

## 2019-03-18 NOTE — PROGRESS NOTES
"   03/18/19 1600   General Information   Discipline PT   Onset of Edema (2017 after AVR, per pt)   Affected Body Part(s) Right LE;Left LE  (scrotum)   Edema Etiology Surgery  (CHF dx, CKD)   Pertinent history of current problem (PT: include personal factors and/or comorbidities that impact the POC; OT: include additional occupational profile info) 57y/o M admitted with acute exacerbation of chronic heart failure with preserved EF, possible acute on chronic renal failure with hx of bioprosthetic AVR 2017 for severe aortic stenosis, DM II   Edema Precautions Renal Insufficiency;Cardiac Edema/CHF   Edema Precaution Comments Order from nephrology Dr. Gonzales   Pain   Patient currently in pain No   Edema Examination / Assessment   Skin Condition Pitting;Non-pitting;Dryness;Hemosiderin deposits;Lipodermatosclerosis   Skin Condition Comments Thick flakes at the mid shin, bumpy thick yellow skin at the toes and lateral foot/ankles. L mid shin kameron size old \"sore\" yellow thick skin over the top-pt admits to \"bumping it on something.\" Compelted transfer. Soft edema over all, under thick skin. Dorsum of the feet puffy, medial ankles with tennis ball size controus, folds and deep creasing at the ankles and mid foot.    Capillary Refill Symmetrical   Dorsal Pedal Pulse Decreased   Stemmer Sign Negative   ROM   Range of Motion (WFL) other (describe)   Description of Range of Motion Deficits Reports the legs are \"heavy.\" but feels they have improved some iwth the medications upon admit. Moving better than a few days ago.   Strength   Strength (WFL) (Demonstrates antigraivty strengh, dec act delores)   Sensation   Sensation (WFL) no deficits were identified   Assessment/Plan   Patient presents with Stage 2 Lymphedema   Assessment Pt is appropriate to trial a quick wrap style applicatoin of gradient comrpession bandaging to reduce volume and improve ease of mobiltiy. Reprots no trial of wraps in the past, just use of compression socks. " States he iw willing to have assist from family to aide in wrap placement as well as go to OP edema clinci for ongoing management.    Clinical Presentation Stable/Uncomplicated   Clinical Presentation Rationale See MR   Clinical Decision Making (Complexity) Low complexity   Planned Edema Interventions Gradient compression bandaging;Exercises;Education;Manual therapy   Treatment Frequency daily   Treatment Duration 3days   Patient, Family and/or Staff in agreement with plan of care. Yes   Risks and benefits of treatment have been explained. Yes   Total Evaluation Time   Total Evaluation Time (Minutes) 8  (Not billed)

## 2019-03-18 NOTE — PLAN OF CARE
Heart Failure Care Pathway  GOALS TO BE MET BEFORE DISCHARGE:    1. Decrease congestion and/or edema with diuretic therapy to achieve near      optimal volume status.            Dyspnea improved:  Yes            Edema improved:     No, please explain: Pt remains at +4 BLE         Net I/O and Weights since admission:          02/16 0700 - 03/18 0659  In: 3560 [P.O.:3560]  Out: 62512 [Urine:59106]  Net: -8290            Vitals:    03/15/19 1650 03/15/19 2000 03/16/19 0624 03/17/19 0619   Weight: 140.6 kg (310 lb) 137.2 kg (302 lb 6.4 oz) 135.5 kg (298 lb 12.8 oz) 132.7 kg (292 lb 9.6 oz)    03/18/19 0405   Weight: 130.5 kg (287 lb 9.6 oz)       2.  O2 sats > 92% on RA or at prior home O2 therapy level.          Current oxygenation status:       SpO2: 96 %         O2 Device: BiPAP/CPAP,            Able to wean O2 this shift to keep sats > 92%:  Yes, pt RA       Does patient use Home O2? No    3.  Tolerates ambulation and mobility near baseline: Yes        How many times did the patient ambulate with nursing staff this shift? 2    Please review the Heart Failure Care Pathway for additional HF goal outcomes.    Meliton Joseph RN  3/18/2019     Pt A&O x4, BP's slightly soft, (90's sys), otherwise VSS on RA. Tele SB w/ BBB. Pt denies CP, dizziness, and SOB. Bumew gtt @ 2mL/hr. Pt urinal voiding at bedside, good urine output. LS clear. Plan to continue to diurese today, pending creat level, lymph wraps. Will continue to monitor.

## 2019-03-18 NOTE — PLAN OF CARE
VSS.  Pt denies any c/o of pain.  MOTA, LS clear/dim at bases.  Tele is SR w/1AVB/BBB.  Bumex infusing, diuresing well.  Lymph wraps on.  BG 63, ate entire tray, will recheck at 1900.  Up independently in room.  Plan for possible right heart cath tomorrow.

## 2019-03-18 NOTE — PLAN OF CARE
VSS. Monitor remains Sinus rhythm. Pt. Denies pain. Bumex drip continues at 0.5 mg/hr. Pt. Ambulated in marie. Tolerating activity well. Lymphedema wraps per order.

## 2019-03-19 ENCOUNTER — SURGERY (OUTPATIENT)
Age: 59
End: 2019-03-19
Payer: COMMERCIAL

## 2019-03-19 ENCOUNTER — APPOINTMENT (OUTPATIENT)
Dept: PHYSICAL THERAPY | Facility: CLINIC | Age: 59
End: 2019-03-19
Payer: COMMERCIAL

## 2019-03-19 LAB
ANION GAP SERPL CALCULATED.3IONS-SCNC: 7 MMOL/L (ref 3–14)
BUN SERPL-MCNC: 25 MG/DL (ref 7–30)
CALCIUM SERPL-MCNC: 8.2 MG/DL (ref 8.5–10.1)
CHLORIDE SERPL-SCNC: 104 MMOL/L (ref 94–109)
CO2 BLD-SCNC: 28 MMOL/L (ref 21–28)
CO2 BLDCOV-SCNC: 29 MMOL/L (ref 21–28)
CO2 SERPL-SCNC: 28 MMOL/L (ref 20–32)
CREAT SERPL-MCNC: 1.44 MG/DL (ref 0.66–1.25)
GFR SERPL CREATININE-BSD FRML MDRD: 53 ML/MIN/{1.73_M2}
GLUCOSE BLDC GLUCOMTR-MCNC: 124 MG/DL (ref 70–99)
GLUCOSE BLDC GLUCOMTR-MCNC: 79 MG/DL (ref 70–99)
GLUCOSE BLDC GLUCOMTR-MCNC: 81 MG/DL (ref 70–99)
GLUCOSE BLDC GLUCOMTR-MCNC: 83 MG/DL (ref 70–99)
GLUCOSE BLDC GLUCOMTR-MCNC: 91 MG/DL (ref 70–99)
GLUCOSE SERPL-MCNC: 85 MG/DL (ref 70–99)
MAGNESIUM SERPL-MCNC: 2.7 MG/DL (ref 1.6–2.3)
PCO2 BLD: 42 MM HG (ref 35–45)
PCO2 BLDV: 44 MM HG (ref 40–50)
PH BLD: 7.43 PH (ref 7.35–7.45)
PH BLDV: 7.42 PH (ref 7.32–7.43)
PLATELET # BLD AUTO: 72 10E9/L (ref 150–450)
PO2 BLD: 77 MM HG (ref 80–105)
PO2 BLDV: 41 MM HG (ref 25–47)
POTASSIUM SERPL-SCNC: 3.6 MMOL/L (ref 3.4–5.3)
SAO2 % BLDA FROM PO2: 96 % (ref 92–100)
SAO2 % BLDV FROM PO2: 77 %
SODIUM SERPL-SCNC: 139 MMOL/L (ref 133–144)

## 2019-03-19 PROCEDURE — 40000275 ZZH STATISTIC RCP TIME EA 10 MIN

## 2019-03-19 PROCEDURE — 99153 MOD SED SAME PHYS/QHP EA: CPT | Performed by: INTERNAL MEDICINE

## 2019-03-19 PROCEDURE — 99152 MOD SED SAME PHYS/QHP 5/>YRS: CPT | Performed by: INTERNAL MEDICINE

## 2019-03-19 PROCEDURE — 25000125 ZZHC RX 250: Performed by: INTERNAL MEDICINE

## 2019-03-19 PROCEDURE — 82803 BLOOD GASES ANY COMBINATION: CPT

## 2019-03-19 PROCEDURE — 27210794 ZZH OR GENERAL SUPPLY STERILE: Performed by: INTERNAL MEDICINE

## 2019-03-19 PROCEDURE — 00000146 ZZHCL STATISTIC GLUCOSE BY METER IP

## 2019-03-19 PROCEDURE — 25000132 ZZH RX MED GY IP 250 OP 250 PS 637: Performed by: INTERNAL MEDICINE

## 2019-03-19 PROCEDURE — 93460 R&L HRT ART/VENTRICLE ANGIO: CPT | Performed by: INTERNAL MEDICINE

## 2019-03-19 PROCEDURE — 93453 R&L HRT CATH W/VENTRICLGRPHY: CPT | Performed by: INTERNAL MEDICINE

## 2019-03-19 PROCEDURE — 25800030 ZZH RX IP 258 OP 636: Performed by: INTERNAL MEDICINE

## 2019-03-19 PROCEDURE — 25000132 ZZH RX MED GY IP 250 OP 250 PS 637: Performed by: HOSPITALIST

## 2019-03-19 PROCEDURE — 99233 SBSQ HOSP IP/OBS HIGH 50: CPT | Mod: 25 | Performed by: INTERNAL MEDICINE

## 2019-03-19 PROCEDURE — 80048 BASIC METABOLIC PNL TOTAL CA: CPT | Performed by: HOSPITALIST

## 2019-03-19 PROCEDURE — 4A023N8 MEASUREMENT OF CARDIAC SAMPLING AND PRESSURE, BILATERAL, PERCUTANEOUS APPROACH: ICD-10-PCS | Performed by: INTERNAL MEDICINE

## 2019-03-19 PROCEDURE — 97530 THERAPEUTIC ACTIVITIES: CPT | Mod: GP | Performed by: PHYSICAL THERAPIST

## 2019-03-19 PROCEDURE — 25000131 ZZH RX MED GY IP 250 OP 636 PS 637: Performed by: HOSPITALIST

## 2019-03-19 PROCEDURE — 36415 COLL VENOUS BLD VENIPUNCTURE: CPT | Performed by: HOSPITALIST

## 2019-03-19 PROCEDURE — 97140 MANUAL THERAPY 1/> REGIONS: CPT | Mod: GP | Performed by: PHYSICAL THERAPIST

## 2019-03-19 PROCEDURE — 93453 R&L HRT CATH W/VENTRICLGRPHY: CPT | Mod: 26 | Performed by: INTERNAL MEDICINE

## 2019-03-19 PROCEDURE — 25000128 H RX IP 250 OP 636: Performed by: INTERNAL MEDICINE

## 2019-03-19 PROCEDURE — 99232 SBSQ HOSP IP/OBS MODERATE 35: CPT | Performed by: HOSPITALIST

## 2019-03-19 PROCEDURE — 21000001 ZZH R&B HEART CARE

## 2019-03-19 PROCEDURE — 85049 AUTOMATED PLATELET COUNT: CPT | Performed by: HOSPITALIST

## 2019-03-19 PROCEDURE — 83735 ASSAY OF MAGNESIUM: CPT | Performed by: HOSPITALIST

## 2019-03-19 RX ORDER — POTASSIUM CHLORIDE 1500 MG/1
20 TABLET, EXTENDED RELEASE ORAL
Status: COMPLETED | OUTPATIENT
Start: 2019-03-19 | End: 2019-03-19

## 2019-03-19 RX ORDER — ASPIRIN 81 MG/1
81 TABLET ORAL DAILY
Status: DISCONTINUED | OUTPATIENT
Start: 2019-03-20 | End: 2019-03-20 | Stop reason: HOSPADM

## 2019-03-19 RX ORDER — BUMETANIDE 1 MG/1
2 TABLET ORAL
Status: DISCONTINUED | OUTPATIENT
Start: 2019-03-19 | End: 2019-03-19

## 2019-03-19 RX ORDER — SODIUM CHLORIDE 9 MG/ML
INJECTION, SOLUTION INTRAVENOUS CONTINUOUS
Status: DISCONTINUED | OUTPATIENT
Start: 2019-03-19 | End: 2019-03-20 | Stop reason: HOSPADM

## 2019-03-19 RX ORDER — HYDROCODONE BITARTRATE AND ACETAMINOPHEN 5; 325 MG/1; MG/1
1-2 TABLET ORAL EVERY 4 HOURS PRN
Status: DISCONTINUED | OUTPATIENT
Start: 2019-03-19 | End: 2019-03-20 | Stop reason: HOSPADM

## 2019-03-19 RX ORDER — LIDOCAINE 40 MG/G
CREAM TOPICAL
Status: DISCONTINUED | OUTPATIENT
Start: 2019-03-19 | End: 2019-03-20 | Stop reason: HOSPADM

## 2019-03-19 RX ORDER — NALOXONE HYDROCHLORIDE 0.4 MG/ML
.2-.4 INJECTION, SOLUTION INTRAMUSCULAR; INTRAVENOUS; SUBCUTANEOUS
Status: ACTIVE | OUTPATIENT
Start: 2019-03-19 | End: 2019-03-20

## 2019-03-19 RX ORDER — FENTANYL CITRATE 50 UG/ML
INJECTION, SOLUTION INTRAMUSCULAR; INTRAVENOUS
Status: DISCONTINUED | OUTPATIENT
Start: 2019-03-19 | End: 2019-03-19 | Stop reason: HOSPADM

## 2019-03-19 RX ORDER — FLUMAZENIL 0.1 MG/ML
0.2 INJECTION, SOLUTION INTRAVENOUS
Status: ACTIVE | OUTPATIENT
Start: 2019-03-19 | End: 2019-03-20

## 2019-03-19 RX ORDER — ATROPINE SULFATE 0.1 MG/ML
0.5 INJECTION INTRAVENOUS EVERY 5 MIN PRN
Status: ACTIVE | OUTPATIENT
Start: 2019-03-19 | End: 2019-03-20

## 2019-03-19 RX ORDER — BUMETANIDE 1 MG/1
3 TABLET ORAL
Status: DISCONTINUED | OUTPATIENT
Start: 2019-03-19 | End: 2019-03-20 | Stop reason: HOSPADM

## 2019-03-19 RX ORDER — LIDOCAINE 40 MG/G
CREAM TOPICAL
Status: DISCONTINUED | OUTPATIENT
Start: 2019-03-19 | End: 2019-03-19 | Stop reason: HOSPADM

## 2019-03-19 RX ORDER — NALOXONE HYDROCHLORIDE 0.4 MG/ML
.1-.4 INJECTION, SOLUTION INTRAMUSCULAR; INTRAVENOUS; SUBCUTANEOUS
Status: DISCONTINUED | OUTPATIENT
Start: 2019-03-19 | End: 2019-03-20 | Stop reason: HOSPADM

## 2019-03-19 RX ORDER — FENTANYL CITRATE 50 UG/ML
25-50 INJECTION, SOLUTION INTRAMUSCULAR; INTRAVENOUS
Status: ACTIVE | OUTPATIENT
Start: 2019-03-19 | End: 2019-03-20

## 2019-03-19 RX ORDER — LORAZEPAM 2 MG/ML
0.5 INJECTION INTRAMUSCULAR
Status: DISCONTINUED | OUTPATIENT
Start: 2019-03-19 | End: 2019-03-19 | Stop reason: HOSPADM

## 2019-03-19 RX ORDER — ACETAMINOPHEN 325 MG/1
325-650 TABLET ORAL EVERY 4 HOURS PRN
Status: DISCONTINUED | OUTPATIENT
Start: 2019-03-19 | End: 2019-03-20 | Stop reason: HOSPADM

## 2019-03-19 RX ORDER — LORAZEPAM 0.5 MG/1
0.5 TABLET ORAL
Status: DISCONTINUED | OUTPATIENT
Start: 2019-03-19 | End: 2019-03-19 | Stop reason: HOSPADM

## 2019-03-19 RX ADMIN — BUMETANIDE 2 MG: 1 TABLET ORAL at 12:32

## 2019-03-19 RX ADMIN — MIDAZOLAM 0.5 MG: 1 INJECTION INTRAMUSCULAR; INTRAVENOUS at 14:48

## 2019-03-19 RX ADMIN — INSULIN GLARGINE 12 UNITS: 100 INJECTION, SOLUTION SUBCUTANEOUS at 08:58

## 2019-03-19 RX ADMIN — FENTANYL CITRATE 25 MCG: 50 INJECTION, SOLUTION INTRAMUSCULAR; INTRAVENOUS at 14:53

## 2019-03-19 RX ADMIN — FENTANYL CITRATE 25 MCG: 50 INJECTION, SOLUTION INTRAMUSCULAR; INTRAVENOUS at 14:48

## 2019-03-19 RX ADMIN — EPLERENONE 50 MG: 25 TABLET ORAL at 21:11

## 2019-03-19 RX ADMIN — ASPIRIN 81 MG: 81 TABLET, COATED ORAL at 08:50

## 2019-03-19 RX ADMIN — EPLERENONE 50 MG: 25 TABLET ORAL at 08:49

## 2019-03-19 RX ADMIN — SODIUM CHLORIDE: 9 INJECTION, SOLUTION INTRAVENOUS at 16:23

## 2019-03-19 RX ADMIN — MIDAZOLAM 0.5 MG: 1 INJECTION INTRAMUSCULAR; INTRAVENOUS at 14:53

## 2019-03-19 RX ADMIN — OYSTER SHELL CALCIUM WITH VITAMIN D 1 TABLET: 500; 200 TABLET, FILM COATED ORAL at 18:01

## 2019-03-19 RX ADMIN — ATORVASTATIN CALCIUM 40 MG: 40 TABLET, FILM COATED ORAL at 08:50

## 2019-03-19 RX ADMIN — BUMETANIDE 3 MG: 1 TABLET ORAL at 18:50

## 2019-03-19 RX ADMIN — POTASSIUM CHLORIDE 30 MEQ: 1500 TABLET, EXTENDED RELEASE ORAL at 08:50

## 2019-03-19 RX ADMIN — TIMOLOL MALEATE 1 DROP: 5 SOLUTION/ DROPS OPHTHALMIC at 08:58

## 2019-03-19 RX ADMIN — DONEPEZIL HYDROCHLORIDE 5 MG: 5 TABLET ORAL at 21:11

## 2019-03-19 RX ADMIN — DOCUSATE SODIUM 100 MG: 100 CAPSULE, LIQUID FILLED ORAL at 21:11

## 2019-03-19 RX ADMIN — TIMOLOL MALEATE 1 DROP: 5 SOLUTION/ DROPS OPHTHALMIC at 21:19

## 2019-03-19 RX ADMIN — MIDAZOLAM 0.5 MG: 1 INJECTION INTRAMUSCULAR; INTRAVENOUS at 15:02

## 2019-03-19 RX ADMIN — LIDOCAINE HYDROCHLORIDE 10 ML: 10 INJECTION, SOLUTION INFILTRATION; PERINEURAL at 14:52

## 2019-03-19 RX ADMIN — POTASSIUM CHLORIDE 30 MEQ: 1500 TABLET, EXTENDED RELEASE ORAL at 21:11

## 2019-03-19 RX ADMIN — POTASSIUM CHLORIDE 20 MEQ: 1500 TABLET, EXTENDED RELEASE ORAL at 13:13

## 2019-03-19 RX ADMIN — OYSTER SHELL CALCIUM WITH VITAMIN D 1 TABLET: 500; 200 TABLET, FILM COATED ORAL at 12:32

## 2019-03-19 RX ADMIN — PANTOPRAZOLE SODIUM 40 MG: 40 TABLET, DELAYED RELEASE ORAL at 06:36

## 2019-03-19 RX ADMIN — ASPIRIN 325 MG: 325 TABLET, DELAYED RELEASE ORAL at 13:13

## 2019-03-19 ASSESSMENT — ACTIVITIES OF DAILY LIVING (ADL)
ADLS_ACUITY_SCORE: 11

## 2019-03-19 ASSESSMENT — MIFFLIN-ST. JEOR: SCORE: 2123.98

## 2019-03-19 NOTE — PROGRESS NOTES
Informed by Dr. Romo that patient has concordance on his RHC and LHC tracings and this is as such restrictive heart disease without evidence of constriction. His filling pressures have near normalized to RA of 10 and LVEDP of 14 after 27 pounds of IV diuresis. Change to PO 3 BID of Bumetanide.     Close outpatient C[facility] clinic follow up. If in the next week, weight is going up despite aggressive fluid restriction at home and good medication compliance (likely will need home nursing) then I recommend Cardiomems implantation. Outpatient cardiac MRI can be considered still.     Follow up with Dr. Mckeon and Rachna Holt.

## 2019-03-19 NOTE — PROGRESS NOTES
Assessment and Plan:     Mr. Chowdary is a delightful 58-year-old gentleman with past medical history significant for the followin.  Severe aortic stenosis with aortic valve replacement in 2017 with 25 mm Trifecta tissue valve.   2.  Type 2 diabetes, on insulin.   3.  Hyperlipidemia.   4.  Treated sleep apnea.   5.  Cor pulmonale versus heart failure with preserved EF.   6.  Recent iron deficiency anemia followed by abbi Vasquez.   7.  History of SVT and paroxysmal AFib, postoperative.  None on recent Zio Patch.   8.  Hx of myectomy at time of AVR, but no clear dx of HOCM  9.  Thrombocytopenia and anemia  10. Hypoalbuminemia    The patient has had a total of 27 pounds of net diuresis since admission.  He is having great response to IV diuretics and fluid restriction in the hospital. Creatinine is holding fine, still has some amount of edema. My guess is the patient is unknowingly noncompliant with fluid intake at home.  He has dementia it seems and we are unsure if he is taking his medications at home per my discussion with his primary team.  - Continue IV Bumex at current dose.  Will reassess based on hemodynamic data.  - Plan on right heart catheterization and depending on pressures, possibly hemodynamic LHC to evaluate for possible constrictive heart disease.    - Potentially outpatient cardiac MRI and cardio mems.  - Holding home thiazide since admission it seems, and high dose K supplementation despite MRA. Renal following.   - Back of BB a bit to 25 BID metoprolol given mild asymptomatic hypotension and bradycardia.    Risks and benefits of the procedure were discussed with the patient in detail that includes but not limited to the risk of stroke, heart attack, death, cardiac or vascular perforation, emergent stenting or bypass, contrast induced allergic reaction, renal dysfunction (including risks of temporary or permanent dialysis), and pulmonary, sedation, and vascular complications  (including bleeding and transfusion). Patient denies any major active bleeding issues and is willing to take and comply with dual antiplatelet therapy and understands associated bleeding risks. Patient understands the overall risks of the procedure and wishes to proceed.    Yves Ashley MD        Interval History:   Continues to feel well and improving every day in terms of breathing and edema.          Medications:       aspirin  81 mg Oral QAM     atorvastatin  40 mg Oral Daily     docusate sodium  100 mg Oral BID     donepezil  5 mg Oral At Bedtime     eplerenone  50 mg Oral BID     insulin aspart  1-7 Units Subcutaneous TID AC     insulin aspart  1-5 Units Subcutaneous At Bedtime     [START ON 3/20/2019] insulin glargine  10 Units Subcutaneous QAM AC     metoprolol tartrate  25 mg Oral BID     pantoprazole  40 mg Oral QAM AC     potassium chloride ER  30 mEq Oral TID     timolol maleate  1 drop Both Eyes BID            Physical Exam:     Patient Vitals for the past 24 hrs:   BP Temp Temp src Heart Rate Resp SpO2 Weight   03/19/19 0758 91/46 98  F (36.7  C) Oral 61 18 93 % --   03/19/19 0635 -- -- -- -- -- -- 128.7 kg (283 lb 11.2 oz)   03/19/19 0029 94/47 97.9  F (36.6  C) Oral 59 18 100 % --   03/18/19 1920 100/52 97.8  F (36.6  C) Oral 62 18 98 % --   03/18/19 1858 107/46 -- -- 60 -- -- --   03/18/19 1842 95/53 -- -- -- -- -- --   03/18/19 1832 98/52 -- -- 61 -- -- --   03/18/19 1800 (!) 88/41 -- -- 56 -- -- --   03/18/19 1528 101/58 97.5  F (36.4  C) Oral 51 16 -- --   03/18/19 1245 97/57 97.6  F (36.4  C) Oral 56 16 100 % --     Vitals:    03/15/19 1650 03/15/19 2000 03/16/19 0624 03/17/19 0619   Weight: 140.6 kg (310 lb) 137.2 kg (302 lb 6.4 oz) 135.5 kg (298 lb 12.8 oz) 132.7 kg (292 lb 9.6 oz)    03/18/19 0405 03/19/19 0635   Weight: 130.5 kg (287 lb 9.6 oz) 128.7 kg (283 lb 11.2 oz)         Intake/Output Summary (Last 24 hours) at 3/18/2019 0850  Last data filed at 3/18/2019 0641  Gross per 24 hour    Intake 720 ml   Output 4375 ml   Net -3655 ml       03/14 0700 - 03/19 0659  In: 4504 [P.O.:4340; I.V.:164]  Out: 55110 [Urine:41325]  Net: -87163    Exam:  GENERAL APPEARANCE ADULT: Alert, in no acute distress  RESP: lungs clear to auscultation   CV: regular rate and rhythm, no murmur, rub, or gallop  ABDOMEN: no guarding or rebound  EXTREMITIES: 1-2+ BLE         Data:   LABS (Last four results)  CMP  Recent Labs   Lab 03/19/19  0616 03/18/19  0615 03/17/19  0535 03/16/19  0614    138 141 139   POTASSIUM 3.6 3.7 3.9 3.7   CHLORIDE 104 103 107 107   CO2 28 28 27 26   ANIONGAP 7 7 7 6   GLC 85 96 92 108*   BUN 25 26 26 22   CR 1.44* 1.50* 1.58* 1.35*   GFRESTIMATED 53* 50* 47* 57*   GFRESTBLACK 61 58* 55* 66   MYRON 8.2* 8.4* 8.3* 8.4*   MAG 2.7*  --   --   --    PHOS  --   --  4.7*  --    PROTTOTAL  --  8.2  --   --    ALBUMIN  --  3.5 3.3*  --    BILITOTAL  --  0.9  --   --    ALKPHOS  --  72  --   --    AST  --  88*  --   --    ALT  --  44  --   --      CBC  Recent Labs   Lab 03/19/19  0616 03/18/19  0615 03/15/19  1444   WBC  --  3.8* 4.2   RBC  --  3.21* 2.89*   HGB  --  10.2* 9.3*   HCT  --  32.1* 29.5*   MCV  --  100 102*   MCH  --  31.8 31.8   MCHC  --  31.8 31.5   RDW  --  18.2* 19.2*   PLT 72* 79* 79*     INR  Recent Labs   Lab 03/15/19  1919   INR 1.28*     TROPONINS   Lab Results   Component Value Date    TROPI <0.015 10/12/2018                                                                                                               Yves Ashley MD

## 2019-03-19 NOTE — PROVIDER NOTIFICATION
MD Notification    Notified Person: MD    Notified Person Name: Dr. Tony    Notification Date/Time: 1840 3-18-19    Notification Interaction: phone call    Purpose of Notification: pt had 2 sec pause, asymptomatic, bp's 88/41, now 107/65    Orders Received: Hold PM metoprolol    Comments:

## 2019-03-19 NOTE — PLAN OF CARE
Discharge Planner PT   Patient plan for discharge: Home. States his sister is here from [place] and will be available to help as needed, including wrap application for edema.  Current status: B LEs with wrinkles, feet with improved contours, less folding of tissue/edema. Creases still remain over the posterior ankles, medial and lateral ankles with puffiness still. Dorsum of the feet puffy, but less. 3+ pitting present. Improved reddish/purple skin coloring over the shins and feet-yesterday lighter and more pale. Able to feel dorsal pedal pulse today B. Legs washed, dried, lotion applied and wraps replaced. Reviewed signs and symptoms of intolerance.    Lymph to return at 10:30 tomorrow, please have wraps removed, legs washed and dried, lotion applied prior to PT arrival.     Barriers to return to prior living situation: None  Recommendations for discharge: home with OP edema services.  Rationale for recommendations: Anticipate pt's mobility with improve with continued IP PT and increased mobility during hospitalization with staff for return home. Pt also reports his sister will be staying with him to care for his mother at time of discharge.          Entered by: Madina Espinosa 03/19/2019 11:57 AM

## 2019-03-19 NOTE — PROGRESS NOTES
SW:  D:  Received a call from Yudith, patient's  Care , [phone].  She is asking for an update when patient discharges.  P:  Will continue to follow.

## 2019-03-19 NOTE — PROGRESS NOTES
Assessment and Plan:   CKD: on bumex IV drip. I/O 780/4550. Net neg 11.7 since adm. Wt down about 12 kg. Cr improving, now 1.44. CKD-3. Lytes show low K and elevated HCO3 C/W diuresis. Mg 2.7.   Stop bumex drip, change to oral diuretic. Decrease dose of K replacement.  F/U our clinic with NP 2-4 weeks after discharge ([facility], [phone]).            Interval History:   CHF: AVR, cor pulmonale, CHF. BP is low: avg 97/51.   Anemia :    Hgb 10.2.     DM  DRAKE on CPAP  Obesity                 Review of Systems:   No SOB or chest pain. NPO for a procedure today. Less LE edema per pt.        Medications:       aspirin  81 mg Oral QAM     atorvastatin  40 mg Oral Daily     docusate sodium  100 mg Oral BID     donepezil  5 mg Oral At Bedtime     eplerenone  50 mg Oral BID     insulin aspart  1-7 Units Subcutaneous TID AC     insulin aspart  1-5 Units Subcutaneous At Bedtime     [START ON 3/20/2019] insulin glargine  10 Units Subcutaneous QAM AC     metoprolol tartrate  25 mg Oral BID     pantoprazole  40 mg Oral QAM AC     potassium chloride ER  30 mEq Oral TID     timolol maleate  1 drop Both Eyes BID       bumetanide 0.5 mg/hr (03/18/19 2128)     Current active medications and PTA medications reviewed, see medication list for details.            Physical Exam:   Vitals were reviewed  Patient Vitals for the past 24 hrs:   BP Temp Temp src Heart Rate Resp SpO2 Weight   03/19/19 0758 91/46 98  F (36.7  C) Oral 61 18 93 % --   03/19/19 0635 -- -- -- -- -- -- 128.7 kg (283 lb 11.2 oz)   03/19/19 0029 94/47 97.9  F (36.6  C) Oral 59 18 100 % --   03/18/19 1920 100/52 97.8  F (36.6  C) Oral 62 18 98 % --   03/18/19 1858 107/46 -- -- 60 -- -- --   03/18/19 1842 95/53 -- -- -- -- -- --   03/18/19 1832 98/52 -- -- 61 -- -- --   03/18/19 1800 (!) 88/41 -- -- 56 -- -- --   03/18/19 1528 101/58 97.5  F (36.4  C) Oral 51 16 -- --   03/18/19 1245 97/57 97.6  F (36.4  C) Oral 56 16 100 % --       Temp:  [97.5  F (36.4  C)-98   F (36.7  C)] 98  F (36.7  C)  Heart Rate:  [51-62] 61  Resp:  [16-18] 18  BP: ()/(41-58) 91/46  FiO2 (%):  [21 %] 21 %  SpO2:  [93 %-100 %] 93 %    Temperatures:  Current - Temp: 98  F (36.7  C); Max - Temp  Av.8  F (36.6  C)  Min: 97.5  F (36.4  C)  Max: 98  F (36.7  C)  Respiration range: Resp  Av.2  Min: 16  Max: 18  Pulse range: No Data Recorded  Blood pressure range: Systolic (24hrs), Av , Min:88 , Max:107   ; Diastolic (24hrs), Av, Min:41, Max:58    Pulse oximetry range: SpO2  Av.8 %  Min: 93 %  Max: 100 %    I/O last 3 completed shifts:  In: 944 [P.O.:780; I.V.:164]  Out: 3800 [Urine:3800]      Intake/Output Summary (Last 24 hours) at 3/19/2019 1028  Last data filed at 3/19/2019 0641  Gross per 24 hour   Intake 944 ml   Output 3250 ml   Net -2306 ml       Alert, obese  Lungs with clear ant BS  Cor RRR nl S1 S2, mild JVD  LE 2+ edema , improved       Wt Readings from Last 4 Encounters:   19 128.7 kg (283 lb 11.2 oz)   03/15/19 140.8 kg (310 lb 4.8 oz)   19 134.9 kg (297 lb 8 oz)   19 132 kg (291 lb)          Data:          Lab Results   Component Value Date     2019     2019     2019      Lab Results   Component Value Date    CHLORIDE 104 2019    CHLORIDE 103 2019    CHLORIDE 107 2019    Lab Results   Component Value Date    BUN 25 2019    BUN 26 2019    BUN 26 2019      Lab Results   Component Value Date    POTASSIUM 3.6 2019    POTASSIUM 3.7 2019    POTASSIUM 3.9 2019    Lab Results   Component Value Date    CO2 28 2019    CO2 28 2019    CO2 27 2019    Lab Results   Component Value Date    CR 1.44 2019    CR 1.50 2019    CR 1.58 2019        Recent Labs   Lab Test 19  0616 19  0615 03/15/19  1444 19  1145  18  0910   WBC  --  3.8* 4.2  --   --  5.5   HGB  --  10.2* 9.3* 11.2*   < > 9.9*   HCT  --  32.1* 29.5*  --    --  32.0*   MCV  --  100 102*  --   --  87   PLT 72* 79* 79*  --   --  136*    < > = values in this interval not displayed.     Recent Labs   Lab Test 03/18/19  0615 10/12/18  1736 05/09/17  1240   AST 88* 55* 110*   ALT 44 31 85*  79*   ALKPHOS 72 82 72   BILITOTAL 0.9 0.3 1.2       Recent Labs   Lab Test 03/19/19  0616 01/23/19  0847 01/09/19  1031   MAG 2.7* 2.7* 2.6*     Recent Labs   Lab Test 03/17/19  0535 05/20/17  0654 05/19/17  0436   PHOS 4.7* 3.4 3.5     Recent Labs   Lab Test 03/19/19  0616 03/18/19  0615 03/17/19  0535   MYRON 8.2* 8.4* 8.3*     Lab Results   Component Value Date    MYRON 8.2 (L) 03/19/2019     Lab Results   Component Value Date    WBC 3.8 (L) 03/18/2019    HGB 10.2 (L) 03/18/2019    HCT 32.1 (L) 03/18/2019     03/18/2019    PLT 72 (L) 03/19/2019     Lab Results   Component Value Date     03/19/2019    POTASSIUM 3.6 03/19/2019    CHLORIDE 104 03/19/2019    CO2 28 03/19/2019    GLC 85 03/19/2019     Lab Results   Component Value Date    BUN 25 03/19/2019    CR 1.44 (H) 03/19/2019     Lab Results   Component Value Date    MAG 2.7 (H) 03/19/2019     Lab Results   Component Value Date    PHOS 4.7 (H) 03/17/2019       Creatinine   Date Value Ref Range Status   03/19/2019 1.44 (H) 0.66 - 1.25 mg/dL Final   03/18/2019 1.50 (H) 0.66 - 1.25 mg/dL Final   03/17/2019 1.58 (H) 0.66 - 1.25 mg/dL Final   03/16/2019 1.35 (H) 0.66 - 1.25 mg/dL Final   03/15/2019 1.70 (H) 0.70 - 1.30 mg/dL Final   01/23/2019 1.58 (H) 0.70 - 1.30 mg/dL Final       Attestation:  I have reviewed today's vital signs, notes, medications, labs and imaging.     Stuart Gregorio MD

## 2019-03-19 NOTE — PLAN OF CARE
A&OX4, BP soft, used CPAP this night, Denies pain.Tele- SB with BBB. NPO, walked in the hallway in the evening with SBA.Voiding on urinal, good output. +4 BLE edema, denies pain. Bumex infusing at 2 ml/hr.  and 81. HR down to 43, asymptomatic.

## 2019-03-20 ENCOUNTER — APPOINTMENT (OUTPATIENT)
Dept: PHYSICAL THERAPY | Facility: CLINIC | Age: 59
End: 2019-03-20
Payer: COMMERCIAL

## 2019-03-20 ENCOUNTER — DOCUMENTATION ONLY (OUTPATIENT)
Dept: CARDIOLOGY | Facility: CLINIC | Age: 59
End: 2019-03-20

## 2019-03-20 ENCOUNTER — TELEPHONE (OUTPATIENT)
Dept: CARDIOLOGY | Facility: CLINIC | Age: 59
End: 2019-03-20

## 2019-03-20 VITALS
SYSTOLIC BLOOD PRESSURE: 99 MMHG | BODY MASS INDEX: 38.64 KG/M2 | HEIGHT: 71 IN | WEIGHT: 276 LBS | OXYGEN SATURATION: 95 % | TEMPERATURE: 98.1 F | DIASTOLIC BLOOD PRESSURE: 54 MMHG | RESPIRATION RATE: 16 BRPM | HEART RATE: 76 BPM

## 2019-03-20 LAB
ANION GAP SERPL CALCULATED.3IONS-SCNC: 5 MMOL/L (ref 3–14)
BUN SERPL-MCNC: 24 MG/DL (ref 7–30)
CALCIUM SERPL-MCNC: 8.3 MG/DL (ref 8.5–10.1)
CHLORIDE SERPL-SCNC: 104 MMOL/L (ref 94–109)
CO2 SERPL-SCNC: 28 MMOL/L (ref 20–32)
CREAT SERPL-MCNC: 1.47 MG/DL (ref 0.66–1.25)
GFR SERPL CREATININE-BSD FRML MDRD: 51 ML/MIN/{1.73_M2}
GLUCOSE BLDC GLUCOMTR-MCNC: 113 MG/DL (ref 70–99)
GLUCOSE BLDC GLUCOMTR-MCNC: 149 MG/DL (ref 70–99)
GLUCOSE BLDC GLUCOMTR-MCNC: 91 MG/DL (ref 70–99)
GLUCOSE SERPL-MCNC: 93 MG/DL (ref 70–99)
PLATELET # BLD AUTO: 76 10E9/L (ref 150–450)
POTASSIUM SERPL-SCNC: 3.7 MMOL/L (ref 3.4–5.3)
SODIUM SERPL-SCNC: 137 MMOL/L (ref 133–144)

## 2019-03-20 PROCEDURE — 40000275 ZZH STATISTIC RCP TIME EA 10 MIN

## 2019-03-20 PROCEDURE — 80048 BASIC METABOLIC PNL TOTAL CA: CPT | Performed by: HOSPITALIST

## 2019-03-20 PROCEDURE — 85049 AUTOMATED PLATELET COUNT: CPT | Performed by: HOSPITALIST

## 2019-03-20 PROCEDURE — 25000132 ZZH RX MED GY IP 250 OP 250 PS 637: Performed by: INTERNAL MEDICINE

## 2019-03-20 PROCEDURE — 94660 CPAP INITIATION&MGMT: CPT

## 2019-03-20 PROCEDURE — 36415 COLL VENOUS BLD VENIPUNCTURE: CPT | Performed by: HOSPITALIST

## 2019-03-20 PROCEDURE — 25000131 ZZH RX MED GY IP 250 OP 636 PS 637: Performed by: HOSPITALIST

## 2019-03-20 PROCEDURE — 99238 HOSP IP/OBS DSCHRG MGMT 30/<: CPT | Performed by: HOSPITALIST

## 2019-03-20 PROCEDURE — 97140 MANUAL THERAPY 1/> REGIONS: CPT | Mod: GP | Performed by: PHYSICAL THERAPIST

## 2019-03-20 PROCEDURE — 00000146 ZZHCL STATISTIC GLUCOSE BY METER IP

## 2019-03-20 PROCEDURE — 25000132 ZZH RX MED GY IP 250 OP 250 PS 637: Performed by: HOSPITALIST

## 2019-03-20 PROCEDURE — 97110 THERAPEUTIC EXERCISES: CPT | Mod: GP | Performed by: PHYSICAL THERAPIST

## 2019-03-20 RX ORDER — INSULIN GLARGINE 100 [IU]/ML
10 INJECTION, SOLUTION SUBCUTANEOUS EVERY MORNING
Start: 2019-03-20 | End: 2019-07-15

## 2019-03-20 RX ORDER — METOPROLOL TARTRATE 50 MG
25 TABLET ORAL 2 TIMES DAILY
Qty: 60 TABLET | Refills: 0
Start: 2019-03-20 | End: 2019-04-22

## 2019-03-20 RX ADMIN — METOPROLOL TARTRATE 25 MG: 25 TABLET ORAL at 09:26

## 2019-03-20 RX ADMIN — INSULIN ASPART 1 UNITS: 100 INJECTION, SOLUTION INTRAVENOUS; SUBCUTANEOUS at 12:23

## 2019-03-20 RX ADMIN — ASPIRIN 81 MG: 81 TABLET, COATED ORAL at 09:27

## 2019-03-20 RX ADMIN — ATORVASTATIN CALCIUM 40 MG: 40 TABLET, FILM COATED ORAL at 09:25

## 2019-03-20 RX ADMIN — PANTOPRAZOLE SODIUM 40 MG: 40 TABLET, DELAYED RELEASE ORAL at 09:25

## 2019-03-20 RX ADMIN — POTASSIUM CHLORIDE 30 MEQ: 1500 TABLET, EXTENDED RELEASE ORAL at 09:26

## 2019-03-20 RX ADMIN — TIMOLOL MALEATE 1 DROP: 5 SOLUTION/ DROPS OPHTHALMIC at 09:25

## 2019-03-20 RX ADMIN — BUMETANIDE 3 MG: 1 TABLET ORAL at 09:26

## 2019-03-20 RX ADMIN — OYSTER SHELL CALCIUM WITH VITAMIN D 1 TABLET: 500; 200 TABLET, FILM COATED ORAL at 09:26

## 2019-03-20 RX ADMIN — EPLERENONE 50 MG: 25 TABLET ORAL at 09:25

## 2019-03-20 RX ADMIN — INSULIN GLARGINE 10 UNITS: 100 INJECTION, SOLUTION SUBCUTANEOUS at 09:25

## 2019-03-20 ASSESSMENT — ACTIVITIES OF DAILY LIVING (ADL)
ADLS_ACUITY_SCORE: 11

## 2019-03-20 ASSESSMENT — MIFFLIN-ST. JEOR: SCORE: 2089.06

## 2019-03-20 NOTE — PROGRESS NOTES
Per chart review, patient's criteria for CardioMEMS is as follows:      Evaluated by or discussed with CORE MD prior to Implant: yes     Class III heart failure symptoms: yes    Hospitalization for heart failure in the last 12 months: yes    Followed by C.O.R.E. clinic providers: yes    Assessment of compliance (ex. Telemanagement): has not been enrolled in telemonitoring, pt could likely comply w/ daily MEMS readings    GFR>25: yes    Chest circumference <65 inches: unknown, BMI 40.8    Able to take Plavix for 30 days in addition to ASA 81 mg for life (or already on Coumadin or NOAC) hx of anemia and thrombocytopenia, followed by Dr. Fletcher, improving    No active infection    No hx >1 pulmonary embolism or DVT    No congenital heart disease    No mechanical right heart valves; s/p tissue AV replacement 2017    No CRT in last 3 months    Life expectancy > 1 year: unknown    Prior authorization is required-->LVM for Tawny Adams ([employer], [phone]) to inquire       Karen Stark RN BSN   12:58 PM 03/20/19      Received VM from Tawny Adams w/ [employer] stating that pt has Jaqueline MILLER which covers CardioMEMS.     Karen Stark RN BSN   3:46 PM 03/20/19

## 2019-03-20 NOTE — PLAN OF CARE
VSS. Monitor remains Sinus rhythm. Pt. Denies pain. Right groin site stable. Pt. Discharged to home per order. Discharge instructions reviewed with pt.

## 2019-03-20 NOTE — PROGRESS NOTES
[facility]  Met with pt to discuss plans for HC.  Pt to be discharged home today and has agreed to have [facility] follow with services of SN, PT and OT. Patient care support center processing referral.  Pt verbalized understanding that initial visit is scheduled for 3/21/19 or 3/22/19. Pt has 24 hour phone number for [facility] for any questions or concerns.

## 2019-03-20 NOTE — PLAN OF CARE
Physical Therapy Discharge Summary    Reason for therapy discharge:    Discharged to PT and lymphedema services to continue with wrap placement and volume management of the LEs.      Progress towards therapy goal(s). See goals on Care Plan in [facility] electronic health record for goal details.  Goals partially met.  Barriers to achieving goals:   discharge from facility and Mobility goals met, edema goal partially met .    Therapy recommendation(s):    Continued therapy is recommended.  Rationale/Recommendations:  Pt will benefit from ongoing rehab services to assist with progressing aerobic and strength activities for optimal functional mobility tolerance.

## 2019-03-20 NOTE — PROGRESS NOTES
Assessment and Plan:   CKD-4: Cr stable at 1.47. eGFR 51. Lytes show K of 3.7 and 28, C/W diuresis. I/O 644/5675. Net neg 16.8 L. On bumex 3 mg bid and eplerenone. . Getting KCL po at high dose.   Ca + D as phos binder.     F/U with our office (Donnie, Lorraine Elkins NP, [phone]) 2 weeks after discharge.   Cont oral diuretics and K replacement.             Interval History:   Thrombocytopenia:   Also anemia and low WBC.  Per IM.     CHF:  On metoprolol. Aortic stenosis S/P AVR.   DM  S/P right heart cath: RA 10 and LVEDP 14.            Review of Systems:   No SOB, ambulating easily. Eager to go home today. Decreased edema noted. Pt notes softer, less swollen legs.           Medications:       aspirin  81 mg Oral Daily     atorvastatin  40 mg Oral Daily     bumetanide  3 mg Oral BID     calcium carbonate-vitamin D  1 tablet Oral BID w/meals     docusate sodium  100 mg Oral BID     donepezil  5 mg Oral At Bedtime     eplerenone  50 mg Oral BID     insulin aspart  1-7 Units Subcutaneous TID AC     insulin aspart  1-5 Units Subcutaneous At Bedtime     insulin glargine  10 Units Subcutaneous QAM AC     metoprolol tartrate  25 mg Oral BID     pantoprazole  40 mg Oral QAM AC     potassium chloride ER  30 mEq Oral BID     sodium chloride (PF)  3 mL Intracatheter Q8H     timolol maleate  1 drop Both Eyes BID       sodium chloride Stopped (03/19/19 2129)     Current active medications and PTA medications reviewed, see medication list for details.            Physical Exam:   Vitals were reviewed  Patient Vitals for the past 24 hrs:   BP Temp Temp src Pulse Heart Rate Resp SpO2 Weight   03/20/19 1110 99/54 98.1  F (36.7  C) Oral -- 63 -- 95 % --   03/20/19 1001 117/62 -- -- 76 -- -- 90 % --   03/20/19 0952 116/61 -- -- -- 68 -- -- --   03/20/19 0926 119/58 -- -- -- 69 -- -- --   03/20/19 0810 110/52 98.2  F (36.8  C) Oral -- 67 16 94 % --   03/20/19 0425 -- -- -- -- -- -- -- 125.2 kg (276 lb)   03/20/19 0326  112/57 -- -- -- 65 14 95 % --   19 2300 108/56 97.9  F (36.6  C) Axillary -- 68 13 92 % --   19 1900 94/52 97.6  F (36.4  C) Oral -- 64 18 93 % --   19 1700 101/56 -- -- -- 59 18 98 % --   19 1559 94/56 97.7  F (36.5  C) Oral 55 57 18 96 % --   19 1235 105/46 97.6  F (36.4  C) Oral -- 59 -- 97 % --       Temp:  [97.6  F (36.4  C)-98.2  F (36.8  C)] 98.1  F (36.7  C)  Pulse:  [55-76] 76  Heart Rate:  [57-69] 63  Resp:  [13-18] 16  BP: ()/(46-62) 99/54  FiO2 (%):  [21 %] 21 %  SpO2:  [90 %-98 %] 95 %    Temperatures:  Current - Temp: 98.1  F (36.7  C); Max - Temp  Av.9  F (36.6  C)  Min: 97.6  F (36.4  C)  Max: 98.2  F (36.8  C)  Respiration range: Resp  Av.2  Min: 13  Max: 18  Pulse range: Pulse  Av.5  Min: 55  Max: 76  Blood pressure range: Systolic (24hrs), Av , Min:94 , Max:119   ; Diastolic (24hrs), Av, Min:46, Max:62    Pulse oximetry range: SpO2  Av.4 %  Min: 90 %  Max: 98 %    I/O last 3 completed shifts:  In: 480 [P.O.:480]  Out: 4675 [Urine:4675]      Intake/Output Summary (Last 24 hours) at 3/20/2019 1205  Last data filed at 3/20/2019 1200  Gross per 24 hour   Intake 480 ml   Output 4625 ml   Net -4145 ml       Alert and responsive  Not on O2  LE wrapped     Wt Readings from Last 4 Encounters:   19 125.2 kg (276 lb)   03/15/19 140.8 kg (310 lb 4.8 oz)   19 134.9 kg (297 lb 8 oz)   19 132 kg (291 lb)          Data:          Lab Results   Component Value Date     2019     2019     2019    Lab Results   Component Value Date    CHLORIDE 104 2019    CHLORIDE 104 2019    CHLORIDE 103 2019    Lab Results   Component Value Date    BUN 24 2019    BUN 25 2019    BUN 26 2019      Lab Results   Component Value Date    POTASSIUM 3.7 2019    POTASSIUM 3.6 2019    POTASSIUM 3.7 2019    Lab Results   Component Value Date    CO2 28 2019    CO2 28  03/19/2019    CO2 28 03/18/2019    Lab Results   Component Value Date    CR 1.47 03/20/2019    CR 1.44 03/19/2019    CR 1.50 03/18/2019        Recent Labs   Lab Test 03/20/19  0640 03/19/19  0616 03/18/19  0615 03/15/19  1444 01/02/19  1145  12/12/18  0910   WBC  --   --  3.8* 4.2  --   --  5.5   HGB  --   --  10.2* 9.3* 11.2*   < > 9.9*   HCT  --   --  32.1* 29.5*  --   --  32.0*   MCV  --   --  100 102*  --   --  87   PLT 76* 72* 79* 79*  --   --  136*    < > = values in this interval not displayed.     Recent Labs   Lab Test 03/18/19  0615 10/12/18  1736 05/09/17  1240   AST 88* 55* 110*   ALT 44 31 85*  79*   ALKPHOS 72 82 72   BILITOTAL 0.9 0.3 1.2       Recent Labs   Lab Test 03/19/19  0616 01/23/19  0847 01/09/19  1031   MAG 2.7* 2.7* 2.6*     Recent Labs   Lab Test 03/17/19  0535 05/20/17  0654 05/19/17  0436   PHOS 4.7* 3.4 3.5     Recent Labs   Lab Test 03/20/19  0640 03/19/19  0616 03/18/19  0615   MYRON 8.3* 8.2* 8.4*       Lab Results   Component Value Date    MYRON 8.3 (L) 03/20/2019     Lab Results   Component Value Date    WBC 3.8 (L) 03/18/2019    HGB 10.2 (L) 03/18/2019    HCT 32.1 (L) 03/18/2019     03/18/2019    PLT 76 (L) 03/20/2019     Lab Results   Component Value Date     03/20/2019    POTASSIUM 3.7 03/20/2019    CHLORIDE 104 03/20/2019    CO2 28 03/20/2019    GLC 93 03/20/2019     Lab Results   Component Value Date    BUN 24 03/20/2019    CR 1.47 (H) 03/20/2019     Lab Results   Component Value Date    MAG 2.7 (H) 03/19/2019     Lab Results   Component Value Date    PHOS 4.7 (H) 03/17/2019       Creatinine   Date Value Ref Range Status   03/20/2019 1.47 (H) 0.66 - 1.25 mg/dL Final   03/19/2019 1.44 (H) 0.66 - 1.25 mg/dL Final   03/18/2019 1.50 (H) 0.66 - 1.25 mg/dL Final   03/17/2019 1.58 (H) 0.66 - 1.25 mg/dL Final   03/16/2019 1.35 (H) 0.66 - 1.25 mg/dL Final   03/15/2019 1.70 (H) 0.70 - 1.30 mg/dL Final       Attestation:  I have reviewed today's vital signs, notes, medications,  labs and imaging.     Stuart Gregorio MD

## 2019-03-20 NOTE — PLAN OF CARE
Heart Failure Care Pathway  GOALS TO BE MET BEFORE DISCHARGE:    1. Decrease congestion and/or edema with diuretic therapy to achieve near      optimal volume status.            Dyspnea improved:  Yes            Edema improved:     Yes        Net I/O and Weights since admission:          02/18 0700 - 03/20 0659  In: 4984 [P.O.:4820; I.V.:164]  Out: 09136 [Urine:98726]  Net: -19321            Vitals:    03/15/19 1650 03/15/19 2000 03/16/19 0624 03/17/19 0619   Weight: 140.6 kg (310 lb) 137.2 kg (302 lb 6.4 oz) 135.5 kg (298 lb 12.8 oz) 132.7 kg (292 lb 9.6 oz)    03/18/19 0405 03/19/19 0635 03/20/19 0425   Weight: 130.5 kg (287 lb 9.6 oz) 128.7 kg (283 lb 11.2 oz) 125.2 kg (276 lb)       2.  O2 sats > 92% on RA or at prior home O2 therapy level.          Current oxygenation status:       SpO2: 95 %         O2 Device: BiPAP/CPAP,            Able to wean O2 this shift to keep sats > 92%:  No, please explain: pt continues to use bipap overnight for sleep apneia very apenic        Does patient use Home O2? No    3.  Tolerates ambulation and mobility near baseline: yes        How many times did the patient ambulate with nursing staff this shift?   X1 to bathroom     Please review the Heart Failure Care Pathway for additional HF goal outcomes.    Thuy Flores RN  3/20/2019     Pt denied pain. Pt lymphedema wraps removed at 0300 to allow break and pt had mild discomfort on top left foot- mepilex applied. Vitals stable on RA during day and using bipap at night. Up w/SBA - SB / SR (55-65). Pt now taking PO bumex. Pt had right heart cath with right groin site- dressing clean dry intact. Pt site has mild firmness with no changes during shift.

## 2019-03-20 NOTE — PROGRESS NOTES
[facility]  Cardiology Progress Note  Date of Service: 2019  Primary Cardiologist: Dr. Mckeon    Assessment & Plan   Mr. Chowdary is a delightful 58-year-old gentleman with past medical history significant for the followin.  Severe aortic stenosis with aortic valve replacement in 2017 with 25 mm Trifecta tissue valve.   2.  Type 2 diabetes, on insulin.   3.  Hyperlipidemia.   4.  Treated sleep apnea.   5.  Cor pulmonale versus heart failure with preserved EF.   6.  Recent iron deficiency anemia followed by abbi Vasquez.   7.  History of SVT and paroxysmal AFib, postoperative.  None on recent Zio Patch.   8.  Hx of myectomy at time of AVR, but no clear dx of HOCM  9.  Thrombocytopenia and anemia  10. Hypoalbuminemia    Right heart catheterization yesterday (3/19/19) showed concordance of LVSP/RVSP with inspiration/expiration suggests no constrictive physiology, filling pressures have near normalized to RA of 10 and LVEDP of 14 after 27 pounds of IV diuresis.     Patient has diuresed a total of 34# since admission (310# -> 276# today). Patient is having a good response from diuretics and fluid restriction. Labs today remain stable, creatinine 1.47 (stable), electrolytes stable.     Concern that patient is noncompliant with fluid restriction at home which is contributing to difficult to control HF symptoms. Patient also has dementia and some concern about compliance with medications at home.     Plan:   1. Continue bumetanide 3mg BID at discharge  2. No thiazide diuretics at discharge  3. Continue metoprolol tartrate 25mg BID  4. Continue eplerenone 50mg BID  5. Extensively reviewed the importance of medication compliance and 1.5L fluid restriction.   6. Followup with Dr. Mckeon as scheduled 3/29/19  7. Close CORE follow up with Rachna Holt PA-C  8. Cardiomems evaluation in outpatient setting.   9. Cardiac MRI outpatient   10. Stable for discharge from cardiology perspective.  Cardiology signing off, please contact with any additional questions.     RODRIGUEZ Paul CNP  Text Page  (M-F, 7:30 - 4:00 pm)    Interval History   Patient up walking around in his room. Reporting feeling great. Denies dyspnea. Denies chest pain or chest tightness. Denies dizziness, lightheadedness or other presyncopal symptoms. States lower extremity edema has significantly improved.     Physical Exam   Temp: 98.1  F (36.7  C) Temp src: Oral BP: 99/54 Pulse: 76 Heart Rate: 63 Resp: 16 SpO2: 95 % O2 Device: None (Room air)    Vitals:    03/18/19 0405 03/19/19 0635 03/20/19 0425   Weight: 130.5 kg (287 lb 9.6 oz) 128.7 kg (283 lb 11.2 oz) 125.2 kg (276 lb)       Constitutional  alert and oriented, in no acute distress.  Skin  warm and dry to touch  ENT  no pallor or cyanosis  Lungs clear bilaterally   Cardiac regular rate/rhythm, no murmur appreciated  Abdomen  abdomen soft, bowel sounds normoactive  Extremities and Back   no clubbing, cyanosis. Edema hard to assess due to lymphedema wraps in place, appears trace edema.   Neurological  no gross motor deficits noted, affect appropriate, oriented to time, person and place.    Medications     sodium chloride Stopped (03/19/19 2129)       aspirin  81 mg Oral Daily     atorvastatin  40 mg Oral Daily     bumetanide  3 mg Oral BID     calcium carbonate-vitamin D  1 tablet Oral BID w/meals     docusate sodium  100 mg Oral BID     donepezil  5 mg Oral At Bedtime     eplerenone  50 mg Oral BID     insulin aspart  1-7 Units Subcutaneous TID AC     insulin aspart  1-5 Units Subcutaneous At Bedtime     insulin glargine  10 Units Subcutaneous QAM AC     metoprolol tartrate  25 mg Oral BID     pantoprazole  40 mg Oral QAM AC     potassium chloride ER  30 mEq Oral BID     sodium chloride (PF)  3 mL Intracatheter Q8H     timolol maleate  1 drop Both Eyes BID       Data   Most Recent 3 CBC's:  Recent Labs   Lab Test 03/20/19  0640 03/19/19  0616 03/18/19  0615 03/15/19  1444  01/02/19  1145  12/12/18  0910   WBC  --   --  3.8* 4.2  --   --  5.5   HGB  --   --  10.2* 9.3* 11.2*   < > 9.9*   MCV  --   --  100 102*  --   --  87   PLT 76* 72* 79* 79*  --   --  136*    < > = values in this interval not displayed.     Most Recent 3 BMP's:  Recent Labs   Lab Test 03/20/19  0640 03/19/19  0616 03/18/19  0615    139 138   POTASSIUM 3.7 3.6 3.7   CHLORIDE 104 104 103   CO2 28 28 28   BUN 24 25 26   CR 1.47* 1.44* 1.50*   ANIONGAP 5 7 7   MYRON 8.3* 8.2* 8.4*   GLC 93 85 96     Most Recent 3 Troponin's:  Recent Labs   Lab Test 10/12/18  1736   TROPI <0.015     Most Recent 3 BNP's:  Recent Labs   Lab Test 03/15/19  1444 12/03/18  0814 11/27/18  1259  10/12/18  1736   NTBNPI  --   --   --   --  902*   NTBNP 635* 748* 102   < >  --     < > = values in this interval not displayed.     Last 24 hours labs reviewed     Right heart catheterization 3/19/19    High right sided filling pressures.    Normal PA pressures.    Normal left ventricular filling pressures.    Hyperdynamic    Normal left sided filling pressures.     Relatively normal LV and Right heart pressures  Concordance of LVSP/RVSP with inspiration/expiration suggests no constrictive physiology

## 2019-03-20 NOTE — PROGRESS NOTES
Patient was placed on BiPAP10/5 Oxygen  21  %; Alarm Volume 10. Skin protective barriers were applied.   Skin Assessment: skin intact.       Patient tolerating well.  RT will continue to monitor.     Shi Triana, VLADIMIR  3/19/2019 10:06 PM

## 2019-03-20 NOTE — PLAN OF CARE
Discharge Planner PT   Patient plan for discharge: Home, stats his sister can help, but would like home care services as able.  Current status: Independent with mobility. Ambulated in the marie with independence, negotiated able to converse without SOB. VS stable pre/post activity.  Lymphedema, pt tolerating well. Small red blanchable redness over the navicular bone of the L foot, likely rubbing from bandage-pt appropriately had nursing remove wraps secondary to discomfort. Mepilex in place for cushion. LEs are significantly improved as volume is observed to have reduced well, still with puffiness over the distal dorsum of the feet and medial ankles;  however, pt with improved contours over the ankles and fore foot B. Tolerating well, pt thankful for assist and reduction of volume.   Barriers to return to prior living situation: None.  Recommendations for discharge: Home with Home PT and lymphedema care   Rationale for recommendations: Pt will benefit from continued skilled rehab services to progress independence and safety with mobility, progress exercise/activity tolerance and continue education for optimal heart health. Will benefit from ongoing management of LE edema with assist of home care services.         Entered by: Madina Espinosa 03/20/2019 10:17 AM

## 2019-03-20 NOTE — CONSULTS
Care Coordination:    -Care Transition Initial Assessment - RN        Met with: Patient.  DATA   Active Problems:    CHF exacerbation (H)       Cognitive Status: awake, alert and oriented.        Contact information and PCP information verified: Yes  Lives With: parent(s)(lives with Mom who is 93y/o )   Living Arrangements: house                 Insurance concerns: No Insurance issues identified  ASSESSMENT  Patient currently receives the following services:  The pt is working on getting PCA services in the home.   His appointment was scheduled for yesterday however he is inpatient.        Identified issues/concerns regarding health management:    Met w/ patient re: home care orders.  Offered patient a choice of home care agencies. Pt would like to use [facility]. Pt understands they must be homebound. Pt informed of the plan and in agreement with the plan. E-mail referral sent to [facility] updating them on the orders.  Home Care phone number placed on discharge instructions. Pt is needing assistance with lymphedema wraps. His sister is willing to learn how to apply them. Pt states he has a scale to do daily weights in the home.       PLAN  Financial costs for the patient include; copays .  Patient given options and choices for discharge yes .  Patient/family is agreeable to the plan?  Yes:  Patient anticipates discharging to Home with homecare RN/PT/OT-lymphedema .        Patient anticipates needs for home equipment: No  Plan/Disposition: Home   Appointments:     A follow-up appointment has been made for you with Lorraine Elikns NP on Tuesday, April 2nd at 1:00PM at [facility].  Please call the clinic at [phone] should you need to change or cancel your appointment.  Please arrive 15 minutes early to your scheduled appointment and bring your photo ID, insurance card and co-payment.         [facility]     [address]  [address]  [place], [place] [address] [phone]    Pt  prefers to make own PCP appointment.    Left Voicemail for pt's [employer] Case Manger Yudith at [phone] on pt's discharge and plans.    Care  (CTS) will continue to follow as needed.    Madina Shaw RN, BAN   Care Coordinator  Ph. [phone]

## 2019-03-20 NOTE — TELEPHONE ENCOUNTER
----- Message from Yves Ashley MD sent at 3/20/2019 12:12 PM CDT -----  He is getting discharged today Please call in 2-3 days to see weight on home scale. If going up, bring him in for IV sooner than waiting and get cardiomems in that case. Labs in 3-5 days and f/u with Mike. Dry weight is less than 285 lbs.

## 2019-03-20 NOTE — TELEPHONE ENCOUNTER
Discussed with LAKSHMI Delgadillo who also saw pt in hospital today. Pt scheduled for follow up with Dr. Mckeon with labs on 3/29/19 and then JOAN Saucedo on 4/15/19 for hospital follow up. Messaged RN board to call pt in am on Friday 3/22 and then scheduled pt for BMP on Monday 3/25. Per LAKSHMI Delgadillo, call pt earlier in day Friday 3/22, if issues with weight, then get him to come in Friday for BMP instead of Monday. Messaged RN board to call 3/22 and watch for labs 3/25. YOANNA Pearl 1:12 PM 03/20/19

## 2019-03-20 NOTE — DISCHARGE INSTRUCTIONS
Discharge Instructions for Cardiac Catheterization  Cardiac catheterization is a procedure to look for blocked areas in the blood vessels that send blood to the heart. A thin, flexible tube (catheter) is put in a blood vessel in your groin or arm. The healthcare provider injects contrast fluid into your blood, which then flows to your heart. X-rays pictures are taken of your heart. Your provider will review the results with you. Be sure to ask any questions you have before you leave. This sheet will help you take care of yourself at home.  Home care    Don't drive or make any important decisions for at least 24 hours after receiving any type of sedation or anesthesia.     Arrange to have a responsible adult drive you home after your procedure.    Only do light and easy activities for the next 2 to 3 days. Ask for help with chores and errands while you recover. Have someone drive you to your appointments.    Don't lift anything heavy for a while. Your healthcare team will tell you when it's safe to lift again.    Ask your healthcare team when you can expect to return to work. Unless your job involves lifting, you may be able to return to your normal activities within a couple of days.    Take your medicines as directed. Don't skip doses.    Drink 6 to 8 glasses of water a day. This is to help flush the contrast dye out of your body. Call your healthcare team if your urine has any change in color.    Take your temperature each day for 7 days. If you feel cold and clammy or start sweating, take your temperature right away and call your healthcare team.    Check your incisions every day for signs of infection. These include redness, swelling, and drainage. It is normal to have a small bruise or bump where the catheter was inserted. A bruise that is getting larger is not normal and should be reported to your healthcare team. If you see blood forming in the incision, call your healthcare team. Go to the emergency  department if you have uncontrolled bleeding from the artery site. This is especially true if you take medicines that make it hard for your blood to clot. Examples are aspirin, clopidogrel, and warfarin.    Eat a healthy diet. Make sure it is low in fat, salt, and cholesterol. Ask your healthcare team for diet information.    Stop smoking. Enroll in a stop-smoking program or ask your healthcare team for help. Stop-smoking programs can be life saving.    Exercise as your healthcare team tells you to. Your healthcare team may recommend you start a cardiac rehabilitation program. Cardiac rehab is an exercise program in which trained healthcare staff watch your progress and stress on your heart while you exercise. Ask your team how to enroll.    Don't swim or take baths until your healthcare team says it s OK. You can shower the day after the procedure. Keep the site clean and dry. This keeps the incision from getting wet and infected until the skin and artery can heal.    Be sure to follow all after-care instructions.   Follow-up care    Make a follow-up appointment as advised by our staff. It's common to have a follow-up appointment 2 to 4 weeks after an angioplasty or coronary stent procedure.    Make a yearly appointment, too. This is to make sure you are still doing well and not having any new symptoms.    Don't wait for a follow-up appointment if your medicines aren't working or you are having heart-related symptoms.  When to seek medical care  Call your healthcare provider right away if you have any of the following:    Chest pain    Constant or increasing pain or numbness in your leg    Fever of 100.4 F (38.0 C) or higher, or as directed by your healthcare provider    Symptoms of infection. These include redness, swelling, drainage, or warmth at the incision site.    Shortness of breath    A leg that feels cold or appears blue    Bleeding, bruising, or a lot of swelling where the catheter was inserted    Blood  in your urine    Black or tarry stools    Any unusual bleeding   Date Last Reviewed: 10/1/2016    0704-8183 The "[employer]. [address] [place] [address], [place] [address]. All rights reserved. This information is not intended as a substitute for professional medical care. Always follow your healthcare professional's instructions.      A follow-up appointment has been made for you with Lorraine Elkins NP on Tuesday, April 2nd at 1:00PM at [facility].  Please call the clinic at [phone] should you need to change or cancel your appointment.  Please arrive 15 minutes early to your scheduled appointment and bring your photo ID, insurance card and co-payment.       [facility]    [address]  [address] [place] [id number]  [phone]      -The patient will discharge home with [facility] with RN/PT/OT.  [facility] will contact the patient directly to arrange the first visit.  [facility]'s phone number is [phone].

## 2019-03-20 NOTE — DISCHARGE SUMMARY
[facility]  Hospitalist Discharge Summary       Date of Admission:  3/15/2019  Date of Discharge:  3/20/2019  Discharging Provider: Norm Washington MD  Discharge Diagnoses   Acute exacerbation of heart failure with preserved ejection fraction    History of   Lower extremity edema  Severe aortic stenosis status post tissue valve replacement 2017  Stage 3 chronic kidney disease   Type 2 diabetes mellitus on insulin  Obstructive sleep apnea on continuous positive airway pressure   Iron deficiency anemia and thrombocytopenia  Hyperlipidemia  Dementia     Follow-ups Needed After Discharge   Follow-up Appointments     Follow-up and recommended labs and tests       Follow up with primary care provider, Sam Villatoro, within 7 days   for hospital follow- up.  No follow up labs or test are needed. Chem 8 and   cbc  Follow up in [facility] clinic.             Unresulted Labs Ordered in the Past 30 Days of this Admission     No orders found from 1/14/2019 to 3/16/2019.          Discharge Disposition   Discharged to home  Condition at discharge: Stable    Hospital Course      Gil Chowdary is a 58 year old male with history of severe aortic valve stenosis status post replacement in 2017,  heart failure with preserved ejection fraction, morbid obesity, obstructive sleep apnea, anemia and thrombocytopenia, type 2 diabetes, and former tobacco use who presented to the ED from cardiology clinic with complaints of weight gain. He has gained about 15 lbs in the last few weeks. Cardiology has started bumex drip and recommended nephrology consultation for question of diuretic resistance.    Exacerbation of heart failure with preserved ejection fraction  History of severe aortic stenosis s/p tissue valve replacement 2017  15 pounds up in ~3 weeks despite reported compliance with meds - he does not routinely monitor his amount of fluid intake  NT Pro ; last echo October 2018 - EF 60-65%, diastolic  dysfunction noted  PTA ASA 81, metoprolol tartrate 50 bid continued with holding parameters   PTA eplerenone held given bumex drip  3/16-Today his weight is down from 140.6Kg to 135.5Kg  Continue intravenous bumetanide   Monitor weight, electrolytes and renal function  3/17-weight is down to 132.7Kg- creatinine around 1.5 HCO3 27- would continue intravenous diuretic one more day. Lower extremity  ultrasound was negative for deep venous thrombosis.  3/18- weight 130.5Kg and renal function is stable- continue intravenous bumetanide infusion  3/19- weight down, renal function stable- discussed with cardiology- right heart catheterization today. Continue bumetanide  3/20-stable on oral bumetanide.  Right heart catheterization- no constrictive physiology.  Home on bumetanide 3mg bid.  276 lbs 0 oz= discharge weight  Also home with lymphedema wraps and home physcial therapy to wrap legs.  Needs close follow up in [facility] clinic.    Possible acute on chronic renal failure  Creatinine is stable and around baseline  Creatinine   Date Value Ref Range Status   03/20/2019 1.47 (H) 0.66 - 1.25 mg/dL Final     Type 2 diabetes mellitus   October 2018 A1c 6.8%  POC QID with sliding scale insulin  PTA glargine (20u qam) adjusted to 16 units every morning  3/17- glucometers in the 80's to 90's - decrease glargine  3/18- glucometers 89/96/106- continue same insulin dosing today  3/19- glucometers still < 100- decrease Lantus again  3/20- home on 10 units of glargine     DRAKE on CPAP  Continue CPAP     Iron deficiency anemia and thrombocytopenia  Hemoglobin 9.3g, appears his baseline as of December  Platelets 72K- continue to monitor      History of hyperlipidemia  PTA statin continued     History of GERD  PTA pantoprazole continued    Dementia   Continue donepezil     Consultations This Hospital Stay   NEPHROLOGY IP CONSULT  CARDIOLOGY IP CONSULT  LYMPHEDEMA THERAPY IP CONSULT  NUTRITION SERVICES ADULT IP CONSULT  PHYSICAL THERAPY ADULT  IP CONSULT  PHARMACY IP CONSULT  PHARMACY IP CONSULT  SMOKING CESSATION PROGRAM IP CONSULT    Code Status   Full Code    Time Spent on this Encounter   I, Norm Washington, personally saw the patient today and spent less than or equal to 30 minutes discharging this patient.       Norm Washington MD  [facility]  ______________________________________________________________________    Physical Exam   Vital Signs: Temp: 98.1  F (36.7  C) Temp src: Oral BP: 99/54 Pulse: 76 Heart Rate: 63 Resp: 16 SpO2: 95 % O2 Device: None (Room air)    Weight: 276 lbs 0 oz  Constitutional: awake, alert, cooperative, no apparent distress, and appears stated age  Respiratory: No increased work of breathing, good air exchange, clear to auscultation bilaterally, no crackles or wheezing  Cardiovascular:  regular rate and rhythm, normal S1 and S2, no S3 or S4, and no murmur noted; legs are ace wrapped       Primary Care Physician   Sam Villatoro    Immunizations       Discharge Orders      LYMPHEDEMA THERAPY REFERRAL      Home Care PT Referral for Hospital Discharge      Reason for your hospital stay    Retaining fluid     Follow-up and recommended labs and tests     Follow up with primary care provider, Sam Villatoro, within 7 days for hospital follow- up.  No follow up labs or test are needed. Chem 8 and cbc  Follow up in [facility] clinic.     Activity    Your activity upon discharge: activity as tolerated     Monitor and record    weight every day     When to contact your care team    Weight gain >= lbs overnight     MD face to face encounter    Documentation of Face to Face and Certification for Home Health Services    I certify that patient: Gil Chowdary is under my care and that I, or a nurse practitioner or physician's assistant working with me, had a face-to-face encounter that meets the physician face-to-face encounter requirements with this patient on: March 20, 2019.    This encounter with  the patient was in whole, or in part, for the following medical condition, which is the primary reason for home health care: lymphedema wraps.    I certify that, based on my findings, the following services are medically necessary home health services: Physical Therapy.    My clinical findings support the need for the above services because: Physical Therapy Services are needed to assess and treat the following functional impairments: lymphedema.    Further, I certify that my clinical findings support that this patient is homebound (i.e. absences from home require considerable and taxing effort and are for medical reasons or [employer] services or infrequently or of short duration when for other reasons) because: Requires assistance of another person or specialized equipment to access medical services because patient: Is unable to walk greater than 50 feet without rest...    Based on the above findings. I certify that this patient is confined to the home and needs intermittent skilled nursing care, physical therapy and/or speech therapy.  The patient is under my care, and I have initiated the establishment of the plan of care.  This patient will be followed by a physician who will periodically review the plan of care.  Physician/Provider to provide follow up care: Sam Villatoro    Attending hospital physician (the Medicare certified [place] provider): Norm Washington  Physician Signature: See electronic signature associated with these discharge orders.  Date: 3/20/2019     Diet    Follow this diet upon discharge:     2G Na, Moderate Consistent CHO Diet; 2L per day fluid restriction.       Significant Results and Procedures   Most Recent 3 CBC's:  Recent Labs   Lab Test 03/20/19  0640 03/19/19  0616 03/18/19  0615 03/15/19  1444 01/02/19  1145  12/12/18  0910   WBC  --   --  3.8* 4.2  --   --  5.5   HGB  --   --  10.2* 9.3* 11.2*   < > 9.9*   MCV  --   --  100 102*  --   --  87   PLT 76* 72* 79* 79*  --    --  136*    < > = values in this interval not displayed.     Most Recent 3 BMP's:  Recent Labs   Lab Test 03/20/19  0640 03/19/19  0616 03/18/19  0615    139 138   POTASSIUM 3.7 3.6 3.7   CHLORIDE 104 104 103   CO2 28 28 28   BUN 24 25 26   CR 1.47* 1.44* 1.50*   ANIONGAP 5 7 7   MYRON 8.3* 8.2* 8.4*   GLC 93 85 96   ,   Results for orders placed or performed during the hospital encounter of 03/15/19   XR Chest 2 Views    Narrative    CHEST TWO VIEWS  3/15/2019 6:01 PM     HISTORY: Dyspnea on exertion.    COMPARISON: 11/27/2018      Impression    IMPRESSION: Prior midline sternotomy. Cardiomegaly is present along  with some mild pulmonary vascular prominence. No infiltrates or  effusions.    BRAD PÉREZ MD   US Lower Extremity Venous Duplex Bilateral    Narrative    US LOWER EXTREMITY VENOUS DUPLEX BILATERAL   3/16/2019 8:57 PM     HISTORY: Edema, evaluate for DVT.    COMPARISON: None.    FINDINGS: Gray-scale, color and Doppler spectral analysis ultrasound  was performed of the bilateral lower extremities. Compression and  augmentation imaging was performed.    The deep venous systems of the bilateral lower extremities are fully  compressible.  Spectral Doppler demonstrates normal phasicity and  excellent augmentation with calf compression.  Visualized greater  saphenous veins are patent.    Calf veins are difficult to see due to edema in the lower extremities.  No obvious thrombosis of the deep calf vessels is seen.         Impression    IMPRESSION: No evidence for DVT within either lower extremity.  Evaluation of the calf veins is difficult due to edema in the lower  legs, according to the technologist.       LIZZY GATICA MD       Discharge Medications   Current Discharge Medication List      CONTINUE these medications which have CHANGED    Details   insulin glargine (BASAGLAR KWIKPEN) 100 UNIT/ML pen Inject 10 Units Subcutaneous every morning    Associated Diagnoses: Type 2 diabetes mellitus with stage 3  chronic kidney disease, with long-term current use of insulin (H)      metoprolol tartrate (LOPRESSOR) 50 MG tablet Take 0.5 tablets (25 mg) by mouth 2 times daily  Qty: 60 tablet, Refills: 0    Associated Diagnoses: Paroxysmal supraventricular tachycardia (H); S/P AVR (aortic valve replacement)         CONTINUE these medications which have NOT CHANGED    Details   aspirin 81 MG tablet Take 81 mg by mouth every morning       atorvastatin (LIPITOR) 40 MG tablet Take 1 tablet (40 mg) by mouth daily  Qty: 90 tablet, Refills: 0    Associated Diagnoses: Hyperlipidemia with target LDL less than 70      bumetanide (BUMEX) 1 MG tablet Take 3 tablets (3 mg) by mouth 2 times daily  Qty: 180 tablet, Refills: 3    Associated Diagnoses: S/P AVR (aortic valve replacement)      donepezil (ARICEPT) 5 MG tablet Take 5 mg by mouth At Bedtime      eplerenone (INSPRA) 50 MG tablet Take 1 tablet (50 mg) by mouth 2 times daily  Qty: 60 tablet, Refills: 11    Associated Diagnoses: Heart failure with preserved ejection fraction, NYHA class I (H)      ferrous sulfate (IRON) 325 (65 Fe) MG tablet Take 1 tablet (325 mg) by mouth 2 times daily  Qty: 100 tablet, Refills: 3    Associated Diagnoses: Other iron deficiency anemia      !! insulin aspart (NOVOLOG FLEXPEN) 100 UNIT/ML injection Inject 3 Units Subcutaneous daily (with dinner) Takes if blood glucose > 150      !! insulin aspart (NOVOLOG FLEXPEN) 100 UNIT/ML injection Inject 2 Units Subcutaneous 2 times daily (with meals) Takes with breakfast and lunch if blood glucose > 150      magnesium oxide (MAG-OX) 400 MG tablet Take 1 tablet (400 mg) by mouth daily  Qty: 90 tablet, Refills: 3    Associated Diagnoses: Muscle cramp      Multiple Vitamins-Minerals (CENTRUM ADULTS PO) Take 1 tablet by mouth every morning       pantoprazole (PROTONIX) 40 MG EC tablet Take 40 mg by mouth every morning (before breakfast)      potassium chloride ER (K-DUR/KLOR-CON M) 10 MEQ CR tablet Take 30 mEq by mouth  3 times daily       prednisoLONE acetate (PRED FORTE) 1 % ophthalmic susp Place 1 drop into both eyes as needed (eye pain)       timolol (TIMOPTIC) 0.5 % ophthalmic solution Place 1 drop into both eyes 2 times daily       clindamycin (CLEOCIN) 300 MG capsule Take 1 capsule (300 mg) by mouth as needed  Qty: 10 capsule, Refills: 3    Comments: Take 2 one hour prior to dental proceedures  Associated Diagnoses: S/P AVR (aortic valve replacement)       !! - Potential duplicate medications found. Please discuss with provider.        Allergies   Allergies   Allergen Reactions     Amoxicillin      Itchy      Penicillins Hives     Spironolactone Rash

## 2019-03-20 NOTE — PLAN OF CARE
VSS, except jerry. Independent in room. Neuros intact. Lymphedema wraps. A+Ox4. Eating and voiding well. No interventions.

## 2019-03-21 ENCOUNTER — TELEPHONE (OUTPATIENT)
Dept: CARDIOLOGY | Facility: CLINIC | Age: 59
End: 2019-03-21

## 2019-03-21 NOTE — TELEPHONE ENCOUNTER
Patient was evaluated by cardiology while inpatient for 15 lb weight gain over 3 weeks, severe peripheral edema, CHF exacerbation-in presence of dietary and possible medication non compliance. Hx of AVR 2017. IV Bumex diuresis of 34 lbs this admission-transitioned to oral. 3/19/19 RHC and LHC completed via RFA and RFV. No interventions. Called patient to discuss any post hospital d/c questions, review discharge medication, and confirm f/u appts. Patient denied any questions regarding new or changes to PTA medications. Patient denied any SOB, chest pain, edema, or light headedness. RN confirmed with patient that he has an apt scheduled with Dr. Mckeon on 3/29/19 with labs prior. Labs also on 3/25/19.  Instructed patient to take daily weights and call clinic for a weight gain of 2 lbs overnight or 5 lbs in a week. Low Na+ diet reviewed. Patient advised to call clinic with any cardiac related questions or concerns prior to his appt. Patient verbalized understanding and agreed with plan. KHAI Choudhary RN.

## 2019-03-22 ENCOUNTER — CARE COORDINATION (OUTPATIENT)
Dept: CARDIOLOGY | Facility: CLINIC | Age: 59
End: 2019-03-22

## 2019-03-22 NOTE — PROGRESS NOTES
Wt yesterday 270# and today 270.2#. Pt states his home care nurse is currently at his home. Pt denies any changes to sx or questions/concerns. Spoke w/ [facility] home care RN, Lorelei. She states they will generally be out to pt's home 3x per week. Next week they will be out on MWF. Reviewed that we plan to do a BMP Monday. She said they will be able to draw the BMP for us. Lorelei is doing pt's intake, but might not be assigned to pt permanently. She recommends we call their triage nurse, Lacey, at [phone] for updates in the mean time. Canceled BMP lab appt for Mon 3/25, message sent to Team RN board to watch for results and call Lacey for an update. Asked that they call us if any discrepancies on med list or tx plan. Yadi Hernandez RN on 3/22/2019 at 9:34 AM

## 2019-03-25 ENCOUNTER — CARE COORDINATION (OUTPATIENT)
Dept: CARDIOLOGY | Facility: CLINIC | Age: 59
End: 2019-03-25

## 2019-03-25 DIAGNOSIS — I50.30 (HFPEF) HEART FAILURE WITH PRESERVED EJECTION FRACTION (H): Primary | ICD-10-CM

## 2019-03-25 DIAGNOSIS — I50.30 (HFPEF) HEART FAILURE WITH PRESERVED EJECTION FRACTION (H): ICD-10-CM

## 2019-03-25 LAB
ANION GAP SERPL CALCULATED.3IONS-SCNC: 12.8 MMOL/L (ref 6–17)
BASOPHILS # BLD AUTO: 0 10E9/L (ref 0–0.2)
BASOPHILS NFR BLD AUTO: 0.5 %
BUN SERPL-MCNC: 21 MG/DL (ref 7–30)
CALCIUM SERPL-MCNC: 9 MG/DL (ref 8.5–10.5)
CHLORIDE SERPL-SCNC: 102 MMOL/L (ref 98–107)
CO2 SERPL-SCNC: 26 MMOL/L (ref 23–29)
CREAT SERPL-MCNC: 1.56 MG/DL (ref 0.7–1.3)
DIFFERENTIAL METHOD BLD: ABNORMAL
EOSINOPHIL # BLD AUTO: 0.2 10E9/L (ref 0–0.7)
EOSINOPHIL NFR BLD AUTO: 4.3 %
ERYTHROCYTE [DISTWIDTH] IN BLOOD BY AUTOMATED COUNT: 16.5 % (ref 10–15)
GFR SERPL CREATININE-BSD FRML MDRD: 46 ML/MIN/{1.73_M2}
GLUCOSE SERPL-MCNC: 85 MG/DL (ref 70–105)
HCT VFR BLD AUTO: 33.3 % (ref 40–53)
HGB BLD-MCNC: 10.6 G/DL (ref 13.3–17.7)
IMM GRANULOCYTES # BLD: 0 10E9/L (ref 0–0.4)
IMM GRANULOCYTES NFR BLD: 0.2 %
LYMPHOCYTES # BLD AUTO: 1.5 10E9/L (ref 0.8–5.3)
LYMPHOCYTES NFR BLD AUTO: 35.3 %
MCH RBC QN AUTO: 31.5 PG (ref 26.5–33)
MCHC RBC AUTO-ENTMCNC: 31.8 G/DL (ref 31.5–36.5)
MCV RBC AUTO: 99 FL (ref 78–100)
MONOCYTES # BLD AUTO: 0.4 10E9/L (ref 0–1.3)
MONOCYTES NFR BLD AUTO: 9.6 %
NEUTROPHILS # BLD AUTO: 2.1 10E9/L (ref 1.6–8.3)
NEUTROPHILS NFR BLD AUTO: 50.1 %
NRBC # BLD AUTO: 0 10*3/UL
NRBC BLD AUTO-RTO: 0 /100
PLATELET # BLD AUTO: 95 10E9/L (ref 150–450)
POTASSIUM SERPL-SCNC: 3.8 MMOL/L (ref 3.5–5.1)
RBC # BLD AUTO: 3.36 10E12/L (ref 4.4–5.9)
SODIUM SERPL-SCNC: 137 MMOL/L (ref 136–145)
WBC # BLD AUTO: 4.2 10E9/L (ref 4–11)

## 2019-03-25 PROCEDURE — 80048 BASIC METABOLIC PNL TOTAL CA: CPT | Performed by: INTERNAL MEDICINE

## 2019-03-25 PROCEDURE — 85025 COMPLETE CBC W/AUTO DIFF WBC: CPT | Performed by: INTERNAL MEDICINE

## 2019-03-25 PROCEDURE — 36415 COLL VENOUS BLD VENIPUNCTURE: CPT | Performed by: INTERNAL MEDICINE

## 2019-03-25 NOTE — PROGRESS NOTES
If possible we should get him a scale.  I'm ok following his wt- would be a good mems candidate down the road, but let's try and get him a bit more compliant first.  Pls set him up with call in wts (my health tracker) once we get a scale set up.  Rachna Holt PA-C 3/25/2019 3:22 PM

## 2019-03-25 NOTE — PROGRESS NOTES
Call from  homecare nurse Jojo [phone] ie they attempted to draw blood and were unable to get.   Called to patient he is out for appointment today and agrees to come to clinic between 1200 and 1230 for lab draw.   Jennifer Savage RN 03/25/19 10:46 AM

## 2019-03-25 NOTE — PROGRESS NOTES
"BMP and CBC/diff drawn today, showing stable Hgb, stable renal fxn and improved platelet count.     Pt admitted 3/15-3/20 for acute on chronic HFpEF, wt gain. Treated w/ bumex gtt and diuresed ~35#. 3/19 RHC. No changes to PTA bumex, eplerenone and KCL at discharge.     Follow-up plan:  -3/29 BMP and Dr. Mckeon  -4/10 Dr. Fletcher  -4/15 Rachna Holt PAC    I reviewed results w/ pt. He told me his primary sxs PTA were heaviness/fatigue, now he feels very well, like he's \"as light as a ballerina.\" He told me his wt today 276#, on 3/22 he reported wt of 270#, discussed discrepancy: He said today's wt came from psychiatrist's office. His scale at home hasn't been working. Discussed the importance of daily wts w/ consistent scale/time/clothing. He asked if he needs to continue measuring his urine output, I gave him ok to discontinue this. He told me purchasing a new scale may be a financial hardship-will see if able to obtain one for him. We reviewed his diuretic plan and he told me he's using a pillbox and \"getting used to\" taking multiple meds.     Reminded him of follow-up 3/29. He was confused about timing at first, and then stated he'd have to rearrange psychiatry appts that are scheduled at same time. Reviewed importance of close cardiology follow-up.     Will YANY Holt PAC and Dr. Mckeon.     Component      Latest Ref Rng & Units 3/18/2019 3/20/2019 3/25/2019   WBC      4.0 - 11.0 10e9/L 3.8 (L)  4.2   RBC Count      4.4 - 5.9 10e12/L 3.21 (L)  3.36 (L)   Hemoglobin      13.3 - 17.7 g/dL 10.2 (L)  10.6 (L)   Hematocrit      40.0 - 53.0 % 32.1 (L)  33.3 (L)   MCV      78 - 100 fl 100  99   MCH      26.5 - 33.0 pg 31.8  31.5   MCHC      31.5 - 36.5 g/dL 31.8  31.8   RDW      10.0 - 15.0 % 18.2 (H)  16.5 (H)   Platelet Count      150 - 450 10e9/L 79 (L)  95 (L)   Sodium      136 - 145 mmol/L 138 137 137   Potassium      3.5 - 5.1 mmol/L 3.7 3.7 3.8   Chloride      98 - 107 mmol/L 103 104 102   Carbon Dioxide      23 - " 29 mmol/L 28 28 26   Anion Gap      6 - 17 mmol/L 7 5 12.8   Glucose      70 - 105 mg/dL 96 93 85   Urea Nitrogen      7 - 30 mg/dL 26 24 21   Creatinine      0.70 - 1.30 mg/dL 1.50 (H) 1.47 (H) 1.56 (H)   GFR Estimate      >60 mL/min/1.73:m2 50 (L) 51 (L) 46 (L)   GFR Estimate If Black      >60 mL/min/1.73:m2 58 (L) 59 (L) 55 (L)   Calcium      8.5 - 10.5 mg/dL 8.4 (L) 8.3 (L) 9.0   Karen Stark RN BSN   2:06 PM 03/25/19

## 2019-03-26 NOTE — PROGRESS NOTES
Called pt and LVM that we will have a scale for him at 3/29 OV and provided him appt details. Asked him to call w/ questions/concerns prior.     Once pt has established daily wts, may be beneficial to enroll him into HF Telemanagement.    Karen Stark RN BSN   11:49 AM 03/26/19

## 2019-03-28 DIAGNOSIS — I50.30 (HFPEF) HEART FAILURE WITH PRESERVED EJECTION FRACTION (H): Primary | ICD-10-CM

## 2019-03-28 DIAGNOSIS — Z95.2 S/P AVR (AORTIC VALVE REPLACEMENT): ICD-10-CM

## 2019-03-28 RX ORDER — BUMETANIDE 1 MG/1
3 TABLET ORAL
Qty: 180 TABLET | Refills: 3 | Status: SHIPPED | OUTPATIENT
Start: 2019-03-28 | End: 2019-04-02

## 2019-04-02 ENCOUNTER — OFFICE VISIT (OUTPATIENT)
Dept: CARDIOLOGY | Facility: CLINIC | Age: 59
End: 2019-04-02
Payer: COMMERCIAL

## 2019-04-02 ENCOUNTER — TRANSFERRED RECORDS (OUTPATIENT)
Dept: HEALTH INFORMATION MANAGEMENT | Facility: CLINIC | Age: 59
End: 2019-04-02

## 2019-04-02 VITALS
SYSTOLIC BLOOD PRESSURE: 114 MMHG | OXYGEN SATURATION: 96 % | WEIGHT: 276 LBS | BODY MASS INDEX: 40.88 KG/M2 | HEART RATE: 60 BPM | DIASTOLIC BLOOD PRESSURE: 64 MMHG | HEIGHT: 69 IN

## 2019-04-02 DIAGNOSIS — I50.9 ACUTE ON CHRONIC CONGESTIVE HEART FAILURE, UNSPECIFIED HEART FAILURE TYPE (H): ICD-10-CM

## 2019-04-02 DIAGNOSIS — I27.20 PULMONARY HYPERTENSION (H): Primary | ICD-10-CM

## 2019-04-02 DIAGNOSIS — I50.30 (HFPEF) HEART FAILURE WITH PRESERVED EJECTION FRACTION (H): Primary | ICD-10-CM

## 2019-04-02 DIAGNOSIS — I89.0 LYMPHEDEMA OF BOTH LOWER EXTREMITIES: ICD-10-CM

## 2019-04-02 DIAGNOSIS — I50.30 (HFPEF) HEART FAILURE WITH PRESERVED EJECTION FRACTION (H): ICD-10-CM

## 2019-04-02 PROCEDURE — 99214 OFFICE O/P EST MOD 30 MIN: CPT | Performed by: PHYSICIAN ASSISTANT

## 2019-04-02 RX ORDER — BUMETANIDE 1 MG/1
TABLET ORAL
Qty: 180 TABLET | Refills: 3 | Status: SHIPPED | OUTPATIENT
Start: 2019-04-02 | End: 2019-04-18 | Stop reason: DRUGHIGH

## 2019-04-02 ASSESSMENT — MIFFLIN-ST. JEOR: SCORE: 2057.31

## 2019-04-02 NOTE — PROGRESS NOTES
Pt agreed to OV w/ Rachna More PAC today ~noon. He sees renal at 1300.     We do have a scale for him!     Karen Stark RN BSN   10:15 AM 04/02/19

## 2019-04-02 NOTE — LETTER
2019      Sam Villatoro PA-C  [facility] [address]  [facility] [place] [address]      RE: Gil Chowdary       Dear Colleague,    I had the pleasure of seeing Gil Chowdary in the [facility].    Service Date: 2019      PRIMARY CARDIOLOGIST:  Dr. Jeremie Mckeon.      REASON FOR VISIT:  Heart failure followup, hospital followup.      HISTORY OF PRESENT ILLNESS:  Mr. Chowdary is a delightful 58-year-old gentleman with past medical history significant for the followin.  Severe aortic stenosis with aortic valve replacement in 2017 with a 25 mm Trifecta tissue valve.   2.  Type 2 diabetes, on insulin.   3.  Hyperlipidemia.   4.  Treated sleep apnea.   5.  Cor pulmonale versus heart failure with preserved EF.   6.  Iron deficiency anemia followed by abbi Vasquez.   7.  History of supraventricular tachycardia and paroxysmal AFib postoperatively, none on recent ZIO Patch.   8.  History of myectomy at the time of AVR, but no clear HOCM.      Mr. Chowdary was admitted in 10/2018 with weight gain and shortness of breath and was found to be significantly anemic with hemoglobins as low as 7.4.  He also had developed significant heart failure.  He peaked at about 325 pounds and left early as he needed to go home and take care of his elderly mother.  Fortunately, we did fairly well as an outpatient.  We slowly diuresed him about 10 pounds a week with [facility] and metolazone.  He then did fairly well, but unfortunately ended up in a rough spot.  He fell off his dietary changes and very quickly went back from a weight of around 280 to 302.  He was seen urgently by Dr. Ashley and admitted to [facility].  There, he diuresed from 310 pounds to 276 in about 5 days.  He also underwent a right heart cath during that hospitalization that showed a wedge of 17 and an LVEDP of between 15 and 23 and no constriction.  He was sent home on Bumex and his ongoing  eplerenone, but no hydrochlorothiazide.  He comes in today for followup.  He was initially supposed to follow up last week, but that visit had to be changed due to our scheduling issue.  Today, he says from a breathing standpoint he feels great.  His weight is low.  He is moving well, but he feels dehydrated.  His mouth is dry.  His skin is dry and flaky.  His skin is tenting on his hands.  He has lots of aches and charley horses and feels extremely thirsty.  This is making it difficult for him to stay to a 1500 to 1000 mL fluid restriction.  That being said, he is taking his pills as scheduled.  Outside of feeling dehydrated, he actually feels fantastic.  He still has some peripheral edema, but it slowly seems to be improving.  His weight has dropped from 277 on discharge down to 271 at home today.  Since the hospitalization, he has been seen by Psychiatry.  They did what appears to be some genetic testing on him and have recommended a medication for depression.  He is not sure if he wants to start that or not.      SOCIAL HISTORY:  He has previously lived at home with his mom and he took care of her.  She is very elderly and has dementia.  Sister and brother-in-law are staying from [place] to help take care of her.  He is a former smoker, a 20-pack-year history, quit in 2011.  No alcohol use.      PHYSICAL EXAMINATION:   GENERAL:  Well-developed, well-nourished, obese gentleman in no acute distress.   HEENT:  Normocephalic, atraumatic.  He appears well today.   HEART:  Regular with a quiet 2/6 systolic murmur heard best at the left sternal border.   LUNGS:  Clear.  No wheezes, rales or rhonchi.   EXTREMITIES:  With lymphedema bilateral shins, but improved from previous.  Very small amount of thigh edema.   ABDOMEN:  Soft and smaller than previous.      Right heart cath shows a mean RA of 10, RV of 38/4, PA of 31/10/20, a wedge of 17.  Left ventricular pressure of 108/1 with an LVEDP of 15.      ASSESSMENT AND PLAN:    1.  Cor pulmonale versus heart failure with preserved EF without constriction on his recent right heart cath.  Unclear why he requires so much diuretic.  I am hopeful now that he is more euvolemic.  We will observe his p.o. medications better.  That being said, he still has morbid obesity and treated sleep apnea, which may make him somewhat resistant.  At this point, he sounds dehydrated.  I am ordering him to very gently cut back his diuretics.  He will remain on 3 mg of Bumex in the morning, but will decrease afternoon to 2 mg.  He will remain on eplerenone 50 mg b.i.d.  He will need a cardiac MRI to rule out other causes of restriction as well as evaluate for HOCM.  This can be arranged after the next visit.  Additionally, he would be a MEMS candidate.  We have yet to have him be able to weigh daily at home.  Because of that, he was given a donated scale today and we will enroll him in My Health Tracker with daily weights.  If we can establish with this, he would be a reasonable CardioMEMS candidate, would have to be able to tolerate Plavix and aspirin for 3 days.   2.  Anemia and thrombocytopenia, followed by Dr. Olivares on iron with good improvement in hemoglobin and platelets still low at 95, but also improving.   3.  Aortic valve replacement with well functioning bioprosthetic valve and history of septal myectomy for septal hypertrophy without LVOT.  Needs to remain on aspirin for this.   4.  Sleep apnea.  Needs to be re-seen and treated as his mass may need adjustments.   5.  Dyslipidemia with minimal nonocclusive disease on his left heart cath in 2017 pre-AVR, but he does have 50% RCA lesion.  We will continue on this.  Well controlled.   6.  History of supraventricular tachycardia and paroxysmal AFib postop, none recently.  Will not continue on Coumadin, especially as his AFib was postoperative, so of course he has risk factors for this.  May need to restart down the road.   7.  Recent visit with  Psychiatry with concern for depression.  I did ask that he follow his psychiatrist's  recommendations and start the medication as I am hopeful it will be helpful for him.      Thank you for allowing me to participate in this patient's care.         BRETT CAO PA-C             D: 2019   T: 2019   MT: SHIRA      Name:     ABIGAIL MATOS   MRN:      [id number]        Account:      [id number]   :      1960           Service Date: 2019      Document: [id number]         Outpatient Encounter Medications as of 2019   Medication Sig Dispense Refill     aspirin 81 MG tablet Take 81 mg by mouth every morning        atorvastatin (LIPITOR) 40 MG tablet Take 1 tablet (40 mg) by mouth daily 90 tablet 0     bumetanide (BUMEX) 1 MG tablet Take 3 pills in the morning and 2 pills in the afternoon 180 tablet 3     donepezil (ARICEPT) 5 MG tablet Take 5 mg by mouth At Bedtime       eplerenone (INSPRA) 50 MG tablet Take 1 tablet (50 mg) by mouth 2 times daily 60 tablet 11     ferrous sulfate (IRON) 325 (65 Fe) MG tablet Take 1 tablet (325 mg) by mouth 2 times daily 100 tablet 3     insulin aspart (NOVOLOG FLEXPEN) 100 UNIT/ML injection Inject 3 Units Subcutaneous daily (with dinner) Takes if blood glucose > 150       insulin aspart (NOVOLOG FLEXPEN) 100 UNIT/ML injection Inject 2 Units Subcutaneous 2 times daily (with meals) Takes with breakfast and lunch if blood glucose > 150       magnesium oxide (MAG-OX) 400 MG tablet Take 1 tablet (400 mg) by mouth daily 90 tablet 3     metoprolol tartrate (LOPRESSOR) 50 MG tablet Take 0.5 tablets (25 mg) by mouth 2 times daily 60 tablet 0     Multiple Vitamins-Minerals (CENTRUM ADULTS PO) Take 1 tablet by mouth every morning        pantoprazole (PROTONIX) 40 MG EC tablet Take 40 mg by mouth every morning (before breakfast)       potassium chloride ER (K-DUR/KLOR-CON M) 10 MEQ CR tablet Take 30 mEq by mouth 3 times daily        prednisoLONE acetate (PRED  FORTE) 1 % ophthalmic susp Place 1 drop into both eyes as needed (eye pain)        timolol (TIMOPTIC) 0.5 % ophthalmic solution Place 1 drop into both eyes 2 times daily        clindamycin (CLEOCIN) 300 MG capsule Take 1 capsule (300 mg) by mouth as needed (Patient not taking: Reported on 3/15/2019) 10 capsule 3     insulin glargine (BASAGLAR KWIKPEN) 100 UNIT/ML pen Inject 10 Units Subcutaneous every morning       [DISCONTINUED] bumetanide (BUMEX) 1 MG tablet Take 3 tablets (3 mg) by mouth 2 times daily 180 tablet 3     No facility-administered encounter medications on file as of 4/2/2019.        Again, thank you for allowing me to participate in the care of your patient.      Sincerely,    Rachna Holt PA-C     [facility]

## 2019-04-02 NOTE — LETTER
[address]  [place] [address]  Phone: [phone]  Fax: [phone]       Gil Chowdary  [address] [place] [id number]      April 2, 2019       Dear Gil Chowdary,  Welcome to the [employer] Home Monitoring Program!  Per our discussion, below is a summary of our conversation today.  Enclosed, you will find the attached instructions on how to access, complete, and submit your daily surveys, as well as detailed instructions for accurate daily weight recording (please refer to these instructions to assure you are measuring accurate and true weights).  Please keep a copy of this information for your records; you may also want to keep a copy of the attached instructions near your phone or computer, for reference, should you need them.        General Program Information:  - Please complete your survey daily, between 6:00 am and 12:00 pm (noon).   - You may complete your survey via touch-tone phone or by logging into your [employer] account (please see attached instructions for either option).   - After submitting your survey, a Registered Nurse (RN) from your care team will review your responses and contact you for any necessary follow up needed, based on survey entries.  Please note, any surveys entered on weekends or holidays will be reviewed the next business day.   - Should you miss 3 consecutive daily surveys, you will receive a reminder to complete your survey message, sent to your email.  - For general questions about your survey, please call [phone].  - Vacations: If you will be out of town and will miss your surveys, please notify the RN from your care team by calling [phone], and a hold will be placed until your return.  - If you are experiencing symptoms, please call your Cardiologist ([facility] at [phone]).      Again, we want to welcome you to the program and look forward to assisting you with your  genet Kowalski RN          Instructions for Accurate Daily Weight Recording:  - You will need a scale at home  - Weigh yourself every day, first thing in the morning.  - Weigh after going to the bathroom and before getting dressed.   - Write down your weight in your daily weight log, and enter this weight in your MyHealth Tracker survey.  - Call your doctor if you gain 2 pounds a day for 2 days in a row, or 5 pounds in 1 week.     Daily Survey Instructions (Touch-Tone Phone):  - Call [phone] to access the survey.   - Follow the prompts in answering the six questions.  Please note, if you call from a number that is not on file, you may be asked for your phone number and/or date of birth followed by the # sign, to verify your identity.   - When prompted, enter your weight - as measured this morning - using the numbers on your phone, followed by the # sign.   - Answer the remaining 5 survey questions, using 1 for Yes or 2 for No, for each question.     Daily Survey Instructions ([employer]):  - Login to your [employer] Account at [email or web address]  - From the  Health  dropdown menu, select  Track My Health    - On the  Track My Health  screen, click on 'My CHF Diary'   - Click 'Add New Data' button (located directly below the 'My CHF Diary' header)  - Click on the 'Now' button, to automatically pull in today's date and current time in your survey (if necessary, please change the time to reflect the exact time you checked your weight)    - Record your daily weight (please refer to the  Instructions for Accurate Daily Weight Recording , above) and answer the survey questions.  - Click the 'Continue' button and review your answers for accuracy (please note, once you submit your survey, your answers cannot be changed)   - Click the 'Submit' button to save and submit your survey

## 2019-04-02 NOTE — PROGRESS NOTES
CORE MA reported to me that pt's OV w/ Dr. Mckeon was orphaned on 3/29, and, unfortunately, not rescheduled. Previously arranged CORE follow-up had been made for 4/15 w/ Rachna Holt PAC, however, would prefer that pt has sooner follow-up, as he is recently discharged from hospital and high risk for readmission and has had issues with noncompliance, in addn to scale ?not working.     There is an opening this afternoon w/ Rachna FRANCO, which I held. Called pt and LVM w/ appt details and requested he call me back to discuss. Sent him a Ditto msg to that effect, as well.     Karen Stark RN BSN   8:36 AM 04/02/19

## 2019-04-02 NOTE — LETTER
4/2/2019    Sam Villatoro PA-C  [facility] [address]  [facility] [address]    RE: Gil Chowdary       Dear Colleague,    I had the pleasure of seeing Gil Chowdary in the [facility].    [id number]  HPI and Plan:   See dictation    Orders this Visit:  Orders Placed This Encounter   Procedures     [facility] CHF Flowsheet     Basic metabolic panel     PULMONARY REHAB REFERRAL     Follow-Up with [facility] - KARYN visit     Orders Placed This Encounter   Medications     bumetanide (BUMEX) 1 MG tablet     Sig: Take 3 pills in the morning and 2 pills in the afternoon     Dispense:  180 tablet     Refill:  3     Medications Discontinued During This Encounter   Medication Reason     bumetanide (BUMEX) 1 MG tablet          Encounter Diagnoses   Name Primary?     Pulmonary hypertension (H) Yes     (HFpEF) heart failure with preserved ejection fraction (H)        CURRENT MEDICATIONS:  Current Outpatient Medications   Medication Sig Dispense Refill     aspirin 81 MG tablet Take 81 mg by mouth every morning        atorvastatin (LIPITOR) 40 MG tablet Take 1 tablet (40 mg) by mouth daily 90 tablet 0     bumetanide (BUMEX) 1 MG tablet Take 3 pills in the morning and 2 pills in the afternoon 180 tablet 3     donepezil (ARICEPT) 5 MG tablet Take 5 mg by mouth At Bedtime       eplerenone (INSPRA) 50 MG tablet Take 1 tablet (50 mg) by mouth 2 times daily 60 tablet 11     ferrous sulfate (IRON) 325 (65 Fe) MG tablet Take 1 tablet (325 mg) by mouth 2 times daily 100 tablet 3     insulin aspart (NOVOLOG FLEXPEN) 100 UNIT/ML injection Inject 3 Units Subcutaneous daily (with dinner) Takes if blood glucose > 150       insulin aspart (NOVOLOG FLEXPEN) 100 UNIT/ML injection Inject 2 Units Subcutaneous 2 times daily (with meals) Takes with breakfast and lunch if blood glucose > 150       magnesium oxide (MAG-OX) 400 MG tablet Take 1 tablet (400 mg) by mouth daily 90 tablet 3      metoprolol tartrate (LOPRESSOR) 50 MG tablet Take 0.5 tablets (25 mg) by mouth 2 times daily 60 tablet 0     Multiple Vitamins-Minerals (CENTRUM ADULTS PO) Take 1 tablet by mouth every morning        pantoprazole (PROTONIX) 40 MG EC tablet Take 40 mg by mouth every morning (before breakfast)       potassium chloride ER (K-DUR/KLOR-CON M) 10 MEQ CR tablet Take 30 mEq by mouth 3 times daily        prednisoLONE acetate (PRED FORTE) 1 % ophthalmic susp Place 1 drop into both eyes as needed (eye pain)        timolol (TIMOPTIC) 0.5 % ophthalmic solution Place 1 drop into both eyes 2 times daily        clindamycin (CLEOCIN) 300 MG capsule Take 1 capsule (300 mg) by mouth as needed (Patient not taking: Reported on 3/15/2019) 10 capsule 3     insulin glargine (BASAGLAR KWIKPEN) 100 UNIT/ML pen Inject 10 Units Subcutaneous every morning         ALLERGIES     Allergies   Allergen Reactions     Amoxicillin      Itchy      Penicillins Hives     Spironolactone Rash       PAST MEDICAL HISTORY:  Past Medical History:   Diagnosis Date     Former tobacco use      Glaucoma      Hyperlipidemia LDL goal <160 12/6/2011     Obesity      DRAKE on CPAP      Right bundle branch block      Severe aortic stenosis     On Echo 10/28/16       PAST SURGICAL HISTORY:  Past Surgical History:   Procedure Laterality Date     CV HEART CATHETERIZATION WITH POSSIBLE INTERVENTION N/A 3/19/2019    Procedure: RHC and LHC (No angiogram);  Surgeon: Jai Romo MD;  Location:  HEART CARDIAC CATH LAB     CV RIGHT HEART CATH N/A 3/19/2019    Procedure: Right Heart Cath;  Surgeon: Jai Romo MD;  Location:  HEART CARDIAC CATH LAB     HEART CATH LEFT HEART CATH  04/07/2017    Diffuse non-obstuctive CAD     REPAIR VALVE AORTIC N/A 5/16/2017    Procedure: REPAIR VALVE AORTIC;  Median Sternotony, CardioPulmonary Bypass, Aortic Valve Replace using 25MM Trifecta Valve with [employer], Septal myectomy;  Surgeon: Jun Simon MD;   Location: [facility] OR     REPLACE VALVE AORTIC N/A 2017    Procedure: REPLACE VALVE AORTIC;;  Surgeon: Jun Simon MD;  Location: [facility] OR     SINUS SURGERY         FAMILY HISTORY:  Family History   Problem Relation Age of Onset     Family History Negative Mother      Diabetes Father      Heart Surgery Father         CABG     Coronary Artery Disease Father      Hypertension Brother      Diabetes Brother      Heart Disease Brother      Heart Surgery Brother         CABG x 3     Family History Negative Sister      Diabetes Brother         History of diabetes, but no longer an issue     Family History Negative Brother        SOCIAL HISTORY:  Social History     Socioeconomic History     Marital status:      Spouse name: Not on file     Number of children: Not on file     Years of education: Not on file     Highest education level: Not on file   Occupational History     Not on file   Social Needs     Financial resource strain: Not on file     Food insecurity:     Worry: Not on file     Inability: Not on file     Transportation needs:     Medical: Not on file     Non-medical: Not on file   Tobacco Use     Smoking status: Former Smoker     Packs/day: 1.00     Years: 20.00     Pack years: 20.00     Types: Cigarettes, Cigars     Start date:      Last attempt to quit: 10/21/2011     Years since quittin.4     Smokeless tobacco: Never Used   Substance and Sexual Activity     Alcohol use: No     Drug use: No     Sexual activity: Not on file   Lifestyle     Physical activity:     Days per week: Not on file     Minutes per session: Not on file     Stress: Not on file   Relationships     Social connections:     Talks on phone: Not on file     Gets together: Not on file     Attends Voodoo service: Not on file     Active member of club or organization: Not on file     Attends meetings of clubs or organizations: Not on file     Relationship status: Not on file     Intimate partner violence:     Fear of  "current or ex partner: Not on file     Emotionally abused: Not on file     Physically abused: Not on file     Forced sexual activity: Not on file   Other Topics Concern     Parent/sibling w/ CABG, MI or angioplasty before 65F 55M? Yes     Comment: brother triple bypass 49   Social History Narrative     Not on file       Review of Systems:  Skin:  Negative     Eyes:  Positive for glasses;glaucoma  ENT:  Negative    Respiratory:  Positive for sleep apnea;CPAP  Cardiovascular:    edema;fatigue  Gastroenterology: Positive for heartburn  Genitourinary:  Negative    Musculoskeletal:  Positive for arthritis;joint pain  Neurologic:  Positive for memory problems  Psychiatric:  Positive for excessive stress;anxiety;depression  Heme/Lymph/Imm:  Negative allergies  Endocrine:  Positive for diabetes    Physical Exam:  Vitals: /64   Pulse 60   Ht 1.753 m (5' 9\")   Wt 125.2 kg (276 lb)   SpO2 96%   BMI 40.76 kg/m      Please refer to dictation for physical exam    Recent Lab Results:  LIPID RESULTS:  Lab Results   Component Value Date    CHOL 126 10/10/2018    HDL 23 (A) 10/10/2018    LDL 71 10/10/2018    TRIG 230 (A) 10/10/2018       LIVER ENZYME RESULTS:  Lab Results   Component Value Date    AST 88 (H) 03/18/2019    ALT 44 03/18/2019       CBC RESULTS:  Lab Results   Component Value Date    WBC 4.2 03/25/2019    RBC 3.36 (L) 03/25/2019    HGB 10.6 (L) 03/25/2019    HCT 33.3 (L) 03/25/2019    MCV 99 03/25/2019    MCH 31.5 03/25/2019    MCHC 31.8 03/25/2019    RDW 16.5 (H) 03/25/2019    PLT 95 (L) 03/25/2019       BMP RESULTS:  Lab Results   Component Value Date     03/25/2019    POTASSIUM 3.8 03/25/2019    CHLORIDE 102 03/25/2019    CO2 26 03/25/2019    ANIONGAP 12.8 03/25/2019    GLC 85 03/25/2019    BUN 21 03/25/2019    CR 1.56 (H) 03/25/2019    GFRESTIMATED 46 (L) 03/25/2019    GFRESTBLACK 55 (L) 03/25/2019    MYRON 9.0 03/25/2019        A1C RESULTS:  Lab Results   Component Value Date    A1C 5.4 03/16/2019 "       INR RESULTS:  Lab Results   Component Value Date    INR 1.28 (H) 03/15/2019    INR 2.19 (H) 10/14/2018           CC  Rachna Holt PA-C  [address]  [place] [address]        Service Date: 2019      PRIMARY CARDIOLOGIST:  Dr. Jeremie Mckeon.      REASON FOR VISIT:  Heart failure followup, hospital followup.      HISTORY OF PRESENT ILLNESS:  Mr. Chowdary is a delightful 58-year-old gentleman with past medical history significant for the followin.  Severe aortic stenosis with aortic valve replacement in 2017 with a 25 mm Trifecta tissue valve.   2.  Type 2 diabetes, on insulin.   3.  Hyperlipidemia.   4.  Treated sleep apnea.   5.  Cor pulmonale versus heart failure with preserved EF.   6.  Iron deficiency anemia followed by abbi Vasquez.   7.  History of supraventricular tachycardia and paroxysmal AFib postoperatively, none on recent ZIO Patch.   8.  History of myectomy at the time of AVR, but no clear HOCM.      Mr. Chowdary was admitted in 10/2018 with weight gain and shortness of breath and was found to be significantly anemic with hemoglobins as low as 7.4.  He also had developed significant heart failure.  He peaked at about 325 pounds and left early as he needed to go home and take care of his elderly mother.  Fortunately, we did fairly well as an outpatient.  We slowly diuresed him about 10 pounds a week with [facility] and metolazone.  He then did fairly well, but unfortunately ended up in a rough spot.  He fell off his dietary changes and very quickly went back from a weight of around 280 to 302.  He was seen urgently by Dr. Ashley and admitted to [facility].  There, he diuresed from 310 pounds to 276 in about 5 days.  He also underwent a right heart cath during that hospitalization that showed a wedge of 17 and an LVEDP of between 15 and 23 and no constriction.  He was sent home on Bumex and his ongoing eplerenone, but no hydrochlorothiazide.  He comes in today for  Discharged followup.  He was initially supposed to follow up last week, but that visit had to be changed due to our scheduling issue.  Today, he says from a breathing standpoint he feels great.  His weight is low.  He is moving well, but he feels dehydrated.  His mouth is dry.  His skin is dry and flaky.  His skin is tenting on his hands.  He has lots of aches and charley horses and feels extremely thirsty.  This is making it difficult for him to stay to a 1500 to 1000 mL fluid restriction.  That being said, he is taking his pills as scheduled.  Outside of feeling dehydrated, he actually feels fantastic.  He still has some peripheral edema, but it slowly seems to be improving.  His weight has dropped from 277 on discharge down to 271 at home today.  Since the hospitalization, he has been seen by Psychiatry.  They did what appears to be some genetic testing on him and have recommended a medication for depression.  He is not sure if he wants to start that or not.      SOCIAL HISTORY:  He has previously lived at home with his mom and he took care of her.  She is very elderly and has dementia.  Sister and brother-in-law are staying from [place] to help take care of her.  He is a former smoker, a 20-pack-year history, quit in 2011.  No alcohol use.      PHYSICAL EXAMINATION:   GENERAL:  Well-developed, well-nourished, obese gentleman in no acute distress.   HEENT:  Normocephalic, atraumatic.  He appears well today.   HEART:  Regular with a quiet 2/6 systolic murmur heard best at the left sternal border.   LUNGS:  Clear.  No wheezes, rales or rhonchi.   EXTREMITIES:  With lymphedema bilateral shins, but improved from previous.  Very small amount of thigh edema.   ABDOMEN:  Soft and smaller than previous.      Right heart cath shows a mean RA of 10, RV of 38/4, PA of 31/10/20, a wedge of 17.  Left ventricular pressure of 108/1 with an LVEDP of 15.      ASSESSMENT AND PLAN:   1.  Cor pulmonale versus heart failure with preserved EF  without constriction on his recent right heart cath.  Unclear why he requires so much diuretic.  I am hopeful now that he is more euvolemic.  We will observe his p.o. medications better.  That being said, he still has morbid obesity and treated sleep apnea, which may make him somewhat resistant.  At this point, he sounds dehydrated.  I am ordering him to very gently cut back his diuretics.  He will remain on 3 mg of Bumex in the morning, but will decrease afternoon to 2 mg.  He will remain on eplerenone 50 mg b.i.d.  He will need a cardiac MRI to rule out other causes of restriction as well as evaluate for HOCM.  This can be arranged after the next visit.  Additionally, he would be a MEMS candidate.  We have yet to have him be able to weigh daily at home.  Because of that, he was given a donated scale today and we will enroll him in [facility] [employer] Tracker with daily weights.  If we can establish with this, he would be a reasonable CardioMEMS candidate, would have to be able to tolerate Plavix and aspirin for 3 days.   2.  Anemia and thrombocytopenia, followed by Dr. Olivares on iron with good improvement in hemoglobin and platelets still low at 95, but also improving.   3.  Aortic valve replacement with well functioning bioprosthetic valve and history of septal myectomy for septal hypertrophy without LVOT.  Needs to remain on aspirin for this.   4.  Sleep apnea.  Needs to be re-seen and treated as his mass may need adjustments.   5.  Dyslipidemia with minimal nonocclusive disease on his left heart cath in 2017 pre-AVR, but he does have 50% RCA lesion.  We will continue on this.  Well controlled.   6.  History of supraventricular tachycardia and paroxysmal AFib postop, none recently.  Will not continue on Coumadin, especially as his AFib was postoperative, so of course he has risk factors for this.  May need to restart down the road.   7.  Recent visit with Psychiatry with concern for depression.  I did ask that he follow  his psychiatrist's  recommendations and start the medication as I am hopeful it will be helpful for him.      Thank you for allowing me to participate in this patient's care.         RACHNA CAO PA-C             D: 2019   T: 2019   MT: SHIRA      Name:     ABIGAIL MATOS   MRN:      [id number]        Account:      [id number]   :      1960           Service Date: 2019      Document: [id number]         Thank you for allowing me to participate in the care of your patient.      Sincerely,     Rachna Cao PA-C     [facility]    cc:   Rachna Cao PA-C  [address]  [place] [id number]

## 2019-04-02 NOTE — PROGRESS NOTES
Service Date: 2019      PRIMARY CARDIOLOGIST:  Dr. Jeremie Mckeon.      REASON FOR VISIT:  Heart failure followup, hospital followup.      HISTORY OF PRESENT ILLNESS:  Mr. Chowdary is a delightful 58-year-old gentleman with past medical history significant for the followin.  Severe aortic stenosis with aortic valve replacement in 2017 with a 25 mm Trifecta tissue valve.   2.  Type 2 diabetes, on insulin.   3.  Hyperlipidemia.   4.  Treated sleep apnea.   5.  Cor pulmonale versus heart failure with preserved EF.   6.  Iron deficiency anemia followed by abbi Vasquez.   7.  History of supraventricular tachycardia and paroxysmal AFib postoperatively, none on recent ZIO Patch.   8.  History of myectomy at the time of AVR, but no clear HOCM.      Mr. Chowdary was admitted in 10/2018 with weight gain and shortness of breath and was found to be significantly anemic with hemoglobins as low as 7.4.  He also had developed significant heart failure.  He peaked at about 325 pounds and left early as he needed to go home and take care of his elderly mother.  Fortunately, we did fairly well as an outpatient.  We slowly diuresed him about 10 pounds a week with [facility] and metolazone.  He then did fairly well, but unfortunately ended up in a rough spot.  He fell off his dietary changes and very quickly went back from a weight of around 280 to 302.  He was seen urgently by Dr. Ashley and admitted to [facility].  There, he diuresed from 310 pounds to 276 in about 5 days.  He also underwent a right heart cath during that hospitalization that showed a wedge of 17 and an LVEDP of between 15 and 23 and no constriction.  He was sent home on Bumex and his ongoing eplerenone, but no hydrochlorothiazide.  He comes in today for followup.  He was initially supposed to follow up last week, but that visit had to be changed due to our scheduling issue.  Today, he says from a breathing standpoint he feels great.  His weight is  low.  He is moving well, but he feels dehydrated.  His mouth is dry.  His skin is dry and flaky.  His skin is tenting on his hands.  He has lots of aches and charley horses and feels extremely thirsty.  This is making it difficult for him to stay to a 1500 to 1000 mL fluid restriction.  That being said, he is taking his pills as scheduled.  Outside of feeling dehydrated, he actually feels fantastic.  He still has some peripheral edema, but it slowly seems to be improving.  His weight has dropped from 277 on discharge down to 271 at home today.  Since the hospitalization, he has been seen by Psychiatry.  They did what appears to be some genetic testing on him and have recommended a medication for depression.  He is not sure if he wants to start that or not.      SOCIAL HISTORY:  He has previously lived at home with his mom and he took care of her.  She is very elderly and has dementia.  Sister and brother-in-law are staying from [place] to help take care of her.  He is a former smoker, a 20-pack-year history, quit in 2011.  No alcohol use.      PHYSICAL EXAMINATION:   GENERAL:  Well-developed, well-nourished, obese gentleman in no acute distress.   HEENT:  Normocephalic, atraumatic.  He appears well today.   HEART:  Regular with a quiet 2/6 systolic murmur heard best at the left sternal border.   LUNGS:  Clear.  No wheezes, rales or rhonchi.   EXTREMITIES:  With lymphedema bilateral shins, but improved from previous.  Very small amount of thigh edema.   ABDOMEN:  Soft and smaller than previous.      Right heart cath shows a mean RA of 10, RV of 38/4, PA of 31/10/20, a wedge of 17.  Left ventricular pressure of 108/1 with an LVEDP of 15.      ASSESSMENT AND PLAN:   1.  Cor pulmonale versus heart failure with preserved EF without constriction on his recent right heart cath.  Unclear why he requires so much diuretic.  I am hopeful now that he is more euvolemic.  We will observe his p.o. medications better.  That being  said, he still has morbid obesity and treated sleep apnea, which may make him somewhat resistant.  At this point, he sounds dehydrated.  I am ordering him to very gently cut back his diuretics.  He will remain on 3 mg of Bumex in the morning, but will decrease afternoon to 2 mg.  He will remain on eplerenone 50 mg b.i.d.  He will need a cardiac MRI to rule out other causes of restriction as well as evaluate for HOCM.  This can be arranged after the next visit.  Additionally, he would be a MEMS candidate.  We have yet to have him be able to weigh daily at home.  Because of that, he was given a donated scale today and we will enroll him in My Health Tracker with daily weights.  If we can establish with this, he would be a reasonable CardioMEMS candidate, would have to be able to tolerate Plavix and aspirin for 3 days.   2.  Anemia and thrombocytopenia, followed by Dr. Olivares on iron with good improvement in hemoglobin and platelets still low at 95, but also improving.   3.  Aortic valve replacement with well functioning bioprosthetic valve and history of septal myectomy for septal hypertrophy without LVOT.  Needs to remain on aspirin for this.   4.  Sleep apnea.  Needs to be re-seen and treated as his mass may need adjustments.   5.  Dyslipidemia with minimal nonocclusive disease on his left heart cath in 2017 pre-AVR, but he does have 50% RCA lesion.  We will continue on this.  Well controlled.   6.  History of supraventricular tachycardia and paroxysmal AFib postop, none recently.  Will not continue on Coumadin, especially as his AFib was postoperative, so of course he has risk factors for this.  May need to restart down the road.   7.  Recent visit with Psychiatry with concern for depression.  I did ask that he follow his psychiatrist's  recommendations and start the medication as I am hopeful it will be helpful for him.      Thank you for allowing me to participate in this patient's care.         BRETT CHIU  SIRENA CAO             D: 2019   T: 2019   MT: SHIRA      Name:     ABIGAIL MATOS   MRN:      [id number]        Account:      [id number]   :      1960           Service Date: 2019      Document: [id number]

## 2019-04-02 NOTE — NURSING NOTE
The After Visit Summary was reviewed with the patient following their office visit with JOAN Saucedo. Patient was educated about any medication changes, the importance of following a low sodium diet, importance of recording daily weights, and when to call [facility]. Patient verbalized understanding of the information and agrees to call with any questions or concerns.     Labs: Lab results from today were reviewed with the patient during the office visit. A copy of the results were provided on the After Visit Summary.     Return appointment: Patient was given instructions on when and how to schedule their next office visit with the [facility]. Patient scheduled to see JOAN Saucedo on 4/22/19.    Medication changes: Decrease Bumex to 3mg in am and 2mg in pm    Pt enrolled in [employer] Tracker. Welcome letter and instructions reviewed with pt. He will start calling in tomorrow. Messaged RN board for 4/3 to watch for first call in. Wt parameters to be 270-275# per JOAN Saucedo.      Meghana Pappas RN  [facility] RN Care Coordinator  [facility]  [phone]

## 2019-04-02 NOTE — PROGRESS NOTES
[id number]  HPI and Plan:   See dictation    Orders this Visit:  Orders Placed This Encounter   Procedures     [facility]     Basic metabolic panel     PULMONARY REHAB REFERRAL     Follow-Up with [facility] - KARYN visit     Orders Placed This Encounter   Medications     bumetanide (BUMEX) 1 MG tablet     Sig: Take 3 pills in the morning and 2 pills in the afternoon     Dispense:  180 tablet     Refill:  3     Medications Discontinued During This Encounter   Medication Reason     bumetanide (BUMEX) 1 MG tablet          Encounter Diagnoses   Name Primary?     Pulmonary hypertension (H) Yes     (HFpEF) heart failure with preserved ejection fraction (H)        CURRENT MEDICATIONS:  Current Outpatient Medications   Medication Sig Dispense Refill     aspirin 81 MG tablet Take 81 mg by mouth every morning        atorvastatin (LIPITOR) 40 MG tablet Take 1 tablet (40 mg) by mouth daily 90 tablet 0     bumetanide (BUMEX) 1 MG tablet Take 3 pills in the morning and 2 pills in the afternoon 180 tablet 3     donepezil (ARICEPT) 5 MG tablet Take 5 mg by mouth At Bedtime       eplerenone (INSPRA) 50 MG tablet Take 1 tablet (50 mg) by mouth 2 times daily 60 tablet 11     ferrous sulfate (IRON) 325 (65 Fe) MG tablet Take 1 tablet (325 mg) by mouth 2 times daily 100 tablet 3     insulin aspart (NOVOLOG FLEXPEN) 100 UNIT/ML injection Inject 3 Units Subcutaneous daily (with dinner) Takes if blood glucose > 150       insulin aspart (NOVOLOG FLEXPEN) 100 UNIT/ML injection Inject 2 Units Subcutaneous 2 times daily (with meals) Takes with breakfast and lunch if blood glucose > 150       magnesium oxide (MAG-OX) 400 MG tablet Take 1 tablet (400 mg) by mouth daily 90 tablet 3     metoprolol tartrate (LOPRESSOR) 50 MG tablet Take 0.5 tablets (25 mg) by mouth 2 times daily 60 tablet 0     Multiple Vitamins-Minerals (CENTRUM ADULTS PO) Take 1 tablet by mouth every morning        pantoprazole (PROTONIX) 40 MG EC tablet Take 40 mg by  mouth every morning (before breakfast)       potassium chloride ER (K-DUR/KLOR-CON M) 10 MEQ CR tablet Take 30 mEq by mouth 3 times daily        prednisoLONE acetate (PRED FORTE) 1 % ophthalmic susp Place 1 drop into both eyes as needed (eye pain)        timolol (TIMOPTIC) 0.5 % ophthalmic solution Place 1 drop into both eyes 2 times daily        clindamycin (CLEOCIN) 300 MG capsule Take 1 capsule (300 mg) by mouth as needed (Patient not taking: Reported on 3/15/2019) 10 capsule 3     insulin glargine (BASAGLAR KWIKPEN) 100 UNIT/ML pen Inject 10 Units Subcutaneous every morning         ALLERGIES     Allergies   Allergen Reactions     Amoxicillin      Itchy      Penicillins Hives     Spironolactone Rash       PAST MEDICAL HISTORY:  Past Medical History:   Diagnosis Date     Former tobacco use      Glaucoma      Hyperlipidemia LDL goal <160 12/6/2011     Obesity      DRAKE on CPAP      Right bundle branch block      Severe aortic stenosis     On Echo 10/28/16       PAST SURGICAL HISTORY:  Past Surgical History:   Procedure Laterality Date     CV HEART CATHETERIZATION WITH POSSIBLE INTERVENTION N/A 3/19/2019    Procedure: RHC and LHC (No angiogram);  Surgeon: Jai Romo MD;  Location:  HEART CARDIAC CATH LAB     CV RIGHT HEART CATH N/A 3/19/2019    Procedure: Right Heart Cath;  Surgeon: Jai Romo MD;  Location:  HEART CARDIAC CATH LAB     HEART CATH LEFT HEART CATH  04/07/2017    Diffuse non-obstuctive CAD     REPAIR VALVE AORTIC N/A 5/16/2017    Procedure: REPAIR VALVE AORTIC;  Median Sternotony, CardioPulmonary Bypass, Aortic Valve Replace using 25MM Trifecta Valve with [employer], Septal myectomy;  Surgeon: Jun Simon MD;  Location: [facility] OR     REPLACE VALVE AORTIC N/A 5/16/2017    Procedure: REPLACE VALVE AORTIC;;  Surgeon: Jun Simon MD;  Location: [facility] OR     SINUS SURGERY  2005       FAMILY HISTORY:  Family History   Problem Relation Age of Onset     Family  History Negative Mother      Diabetes Father      Heart Surgery Father         CABG     Coronary Artery Disease Father      Hypertension Brother      Diabetes Brother      Heart Disease Brother      Heart Surgery Brother         CABG x 3     Family History Negative Sister      Diabetes Brother         History of diabetes, but no longer an issue     Family History Negative Brother        SOCIAL HISTORY:  Social History     Socioeconomic History     Marital status:      Spouse name: Not on file     Number of children: Not on file     Years of education: Not on file     Highest education level: Not on file   Occupational History     Not on file   Social Needs     Financial resource strain: Not on file     Food insecurity:     Worry: Not on file     Inability: Not on file     Transportation needs:     Medical: Not on file     Non-medical: Not on file   Tobacco Use     Smoking status: Former Smoker     Packs/day: 1.00     Years: 20.00     Pack years: 20.00     Types: Cigarettes, Cigars     Start date:      Last attempt to quit: 10/21/2011     Years since quittin.4     Smokeless tobacco: Never Used   Substance and Sexual Activity     Alcohol use: No     Drug use: No     Sexual activity: Not on file   Lifestyle     Physical activity:     Days per week: Not on file     Minutes per session: Not on file     Stress: Not on file   Relationships     Social connections:     Talks on phone: Not on file     Gets together: Not on file     Attends Caodaism service: Not on file     Active member of club or organization: Not on file     Attends meetings of clubs or organizations: Not on file     Relationship status: Not on file     Intimate partner violence:     Fear of current or ex partner: Not on file     Emotionally abused: Not on file     Physically abused: Not on file     Forced sexual activity: Not on file   Other Topics Concern     Parent/sibling w/ CABG, MI or angioplasty before 65F 55M? Yes     Comment:  "brother triple bypass 49   Social History Narrative     Not on file       Review of Systems:  Skin:  Negative     Eyes:  Positive for glasses;glaucoma  ENT:  Negative    Respiratory:  Positive for sleep apnea;CPAP  Cardiovascular:    edema;fatigue  Gastroenterology: Positive for heartburn  Genitourinary:  Negative    Musculoskeletal:  Positive for arthritis;joint pain  Neurologic:  Positive for memory problems  Psychiatric:  Positive for excessive stress;anxiety;depression  Heme/Lymph/Imm:  Negative allergies  Endocrine:  Positive for diabetes    Physical Exam:  Vitals: /64   Pulse 60   Ht 1.753 m (5' 9\")   Wt 125.2 kg (276 lb)   SpO2 96%   BMI 40.76 kg/m     Please refer to dictation for physical exam    Recent Lab Results:  LIPID RESULTS:  Lab Results   Component Value Date    CHOL 126 10/10/2018    HDL 23 (A) 10/10/2018    LDL 71 10/10/2018    TRIG 230 (A) 10/10/2018       LIVER ENZYME RESULTS:  Lab Results   Component Value Date    AST 88 (H) 03/18/2019    ALT 44 03/18/2019       CBC RESULTS:  Lab Results   Component Value Date    WBC 4.2 03/25/2019    RBC 3.36 (L) 03/25/2019    HGB 10.6 (L) 03/25/2019    HCT 33.3 (L) 03/25/2019    MCV 99 03/25/2019    MCH 31.5 03/25/2019    MCHC 31.8 03/25/2019    RDW 16.5 (H) 03/25/2019    PLT 95 (L) 03/25/2019       BMP RESULTS:  Lab Results   Component Value Date     03/25/2019    POTASSIUM 3.8 03/25/2019    CHLORIDE 102 03/25/2019    CO2 26 03/25/2019    ANIONGAP 12.8 03/25/2019    GLC 85 03/25/2019    BUN 21 03/25/2019    CR 1.56 (H) 03/25/2019    GFRESTIMATED 46 (L) 03/25/2019    GFRESTBLACK 55 (L) 03/25/2019    MYRON 9.0 03/25/2019        A1C RESULTS:  Lab Results   Component Value Date    A1C 5.4 03/16/2019       INR RESULTS:  Lab Results   Component Value Date    INR 1.28 (H) 03/15/2019    INR 2.19 (H) 10/14/2018           CC  Rachna Holt PAGerardoC  [id number]  [place] [id number]      "

## 2019-04-02 NOTE — PATIENT INSTRUCTIONS
Call [facility] nurse for any questions or concerns Mon-Fri 8am-4pm:                                                #[phone]                                       For concerns after hours:                                               #[phone]     1: Medication changes: decrease bumex to 3 mg (3 pills) in the and 2 mg (2 pills) in the afternoon.   Continue other medications.     2: Plan from today: follow up with me in about 3 weeks.    Please start pulmonary (cardiac) rehab.  They should call you within 2 days, if they don't please call them.

## 2019-04-03 ENCOUNTER — TELEPHONE (OUTPATIENT)
Dept: ONCOLOGY | Facility: CLINIC | Age: 59
End: 2019-04-03

## 2019-04-03 ENCOUNTER — DOCUMENTATION ONLY (OUTPATIENT)
Dept: CARDIOLOGY | Facility: CLINIC | Age: 59
End: 2019-04-03

## 2019-04-03 NOTE — PROGRESS NOTES
Pt is successfully completed first survey in My Health Tracker/HF telemanagement and is w/in range and w/o sx. Will cont to monitor.          Karen Stark RN BSN   10:02 AM 04/03/19

## 2019-04-05 ENCOUNTER — HOSPITAL ENCOUNTER (OUTPATIENT)
Facility: CLINIC | Age: 59
Setting detail: SPECIMEN
Discharge: HOME | End: 2019-04-05
Attending: INTERNAL MEDICINE | Admitting: INTERNAL MEDICINE
Payer: COMMERCIAL

## 2019-04-05 ENCOUNTER — INFUSION THERAPY VISIT (OUTPATIENT)
Dept: INFUSION THERAPY | Facility: CLINIC | Age: 59
End: 2019-04-05
Attending: INTERNAL MEDICINE
Payer: COMMERCIAL

## 2019-04-05 DIAGNOSIS — D50.8 OTHER IRON DEFICIENCY ANEMIA: ICD-10-CM

## 2019-04-05 LAB
BASOPHILS # BLD AUTO: 0 10E9/L (ref 0–0.2)
BASOPHILS NFR BLD AUTO: 0.2 %
DIFFERENTIAL METHOD BLD: ABNORMAL
EOSINOPHIL # BLD AUTO: 0.2 10E9/L (ref 0–0.7)
EOSINOPHIL NFR BLD AUTO: 4.2 %
ERYTHROCYTE [DISTWIDTH] IN BLOOD BY AUTOMATED COUNT: 15.2 % (ref 10–15)
FERRITIN SERPL-MCNC: 59 NG/ML (ref 26–388)
HCT VFR BLD AUTO: 35 % (ref 40–53)
HGB BLD-MCNC: 11.4 G/DL (ref 13.3–17.7)
IMM GRANULOCYTES # BLD: 0 10E9/L (ref 0–0.4)
IMM GRANULOCYTES NFR BLD: 0.2 %
IRON SATN MFR SERPL: 31 % (ref 15–46)
IRON SERPL-MCNC: 126 UG/DL (ref 35–180)
LYMPHOCYTES # BLD AUTO: 1.4 10E9/L (ref 0.8–5.3)
LYMPHOCYTES NFR BLD AUTO: 32.1 %
MCH RBC QN AUTO: 32.3 PG (ref 26.5–33)
MCHC RBC AUTO-ENTMCNC: 32.6 G/DL (ref 31.5–36.5)
MCV RBC AUTO: 99 FL (ref 78–100)
MONOCYTES # BLD AUTO: 0.5 10E9/L (ref 0–1.3)
MONOCYTES NFR BLD AUTO: 11.2 %
NEUTROPHILS # BLD AUTO: 2.2 10E9/L (ref 1.6–8.3)
NEUTROPHILS NFR BLD AUTO: 52.1 %
NRBC # BLD AUTO: 0 10*3/UL
NRBC BLD AUTO-RTO: 0 /100
PLATELET # BLD AUTO: 86 10E9/L (ref 150–450)
RBC # BLD AUTO: 3.53 10E12/L (ref 4.4–5.9)
RETICS # AUTO: 76.2 10E9/L (ref 25–95)
RETICS/RBC NFR AUTO: 2.2 % (ref 0.5–2)
TIBC SERPL-MCNC: 403 UG/DL (ref 240–430)
TRANSFERRIN SERPL-MCNC: 349 MG/DL (ref 210–360)
WBC # BLD AUTO: 4.3 10E9/L (ref 4–11)

## 2019-04-05 PROCEDURE — 82728 ASSAY OF FERRITIN: CPT | Performed by: INTERNAL MEDICINE

## 2019-04-05 PROCEDURE — 85045 AUTOMATED RETICULOCYTE COUNT: CPT | Performed by: INTERNAL MEDICINE

## 2019-04-05 PROCEDURE — 36415 COLL VENOUS BLD VENIPUNCTURE: CPT

## 2019-04-05 PROCEDURE — 83540 ASSAY OF IRON: CPT | Performed by: INTERNAL MEDICINE

## 2019-04-05 PROCEDURE — 85025 COMPLETE CBC W/AUTO DIFF WBC: CPT | Performed by: INTERNAL MEDICINE

## 2019-04-05 PROCEDURE — 84466 ASSAY OF TRANSFERRIN: CPT | Performed by: INTERNAL MEDICINE

## 2019-04-05 PROCEDURE — 83550 IRON BINDING TEST: CPT | Performed by: INTERNAL MEDICINE

## 2019-04-05 NOTE — PROGRESS NOTES
Medical Assistant Note:  Gil Chowdary presents today for blood draw.    Patient seen by provider today: No.   present during visit today: Not Applicable.    Concerns: No Concerns.    Procedure:  Lab draw site: Left Hand, Needle type: BF, Gauge: 23g with gauze and coban.    Post Assessment:  Labs drawn without difficulty: Yes.    Discharge Plan:  Departure Mode: Ambulatory.    Face to Face Time: 5.    Katelyn Garner

## 2019-04-08 DIAGNOSIS — I50.30 (HFPEF) HEART FAILURE WITH PRESERVED EJECTION FRACTION (H): ICD-10-CM

## 2019-04-08 DIAGNOSIS — I89.0 LYMPHEDEMA OF BOTH LOWER EXTREMITIES: ICD-10-CM

## 2019-04-09 ENCOUNTER — CARE COORDINATION (OUTPATIENT)
Dept: CARDIOLOGY | Facility: CLINIC | Age: 59
End: 2019-04-09

## 2019-04-09 NOTE — PROGRESS NOTES
"[facility] - MyHealth Tracker    Heart Failure sx: none  Current daily diuretic: bumex 3mg am/2mg pm, eplerenone 50mg bid  Current PRN diuretic plan: per [facility]         Patients weight continues to slowly decline. He was last seen by Rachna Holt PA-C 4/2/19, per her clinic notes:   At this point, he sounds dehydrated.  I am ordering him to very gently cut back his diuretics.  He will remain on 3 mg of Bumex in the morning, but will decrease afternoon to 2 mg.  He will remain on eplerenone 50 mg b.i.d.     His current weight parameters 270-275#. He stated that he is feeling well. He denied any dizziness/lightheadedness. He feels that his breathing has improved, he has more energy and is sleeping better. When fluid up he was restless at night and unable to sleep soundly - he denied any PND/orthopnea, but stated, \"I just didn't sleep well.\"  He also stated that he is \"moving better, .... I'm not huffing and puffing like I was.\"  His feet and legs look significantly better - he stated they are nearly back to normal and the scrotal swelling has resolved.     Will review w/Rachna Holt PA-C for adjusting weight parameters.  Jaqueline Miller RN on 4/9/2019 at 11:04 AM      "

## 2019-04-09 NOTE — PROGRESS NOTES
Continue current diuretics, change parameters to 265-270.  Will attempt to decrease diuretics at next clinic visit as long as he continues to do well.  Rachna Holt PA-C 4/9/2019 1:50 PM

## 2019-04-10 ENCOUNTER — HOSPITAL ENCOUNTER (OUTPATIENT)
Facility: CLINIC | Age: 59
Setting detail: SPECIMEN
End: 2019-04-10
Attending: INTERNAL MEDICINE
Payer: COMMERCIAL

## 2019-04-10 ENCOUNTER — PATIENT OUTREACH (OUTPATIENT)
Dept: ONCOLOGY | Facility: CLINIC | Age: 59
End: 2019-04-10

## 2019-04-10 ENCOUNTER — ONCOLOGY VISIT (OUTPATIENT)
Dept: ONCOLOGY | Facility: CLINIC | Age: 59
End: 2019-04-10
Attending: INTERNAL MEDICINE
Payer: COMMERCIAL

## 2019-04-10 VITALS
SYSTOLIC BLOOD PRESSURE: 106 MMHG | DIASTOLIC BLOOD PRESSURE: 68 MMHG | HEIGHT: 70 IN | HEART RATE: 67 BPM | TEMPERATURE: 98.2 F | BODY MASS INDEX: 38.74 KG/M2 | WEIGHT: 270.6 LBS | OXYGEN SATURATION: 95 %

## 2019-04-10 DIAGNOSIS — D50.8 OTHER IRON DEFICIENCY ANEMIA: Primary | ICD-10-CM

## 2019-04-10 PROCEDURE — 99213 OFFICE O/P EST LOW 20 MIN: CPT | Performed by: INTERNAL MEDICINE

## 2019-04-10 PROCEDURE — G0463 HOSPITAL OUTPT CLINIC VISIT: HCPCS

## 2019-04-10 RX ORDER — VILAZODONE HYDROCHLORIDE 20 MG/1
20 TABLET ORAL DAILY
COMMUNITY

## 2019-04-10 ASSESSMENT — MIFFLIN-ST. JEOR: SCORE: 2048.68

## 2019-04-10 ASSESSMENT — PAIN SCALES - GENERAL: PAINLEVEL: NO PAIN (0)

## 2019-04-10 NOTE — PROGRESS NOTES
Pt called stating that he just saw his Hematologist  today & he forgot to mention that he's on medication:  Viibryd 20mg once daily.  Will update Pt's Epic medications.    Pt said that his paperwork (AVS summary) says that Pt is on 10 units insulin (? Lantus) once daily but he's actually taking 20 units once daily.  Encouraged Pt to talk to his PCP about his insulin dosing & update Epic if Pt is to be taking 20 units (? Lantus) as  is his hematologist for his iron deficiency issues.

## 2019-04-10 NOTE — LETTER
"    4/10/2019         RE: Gil Chowdary  [address] [place] [id number]        Dear Colleague,    Thank you for referring your patient, Gil Chowdary, to the [facility]. Please see a copy of my visit note below.    [facility]  HEMATOLOGY ONCOLOGY     DIAGNOSIS:  1- Iron deficiency anemia,resolved. he patient had heart surgery an year prior.  Anemia of chronic renal disease.   2- medication related thrombocytopenia vs ITP     TREATMENT:  Iron sulfate 325 mg twice daily.      SUBJECTIVE:  The patient was seen as a followup today. He is feeling better. His energy level has improved.     REVIEW OF SYSTEMS:  A complete review of systems was performed and found to be negative other than pertinent positives mentioned in history of present illness.      Past medical, social histories reviewed.     Meds- Reviewed.     PHYSICAL EXAMINATION:   VITAL SIGNS: /68   Pulse 67   Temp 98.2  F (36.8  C) (Oral)   Ht 1.778 m (5' 10\")   Wt 122.7 kg (270 lb 9.6 oz)   SpO2 95%   BMI 38.83 kg/m     CONSTITUTIONAL:  Sitting comfortably.   NEUROLOGIC:  Alert, awake.   PSYCHIATRIC:  Mood and affect appear normal.      LABORATORY DATA AND IMAGING REVIEWED DURING THIS VISIT:  Recent Labs   Lab Test 03/25/19  1216 03/20/19  0640 03/19/19  0616  03/17/19  0535  01/23/19  0847  05/20/17  0654    137 139   < > 141   < > 136   < > 135   POTASSIUM 3.8 3.7 3.6   < > 3.9   < > 3.7   < > 4.0   CHLORIDE 102 104 104   < > 107   < > 101   < > 103   CO2 26 28 28   < > 27   < > 28   < > 22   ANIONGAP 12.8 5 7   < > 7   < > 10.7   < > 10   BUN 21 24 25   < > 26   < > 28   < > 31*   CR 1.56* 1.47* 1.44*   < > 1.58*   < > 1.58*   < > 1.00   GLC 85 93 85   < > 92   < > 135*   < > 103*   MYRON 9.0 8.3* 8.2*   < > 8.3*   < > 9.5   < > 8.2*   MAG  --   --  2.7*  --   --   --  2.7*   < > 2.6*   PHOS  --   --   --   --  4.7*  --   --   --  3.4    < > = values in this interval not displayed. "     Recent Labs   Lab Test 04/05/19  1310 03/25/19  1216 03/20/19  0640  03/18/19  0615 03/15/19  1444   WBC 4.3 4.2  --   --  3.8* 4.2   HGB 11.4* 10.6*  --   --  10.2* 9.3*   PLT 86* 95* 76*   < > 79* 79*   MCV 99 99  --   --  100 102*   NEUTROPHIL 52.1 50.1  --   --   --  53.1    < > = values in this interval not displayed.     Recent Labs   Lab Test 03/18/19  0615 03/17/19  0535 10/12/18  1736 05/09/17  1240   BILITOTAL 0.9  --  0.3 1.2   ALKPHOS 72  --  82 72   ALT 44  --  31 85*  79*   AST 88*  --  55* 110*   ALBUMIN 3.5 3.3* 3.6 3.8    Results for SHAWNA ERIKA WAYNE (MRN [id number]) as of 4/10/2019 09:20   Ref. Range 4/5/2019 13:10   Ferritin Latest Ref Range: 26 - 388 ng/mL 59   Iron Latest Ref Range: 35 - 180 ug/dL 126   Iron Binding Cap Latest Ref Range: 240 - 430 ug/dL 403   Iron Saturation Index Latest Ref Range: 15 - 46 % 31   Transferrin Latest Ref Range: 210 - 360 mg/dL 349     ASSESSMENT:  This is a 58-year-old gentleman with a normocytic anemia and moderate thrombocytopenia, multiple comorbidities, including CHF, diabetes, and diabetic nephropathy.     S/P colonoscopy, he had few polyps.     - His iron studies are better after oral replacement.   Normocytic anemia is persisting and is related to kidney dysfunction.   Moderate thrombocytopenia.      PLAN:   1.  Stop oral iron.   2.  Return to clinic 4 months, CBC with diff, retic count, and iron studies.   JUAN FLETCHER MD    4/10/2019      Again, thank you for allowing me to participate in the care of your patient.        Sincerely,        Juan Fletcher MD

## 2019-04-10 NOTE — PATIENT INSTRUCTIONS
1.  Stop oral iron.   2.  Return to clinic 4 months, CBC with diff, retic count, and iron studies. Scheduled/ Loretta    AVS printed and given to patient/ Loretta

## 2019-04-10 NOTE — PROGRESS NOTES
"[facility]  HEMATOLOGY ONCOLOGY     DIAGNOSIS:  1- Iron deficiency anemia,resolved. he patient had heart surgery an year prior.  Anemia of chronic renal disease.   2- medication related thrombocytopenia vs ITP     TREATMENT:  Iron sulfate 325 mg twice daily.      SUBJECTIVE:  The patient was seen as a followup today. He is feeling better. His energy level has improved.     REVIEW OF SYSTEMS:  A complete review of systems was performed and found to be negative other than pertinent positives mentioned in history of present illness.      Past medical, social histories reviewed.     Meds- Reviewed.     PHYSICAL EXAMINATION:   VITAL SIGNS: /68   Pulse 67   Temp 98.2  F (36.8  C) (Oral)   Ht 1.778 m (5' 10\")   Wt 122.7 kg (270 lb 9.6 oz)   SpO2 95%   BMI 38.83 kg/m    CONSTITUTIONAL:  Sitting comfortably.   NEUROLOGIC:  Alert, awake.   PSYCHIATRIC:  Mood and affect appear normal.      LABORATORY DATA AND IMAGING REVIEWED DURING THIS VISIT:  Recent Labs   Lab Test 03/25/19  1216 03/20/19  0640 03/19/19  0616  03/17/19  0535  01/23/19  0847  05/20/17  0654    137 139   < > 141   < > 136   < > 135   POTASSIUM 3.8 3.7 3.6   < > 3.9   < > 3.7   < > 4.0   CHLORIDE 102 104 104   < > 107   < > 101   < > 103   CO2 26 28 28   < > 27   < > 28   < > 22   ANIONGAP 12.8 5 7   < > 7   < > 10.7   < > 10   BUN 21 24 25   < > 26   < > 28   < > 31*   CR 1.56* 1.47* 1.44*   < > 1.58*   < > 1.58*   < > 1.00   GLC 85 93 85   < > 92   < > 135*   < > 103*   MYRON 9.0 8.3* 8.2*   < > 8.3*   < > 9.5   < > 8.2*   MAG  --   --  2.7*  --   --   --  2.7*   < > 2.6*   PHOS  --   --   --   --  4.7*  --   --   --  3.4    < > = values in this interval not displayed.     Recent Labs   Lab Test 04/05/19  1310 03/25/19  1216 03/20/19  0640  03/18/19  0615 03/15/19  1444   WBC 4.3 4.2  --   --  3.8* 4.2   HGB 11.4* 10.6*  --   --  10.2* 9.3*   PLT 86* 95* 76*   < > 79* 79*   MCV 99 99  --   --  100 102*   NEUTROPHIL " 52.1 50.1  --   --   --  53.1    < > = values in this interval not displayed.     Recent Labs   Lab Test 03/18/19  0615 03/17/19  0535 10/12/18  1736 05/09/17  1240   BILITOTAL 0.9  --  0.3 1.2   ALKPHOS 72  --  82 72   ALT 44  --  31 85*  79*   AST 88*  --  55* 110*   ALBUMIN 3.5 3.3* 3.6 3.8    Results for ERIKA MATSO (MRN [id number]) as of 4/10/2019 09:20   Ref. Range 4/5/2019 13:10   Ferritin Latest Ref Range: 26 - 388 ng/mL 59   Iron Latest Ref Range: 35 - 180 ug/dL 126   Iron Binding Cap Latest Ref Range: 240 - 430 ug/dL 403   Iron Saturation Index Latest Ref Range: 15 - 46 % 31   Transferrin Latest Ref Range: 210 - 360 mg/dL 349     ASSESSMENT:  This is a 58-year-old gentleman with a normocytic anemia and moderate thrombocytopenia, multiple comorbidities, including CHF, diabetes, and diabetic nephropathy.     S/P colonoscopy, he had few polyps.     - His iron studies are better after oral replacement.   Normocytic anemia is persisting and is related to kidney dysfunction.   Moderate thrombocytopenia.      PLAN:   1.  Stop oral iron.   2.  Return to clinic 4 months, CBC with diff, retic count, and iron studies.   CANDELARIA DAVIS MD    4/10/2019

## 2019-04-15 NOTE — PROGRESS NOTES
[facility] Telemanagment     Alert for: wt 1# below parameter (265-270#) at 264# x last 2 readings  Heart Failure sx: none reported  Current daily diuretic: bumex 3mg QAM/2mg QPM, eplerenone 50mg BID, KCL 30meq TID    Next follow up: 4/22 OV w/ Rachna More PAC, needs labs.     Called pt to review-LVM requesting call back.          Karen Stark RN BSN   9:35 AM 04/15/19

## 2019-04-16 NOTE — PROGRESS NOTES
[facility] Telemanagment     Alert for: wt 1-2# below parameter (265-270#) for last 3 days, at 263# today  Heart Failure sx: none reported  Current daily diuretic: bumex 3mg QAM/2mg QPM, eplerenone 50mg BID, KCL 30meq TID     Next follow up: 4/22 OV w/ Rachna More PAC at 0730, needs labs.     Called pt, no answer. LVM requesting call back to review how he's feeling, diuretic dosing, and request he have BMP drawn 4/19 at pulm rehab.              Karen Stark RN BSN   9:12 AM 04/16/19

## 2019-04-17 NOTE — PROGRESS NOTES
Pt LVM returning my call to review wt decreasing.     Will request [facility] RN call pt tomorrow to review. He did not report MHT survey today. We need to arrange BMP prior to 4/22 OV w/ Rachna More PAC, as well.    Karen Stark RN BSN   5:08 PM 04/17/19

## 2019-04-18 ENCOUNTER — CARE COORDINATION (OUTPATIENT)
Dept: CARDIOLOGY | Facility: CLINIC | Age: 59
End: 2019-04-18

## 2019-04-18 DIAGNOSIS — I50.30 (HFPEF) HEART FAILURE WITH PRESERVED EJECTION FRACTION (H): Primary | ICD-10-CM

## 2019-04-18 RX ORDER — BUMETANIDE 1 MG/1
TABLET ORAL
Qty: 360 TABLET | Refills: 3 | Status: SHIPPED | OUTPATIENT
Start: 2019-04-18 | End: 2019-07-30

## 2019-04-18 NOTE — PROGRESS NOTES
[facility] - MyHealth Tracker     Alert for: weight below parameters  Heart Failure sx: no  Current daily diuretic: bumex 3mg am/2mg pm, eplerenone 50mg bid, KCL 30mEq tid    Call to patient as his weight continues to run below parameters. He stated that his mom  2days ago. 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 I expressed our condolences on his loss    He denied any dizziness, lightheadedness. He is otherwise feeling well any denied any HF sx. He cancelled his pulmonary rehab appt, so will make other arrangements to have his blood drawn for his appt w/Rachna Holt PA-C on Monday. Will review w/WILLIAM for any recommendation re:diuretic adjustment.  Jaqueline Miller RN on 2019 at 10:11 AM

## 2019-04-18 NOTE — PROGRESS NOTES
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  Ok to decrease bumex to 2 mg bid.  Will see him Monday with labs.  Rachna Holt PA-C 4/18/2019 12:21 PM

## 2019-04-18 NOTE — PROGRESS NOTES
Called patient w/instructions to decrease bumex to 2mg bid. He verbalized understanding. He confirmed that he will have his labs done here([facility]) tomorrow. He will call scheduling for appt. Med list updated and Rx sent to his preferred pharmacy.  Jaqueline Miller RN on 4/18/2019 at 1:51 PM

## 2019-04-19 DIAGNOSIS — I50.30 (HFPEF) HEART FAILURE WITH PRESERVED EJECTION FRACTION (H): ICD-10-CM

## 2019-04-19 LAB
ANION GAP SERPL CALCULATED.3IONS-SCNC: 13.7 MMOL/L (ref 6–17)
BUN SERPL-MCNC: 20 MG/DL (ref 7–30)
CALCIUM SERPL-MCNC: 9.4 MG/DL (ref 8.5–10.5)
CHLORIDE SERPL-SCNC: 105 MMOL/L (ref 98–107)
CO2 SERPL-SCNC: 26 MMOL/L (ref 23–29)
CREAT SERPL-MCNC: 1.35 MG/DL (ref 0.7–1.3)
GFR SERPL CREATININE-BSD FRML MDRD: 54 ML/MIN/{1.73_M2}
GLUCOSE SERPL-MCNC: 86 MG/DL (ref 70–105)
POTASSIUM SERPL-SCNC: 3.7 MMOL/L (ref 3.5–5.1)
SODIUM SERPL-SCNC: 141 MMOL/L (ref 136–145)

## 2019-04-19 PROCEDURE — 80048 BASIC METABOLIC PNL TOTAL CA: CPT | Performed by: PHYSICIAN ASSISTANT

## 2019-04-19 PROCEDURE — 36415 COLL VENOUS BLD VENIPUNCTURE: CPT | Performed by: PHYSICIAN ASSISTANT

## 2019-04-22 ENCOUNTER — OFFICE VISIT (OUTPATIENT)
Dept: CARDIOLOGY | Facility: CLINIC | Age: 59
End: 2019-04-22
Attending: PHYSICIAN ASSISTANT
Payer: COMMERCIAL

## 2019-04-22 VITALS
DIASTOLIC BLOOD PRESSURE: 70 MMHG | WEIGHT: 269.3 LBS | HEART RATE: 62 BPM | HEIGHT: 69 IN | BODY MASS INDEX: 39.89 KG/M2 | SYSTOLIC BLOOD PRESSURE: 112 MMHG

## 2019-04-22 DIAGNOSIS — I50.30 (HFPEF) HEART FAILURE WITH PRESERVED EJECTION FRACTION (H): ICD-10-CM

## 2019-04-22 DIAGNOSIS — Z95.2 S/P AVR (AORTIC VALVE REPLACEMENT): ICD-10-CM

## 2019-04-22 DIAGNOSIS — I47.10 PAROXYSMAL SUPRAVENTRICULAR TACHYCARDIA (H): ICD-10-CM

## 2019-04-22 PROCEDURE — 99214 OFFICE O/P EST MOD 30 MIN: CPT | Performed by: PHYSICIAN ASSISTANT

## 2019-04-22 RX ORDER — METOPROLOL TARTRATE 25 MG/1
25 TABLET, FILM COATED ORAL 2 TIMES DAILY
Qty: 180 TABLET | Refills: 3 | Status: SHIPPED | OUTPATIENT
Start: 2019-04-22

## 2019-04-22 ASSESSMENT — MIFFLIN-ST. JEOR: SCORE: 2026.92

## 2019-04-22 NOTE — PROGRESS NOTES
[id number]  HPI and Plan:   See dictation    Orders this Visit:  Orders Placed This Encounter   Procedures     Basic metabolic panel     Follow-Up with [facility] - KARYN visit     Orders Placed This Encounter   Medications     metoprolol tartrate (LOPRESSOR) 25 MG tablet     Sig: Take 1 tablet (25 mg) by mouth 2 times daily     Dispense:  180 tablet     Refill:  3     Medications Discontinued During This Encounter   Medication Reason     metoprolol tartrate (LOPRESSOR) 50 MG tablet          Encounter Diagnoses   Name Primary?     (HFpEF) heart failure with preserved ejection fraction (H)      Paroxysmal supraventricular tachycardia (H)      S/P AVR (aortic valve replacement)        CURRENT MEDICATIONS:  Current Outpatient Medications   Medication Sig Dispense Refill     aspirin 81 MG tablet Take 81 mg by mouth every morning        atorvastatin (LIPITOR) 40 MG tablet Take 1 tablet (40 mg) by mouth daily 90 tablet 0     bumetanide (BUMEX) 1 MG tablet Take 2 tablets(2mg) by mouth twice daily 360 tablet 3     clindamycin (CLEOCIN) 300 MG capsule Take 1 capsule (300 mg) by mouth as needed 10 capsule 3     donepezil (ARICEPT) 5 MG tablet Take 5 mg by mouth At Bedtime       eplerenone (INSPRA) 50 MG tablet Take 1 tablet (50 mg) by mouth 2 times daily 60 tablet 11     ferrous sulfate (IRON) 325 (65 Fe) MG tablet Take 1 tablet (325 mg) by mouth 2 times daily 100 tablet 3     insulin aspart (NOVOLOG FLEXPEN) 100 UNIT/ML injection Inject 3 Units Subcutaneous daily (with dinner) Takes if blood glucose > 150       insulin aspart (NOVOLOG FLEXPEN) 100 UNIT/ML injection Inject 2 Units Subcutaneous 2 times daily (with meals) Takes with breakfast and lunch if blood glucose > 150       insulin glargine (BASAGLAR KWIKPEN) 100 UNIT/ML pen Inject 10 Units Subcutaneous every morning       magnesium oxide (MAG-OX) 400 MG tablet Take 1 tablet (400 mg) by mouth daily 90 tablet 3     metoprolol tartrate (LOPRESSOR) 25 MG tablet Take 1 tablet (25  mg) by mouth 2 times daily 180 tablet 3     Multiple Vitamins-Minerals (CENTRUM ADULTS PO) Take 1 tablet by mouth every morning        order for DME Pt to be fit with compression garments 20-30mmHg or veclro compression garment from foot to knee bilaterally. 4 each 3     pantoprazole (PROTONIX) 40 MG EC tablet Take 40 mg by mouth every morning (before breakfast)       potassium chloride ER (K-DUR/KLOR-CON M) 10 MEQ CR tablet Take 30 mEq by mouth 3 times daily        prednisoLONE acetate (PRED FORTE) 1 % ophthalmic susp Place 1 drop into both eyes as needed (eye pain)        timolol (TIMOPTIC) 0.5 % ophthalmic solution Place 1 drop into both eyes 2 times daily        vilazodone (VIIBRYD) 20 MG TABS tablet Take 20 mg by mouth daily         ALLERGIES     Allergies   Allergen Reactions     Amoxicillin      Itchy      Penicillins Hives     Spironolactone Rash       PAST MEDICAL HISTORY:  Past Medical History:   Diagnosis Date     Former tobacco use      Glaucoma      Hyperlipidemia LDL goal <160 12/6/2011     Obesity      DRAKE on CPAP      Right bundle branch block      Severe aortic stenosis     On Echo 10/28/16       PAST SURGICAL HISTORY:  Past Surgical History:   Procedure Laterality Date     CV HEART CATHETERIZATION WITH POSSIBLE INTERVENTION N/A 3/19/2019    Procedure: RHC and LHC (No angiogram);  Surgeon: Jai Romo MD;  Location:  [facility]ARDIAC CATH LAB     CV RIGHT HEART CATH N/A 3/19/2019    Procedure: Right Heart Cath;  Surgeon: Jai Romo MD;  Location:  HE[facility]ARDI[facility] CATH LAB     HEART CATH LEFT HEART CATH  04/07/2017    Diffuse non-obstuctive CAD     REPAIR VALVE AORTIC N/A 5/16/2017    Procedure: REPAIR VALVE AORTIC;  Median Sternotony, CardioPulmonary Bypass, Aortic Valve Replace using 25MM Trifecta Valve with [employer] [employer], Septal myectomy;  Surgeon: Jun Simon MD;  Location: [facility]     REPLACE VALVE AORTIC N/A 5/16/2017    Procedure: REPLACE VALVE AORTIC;;  Surgeon:  Jun Simon MD;  Location: [facility] OR     SINUS SURGERY         FAMILY HISTORY:  Family History   Problem Relation Age of Onset     Family History Negative Mother      Diabetes Father      Heart Surgery Father         CABG     Coronary Artery Disease Father      Hypertension Brother      Diabetes Brother      Heart Disease Brother      Heart Surgery Brother         CABG x 3     Family History Negative Sister      Diabetes Brother         History of diabetes, but no longer an issue     Family History Negative Brother        SOCIAL HISTORY:  Social History     Socioeconomic History     Marital status:      Spouse name: None     Number of children: None     Years of education: None     Highest education level: None   Occupational History     None   Social Needs     Financial resource strain: None     Food insecurity:     Worry: None     Inability: None     Transportation needs:     Medical: None     Non-medical: None   Tobacco Use     Smoking status: Former Smoker     Packs/day: 1.00     Years: 20.00     Pack years: 20.00     Types: Cigarettes, Cigars     Start date:      Last attempt to quit: 10/21/2011     Years since quittin.5     Smokeless tobacco: Never Used   Substance and Sexual Activity     Alcohol use: No     Drug use: No     Sexual activity: None   Lifestyle     Physical activity:     Days per week: None     Minutes per session: None     Stress: None   Relationships     Social connections:     Talks on phone: None     Gets together: None     Attends Roman [employer] service: None     Active member of club or organization: None     Attends meetings of clubs or organizations: None     Relationship status: None     Intimate partner violence:     Fear of current or ex partner: None     Emotionally abused: None     Physically abused: None     Forced sexual activity: None   Other Topics Concern     Parent/sibling w/ CABG, MI or angioplasty before 65F 55M? Yes     Comment: brother triple bypass  "49   Social History Narrative     None       Review of Systems:  Skin:  Negative     Eyes:  Positive for glasses  ENT:  Negative    Respiratory:  Positive for sleep apnea;CPAP  Cardiovascular:  Negative    Gastroenterology: Positive for heartburn;diarrhea  Genitourinary:  Negative    Musculoskeletal:  Positive for arthritis  Neurologic:  Negative    Psychiatric:  Positive for anxiety;depression  Heme/Lymph/Imm:  Negative    Endocrine:  Positive for diabetes    Physical Exam:  Vitals: /70   Pulse 62   Ht 1.753 m (5' 9\")   Wt 122.2 kg (269 lb 4.8 oz)   BMI 39.77 kg/m     Please refer to dictation for physical exam    Recent Lab Results:  LIPID RESULTS:  Lab Results   Component Value Date    CHOL 126 10/10/2018    HDL 23 (A) 10/10/2018    LDL 71 10/10/2018    TRIG 230 (A) 10/10/2018       LIVER ENZYME RESULTS:  Lab Results   Component Value Date    AST 88 (H) 03/18/2019    ALT 44 03/18/2019       CBC RESULTS:  Lab Results   Component Value Date    WBC 4.3 04/05/2019    RBC 3.53 (L) 04/05/2019    HGB 11.4 (L) 04/05/2019    HCT 35.0 (L) 04/05/2019    MCV 99 04/05/2019    MCH 32.3 04/05/2019    MCHC 32.6 04/05/2019    RDW 15.2 (H) 04/05/2019    PLT 86 (L) 04/05/2019       BMP RESULTS:  Lab Results   Component Value Date     04/19/2019    POTASSIUM 3.7 04/19/2019    CHLORIDE 105 04/19/2019    CO2 26 04/19/2019    ANIONGAP 13.7 04/19/2019    GLC 86 04/19/2019    BUN 20 04/19/2019    CR 1.35 (H) 04/19/2019    GFRESTIMATED 54 (L) 04/19/2019    GFRESTBLACK 65 04/19/2019    MYRON 9.4 04/19/2019        A1C RESULTS:  Lab Results   Component Value Date    A1C 5.4 03/16/2019       INR RESULTS:  Lab Results   Component Value Date    INR 1.28 (H) 03/15/2019    INR 2.19 (H) 10/14/2018           CC  Rachna Holt PAASMITA  [address]  [place] [id number]      "

## 2019-04-22 NOTE — LETTER
4/22/2019    Sam Villatoro PA-C  [facility] [address]  [place] [id number]    RE: Gil Chowdary       Dear Colleague,    I had the pleasure of seeing Gil Chowdary in the [facility].    [id number]  HPI and Plan:   See dictation    Orders this Visit:  Orders Placed This Encounter   Procedures     Basic metabolic panel     [facility] with [facility] - KARYN visit     Orders Placed This Encounter   Medications     metoprolol tartrate (LOPRESSOR) 25 MG tablet     Sig: Take 1 tablet (25 mg) by mouth 2 times daily     Dispense:  180 tablet     Refill:  3     Medications Discontinued During This Encounter   Medication Reason     metoprolol tartrate (LOPRESSOR) 50 MG tablet          Encounter Diagnoses   Name Primary?     (HFpEF) heart failure with preserved ejection fraction (H)      Paroxysmal supraventricular tachycardia (H)      S/P AVR (aortic valve replacement)        CURRENT MEDICATIONS:  Current Outpatient Medications   Medication Sig Dispense Refill     aspirin 81 MG tablet Take 81 mg by mouth every morning        atorvastatin (LIPITOR) 40 MG tablet Take 1 tablet (40 mg) by mouth daily 90 tablet 0     bumetanide (BUMEX) 1 MG tablet Take 2 tablets(2mg) by mouth twice daily 360 tablet 3     clindamycin (CLEOCIN) 300 MG capsule Take 1 capsule (300 mg) by mouth as needed 10 capsule 3     donepezil (ARICEPT) 5 MG tablet Take 5 mg by mouth At Bedtime       eplerenone (INSPRA) 50 MG tablet Take 1 tablet (50 mg) by mouth 2 times daily 60 tablet 11     ferrous sulfate (IRON) 325 (65 Fe) MG tablet Take 1 tablet (325 mg) by mouth 2 times daily 100 tablet 3     insulin aspart (NOVOLOG FLEXPEN) 100 UNIT/ML injection Inject 3 Units Subcutaneous daily (with dinner) Takes if blood glucose > 150       insulin aspart (NOVOLOG FLEXPEN) 100 UNIT/ML injection Inject 2 Units Subcutaneous 2 times daily (with meals) Takes with breakfast and lunch if blood glucose > 150        insulin glargine (BASAGLAR KWIKPEN) 100 UNIT/ML pen Inject 10 Units Subcutaneous every morning       magnesium oxide (MAG-OX) 400 MG tablet Take 1 tablet (400 mg) by mouth daily 90 tablet 3     metoprolol tartrate (LOPRESSOR) 25 MG tablet Take 1 tablet (25 mg) by mouth 2 times daily 180 tablet 3     Multiple Vitamins-Minerals (CENTRUM ADULTS PO) Take 1 tablet by mouth every morning        order for DME Pt to be fit with compression garments 20-30mmHg or veclro compression garment from foot to knee bilaterally. 4 each 3     pantoprazole (PROTONIX) 40 MG EC tablet Take 40 mg by mouth every morning (before breakfast)       potassium chloride ER (K-DUR/KLOR-CON M) 10 MEQ CR tablet Take 30 mEq by mouth 3 times daily        prednisoLONE acetate (PRED FORTE) 1 % ophthalmic susp Place 1 drop into both eyes as needed (eye pain)        timolol (TIMOPTIC) 0.5 % ophthalmic solution Place 1 drop into both eyes 2 times daily        vilazodone (VIIBRYD) 20 MG TABS tablet Take 20 mg by mouth daily         ALLERGIES     Allergies   Allergen Reactions     Amoxicillin      Itchy      Penicillins Hives     Spironolactone Rash       PAST MEDICAL HISTORY:  Past Medical History:   Diagnosis Date     Former tobacco use      Glaucoma      Hyperlipidemia LDL goal <160 12/6/2011     Obesity      DRAKE on CPAP      Right bundle branch block      Severe aortic stenosis     On Echo 10/28/16       PAST SURGICAL HISTORY:  Past Surgical History:   Procedure Laterality Date     CV HEART CATHETERIZATION WITH POSSIBLE INTERVENTION N/A 3/19/2019    Procedure: RHC and LHC (No angiogram);  Surgeon: Jai Romo MD;  Location:  HEART CARDIAC CATH LAB     CV RIGHT HEART CATH N/A 3/19/2019    Procedure: Right Heart Cath;  Surgeon: Jai Romo MD;  Location:  HEART CARDIAC CATH LAB     HEART CATH LEFT HEART CATH  04/07/2017    Diffuse non-obstuctive CAD     REPAIR VALVE AORTIC N/A 5/16/2017    Procedure: REPAIR VALVE AORTIC;  Median  Sternotony, CardioPulmonary Bypass, Aortic Valve Replace using 25MM Trifecta Valve with [employer], Septal myectomy;  Surgeon: Jun Simon MD;  Location: [facility]     REPLACE VALVE AORTIC N/A 2017    Procedure: REPLACE VALVE AORTIC;;  Surgeon: Jun Simon MD;  Location: [facility]     SINUS SURGERY         FAMILY HISTORY:  Family History   Problem Relation Age of Onset     Family History Negative Mother      Diabetes Father      Heart Surgery Father         CABG     Coronary Artery Disease Father      Hypertension Brother      Diabetes Brother      Heart Disease Brother      Heart Surgery Brother         CABG x 3     Family History Negative Sister      Diabetes Brother         History of diabetes, but no longer an issue     Family History Negative Brother        SOCIAL HISTORY:  Social History     Socioeconomic History     Marital status:      Spouse name: None     Number of children: None     Years of education: None     Highest education level: None   Occupational History     None   Social Needs     Financial resource strain: None     Food insecurity:     Worry: None     Inability: None     Transportation needs:     Medical: None     Non-medical: None   Tobacco Use     Smoking status: Former Smoker     Packs/day: 1.00     Years: 20.00     Pack years: 20.00     Types: Cigarettes, Cigars     Start date:      Last attempt to quit: 10/21/2011     Years since quittin.5     Smokeless tobacco: Never Used   Substance and Sexual Activity     Alcohol use: No     Drug use: No     Sexual activity: None   Lifestyle     Physical activity:     Days per week: None     Minutes per session: None     Stress: None   Relationships     Social connections:     Talks on phone: None     Gets together: None     Attends Hoahaoism service: None     Active member of club or organization: None     Attends meetings of clubs or organizations: None     Relationship status: None     Intimate partner  "violence:     Fear of current or ex partner: None     Emotionally abused: None     Physically abused: None     Forced sexual activity: None   Other Topics Concern     Parent/sibling w/ CABG, MI or angioplasty before 65F 55M? Yes     Comment: brother triple bypass 49   Social History Narrative     None       Review of Systems:  Skin:  Negative     Eyes:  Positive for glasses  ENT:  Negative    Respiratory:  Positive for sleep apnea;CPAP  Cardiovascular:  Negative    Gastroenterology: Positive for heartburn;diarrhea  Genitourinary:  Negative    Musculoskeletal:  Positive for arthritis  Neurologic:  Negative    Psychiatric:  Positive for anxiety;depression  Heme/Lymph/Imm:  Negative    Endocrine:  Positive for diabetes    Physical Exam:  Vitals: /70   Pulse 62   Ht 1.753 m (5' 9\")   Wt 122.2 kg (269 lb 4.8 oz)   BMI 39.77 kg/m      Please refer to dictation for physical exam    Recent Lab Results:  LIPID RESULTS:  Lab Results   Component Value Date    CHOL 126 10/10/2018    HDL 23 (A) 10/10/2018    LDL 71 10/10/2018    TRIG 230 (A) 10/10/2018       LIVER ENZYME RESULTS:  Lab Results   Component Value Date    AST 88 (H) 03/18/2019    ALT 44 03/18/2019       CBC RESULTS:  Lab Results   Component Value Date    WBC 4.3 04/05/2019    RBC 3.53 (L) 04/05/2019    HGB 11.4 (L) 04/05/2019    HCT 35.0 (L) 04/05/2019    MCV 99 04/05/2019    MCH 32.3 04/05/2019    MCHC 32.6 04/05/2019    RDW 15.2 (H) 04/05/2019    PLT 86 (L) 04/05/2019       BMP RESULTS:  Lab Results   Component Value Date     04/19/2019    POTASSIUM 3.7 04/19/2019    CHLORIDE 105 04/19/2019    CO2 26 04/19/2019    ANIONGAP 13.7 04/19/2019    GLC 86 04/19/2019    BUN 20 04/19/2019    CR 1.35 (H) 04/19/2019    GFRESTIMATED 54 (L) 04/19/2019    GFRESTBLACK 65 04/19/2019    MYRON 9.4 04/19/2019        A1C RESULTS:  Lab Results   Component Value Date    A1C 5.4 03/16/2019       INR RESULTS:  Lab Results   Component Value Date    INR 1.28 (H) 03/15/2019    " INR 2.19 (H) 10/14/2018           CC  Rachna Holt PA-C  [address]  [place] [id number]        Thank you for allowing me to participate in the care of your patient.      Sincerely,     Rachna Holt PA-C     [facility]    cc:   Rachna Holt PA-C  [address]  [place] [id number]

## 2019-04-22 NOTE — NURSING NOTE
Adjusted MyHealth Tracker wt parameters to 262-270# per Rachna Holt PAC.     Karen Stark RN BSN   8:37 AM 04/22/19

## 2019-04-22 NOTE — PATIENT INSTRUCTIONS
Call [facility] nurse for any questions or concerns Mon-Fri 8am-4pm:                                                #[phone]                                       For concerns after hours:                                               #[phone]     1: Medication changes: continue on bumex 2 mg twice a day.    I sent in a new prescription for metoprolol, these will be 25 mg tablets so you don't have to cut them in half.    Call if your weight hits 270 at home.    2: Plan from today: follow up with me in about 1 month with labs.    3: Lab results: see attached; labs are beautiful!

## 2019-04-22 NOTE — LETTER
2019      Sam Villatoro PA-C  [facility] [address]  [place] [address]      RE: Gil Chowdary       Dear Colleague,    I had the pleasure of seeing Gil Chowdary in the [facility].    Service Date: 2019      PRIMARY CARDIOLOGIST:  Dr. Mckeon.      REASON FOR VISIT:  Heart failure followup.      HISTORY OF PRESENT ILLNESS:  Mr. Chowdary is a delightful 58-year-old gentleman with past medical history significant for the followin.  Severe aortic stenosis with aortic valve replacement in 2017 with a 25 mm Trifecta tissue valve.   2.  Type 2 diabetes, on insulin.   3.  Hyperlipidemia.   4.  Treated sleep apnea.   5.  Heart failure with preserved EF.   6.  History of iron deficiency anemia, follow with Dr. Fletcher, as well as ITP.   7.  History of supraventricular tachycardia and paroxysmal AFib postoperatively, none on recent Zio Patch.   8.  History of myectomy at the time of AVR, but no clear HCM.        I had met him after he was admitted in 10/2018 with weight gain and shortness of breath.  He was found to be anemic with hemoglobins in the george of 7.4.  He was unable to stay inpatient as he needed to go home and take care of his mother, who was ill.  He struggled throughout the winter, responded well to [facility] but eventually was brought in by Dr. Ashley and diuresed about 35 pounds in 5 days during that hospitalization.  He underwent right heart cath that showed no constriction.      Since then, he has been doing fairly well.  He denies chest pain, shortness of breath, orthopnea or PND.  He does not feel dehydrated, although he is still urinating frequently.  His energy is poor and he is very tired, but his mother passed away last week.      SOCIAL HISTORY:  He again was living at home with his mom taking care of her since her strokes.  She has now passed and they will work on selling the house.  Sister and brother-in-law  are staying also there.  He is a former smoker, 20-year pack history, quit in 2011.  No alcohol use.      PHYSICAL EXAMINATION:   GENERAL:  Well-developed, well-nourished gentleman in no acute distress.   HEENT:  Normocephalic, atraumatic.   HEART:  Regular.  I do not appreciate murmur, rub or gallop.  Valve sounds are crisp.   LUNGS:  Clear bilaterally.   EXTREMITIES:  With bilateral lymphedema wraps in place with just mild pitting edema above the wraps at the knee.   NECK:  He has just 2 cm JVP above the sternal notch with minimal hepatojugular reflux at 30 degrees.   SKIN:  Warm and dry.      STUDIES:  Right heart cath done 03/13 shows a mean RA of 10, RV of 38/4, PA of 31/10/20, wedge of 17, LVEDP of 15.  No constriction.      ASSESSMENT AND PLAN:   1.  Heart failure with preserved EF without constriction on right heart cath.  We have been able to recently decrease his diuretic and he now is just on 2 mg b.i.d.  We switched that last week, and his weights are stable.  He is also on eplerenone 50 mg b.i.d.  With this, his weight has remained stable and he does appear euvolemic.  He is calling in his daily weights on My Health Tracker, and these are at a low since we met him and stable at 265 over the last 2 days.  He was recommended to have a cardiac MRI by Dr. Ashley and will consider this at the next visit.  Could also consider CardioMEMS, but he needs to be on aspirin and Plavix for 30 days for this.   2.  Anemia and thrombocytopenia with good improvement, followed by Dr. Fletcher.   3.  Aortic valve replacement.  Well-functioning bioprosthetic valve with history of septal myectomy for septal hypertrophy without LVOT gradient.  On aspirin.   4.  Sleep apnea, treated.   5.  Dyslipidemia, on statin.   6.  History of SVT and paroxysmal AFib.  None seen on Zio Patch and none documented for over a year on aspirin alone.   7.  Depression and memory loss followed by Psychiatry.      Thank you for allowing me to  participate in this patient's care.  I will see him back in 1 month, sooner with concerns.      SIRENA Saucedo PA-C             D: 2019   T: 2019   MT: BLANE      Name:     ABIGAIL MATOS   MRN:      [id number]        Account:      [id number]   :      1960           Service Date: 2019      Document: [id number]         Outpatient Encounter Medications as of 2019   Medication Sig Dispense Refill     aspirin 81 MG tablet Take 81 mg by mouth every morning        atorvastatin (LIPITOR) 40 MG tablet Take 1 tablet (40 mg) by mouth daily 90 tablet 0     bumetanide (BUMEX) 1 MG tablet Take 2 tablets(2mg) by mouth twice daily 360 tablet 3     clindamycin (CLEOCIN) 300 MG capsule Take 1 capsule (300 mg) by mouth as needed 10 capsule 3     donepezil (ARICEPT) 5 MG tablet Take 5 mg by mouth At Bedtime       eplerenone (INSPRA) 50 MG tablet Take 1 tablet (50 mg) by mouth 2 times daily 60 tablet 11     ferrous sulfate (IRON) 325 (65 Fe) MG tablet Take 1 tablet (325 mg) by mouth 2 times daily 100 tablet 3     insulin aspart (NOVOLOG FLEXPEN) 100 UNIT/ML injection Inject 3 Units Subcutaneous daily (with dinner) Takes if blood glucose > 150       insulin aspart (NOVOLOG FLEXPEN) 100 UNIT/ML injection Inject 2 Units Subcutaneous 2 times daily (with meals) Takes with breakfast and lunch if blood glucose > 150       insulin glargine (BASAGLAR KWIKPEN) 100 UNIT/ML pen Inject 10 Units Subcutaneous every morning       magnesium oxide (MAG-OX) 400 MG tablet Take 1 tablet (400 mg) by mouth daily 90 tablet 3     metoprolol tartrate (LOPRESSOR) 25 MG tablet Take 1 tablet (25 mg) by mouth 2 times daily 180 tablet 3     Multiple Vitamins-Minerals (CENTRUM ADULTS PO) Take 1 tablet by mouth every morning        order for DME Pt to be fit with compression garments 20-30mmHg or veclro compression garment from foot to knee bilaterally. 4 each 3     pantoprazole (PROTONIX) 40 MG EC  tablet Take 40 mg by mouth every morning (before breakfast)       potassium chloride ER (K-DUR/KLOR-CON M) 10 MEQ CR tablet Take 30 mEq by mouth 3 times daily        prednisoLONE acetate (PRED FORTE) 1 % ophthalmic susp Place 1 drop into both eyes as needed (eye pain)        timolol (TIMOPTIC) 0.5 % ophthalmic solution Place 1 drop into both eyes 2 times daily        vilazodone (VIIBRYD) 20 MG TABS tablet Take 20 mg by mouth daily       [DISCONTINUED] metoprolol tartrate (LOPRESSOR) 50 MG tablet Take 0.5 tablets (25 mg) by mouth 2 times daily 60 tablet 0     No facility-administered encounter medications on file as of 4/22/2019.        Again, thank you for allowing me to participate in the care of your patient.      Sincerely,    Rachna Holt PA-C     [facility]

## 2019-04-22 NOTE — RESULT ENCOUNTER NOTE
Reviewed during clinic visit.  Please see progress note for plan.  Rachna Holt PA-C 4/22/2019 2:35 PM

## 2019-04-22 NOTE — PROGRESS NOTES
Service Date: 2019      PRIMARY CARDIOLOGIST:  Dr. Mckeon.      REASON FOR VISIT:  Heart failure followup.      HISTORY OF PRESENT ILLNESS:  Mr. Chowdary is a delightful 58-year-old gentleman with past medical history significant for the followin.  Severe aortic stenosis with aortic valve replacement in 2017 with a 25 mm Trifecta tissue valve.   2.  Type 2 diabetes, on insulin.   3.  Hyperlipidemia.   4.  Treated sleep apnea.   5.  Heart failure with preserved EF.   6.  History of iron deficiency anemia, follow with Dr. Fletcher, as well as ITP.   7.  History of supraventricular tachycardia and paroxysmal AFib postoperatively, none on recent Zio Patch.   8.  History of myectomy at the time of AVR, but no clear HCM.        I had met him after he was admitted in 10/2018 with weight gain and shortness of breath.  He was found to be anemic with hemoglobins in the george of 7.4.  He was unable to stay inpatient as he needed to go home and take care of his mother, who was ill.  He struggled throughout the winter, responded well to [facility] but eventually was brought in by Dr. Ashley and diuresed about 35 pounds in 5 days during that hospitalization.  He underwent right heart cath that showed no constriction.      Since then, he has been doing fairly well.  He denies chest pain, shortness of breath, orthopnea or PND.  He does not feel dehydrated, although he is still urinating frequently.  His energy is poor and he is very tired, but his mother passed away last week.      SOCIAL HISTORY:  He again was living at home with his mom taking care of her since her strokes.  She has now passed and they will work on selling the house.  Sister and brother-in-law are staying also there.  He is a former smoker, 20-year pack history, quit in .  No alcohol use.      PHYSICAL EXAMINATION:   GENERAL:  Well-developed, well-nourished gentleman in no acute distress.   HEENT:  Normocephalic, atraumatic.   HEART:  Regular.  I  do not appreciate murmur, rub or gallop.  Valve sounds are crisp.   LUNGS:  Clear bilaterally.   EXTREMITIES:  With bilateral lymphedema wraps in place with just mild pitting edema above the wraps at the knee.   NECK:  He has just 2 cm JVP above the sternal notch with minimal hepatojugular reflux at 30 degrees.   SKIN:  Warm and dry.      STUDIES:  Right heart cath done 03/13 shows a mean RA of 10, RV of 38/4, PA of 31/10/20, wedge of 17, LVEDP of 15.  No constriction.      ASSESSMENT AND PLAN:   1.  Heart failure with preserved EF without constriction on right heart cath.  We have been able to recently decrease his diuretic and he now is just on 2 mg b.i.d.  We switched that last week, and his weights are stable.  He is also on eplerenone 50 mg b.i.d.  With this, his weight has remained stable and he does appear euvolemic.  He is calling in his daily weights on [employer], and these are at a low since we met him and stable at 265 over the last 2 days.  He was recommended to have a cardiac MRI by Dr. Ashley and will consider this at the next visit.  Could also consider CardioMEMS, but he needs to be on aspirin and Plavix for 30 days for this.   2.  Anemia and thrombocytopenia with good improvement, followed by Dr. Fletcher.   3.  Aortic valve replacement.  Well-functioning bioprosthetic valve with history of septal myectomy for septal hypertrophy without LVOT gradient.  On aspirin.   4.  Sleep apnea, treated.   5.  Dyslipidemia, on statin.   6.  History of SVT and paroxysmal AFib.  None seen on Zio Patch and none documented for over a year on aspirin alone.   7.  Depression and memory loss followed by Psychiatry.      Thank you for allowing me to participate in this patient's care.  I will see him back in 1 month, sooner with concerns.      Rachna Holt PA-C     ADDENDUM: This patient has lymphedema and as well as edema from heart failure in a mixed picture.  His treatments thus far have been helpful, and if they  can be optimized any further this would greatly benefit the patient.    Brett Holt PA-C 2019 11:07 AM        BRETT HOLT PA-C             D: 2019   T: 2019   MT: BLANE      Name:     ABIGAIL MATOS   MRN:      [id number]        Account:      [id number]   :      1960           Service Date: 2019      Document: [id number]

## 2019-04-22 NOTE — NURSING NOTE
Consistent downward trend in wt over last couple weeks. For review at today's OV w/ Rachna More PAC in [facility].        Karen Stark RN BSN   7:49 AM 04/22/19

## 2019-04-25 ENCOUNTER — HOSPITAL ENCOUNTER (OUTPATIENT)
Dept: CARDIAC REHAB | Facility: CLINIC | Age: 59
End: 2019-04-25
Attending: PHYSICIAN ASSISTANT
Payer: COMMERCIAL

## 2019-04-25 DIAGNOSIS — I27.20 PULMONARY HYPERTENSION (H): ICD-10-CM

## 2019-04-25 PROCEDURE — G0238 OTH RESP PROC, INDIV: HCPCS

## 2019-04-25 PROCEDURE — 40000244 ZZH STATISTIC VISIT PULM REHAB

## 2019-04-29 ENCOUNTER — HOSPITAL ENCOUNTER (OUTPATIENT)
Dept: CARDIAC REHAB | Facility: CLINIC | Age: 59
End: 2019-04-29
Attending: PHYSICIAN ASSISTANT
Payer: COMMERCIAL

## 2019-04-29 PROCEDURE — G0238 OTH RESP PROC, INDIV: HCPCS

## 2019-04-29 PROCEDURE — 40000244 ZZH STATISTIC VISIT PULM REHAB

## 2019-05-01 ENCOUNTER — HOSPITAL ENCOUNTER (OUTPATIENT)
Dept: CARDIAC REHAB | Facility: CLINIC | Age: 59
End: 2019-05-01
Attending: PHYSICIAN ASSISTANT
Payer: COMMERCIAL

## 2019-05-01 PROCEDURE — 40000244 ZZH STATISTIC VISIT PULM REHAB

## 2019-05-01 PROCEDURE — G0239 OTH RESP PROC, GROUP: HCPCS

## 2019-05-15 ENCOUNTER — HOSPITAL ENCOUNTER (OUTPATIENT)
Dept: CARDIAC REHAB | Facility: CLINIC | Age: 59
End: 2019-05-15
Attending: PHYSICIAN ASSISTANT
Payer: COMMERCIAL

## 2019-05-15 PROCEDURE — G0239 OTH RESP PROC, GROUP: HCPCS

## 2019-05-15 PROCEDURE — 40000244 ZZH STATISTIC VISIT PULM REHAB

## 2019-05-20 ENCOUNTER — CARE COORDINATION (OUTPATIENT)
Dept: CARDIOLOGY | Facility: CLINIC | Age: 59
End: 2019-05-20

## 2019-05-20 NOTE — PROGRESS NOTES
Incoming call from patient. He has an appointment at the  office tomorrow to investigate his disability claim. He is requesting a letter from Heath Holt PA-C sating his need for permanent disability d/t his health. Will forward to heath for review.  Jaqueline Miller RN on 5/20/2019 at 12:02 PM

## 2019-05-20 NOTE — PROGRESS NOTES
His heart conditions alone (hfpef, and avr) do not warrant permanent disability, but I do believe his memory issue, depression combined with heart may.  His pcp is the best person to be comprehensive.

## 2019-05-20 NOTE — PROGRESS NOTES
Called patient w/response per Rachna Holt. He will contact his PCP.  Jaqueline Miller RN on 5/20/2019 at 1:35 PM

## 2019-05-28 ENCOUNTER — OFFICE VISIT (OUTPATIENT)
Dept: CARDIOLOGY | Facility: CLINIC | Age: 59
End: 2019-05-28
Attending: PHYSICIAN ASSISTANT
Payer: COMMERCIAL

## 2019-05-28 VITALS
OXYGEN SATURATION: 94 % | BODY MASS INDEX: 39.94 KG/M2 | SYSTOLIC BLOOD PRESSURE: 104 MMHG | WEIGHT: 269.7 LBS | HEART RATE: 72 BPM | DIASTOLIC BLOOD PRESSURE: 64 MMHG | HEIGHT: 69 IN

## 2019-05-28 DIAGNOSIS — I50.30 (HFPEF) HEART FAILURE WITH PRESERVED EJECTION FRACTION (H): ICD-10-CM

## 2019-05-28 LAB
ANION GAP SERPL CALCULATED.3IONS-SCNC: 12 MMOL/L (ref 6–17)
BUN SERPL-MCNC: 17 MG/DL (ref 7–30)
CALCIUM SERPL-MCNC: 9.4 MG/DL (ref 8.5–10.5)
CHLORIDE SERPL-SCNC: 104 MMOL/L (ref 98–107)
CO2 SERPL-SCNC: 26 MMOL/L (ref 23–29)
CREAT SERPL-MCNC: 1.38 MG/DL (ref 0.7–1.3)
GFR SERPL CREATININE-BSD FRML MDRD: 53 ML/MIN/{1.73_M2}
GLUCOSE SERPL-MCNC: 140 MG/DL (ref 70–105)
POTASSIUM SERPL-SCNC: 4 MMOL/L (ref 3.5–5.1)
SODIUM SERPL-SCNC: 138 MMOL/L (ref 136–145)

## 2019-05-28 PROCEDURE — 80048 BASIC METABOLIC PNL TOTAL CA: CPT | Performed by: PHYSICIAN ASSISTANT

## 2019-05-28 PROCEDURE — 99214 OFFICE O/P EST MOD 30 MIN: CPT | Performed by: PHYSICIAN ASSISTANT

## 2019-05-28 PROCEDURE — 36415 COLL VENOUS BLD VENIPUNCTURE: CPT | Performed by: PHYSICIAN ASSISTANT

## 2019-05-28 ASSESSMENT — MIFFLIN-ST. JEOR: SCORE: 2028.73

## 2019-05-28 NOTE — NURSING NOTE
Wts in May have ranged 261# to 266# w/o rapid wt changes. No sx have been reported.     Karen Stark RN BSN   3:43 PM 05/28/19

## 2019-05-28 NOTE — LETTER
2019      Sam Villatoro PA-C  [facility] [address]  [place] [address]      RE: Gil Chowdary       Dear Colleague,    I had the pleasure of seeing Gil Chowdary in the [facility].    Service Date: 2019      PRIMARY CARDIOLOGIST:  Dr. Mckeon.      REASON FOR VISIT:  Heart failure followup.      HISTORY OF PRESENT ILLNESS:  Mr. Chowdary is a delightful 58-year-old gentleman with past medical history significant for the followin.  Severe aortic stenosis with aortic valve replacement in 2017 with a 25 mm Trifecta tissue valve.   2.  Type 2 diabetes, on insulin.   3.  Hyperlipidemia.   4.  Treated sleep apnea.   5.  Heart failure with preserved EF.   6.  History of iron deficiency anemia and ITP followed by Dr. Fletcher.   7.  History of supraventricular tachycardia and paroxysmal AFib postoperatively, none on recent Zio Patch.   8.  History of myectomy at the time of AVR, but no clear HCM.   9.  Edema of mixed etiology, lymphedema and heart failure.      I initially met Adolfo in 10/2018 when he had been admitted with weight gain and shortness of breath in the context of acute anemia with a george of 7.4.  He was unable to stay inpatient as he took care of his elderly mother.  We tried to diurese him as an outpatient throughout the winter, but he eventually ended up admitted and was diuresed 35 pounds.  Right heart cath during that stay showed no constriction.      He comes in today for routine followup and he is doing well from a cardiac standpoint.  He denies chest pain, shortness of breath, orthopnea or PND.  He continues to struggle with his memory and this is a big problem for him.  He is doing the best he can to eat and drink appropriately.  He is attending cardiac rehab once a week.  He tells me that Dr. Villatoro, his primary, has recently noticed some liver abnormalities and was initially told that it was hepatitis C, although he was  told this morning it is not hep C but he will be seeing a specialist for this later this week.      SOCIAL HISTORY:  He was living at home taking care of his mother, who is in her 90s.  She has passed away approximately 1 month ago.  The house is sold and they closed in late June.  He and his sister are working on cleaning it out and getting it ready.  At that point he is going to stay with his brother for a while and perhaps his sister before he works on getting a place of his own, if that is affordable.  He is a former smoker, 20-pack-year history, quit in 2011.  No alcohol use.      PHYSICAL EXAMINATION:   GENERAL:  Well-developed, well-nourished gentleman in no acute distress.   HEENT:  Normocephalic, atraumatic.   HEART:  Regular.  Valve sounds are crisp.  I do not appreciate murmur or rub.   LUNGS:  Clear bilaterally without wheezing, rales or rhonchi.     EXTREMITIES:  Legs have 1+ woody and pitting edema to the knee, improved from previous.     NECK:  At 90 degrees his neck veins are flat.   SKIN:  Warm and dry.      LABORATORY DATA:  Labs today show a creatinine of 1.38, BUN 17, potassium 4.0, sodium 138.      ASSESSMENT AND PLAN:   1.  Heart failure with preserved EF with no sign of constriction on right heart cath.  His weights are improving and are stable at home between 261 and 266.  This is also actually his parameters.  He is on Bumex 2 mg b.i.d. and eplerenone 50 mg b.i.d.  With this, he is still requiring 30 mEq of potassium 3 times a day.  At this point, we will keep him on these current doses.  I am hopeful that his weight will continue to trend down, and we will need to adjust his weigh range based on actual weight.  At this point, I think he is euvolemic.   2.  Anemia and thrombocytopenia, very well improved, followed by Dr. Fletcher.   3.  History of aortic valve replacement with a bioprosthetic aortic valve and history of septal myectomy for septal hypertrophy without LVOT gradient.  He requires  aspirin alone for anticoagulation and antibiotic prophylaxis for any dental work.   4.  Treated sleep apnea.   5.  Dyslipidemia, well controlled on Lipitor 40 mg.   6.  History of SVT and paroxysmal AFib postop, none seen recently.   7.  Memory loss, depression, recent abnormal liver findings.  This is followed by his primary and will be seen by a liver specialist.  It is possible that his congestion caused some hepatic, although we will defer to hepatologist on this.   8.  Lymphedema as well as edema from heart failure that is a mixed picture.  There is a plan for sequential compression devices, and I think this would be wise.  It will certainly help prevent hospital admissions and mobilize his fluid.      Thank you for allowing me to participate in this delightful patient's care.      SIRENA Saucedo PA-C             D: 2019   T: 2019   MT: BLANE      Name:     ABIGAIL MATOS   MRN:      [id number]        Account:      [id number]   :      1960           Service Date: 2019      Document: [id number]         Outpatient Encounter Medications as of 2019   Medication Sig Dispense Refill     aspirin 81 MG tablet Take 81 mg by mouth every morning        atorvastatin (LIPITOR) 40 MG tablet Take 1 tablet (40 mg) by mouth daily 90 tablet 0     bumetanide (BUMEX) 1 MG tablet Take 2 tablets(2mg) by mouth twice daily 360 tablet 3     clindamycin (CLEOCIN) 300 MG capsule Take 1 capsule (300 mg) by mouth as needed 10 capsule 3     eplerenone (INSPRA) 50 MG tablet Take 1 tablet (50 mg) by mouth 2 times daily 60 tablet 11     ferrous sulfate (IRON) 325 (65 Fe) MG tablet Take 1 tablet (325 mg) by mouth 2 times daily 100 tablet 3     insulin glargine (BASAGLAR KWIKPEN) 100 UNIT/ML pen Inject 10 Units Subcutaneous every morning       magnesium oxide (MAG-OX) 400 MG tablet Take 1 tablet (400 mg) by mouth daily 90 tablet 3     metoprolol tartrate (LOPRESSOR) 25 MG tablet  Take 1 tablet (25 mg) by mouth 2 times daily 180 tablet 3     Multiple Vitamins-Minerals (CENTRUM ADULTS PO) Take 1 tablet by mouth every morning        order for DME Pt to be fit with compression garments 20-30mmHg or veclro compression garment from foot to knee bilaterally. 4 each 3     pantoprazole (PROTONIX) 40 MG EC tablet Take 40 mg by mouth every morning (before breakfast)       potassium chloride ER (K-DUR/KLOR-CON M) 10 MEQ CR tablet Take 30 mEq by mouth 3 times daily        prednisoLONE acetate (PRED FORTE) 1 % ophthalmic susp Place 1 drop into both eyes as needed (eye pain)        timolol (TIMOPTIC) 0.5 % ophthalmic solution Place 1 drop into both eyes 2 times daily        vilazodone (VIIBRYD) 20 MG TABS tablet Take 20 mg by mouth daily       [DISCONTINUED] donepezil (ARICEPT) 5 MG tablet Take 5 mg by mouth At Bedtime       [DISCONTINUED] insulin aspart (NOVOLOG FLEXPEN) 100 UNIT/ML injection Inject 3 Units Subcutaneous daily (with dinner) Takes if blood glucose > 150       [DISCONTINUED] insulin aspart (NOVOLOG FLEXPEN) 100 UNIT/ML injection Inject 2 Units Subcutaneous 2 times daily (with meals) Takes with breakfast and lunch if blood glucose > 150       No facility-administered encounter medications on file as of 5/28/2019.        Again, thank you for allowing me to participate in the care of your patient.      Sincerely,    Rachna Holt PA-C     [facility]

## 2019-05-28 NOTE — LETTER
5/28/2019    Sam Villatoro PA-C  [facility] [address]  [place] [address]    RE: Gil Chowdary       Dear Colleague,    I had the pleasure of seeing Gil Chowdary in the [facility].    [id number]  HPI and Plan:   See dictation    Orders this Visit:  Orders Placed This Encounter   Procedures     Basic metabolic panel     Follow-Up with [facility] - KARYN visit     No orders of the defined types were placed in this encounter.    Medications Discontinued During This Encounter   Medication Reason     donepezil (ARICEPT) 5 MG tablet Discontinued by another Health Care Provider     insulin aspart (NOVOLOG FLEXPEN) 100 UNIT/ML injection Discontinued by another Health Care Provider     insulin aspart (NOVOLOG FLEXPEN) 100 UNIT/ML injection Discontinued by another Health Care Provider         Encounter Diagnosis   Name Primary?     (HFpEF) heart failure with preserved ejection fraction (H)        CURRENT MEDICATIONS:  Current Outpatient Medications   Medication Sig Dispense Refill     aspirin 81 MG tablet Take 81 mg by mouth every morning        atorvastatin (LIPITOR) 40 MG tablet Take 1 tablet (40 mg) by mouth daily 90 tablet 0     bumetanide (BUMEX) 1 MG tablet Take 2 tablets(2mg) by mouth twice daily 360 tablet 3     clindamycin (CLEOCIN) 300 MG capsule Take 1 capsule (300 mg) by mouth as needed 10 capsule 3     eplerenone (INSPRA) 50 MG tablet Take 1 tablet (50 mg) by mouth 2 times daily 60 tablet 11     ferrous sulfate (IRON) 325 (65 Fe) MG tablet Take 1 tablet (325 mg) by mouth 2 times daily 100 tablet 3     insulin glargine (BASAGLAR KWIKPEN) 100 UNIT/ML pen Inject 10 Units Subcutaneous every morning       magnesium oxide (MAG-OX) 400 MG tablet Take 1 tablet (400 mg) by mouth daily 90 tablet 3     metoprolol tartrate (LOPRESSOR) 25 MG tablet Take 1 tablet (25 mg) by mouth 2 times daily 180 tablet 3     Multiple Vitamins-Minerals (CENTRUM ADULTS PO)  Take 1 tablet by mouth every morning        order for DME Pt to be fit with compression garments 20-30mmHg or veclro compression garment from foot to knee bilaterally. 4 each 3     pantoprazole (PROTONIX) 40 MG EC tablet Take 40 mg by mouth every morning (before breakfast)       potassium chloride ER (K-DUR/KLOR-CON M) 10 MEQ CR tablet Take 30 mEq by mouth 3 times daily        prednisoLONE acetate (PRED FORTE) 1 % ophthalmic susp Place 1 drop into both eyes as needed (eye pain)        timolol (TIMOPTIC) 0.5 % ophthalmic solution Place 1 drop into both eyes 2 times daily        vilazodone (VIIBRYD) 20 MG TABS tablet Take 20 mg by mouth daily         ALLERGIES     Allergies   Allergen Reactions     Amoxicillin      Itchy      Penicillins Hives     Spironolactone Rash       PAST MEDICAL HISTORY:  Past Medical History:   Diagnosis Date     Former tobacco use      Glaucoma      Hyperlipidemia LDL goal <160 12/6/2011     Obesity      DRAKE on CPAP      Right bundle branch block      Severe aortic stenosis     On Echo 10/28/16       PAST SURGICAL HISTORY:  Past Surgical History:   Procedure Laterality Date     CV HEART CATHETERIZATION WITH POSSIBLE INTERVENTION N/A 3/19/2019    Procedure: RHC and LHC (No angiogram);  Surgeon: Jai Romo MD;  Location:  HEART CARDIAC CATH LAB     CV RIGHT HEART CATH N/A 3/19/2019    Procedure: Right Heart Cath;  Surgeon: Jai Romo MD;  Location:  HEART CARDIAC CATH LAB     HEART CATH LEFT HEART CATH  04/07/2017    Diffuse non-obstuctive CAD     REPAIR VALVE AORTIC N/A 5/16/2017    Procedure: REPAIR VALVE AORTIC;  Median Sternotony, CardioPulmonary Bypass, Aortic Valve Replace using 25MM Trifecta Valve with [employer], Septal myectomy;  Surgeon: Jun Simon MD;  Location:  OR     REPLACE VALVE AORTIC N/A 5/16/2017    Procedure: REPLACE VALVE AORTIC;;  Surgeon: Jun Simon MD;  Location:  OR     SINUS SURGERY  2005       FAMILY  HISTORY:  Family History   Problem Relation Age of Onset     Family History Negative Mother      Diabetes Father      Heart Surgery Father         CABG     Coronary Artery Disease Father      Hypertension Brother      Diabetes Brother      Heart Disease Brother      Heart Surgery Brother         CABG x 3     Family History Negative Sister      Diabetes Brother         History of diabetes, but no longer an issue     Family History Negative Brother        SOCIAL HISTORY:  Social History     Socioeconomic History     Marital status:      Spouse name: None     Number of children: None     Years of education: None     Highest education level: None   Occupational History     None   Social Needs     Financial resource strain: None     Food insecurity:     Worry: None     Inability: None     Transportation needs:     Medical: None     Non-medical: None   Tobacco Use     Smoking status: Former Smoker     Packs/day: 1.00     Years: 20.00     Pack years: 20.00     Types: Cigarettes, Cigars     Start date:      Last attempt to quit: 10/21/2011     Years since quittin.6     Smokeless tobacco: Never Used   Substance and Sexual Activity     Alcohol use: No     Drug use: No     Sexual activity: None   Lifestyle     Physical activity:     Days per week: None     Minutes per session: None     Stress: None   Relationships     Social connections:     Talks on phone: None     Gets together: None     Attends Druze service: None     Active member of club or organization: None     Attends meetings of clubs or organizations: None     Relationship status: None     Intimate partner violence:     Fear of current or ex partner: None     Emotionally abused: None     Physically abused: None     Forced sexual activity: None   Other Topics Concern     Parent/sibling w/ CABG, MI or angioplasty before 65F 55M? Yes     Comment: brother triple bypass 49   Social History Narrative     None       Review of Systems:  Skin:  Negative    "  Eyes:  Positive for glasses  ENT:  Negative    Respiratory:  Positive for sleep apnea;CPAP  Cardiovascular:  Negative    Gastroenterology: Positive for heartburn;diarrhea  Genitourinary:  Negative    Musculoskeletal:  Positive for arthritis  Neurologic:  Negative    Psychiatric:  Positive for anxiety;depression  Heme/Lymph/Imm:  Negative    Endocrine:  Positive for diabetes    Physical Exam:  Vitals: /64   Pulse 72   Ht 1.753 m (5' 9\")   Wt 122.3 kg (269 lb 11.2 oz)   SpO2 94%   BMI 39.83 kg/m      Please refer to dictation for physical exam    Recent Lab Results:  LIPID RESULTS:  Lab Results   Component Value Date    CHOL 126 10/10/2018    HDL 23 (A) 10/10/2018    LDL 71 10/10/2018    TRIG 230 (A) 10/10/2018       LIVER ENZYME RESULTS:  Lab Results   Component Value Date    AST 88 (H) 03/18/2019    ALT 44 03/18/2019       CBC RESULTS:  Lab Results   Component Value Date    WBC 4.3 04/05/2019    RBC 3.53 (L) 04/05/2019    HGB 11.4 (L) 04/05/2019    HCT 35.0 (L) 04/05/2019    MCV 99 04/05/2019    MCH 32.3 04/05/2019    MCHC 32.6 04/05/2019    RDW 15.2 (H) 04/05/2019    PLT 86 (L) 04/05/2019       BMP RESULTS:  Lab Results   Component Value Date     05/28/2019    POTASSIUM 4.0 05/28/2019    CHLORIDE 104 05/28/2019    CO2 26 05/28/2019    ANIONGAP 12 05/28/2019     (H) 05/28/2019    BUN 17 05/28/2019    CR 1.38 (H) 05/28/2019    GFRESTIMATED 53 (L) 05/28/2019    GFRESTBLACK 64 05/28/2019    MYRON 9.4 05/28/2019        A1C RESULTS:  Lab Results   Component Value Date    A1C 5.4 03/16/2019       INR RESULTS:  Lab Results   Component Value Date    INR 1.28 (H) 03/15/2019    INR 2.19 (H) 10/14/2018           CC  Rachna Holt PA-C  [address]  [place] [address]        Thank you for allowing me to participate in the care of your patient.      Sincerely,     Rachna Holt PA-C     [facility]    cc:   Rachna Holt PAGerardoC  [address]  [place] [address]

## 2019-05-28 NOTE — PATIENT INSTRUCTIONS
Call [facility] nurse for any questions or concerns Mon-Fri 8am-4pm:                                                #[phone]                                       For concerns after hours:                                               #[phone]     1: Medication changes: continue current medications.     2: Plan from today: get in touch with [facility] and see if they have everything they need to get your leg sequential compressions devices.      Please let your liver doctor know about fluid you've been retaining this winter- it may be part of the liver issue.      I'll see you back in mid July with labs.      3: Lab results: labs are beautiful!

## 2019-05-28 NOTE — RESULT ENCOUNTER NOTE
Reviewed during clinic visit.  Please see progress note for plan.  Rachna Holt PA-C 5/28/2019 5:14 PM

## 2019-05-28 NOTE — PROGRESS NOTES
[id number]  HPI and Plan:   See dictation    Orders this Visit:  Orders Placed This Encounter   Procedures     Basic metabolic panel     Follow-Up with [facility] - [facility]N visit     No orders of the defined types were placed in this encounter.    Medications Discontinued During This Encounter   Medication Reason     donepezil (ARICEPT) 5 MG tablet Discontinued by another Health Care Provider     insulin aspart (NOVOLOG FLEXPEN) 100 UNIT/ML injection Discontinued by another Health Care Provider     insulin aspart (NOVOLOG FLEXPEN) 100 UNIT/ML injection Discontinued by another Health Care Provider         Encounter Diagnosis   Name Primary?     (HFpEF) heart failure with preserved ejection fraction (H)        CURRENT MEDICATIONS:  Current Outpatient Medications   Medication Sig Dispense Refill     aspirin 81 MG tablet Take 81 mg by mouth every morning        atorvastatin (LIPITOR) 40 MG tablet Take 1 tablet (40 mg) by mouth daily 90 tablet 0     bumetanide (BUMEX) 1 MG tablet Take 2 tablets(2mg) by mouth twice daily 360 tablet 3     clindamycin (CLEOCIN) 300 MG capsule Take 1 capsule (300 mg) by mouth as needed 10 capsule 3     eplerenone (INSPRA) 50 MG tablet Take 1 tablet (50 mg) by mouth 2 times daily 60 tablet 11     ferrous sulfate (IRON) 325 (65 Fe) MG tablet Take 1 tablet (325 mg) by mouth 2 times daily 100 tablet 3     insulin glargine (BASAGLAR KWIKPEN) 100 UNIT/ML pen Inject 10 Units Subcutaneous every morning       magnesium oxide (MAG-OX) 400 MG tablet Take 1 tablet (400 mg) by mouth daily 90 tablet 3     metoprolol tartrate (LOPRESSOR) 25 MG tablet Take 1 tablet (25 mg) by mouth 2 times daily 180 tablet 3     Multiple Vitamins-Minerals (CENTRUM ADULTS PO) Take 1 tablet by mouth every morning        order for DME Pt to be fit with compression garments 20-30mmHg or veclro compression garment from foot to knee bilaterally. 4 each 3     pantoprazole (PROTONIX) 40 MG EC tablet Take 40 mg by mouth every morning  (before breakfast)       potassium chloride ER (K-DUR/KLOR-CON M) 10 MEQ CR tablet Take 30 mEq by mouth 3 times daily        prednisoLONE acetate (PRED FORTE) 1 % ophthalmic susp Place 1 drop into both eyes as needed (eye pain)        timolol (TIMOPTIC) 0.5 % ophthalmic solution Place 1 drop into both eyes 2 times daily        vilazodone (VIIBRYD) 20 MG TABS tablet Take 20 mg by mouth daily         ALLERGIES     Allergies   Allergen Reactions     Amoxicillin      Itchy      Penicillins Hives     Spironolactone Rash       PAST MEDICAL HISTORY:  Past Medical History:   Diagnosis Date     Former tobacco use      Glaucoma      Hyperlipidemia LDL goal <160 12/6/2011     Obesity      DRAKE on CPAP      Right bundle branch block      Severe aortic stenosis     On Echo 10/28/16       PAST SURGICAL HISTORY:  Past Surgical History:   Procedure Laterality Date     CV HEART CATHETERIZATION WITH POSSIBLE INTERVENTION N/A 3/19/2019    Procedure: RHC and LHC (No angiogram);  Surgeon: Jai Romo MD;  Location:  HEART CARDIAC CATH LAB     CV RIGHT HEART CATH N/A 3/19/2019    Procedure: Right Heart Cath;  Surgeon: Jai Romo MD;  Location:  HEART CARDIAC CATH LAB     HEART CATH LEFT HEART CATH  04/07/2017    Diffuse non-obstuctive CAD     REPAIR VALVE AORTIC N/A 5/16/2017    Procedure: REPAIR VALVE AORTIC;  Median Sternotony, CardioPulmonary Bypass, Aortic Valve Replace using 25MM Trifecta Valve with [employer], Septal myectomy;  Surgeon: Jun Simon MD;  Location:  OR     REPLACE VALVE AORTIC N/A 5/16/2017    Procedure: REPLACE VALVE AORTIC;;  Surgeon: Jun Simon MD;  Location:  OR     SINUS SURGERY  2005       FAMILY HISTORY:  Family History   Problem Relation Age of Onset     Family History Negative Mother      Diabetes Father      Heart Surgery Father         CABG     Coronary Artery Disease Father      Hypertension Brother      Diabetes Brother      Heart Disease Brother       Heart Surgery Brother         CABG x 3     Family History Negative Sister      Diabetes Brother         History of diabetes, but no longer an issue     Family History Negative Brother        SOCIAL HISTORY:  Social History     Socioeconomic History     Marital status:      Spouse name: None     Number of children: None     Years of education: None     Highest education level: None   Occupational History     None   Social Needs     Financial resource strain: None     Food insecurity:     Worry: None     Inability: None     Transportation needs:     Medical: None     Non-medical: None   Tobacco Use     Smoking status: Former Smoker     Packs/day: 1.00     Years: 20.00     Pack years: 20.00     Types: Cigarettes, Cigars     Start date:      Last attempt to quit: 10/21/2011     Years since quittin.6     Smokeless tobacco: Never Used   Substance and Sexual Activity     Alcohol use: No     Drug use: No     Sexual activity: None   Lifestyle     Physical activity:     Days per week: None     Minutes per session: None     Stress: None   Relationships     Social connections:     Talks on phone: None     Gets together: None     Attends Temple service: None     Active member of club or organization: None     Attends meetings of clubs or organizations: None     Relationship status: None     Intimate partner violence:     Fear of current or ex partner: None     Emotionally abused: None     Physically abused: None     Forced sexual activity: None   Other Topics Concern     Parent/sibling w/ CABG, MI or angioplasty before 65F 55M? Yes     Comment: brother triple bypass 49   Social History Narrative     None       Review of Systems:  Skin:  Negative     Eyes:  Positive for glasses  ENT:  Negative    Respiratory:  Positive for sleep apnea;CPAP  Cardiovascular:  Negative    Gastroenterology: Positive for heartburn;diarrhea  Genitourinary:  Negative    Musculoskeletal:  Positive for arthritis  Neurologic:   "Negative    Psychiatric:  Positive for anxiety;depression  Heme/Lymph/Imm:  Negative    Endocrine:  Positive for diabetes    Physical Exam:  Vitals: /64   Pulse 72   Ht 1.753 m (5' 9\")   Wt 122.3 kg (269 lb 11.2 oz)   SpO2 94%   BMI 39.83 kg/m     Please refer to dictation for physical exam    Recent Lab Results:  LIPID RESULTS:  Lab Results   Component Value Date    CHOL 126 10/10/2018    HDL 23 (A) 10/10/2018    LDL 71 10/10/2018    TRIG 230 (A) 10/10/2018       LIVER ENZYME RESULTS:  Lab Results   Component Value Date    AST 88 (H) 03/18/2019    ALT 44 03/18/2019       CBC RESULTS:  Lab Results   Component Value Date    WBC 4.3 04/05/2019    RBC 3.53 (L) 04/05/2019    HGB 11.4 (L) 04/05/2019    HCT 35.0 (L) 04/05/2019    MCV 99 04/05/2019    MCH 32.3 04/05/2019    MCHC 32.6 04/05/2019    RDW 15.2 (H) 04/05/2019    PLT 86 (L) 04/05/2019       BMP RESULTS:  Lab Results   Component Value Date     05/28/2019    POTASSIUM 4.0 05/28/2019    CHLORIDE 104 05/28/2019    CO2 26 05/28/2019    ANIONGAP 12 05/28/2019     (H) 05/28/2019    BUN 17 05/28/2019    CR 1.38 (H) 05/28/2019    GFRESTIMATED 53 (L) 05/28/2019    GFRESTBLACK 64 05/28/2019    MYRON 9.4 05/28/2019        A1C RESULTS:  Lab Results   Component Value Date    A1C 5.4 03/16/2019       INR RESULTS:  Lab Results   Component Value Date    INR 1.28 (H) 03/15/2019    INR 2.19 (H) 10/14/2018           CC  MAHNAZ LuciaC  [address]  [place] [id number]      "

## 2019-05-29 NOTE — PROGRESS NOTES
Service Date: 2019      PRIMARY CARDIOLOGIST:  Dr. Mckeon.      REASON FOR VISIT:  Heart failure followup.      HISTORY OF PRESENT ILLNESS:  Mr. Chowdary is a delightful 58-year-old gentleman with past medical history significant for the followin.  Severe aortic stenosis with aortic valve replacement in 2017 with a 25 mm Trifecta tissue valve.   2.  Type 2 diabetes, on insulin.   3.  Hyperlipidemia.   4.  Treated sleep apnea.   5.  Heart failure with preserved EF.   6.  History of iron deficiency anemia and ITP followed by Dr. Fletcher.   7.  History of supraventricular tachycardia and paroxysmal AFib postoperatively, none on recent Zio Patch.   8.  History of myectomy at the time of AVR, but no clear HCM.   9.  Edema of mixed etiology, lymphedema and heart failure.      I initially met Adolfo in 10/2018 when he had been admitted with weight gain and shortness of breath in the context of acute anemia with a george of 7.4.  He was unable to stay inpatient as he took care of his elderly mother.  We tried to diurese him as an outpatient throughout the winter, but he eventually ended up admitted and was diuresed 35 pounds.  Right heart cath during that stay showed no constriction.      He comes in today for routine followup and he is doing well from a cardiac standpoint.  He denies chest pain, shortness of breath, orthopnea or PND.  He continues to struggle with his memory and this is a big problem for him.  He is doing the best he can to eat and drink appropriately.  He is attending cardiac rehab once a week.  He tells me that Dr. Villatoro, his primary, has recently noticed some liver abnormalities and was initially told that it was hepatitis C, although he was told this morning it is not hep C but he will be seeing a specialist for this later this week.      SOCIAL HISTORY:  He was living at home taking care of his mother, who is in her 90s.  She has passed away approximately 1 month ago.  The house is sold and they  closed in late June.  He and his sister are working on cleaning it out and getting it ready.  At that point he is going to stay with his brother for a while and perhaps his sister before he works on getting a place of his own, if that is affordable.  He is a former smoker, 20-pack-year history, quit in 2011.  No alcohol use.      PHYSICAL EXAMINATION:   GENERAL:  Well-developed, well-nourished gentleman in no acute distress.   HEENT:  Normocephalic, atraumatic.   HEART:  Regular.  Valve sounds are crisp.  I do not appreciate murmur or rub.   LUNGS:  Clear bilaterally without wheezing, rales or rhonchi.     EXTREMITIES:  Legs have 1+ woody and pitting edema to the knee, improved from previous.     NECK:  At 90 degrees his neck veins are flat.   SKIN:  Warm and dry.      LABORATORY DATA:  Labs today show a creatinine of 1.38, BUN 17, potassium 4.0, sodium 138.      ASSESSMENT AND PLAN:   1.  Heart failure with preserved EF with no sign of constriction on right heart cath.  His weights are improving and are stable at home between 261 and 266.  This is also actually his parameters.  He is on Bumex 2 mg b.i.d. and eplerenone 50 mg b.i.d.  With this, he is still requiring 30 mEq of potassium 3 times a day.  At this point, we will keep him on these current doses.  I am hopeful that his weight will continue to trend down, and we will need to adjust his weigh range based on actual weight.  At this point, I think he is euvolemic.   2.  Anemia and thrombocytopenia, very well improved, followed by Dr. Fletcher.   3.  History of aortic valve replacement with a bioprosthetic aortic valve and history of septal myectomy for septal hypertrophy without LVOT gradient.  He requires aspirin alone for anticoagulation and antibiotic prophylaxis for any dental work.   4.  Treated sleep apnea.   5.  Dyslipidemia, well controlled on Lipitor 40 mg.   6.  History of SVT and paroxysmal AFib postop, none seen recently.   7.  Memory loss, depression,  recent abnormal liver findings.  This is followed by his primary and will be seen by a liver specialist.  It is possible that his congestion caused some hepatic, although we will defer to hepatologist on this.   8.  Lymphedema as well as edema from heart failure that is a mixed picture.  There is a plan for sequential compression devices, and I think this would be wise.  It will certainly help prevent hospital admissions and mobilize his fluid.      Thank you for allowing me to participate in this delightful patient's care.      SIRENA Saucedo PA-C             D: 2019   T: 2019   MT: BLANE      Name:     ABIGAIL MATOS   MRN:      [id number]        Account:      [id number]   :      1960           Service Date: 2019      Document: [id number]

## 2019-06-05 NOTE — PROGRESS NOTES
Cardiopulmonary Discharge Summary    Reason for discharge:    Patient/family request discontinuation of services.    Progress towards goals:  Goals partially met.  Barriers to achieving goals:   discharge from facility.    Recommendation(s):    Continue home exercise program. Pt will benefit from continued exercise program (aerobic and muscle conditioning at home to maintain health weight, improve conditioning, and quality of life.

## 2019-06-06 ENCOUNTER — CARE COORDINATION (OUTPATIENT)
Dept: CARDIOLOGY | Facility: CLINIC | Age: 59
End: 2019-06-06

## 2019-06-06 NOTE — PROGRESS NOTES
Called patient as there is no MHT transmission today. Left detailed message re:daily calls to MHT. Will watch for transmission.  Jaqueline Miller RN on 6/6/2019 at 11:40 AM

## 2019-06-11 ENCOUNTER — TRANSFERRED RECORDS (OUTPATIENT)
Dept: HEALTH INFORMATION MANAGEMENT | Facility: CLINIC | Age: 59
End: 2019-06-11

## 2019-06-12 NOTE — PROGRESS NOTES
Last MyHealth Tracker/HF telemanagement survey 6/10. Called pt and LVM requesting he report surveys daily.    Karen Stark RN BSN   1:45 PM 06/12/19

## 2019-06-26 ENCOUNTER — TELEPHONE (OUTPATIENT)
Dept: CARDIOLOGY | Facility: CLINIC | Age: 59
End: 2019-06-26

## 2019-06-26 DIAGNOSIS — I50.9 CHF EXACERBATION (H): Primary | ICD-10-CM

## 2019-06-26 NOTE — TELEPHONE ENCOUNTER
Prior Authorization Retail Medication Request    Medication/Dose: potassium chloride 10meq tabs, pt takes 30meq 3 times a day  ICD code (if different than what is on RX):  CHF,AFib,Aortic valve replacement  Previously Tried and Failed:    Rationale:  Pt uses 10 meq tabs, but needs #9 to get his daily dose    Insurance Name:  Maliha MILLER  Insurance ID:  [id number]      Pharmacy Information (if different than what is on RX)  Name:  [facility]  [address] [place]  Phone:

## 2019-06-28 NOTE — TELEPHONE ENCOUNTER
PA Initiation    Medication: Potassium Chloride 10meq tabs-   Insurance Company: [employer] - Phone [phone] Fax [phone]  Pharmacy Filling the Rx: [facility] [address] [place] [address] AVE AT [address] [place] [address] & [facility]  Filling Pharmacy Phone: [phone]  Filling Pharmacy Fax: [phone]  Start Date: 6/28/2019

## 2019-07-02 NOTE — TELEPHONE ENCOUNTER
"Initially submitted to [facility], arline'd fax stating \"Patient doesn't have a DOD  policy, please submit to correct plan.\"  - Refaxed to [facility] at [phone]  "

## 2019-07-03 RX ORDER — POTASSIUM CHLORIDE 1125 MG/1
30 TABLET, EXTENDED RELEASE ORAL 3 TIMES DAILY
Qty: 180 TABLET | Refills: 11 | Status: SHIPPED | OUTPATIENT
Start: 2019-07-03 | End: 2019-08-13

## 2019-07-03 NOTE — TELEPHONE ENCOUNTER
PRIOR AUTHORIZATION DENIED    Medication: Potassium Chloride 10meq tabs-     Denial Date: 7/2/2019    Denial Rational: insurance will only cover a max of 3 tablets per day. Consider doing a 20 MEQ and 10 MEQ Script?    Appeal Information: n/a

## 2019-07-03 NOTE — TELEPHONE ENCOUNTER
Rx sent to pharmacy for KCL 15meq tabs, take two tabs (30meq) TID.     Karen Stark RN BSN   3:52 PM 07/03/19

## 2019-07-08 ENCOUNTER — CARE COORDINATION (OUTPATIENT)
Dept: CARDIOLOGY | Facility: CLINIC | Age: 59
End: 2019-07-08

## 2019-07-08 DIAGNOSIS — I50.32 CHRONIC HEART FAILURE WITH PRESERVED EJECTION FRACTION (H): Primary | ICD-10-CM

## 2019-07-08 NOTE — PROGRESS NOTES
Last MyHealth Tracker/HF telemanagement survey 7/3/19. Called pt and LVM requesting he report surveys daily and reminding him of [facility] follow-up 7/15.    Karen Stark RN BSN   12:32 PM 07/08/19

## 2019-07-12 NOTE — PROGRESS NOTES
[facility] - MyHealth Tracker     Alert for: wt 1# below wt parameters  Heart Failure sx: none  Current weight parameters: 262-270#  Next follow up: 7/15/19 visit with JOAN Babcock and labs    Wt 1# below parameters, no symptoms reported. Pt has visit Monday with [facility]. Will continue to monitor. YOANNA Pearl 10:18 AM 07/12/19

## 2019-07-15 ENCOUNTER — OFFICE VISIT (OUTPATIENT)
Dept: CARDIOLOGY | Facility: CLINIC | Age: 59
End: 2019-07-15
Attending: PHYSICIAN ASSISTANT
Payer: COMMERCIAL

## 2019-07-15 VITALS
HEIGHT: 69 IN | DIASTOLIC BLOOD PRESSURE: 60 MMHG | BODY MASS INDEX: 39.78 KG/M2 | WEIGHT: 268.6 LBS | OXYGEN SATURATION: 96 % | SYSTOLIC BLOOD PRESSURE: 100 MMHG | HEART RATE: 64 BPM

## 2019-07-15 DIAGNOSIS — I50.32 CHRONIC HEART FAILURE WITH PRESERVED EJECTION FRACTION (H): Primary | ICD-10-CM

## 2019-07-15 DIAGNOSIS — I50.30 (HFPEF) HEART FAILURE WITH PRESERVED EJECTION FRACTION (H): ICD-10-CM

## 2019-07-15 DIAGNOSIS — Z95.2 H/O AORTIC VALVE REPLACEMENT: ICD-10-CM

## 2019-07-15 LAB
ANION GAP SERPL CALCULATED.3IONS-SCNC: 6 MMOL/L (ref 3–14)
BUN SERPL-MCNC: 14 MG/DL (ref 7–30)
CALCIUM SERPL-MCNC: 8.8 MG/DL (ref 8.5–10.1)
CHLORIDE SERPL-SCNC: 105 MMOL/L (ref 94–109)
CO2 SERPL-SCNC: 27 MMOL/L (ref 20–32)
CREAT SERPL-MCNC: 1.14 MG/DL (ref 0.66–1.25)
GFR SERPL CREATININE-BSD FRML MDRD: 70 ML/MIN/{1.73_M2}
GLUCOSE SERPL-MCNC: 196 MG/DL (ref 70–99)
POTASSIUM SERPL-SCNC: 4 MMOL/L (ref 3.4–5.3)
SODIUM SERPL-SCNC: 138 MMOL/L (ref 133–144)

## 2019-07-15 PROCEDURE — 99214 OFFICE O/P EST MOD 30 MIN: CPT | Performed by: PHYSICIAN ASSISTANT

## 2019-07-15 PROCEDURE — 36415 COLL VENOUS BLD VENIPUNCTURE: CPT | Performed by: PHYSICIAN ASSISTANT

## 2019-07-15 PROCEDURE — 80048 BASIC METABOLIC PNL TOTAL CA: CPT | Performed by: PHYSICIAN ASSISTANT

## 2019-07-15 RX ORDER — MEMANTINE HYDROCHLORIDE 5 MG/1
5 TABLET ORAL 2 TIMES DAILY
COMMUNITY

## 2019-07-15 ASSESSMENT — MIFFLIN-ST. JEOR: SCORE: 2023.74

## 2019-07-15 NOTE — RESULT ENCOUNTER NOTE
Reviewed during clinic visit.  Please see progress note for plan.  Rachna Holt PA-C 7/15/2019 9:33 AM

## 2019-07-15 NOTE — LETTER
7/15/2019    Sam Villatoro PA-C  [email or web address] [address]  [facility] [address]    RE: Gil Chowdary       Dear Colleague,    I had the pleasure of seeing Gil Chowdary in the [facility].    [id number]  HPI and Plan:   See dictation    Orders this Visit:  Orders Placed This Encounter   Procedures     Basic metabolic panel     N terminal pro BNP outpatient     Follow-Up with [facility] - KARYN visit     Orders Placed This Encounter   Medications     memantine (NAMENDA) 5 MG tablet     Sig: Take 5 mg by mouth 2 times daily     Insulin Aspart (NOVOLOG FLEXPEN SC)     Sig: Inject Subcutaneous as needed     Medications Discontinued During This Encounter   Medication Reason     insulin glargine (BASAGLAR KWIKPEN) 100 UNIT/ML pen Discontinued by another Health Care Provider     ferrous sulfate (IRON) 325 (65 Fe) MG tablet          Encounter Diagnoses   Name Primary?     (HFpEF) heart failure with preserved ejection fraction (H)      Chronic heart failure with preserved ejection fraction (H) Yes     H/O aortic valve replacement        CURRENT MEDICATIONS:  Current Outpatient Medications   Medication Sig Dispense Refill     aspirin 81 MG tablet Take 81 mg by mouth every morning        atorvastatin (LIPITOR) 40 MG tablet Take 1 tablet (40 mg) by mouth daily 90 tablet 0     bumetanide (BUMEX) 1 MG tablet Take 2 tablets(2mg) by mouth twice daily 360 tablet 3     clindamycin (CLEOCIN) 300 MG capsule Take 1 capsule (300 mg) by mouth as needed 10 capsule 3     eplerenone (INSPRA) 50 MG tablet Take 1 tablet (50 mg) by mouth 2 times daily 60 tablet 11     Insulin Aspart (NOVOLOG FLEXPEN SC) Inject Subcutaneous as needed       magnesium oxide (MAG-OX) 400 MG tablet Take 1 tablet (400 mg) by mouth daily 90 tablet 3     memantine (NAMENDA) 5 MG tablet Take 5 mg by mouth 2 times daily       metoprolol tartrate (LOPRESSOR) 25 MG tablet Take 1 tablet (25 mg) by mouth 2 times  daily 180 tablet 3     Multiple Vitamins-Minerals (CENTRUM ADULTS PO) Take 1 tablet by mouth every morning        order for DME Pt to be fit with compression garments 20-30mmHg or veclro compression garment from foot to knee bilaterally. 4 each 3     pantoprazole (PROTONIX) 40 MG EC tablet Take 40 mg by mouth every morning (before breakfast)       potassium chloride ER (K-DUR/KLOR-CON M) 10 MEQ CR tablet Take 30 mEq by mouth 3 times daily        potassium chloride ER (KLOR-CON M15) 15 MEQ CR tablet Take 2 tablets (30 mEq) by mouth 3 times daily 180 tablet 11     prednisoLONE acetate (PRED FORTE) 1 % ophthalmic susp Place 1 drop into both eyes as needed (eye pain)        timolol (TIMOPTIC) 0.5 % ophthalmic solution Place 1 drop into both eyes 2 times daily        vilazodone (VIIBRYD) 20 MG TABS tablet Take 20 mg by mouth daily         ALLERGIES     Allergies   Allergen Reactions     Amoxicillin      Itchy      Penicillins Hives     Spironolactone Rash       PAST MEDICAL HISTORY:  Past Medical History:   Diagnosis Date     Former tobacco use      Glaucoma      Hyperlipidemia LDL goal <160 12/6/2011     Obesity      DRAKE on CPAP      Right bundle branch block      Severe aortic stenosis     On Echo 10/28/16       PAST SURGICAL HISTORY:  Past Surgical History:   Procedure Laterality Date     CV HEART CATHETERIZATION WITH POSSIBLE INTERVENTION N/A 3/19/2019    Procedure: RHC and LHC (No angiogram);  Surgeon: Jai Romo MD;  Location:  HEART CARDIAC CATH LAB     CV RIGHT HEART CATH N/A 3/19/2019    Procedure: Right Heart Cath;  Surgeon: Jai Romo MD;  Location:  HEART CARDIAC CATH LAB     HEART CATH LEFT HEART CATH  04/07/2017    Diffuse non-obstuctive CAD     REPAIR VALVE AORTIC N/A 5/16/2017    Procedure: REPAIR VALVE AORTIC;  Median Sternotony, CardioPulmonary Bypass, Aortic Valve Replace using 25MM Trifecta Valve with [employer], Septal myectomy;  Surgeon: Jun Simon MD;   Location: [facility] OR     REPLACE VALVE AORTIC N/A 2017    Procedure: REPLACE VALVE AORTIC;;  Surgeon: Jun Simon MD;  Location: [facility] OR     SINUS SURGERY         FAMILY HISTORY:  Family History   Problem Relation Age of Onset     Family History Negative Mother      Diabetes Father      Heart Surgery Father         CABG     Coronary Artery Disease Father      Hypertension Brother      Diabetes Brother      Heart Disease Brother      Heart Surgery Brother         CABG x 3     Family History Negative Sister      Diabetes Brother         History of diabetes, but no longer an issue     Family History Negative Brother        SOCIAL HISTORY:  Social History     Socioeconomic History     Marital status:      Spouse name: Not on file     Number of children: Not on file     Years of education: Not on file     Highest education level: Not on file   Occupational History     Not on file   Social Needs     Financial resource strain: Not on file     Food insecurity:     Worry: Not on file     Inability: Not on file     Transportation needs:     Medical: Not on file     Non-medical: Not on file   Tobacco Use     Smoking status: Former Smoker     Packs/day: 1.00     Years: 20.00     Pack years: 20.00     Types: Cigarettes, Cigars     Start date:      Last attempt to quit: 10/21/2011     Years since quittin.7     Smokeless tobacco: Never Used   Substance and Sexual Activity     Alcohol use: No     Drug use: No     Sexual activity: Not on file   Lifestyle     Physical activity:     Days per week: Not on file     Minutes per session: Not on file     Stress: Not on file   Relationships     Social connections:     Talks on phone: Not on file     Gets together: Not on file     Attends Yazidi service: Not on file     Active member of club or organization: Not on file     Attends meetings of clubs or organizations: Not on file     Relationship status: Not on file     Intimate partner violence:     Fear of  "current or ex partner: Not on file     Emotionally abused: Not on file     Physically abused: Not on file     Forced sexual activity: Not on file   Other Topics Concern     Parent/sibling w/ CABG, MI or angioplasty before 65F 55M? Yes     Comment: brother triple bypass 49   Social History Narrative     Not on file       Review of Systems:  Skin:  Negative     Eyes:  Positive for glasses  ENT:  Negative    Respiratory:  Positive for sleep apnea;CPAP  Cardiovascular:    lightheadedness;Positive for  Gastroenterology: Positive for heartburn;diarrhea  Genitourinary:  Negative    Musculoskeletal:  Positive for arthritis  Neurologic:  Positive for memory problems  Psychiatric:  Positive for anxiety;depression  Heme/Lymph/Imm:  Negative    Endocrine:  Positive for diabetes    Physical Exam:  Vitals: /60   Pulse 64   Ht 1.753 m (5' 9\")   Wt 121.8 kg (268 lb 9.6 oz)   SpO2 96%   BMI 39.67 kg/m      Please refer to dictation for physical exam    Recent Lab Results:  LIPID RESULTS:  Lab Results   Component Value Date    CHOL 126 10/10/2018    HDL 23 (A) 10/10/2018    LDL 71 10/10/2018    TRIG 230 (A) 10/10/2018       LIVER ENZYME RESULTS:  Lab Results   Component Value Date    AST 88 (H) 03/18/2019    ALT 44 03/18/2019       CBC RESULTS:  Lab Results   Component Value Date    WBC 4.3 04/05/2019    RBC 3.53 (L) 04/05/2019    HGB 11.4 (L) 04/05/2019    HCT 35.0 (L) 04/05/2019    MCV 99 04/05/2019    MCH 32.3 04/05/2019    MCHC 32.6 04/05/2019    RDW 15.2 (H) 04/05/2019    PLT 86 (L) 04/05/2019       BMP RESULTS:  Lab Results   Component Value Date     07/15/2019    POTASSIUM 4.0 07/15/2019    CHLORIDE 105 07/15/2019    CO2 27 07/15/2019    ANIONGAP 6 07/15/2019     (H) 07/15/2019    BUN 14 07/15/2019    CR 1.14 07/15/2019    GFRESTIMATED 70 07/15/2019    GFRESTBLACK 81 07/15/2019    MYRON 8.8 07/15/2019        A1C RESULTS:  Lab Results   Component Value Date    A1C 5.4 03/16/2019       INR RESULTS:  Lab " Results   Component Value Date    INR 1.28 (H) 03/15/2019    INR 2.19 (H) 10/14/2018           CC  Rachna Holt PA-C  [address]  [place] [id number]        Thank you for allowing me to participate in the care of your patient.      Sincerely,     Rachna Holt PA-C     [facility]    cc:   Rachna Holt PA-C  [address]  [place] [id number]

## 2019-07-15 NOTE — PROGRESS NOTES
Service Date: 07/15/2019      PRIMARY CARDIOLOGIST:  Dr. Mckeon.      REASON FOR VISIT:  Heart failure followup.      HISTORY OF PRESENT ILLNESS:  Mr. Chowdary is a delightful 58-year-old gentleman with past medical history significant for the followin.  Severe aortic stenosis with aortic valve replacement in 2017 with a 25 mm Trifecta tissue valve.   2.  Type 2 diabetes.  No longer on insulin.   3.  Hyperlipidemia.   4.  Treated sleep apnea.   5.  Heart failure with preserved EF.   6.  History of iron deficiency anemia and ITP followed by Dr. Fletcher.   7.  History of SVT and paroxysmal AFib postop, none on recent Zio Patch.   8.  History of myectomy at the time of aortic valve replacement but no clear hypertrophic cardiomyopathy diagnosis.   9.  Edema, mixed etiology, lymphedema and heart failure.      I had initially met Adolfo back in 10/2018 when he had been admitted with weight gain and shortness of breath in the context of acute anemia with a george of 7.4.  He was unable to stay inpatient as he took care of his elderly mother.  We tried to diurese him as an outpatient throughout winter so he could continue to care for his mother.  He eventually ended up inpatient and was diuresed 35 pounds.  He underwent a right heart cath that showed no constriction.      He comes in today for routine followup.  His mother passed away in April, and they have sold her house, so he is now staying either with his sister or his brother for weeks at a time.  He does not know his next steps.  He has also recently been diagnosed with early onset dementia.  From a cardiac standpoint, he is doing well.  He denies chest pain, shortness of breath, orthopnea or PND.  He had a couple of episodes of dizziness and lightheadedness right after his mom passed, but he is not sure what that was from, perhaps he just felt overwhelmed.  He is trying to watch his salt and trying to weigh himself daily, although he misses some days due to changing  houses.  He also is wearing his CPAP regularly.      SOCIAL HISTORY:  He was living at home taking care of his mother, who was in her late 90s.  She passed away in 04/2019.  The house is now sold, and he is trying to figure out where he is going to live next.  He is a former smoker with a 20-pack-year history, quit in 2011.  No alcohol use.  He is watching his sodium.  He does have Social Security Disability in process with hopefully a hearing in October.      PHYSICAL EXAMINATION:   GENERAL:  Well-developed, well-nourished gentleman in no acute distress.   HEENT:  Normocephalic, atraumatic.   HEART:  Regular in rate and rhythm.  I do not appreciate murmur, rub or gallop.   RESPIRATORY:  Lungs are clear without wheezes, rales or rhonchi.   EXTREMITIES:  With 1+ pitting edema to mid shin, which has greatly improved from previous, with compression stockings in place.  He has 8 cm JVP with minimal hepatojugular reflux at 30 degrees.      LABORATORY STUDIES:  Labs show creatinine of 1.14, BUN 14, potassium 4.0, sodium 138.      ASSESSMENT AND PLAN:   1.  Heart failure with preserved EF.  There is no sign of constriction on right heart cath.  His weights are stable at home at about 262 pounds, which is impressive given how frequently he has been required to move and all of the changes in his life.  At this point, we will keep him on his current diuretic dose, which is 2 mg of Bumex b.i.d.  He will also remain on Lopressor 25 mg b.i.d. and eplerenone 50 mg b.i.d.  He is unclear on his potassium dose, and I will have my nurses call to make sure we have that correct in the chart.  We believe he is also taking 30 mEq potassium b.i.d.   2.  Anemia and thrombocytopenia with ITP followed by Dr. Fletcher, now off iron.   3.  History of aortic valve replacement with bioprosthetic aortic valve and history of septal myectomy at that time for septal hypertrophy without LVOT gradient.  He is on aspirin alone for anticoagulation and  antibiotic prophylaxis for dental work.  Normal function on last echo.   4.  Treated sleep apnea.   5.  Dyslipidemia, well controlled on Lipitor 40 mg.   6.  History of SVT and paroxysmal AFib postoperatively, none seen recently.  Now off anticoagulation.   7.  Memory loss per patient now diagnosed with early onset dementia.   8.  Lymphedema as well as edema from heart failure and a mixed picture.  We will continue with sequential compression devices and compression stockings.      Thank you for allowing me to participate in this delightful patient's care.      SIRENA Saucedo PA-C             D: 07/15/2019   T: 07/15/2019   MT: BLANE      Name:     ABIGAIL MATOS   MRN:      [id number]        Account:      [id number]   :      1960           Service Date: 07/15/2019      Document: [id number]

## 2019-07-15 NOTE — PATIENT INSTRUCTIONS
Call [facility] nurse for any questions or concerns Mon-Fri 8am-4pm:                                                #[phone]                                       For concerns after hours:                                               #[phone]     I'll have the nurses call you later this morning to check on the dose of your potassium to make sure we have the correct dose of your potassium.      1: Medication changes: continue current medications.     2: Plan from today: I'll see you back in October with labs before that visit.  Please call if you need anything beforehand!    3: Lab results: see attached;  Beautiful labs.    Component      Latest Ref Rng & Units 7/15/2019   Sodium      133 - 144 mmol/L 138   Potassium      3.4 - 5.3 mmol/L 4.0   Chloride      94 - 109 mmol/L 105   Carbon Dioxide      20 - 32 mmol/L 27   Anion Gap      3 - 14 mmol/L 6   Glucose      70 - 99 mg/dL 196 (H)   Urea Nitrogen      7 - 30 mg/dL 14   Creatinine      0.66 - 1.25 mg/dL 1.14   GFR Estimate      >60 mL/min/1.73:m2 70   GFR Estimate If Black      >60 mL/min/1.73:m2 81   Calcium      8.5 - 10.1 mg/dL 8.8

## 2019-07-15 NOTE — PROGRESS NOTES
[id number]  HPI and Plan:   See dictation    Orders this Visit:  Orders Placed This Encounter   Procedures     Basic metabolic panel     N terminal pro BNP outpatient     Follow-Up with [facility] - KARYN visit     Orders Placed This Encounter   Medications     memantine (NAMENDA) 5 MG tablet     Sig: Take 5 mg by mouth 2 times daily     Insulin Aspart (NOVOLOG FLEXPEN SC)     Sig: Inject Subcutaneous as needed     Medications Discontinued During This Encounter   Medication Reason     insulin glargine (BASAGLAR KWIKPEN) 100 UNIT/ML pen Discontinued by another Health Care Provider     ferrous sulfate (IRON) 325 (65 Fe) MG tablet          Encounter Diagnoses   Name Primary?     (HFpEF) heart failure with preserved ejection fraction (H)      Chronic heart failure with preserved ejection fraction (H) Yes     H/O aortic valve replacement        CURRENT MEDICATIONS:  Current Outpatient Medications   Medication Sig Dispense Refill     aspirin 81 MG tablet Take 81 mg by mouth every morning        atorvastatin (LIPITOR) 40 MG tablet Take 1 tablet (40 mg) by mouth daily 90 tablet 0     bumetanide (BUMEX) 1 MG tablet Take 2 tablets(2mg) by mouth twice daily 360 tablet 3     clindamycin (CLEOCIN) 300 MG capsule Take 1 capsule (300 mg) by mouth as needed 10 capsule 3     eplerenone (INSPRA) 50 MG tablet Take 1 tablet (50 mg) by mouth 2 times daily 60 tablet 11     Insulin Aspart (NOVOLOG FLEXPEN SC) Inject Subcutaneous as needed       magnesium oxide (MAG-OX) 400 MG tablet Take 1 tablet (400 mg) by mouth daily 90 tablet 3     memantine (NAMENDA) 5 MG tablet Take 5 mg by mouth 2 times daily       metoprolol tartrate (LOPRESSOR) 25 MG tablet Take 1 tablet (25 mg) by mouth 2 times daily 180 tablet 3     Multiple Vitamins-Minerals (CENTRUM ADULTS PO) Take 1 tablet by mouth every morning        order for DME Pt to be fit with compression garments 20-30mmHg or veclro compression garment from foot to knee bilaterally. 4 each 3      pantoprazole (PROTONIX) 40 MG EC tablet Take 40 mg by mouth every morning (before breakfast)       potassium chloride ER (K-DUR/KLOR-CON M) 10 MEQ CR tablet Take 30 mEq by mouth 3 times daily        potassium chloride ER (KLOR-CON M15) 15 MEQ CR tablet Take 2 tablets (30 mEq) by mouth 3 times daily 180 tablet 11     prednisoLONE acetate (PRED FORTE) 1 % ophthalmic susp Place 1 drop into both eyes as needed (eye pain)        timolol (TIMOPTIC) 0.5 % ophthalmic solution Place 1 drop into both eyes 2 times daily        vilazodone (VIIBRYD) 20 MG TABS tablet Take 20 mg by mouth daily         ALLERGIES     Allergies   Allergen Reactions     Amoxicillin      Itchy      Penicillins Hives     Spironolactone Rash       PAST MEDICAL HISTORY:  Past Medical History:   Diagnosis Date     Former tobacco use      Glaucoma      Hyperlipidemia LDL goal <160 12/6/2011     Obesity      DRAKE on CPAP      Right bundle branch block      Severe aortic stenosis     On Echo 10/28/16       PAST SURGICAL HISTORY:  Past Surgical History:   Procedure Laterality Date     CV HEART CATHETERIZATION WITH POSSIBLE INTERVENTION N/A 3/19/2019    Procedure: RHC and LHC (No angiogram);  Surgeon: Jai Romo MD;  Location:  HEART CARDIAC CATH LAB     CV RIGHT HEART CATH N/A 3/19/2019    Procedure: Right Heart Cath;  Surgeon: Jai Romo MD;  Location:  HEART CARDIAC CATH LAB     HEART CATH LEFT HEART CATH  04/07/2017    Diffuse non-obstuctive CAD     REPAIR VALVE AORTIC N/A 5/16/2017    Procedure: REPAIR VALVE AORTIC;  Median Sternotony, CardioPulmonary Bypass, Aortic Valve Replace using 25MM Trifecta Valve with [employer], Septal myectomy;  Surgeon: Jun Simon MD;  Location:  OR     REPLACE VALVE AORTIC N/A 5/16/2017    Procedure: REPLACE VALVE AORTIC;;  Surgeon: Jun Simon MD;  Location:  OR     SINUS SURGERY  2005       FAMILY HISTORY:  Family History   Problem Relation Age of Onset     Family  History Negative Mother      Diabetes Father      Heart Surgery Father         CABG     Coronary Artery Disease Father      Hypertension Brother      Diabetes Brother      Heart Disease Brother      Heart Surgery Brother         CABG x 3     Family History Negative Sister      Diabetes Brother         History of diabetes, but no longer an issue     Family History Negative Brother        SOCIAL HISTORY:  Social History     Socioeconomic History     Marital status:      Spouse name: Not on file     Number of children: Not on file     Years of education: Not on file     Highest education level: Not on file   Occupational History     Not on file   Social Needs     Financial resource strain: Not on file     Food insecurity:     Worry: Not on file     Inability: Not on file     Transportation needs:     Medical: Not on file     Non-medical: Not on file   Tobacco Use     Smoking status: Former Smoker     Packs/day: 1.00     Years: 20.00     Pack years: 20.00     Types: Cigarettes, Cigars     Start date:      Last attempt to quit: 10/21/2011     Years since quittin.7     Smokeless tobacco: Never Used   Substance and Sexual Activity     Alcohol use: No     Drug use: No     Sexual activity: Not on file   Lifestyle     Physical activity:     Days per week: Not on file     Minutes per session: Not on file     Stress: Not on file   Relationships     Social connections:     Talks on phone: Not on file     Gets together: Not on file     Attends [employer] service: Not on file     Active member of club or organization: Not on file     Attends meetings of clubs or organizations: Not on file     Relationship status: Not on file     Intimate partner violence:     Fear of current or ex partner: Not on file     Emotionally abused: Not on file     Physically abused: Not on file     Forced sexual activity: Not on file   Other Topics Concern     Parent/sibling w/ CABG, MI or angioplasty before 65F 55M? Yes     Comment:  "brother triple bypass 49   Social History Narrative     Not on file       Review of Systems:  Skin:  Negative     Eyes:  Positive for glasses  ENT:  Negative    Respiratory:  Positive for sleep apnea;CPAP  Cardiovascular:    lightheadedness;Positive for  Gastroenterology: Positive for heartburn;diarrhea  Genitourinary:  Negative    Musculoskeletal:  Positive for arthritis  Neurologic:  Positive for memory problems  Psychiatric:  Positive for anxiety;depression  Heme/Lymph/Imm:  Negative    Endocrine:  Positive for diabetes    Physical Exam:  Vitals: /60   Pulse 64   Ht 1.753 m (5' 9\")   Wt 121.8 kg (268 lb 9.6 oz)   SpO2 96%   BMI 39.67 kg/m     Please refer to dictation for physical exam    Recent Lab Results:  LIPID RESULTS:  Lab Results   Component Value Date    CHOL 126 10/10/2018    HDL 23 (A) 10/10/2018    LDL 71 10/10/2018    TRIG 230 (A) 10/10/2018       LIVER ENZYME RESULTS:  Lab Results   Component Value Date    AST 88 (H) 03/18/2019    ALT 44 03/18/2019       CBC RESULTS:  Lab Results   Component Value Date    WBC 4.3 04/05/2019    RBC 3.53 (L) 04/05/2019    HGB 11.4 (L) 04/05/2019    HCT 35.0 (L) 04/05/2019    MCV 99 04/05/2019    MCH 32.3 04/05/2019    MCHC 32.6 04/05/2019    RDW 15.2 (H) 04/05/2019    PLT 86 (L) 04/05/2019       BMP RESULTS:  Lab Results   Component Value Date     07/15/2019    POTASSIUM 4.0 07/15/2019    CHLORIDE 105 07/15/2019    CO2 27 07/15/2019    ANIONGAP 6 07/15/2019     (H) 07/15/2019    BUN 14 07/15/2019    CR 1.14 07/15/2019    GFRESTIMATED 70 07/15/2019    GFRESTBLACK 81 07/15/2019    MYRON 8.8 07/15/2019        A1C RESULTS:  Lab Results   Component Value Date    A1C 5.4 03/16/2019       INR RESULTS:  Lab Results   Component Value Date    INR 1.28 (H) 03/15/2019    INR 2.19 (H) 10/14/2018           CC  Rachna Holt PAASMITA  [address]  [place] [id number]      "

## 2019-07-15 NOTE — LETTER
7/15/2019      Sam Villatoro PA-C  [facility] [address]  [place] [address]      RE: Gil Chowdary       Dear Colleague,    I had the pleasure of seeing Gil Chowdary in the [facility].    Service Date: 07/15/2019      PRIMARY CARDIOLOGIST:  Dr. Mckeon.      REASON FOR VISIT:  Heart failure followup.      HISTORY OF PRESENT ILLNESS:  Mr. Chowdary is a delightful 58-year-old gentleman with past medical history significant for the followin.  Severe aortic stenosis with aortic valve replacement in 2017 with a 25 mm Trifecta tissue valve.   2.  Type 2 diabetes.  No longer on insulin.   3.  Hyperlipidemia.   4.  Treated sleep apnea.   5.  Heart failure with preserved EF.   6.  History of iron deficiency anemia and ITP followed by Dr. Fletcher.   7.  History of SVT and paroxysmal AFib postop, none on recent Zio Patch.   8.  History of myectomy at the time of aortic valve replacement but no clear hypertrophic cardiomyopathy diagnosis.   9.  Edema, mixed etiology, lymphedema and heart failure.      I had initially met Adolfo foster in 10/2018 when he had been admitted with weight gain and shortness of breath in the context of acute anemia with a george of 7.4.  He was unable to stay inpatient as he took care of his elderly mother.  We tried to diurese him as an outpatient throughout winter so he could continue to care for his mother.  He eventually ended up inpatient and was diuresed 35 pounds.  He underwent a right heart cath that showed no constriction.      He comes in today for routine followup.  His mother passed away in April, and they have sold her house, so he is now staying either with his sister or his brother for weeks at a time.  He does not know his next steps.  He has also recently been diagnosed with early onset dementia.  From a cardiac standpoint, he is doing well.  He denies chest pain, shortness of breath, orthopnea or PND.  He had a  couple of episodes of dizziness and lightheadedness right after his mom passed, but he is not sure what that was from, perhaps he just felt overwhelmed.  He is trying to watch his salt and trying to weigh himself daily, although he misses some days due to changing houses.  He also is wearing his CPAP regularly.      SOCIAL HISTORY:  He was living at home taking care of his mother, who was in her late 90s.  She passed away in 04/2019.  The house is now sold, and he is trying to figure out where he is going to live next.  He is a former smoker with a 20-pack-year history, quit in 2011.  No alcohol use.  He is watching his sodium.  He does have Social Security Disability in process with hopefully a hearing in October.      PHYSICAL EXAMINATION:   GENERAL:  Well-developed, well-nourished gentleman in no acute distress.   HEENT:  Normocephalic, atraumatic.   HEART:  Regular in rate and rhythm.  I do not appreciate murmur, rub or gallop.   RESPIRATORY:  Lungs are clear without wheezes, rales or rhonchi.   EXTREMITIES:  With 1+ pitting edema to mid shin, which has greatly improved from previous, with compression stockings in place.  He has 8 cm JVP with minimal hepatojugular reflux at 30 degrees.      LABORATORY STUDIES:  Labs show creatinine of 1.14, BUN 14, potassium 4.0, sodium 138.      ASSESSMENT AND PLAN:   1.  Heart failure with preserved EF.  There is no sign of constriction on right heart cath.  His weights are stable at home at about 262 pounds, which is impressive given how frequently he has been required to move and all of the changes in his life.  At this point, we will keep him on his current diuretic dose, which is 2 mg of Bumex b.i.d.  He will also remain on Lopressor 25 mg b.i.d. and eplerenone 50 mg b.i.d.  He is unclear on his potassium dose, and I will have my nurses call to make sure we have that correct in the chart.  We believe he is also taking 30 mEq potassium b.i.d.   2.  Anemia and  thrombocytopenia with ITP followed by Dr. Fletcher, now off iron.   3.  History of aortic valve replacement with bioprosthetic aortic valve and history of septal myectomy at that time for septal hypertrophy without LVOT gradient.  He is on aspirin alone for anticoagulation and antibiotic prophylaxis for dental work.  Normal function on last echo.   4.  Treated sleep apnea.   5.  Dyslipidemia, well controlled on Lipitor 40 mg.   6.  History of SVT and paroxysmal AFib postoperatively, none seen recently.  Now off anticoagulation.   7.  Memory loss per patient now diagnosed with early onset dementia.   8.  Lymphedema as well as edema from heart failure and a mixed picture.  We will continue with sequential compression devices and compression stockings.      Thank you for allowing me to participate in this delightful patient's care.      SIRENA Saucedo PA-C             D: 07/15/2019   T: 07/15/2019   MT: BLANE      Name:     ABIGAIL MATOS   MRN:      [id number]        Account:      [id number]   :      1960           Service Date: 07/15/2019      Document: [id number]         Outpatient Encounter Medications as of 7/15/2019   Medication Sig Dispense Refill     aspirin 81 MG tablet Take 81 mg by mouth every morning        atorvastatin (LIPITOR) 40 MG tablet Take 1 tablet (40 mg) by mouth daily 90 tablet 0     bumetanide (BUMEX) 1 MG tablet Take 2 tablets(2mg) by mouth twice daily 360 tablet 3     clindamycin (CLEOCIN) 300 MG capsule Take 1 capsule (300 mg) by mouth as needed 10 capsule 3     eplerenone (INSPRA) 50 MG tablet Take 1 tablet (50 mg) by mouth 2 times daily 60 tablet 11     Insulin Aspart (NOVOLOG FLEXPEN SC) Inject Subcutaneous as needed       magnesium oxide (MAG-OX) 400 MG tablet Take 1 tablet (400 mg) by mouth daily 90 tablet 3     memantine (NAMENDA) 5 MG tablet Take 5 mg by mouth 2 times daily       metoprolol tartrate (LOPRESSOR) 25 MG tablet Take 1 tablet (25 mg) by  mouth 2 times daily 180 tablet 3     Multiple Vitamins-Minerals (CENTRUM ADULTS PO) Take 1 tablet by mouth every morning        order for DME Pt to be fit with compression garments 20-30mmHg or veclro compression garment from foot to knee bilaterally. 4 each 3     pantoprazole (PROTONIX) 40 MG EC tablet Take 40 mg by mouth every morning (before breakfast)       potassium chloride ER (KLOR-CON M15) 15 MEQ CR tablet Take 2 tablets (30 mEq) by mouth 3 times daily 180 tablet 11     prednisoLONE acetate (PRED FORTE) 1 % ophthalmic susp Place 1 drop into both eyes as needed (eye pain)        timolol (TIMOPTIC) 0.5 % ophthalmic solution Place 1 drop into both eyes 2 times daily        vilazodone (VIIBRYD) 20 MG TABS tablet Take 20 mg by mouth daily       [DISCONTINUED] potassium chloride ER (K-DUR/KLOR-CON M) 10 MEQ CR tablet Take 30 mEq by mouth 3 times daily        [DISCONTINUED] ferrous sulfate (IRON) 325 (65 Fe) MG tablet Take 1 tablet (325 mg) by mouth 2 times daily (Patient not taking: Reported on 7/15/2019) 100 tablet 3     [DISCONTINUED] insulin glargine (BASAGLAR KWIKPEN) 100 UNIT/ML pen Inject 10 Units Subcutaneous every morning (Patient not taking: Reported on 7/15/2019)       No facility-administered encounter medications on file as of 7/15/2019.        Again, thank you for allowing me to participate in the care of your patient.      Sincerely,    Rachna Holt PA-C     [facility]

## 2019-07-17 RX ORDER — POTASSIUM CHLORIDE 750 MG/1
TABLET, EXTENDED RELEASE ORAL
Start: 2019-07-17 | End: 2019-09-26

## 2019-07-17 NOTE — PROGRESS NOTES
Last [employer] CHF survey sent 7/14. Called pt and LVM w/ friendly reminder to please call in survey daily.    Karen Stark RN BSN   1:27 PM 07/17/19

## 2019-07-17 NOTE — PROGRESS NOTES
[facility] Telemanagment      Wt 260# and 2# below goal of 262-270#.   No sx reported.   Pt seen for [facility] follow-up 7/15 (home wt 263#) by Rachna HANCOCK who noted his HF to be stable and no changes to diuretic plan were made.      Next [facility] follow-up scheduled for 10/14/19.     Spoke w/ pt who told me he's very pleased with how he feels. We had a really nice conversation about his efforts to take care of his health and I congratulated him for doing so. He's trying to be more active. He denied acute illness. I suspect his wt loss may be intentional. Will send FYI to Rachna FRANCO to see if wt range should be adjusted.    His med list states he takes KCL 30meq BID and pt told me he's been taking KCL 30meq QAM/60meq QPM. I adjusted his med list to reflect his actual dosing.          Karen Stark RN BSN   3:41 PM 07/17/19

## 2019-07-30 ENCOUNTER — CARE COORDINATION (OUTPATIENT)
Dept: CARDIOLOGY | Facility: CLINIC | Age: 59
End: 2019-07-30

## 2019-07-30 DIAGNOSIS — I50.30 (HFPEF) HEART FAILURE WITH PRESERVED EJECTION FRACTION (H): ICD-10-CM

## 2019-07-30 DIAGNOSIS — Z95.2 H/O AORTIC VALVE REPLACEMENT: Primary | ICD-10-CM

## 2019-07-30 DIAGNOSIS — Z95.2 S/P AVR (AORTIC VALVE REPLACEMENT): ICD-10-CM

## 2019-07-30 RX ORDER — BUMETANIDE 1 MG/1
TABLET ORAL
Qty: 360 TABLET | Refills: 3 | Status: SHIPPED | OUTPATIENT
Start: 2019-07-30 | End: 2020-07-29

## 2019-07-30 RX ORDER — CLINDAMYCIN HCL 300 MG
600 CAPSULE ORAL ONCE
Qty: 2 CAPSULE | Refills: 11 | Status: SHIPPED | OUTPATIENT
Start: 2019-07-30 | End: 2019-10-14

## 2019-07-30 RX ORDER — CLINDAMYCIN HCL 300 MG
600 CAPSULE ORAL ONCE
Qty: 2 CAPSULE | Refills: 11 | Status: SHIPPED | OUTPATIENT
Start: 2019-07-30 | End: 2019-07-30

## 2019-07-30 NOTE — PROGRESS NOTES
"Reviewed Rachna Holt PAC's rec w/ pt,  \"Needs climdamycin 600 mg one time dose 1 hour before dental procedure.\"    Rx resent to pt's requested pharmacy.     Karen Stark RN BSN   2:17 PM 07/30/19        "

## 2019-07-30 NOTE — PROGRESS NOTES
Should stay on asa.  Needs climdamycin 600 mg one time dose 1 hour before dental procedure (pcn allergy).  I'm glad he called.  Will send in rx to [facility] on [address].  Rachna Holt PA-C 7/30/2019 12:02 PM

## 2019-07-30 NOTE — PROGRESS NOTES
"Pt LVM asking if he needs any pre-meds prior to dental visit, as he has taken antibiotics in the past and is currently w/o said rx.     Per Rachna HANCOCK's note 7/15/19,   \"History of aortic valve replacement with bioprosthetic aortic valve and history of septal myectomy at that time for septal hypertrophy without LVOT gradient.  He is on aspirin alone for anticoagulation and antibiotic prophylaxis for dental work.\"      Called pt to inquire about type and date of dental visit--LVM requesting call back.    Rachna--please advise.     Thanks!    Karen Stark RN BSN   9:55 AM 07/30/19          "

## 2019-08-07 ENCOUNTER — INFUSION THERAPY VISIT (OUTPATIENT)
Dept: INFUSION THERAPY | Facility: CLINIC | Age: 59
End: 2019-08-07
Attending: INTERNAL MEDICINE
Payer: COMMERCIAL

## 2019-08-07 ENCOUNTER — HOSPITAL ENCOUNTER (OUTPATIENT)
Facility: CLINIC | Age: 59
Setting detail: SPECIMEN
Discharge: HOME | End: 2019-08-07
Attending: INTERNAL MEDICINE | Admitting: INTERNAL MEDICINE
Payer: COMMERCIAL

## 2019-08-07 DIAGNOSIS — Z95.2 H/O AORTIC VALVE REPLACEMENT: Primary | ICD-10-CM

## 2019-08-07 DIAGNOSIS — D50.8 OTHER IRON DEFICIENCY ANEMIA: ICD-10-CM

## 2019-08-07 DIAGNOSIS — I50.32 CHRONIC HEART FAILURE WITH PRESERVED EJECTION FRACTION (H): ICD-10-CM

## 2019-08-07 LAB
ANION GAP SERPL CALCULATED.3IONS-SCNC: 6 MMOL/L (ref 3–14)
BUN SERPL-MCNC: 15 MG/DL (ref 7–30)
CALCIUM SERPL-MCNC: 8.6 MG/DL (ref 8.5–10.1)
CHLORIDE SERPL-SCNC: 105 MMOL/L (ref 94–109)
CO2 SERPL-SCNC: 27 MMOL/L (ref 20–32)
CREAT SERPL-MCNC: 1.16 MG/DL (ref 0.66–1.25)
FERRITIN SERPL-MCNC: 20 NG/ML (ref 26–388)
GFR SERPL CREATININE-BSD FRML MDRD: 68 ML/MIN/{1.73_M2}
GLUCOSE SERPL-MCNC: 239 MG/DL (ref 70–99)
IRON SATN MFR SERPL: 12 % (ref 15–46)
IRON SERPL-MCNC: 52 UG/DL (ref 35–180)
POTASSIUM SERPL-SCNC: 3.9 MMOL/L (ref 3.4–5.3)
SODIUM SERPL-SCNC: 138 MMOL/L (ref 133–144)
TIBC SERPL-MCNC: 428 UG/DL (ref 240–430)
TRANSFERRIN SERPL-MCNC: 375 MG/DL (ref 210–360)

## 2019-08-07 PROCEDURE — 83550 IRON BINDING TEST: CPT | Performed by: INTERNAL MEDICINE

## 2019-08-07 PROCEDURE — 84466 ASSAY OF TRANSFERRIN: CPT | Performed by: INTERNAL MEDICINE

## 2019-08-07 PROCEDURE — 36415 COLL VENOUS BLD VENIPUNCTURE: CPT

## 2019-08-07 PROCEDURE — 80048 BASIC METABOLIC PNL TOTAL CA: CPT | Performed by: INTERNAL MEDICINE

## 2019-08-07 PROCEDURE — 82728 ASSAY OF FERRITIN: CPT | Performed by: INTERNAL MEDICINE

## 2019-08-07 PROCEDURE — 83540 ASSAY OF IRON: CPT | Performed by: INTERNAL MEDICINE

## 2019-08-07 NOTE — PROGRESS NOTES
Medical Assistant Note:  Gil Chowdary presents today for lab draw.    Patient seen by provider today: NO   present during visit today: Not Applicable.    Concerns: No Concerns.    Procedure:  Lab draw site: Left Hand Draw, Needle type: BF, Gauge: 21. Gauze and coban applied    Post Assessment:  Labs drawn without difficulty: Yes.    Discharge Plan:  Departure Mode: Ambulatory.    Face to Face Time: 5.    Padmini Bob MA

## 2019-08-13 DIAGNOSIS — I50.9 CHF EXACERBATION (H): ICD-10-CM

## 2019-08-13 RX ORDER — POTASSIUM CHLORIDE 1125 MG/1
30 TABLET, EXTENDED RELEASE ORAL 3 TIMES DAILY
Qty: 550 TABLET | Refills: 3 | Status: SHIPPED | OUTPATIENT
Start: 2019-08-13 | End: 2019-09-26

## 2019-08-14 ENCOUNTER — ONCOLOGY VISIT (OUTPATIENT)
Dept: ONCOLOGY | Facility: CLINIC | Age: 59
End: 2019-08-14
Attending: INTERNAL MEDICINE
Payer: COMMERCIAL

## 2019-08-14 ENCOUNTER — TELEPHONE (OUTPATIENT)
Dept: SPIRITUAL SERVICES | Facility: CLINIC | Age: 59
End: 2019-08-14

## 2019-08-14 ENCOUNTER — HOSPITAL ENCOUNTER (OUTPATIENT)
Facility: CLINIC | Age: 59
Setting detail: SPECIMEN
End: 2019-08-14
Attending: INTERNAL MEDICINE
Payer: COMMERCIAL

## 2019-08-14 ENCOUNTER — ALLIED HEALTH/NURSE VISIT (OUTPATIENT)
Dept: ONCOLOGY | Facility: CLINIC | Age: 59
End: 2019-08-14

## 2019-08-14 VITALS
TEMPERATURE: 98.3 F | WEIGHT: 266 LBS | HEIGHT: 69 IN | DIASTOLIC BLOOD PRESSURE: 63 MMHG | HEART RATE: 71 BPM | SYSTOLIC BLOOD PRESSURE: 108 MMHG | OXYGEN SATURATION: 94 % | BODY MASS INDEX: 39.4 KG/M2

## 2019-08-14 DIAGNOSIS — Z71.9 COUNSELING, UNSPECIFIED: Primary | ICD-10-CM

## 2019-08-14 DIAGNOSIS — D50.8 OTHER IRON DEFICIENCY ANEMIA: Primary | ICD-10-CM

## 2019-08-14 PROCEDURE — G0463 HOSPITAL OUTPT CLINIC VISIT: HCPCS

## 2019-08-14 PROCEDURE — 99213 OFFICE O/P EST LOW 20 MIN: CPT | Performed by: INTERNAL MEDICINE

## 2019-08-14 RX ORDER — FERROUS SULFATE 325(65) MG
325 TABLET ORAL 2 TIMES DAILY
Qty: 60 TABLET | Refills: 3 | Status: SHIPPED | OUTPATIENT
Start: 2019-08-14

## 2019-08-14 ASSESSMENT — PAIN SCALES - GENERAL: PAINLEVEL: MODERATE PAIN (4)

## 2019-08-14 ASSESSMENT — MIFFLIN-ST. JEOR: SCORE: 2011.95

## 2019-08-14 NOTE — LETTER
"    8/14/2019         RE: Gil Chowdary  [address] [facility] [address]        Dear Colleague,    Thank you for referring your patient, Gil Chowdary, to the [facility]. Please see a copy of my visit note below.    [facility] PHYSICIANS  HEMATOLOGY ONCOLOGY     DIAGNOSIS:  1- Iron deficiency anemia,resolved. he patient had heart surgery an year prior.  Anemia of chronic renal disease.   2- medication related thrombocytopenia vs ITP     TREATMENT:  Observation     SUBJECTIVE:  The patient was seen as a followup today. He has been struggling with memory issues. No significant change in health status otherwise.     REVIEW OF SYSTEMS:  A complete review of systems was performed and found to be negative other than pertinent positives mentioned in history of present illness.      Past medical, social histories reviewed.     Meds- Reviewed.     PHYSICAL EXAMINATION:   VITAL SIGNS: /63 (BP Location: Left arm, Patient Position: Chair, Cuff Size: Adult Large)   Pulse 71   Temp 98.3  F (36.8  C) (Oral)   Ht 1.753 m (5' 9\")   Wt 120.7 kg (266 lb)   SpO2 94%   BMI 39.28 kg/m     CONSTITUTIONAL:  Sitting comfortably.   NEUROLOGIC:  Alert, awake.   PSYCHIATRIC:  Anxious     LABORATORY DATA AND IMAGING REVIEWED DURING THIS VISIT:  Recent Labs   Lab Test 08/07/19  0937 07/15/19  0758  03/19/19  0616  03/17/19  0535  01/23/19  0847  05/20/17  0654    138   < > 139   < > 141   < > 136   < > 135   POTASSIUM 3.9 4.0   < > 3.6   < > 3.9   < > 3.7   < > 4.0   CHLORIDE 105 105   < > 104   < > 107   < > 101   < > 103   CO2 27 27   < > 28   < > 27   < > 28   < > 22   ANIONGAP 6 6   < > 7   < > 7   < > 10.7   < > 10   BUN 15 14   < > 25   < > 26   < > 28   < > 31*   CR 1.16 1.14   < > 1.44*   < > 1.58*   < > 1.58*   < > 1.00   * 196*   < > 85   < > 92   < > 135*   < > 103*   MYRON 8.6 8.8   < > 8.2*   < > 8.3*   < > 9.5   < > 8.2*   MAG  --   --   --  2.7*  --   --   --  2.7*   < > " 2.6*   PHOS  --   --   --   --   --  4.7*  --   --   --  3.4    < > = values in this interval not displayed.     Recent Labs   Lab Test 04/05/19  1310 03/25/19  1216 03/20/19  0640  03/18/19  0615 03/15/19  1444   WBC 4.3 4.2  --   --  3.8* 4.2   HGB 11.4* 10.6*  --   --  10.2* 9.3*   PLT 86* 95* 76*   < > 79* 79*   MCV 99 99  --   --  100 102*   NEUTROPHIL 52.1 50.1  --   --   --  53.1    < > = values in this interval not displayed.     Recent Labs   Lab Test 03/18/19  0615 03/17/19  0535 10/12/18  1736 05/09/17  1240   BILITOTAL 0.9  --  0.3 1.2   ALKPHOS 72  --  82 72   ALT 44  --  31 85*  79*   AST 88*  --  55* 110*   ALBUMIN 3.5 3.3* 3.6 3.8     ASSESSMENT:  This is a 58-year-old gentleman with a normocytic anemia and moderate thrombocytopenia, multiple comorbidities, including CHF, diabetes, and diabetic nephropathy.   S/P colonoscopy, he had few polyps.     - His iron studies have become abnormal again, his ferritin and iron saturation has declined.  Normocytic anemia and moderate thrombocytopenia.   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 He has reactive gastropathy noted as well. He is on PPI which impact iron absorption as well.      PLAN:   1.  Start Iron sulfate 325 mg twice daily  2.  Return to clinic 8 weeks CBC with diff, retic count, and iron studies.   JUAN FLETCHER MD    8/14/2019      Again, thank you for allowing me to participate in the care of your patient.        Sincerely,        Juan Fletcher MD

## 2019-08-14 NOTE — PROGRESS NOTES
Oncology Distress Screening Follow-up  Clinical Social Work  [facility]    Identified Concern and Score From Distress Screenin. How concerned are you about your ability to eat?   0           2. How concerned are you about unintended weight loss or your current weight?   7Abnormal            3. How concerned are you about feeling depressed or very sad?   10Abnormal            4. How concerned are you about feeling anxious or very scared?   9Abnormal            5. Do you struggle with the loss of meaning and abi in your life?       Quite a bitAbnormal            7. How concerned are you about knowing what resources are available to help you?   10Abnormal            You can also ask to be contacted by one of our Oncology Supportive Care professionals.    9. If you want to be contacted by one of our professionals, I can send a message to them right now.   Oncology Social  Worker;Oncolo-  gy Dietitian             Date of Distress Screenin19    Intervention:   Adolfo is a 58-year-old gentleman who comes to clinic for visit with Dr. Fletcher for support surround iron deficiency. This clinician met with Adolfo today due to results from positive distress screen as well as concern from MA. Adolfo tearful at times throughout visit, expressing emotional distress with mother's death in 2019, adjustment to living with sister, and coping with dementia and changes in cognition. Provided emotional support surrounding multiple stressors, validating concerns. Adolfo reports that he sees a therapist Frandy Recinos weekly, and a psychiatrist every 6 weeks for ongoing therapeutic support which has been helpful. This clinician encouraged ongoing engagement with therapeutic support and involvement of sister Elisabeth in medical care. Discussed additional community resources that may be of support for Adolfo. Adolfo appeared appreciative of resources and knows to reach out in case of distress or concern.     Education Provided:   [facility]  : [phone]  New patient scheduling through [facility] (to explore PCP that is closer to [place]), [phone]  [facility] (for OT support)   Alzheimer s and Dementia Support: [email or web address]  RD contact information   Onc SW contact information    Follow-up Required:   1) SW will request RD follow-up with Adolfo per his request  2) Ongoing SW available as needed. Pt knows to reach out in the case of distress or concern.   3) Ongoing collaboration with multidisciplinary care team.     RHONDA Armijo, LICSW  Phone: [phone]  Pager: [phone]    [facility]: M, Thu  *every other Tue, 8am-4:30pm  [facility]: W, F, *every other Tue, 8am-4:30pm

## 2019-08-14 NOTE — PATIENT INSTRUCTIONS
1.  Start Iron sulfate 325 mg twice daily  2.  Return to clinic 8 weeks CBC with diff, retic count, and iron studies. Dr. Nigel MOBLEY released the patient

## 2019-08-14 NOTE — PROGRESS NOTES
"[facility] PHYSICIANS  HEMATOLOGY ONCOLOGY     DIAGNOSIS:  1- Iron deficiency anemia,resolved. he patient had heart surgery an year prior.  Anemia of chronic renal disease.   2- medication related thrombocytopenia vs ITP     TREATMENT:  Observation     SUBJECTIVE:  The patient was seen as a followup today. He has been struggling with memory issues. No significant change in health status otherwise.     REVIEW OF SYSTEMS:  A complete review of systems was performed and found to be negative other than pertinent positives mentioned in history of present illness.      Past medical, social histories reviewed.     Meds- Reviewed.     PHYSICAL EXAMINATION:   VITAL SIGNS: /63 (BP Location: Left arm, Patient Position: Chair, Cuff Size: Adult Large)   Pulse 71   Temp 98.3  F (36.8  C) (Oral)   Ht 1.753 m (5' 9\")   Wt 120.7 kg (266 lb)   SpO2 94%   BMI 39.28 kg/m    CONSTITUTIONAL:  Sitting comfortably.   NEUROLOGIC:  Alert, awake.   PSYCHIATRIC:  Anxious     LABORATORY DATA AND IMAGING REVIEWED DURING THIS VISIT:  Recent Labs   Lab Test 08/07/19  0937 07/15/19  0758  03/19/19  0616  03/17/19  0535  01/23/19  0847  05/20/17  0654    138   < > 139   < > 141   < > 136   < > 135   POTASSIUM 3.9 4.0   < > 3.6   < > 3.9   < > 3.7   < > 4.0   CHLORIDE 105 105   < > 104   < > 107   < > 101   < > 103   CO2 27 27   < > 28   < > 27   < > 28   < > 22   ANIONGAP 6 6   < > 7   < > 7   < > 10.7   < > 10   BUN 15 14   < > 25   < > 26   < > 28   < > 31*   CR 1.16 1.14   < > 1.44*   < > 1.58*   < > 1.58*   < > 1.00   * 196*   < > 85   < > 92   < > 135*   < > 103*   MYRON 8.6 8.8   < > 8.2*   < > 8.3*   < > 9.5   < > 8.2*   MAG  --   --   --  2.7*  --   --   --  2.7*   < > 2.6*   PHOS  --   --   --   --   --  4.7*  --   --   --  3.4    < > = values in this interval not displayed.     Recent Labs   Lab Test 04/05/19  1310 03/25/19  1216 03/20/19  0640  03/18/19  0615 03/15/19  1444   WBC 4.3 4.2  --   --  3.8* " 4.2   HGB 11.4* 10.6*  --   --  10.2* 9.3*   PLT 86* 95* 76*   < > 79* 79*   MCV 99 99  --   --  100 102*   NEUTROPHIL 52.1 50.1  --   --   --  53.1    < > = values in this interval not displayed.     Recent Labs   Lab Test 03/18/19  0615 03/17/19  0535 10/12/18  1736 05/09/17  1240   BILITOTAL 0.9  --  0.3 1.2   ALKPHOS 72  --  82 72   ALT 44  --  31 85*  79*   AST 88*  --  55* 110*   ALBUMIN 3.5 3.3* 3.6 3.8     ASSESSMENT:  This is a 58-year-old gentleman with a normocytic anemia and moderate thrombocytopenia, multiple comorbidities, including CHF, diabetes, and diabetic nephropathy.   S/P colonoscopy, he had few polyps.     - His iron studies have become abnormal again, his ferritin and iron saturation has declined.  Normocytic anemia and moderate thrombocytopenia.   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 He has reactive gastropathy noted as well. He is on PPI which impact iron absorption as well.      PLAN:   1.  Start Iron sulfate 325 mg twice daily  2.  Return to clinic 8 weeks CBC with diff, retic count, and iron studies.   CANDELARIA DAVIS MD    8/14/2019

## 2019-08-14 NOTE — TELEPHONE ENCOUNTER
SPIRITUAL HEALTH SERVICES Left Message Note  [facility]    Reason for Contact: Gil Chowdary was contacted by phone per reported concerns about spiritual wellbeing on the Oncology Distress Screening tool.    Intervention: A voicemail message was left with contact information.    Plan: Will follow up by calling patient a second time if he does not contact me within 72 hours, and/or respond if pt reaches out to me..    China Ayon MA  Staff   Pager [phone]  Phone [phone]

## 2019-08-15 ENCOUNTER — CARE COORDINATION (OUTPATIENT)
Dept: CARDIOLOGY | Facility: CLINIC | Age: 59
End: 2019-08-15

## 2019-08-15 NOTE — PROGRESS NOTES
[facility] Heart CORE Clinic - MyHealth Tracker         Alert for: No Call since 8/11/19    Called pt, he completely forgot, as he has had a lot going on. He has been weighing every morning. Wt today 264#, up from 261# on 8/11. Pt reports he is feeling well, denies increased SOB or swelling. Wt within parameters, current parameters: 258-268#. Pt taking Bumex 2mg BID, Inspra 50mg BID, and KCL 30meq in am and 60meq in pm. Last visit with JOAN Babcock 7/15/19, home wt was 262#, no changes made. Next CORE visit 10/14/19 with JOAN Babcock. Pt states his sister cooks, and he was just telling her they need to sit down and plan some better meals to eat better. Offered low sodium recipe handouts to patient, he would love to have these. Mailed pt low sodium recipe hand outs per his request. Will continue to monitor. YOANNA Pearl 1:30 PM 08/15/19

## 2019-08-19 NOTE — PROGRESS NOTES
Incoming call from pt to confirm successful MyHealth Tracker (MHT) survey. Told him we received successful survey, wt and sx stable/within range.     I asked him to please call between now and 10/2019 follow-up with concerns/questions.    Karen Stark RN BSN   9:27 AM 08/19/19

## 2019-08-19 NOTE — PROGRESS NOTES
Received VM that pt left on 8/18 AM stating he got a new phone ([phone]) and was unable to call into MyHealth Tracker (MHT). He noted he felt well and wt stable at 262#.    Adjusted pt's contact info to reflect new number.    Called pt to tell him he should now be able to call into MHT, no answer, VM not yet set up. Will watch for survey to come in--if no survey by tomorrow, will call pt again.    Karen Stark RN BSN   9:07 AM 08/19/19

## 2019-08-21 ENCOUNTER — TELEPHONE (OUTPATIENT)
Dept: SPIRITUAL SERVICES | Facility: CLINIC | Age: 59
End: 2019-08-21

## 2019-08-21 NOTE — TELEPHONE ENCOUNTER
SPIRITUAL HEALTH SERVICES Phone Encounter Note  [facility]    Reason for Contact: Gil Chowdary was contacted by phone per reported concerns about spiritual well-being on the Oncology Distress Screening tool.    Intervention: Provided reflective conversation and support as pt shared his lengthy illness narrative. He's experienced multiple losses, including his wife (two years ago), his mother, his home, and his health.  He described various health concerns including diabetes, lymphedema and a new diagnosis of dementia.    Pt is currently living with his sister and brother in law and has support from his brother and other family members.     Plan: SH team will be available per on-going needs for support by phone.    China Ayon MA  Staff   Pager [phone]  Phone [phone]

## 2019-08-27 ENCOUNTER — TELEPHONE (OUTPATIENT)
Dept: ONCOLOGY | Facility: CLINIC | Age: 59
End: 2019-08-27

## 2019-08-27 NOTE — PROGRESS NOTES
Received positive distress screen regarding patient's concern for current weight and request by , Carolina, to contact patient.  Called and left RD contact information on patient's voicemail.  Await return call from patient.    April Walls, RD, LD  Clinical Dietitian  [facility]  [phone] (direct)

## 2019-08-28 ENCOUNTER — CARE COORDINATION (OUTPATIENT)
Dept: CARDIOLOGY | Facility: CLINIC | Age: 59
End: 2019-08-28

## 2019-08-28 NOTE — PROGRESS NOTES
[facility] Heart CORE Clinic - MyHealth Tracker     Alert: No call since 8/25  Weight parameters: 258-268#  Future Appointments   Date Time Provider Department Center   10/8/2019  8:10 AM SH LAB DRAW 1 [facility] [place]   10/8/2019  9:00 AM [facility]   10/14/2019  9:10 AM [facility]   10/14/2019 10:10 AM Yanet, SIRENA [facility] P PSA CLIN       Pt has not called in since 8/25. Called pt to review, no answer. LVM reminding him to call each day. Asked for call back with update. Ryanne, RN 2:07 PM 08/28/19      My weight this morning is:   Units pounds   7/20/2019 263   7/22/2019 262   7/24/2019 261   7/29/2019 262   7/30/2019 262   7/31/2019 262   8/4/2019 262   8/9/2019 260   8/11/2019 261   8/15/2019 264   8/19/2019 261   8/20/2019 261   8/21/2019 262   8/25/2019 262

## 2019-08-29 NOTE — PROGRESS NOTES
Have not heard back from pt. Checked My Health Tracker, he did call in weight this am. Wt today 260#, down 2# from last call in and within wt parameters. Will continue to monitor. YOANNA Pearl 9:42 AM 08/29/19

## 2019-09-04 ENCOUNTER — CARE COORDINATION (OUTPATIENT)
Dept: CARDIOLOGY | Facility: CLINIC | Age: 59
End: 2019-09-04

## 2019-09-04 NOTE — PROGRESS NOTES
Incoming message from patient stating that he has been dealing recently w/an URI and is attributing dizziness(as reported on MHT) to this. Call back to patient to assess - line busy on multiple attempts. Jaqueline Miller RN on 9/4/2019 at 3:13 PM

## 2019-09-04 NOTE — PROGRESS NOTES
[facility] Heart- CORE Clinic Telemanagment     Pt has reported dizziness several days on MyHealth Tracker, with wts w/in goal.     Called pt to review symptom. LVM requesting call back and noting that he should call 911 if presyncope/syncope occurs.    Karen Stark RN BSN   11:19 AM 09/04/19

## 2019-09-09 NOTE — PROGRESS NOTES
Pt has reported no to dizziness on MyHealth Tracker x last 2 surveys. Will cont to monitor.       Karen Stark RN BSN   8:57 AM 09/09/19

## 2019-09-10 ENCOUNTER — CARE COORDINATION (OUTPATIENT)
Dept: CARDIOLOGY | Facility: CLINIC | Age: 59
End: 2019-09-10

## 2019-09-10 NOTE — PROGRESS NOTES
[facility] Heart CORE Clinic - MyHealth Tracker     No Call since 9/7/19. Called pt to review, no answer. LVM reminding him to call in weights daily. YOANNA Pearl 12:52 PM 09/10/19

## 2019-09-11 ENCOUNTER — TRANSFERRED RECORDS (OUTPATIENT)
Dept: HEALTH INFORMATION MANAGEMENT | Facility: CLINIC | Age: 59
End: 2019-09-11

## 2019-09-12 NOTE — PROGRESS NOTES
[facility] Heart CORE Clinic - MyHealth Tracker    Pt has not called in weight still since 9/7/19. Called pt to review, no answer. LVM to call weight in daily. Asked pt to call back if he needs the number to call or if he has concerns, otherwise will watch for weight tomorrow. YOANNA Pearl 12:47 PM 09/12/19

## 2019-09-24 ENCOUNTER — CARE COORDINATION (OUTPATIENT)
Dept: CARDIOLOGY | Facility: CLINIC | Age: 59
End: 2019-09-24

## 2019-09-24 DIAGNOSIS — I50.32 CHRONIC HEART FAILURE WITH PRESERVED EJECTION FRACTION (H): ICD-10-CM

## 2019-09-24 NOTE — PROGRESS NOTES
[facility] Heart CORE Clinic - MyHealth Tracker  Alert: No Call since 9-20  Weight today: 255#, yesterday 256#  Weight parameters: 258-268#  Heart Failure sx: Called patient to follow up why he hasn't called in weights. Patient was just recently admitted to [facility] for infection 9-18-9-20. Patient was unable to tell me what the infection was, where it was located or what antibiotics he is on. He did tell me that he thought he was calling in weights, but he seemed slightly confused about the subject. I reinforced calling in was a good way for us to assess his fluid status and I gave him the number to call in his weights. Patient didn't complain of any symptoms, he stated that this is the best he's felt minus the minor set back of being hospitalized. Patient states that he has been walking more, eating better and has an upcoming appointment with a dietician to help him with better food choices.   Daily diuretic plan: Bumex 2mg BID  Next follow up:   Future Appointments   Date Time Provider Department Center   10/8/2019  8:10 AM  LAB DRAW 1 [facility]   10/8/2019  9:00 AM Keith Manning MD [facility]   10/14/2019  9:10 AM [facility]   10/14/2019 10:10 AM Yanet, SIRENA Smith [facility] PSA CLIN     Will follow up if no call on MHT tomorrow.      YOANNA Esquivel September 24, 2019 1:49 PM

## 2019-09-26 RX ORDER — POTASSIUM CHLORIDE 750 MG/1
30 TABLET, EXTENDED RELEASE ORAL 3 TIMES DAILY
COMMUNITY
Start: 2019-09-26

## 2019-09-26 NOTE — PROGRESS NOTES
Hospital notes reviewed in care everywhere- pt admitted with hepatic encephalopathy.  No mention of infection.  Hopefully this improves his mental status- he's struggled with memory loss.   I'm ok with his wts and Cr.  His Cr has been variable over the last year and we can continue to follow it.  No changes to meds before my visit 10/14.  Ok to adjust wt range to 255-263.      Rachna Holt PA-C 9/26/2019 9:53 AM

## 2019-09-26 NOTE — PROGRESS NOTES
[facility] Heart Care - CORE Clinic Telemanagment  My Health Tracker HF Alert     Weight today: 256#  Current weight parameters: 258-268#  Heart Failure sx: none reported  Daily diuretic plan: Bumex 2mg BID, Inspra 50mg BID, KCL 30meq TID  Notes: Wt between 255-256# this week, which is below min wt parameters. See note from RN on 9/24, pt recently admitted to [facility] for some type of infection per pt. Reviewed care Everywhere, pt admitted from 9/11-9/13 for AMS and diagnosed with hepatic encephalopathy secondary to liver cirrhosis. Pt also noted to have elevated Creatinine on admission, Bumex and hydrochlorothiazide held. Of note, we do not have hydrochlorothiazide on pt's med list. Unclear how long pt has been taking that. Pt used to be on hydrochlorothiazide, but per our records, it was stopped 1/2019. Pt discharged back on Bumex 2mg BID, Inspra 50mg BID, and KCL 30meq TID. Pt seen twice since then by PCP for close follow up. Last visit 9/23/19 in Care Everywhere, reports pt doing well. Pt supposed to be seeing Dr. Joe, with Tatyana MAHAN this week. BMP done 9/17/19 in Care Everywhere at PCP post discharge, Creatinine remains elevated compared to recent BMP's done here at our office. Per notes, pt living with sister. No consent to communicate on file. Called pt, he reports he has been feeling really good lately. Asked pt who is setting up his medications, as notes from hospital talk about concern with pt's medication management. Pt reports a home care nurse is going to be setting them up. He has the first visit scheduled today. He is not sure if it is through [place] or [facility]. Asked pt to have home care nurse to call me so we can make sure medication lists are updated and accurate. Pt stated understanding and wrote down my name and phone number. Update sent to JOAN Babcock. Messaged RN board to watch for home care nurses call.   Next follow up:   Date Time Provider Department Center   10/8/2019  8:10  [facility] [place]   10/8/2019  9:00 AM Keith Manning MD [facility] [place]   10/14/2019  9:10 AM [facility] LAB [facility]   10/14/2019 10:10 AM Yanet, SIRENA [facility] PSA CLIN      My weight this morning is:   Units pounds   8/20/2019 261   8/21/2019 262   8/25/2019 262   8/29/2019 260   8/31/2019 260   9/1/2019 260   9/4/2019 260   9/6/2019 259   9/7/2019 259   9/14/2019 260   9/16/2019 258   9/19/2019 258   9/20/2019 257   9/26/2019 256         Component      Latest Ref Rng & Units 7/15/2019 8/7/2019   Sodium      133 - 144 mmol/L 138 138   Potassium      3.4 - 5.3 mmol/L 4.0 3.9   Chloride      94 - 109 mmol/L 105 105   Carbon Dioxide      20 - 32 mmol/L 27 27   Anion Gap      3 - 14 mmol/L 6 6   Glucose      70 - 99 mg/dL 196 (H) 239 (H)   Urea Nitrogen      7 - 30 mg/dL 14 15   Creatinine      0.66 - 1.25 mg/dL 1.14 1.16   GFR Estimate      >60 mL/min/1.73:m2 70 68   GFR Estimate If Black      >60 mL/min/1.73:m2 81 79   Calcium      8.5 - 10.1 mg/dL 8.8 8.6     YOANNA Pearl 9:25 AM 09/26/19

## 2019-09-27 ENCOUNTER — CARE COORDINATION (OUTPATIENT)
Dept: CARDIOLOGY | Facility: CLINIC | Age: 59
End: 2019-09-27

## 2019-09-27 DIAGNOSIS — I50.32 CHRONIC HEART FAILURE WITH PRESERVED EJECTION FRACTION (H): Primary | ICD-10-CM

## 2019-09-27 NOTE — PROGRESS NOTES
Called patient for update on home care services being received and an updated med list. Left voice mail for call back.       YOANNA Esquivel September 27, 2019 10:30 AM

## 2019-09-30 NOTE — PROGRESS NOTES
Called patient again for follow up and for an updated med list. Left voice mail for call back.       YOANNA Esquivel September 30, 2019 3:16 PM

## 2019-10-01 NOTE — PROGRESS NOTES
Called and spoke with Anette, Public Health Nurse about current med list. PHN will be faxing current med list over.     YOANNA Esquivel October 1, 2019 9:13 AM

## 2019-10-02 ENCOUNTER — TELEPHONE (OUTPATIENT)
Dept: CARDIOLOGY | Facility: CLINIC | Age: 59
End: 2019-10-02

## 2019-10-02 NOTE — TELEPHONE ENCOUNTER
Received VM from pt stating he got a new scale and wanted to confirm we received his MyHealth Tracker survey.    Survey from today was successfully submitted and no sx, wt w/in range. Will cont to monitor.       Karen Stark RN BSN   5:07 PM 10/02/19

## 2019-10-03 ENCOUNTER — HEALTH MAINTENANCE LETTER (OUTPATIENT)
Age: 59
End: 2019-10-03

## 2019-10-07 DIAGNOSIS — D64.9 ANEMIA: Primary | ICD-10-CM

## 2019-10-07 NOTE — TELEPHONE ENCOUNTER
[facility] Heart Care - CORE Clinic Telemanagment  My Health Tracker HF Alert    Last received MHT survey on 10/2.     Called pt to inquire re: missed surveys. He told me he had received a scale from unknown service and believed that the wts were being automatically sent to us. We discussed that this may have been from home care, or insurance, or other. I asked him how he preferred to monitor his wts/HF and he preferred to continue to call into MHT/CORE, so will expect him to resume surveys tomorrow--reminder sent to RN board to watch for survey.    He stated that his wt today is 259#, which is up from last wk, but w/in his goal and he is w/o HF sx. He also reported diarrhea last wk, which has now resolved-possible source of last wk's lower wts. He's now eating/drinking like typical.     He was unable to confirm his diuretic/potassium plan as he now has a home care nurse set up his meds after 9/2019 admit for AMS, hepatic encephalopathy, ammonia level 119 on admit. Discharge meds per Care Everywhere: potassium 30meq TID, )note he was also started daily lactulose), bumex 2mg BID, eplerenone 50mg BID, hydrochlorothiazide was stopped.     I see multiple notes in Care Everywhere for establishing care w/ PCP closer to his home, hospital follow-up and he was noted to be doing well, despite ongoing high ammonia levels, for which he planned to follow-up w/ Dr. Joe.     He confirmed plan for CORE follow-up next week.      Recent home wts:    Current goal wt: 255-263#    Future Appointments   Date Time Provider Department Center   10/8/2019  8:10 AM  LAB DRAW 1 [facility] [place]   10/8/2019  9:00 AM Keith Manning MD [facility] [place]   10/14/2019  9:10 AM [facility] LAB [facility] PSA CLIN   10/14/2019 10:10 AM Rachna Holt PA-C [facility] PSA CLIN     Karen Stark RN BSN   11:19 AM 10/07/19

## 2019-10-11 NOTE — TELEPHONE ENCOUNTER
[facility] Telemanagment  My Health Tracker HF Alert     No call variance. Last weight called in on 10/8. Called pt to review, no answer.  LVM reminding him to call daily. Pt has visit with [facility] KARYN on Monday. YOANNA Pearl 1:24 PM 10/11/19

## 2019-10-14 ENCOUNTER — OFFICE VISIT (OUTPATIENT)
Dept: CARDIOLOGY | Facility: CLINIC | Age: 59
End: 2019-10-14
Attending: PHYSICIAN ASSISTANT
Payer: COMMERCIAL

## 2019-10-14 VITALS
HEART RATE: 70 BPM | DIASTOLIC BLOOD PRESSURE: 58 MMHG | SYSTOLIC BLOOD PRESSURE: 98 MMHG | BODY MASS INDEX: 38.06 KG/M2 | HEIGHT: 69 IN | OXYGEN SATURATION: 95 % | WEIGHT: 257 LBS

## 2019-10-14 DIAGNOSIS — I50.32 CHRONIC HEART FAILURE WITH PRESERVED EJECTION FRACTION (H): ICD-10-CM

## 2019-10-14 LAB
ANION GAP SERPL CALCULATED.3IONS-SCNC: 13.8 MMOL/L (ref 6–17)
BUN SERPL-MCNC: 14 MG/DL (ref 7–30)
CALCIUM SERPL-MCNC: 9.1 MG/DL (ref 8.5–10.5)
CHLORIDE SERPL-SCNC: 105 MMOL/L (ref 98–107)
CO2 SERPL-SCNC: 25 MMOL/L (ref 23–29)
CREAT SERPL-MCNC: 1.28 MG/DL (ref 0.7–1.3)
GFR SERPL CREATININE-BSD FRML MDRD: 57 ML/MIN/{1.73_M2}
GLUCOSE SERPL-MCNC: 103 MG/DL (ref 70–105)
NT-PROBNP SERPL-MCNC: 154 PG/ML (ref 0–125)
POTASSIUM SERPL-SCNC: 3.8 MMOL/L (ref 3.5–5.1)
SODIUM SERPL-SCNC: 140 MMOL/L (ref 136–145)

## 2019-10-14 PROCEDURE — 80048 BASIC METABOLIC PNL TOTAL CA: CPT | Performed by: PHYSICIAN ASSISTANT

## 2019-10-14 PROCEDURE — 36415 COLL VENOUS BLD VENIPUNCTURE: CPT | Performed by: PHYSICIAN ASSISTANT

## 2019-10-14 PROCEDURE — 83880 ASSAY OF NATRIURETIC PEPTIDE: CPT | Performed by: PHYSICIAN ASSISTANT

## 2019-10-14 PROCEDURE — 99214 OFFICE O/P EST MOD 30 MIN: CPT | Performed by: PHYSICIAN ASSISTANT

## 2019-10-14 RX ORDER — LACTULOSE 10 G/15ML
20 SOLUTION ORAL DAILY
Qty: 45 ML | COMMUNITY
Start: 2019-10-14 | End: 2019-11-18

## 2019-10-14 ASSESSMENT — MIFFLIN-ST. JEOR: SCORE: 1966.12

## 2019-10-14 NOTE — PATIENT INSTRUCTIONS
Call [facility] nurse for any questions or concerns Mon-Fri 8am-4pm:                                                [phone]                                       For concerns after hours:                                                 [phone]     Medication changes: continue current medications.      Plan from today:   You can use the scale we gave you and call the weights in.    I'm not sure who is getting the other weights, we get them from your phone call.    Please follow up with Dr. Mckeon in about 3-4 months with labs and an echoardiogram before that visit.      Lab results: see attached; labs look good overall.    Component      Latest Ref Rng & Units 10/14/2019   Sodium      136 - 145 mmol/L 140   Potassium      3.5 - 5.1 mmol/L 3.8   Chloride      98 - 107 mmol/L 105   Carbon Dioxide      23 - 29 mmol/L 25   Anion Gap      6 - 17 mmol/L 13.8   Glucose      70 - 105 mg/dL 103   Urea Nitrogen      7 - 30 mg/dL 14   Creatinine      0.70 - 1.30 mg/dL 1.28   GFR Estimate      >60 mL/min/1.73:m2 57 (L)   GFR Estimate If Black      >60 mL/min/1.73:m2 69   Calcium      8.5 - 10.5 mg/dL 9.1

## 2019-10-14 NOTE — LETTER
10/14/2019      Delia Neely  [address] [address]      RE: Gil Chowdary       Dear Colleague,    I had the pleasure of seeing Gil Chowdary in the [facility].    Service Date: 10/14/2019      PRIMARY CARDIOLOGIST:  Dr. Mckeon.      REASON FOR VISIT:  Heart failure followup.      HISTORY OF PRESENT ILLNESS:  Mr. Chowdary is a delightful 59-year-old gentleman with past medical history significant for the followin.  Severe aortic stenosis with aortic valve replacement in 2017 with a 25 mm Trifecta tissue valve.   2.  Type 2 diabetes, on insulin.   3.  Hyperlipidemia.   4.  Treated sleep apnea.   5.  Heart failure with preserved EF.   6.  History of iron deficiency anemia and ITP followed by Dr. Fletcher.   7.  History of SVT and paroxysmal AFib postoperatively, none on recent Zio Patch.   8.  History of myectomy at the time of aortic valve replacement, but no clear hypertrophic cardiomyopathy diagnosis.   9.  Lymphedema and edema, secondary to heart failure.      I had met Adolfo about a year ago when he was admitted with weight gain and shortness of breath in the context of acute anemia with a george of 7.4.  He was unable to stay inpatient as he had to take care of his elderly mother.  He eventually ended up diuresing like 35 pounds and has since been on an excellent path of diuresis as an outpatient.  Unfortunately, he was admitted this summer with confusion and found to have hepatic encephalopathy.  Since that time, he has also moved in with his sister near [place], and this is working out well.  With the admission for hepatic encephalopathy, he has done better but his memory issues did not resolve.      Today, he says he is doing well.  He denies chest pain, shortness of breath, orthopnea or PND.  He has not had syncope or presyncope.  He continues to struggle with his memory issues, but his sister helps him with meds, etc.  He  weighs himself daily on 2 scales, one that is electronic and one that is the one we got and then he calls into us.  He is not sure where the other one goes.      SOCIAL HISTORY:  Again, previously lived with his mom and took care of the house.  She passed away in 04/2019.  Again, he is now living with his sister.  He has a 20-pack-year history of smoking, quit in 2011.  No alcohol use.  He is watching his sodium and has social security disability that just got approved.      PHYSICAL EXAMINATION:   GENERAL:  Well-developed, well-nourished gentleman, in no acute distress.   HEENT:  Normocephalic, atraumatic.   HEART:  Regular rate and rhythm.  I do not appreciate murmur, rub or gallop.   LUNGS:  Clear with some coarse rhonchi in the right lower lobe, otherwise without wheezes or rales.   EXTREMITIES:  With 1+ pitting edema to mid shin and compression stockings in place.   NECK:  Veins are flat at 30 degrees.   SKIN:  Warm and dry.      ASSESSMENT AND PLAN:   1.  Chronic heart failure with preserved EF with no sign of constriction on right heart cath and his weight now stable at 255 pounds.  He is now down about 77 pounds since his peak.  We will reset his dry weight between 250 and 258 pounds.  He will remain on Bumex 2 mg b.i.d., Eplerenone 50 mg b.i.d.  His creatinine is stable today at 1.28 with BUN 14, potassium 3.8.  He still does require potassium at 30 mEq 3 times a day to maintain his potassium, but hopefully we will be able to cut back his diuretics going forward to help lower his potassium burden.   2.  History of aortic valve replacement with bioprosthetic aortic valve and history of septal myectomy for septal hypertrophy without LVOT gradient.  He can remain on aspirin alone for anticoagulation and needs antibiotic prophylaxis for dental work.  He is due for repeat echo.  This will be done in 3 months.   3.  Treated sleep apnea.   4.  History of SVT and paroxysmal AFib postoperatively, none on last Zio.    5.  Early onset dementia.  Unfortunately, this has not resolved with hepatic encephalopathy.   6.  Dyslipidemia, well-controlled on Lipitor.      We will have Dr. Mckeon see him back in 3 months with a BMP and echo at that time.  We will certainly talk to him sooner with concerns.      Rachna Holt PA-C      cc:   Jeremie Mckeon MD   [facility] [place]   [address]   [place] [address]         RACHNA HOLT PA-C             D: 10/14/2019   T: 10/14/2019   MT: al      Name:     ABIGAIL MATOS   MRN:      [id number]        Account:      [id number]   :      1960           Service Date: 10/14/2019      Document: [id number]         Outpatient Encounter Medications as of 10/14/2019   Medication Sig Dispense Refill     aspirin 81 MG tablet Take 81 mg by mouth every morning        atorvastatin (LIPITOR) 40 MG tablet Take 1 tablet (40 mg) by mouth daily 90 tablet 0     bumetanide (BUMEX) 1 MG tablet Take 2 tablets(2mg) by mouth twice daily 360 tablet 3     eplerenone (INSPRA) 50 MG tablet Take 1 tablet (50 mg) by mouth 2 times daily 60 tablet 11     ferrous sulfate (FEROSUL) 325 (65 Fe) MG tablet Take 1 tablet (325 mg) by mouth 2 times daily 60 tablet 3     Insulin Aspart (NOVOLOG FLEXPEN SC) Inject Subcutaneous as needed       lactulose (CHRONULAC) 10 GM/15ML solution Take 30 mLs (20 g) by mouth daily 45 mL      magnesium oxide (MAG-OX) 400 MG tablet Take 1 tablet (400 mg) by mouth daily 90 tablet 3     memantine (NAMENDA) 5 MG tablet Take 5 mg by mouth 2 times daily       metoprolol tartrate (LOPRESSOR) 25 MG tablet Take 1 tablet (25 mg) by mouth 2 times daily 180 tablet 3     Multiple Vitamins-Minerals (CENTRUM ADULTS PO) Take 1 tablet by mouth every morning        order for DME Pt to be fit with compression garments 20-30mmHg or veclro compression garment from foot to knee bilaterally. 4 each 3     pantoprazole (PROTONIX) 40 MG EC tablet Take 40 mg by mouth every morning  (before breakfast)       potassium chloride ER (K-DUR/KLOR-CON M) 10 MEQ CR tablet Take 3 tablets (30 mEq) by mouth 3 times daily       prednisoLONE acetate (PRED FORTE) 1 % ophthalmic susp Place 1 drop into both eyes as needed (eye pain)        timolol (TIMOPTIC) 0.5 % ophthalmic solution Place 1 drop into both eyes 2 times daily        vilazodone (VIIBRYD) 20 MG TABS tablet Take 20 mg by mouth daily       [DISCONTINUED] clindamycin (CLEOCIN) 300 MG capsule Take 2 capsules (600 mg) by mouth once for 1 dose Take 1 hour before dental visits 2 capsule 11     No facility-administered encounter medications on file as of 10/14/2019.        Again, thank you for allowing me to participate in the care of your patient.      Sincerely,    Rachna Holt PA-C     [facility]

## 2019-10-14 NOTE — PROGRESS NOTES
[id number]  HPI and Plan:   See dictation    Orders this Visit:  Orders Placed This Encounter   Procedures     Basic metabolic panel     Follow-Up with Cardiologist     Echocardiogram Complete     Orders Placed This Encounter   Medications     lactulose (CHRONULAC) 10 GM/15ML solution     Sig: Take 30 mLs (20 g) by mouth daily     Dispense:  45 mL     Medications Discontinued During This Encounter   Medication Reason     clindamycin (CLEOCIN) 300 MG capsule Therapy completed         Encounter Diagnosis   Name Primary?     Chronic heart failure with preserved ejection fraction (H)        CURRENT MEDICATIONS:  Current Outpatient Medications   Medication Sig Dispense Refill     aspirin 81 MG tablet Take 81 mg by mouth every morning        atorvastatin (LIPITOR) 40 MG tablet Take 1 tablet (40 mg) by mouth daily 90 tablet 0     bumetanide (BUMEX) 1 MG tablet Take 2 tablets(2mg) by mouth twice daily 360 tablet 3     eplerenone (INSPRA) 50 MG tablet Take 1 tablet (50 mg) by mouth 2 times daily 60 tablet 11     ferrous sulfate (FEROSUL) 325 (65 Fe) MG tablet Take 1 tablet (325 mg) by mouth 2 times daily 60 tablet 3     Insulin Aspart (NOVOLOG FLEXPEN SC) Inject Subcutaneous as needed       lactulose (CHRONULAC) 10 GM/15ML solution Take 30 mLs (20 g) by mouth daily 45 mL      magnesium oxide (MAG-OX) 400 MG tablet Take 1 tablet (400 mg) by mouth daily 90 tablet 3     memantine (NAMENDA) 5 MG tablet Take 5 mg by mouth 2 times daily       metoprolol tartrate (LOPRESSOR) 25 MG tablet Take 1 tablet (25 mg) by mouth 2 times daily 180 tablet 3     Multiple Vitamins-Minerals (CENTRUM ADULTS PO) Take 1 tablet by mouth every morning        order for DME Pt to be fit with compression garments 20-30mmHg or veclro compression garment from foot to knee bilaterally. 4 each 3     pantoprazole (PROTONIX) 40 MG EC tablet Take 40 mg by mouth every morning (before breakfast)       potassium chloride ER (K-DUR/KLOR-CON M) 10 MEQ CR tablet Take 3  tablets (30 mEq) by mouth 3 times daily       prednisoLONE acetate (PRED FORTE) 1 % ophthalmic susp Place 1 drop into both eyes as needed (eye pain)        timolol (TIMOPTIC) 0.5 % ophthalmic solution Place 1 drop into both eyes 2 times daily        vilazodone (VIIBRYD) 20 MG TABS tablet Take 20 mg by mouth daily         ALLERGIES     Allergies   Allergen Reactions     Amoxicillin      Itchy      Penicillins Hives     Spironolactone Rash       PAST MEDICAL HISTORY:  Past Medical History:   Diagnosis Date     Former tobacco use      Glaucoma      Hyperlipidemia LDL goal <160 12/6/2011     Obesity      DRAKE on CPAP      Right bundle branch block      Severe aortic stenosis     On Echo 10/28/16       PAST SURGICAL HISTORY:  Past Surgical History:   Procedure Laterality Date     CV HEART CATHETERIZATION WITH POSSIBLE INTERVENTION N/A 3/19/2019    Procedure: RHC and LHC (No angiogram);  Surgeon: Jai Romo MD;  Location:  HEART CARDIAC CATH LAB     CV RIGHT HEART CATH N/A 3/19/2019    Procedure: Right Heart Cath;  Surgeon: Jai Romo MD;  Location:  HEART CARDIAC CATH LAB     HEART CATH LEFT HEART CATH  04/07/2017    Diffuse non-obstuctive CAD     REPAIR VALVE AORTIC N/A 5/16/2017    Procedure: REPAIR VALVE AORTIC;  Median Sternotony, CardioPulmonary Bypass, Aortic Valve Replace using 25MM Trifecta Valve with [employer], Septal myectomy;  Surgeon: Jun Simon MD;  Location: [facility] OR     REPLACE VALVE AORTIC N/A 5/16/2017    Procedure: REPLACE VALVE AORTIC;;  Surgeon: Jun Simon MD;  Location: [facility]     SINUS SURGERY  2005       FAMILY HISTORY:  Family History   Problem Relation Age of Onset     Family History Negative Mother      Diabetes Father      Heart Surgery Father         CABG     Coronary Artery Disease Father      Hypertension Brother      Diabetes Brother      Heart Disease Brother      Heart Surgery Brother         CABG x 3     Family History Negative Sister       Diabetes Brother         History of diabetes, but no longer an issue     Family History Negative Brother        SOCIAL HISTORY:  Social History     Socioeconomic History     Marital status:      Spouse name: Not on file     Number of children: Not on file     Years of education: Not on file     Highest education level: Not on file   Occupational History     Not on file   Social Needs     Financial resource strain: Not on file     Food insecurity:     Worry: Not on file     Inability: Not on file     Transportation needs:     Medical: Not on file     Non-medical: Not on file   Tobacco Use     Smoking status: Former Smoker     Packs/day: 1.00     Years: 20.00     Pack years: 20.00     Types: Cigarettes, Cigars     Start date:      Last attempt to quit: 10/21/2011     Years since quittin.9     Smokeless tobacco: Never Used   Substance and Sexual Activity     Alcohol use: No     Drug use: No     Sexual activity: Not on file   Lifestyle     Physical activity:     Days per week: Not on file     Minutes per session: Not on file     Stress: Not on file   Relationships     Social connections:     Talks on phone: Not on file     Gets together: Not on file     Attends Sabianism service: Not on file     Active member of club or organization: Not on file     Attends meetings of clubs or organizations: Not on file     Relationship status: Not on file     Intimate partner violence:     Fear of current or ex partner: Not on file     Emotionally abused: Not on file     Physically abused: Not on file     Forced sexual activity: Not on file   Other Topics Concern     Parent/sibling w/ CABG, MI or angioplasty before 65F 55M? Yes     Comment: brother triple bypass 49   Social History Narrative     Not on file       Review of Systems:  Skin:  Negative     Eyes:  Positive for glasses  ENT:  Negative    Respiratory:  Positive for sleep apnea;CPAP  Cardiovascular:    lightheadedness;Positive for  Gastroenterology:  "Positive for heartburn;diarrhea  Genitourinary:  Negative    Musculoskeletal:  Positive for arthritis  Neurologic:  Positive for memory problems  Psychiatric:  Positive for anxiety;depression  Heme/Lymph/Imm:  Negative    Endocrine:  Positive for diabetes    Physical Exam:  Vitals: BP 98/58   Pulse 70   Ht 1.753 m (5' 9\")   Wt 116.6 kg (257 lb)   SpO2 95%   BMI 37.95 kg/m     Please refer to dictation for physical exam    Recent Lab Results:  LIPID RESULTS:  Lab Results   Component Value Date    CHOL 126 10/10/2018    HDL 23 (A) 10/10/2018    LDL 71 10/10/2018    TRIG 230 (A) 10/10/2018       LIVER ENZYME RESULTS:  Lab Results   Component Value Date    AST 88 (H) 03/18/2019    ALT 44 03/18/2019       CBC RESULTS:  Lab Results   Component Value Date    WBC 4.3 04/05/2019    RBC 3.53 (L) 04/05/2019    HGB 11.4 (L) 04/05/2019    HCT 35.0 (L) 04/05/2019    MCV 99 04/05/2019    MCH 32.3 04/05/2019    MCHC 32.6 04/05/2019    RDW 15.2 (H) 04/05/2019    PLT 86 (L) 04/05/2019       BMP RESULTS:  Lab Results   Component Value Date     10/14/2019    POTASSIUM 3.8 10/14/2019    CHLORIDE 105 10/14/2019    CO2 25 10/14/2019    ANIONGAP 13.8 10/14/2019     10/14/2019    BUN 14 10/14/2019    CR 1.28 10/14/2019    GFRESTIMATED 57 (L) 10/14/2019    GFRESTBLACK 69 10/14/2019    MYRON 9.1 10/14/2019        A1C RESULTS:  Lab Results   Component Value Date    A1C 5.4 03/16/2019       INR RESULTS:  Lab Results   Component Value Date    INR 1.28 (H) 03/15/2019    INR 2.19 (H) 10/14/2018           JARETT Holt PA-C  [address]  [place] [id number]      "

## 2019-10-14 NOTE — RESULT ENCOUNTER NOTE
Reviewed during clinic visit.  Please see progress note for plan.  Rachna Holt PA-C 10/14/2019 5:02 PM

## 2019-10-14 NOTE — LETTER
10/14/2019    Delia Robertson [address] [address]    RE: Gil Chowdary       Dear Colleague,    I had the pleasure of seeing Gil Chowdary in the [facility].     See above    [id number]  HPI and Plan:   See dictation    Orders this Visit:  Orders Placed This Encounter   Procedures     Basic metabolic panel     Follow-Up with Cardiologist     Echocardiogram Complete     Orders Placed This Encounter   Medications     lactulose (CHRONULAC) 10 GM/15ML solution     Sig: Take 30 mLs (20 g) by mouth daily     Dispense:  45 mL     Medications Discontinued During This Encounter   Medication Reason     clindamycin (CLEOCIN) 300 MG capsule Therapy completed         Encounter Diagnosis   Name Primary?     Chronic heart failure with preserved ejection fraction (H)        CURRENT MEDICATIONS:  Current Outpatient Medications   Medication Sig Dispense Refill     aspirin 81 MG tablet Take 81 mg by mouth every morning        atorvastatin (LIPITOR) 40 MG tablet Take 1 tablet (40 mg) by mouth daily 90 tablet 0     bumetanide (BUMEX) 1 MG tablet Take 2 tablets(2mg) by mouth twice daily 360 tablet 3     eplerenone (INSPRA) 50 MG tablet Take 1 tablet (50 mg) by mouth 2 times daily 60 tablet 11     ferrous sulfate (FEROSUL) 325 (65 Fe) MG tablet Take 1 tablet (325 mg) by mouth 2 times daily 60 tablet 3     Insulin Aspart (NOVOLOG FLEXPEN SC) Inject Subcutaneous as needed       lactulose (CHRONULAC) 10 GM/15ML solution Take 30 mLs (20 g) by mouth daily 45 mL      magnesium oxide (MAG-OX) 400 MG tablet Take 1 tablet (400 mg) by mouth daily 90 tablet 3     memantine (NAMENDA) 5 MG tablet Take 5 mg by mouth 2 times daily       metoprolol tartrate (LOPRESSOR) 25 MG tablet Take 1 tablet (25 mg) by mouth 2 times daily 180 tablet 3     Multiple Vitamins-Minerals (CENTRUM ADULTS PO) Take 1 tablet by mouth every morning        order for DME Pt to be fit with compression  garments 20-30mmHg or veclro compression garment from foot to knee bilaterally. 4 each 3     pantoprazole (PROTONIX) 40 MG EC tablet Take 40 mg by mouth every morning (before breakfast)       potassium chloride ER (K-DUR/KLOR-CON M) 10 MEQ CR tablet Take 3 tablets (30 mEq) by mouth 3 times daily       prednisoLONE acetate (PRED FORTE) 1 % ophthalmic susp Place 1 drop into both eyes as needed (eye pain)        timolol (TIMOPTIC) 0.5 % ophthalmic solution Place 1 drop into both eyes 2 times daily        vilazodone (VIIBRYD) 20 MG TABS tablet Take 20 mg by mouth daily         ALLERGIES     Allergies   Allergen Reactions     Amoxicillin      Itchy      Penicillins Hives     Spironolactone Rash       PAST MEDICAL HISTORY:  Past Medical History:   Diagnosis Date     Former tobacco use      Glaucoma      Hyperlipidemia LDL goal <160 12/6/2011     Obesity      DRAKE on CPAP      Right bundle branch block      Severe aortic stenosis     On Echo 10/28/16       PAST SURGICAL HISTORY:  Past Surgical History:   Procedure Laterality Date     CV HEART CATHETERIZATION WITH POSSIBLE INTERVENTION N/A 3/19/2019    Procedure: RHC and LHC (No angiogram);  Surgeon: Jai Romo MD;  Location:  HEART CARDIAC CATH LAB     CV RIGHT HEART CATH N/A 3/19/2019    Procedure: Right Heart Cath;  Surgeon: Jai Romo MD;  Location:  HEART CARDIAC CATH LAB     HEART CATH LEFT HEART CATH  04/07/2017    Diffuse non-obstuctive CAD     REPAIR VALVE AORTIC N/A 5/16/2017    Procedure: REPAIR VALVE AORTIC;  Median Sternotony, CardioPulmonary Bypass, Aortic Valve Replace using 25MM Trifecta Valve with [employer], Septal myectomy;  Surgeon: Jun Simon MD;  Location:  OR     REPLACE VALVE AORTIC N/A 5/16/2017    Procedure: REPLACE VALVE AORTIC;;  Surgeon: Jun Simon MD;  Location:  OR     SINUS SURGERY  2005       FAMILY HISTORY:  Family History   Problem Relation Age of Onset     Family History Negative  Mother      Diabetes Father      Heart Surgery Father         CABG     Coronary Artery Disease Father      Hypertension Brother      Diabetes Brother      Heart Disease Brother      Heart Surgery Brother         CABG x 3     Family History Negative Sister      Diabetes Brother         History of diabetes, but no longer an issue     Family History Negative Brother        SOCIAL HISTORY:  Social History     Socioeconomic History     Marital status:      Spouse name: Not on file     Number of children: Not on file     Years of education: Not on file     Highest education level: Not on file   Occupational History     Not on file   Social Needs     Financial resource strain: Not on file     Food insecurity:     Worry: Not on file     Inability: Not on file     Transportation needs:     Medical: Not on file     Non-medical: Not on file   Tobacco Use     Smoking status: Former Smoker     Packs/day: 1.00     Years: 20.00     Pack years: 20.00     Types: Cigarettes, Cigars     Start date:      Last attempt to quit: 10/21/2011     Years since quittin.9     Smokeless tobacco: Never Used   Substance and Sexual Activity     Alcohol use: No     Drug use: No     Sexual activity: Not on file   Lifestyle     Physical activity:     Days per week: Not on file     Minutes per session: Not on file     Stress: Not on file   Relationships     Social connections:     Talks on phone: Not on file     Gets together: Not on file     Attends Christian service: Not on file     Active member of club or organization: Not on file     Attends meetings of clubs or organizations: Not on file     Relationship status: Not on file     Intimate partner violence:     Fear of current or ex partner: Not on file     Emotionally abused: Not on file     Physically abused: Not on file     Forced sexual activity: Not on file   Other Topics Concern     Parent/sibling w/ CABG, MI or angioplasty before 65F 55M? Yes     Comment: brother triple bypass  "49   Social History Narrative     Not on file       Review of Systems:  Skin:  Negative     Eyes:  Positive for glasses  ENT:  Negative    Respiratory:  Positive for sleep apnea;CPAP  Cardiovascular:    lightheadedness;Positive for  Gastroenterology: Positive for heartburn;diarrhea  Genitourinary:  Negative    Musculoskeletal:  Positive for arthritis  Neurologic:  Positive for memory problems  Psychiatric:  Positive for anxiety;depression  Heme/Lymph/Imm:  Negative    Endocrine:  Positive for diabetes    Physical Exam:  Vitals: BP 98/58   Pulse 70   Ht 1.753 m (5' 9\")   Wt 116.6 kg (257 lb)   SpO2 95%   BMI 37.95 kg/m      Please refer to dictation for physical exam    Recent Lab Results:  LIPID RESULTS:  Lab Results   Component Value Date    CHOL 126 10/10/2018    HDL 23 (A) 10/10/2018    LDL 71 10/10/2018    TRIG 230 (A) 10/10/2018       LIVER ENZYME RESULTS:  Lab Results   Component Value Date    AST 88 (H) 03/18/2019    ALT 44 03/18/2019       CBC RESULTS:  Lab Results   Component Value Date    WBC 4.3 04/05/2019    RBC 3.53 (L) 04/05/2019    HGB 11.4 (L) 04/05/2019    HCT 35.0 (L) 04/05/2019    MCV 99 04/05/2019    MCH 32.3 04/05/2019    MCHC 32.6 04/05/2019    RDW 15.2 (H) 04/05/2019    PLT 86 (L) 04/05/2019       BMP RESULTS:  Lab Results   Component Value Date     10/14/2019    POTASSIUM 3.8 10/14/2019    CHLORIDE 105 10/14/2019    CO2 25 10/14/2019    ANIONGAP 13.8 10/14/2019     10/14/2019    BUN 14 10/14/2019    CR 1.28 10/14/2019    GFRESTIMATED 57 (L) 10/14/2019    GFRESTBLACK 69 10/14/2019    MYRON 9.1 10/14/2019        A1C RESULTS:  Lab Results   Component Value Date    A1C 5.4 03/16/2019       INR RESULTS:  Lab Results   Component Value Date    INR 1.28 (H) 03/15/2019    INR 2.19 (H) 10/14/2018           CC  Rachna Holt PAASMITA  [address]  [place] [address]        Service Date: 10/14/2019      PRIMARY CARDIOLOGIST:  Dr. Mckeon.      REASON FOR VISIT:  Heart failure " followup.      HISTORY OF PRESENT ILLNESS:  Mr. Chowdary is a delightful 59-year-old gentleman with past medical history significant for the followin.  Severe aortic stenosis with aortic valve replacement in 2017 with a 25 mm Trifecta tissue valve.   2.  Type 2 diabetes, on insulin.   3.  Hyperlipidemia.   4.  Treated sleep apnea.   5.  Heart failure with preserved EF.   6.  History of iron deficiency anemia and ITP followed by Dr. Fletcher.   7.  History of SVT and paroxysmal AFib postoperatively, none on recent Zio Patch.   8.  History of myectomy at the time of aortic valve replacement, but no clear hypertrophic cardiomyopathy diagnosis.   9.  Lymphedema and edema, secondary to heart failure.      I had met Mata about a year ago when he was admitted with weight gain and shortness of breath in the context of acute anemia with a george of 7.4.  He was unable to stay inpatient as he had to take care of his elderly mother.  He eventually ended up diuresing like 35 pounds and has since been on an excellent path of diuresis as an outpatient.  Unfortunately, he was admitted this summer with confusion and found to have hepatic encephalopathy.  Since that time, he has also moved in with his sister near [place], and this is working out well.  With the admission for hepatic encephalopathy, he has done better but his memory issues did not resolve.      Today, he says he is doing well.  He denies chest pain, shortness of breath, orthopnea or PND.  He has not had syncope or presyncope.  He continues to struggle with his memory issues, but his sister helps him with meds, etc.  He weighs himself daily on 2 scales, one that is electronic and one that is the one we got and then he calls into us.  He is not sure where the other one goes.      SOCIAL HISTORY:  Again, previously lived with his mom and took care of the house.  She passed away in 2019.  Again, he is now living with his sister.  He has a 20-pack-year history of  smoking, quit in 2011.  No alcohol use.  He is watching his sodium and has social security disability that just got approved.      PHYSICAL EXAMINATION:   GENERAL:  Well-developed, well-nourished gentleman, in no acute distress.   HEENT:  Normocephalic, atraumatic.   HEART:  Regular rate and rhythm.  I do not appreciate murmur, rub or gallop.   LUNGS:  Clear with some coarse rhonchi in the right lower lobe, otherwise without wheezes or rales.   EXTREMITIES:  With 1+ pitting edema to mid shin and compression stockings in place.   NECK:  Veins are flat at 30 degrees.   SKIN:  Warm and dry.      ASSESSMENT AND PLAN:   1.  Chronic heart failure with preserved EF with no sign of constriction on right heart cath and his weight now stable at 255 pounds.  He is now down about 77 pounds since his peak.  We will reset his dry weight between 250 and 258 pounds.  He will remain on Bumex 2 mg b.i.d., Eplerenone 50 mg b.i.d.  His creatinine is stable today at 1.28 with BUN 14, potassium 3.8.  He still does require potassium at 30 mEq 3 times a day to maintain his potassium, but hopefully we will be able to cut back his diuretics going forward to help lower his potassium burden.   2.  History of aortic valve replacement with bioprosthetic aortic valve and history of septal myectomy for septal hypertrophy without LVOT gradient.  He can remain on aspirin alone for anticoagulation and needs antibiotic prophylaxis for dental work.  He is due for repeat echo.  This will be done in 3 months.   3.  Treated sleep apnea.   4.  History of SVT and paroxysmal AFib postoperatively, none on last Zio.   5.  Early onset dementia.  Unfortunately, this has not resolved with hepatic encephalopathy.   6.  Dyslipidemia, well-controlled on Lipitor.      We will have Dr. Mckeon see him back in 3 months with a BMP and echo at that time.  We will certainly talk to him sooner with concerns.      Rachna Holt PA-C      cc:   Jeremie Mckeon MD   [facility] [place]   [address]   [place] [address]         RACHNA CAO PA-C             D: 10/14/2019   T: 10/14/2019   MT: al      Name:     ABIGAIL MATOS   MRN:      [id number]        Account:      [id number]   :      1960           Service Date: 10/14/2019      Document: [id number]       Thank you for allowing me to participate in the care of your patient.      Sincerely,     Rachna Cao PA-C     [facility]    cc:   Rachna Cao PA-C  [address]  [place] [address]

## 2019-10-14 NOTE — PROGRESS NOTES
Service Date: 10/14/2019      PRIMARY CARDIOLOGIST:  Dr. Mckeon.      REASON FOR VISIT:  Heart failure followup.      HISTORY OF PRESENT ILLNESS:  Mr. Chowdary is a delightful 59-year-old gentleman with past medical history significant for the followin.  Severe aortic stenosis with aortic valve replacement in 2017 with a 25 mm Trifecta tissue valve.   2.  Type 2 diabetes, on insulin.   3.  Hyperlipidemia.   4.  Treated sleep apnea.   5.  Heart failure with preserved EF.   6.  History of iron deficiency anemia and ITP followed by Dr. Fletcher.   7.  History of SVT and paroxysmal AFib postoperatively, none on recent Zio Patch.   8.  History of myectomy at the time of aortic valve replacement, but no clear hypertrophic cardiomyopathy diagnosis.   9.  Lymphedema and edema, secondary to heart failure.      I had met Mata about a year ago when he was admitted with weight gain and shortness of breath in the context of acute anemia with a george of 7.4.  He was unable to stay inpatient as he had to take care of his elderly mother.  He eventually ended up diuresing like 35 pounds and has since been on an excellent path of diuresis as an outpatient.  Unfortunately, he was admitted this summer with confusion and found to have hepatic encephalopathy.  Since that time, he has also moved in with his sister near [place], and this is working out well.  With the admission for hepatic encephalopathy, he has done better but his memory issues did not resolve.      Today, he says he is doing well.  He denies chest pain, shortness of breath, orthopnea or PND.  He has not had syncope or presyncope.  He continues to struggle with his memory issues, but his sister helps him with meds, etc.  He weighs himself daily on 2 scales, one that is electronic and one that is the one we got and then he calls into us.  He is not sure where the other one goes.      SOCIAL HISTORY:  Again, previously lived with his mom and took care of the house.  She  passed away in 04/2019.  Again, he is now living with his sister.  He has a 20-pack-year history of smoking, quit in 2011.  No alcohol use.  He is watching his sodium and has social security disability that just got approved.      PHYSICAL EXAMINATION:   GENERAL:  Well-developed, well-nourished gentleman, in no acute distress.   HEENT:  Normocephalic, atraumatic.   HEART:  Regular rate and rhythm.  I do not appreciate murmur, rub or gallop.   LUNGS:  Clear with some coarse rhonchi in the right lower lobe, otherwise without wheezes or rales.   EXTREMITIES:  With 1+ pitting edema to mid shin and compression stockings in place.   NECK:  Veins are flat at 30 degrees.   SKIN:  Warm and dry.      ASSESSMENT AND PLAN:   1.  Chronic heart failure with preserved EF with no sign of constriction on right heart cath and his weight now stable at 255 pounds.  He is now down about 77 pounds since his peak.  We will reset his dry weight between 250 and 258 pounds.  He will remain on Bumex 2 mg b.i.d., Eplerenone 50 mg b.i.d.  His creatinine is stable today at 1.28 with BUN 14, potassium 3.8.  He still does require potassium at 30 mEq 3 times a day to maintain his potassium, but hopefully we will be able to cut back his diuretics going forward to help lower his potassium burden.   2.  History of aortic valve replacement with bioprosthetic aortic valve and history of septal myectomy for septal hypertrophy without LVOT gradient.  He can remain on aspirin alone for anticoagulation and needs antibiotic prophylaxis for dental work.  He is due for repeat echo.  This will be done in 3 months.   3.  Treated sleep apnea.   4.  History of SVT and paroxysmal AFib postoperatively, none on last Zio.   5.  Early onset dementia.  Unfortunately, this has not resolved with hepatic encephalopathy.   6.  Dyslipidemia, well-controlled on Lipitor.      We will have Dr. Mckeon see him back in 3 months with a BMP and echo at that time.  We will certainly  talk to him sooner with concerns.      Rachna Holt PA-C      cc:   Jeremie Mckeon MD   [facility] [place]   [address] [facility] [address]   [place] [address]         RACHNA HOLT PA-C             D: 10/14/2019   T: 10/14/2019   MT: al      Name:     ABIGAIL MATOS   MRN:      [id number]        Account:      [id number]   :      1960           Service Date: 10/14/2019      Document: [id number]

## 2019-10-16 ENCOUNTER — TELEPHONE (OUTPATIENT)
Dept: CARDIOLOGY | Facility: CLINIC | Age: 59
End: 2019-10-16

## 2019-10-16 NOTE — TELEPHONE ENCOUNTER
[facility] Clinic Telemanagment  My Health Tracker HF Alert     Last MHT survey 10/14. Called pt w/ friendly reminder to submit MHT surveys daily and/or call CORE RN if questions/concerns.      Future Appointments   Date Time Provider Department Center   10/30/2019  8:20 AM [facility] [place] [facility]   10/30/2019  9:20 AM Keith Manning MD [facility]   1/10/2020 10:00 AM SHCVECHR2 [facility]   1/15/2020  9:40 AM [facility]   1/15/2020 10:45 AM [facility]       Karen Stark RN BSN   4:10 PM 10/16/19

## 2019-10-30 ENCOUNTER — TELEPHONE (OUTPATIENT)
Dept: ONCOLOGY | Facility: CLINIC | Age: 59
End: 2019-10-30

## 2019-10-31 ENCOUNTER — HEALTH MAINTENANCE LETTER (OUTPATIENT)
Age: 59
End: 2019-10-31

## 2019-11-04 ENCOUNTER — CARE COORDINATION (OUTPATIENT)
Dept: CARDIOLOGY | Facility: CLINIC | Age: 59
End: 2019-11-04

## 2019-11-11 ENCOUNTER — CARE COORDINATION (OUTPATIENT)
Dept: CARDIOLOGY | Facility: CLINIC | Age: 59
End: 2019-11-11

## 2019-11-11 NOTE — PROGRESS NOTES
[facility] - [facility] Clinic Telemanagment  My Health Tracker HF Alert    No Call Variance. Last weight called into MHT on 11/7/19. Last weight 248# and within wt parameters. Note from RN 11/4 and 11/5 regarding wt loss, wt parameters were adjusted and pt was instructed to get labs at local clinic to make sure not getting dehydrated, as pt on lactulose and having large amounts of loose stool last week. Last note says pt agree's to getting BMP at lab in [place], [place] to make sure not getting too dry. I do not see any notes since than and no results.     Called pt, wt has been stable since 11/6. Wt today 250#, pt feeling well. Loose stools not every day, just occasionally now. Pt denies increased SOB or swelling. Asked if he ever had labs done, he has not. Asked him to do that. He will go to [facility] tomorrow or Wednesday. Faxed order for BMP to [facility] at fax# [phone]. Messaged CORE RN board to watch for results tomorrow or Wednesday.     Daily diuretic plan: Bumex 2mg BID, Inspra 50mg BID, KCL 30meq TID    Weight goal: 241-251#   My weight this morning is:   Units pounds   10/12/2019 257   10/14/2019 255   10/16/2019 254   10/17/2019 253   10/18/2019 253   10/20/2019 253   10/23/2019 254   10/28/2019 253   10/28/2019 253   10/29/2019 253   11/2/2019 247   11/4/2019 243   11/6/2019 249   11/7/2019 248   11/11/2019 250     Future Appointments   Date Time Provider Department Center   11/27/2019  9:40 AM Keith Manning MD [facility] [place]   1/10/2020 10:00 AM SHCVECHR2 SHCVCV CVIMG   1/15/2020  9:40 AM S[facility]OMAY[facility] LAB [facility]   1/15/2020 10:45 AM Jeremie Mckeon MD [facility]       YOANNA Pearl 1:04 PM 11/11/19

## 2019-11-13 ENCOUNTER — TRANSFERRED RECORDS (OUTPATIENT)
Dept: HEALTH INFORMATION MANAGEMENT | Facility: CLINIC | Age: 59
End: 2019-11-13

## 2019-11-13 LAB
ANION GAP SERPL CALCULATED.3IONS-SCNC: 8 MMOL/L (ref 5–18)
BUN SERPL-MCNC: 11 MG/DL (ref 8–25)
CALCIUM SERPL-MCNC: 9 MG/DL (ref 10–20)
CHLORIDE SERPLBLD-SCNC: 104 MMOL/L (ref 98–110)
CO2 SERPL-SCNC: 25 MMOL/L (ref 21–31)
CREAT SERPL-MCNC: 1.29 MG/DL (ref 10–20)
GFR SERPL CREATININE-BSD FRML MDRD: 57 ML/MIN/1.73M2
GLUCOSE SERPL-MCNC: 181 MG/DL (ref 65–100)
POTASSIUM SERPL-SCNC: 3.6 MMOL/L (ref 3.5–5)
SODIUM SERPL-SCNC: 137 MMOL/L (ref 135–145)

## 2019-11-13 NOTE — PROGRESS NOTES
Called pt, no answer. LVM letting him know labs look good, no changes, keep calling in weights. Left call back number for questions. YOANNA Pearl 1:30 PM 11/13/19

## 2019-11-13 NOTE — PROGRESS NOTES
[facility]    [facility] noted. Wt stable the last few days and within wt parameters of 241-251#. Update sent to JOAN Babcock. YOANNA Pearl 10:57 AM 11/13/19      My weight this morning is:   Units pounds   10/18/2019 253   10/20/2019 253   10/23/2019 254   10/28/2019 253   10/28/2019 253   10/29/2019 253   11/2/2019 247   11/4/2019 243   11/6/2019 249   11/7/2019 248   11/11/2019 250   11/12/2019 251       Component      Latest Ref Rng & Units 8/7/2019 10/14/2019   Sodium      136 - 145 mmol/L 138 140   Potassium      3.5 - 5.1 mmol/L 3.9 3.8   Chloride      98 - 107 mmol/L 105 105   Carbon Dioxide      23 - 29 mmol/L 27 25   Anion Gap      6 - 17 mmol/L 6 13.8   Glucose      70 - 105 mg/dL 239 (H) 103   Urea Nitrogen      7 - 30 mg/dL 15 14   Creatinine      0.70 - 1.30 mg/dL 1.16 1.28   GFR Estimate      >60 mL/min/1.73:m2 68 57 (L)   GFR Estimate If Black      >60 mL/min/1.73:m2 79 69   Calcium      8.5 - 10.5 mg/dL 8.6 9.1

## 2019-11-15 ENCOUNTER — DOCUMENTATION ONLY (OUTPATIENT)
Dept: CARDIOLOGY | Facility: CLINIC | Age: 59
End: 2019-11-15

## 2019-11-15 ENCOUNTER — CARE COORDINATION (OUTPATIENT)
Dept: CARDIOLOGY | Facility: CLINIC | Age: 59
End: 2019-11-15

## 2019-11-15 NOTE — PROGRESS NOTES
[facility] - C[facility] Clinic Telemanagment  My Health Tracker HF Alert     No Call Variance. Last weight called in on 11/12/19. Called pt to review, no answer. LVM reminding him to call in weights daily. YOANNA Pearl 1:31 PM 11/15/19

## 2019-11-18 ENCOUNTER — CARE COORDINATION (OUTPATIENT)
Dept: CARDIOLOGY | Facility: CLINIC | Age: 59
End: 2019-11-18

## 2019-11-18 RX ORDER — LACTULOSE 10 G/15ML
SOLUTION ORAL
Qty: 45 ML | COMMUNITY
Start: 2019-11-18 | End: 2020-04-30

## 2019-11-18 NOTE — PROGRESS NOTES
[facility] Clinic Telemanagment  My Health Tracker HF Alert     Weight today: 255#  Weight goal: 241-251#   My weight this morning is:   Units pounds   10/28/2019 253   10/28/2019 253   10/29/2019 253   11/2/2019 247   11/4/2019 243   11/6/2019 249   11/7/2019 248   11/11/2019 250   11/12/2019 251   11/16/2019 254   11/18/2019 255     Heart Failure sx: none reported    Daily diuretic plan: Bumex 2mg BID, Inspra 50mg BID, KCL 30meq TID    Notes: Wt 4# above goal today and up 7# in the last 10 days or so. No symptoms reported on survey. Labs drawn 11/13 at [facility], labs looked better than ever per JOAN Babcock and no changes made. Wt at that time was 251#, which was at max at parameter. Called pt, he reports feeling well. He denies any SOB or swelling. Pt reports he has not been exercising at all, but he will try to start doing that. He plans to get treadmill set up and start walking. Pt reports he is really feeling good. Pt reports when wt was down to 243# he had been taking Lactulose 30mL a day, at that point he was having quite a bit of loose stool, so PCP Dr. Neely's nurse told pt to go back to just 15mL a day, but pt states now he doesn't feel like he is going enough and sometimes is bloated. Pt wonders if he needs to go back up on the lactulose. I do see RN notes from 11/8 instructing this to pt in Care Everywhere. Wt gain does correlate with change in lactulose dose. I advised pt call Dr. Neely's nurse and discuss. Asked pt to let me know so we can stay updated and update med list, as we still have he takes 30mL a day. Pt stated understanding. Messaged to JOAN Babcock. YOANNA Pearl 9:38 AM 11/18/19       Future Appointments   Date Time Provider Department Center   11/27/2019  9:40 AM Keith Manning MD [facility] [place]   1/10/2020 10:00 AM SHCVECHR2 SHCVCV CVIMG   1/15/2020  9:40 AM [facility] LAB [facility]   1/15/2020 10:45 AM Jeremie Mckeon MD [facility]

## 2019-11-18 NOTE — PROGRESS NOTES
Received VM from pt, he spoke with Dr. Neely's office and they recommended he increase Lactulose to 1/1 cup or 7.5mL in am and 15mL in pm. Updated med list. YOANNA Pearl 11:48 AM 11/18/19

## 2019-11-18 NOTE — PROGRESS NOTES
I agree that the weights do seem to correlate with lactulose dosing.  As long as pt does not report sx, lets follow wts until next Monday to give lactulose time to work.  We can adjust at that time if need be.  Rachna Holt PA-C 11/18/2019 12:21 PM

## 2019-11-18 NOTE — PROGRESS NOTES
Called pt, reviewed Sadiq's comments and recommendations. Pt stated understanding. Will continue to monitor wt and sx on survey. Asked pt to call us as well if he develops any increased SOB or swelling. He stated understanding. YOANNA Pearl 12:27 PM 11/18/19

## 2019-11-19 NOTE — PROGRESS NOTES
[facility] Telemanagment  My Health Tracker HF Alert     Weight today: 253#  Weight goal: 241-251#   My weight this morning is:   Units pounds   11/6/2019 249   11/7/2019 248   11/11/2019 250   11/12/2019 251   11/15/2019 253   11/16/2019 254   11/18/2019 255   11/19/2019 253     Heart Failure sx: none reported    Daily diuretic plan: Bumex 2mg BID, Inspra 50mg BID, KCL 30meq TID    Notes: See notes from yesterday. Wt gain in correlation with decrease in lactulose dose. Lactulose increased yesterday. Wt down 2# today from yesterday. Will continue to monitor. YOANNA Pearl 10:41 AM 11/19/19     Future Appointments   Date Time Provider Department Center   11/27/2019  9:40 AM Keith Manning MD [facility] [place]   1/10/2020 10:00 AM [email or web address]   1/15/2020  9:40 AM [facility]   1/15/2020 10:45 AM [facility]

## 2019-11-20 NOTE — PROGRESS NOTES
[facility] Tele[facility]  My Health Tracker HF Alert     Weight today: 254#  Weight goal: 241-251#   My weight this morning is:   Units pounds   10/28/2019 253   10/28/2019 253   10/29/2019 253   11/2/2019 247   11/4/2019 243   11/6/2019 249   11/7/2019 248   11/11/2019 250   11/12/2019 251   11/15/2019 253   11/16/2019 254   11/18/2019 255   11/19/2019 253   11/20/2019 254     Heart Failure sx: none reported    Daily diuretic plan: Bumex 2mg BID, Inspra 50mg BID, KCL 30meq TID      Notes: Continuing to monitor per JOAN Babcock given changes in lactulose dose and no symptoms reported. YOANNA Pearl 10:54 AM 11/20/19      Future Appointments   Date Time Provider Department Center   11/27/2019  9:40 AM Keith Manning MD [facility] [place]   1/10/2020 10:00 AM [email or web address]   1/15/2020  9:40 AM [facility]   1/15/2020 10:45 AM Jeremie Mckeon MD [facility]

## 2019-11-21 NOTE — PROGRESS NOTES
[facility] Tele[facility]agment  My Health Tracker HF Alert     Weight today: 254#  Weight goal: 241-251#   My weight this morning is:   Units pounds   11/6/2019 249   11/7/2019 248   11/11/2019 250   11/12/2019 251   11/15/2019 253   11/16/2019 254   11/18/2019 255   11/19/2019 253   11/20/2019 254   11/21/2019 254     Heart Failure sx: none reported     Daily diuretic plan: Bumex 2mg BID, Inspra 50mg BID, KCL 30meq TID       Notes: Wt remains stable, no symptoms reported. Will continue to monitor. Plan to send weight updates to JOAN Babcock next week after more time on increased Lactulose dose as long as pt not reporting symptoms. YOANNA Pearl 8:53 AM 11/21/19     Future Appointments   Date Time Provider Department Center   11/27/2019  9:40 AM Keith Manning MD [facility] [place]   1/10/2020 10:00 AM [facility]   1/15/2020  9:40 AM [facility]   1/15/2020 10:45 AM Jeremie Mckeon MD [facility]

## 2019-11-25 NOTE — PROGRESS NOTES
Agree, expected.  Continue to monitor, update appreciated.  Rachna Holt PA-C 11/25/2019 2:38 PM

## 2019-11-25 NOTE — PROGRESS NOTES
[facility]  My Health Tracker HF Alert    Note that pt had 6# wt loss from 11/21 to 11/25. Wt loss as expected after increase to lactulose. Wt is w/in range and no sx reported. Will continue to monitor. FY[place] to Rachna Holt PAC.      Recent home wts:    Current goal wt:    241-251  Heart Failure sx:    None reported    Future Appointments   Date Time Provider Department Center   11/27/2019  9:40 AM Keith Manning MD [facility]   1/10/2020 10:00 AM [facility]   1/15/2020  9:40 AM [facility]   1/15/2020 10:45 AM Jeremie Mckeon MD [facility]     Karen Stark RN BSN   1:04 PM 11/25/19

## 2019-12-04 ENCOUNTER — CARE COORDINATION (OUTPATIENT)
Dept: CARDIOLOGY | Facility: CLINIC | Age: 59
End: 2019-12-04

## 2019-12-04 NOTE — PROGRESS NOTES
[facility] Telemanagment  My Health Tracker HF Alert     Weight today: 253#  Weight goal: 241-251#   My weight this morning is:   Units pounds   11/15/2019 253   11/16/2019 254   11/18/2019 255   11/19/2019 253   11/20/2019 254   11/21/2019 254   11/25/2019 248   11/29/2019 254   11/30/2019 253   12/1/2019 253   12/4/2019 253     Heart Failure sx: none reported    Daily diuretic plan: Bumex 2mg BID, Inspra 50mg BID, KCL 30meq TID      Notes: Wt remains above goal. Wt was down within goal only 1 day in the last week, otherwise has been stable between 253-254#, which is 2-3# above goal. Called pt, he is feeling well. He denies increased SOB or swelling. Wt's have stabilized on higher dose lactulose but still above goal. Messaged to JOAN Babcock for review. YOANNA Pearl 2:05 PM 12/04/19      Future Appointments   Date Time Provider Department Center   1/10/2020 10:00 AM [facility]   1/15/2020  9:40 AM [facility]   1/15/2020 10:45 AM [place]

## 2019-12-04 NOTE — PROGRESS NOTES
pls adjust goal 245-255, I think things dropped initially with lactulose, but now seem more stable.  Rachna Holt PA-C 12/4/2019 3:23 PM

## 2019-12-17 ENCOUNTER — CARE COORDINATION (OUTPATIENT)
Dept: CARDIOLOGY | Facility: CLINIC | Age: 59
End: 2019-12-17

## 2019-12-17 NOTE — PROGRESS NOTES
[facility] Tele[facility]  My Health Tracker HF Alert     No Call Variance. Last weight called in on 12/14. Called pt to review, no answer. LVM reminding him to call in weights daily. Ryanne RN 1:23 PM 12/17/19     Weight goal: 245-255#  MyHealth Tracker CHF Flowsheet Past Weight   12/5/2019 253   12/6/2019 251   12/7/2019 253   12/9/2019 254   12/11/2019 254   12/12/2019 254   12/14/2019 252        Future Appointments   Date Time Provider Department Center   1/10/2020 10:00 AM [facility]   1/15/2020  9:40 AM [facility]   1/15/2020 10:45 AM Jeremie Mckeon MD [facility]

## 2019-12-23 ENCOUNTER — CARE COORDINATION (OUTPATIENT)
Dept: CARDIOLOGY | Facility: CLINIC | Age: 59
End: 2019-12-23

## 2019-12-23 NOTE — PROGRESS NOTES
[facility] Telemanagment  My Health Tracker HF Alert     No Call Variance. Called pt to review, last weight from 12/20/19. No answer, LVM reminding him to call in weights daily. Ryanne RN 2:01 PM 12/23/19     Future Appointments   Date Time Provider Department Center   1/10/2020 10:00 AM [facility]   1/15/2020  9:40 AM [facility]   1/15/2020 10:45 AM Jeremie Mckeon MD [facility]

## 2019-12-24 ENCOUNTER — TRANSFERRED RECORDS (OUTPATIENT)
Dept: HEALTH INFORMATION MANAGEMENT | Facility: CLINIC | Age: 59
End: 2019-12-24

## 2020-01-03 ENCOUNTER — CARE COORDINATION (OUTPATIENT)
Dept: CARDIOLOGY | Facility: CLINIC | Age: 60
End: 2020-01-03

## 2020-01-03 NOTE — PROGRESS NOTES
"[facility] Telemanagment  My Health Tracker HF Alert     No Call Variance. Last weight called in on 12/29. Called pt, he totally forgot about calling in his weights. Pt reports it has remained stable at 252-253#. He is feeling fine without concerns. Pt stated \"How can I not be good with you guys on my side\". Reminded pt to call in daily. He forgot to weigh this am, as he was up early. Pt will try to remember to call in tomorrow. YOANNA Pearl 1:17 PM 01/03/20     Weight goal: 245-255#  MyHealth Tracker CHF Flowsheet My weight today is:   12/18/2019 252   12/19/2019 254   12/20/2019 253   12/24/2019 253   12/28/2019 252   12/28/2019 253   12/29/2019 252      Future Appointments   Date Time Provider Department Center   1/10/2020 10:00 AM [facility]2 [facility]   1/15/2020  9:40 AM [facility]   1/15/2020 10:45 AM Jeremie Mckeon MD [facility]     "

## 2020-01-08 ENCOUNTER — CARE COORDINATION (OUTPATIENT)
Dept: CARDIOLOGY | Facility: CLINIC | Age: 60
End: 2020-01-08

## 2020-01-08 NOTE — PROGRESS NOTES
"[facility]  My Health Tracker HF Alert     Lapse in surveys from 12/29 to 1/8, 3# wt gain during that time and today reports increased dizziness.    Spoke to pt who told me he's had a \"cold\" for several days (nasal congestion, drainage) and attributes intermittent dizziness to this. He denied fever/chills. I advised he try mucinex and saline nasal spray, call PCP if sx persist next wk.    He denied SOB/MOTA, edema, orthopnea. He thinks he may have been less mindful on diet over holidays, thus wt gain. He intends to be more mindful going forward.    He told me he'll do his best to submit daily surveys. And confirmed follow-up plan for this/next wk.     Current goal wt:  245-255    Recent home wts:      Future Appointments   Date Time Provider Department Center   1/10/2020 10:00 AM [facility]   1/15/2020  9:40 AM [facility]   1/15/2020 10:45 AM [facility]     Karen Stark RN BSN   1:46 PM 01/08/20        "

## 2020-01-10 ENCOUNTER — HOSPITAL ENCOUNTER (OUTPATIENT)
Dept: CARDIOLOGY | Facility: CLINIC | Age: 60
Discharge: HOME | End: 2020-01-10
Attending: PHYSICIAN ASSISTANT | Admitting: PHYSICIAN ASSISTANT
Payer: COMMERCIAL

## 2020-01-10 DIAGNOSIS — I50.32 CHRONIC HEART FAILURE WITH PRESERVED EJECTION FRACTION (H): ICD-10-CM

## 2020-01-10 PROCEDURE — 25500064 ZZH RX 255 OP 636: Performed by: PHYSICIAN ASSISTANT

## 2020-01-10 PROCEDURE — 40000264 ECHOCARDIOGRAM COMPLETE

## 2020-01-10 PROCEDURE — 93306 TTE W/DOPPLER COMPLETE: CPT | Mod: 26 | Performed by: INTERNAL MEDICINE

## 2020-01-10 RX ADMIN — HUMAN ALBUMIN MICROSPHERES AND PERFLUTREN 3 ML: 10; .22 INJECTION, SOLUTION INTRAVENOUS at 11:04

## 2020-01-15 ENCOUNTER — OFFICE VISIT (OUTPATIENT)
Dept: CARDIOLOGY | Facility: CLINIC | Age: 60
End: 2020-01-15
Attending: PHYSICIAN ASSISTANT
Payer: COMMERCIAL

## 2020-01-15 ENCOUNTER — CARE COORDINATION (OUTPATIENT)
Dept: CARDIOLOGY | Facility: CLINIC | Age: 60
End: 2020-01-15

## 2020-01-15 VITALS
DIASTOLIC BLOOD PRESSURE: 70 MMHG | BODY MASS INDEX: 38.66 KG/M2 | SYSTOLIC BLOOD PRESSURE: 112 MMHG | HEART RATE: 72 BPM | HEIGHT: 69 IN | WEIGHT: 261 LBS

## 2020-01-15 DIAGNOSIS — Z95.2 H/O AORTIC VALVE REPLACEMENT: Primary | ICD-10-CM

## 2020-01-15 DIAGNOSIS — E78.5 HYPERLIPIDEMIA LDL GOAL <160: ICD-10-CM

## 2020-01-15 DIAGNOSIS — I50.32 CHRONIC HEART FAILURE WITH PRESERVED EJECTION FRACTION (H): ICD-10-CM

## 2020-01-15 LAB
ANION GAP SERPL CALCULATED.3IONS-SCNC: 9.7 MMOL/L (ref 6–17)
BUN SERPL-MCNC: 12 MG/DL (ref 7–30)
CALCIUM SERPL-MCNC: 8.3 MG/DL (ref 8.5–10.5)
CHLORIDE SERPL-SCNC: 106 MMOL/L (ref 98–107)
CO2 SERPL-SCNC: 27 MMOL/L (ref 23–29)
CREAT SERPL-MCNC: 1.23 MG/DL (ref 0.7–1.3)
GFR SERPL CREATININE-BSD FRML MDRD: 60 ML/MIN/{1.73_M2}
GLUCOSE SERPL-MCNC: 187 MG/DL (ref 70–105)
POTASSIUM SERPL-SCNC: 3.7 MMOL/L (ref 3.5–5.1)
SODIUM SERPL-SCNC: 139 MMOL/L (ref 136–145)

## 2020-01-15 PROCEDURE — 36415 COLL VENOUS BLD VENIPUNCTURE: CPT | Performed by: PHYSICIAN ASSISTANT

## 2020-01-15 PROCEDURE — 80048 BASIC METABOLIC PNL TOTAL CA: CPT | Performed by: PHYSICIAN ASSISTANT

## 2020-01-15 PROCEDURE — 99214 OFFICE O/P EST MOD 30 MIN: CPT | Performed by: INTERNAL MEDICINE

## 2020-01-15 ASSESSMENT — MIFFLIN-ST. JEOR: SCORE: 1984.27

## 2020-01-15 NOTE — PROGRESS NOTES
HPI and Plan:   I had the pleasure of seeing Mr. Chowdary in cardiology outpatient clinic in [place] today.  He was previously seen by Dr. Salinas in the past and establish care with me in 02/2019.  He is a pleasant 58-year-old gentleman with past medical history of severe aortic stenosis with aortic valve replacement in 2017 with 25 mm tissue valve, type 2 diabetes on insulin, hyperlipidemia, sleep apnea on CPAP, heart failure with preserved ejection fraction, iron deficiency anemia for which she is being followed by Dr. Fletcher, history of SVT and paroxysmal A. fib in postop (no recurrence on recent ZIO patch).  He has also been followed by my colleague Ms. Rachna Holt for heart failure.     The patient was admitted with exacerbation of diastolic heart failure in October 2018.  Due to some personal reasons he had to be discharged prior to being euvolemic.  He was followed very closely after discharge for some time with almost weekly clinic visit for medication titration and symptom monitoring.  Fortunately, he did well with outpatient monitoring and medication adjustment and became euvolemic.  His baseline weight was thought to be  around 250-255 pounds.  He continues to be on 2 mg of Bumex BID.  He has also required 30 meq of potassium for diuresis related hypokalemia. More recently, he was admitted for an episode of hepatic encephalopathy and was treated with lactulose which led to some fluctuation in the weight which had stabilized between 250-255 pounds.  He is on a home weight monitoring program and.    He feels good today and only reports trace edema.  He is able to walk without any difficulty.  Denies chest pain, difficulty lying flat.  He tries to restrict his salt in diet and water intake.  He plans to exercise and eat healthy.  He has been taking his medications as prescribed.  He underwent an echocardiogram prior to this visit with me which shows ejection fraction of 65- 60%.  Well-seated normally functioning  aortic valve with a mean gradient of 10 mmHg which is similar to the prior echocardiogram in 10/2018.    Assessment plan:   1.  Severe aortic stenosis status post AVR with tissue valve  2.  Heart failure with preserved ejection fraction  3.  Sleep apnea on CPAP  4.  Iron deficiency anemia  5.  Type 2 diabetes  6.  History of SVT and paroxysmal AFib postop, none on recent Zio Patch.  7.  History of myectomy at the time of aortic valve replacement but no clear hypertrophic cardiomyopathy diagnosis.   8.  Hyperlipidemia    The patient is currently doing well clinically.  Constrictive cardiomyopathy has been ruled out during his past admission with simultaneous left and right heart catheterization.  At this point I believe he most likely has diastolic heart failure.  Today, he does have mild lower extremity edema but I think it is significant improved from before.  He took his weight at home today and it was 255 pounds.  In the clinic, he weighs 261 pounds with his clothes and shoes on.  I will continue him on the current dose of Bumex and eplerenone for now.  I will have him come back and see Ms. Briscoe More in 3 months for continued monitoring.  I believe we can possibly uptitrate potassium sparing diuretic with the goal to decrease the amount of potassium supplementation he is on.  However, he is stable and am not making any changes to his medications today.    Thank you for allowing me to participate in the care of Gil WAYNE Barrientosmireya    This note was completed in part using Dragon voice recognition software. Although reviewed after completion, some word and grammatical errors may occur.    Jeremie Mckeon MD  Cardiology    Orders Placed This Encounter   Procedures     Follow-Up with [facility]- [facility] Visit     Encounter Diagnoses   Name Primary?     H/O aortic valve replacement Yes     Chronic heart failure with preserved ejection fraction (H)      Hyperlipidemia LDL goal <160        CURRENT MEDICATIONS:  Current  Outpatient Medications   Medication Sig Dispense Refill     aspirin 81 MG tablet Take 81 mg by mouth every morning        atorvastatin (LIPITOR) 40 MG tablet Take 1 tablet (40 mg) by mouth daily 90 tablet 0     bumetanide (BUMEX) 1 MG tablet Take 2 tablets(2mg) by mouth twice daily 360 tablet 3     ferrous sulfate (FEROSUL) 325 (65 Fe) MG tablet Take 1 tablet (325 mg) by mouth 2 times daily 60 tablet 3     Insulin Aspart (NOVOLOG FLEXPEN SC) Inject Subcutaneous as needed       lactulose (CHRONULAC) 10 GM/15ML solution 1 cup (15mL) in the morning 45 mL      memantine (NAMENDA) 5 MG tablet Take 5 mg by mouth 2 times daily       metoprolol tartrate (LOPRESSOR) 25 MG tablet Take 1 tablet (25 mg) by mouth 2 times daily 180 tablet 3     Multiple Vitamins-Minerals (CENTRUM ADULTS PO) Take 1 tablet by mouth every morning        pantoprazole (PROTONIX) 40 MG EC tablet Take 40 mg by mouth every morning (before breakfast)       potassium chloride ER (K-DUR/KLOR-CON M) 10 MEQ CR tablet Take 3 tablets (30 mEq) by mouth 3 times daily       prednisoLONE acetate (PRED FORTE) 1 % ophthalmic susp Place 1 drop into both eyes as needed (eye pain)        timolol (TIMOPTIC) 0.5 % ophthalmic solution Place 1 drop into both eyes 2 times daily        vilazodone (VIIBRYD) 20 MG TABS tablet Take 20 mg by mouth daily       eplerenone (INSPRA) 50 MG tablet Take 1 tablet (50 mg) by mouth 2 times daily 60 tablet 11     order for DME Pt to be fit with compression garments 20-30mmHg or veclro compression garment from foot to knee bilaterally. 4 each 3       ALLERGIES     Allergies   Allergen Reactions     Amoxicillin      Itchy      Penicillins Hives     Spironolactone Rash       PAST MEDICAL HISTORY:  Past Medical History:   Diagnosis Date     Former tobacco use      Glaucoma      Hyperlipidemia LDL goal <160 12/6/2011     Obesity      DRAKE on CPAP      Right bundle branch block      Severe aortic stenosis     On Echo 10/28/16       PAST SURGICAL  HISTORY:  Past Surgical History:   Procedure Laterality Date     CV HEART CATHETERIZATION WITH POSSIBLE INTERVENTION N/A 3/19/2019    Procedure: RHC and LHC (No angiogram);  Surgeon: Jai Romo MD;  Location:  HEART CARDIAC CATH LAB     CV RIGHT HEART CATH N/A 3/19/2019    Procedure: Right Heart Cath;  Surgeon: Jai Romo MD;  Location:  HEART CARDIAC CATH LAB     HEART CATH LEFT HEART CATH  04/07/2017    Diffuse non-obstuctive CAD     REPAIR VALVE AORTIC N/A 5/16/2017    Procedure: REPAIR VALVE AORTIC;  Median Sternotony, CardioPulmonary Bypass, Aortic Valve Replace using 25MM Trifecta Valve with [employer], Septal myectomy;  Surgeon: Jun Simon MD;  Location: [facility] OR     REPLACE VALVE AORTIC N/A 5/16/2017    Procedure: REPLACE VALVE AORTIC;;  Surgeon: Jun Simon MD;  Location: [facility] OR     SINUS SURGERY  2005       FAMILY HISTORY:  Family History   Problem Relation Age of Onset     Family History Negative Mother      Diabetes Father      Heart Surgery Father         CABG     Coronary Artery Disease Father      Hypertension Brother      Diabetes Brother      Heart Disease Brother      Heart Surgery Brother         CABG x 3     Family History Negative Sister      Diabetes Brother         History of diabetes, but no longer an issue     Family History Negative Brother        SOCIAL HISTORY:  Social History     Socioeconomic History     Marital status:      Spouse name: None     Number of children: None     Years of education: None     Highest education level: None   Occupational History     None   Social Needs     Financial resource strain: None     Food insecurity:     Worry: None     Inability: None     Transportation needs:     Medical: None     Non-medical: None   Tobacco Use     Smoking status: Former Smoker     Packs/day: 1.00     Years: 20.00     Pack years: 20.00     Types: Cigarettes, Cigars     Start date: 1979     Last attempt to quit: 10/21/2011      "Years since quittin.2     Smokeless tobacco: Never Used   Substance and Sexual Activity     Alcohol use: No     Drug use: No     Sexual activity: None   Lifestyle     Physical activity:     Days per week: None     Minutes per session: None     Stress: None   Relationships     Social connections:     Talks on phone: None     Gets together: None     Attends Synagogue service: None     Active member of club or organization: None     Attends meetings of clubs or organizations: None     Relationship status: None     Intimate partner violence:     Fear of current or ex partner: None     Emotionally abused: None     Physically abused: None     Forced sexual activity: None   Other Topics Concern     Parent/sibling w/ CABG, MI or angioplasty before 65F 55M? Yes     Comment: brother triple bypass 49   Social History Narrative     None       Review of Systems:  Skin:  Negative       Eyes:  Positive for glasses reading and driving  ENT:  Negative      Respiratory:  Positive for sleep apnea;CPAP;cough     Cardiovascular:  Negative      Gastroenterology: Negative      Genitourinary:  not assessed      Musculoskeletal:  Positive for arthritis Pt reports in the neck area arthritis.   Neurologic:  Positive for memory problems    Psychiatric:  Positive for anxiety;depression    Heme/Lymph/Imm:  Negative      Endocrine:  Positive for diabetes      Physical Exam:  Vitals: /70   Pulse 72   Ht 1.753 m (5' 9\")   Wt 118.4 kg (261 lb)   BMI 38.54 kg/m    Constitutional: awake, alert, no distress  Skin: Warm and dry to touch  Head: Normocephalic, atraumatic  Eyes: Conjunctivae and lids unremarkable, sclera white  ENT: No pallor or cyanosis  Respiratory: Normal breath sounds, clear to auscultation  Cardiac: Regular rate and rhythm, S1-S2 normal.  No murmurs gallops or rubs.  Mild bilateral pedal edema.   Extremities and musculoskeletal: No gross motor deficit  Neurological.  Affect normal  Psych: Alert and oriented x3    Recent " Lab Results:  LIPID RESULTS:  Lab Results   Component Value Date    CHOL 126 10/10/2018    HDL 23 (A) 10/10/2018    LDL 71 10/10/2018    TRIG 230 (A) 10/10/2018       LIVER ENZYME RESULTS:  Lab Results   Component Value Date    AST 88 (H) 03/18/2019    ALT 44 03/18/2019       CBC RESULTS:  Lab Results   Component Value Date    WBC 4.3 04/05/2019    RBC 3.53 (L) 04/05/2019    HGB 11.4 (L) 04/05/2019    HCT 35.0 (L) 04/05/2019    MCV 99 04/05/2019    MCH 32.3 04/05/2019    MCHC 32.6 04/05/2019    RDW 15.2 (H) 04/05/2019    PLT 86 (L) 04/05/2019       BMP RESULTS:  Lab Results   Component Value Date     01/15/2020    POTASSIUM 3.7 01/15/2020    CHLORIDE 106 01/15/2020    CO2 27 01/15/2020    ANIONGAP 9.7 01/15/2020     (H) 01/15/2020    BUN 12 01/15/2020    CR 1.23 01/15/2020    GFRESTIMATED 60 (L) 01/15/2020    GFRESTBLACK 73 01/15/2020    MYRON 8.3 (L) 01/15/2020        A1C RESULTS:  Lab Results   Component Value Date    A1C 5.4 03/16/2019       INR RESULTS:  Lab Results   Component Value Date    INR 1.28 (H) 03/15/2019    INR 2.19 (H) 10/14/2018       CC  MAHNAZ LuciaC  [id number]  [place] [id number]    All medical records were reviewed in detail and discussed with the patient. Greater than 45 mins were spent with the patient, 50% of this time was spent on counseling and coordination of care.  After visit summary was printed and given to the patient.

## 2020-01-15 NOTE — LETTER
1/15/2020    Delia Wyman[address] [address]    RE: Gil Chowdary       Dear Colleague,    I had the pleasure of seeing Gil Chowdary in the [facility].    HPI and Plan:   I had the pleasure of seeing Mr. Chowdary in cardiology outpatient clinic in [place] today.  He was previously seen by Dr. Salians in the past and establish care with me in 02/2019.  He is a pleasant 58-year-old gentleman with past medical history of severe aortic stenosis with aortic valve replacement in 2017 with 25 mm tissue valve, type 2 diabetes on insulin, hyperlipidemia, sleep apnea on CPAP, heart failure with preserved ejection fraction, iron deficiency anemia for which she is being followed by Dr. Fletcher, history of SVT and paroxysmal A. fib in postop (no recurrence on recent ZIO patch).  He has also been followed by my colleague Ms. Rachna Holt for heart failure.     The patient was admitted with exacerbation of diastolic heart failure in October 2018.  Due to some personal reasons he had to be discharged prior to being euvolemic.  He was followed very closely after discharge for some time with almost weekly clinic visit for medication titration and symptom monitoring.  Fortunately, he did well with outpatient monitoring and medication adjustment and became euvolemic.  His baseline weight was thought to be  around 250-255 pounds.  He continues to be on 2 mg of Bumex BID.  He has also required 30 meq of potassium for diuresis related hypokalemia. More recently, he was admitted for an episode of hepatic encephalopathy and was treated with lactulose which led to some fluctuation in the weight which had stabilized between 250-255 pounds.  He is on a home weight monitoring program and.    He feels good today and only reports trace edema.   He is able to walk without any difficulty.  Denies chest pain, difficulty lying flat.  He tries to restrict his salt in diet  and water intake.  He plans to exercise and eat healthy.  He has been taking his medications as prescribed.  He underwent an echocardiogram prior to this visit with me which shows ejection fraction of 65- 60%.  Well-seated normally functioning aortic valve with a mean gradient of 10 mmHg which is similar to the prior echocardiogram in 10/2018.    Assessment plan:   1.  Severe aortic stenosis status post AVR with tissue valve  2.  Heart failure with preserved ejection fraction  3.  Sleep apnea on CPAP  4.  Iron deficiency anemia  5.  Type 2 diabetes  6.  History of SVT and paroxysmal AFib postop, none on recent Zio Patch.  7.  History of myectomy at the time of aortic valve replacement but no clear hypertrophic cardiomyopathy diagnosis.   8.  Hyperlipidemia    The patient is currently doing well clinically.  Constrictive cardiomyopathy has been ruled out during his past admission with simultaneous left and right heart catheterization.  At this point I believe he most likely has diastolic heart failure.  Today, he does have mild lower extremity edema but I think it is significant improved from before.  He took his weight at home today and it was 255 pounds.  In the clinic, he weighs 261 pounds with his clothes and shoes on.  I will continue him on the current dose of Bumex and eplerenone for now.  I will have him come back and see Ms. Briscoe More in 3 months for continued monitoring.  I believe we can possibly uptitrate potassium sparing diuretic with the goal to decrease the amount of potassium supplementation he is on.  However, he is stable and am not making any changes to his medications today.    Thank you for allowing me to participate in the care of Gil Chowdary    This note was completed in part using Dragon voice recognition software. Although reviewed after completion, some word and grammatical errors may occur.    Jeremie Mckeon MD  Cardiology    Orders Placed This Encounter   Procedures     Follow-Up  with [facility] Visit     Encounter Diagnoses   Name Primary?     H/O aortic valve replacement Yes     Chronic heart failure with preserved ejection fraction (H)      Hyperlipidemia LDL goal <160        CURRENT MEDICATIONS:  Current Outpatient Medications   Medication Sig Dispense Refill     aspirin 81 MG tablet Take 81 mg by mouth every morning        atorvastatin (LIPITOR) 40 MG tablet Take 1 tablet (40 mg) by mouth daily 90 tablet 0     bumetanide (BUMEX) 1 MG tablet Take 2 tablets(2mg) by mouth twice daily 360 tablet 3     ferrous sulfate (FEROSUL) 325 (65 Fe) MG tablet Take 1 tablet (325 mg) by mouth 2 times daily 60 tablet 3     Insulin Aspart (NOVOLOG FLEXPEN SC) Inject Subcutaneous as needed       lactulose (CHRONULAC) 10 GM/15ML solution 1 cup (15mL) in the morning 45 mL      memantine (NAMENDA) 5 MG tablet Take 5 mg by mouth 2 times daily       metoprolol tartrate (LOPRESSOR) 25 MG tablet Take 1 tablet (25 mg) by mouth 2 times daily 180 tablet 3     Multiple Vitamins-Minerals (CENTRUM ADULTS PO) Take 1 tablet by mouth every morning        pantoprazole (PROTONIX) 40 MG EC tablet Take 40 mg by mouth every morning (before breakfast)       potassium chloride ER (K-DUR/KLOR-CON M) 10 MEQ CR tablet Take 3 tablets (30 mEq) by mouth 3 times daily       prednisoLONE acetate (PRED FORTE) 1 % ophthalmic susp Place 1 drop into both eyes as needed (eye pain)        timolol (TIMOPTIC) 0.5 % ophthalmic solution Place 1 drop into both eyes 2 times daily        vilazodone (VIIBRYD) 20 MG TABS tablet Take 20 mg by mouth daily       eplerenone (INSPRA) 50 MG tablet Take 1 tablet (50 mg) by mouth 2 times daily 60 tablet 11     order for DME Pt to be fit with compression garments 20-30mmHg or veclro compression garment from foot to knee bilaterally. 4 each 3       ALLERGIES     Allergies   Allergen Reactions     Amoxicillin      Itchy      Penicillins Hives     Spironolactone Rash       PAST MEDICAL HISTORY:  Past  Medical History:   Diagnosis Date     Former tobacco use      Glaucoma      Hyperlipidemia LDL goal <160 12/6/2011     Obesity      DRAKE on CPAP      Right bundle branch block      Severe aortic stenosis     On Echo 10/28/16       PAST SURGICAL HISTORY:  Past Surgical History:   Procedure Laterality Date     CV HEART CATHETERIZATION WITH POSSIBLE INTERVENTION N/A 3/19/2019    Procedure: RHC and LHC (No angiogram);  Surgeon: Jai Romo MD;  Location:  HEART CARDIAC CATH LAB     CV RIGHT HEART CATH N/A 3/19/2019    Procedure: Right Heart Cath;  Surgeon: Jai Romo MD;  Location:  HEART CARDIAC CATH LAB     HEART CATH LEFT HEART CATH  04/07/2017    Diffuse non-obstuctive CAD     REPAIR VALVE AORTIC N/A 5/16/2017    Procedure: REPAIR VALVE AORTIC;  Median Sternotony, CardioPulmonary Bypass, Aortic Valve Replace using 25MM Trifecta Valve with [employer], Septal myectomy;  Surgeon: Jun Simon MD;  Location: [facility] OR     REPLACE VALVE AORTIC N/A 5/16/2017    Procedure: REPLACE VALVE AORTIC;;  Surgeon: Jun Smion MD;  Location: [facility] OR     SINUS SURGERY  2005       FAMILY HISTORY:  Family History   Problem Relation Age of Onset     Family History Negative Mother      Diabetes Father      Heart Surgery Father         CABG     Coronary Artery Disease Father      Hypertension Brother      Diabetes Brother      Heart Disease Brother      Heart Surgery Brother         CABG x 3     Family History Negative Sister      Diabetes Brother         History of diabetes, but no longer an issue     Family History Negative Brother        SOCIAL HISTORY:  Social History     Socioeconomic History     Marital status:      Spouse name: None     Number of children: None     Years of education: None     Highest education level: None   Occupational History     None   Social Needs     Financial resource strain: None     Food insecurity:     Worry: None     Inability: None     Transportation  "needs:     Medical: None     Non-medical: None   Tobacco Use     Smoking status: Former Smoker     Packs/day: 1.00     Years: 20.00     Pack years: 20.00     Types: Cigarettes, Cigars     Start date:      Last attempt to quit: 10/21/2011     Years since quittin.2     Smokeless tobacco: Never Used   Substance and Sexual Activity     Alcohol use: No     Drug use: No     Sexual activity: None   Lifestyle     Physical activity:     Days per week: None     Minutes per session: None     Stress: None   Relationships     Social connections:     Talks on phone: None     Gets together: None     Attends Restorationism service: None     Active member of club or organization: None     Attends meetings of clubs or organizations: None     Relationship status: None     Intimate partner violence:     Fear of current or ex partner: None     Emotionally abused: None     Physically abused: None     Forced sexual activity: None   Other Topics Concern     Parent/sibling w/ CABG, MI or angioplasty before 65F 55M? Yes     Comment: brother triple bypass 49   Social History Narrative     None       Review of Systems:  Skin:  Negative       Eyes:  Positive for glasses reading and driving  ENT:  Negative      Respiratory:  Positive for sleep apnea;CPAP;cough     Cardiovascular:  Negative      Gastroenterology: Negative      Genitourinary:  not assessed      Musculoskeletal:  Positive for arthritis Pt reports in the neck area arthritis.   Neurologic:  Positive for memory problems    Psychiatric:  Positive for anxiety;depression    Heme/Lymph/Imm:  Negative      Endocrine:  Positive for diabetes      Physical Exam:  Vitals: /70   Pulse 72   Ht 1.753 m (5' 9\")   Wt 118.4 kg (261 lb)   BMI 38.54 kg/m     Constitutional: awake, alert, no distress  Skin: Warm and dry to touch  Head: Normocephalic, atraumatic  Eyes: Conjunctivae and lids unremarkable, sclera white  ENT: No pallor or cyanosis  Respiratory: Normal breath sounds, clear to " auscultation  Cardiac: Regular rate and rhythm, S1-S2 normal.  No murmurs gallops or rubs.  Mild bilateral pedal edema.   Extremities and musculoskeletal: No gross motor deficit  Neurological.  Affect normal  Psych: Alert and oriented x3    Recent Lab Results:  LIPID RESULTS:  Lab Results   Component Value Date    CHOL 126 10/10/2018    HDL 23 (A) 10/10/2018    LDL 71 10/10/2018    TRIG 230 (A) 10/10/2018       LIVER ENZYME RESULTS:  Lab Results   Component Value Date    AST 88 (H) 03/18/2019    ALT 44 03/18/2019       CBC RESULTS:  Lab Results   Component Value Date    WBC 4.3 04/05/2019    RBC 3.53 (L) 04/05/2019    HGB 11.4 (L) 04/05/2019    HCT 35.0 (L) 04/05/2019    MCV 99 04/05/2019    MCH 32.3 04/05/2019    MCHC 32.6 04/05/2019    RDW 15.2 (H) 04/05/2019    PLT 86 (L) 04/05/2019       BMP RESULTS:  Lab Results   Component Value Date     01/15/2020    POTASSIUM 3.7 01/15/2020    CHLORIDE 106 01/15/2020    CO2 27 01/15/2020    ANIONGAP 9.7 01/15/2020     (H) 01/15/2020    BUN 12 01/15/2020    CR 1.23 01/15/2020    GFRESTIMATED 60 (L) 01/15/2020    GFRESTBLACK 73 01/15/2020    MYRON 8.3 (L) 01/15/2020        A1C RESULTS:  Lab Results   Component Value Date    A1C 5.4 03/16/2019       INR RESULTS:  Lab Results   Component Value Date    INR 1.28 (H) 03/15/2019    INR 2.19 (H) 10/14/2018       CC  Rachna Holt PA-C  [id number]  [place] [address]    All medical records were reviewed in detail and discussed with the patient. Greater than 45 mins were spent with the patient, 50% of this time was spent on counseling and coordination of care.  After visit summary was printed and given to the patient.      Thank you for allowing me to participate in the care of your patient.      Sincerely,     Jeremie Mckeon MD     [facility]    cc:   Rachna Holt PA-C  [id number]  [place] [address]

## 2020-01-15 NOTE — LETTER
1/15/2020    Delia Wyman[address] [address]    RE: Gil Chowdary       Dear Colleague,    I had the pleasure of seeing Gil Chowdary in the [facility].    HPI and Plan:   I had the pleasure of seeing Mr. Chowdary in cardiology outpatient clinic in [place] today.  He was previously seen by Dr. Salinas in the past and establish care with me in 02/2019.  He is a pleasant 58-year-old gentleman with past medical history of severe aortic stenosis with aortic valve replacement in 2017 with 25 mm tissue valve, type 2 diabetes on insulin, hyperlipidemia, sleep apnea on CPAP, heart failure with preserved ejection fraction, iron deficiency anemia for which she is being followed by Dr. Fletcher, history of SVT and paroxysmal A. fib in postop (no recurrence on recent ZIO patch).  He has also been followed by my colleague Ms. Rachna Holt for heart failure.     The patient was admitted with exacerbation of diastolic heart failure in October 2018.  Due to some personal reasons he had to be discharged prior to being euvolemic.  He was followed very closely after discharge for some time with almost weekly clinic visit for medication titration and symptom monitoring.  Fortunately, he did well with outpatient monitoring and medication adjustment and became euvolemic.  His baseline weight was thought to be  around 250-255 pounds.  He continues to be on 2 mg of Bumex BID.  He has also required 30 meq of potassium for diuresis related hypokalemia. More recently, he was admitted for an episode of hepatic encephalopathy and was treated with lactulose which led to some fluctuation in the weight which had stabilized between 250-255 pounds.  He is on a home weight monitoring program and.    He feels good today and only reports trace edema.   He is able to walk without any difficulty.  Denies chest pain, difficulty lying flat.  He tries to restrict his salt in diet  and water intake.  He plans to exercise and eat healthy.  He has been taking his medications as prescribed.  He underwent an echocardiogram prior to this visit with me which shows ejection fraction of 65- 60%.  Well-seated normally functioning aortic valve with a mean gradient of 10 mmHg which is similar to the prior echocardiogram in 10/2018.    Assessment plan:   1.  Severe aortic stenosis status post AVR with tissue valve  2.  Heart failure with preserved ejection fraction  3.  Sleep apnea on CPAP  4.  Iron deficiency anemia  5.  Type 2 diabetes  6.  History of SVT and paroxysmal AFib postop, none on recent Zio Patch.  7.  History of myectomy at the time of aortic valve replacement but no clear hypertrophic cardiomyopathy diagnosis.   8.  Hyperlipidemia    The patient is currently doing well clinically.  Constrictive cardiomyopathy has been ruled out during his past admission with simultaneous left and right heart catheterization.  At this point I believe he most likely has diastolic heart failure.  Today, he does have mild lower extremity edema but I think it is significant improved from before.  He took his weight at home today and it was 255 pounds.  In the clinic, he weighs 261 pounds with his clothes and shoes on.  I will continue him on the current dose of Bumex and eplerenone for now.  I will have him come back and see Ms. Briscoe More in 3 months for continued monitoring.  I believe we can possibly uptitrate potassium sparing diuretic with the goal to decrease the amount of potassium supplementation he is on.  However, he is stable and am not making any changes to his medications today.    Thank you for allowing me to participate in the care of Gil Chowdary    This note was completed in part using Dragon voice recognition software. Although reviewed after completion, some word and grammatical errors may occur.    Jeremie Mckeon MD  Cardiology    Orders Placed This Encounter   Procedures     Follow-Up  with [facility] Visit     Encounter Diagnoses   Name Primary?     H/O aortic valve replacement Yes     Chronic heart failure with preserved ejection fraction (H)      Hyperlipidemia LDL goal <160        CURRENT MEDICATIONS:  Current Outpatient Medications   Medication Sig Dispense Refill     aspirin 81 MG tablet Take 81 mg by mouth every morning        atorvastatin (LIPITOR) 40 MG tablet Take 1 tablet (40 mg) by mouth daily 90 tablet 0     bumetanide (BUMEX) 1 MG tablet Take 2 tablets(2mg) by mouth twice daily 360 tablet 3     ferrous sulfate (FEROSUL) 325 (65 Fe) MG tablet Take 1 tablet (325 mg) by mouth 2 times daily 60 tablet 3     Insulin Aspart (NOVOLOG FLEXPEN SC) Inject Subcutaneous as needed       lactulose (CHRONULAC) 10 GM/15ML solution 1 cup (15mL) in the morning 45 mL      memantine (NAMENDA) 5 MG tablet Take 5 mg by mouth 2 times daily       metoprolol tartrate (LOPRESSOR) 25 MG tablet Take 1 tablet (25 mg) by mouth 2 times daily 180 tablet 3     Multiple Vitamins-Minerals (CENTRUM ADULTS PO) Take 1 tablet by mouth every morning        pantoprazole (PROTONIX) 40 MG EC tablet Take 40 mg by mouth every morning (before breakfast)       potassium chloride ER (K-DUR/KLOR-CON M) 10 MEQ CR tablet Take 3 tablets (30 mEq) by mouth 3 times daily       prednisoLONE acetate (PRED FORTE) 1 % ophthalmic susp Place 1 drop into both eyes as needed (eye pain)        timolol (TIMOPTIC) 0.5 % ophthalmic solution Place 1 drop into both eyes 2 times daily        vilazodone (VIIBRYD) 20 MG TABS tablet Take 20 mg by mouth daily       eplerenone (INSPRA) 50 MG tablet Take 1 tablet (50 mg) by mouth 2 times daily 60 tablet 11     order for DME Pt to be fit with compression garments 20-30mmHg or veclro compression garment from foot to knee bilaterally. 4 each 3       ALLERGIES     Allergies   Allergen Reactions     Amoxicillin      Itchy      Penicillins Hives     Spironolactone Rash       PAST MEDICAL HISTORY:  Past  Medical History:   Diagnosis Date     Former tobacco use      Glaucoma      Hyperlipidemia LDL goal <160 12/6/2011     Obesity      DRAKE on CPAP      Right bundle branch block      Severe aortic stenosis     On Echo 10/28/16       PAST SURGICAL HISTORY:  Past Surgical History:   Procedure Laterality Date     CV HEART CATHETERIZATION WITH POSSIBLE INTERVENTION N/A 3/19/2019    Procedure: RHC and LHC (No angiogram);  Surgeon: Jai Romo MD;  Location:  HEART CARDIAC CATH LAB     CV RIGHT HEART CATH N/A 3/19/2019    Procedure: Right Heart Cath;  Surgeon: Jai Romo MD;  Location:  HEART CARDIAC CATH LAB     HEART CATH LEFT HEART CATH  04/07/2017    Diffuse non-obstuctive CAD     REPAIR VALVE AORTIC N/A 5/16/2017    Procedure: REPAIR VALVE AORTIC;  Median Sternotony, CardioPulmonary Bypass, Aortic Valve Replace using 25MM Trifecta Valve with [employer], Septal myectomy;  Surgeon: Jun Simon MD;  Location: [facility] OR     REPLACE VALVE AORTIC N/A 5/16/2017    Procedure: REPLACE VALVE AORTIC;;  Surgeon: Jun Simon MD;  Location: [facility] OR     SINUS SURGERY  2005       FAMILY HISTORY:  Family History   Problem Relation Age of Onset     Family History Negative Mother      Diabetes Father      Heart Surgery Father         CABG     Coronary Artery Disease Father      Hypertension Brother      Diabetes Brother      Heart Disease Brother      Heart Surgery Brother         CABG x 3     Family History Negative Sister      Diabetes Brother         History of diabetes, but no longer an issue     Family History Negative Brother        SOCIAL HISTORY:  Social History     Socioeconomic History     Marital status:      Spouse name: None     Number of children: None     Years of education: None     Highest education level: None   Occupational History     None   Social Needs     Financial resource strain: None     Food insecurity:     Worry: None     Inability: None     Transportation  "needs:     Medical: None     Non-medical: None   Tobacco Use     Smoking status: Former Smoker     Packs/day: 1.00     Years: 20.00     Pack years: 20.00     Types: Cigarettes, Cigars     Start date:      Last attempt to quit: 10/21/2011     Years since quittin.2     Smokeless tobacco: Never Used   Substance and Sexual Activity     Alcohol use: No     Drug use: No     Sexual activity: None   Lifestyle     Physical activity:     Days per week: None     Minutes per session: None     Stress: None   Relationships     Social connections:     Talks on phone: None     Gets together: None     Attends Shinto service: None     Active member of club or organization: None     Attends meetings of clubs or organizations: None     Relationship status: None     Intimate partner violence:     Fear of current or ex partner: None     Emotionally abused: None     Physically abused: None     Forced sexual activity: None   Other Topics Concern     Parent/sibling w/ CABG, MI or angioplasty before 65F 55M? Yes     Comment: brother triple bypass 49   Social History Narrative     None       Review of Systems:  Skin:  Negative       Eyes:  Positive for glasses reading and driving  ENT:  Negative      Respiratory:  Positive for sleep apnea;CPAP;cough     Cardiovascular:  Negative      Gastroenterology: Negative      Genitourinary:  not assessed      Musculoskeletal:  Positive for arthritis Pt reports in the neck area arthritis.   Neurologic:  Positive for memory problems    Psychiatric:  Positive for anxiety;depression    Heme/Lymph/Imm:  Negative      Endocrine:  Positive for diabetes      Physical Exam:  Vitals: /70   Pulse 72   Ht 1.753 m (5' 9\")   Wt 118.4 kg (261 lb)   BMI 38.54 kg/m     Constitutional: awake, alert, no distress  Skin: Warm and dry to touch  Head: Normocephalic, atraumatic  Eyes: Conjunctivae and lids unremarkable, sclera white  ENT: No pallor or cyanosis  Respiratory: Normal breath sounds, clear to " auscultation  Cardiac: Regular rate and rhythm, S1-S2 normal.  No murmurs gallops or rubs.  Mild bilateral pedal edema.   Extremities and musculoskeletal: No gross motor deficit  Neurological.  Affect normal  Psych: Alert and oriented x3    Recent Lab Results:  LIPID RESULTS:  Lab Results   Component Value Date    CHOL 126 10/10/2018    HDL 23 (A) 10/10/2018    LDL 71 10/10/2018    TRIG 230 (A) 10/10/2018       LIVER ENZYME RESULTS:  Lab Results   Component Value Date    AST 88 (H) 03/18/2019    ALT 44 03/18/2019       CBC RESULTS:  Lab Results   Component Value Date    WBC 4.3 04/05/2019    RBC 3.53 (L) 04/05/2019    HGB 11.4 (L) 04/05/2019    HCT 35.0 (L) 04/05/2019    MCV 99 04/05/2019    MCH 32.3 04/05/2019    MCHC 32.6 04/05/2019    RDW 15.2 (H) 04/05/2019    PLT 86 (L) 04/05/2019       BMP RESULTS:  Lab Results   Component Value Date     01/15/2020    POTASSIUM 3.7 01/15/2020    CHLORIDE 106 01/15/2020    CO2 27 01/15/2020    ANIONGAP 9.7 01/15/2020     (H) 01/15/2020    BUN 12 01/15/2020    CR 1.23 01/15/2020    GFRESTIMATED 60 (L) 01/15/2020    GFRESTBLACK 73 01/15/2020    MYRON 8.3 (L) 01/15/2020        A1C RESULTS:  Lab Results   Component Value Date    A1C 5.4 03/16/2019       INR RESULTS:  Lab Results   Component Value Date    INR 1.28 (H) 03/15/2019    INR 2.19 (H) 10/14/2018           All medical records were reviewed in detail and discussed with the patient. Greater than 45 mins were spent with the patient, 50% of this time was spent on counseling and coordination of care.  After visit summary was printed and given to the patient.      Thank you for allowing me to participate in the care of your patient.    Sincerely,     Jeremie Mckeon MD     [facility]

## 2020-01-15 NOTE — PROGRESS NOTES
[facility]  My Health Tracker HF Alert    Situation/Background:    Goal wt range: 245# - 255#  Recent wts:    Heart Failure sx: None noted on survey  Current diuretic regimen:     Bumex 2 mg BID    KCL 30 mEq TID   No future appointments.    Assessment/Recommendations:    Saw Dr. Mckeon in clinic today. Per note, home wt 255#, asx, no med changes. Will continue to monitor and collaborate with patient and/or provider(s) as necessary.      PANKAJ PrattN, RN, PHN, HNB-BC   1/15/2020 at 1:59 PM

## 2020-01-16 ENCOUNTER — CARE COORDINATION (OUTPATIENT)
Dept: CARDIOLOGY | Facility: CLINIC | Age: 60
End: 2020-01-16

## 2020-01-16 NOTE — PROGRESS NOTES
[facility]  My Health Tracker HF Alert    Situation/Background:    Inconsistency submitting MHT surveys. Saw Dr. Mckeon 1/15/20.     Assessment/Recommendations:    Called pt to check in. Unable to reach, left VM reminding to submit surveys and call if he is having difficulty or needs help.     PANKAJ PrattN, RN, PHN, HNB-BC   1/16/2020 at 1:35 PM

## 2020-01-22 ENCOUNTER — CARE COORDINATION (OUTPATIENT)
Dept: CARDIOLOGY | Facility: CLINIC | Age: 60
End: 2020-01-22

## 2020-01-22 NOTE — PROGRESS NOTES
[facility] Care - CORE Clinic Telemanagment  My Health Tracker HF Alert     No Call Variance. Last weight from 1/12/20. Pt saw Dr. Mckeon on 1/15. Messages have been left with pt. Called pt again, he apologized many times. He keeps forgetting. Discussed putting up sign by scale to remember and call in weights right when he weighs. Pt stated he will try that. He reports his wt got up to 260#, so he cut back fluid intake and it came back down and is 253#, which is within goal. Pt denies SOB or swelling. Will continue to monitor. YOANNA Pearl 1:54 PM 01/22/20     Goal weight: 245-255#  MyHealth Tracker CHF Flowsheet My weight today is:   12/19/2019 254   12/20/2019 253   12/24/2019 253   12/28/2019 252   12/28/2019 253   12/29/2019 252   1/8/2020 255   1/10/2020 255   1/12/2020 254

## 2020-01-24 ENCOUNTER — TELEPHONE (OUTPATIENT)
Dept: CARDIOLOGY | Facility: CLINIC | Age: 60
End: 2020-01-24

## 2020-01-24 NOTE — TELEPHONE ENCOUNTER
[facility] Heart-CORE Clinic    Received VM from pt indicating that he has questions and would like an RN to call him back. Returned pt's call, unable to reach, left VM and advised that if he doesn't get a hold of RN to leave VM indicating what specific questions he has so that RNs can provide him with an informed response.    PANKAJ PrattN, RN, PHN, HNB-BC   1/24/2020 at 10:06 AM

## 2020-01-27 ENCOUNTER — CARE COORDINATION (OUTPATIENT)
Dept: CARDIOLOGY | Facility: CLINIC | Age: 60
End: 2020-01-27

## 2020-01-27 NOTE — PROGRESS NOTES
"[facility] Heart-CORE Clinic    Incoming call from pt requesting to talk about a couple concerns.    1. Hand shaking, 3-4 episodes over last ~wk lasting <10 min, that wake him from sleep and resolve after he gets up and moves around.   He denied difficulty swallowing/speaking, denied unilateral weakness, denied numbness. Some mild tingling to hands.     2. Poor sleep. Able to fall asleep w/o much issue, then wakes ~3x nightly and difficulty returning to sleep. ?Possibly has racing thoughts upon waking. Has been wearing CPAP w/o issue.    He said he's been eating and drinking like normal. Denied acute illness. Typical urinary/bowel habits. Has been regularly walking or biking for activity and feels tired after completing. Several times he mentioned feelings of being overwhelmed by health conditions and loss of mother in last ~yr. He verbalized need to get back in with psychologist. Praised him for taking initiative of calling to request help with these issues.     He said his sister assists him with med set up and he's cont'd to take KCL, bumex, inspra and lactulose.     I reviewed Mata sx that would require urgent/emergent eval: unilateral weakness, difficulty speaking, persistent numbness/pain, difficulty swallowing.     His wts on telemonitoring have been stable and w/in his goal. He denied HF sx (SOB, MOTA, swelling, orthopnea), \"My breathing feels really good!\" Seen by Dr. Mckeon 1/15/2020 and deemed stable from CV perspective, no changes made that day.       We agreed on the following plan:  --follow-up with sleep med (has been >1 yr since last visit) re: poor sleep in case DRAKE/CPAP issue  --follow-up w/ PCP vs local psyhcology vs palliative team in our office for poor sleep and verbalizations of being overwhelmed.  --follow-up w/ PCP re: hand shaking if it conts/worsens    I asked if he'd like me to call his sister to review above and he declined, saying he'd review with her. He did ask me to update PCP " team about our conversation. I called PCP office and left msg for nursing to call me back.    Karen Stark RN BSN   2:37 PM 01/27/20

## 2020-01-27 NOTE — PROGRESS NOTES
[facility] Heart-CORE Clinic    Reviewed pt's concerns and my recommendations from below note with pt's PCP nursing team who agreed to reach out to pt to assist with coordinating follow-up. Appreciate their help!    MyChart msg sent to pt with recommendations from today per his request.    Karen Stark RN BSN   3:17 PM 01/27/20

## 2020-01-29 DIAGNOSIS — R25.2 MUSCLE CRAMP: ICD-10-CM

## 2020-01-29 RX ORDER — MAGNESIUM OXIDE 400 MG/1
400 TABLET ORAL DAILY
Qty: 90 TABLET | Refills: 3 | Status: SHIPPED | OUTPATIENT
Start: 2020-01-29

## 2020-01-31 DIAGNOSIS — I50.30 HEART FAILURE WITH PRESERVED EJECTION FRACTION, NYHA CLASS I (H): ICD-10-CM

## 2020-01-31 RX ORDER — EPLERENONE 50 MG/1
50 TABLET, FILM COATED ORAL 2 TIMES DAILY
Qty: 180 TABLET | Refills: 0 | Status: SHIPPED | OUTPATIENT
Start: 2020-01-31 | End: 2020-04-27

## 2020-02-14 ENCOUNTER — CARE COORDINATION (OUTPATIENT)
Dept: CARDIOLOGY | Facility: CLINIC | Age: 60
End: 2020-02-14

## 2020-02-14 NOTE — PROGRESS NOTES
[facility] Heart Clinic - CORE Clinic Telemanagment  My Health Tracker HF Alert     No Call Variance. Last weight from 2/11, called pt. No answer, LVM for call back to review. YOANNA Pearl 2:03 PM 02/14/20     Future Appointments   Date Time Provider Department Center   4/20/2020  1:10 PM More, SIRENA Smith [facility]IN

## 2020-02-19 ENCOUNTER — CARE COORDINATION (OUTPATIENT)
Dept: CARDIOLOGY | Facility: CLINIC | Age: 60
End: 2020-02-19

## 2020-02-19 NOTE — PROGRESS NOTES
"[facility] Heart Clinic - CORE Clinic Telemanagment  My Health Tracker HF Alert     Weight today: 258#  Weight goal: 245-255#  MyHealth Tracker CHF Flowsheet My weight today is:   1/10/2020 255   1/12/2020 254   1/23/2020 253   1/24/2020 254   1/26/2020 255   1/27/2020 253   1/28/2020 253   1/31/2020 255   2/3/2020 255   2/4/2020 254   2/5/2020 252   2/9/2020 253   2/11/2020 255   2/17/2020 258   2/18/2020 258     Heart Failure sx: none reported     Daily diuretic plan:   Bumex 2mg BID  KCL 30meq TID     Notes: No weights for almost a week, VM's left for pt, and now he called in weights at 10pm the last 2 nights and weight 3# above goal since last weight on 2/11. No symptoms reported. Called pt, verified he is weighing every am he just forgets to call and he remembered late the last few nights. Pt and I discussed weighs of remembering. He is going to try to set an alarm on his phone to call in weights in am. Pt stated he is doing really well. He reports the weight slowly went up, it did not jump over night. He denies increased SOB or swelling. Pt reports he has been less active and \"lazy\", and plans to start doing some exercising. He also discussed with his sister this am about making healthier food choices. Pt states he is very thankful for our care and all the time we have given him. YANY to JOAN Babcock with weight gain. YOANNA Pearl 12:35 PM 02/19/20     Future Appointments   Date Time Provider Department Center   4/20/2020  1:10 PM More, JOAN Smith-SARA SKELTON [facility] PSA CLIN         "

## 2020-02-21 NOTE — PROGRESS NOTES
[facility] Heart [facility] - CORE Clinic Telemanagement  My Health Tracker HF Alert     Weight today: 253#, 5# wt loss   Weight goal: 245-255#  MyHealth Tracker CHF Flowsheet My weight today is:   2/17/2020 258   2/18/2020 258   2/20/2020 258   2/21/2020 253     Heart Failure sx: none    Daily diuretic plan:   Bumex 2mg BID  KCL 30meq TID     Notes: Wt down 5# from yesterday and back within goal. No symptoms reported. Spoke to pt earlier this week and he was going to start watching diet closer and eating healthier. Will continue to monitor. YOANNA Pearl 7:58 AM 02/21/20      Future Appointments   Date Time Provider Department Center   4/20/2020  1:10 PM More, SIRENA Smith [facility] PSA [facility]

## 2020-02-24 NOTE — PROGRESS NOTES
[facility] Heart-CORE Clinic  My Health Tracker HF Alert     Pt's wt has been 2-5# above max threshold over last wk, w/ exception of 1 day.     LVM for pt asking him to call CORE RN if he has dyspnea, new swelling, orthopnea or questions. Otherwise wt trending down appropriately and we will cont to monitor.     Reviewed Care Everywhere and see that pt saw PCP for low mood, and was referred to, and seen by, mental health practitioner.       Today's home wt: 257#  Goal wt: 245-255#  Recent wts:    Heart Failure sx: none reported    Daily diuretic plan:   Bumex 2mg BID  KCL 30meq TID     Future Appointments   Date Time Provider Department Center   4/20/2020  1:10 PM More, SIRENA Smith [facility] PSA CLIN     Karen Stark RN BSN   8:58 AM 02/24/20

## 2020-02-28 ENCOUNTER — CARE COORDINATION (OUTPATIENT)
Dept: CARDIOLOGY | Facility: CLINIC | Age: 60
End: 2020-02-28

## 2020-02-28 DIAGNOSIS — Z95.2 H/O AORTIC VALVE REPLACEMENT: Primary | ICD-10-CM

## 2020-02-28 NOTE — PROGRESS NOTES
Incoming message from patient stating he is scheduled for a dental extraction next week(week on 3/2/20) and was told he needs abx.     Per Epic review, patient last seen by Rachna Holt on 10/14/19. Per her clinic notes:  History of aortic valve replacement with bioprosthetic aortic valve and history of septal myectomy for septal hypertrophy without LVOT gradient.  He can remain on aspirin alone for anticoagulation and needs antibiotic prophylaxis for dental work    He has amoxicillin and penicillins in his current list of allergies. As a result will forward to Rachna Holt for review.  Jaqueline Miller RN on 2/28/2020 at 3:13 PM

## 2020-02-28 NOTE — PROGRESS NOTES
He should take clindamycin 600 mg one time dose to be taken 1 hour before dental visit.  Pls give refills for future appointments.

## 2020-03-02 NOTE — PROGRESS NOTES
[facility] Heart-CORE Clinic  My Health Tracker HF Alert    Situation/Background:    Wt continues to fluctuate     2/29 home wt: 255# Goal wt range: 245# - 255#  Recent wts:    Heart Failure sx: Dizziness/lightheadedness reported  Current diuretic regimen:      Bumex 2 mg BID   KCL 30 mEq TID  Future Appointments   Date Time Provider Department Center   4/20/2020  1:10 PM More, SIRENA Smith [facility] PSA CLIN       Assessment/Recommendations:    Called pt to request wts from 3/1 and 3/2 and also to ask what he feels is the reason for the wt increase on 2/28. Unable to reach, left  requesting callback.     Maureen Castro RN   3/2/2020 at 2:24 PM

## 2020-03-03 RX ORDER — CLINDAMYCIN HCL 300 MG
CAPSULE ORAL
Qty: 2 CAPSULE | Refills: 3 | Status: SHIPPED | OUTPATIENT
Start: 2020-03-03

## 2020-03-03 NOTE — PROGRESS NOTES
Patient contacted w/abx recommendation per Rachna Holt. Rx sent to his preferred pharmacy. Patient verbalized understanding.  Jaqueline Miller RN on 3/3/2020 at 10:25 AM

## 2020-03-03 NOTE — PROGRESS NOTES
[facility] Heart-CORE Clinic    Update: Pt did not call back but logged wts for 3/2 and 3/3, both 254# and with no symptoms. Will continue to monitor.    PANKAJ PrattN, RN, PHN, HNB-BC   3/3/2020 at 10:50 AM

## 2020-03-20 ENCOUNTER — CARE COORDINATION (OUTPATIENT)
Dept: CARDIOLOGY | Facility: CLINIC | Age: 60
End: 2020-03-20

## 2020-03-20 NOTE — PROGRESS NOTES
[facility] Heart-CORE Clinic    Called patient to assess as per his MHT today he is reporting dizziness - LM for call back.  Jaqueline Miller RN on 3/20/2020 at 9:08 AM     PAIN/REDNESS

## 2020-03-20 NOTE — PROGRESS NOTES
It's possible he's dehydrated or hypotensive- not sure and don't want him to get labs.  Labs have him hold bumex, eplerenone, and potassium this afternoon and tomorrow am and see if that helps.  If that helps he can cut these back to once a day for the rest of the weekend and we can talk again on Monday to see how he's doing.    Rachna Holt PA-C 3/20/2020 11:05 AM

## 2020-03-20 NOTE — PROGRESS NOTES
Call to patient w/recommendations per Rachna Holt. W/patients permission I reviewed instructions w/his sister as she is the one who sets up his meds. Patient verbalized understanding and had no questions. Reminder sent to board for f/u call Mon 3/23.  Jaqueline Miller RN on 3/20/2020 at 11:31 AM

## 2020-03-20 NOTE — PROGRESS NOTES
[facility] Heart Clinic - CORE Clinic Telemanagement  My Health Tracker HF Alert - patient reporting dizziness     Weight today: 256#    Weight goal: 245 - 255#      Heart Failure sx:   Patient reporting intermittent dizziness/lightheadedness. He stated this started this past weekend. Sx not constant snd he is not able to define specific trigger. He added that this affects his gait, but denied any recent falls.     He denied new/increased SOB/MOTA. He is not sleeping well, but denied orthopnea/PND. He also denied any new/worsening swelling    Daily diuretic plan:    bumex 2mg bid(+KCL 30meq tid)   eplerenone 50mg bid    Notes:    Will forward to Rachna Holt for review.  Jaqueline Miller, RN on 3/20/2020 at 10:00 AM    Future Appointments   Date Time Provider Department Center   4/20/2020  1:10 PM Rachna Holt PA-C [facility] PSA CLIN

## 2020-03-23 NOTE — PROGRESS NOTES
Will continue to monitor. Reviewed with pt to call with symptoms.     PANKAJ LanN, RN, CHFN  03/23/20 at 10:48 AM

## 2020-03-23 NOTE — PROGRESS NOTES
[facility] Heart Clinic - CORE Clinic Telemanagement  My Health Tracker HF Alert     Weight today: 260#   Weight goal: 245-255#  MyHealth Tracker CHF Flowsheet My weight today is:   3/3/2020 254   3/5/2020 256   3/8/2020 255   3/12/2020 255   3/13/2020 254   3/14/2020 253   3/15/2020 254   3/19/2020 256   3/20/2020 256   3/23/2020 260     Heart Failure sx: none reported    Daily diuretic plan:   Bumex 2mg BID  KCL 30meq TID   Eplerenone 50mg BID     Notes: Wt up 4# since Friday 3/20. Per notes, pt reporting increased dizziness on Friday 3/20. Pt was instructed to hold Bumex, KCL, and Eplerenone Friday pm and Saturday am. Called pt, he reports he is feeling good. Pt reports he still has a little dizziness but no more than his usual. He reports his hands still shake/tremors. Pt repots PCP is aware and they have him set up for Neurologist. Spoke with sister, they held pm doses Friday and am doses Saturday. He took PM doses Saturday and back to regular routine yesterday. Pt denies increased SOB. States he has a little more swelling in legs. Reviewed to continue regular medication, hopefully weight will trend down this week. Told pt and sister if dizziness increases again to please call back. They stated understanding. Told pt I will update JOAN Babcock and call back only if changes. He stated understanding.     FYI to JOAN Babcock.        Future Appointments   Date Time Provider Department Center   4/20/2020  1:10 PM More, SIRENA Smith [facility] PSA CLIN       DESMOND Lan, RN, CHFN  03/23/20 at 10:18 AM

## 2020-03-24 NOTE — PROGRESS NOTES
[facility] Heart Clinic - CORE Clinic Telemanagement  My Health Tracker HF Alert     Weight today: 259#    Weight goal: 245-255#  MyHealth Tracker CHF Flowsheet My weight today is:   3/19/2020 256   3/20/2020 256   3/23/2020 260   3/24/2020 259     Heart Failure sx: none    Daily diuretic plan:   Bumex 2mg BID  KCL 30meq TID   Eplerenone 50mg BID    Notes: Wt down 1#, no symptoms reported. Will continue to monitor as wt trending down and no symptoms reported.      Future Appointments   Date Time Provider Department Center   4/20/2020  1:10 PM More, SIRENA Smith [email or web address] PSA CLIN       DESMOND Lan, RN, CHFN  03/24/20 at 10:33 AM

## 2020-03-25 NOTE — PROGRESS NOTES
[facility] Heart Clinic - CORE Clinic Telemanagement  My Health Tracker HF Alert     Weight today: 256#    Weight goal: 245-255#  MyHealth Tracker CHF Flowsheet My weight today is:   3/19/2020 256   3/20/2020 256   3/23/2020 260   3/24/2020 259   3/25/2020 256     Heart Failure sx: none    Daily diuretic plan:   Bumex 2mg BID  KCL 30meq TID   Eplerenone 50mg BID    Notes: Wt continues to trend down back towards goal, now only 1# above goal. No symptoms reported. Will continue to monitor.       Future Appointments   Date Time Provider Department Center   4/20/2020  1:10 PM [facility]       PANKAJ LanN, RN, CHFN  03/25/20 at 10:22 AM

## 2020-03-26 ENCOUNTER — TELEPHONE (OUTPATIENT)
Dept: CARDIOLOGY | Facility: CLINIC | Age: 60
End: 2020-03-26

## 2020-03-26 NOTE — TELEPHONE ENCOUNTER
Hello:   We received a request from [facility] for a PA for eplerenone.I will fax it to you in a few moments.The reason he is on this this drug is that he had hives on spironolactone which is their alternative choice. Rachna Holt has an office note from 19 that documents this reaction. Appreciate your help with this submission.           Service Date: 2019      PRIMARY CARDIOLOGIST:  Will be Dr. Ashley, previously was followed with Dr. Salinas.      REASON FOR VISIT:  HFpEF.  Cor pulmonale.      HISTORY OF PRESENT ILLNESS:  Mr. Chowdary is a delightful 58-year-old gentleman with past medical history significant for the followin.  Severe aortic stenosis with aortic valve replacement in 2017 with 25 mm Trifecta tissue valve.   2.  Type 2 diabetes, on insulin.   3.  Hyperlipidemia.   4.  Treated sleep apnea.   5.  Cor pulmonale versus heart failure with preserved EF.   6.  Recent iron deficiency anemia followed by abbi Vasquez.   7.  History of SVT and paroxysmal AFib, postoperative.  None on recent Zio Patch.      Mr. Chowdary was admitted in October with weight gain and shortness of breath and was diuresed from about 325 pounds to 310 pounds but left early as he needed to take care of his elderly mother with whom he lives.  We struggled to get him diuresed as an outpatient.  His weight eventually peaked at 329 pounds but then started dropping about 10 pounds a week with the addition of hydrochlorothiazide.  This has been complicated by the fact that he is wasting a lot of potassium, requiring massive potassium supplementation.  He had hives reaction to the spironolactone and was started on eplerenone last week by Dr. Krause as his potassium continued to run low.

## 2020-03-27 NOTE — TELEPHONE ENCOUNTER
PA Initiation    Medication: Eplerenone 50MG -  Insurance Company: [employer] - Phone [phone] Fax [phone]  Pharmacy Filling the Rx: [facility] - [place], [place] - [address] [place] [address]  Filling Pharmacy Phone: [phone]  Filling Pharmacy Fax: [phone]  Start Date: 3/27/2020

## 2020-03-30 ENCOUNTER — TELEPHONE (OUTPATIENT)
Dept: CARDIOLOGY | Facility: CLINIC | Age: 60
End: 2020-03-30

## 2020-03-30 NOTE — TELEPHONE ENCOUNTER
[facility] Heart-CORE Clinic  My Health Tracker HF Alert     Pt last submitted My Health Tracker HF survey on 3/25.    Called pt and left him a VM w/ friendly reminder to submit surveys daily and/or call CORE RN w/ questions/concerns.    Future Appointments   Date Time Provider Department Center   4/20/2020  1:10 PM More, SIRENA Smith [facility] PSA CLIN     Karen Stark RN BSN   1:57 PM 03/30/20

## 2020-03-30 NOTE — TELEPHONE ENCOUNTER
Prior Authorization Approval    Medication: Eplerenone 50MG -APPROVED was approved on 3/27/2020  Effective: 3/27/2020 to 12/31/2020  Reference #: PA Case: [id number]  Approved Dose/Quantity:   Insurance Company: [employer] - Phone [phone] Fax [phone]  Expected CoPay:    Pharmacy Filling the Rx: [facility] - [address]  Pharmacy Notified: Yes  Patient Notified: Comment:  **Instructed pharmacy to notify patient when script is ready to /ship.**

## 2020-03-31 NOTE — TELEPHONE ENCOUNTER
[facility] Heart-CORE Clinic    Last My Health Tracker survey submitted on 3/25. Left pt a VM yesterday requesting he resume surveys.     Reviewed Care Everywhere and see that pt was admitted to [facility] in [place], [place] from 3/28-3/29 for increased confusion and ammonia level elevated from baseline. He had a telehealth visit with his PCP 3/30 at which time lactulose was increased. No changes to diuretic plan.    If pt hasn't submitted HF survey by tomorrow, will try reaching out to him again.      Future Appointments   Date Time Provider Department Center   4/20/2020  1:10 PM More, SIRENA Smith [facility] [place] Stark RN BSN   1:15 PM 03/31/20

## 2020-04-01 NOTE — TELEPHONE ENCOUNTER
[facility] Heart-CORE Clinic  My Health Tracker HF Alert     Last My Health Tracker survey submitted on 3/25. Left pt a VM 3/30 requesting he resume surveys. See note from yesterday for info on admission 3/28-3/29 for confusion.     Second VM left today requesting that pt resume MHT surveys, and/or call CORE RN w/ questions/concerns.      Future Appointments   Date Time Provider Department Center   4/20/2020  1:10 PM More, SIRENA Smith [facility] PSA CLIN     Karen Stark RN BSN   12:22 PM 04/01/20

## 2020-04-06 ENCOUNTER — CARE COORDINATION (OUTPATIENT)
Dept: CARDIOLOGY | Facility: CLINIC | Age: 60
End: 2020-04-06

## 2020-04-06 NOTE — PROGRESS NOTES
"[facility] Heart-CORE Clinic  Received VM from pt asking us to call \"the scale lady\" at [phone], as she needs to talk to us. Pt then left scale model number , and Box . Pt asked if we could connect with the lady so he can start weighing again.     Reviewed chart, unclear what program he is going to be using. I do not see any notes about him enrolling in any scale program. Attempted to call phone number pt left, but it was for [employer]. Attempted to call pt, no answer. LVM asking for clarification and that number I called was for [employer]. Asked pt to call me back.     Called and spoke with sister Yesi, she states pt has been using a scale through insurance, and last week they turned it off, so pt has been unable to weigh. Sister asking us to call as very important for him to weigh. Pt has scale from [employer],   SN# [id number]. Reviewed phone number pt gave us was for [employer]. Sister gave me correct phone number for [employer]:  [phone].     Called [employer], spoke with representative. Pt's insurance changed and thus was scale disabled and program on hold until new information is received. Called and reviewed this with sister Elisabeth, she stated understanding. They have the new insurance info, she will call [employer] today to give them the information so that scale and be enabled again and pt can start calling in weights.     Meghana Pappas, PANKAJN, RN, CHFN  04/06/20 at 10:02 AM   "

## 2020-04-10 NOTE — PROGRESS NOTES
[facility] Heart-CORE [facility]    Called Elisabeth for update on scale. States she has not heard back from [employer] after updating them with pt's insurance info yesterday.     Called [employer] and spoke to rep - they received updated insurance info, [employer] rep needs to review and approve. This could take 3-4 business days. They agreed to contact CORE RN directly once decision has been made - provided phone number.    Also called sister Elisabeth, no answer, LVM with the above. Also included that I will set aside a digital scale from our clinic for pt today which she can  if [employer] does not approve.     Will call [employer] late next week if no call before then - reminder to RN board.     PANKAJ PrattN, RN, PHN, HNB-BC   4/10/2020 at 12:18 PM

## 2020-04-10 NOTE — PROGRESS NOTES
Incoming call from patient w/questions about his scale. He had a difficult time remembering from where he got his scale, and the associated telemonitoring program. I reviewed with him that he currently has a scale from [employer], but it has been disabled as his insurance changed. He no longer has [employer], but now has Medicaid. CORE RN called earlier this week and talked to his sister. She has communicated w/[employer]. Mata today states he has not heard back from them. I recommended he have his sister call back to [employer] on Monday. He agreed to this plan and had no additional questions/concens.  Jaqueline Miller RN on 4/10/2020 at 11:34 AM

## 2020-04-15 NOTE — PROGRESS NOTES
"[facility] Heart-CORE Clinic      Have not yet received additional MyHealth Tracker surveys from pt since last wk when discussions were had re: his [employer] scale.      Called and spoke to pt's sister Elisabeth (pronounced \"Eee-issac\"). Elisabeth said that [employer] has sent pt a box and requested that pt return his scale to them d/t insurance change.     Reviewed w/ Elisabeth the importance of daily wts to help monitor fluid status and noted that the sooner he's able to resume daily wts the better. Did note that we could give him one of our scales, however we both agreed they do not want to travel to [place] d/t distance and Covid-19 precautions.     Elisabeth noted that she and pt's  will work on getting pt a scale. I advised that he may resume My Health Tracker surveys as soon as he gets scale, and in the mean time needs they need to closely monitor for increased dyspnea/swelling. She verbalized understanding.     Will reach out to them next week if have not received addnl surveys.    Karen Stark RN BSN   1:02 PM 04/15/20            "

## 2020-04-16 NOTE — PROGRESS NOTES
[facility] Heart-CORE Clinic   My Health Tracker survey received today. Weight 254#, no symptoms reported. Weight within goal of 245-255#. Will continue to monitor.     PANKAJ LanN, RN, CHFN  04/16/20 at 8:25 AM     MyHealth Tracker CHF Flowsheet My weight today is:   3/13/2020 254   3/14/2020 253   3/15/2020 254   3/19/2020 256   3/20/2020 256   3/23/2020 260   3/24/2020 259   3/25/2020 256   4/16/2020 254

## 2020-04-17 ENCOUNTER — CARE COORDINATION (OUTPATIENT)
Dept: CARDIOLOGY | Facility: CLINIC | Age: 60
End: 2020-04-17

## 2020-04-17 NOTE — PROGRESS NOTES
"[facility] Heart-CORE Clinic    Pt LVM re: vital signs.    Said \"I know you like to keep track of things\" and provided vitals for the last 2 days.     4/16 /58, HR 74, wt 253#  4/17 /53, HR 76, wt 254#    States he is feeling well and does not need callback.     Maureen Castro, PANKAJN, RN, PHN, HNB-BC   4/17/2020 at 2:21 PM      "

## 2020-04-20 ENCOUNTER — CARE COORDINATION (OUTPATIENT)
Dept: CARDIOLOGY | Facility: CLINIC | Age: 60
End: 2020-04-20

## 2020-04-20 NOTE — PROGRESS NOTES
[facility] - CORE Clinic Telemanagement  My Health Tracker HF Alert     No call variance. Last weight from 4/17. Called pt to review, no answer. LVM reminding him to call in weights daily.     Future Appointments   Date Time Provider Department Center   4/30/2020  1:10 PM Anette Steward, APRN CNP [facility] PSA CLIN       PANKAJ LanN, RN, CHFN  04/20/20 at 1:31 PM

## 2020-04-22 ENCOUNTER — CARE COORDINATION (OUTPATIENT)
Dept: CARDIOLOGY | Facility: CLINIC | Age: 60
End: 2020-04-22

## 2020-04-22 NOTE — PROGRESS NOTES
[facility] - CORE Clinic Telemanagement  My Health Tracker HF Alert     Weight today: 258#   Weight goal: 245-255#  MyHealth Tracker CHF Flowsheet My weight today is:   3/15/2020 254   3/19/2020 256   3/20/2020 256   3/23/2020 260   3/24/2020 259   3/25/2020 256   4/16/2020 254   4/17/2020 254   4/21/2020 255   4/22/2020 258     Heart Failure sx: none reported    Daily diuretic plan:   Bumex 2mg BID  Eplerenone 50mg BID   KCL 30meq TID     Notes: 3# wt gain and now above goal. Pt has been up to this wt in the past month, but this was after diuretics held due to dizziness x 2 days Wt trended back down after restarting diuretics. Called pt for update, no answer. LVM for call back with update on how he is feeling and asked what he ate yesterday, if increased sodium eaten.      Future Appointments   Date Time Provider Department Center   4/30/2020  1:10 PM Anette Steward, APRN CNP [facility]       DESMOND Lan, RN, CHFN  04/22/20 at 9:58 AM

## 2020-04-23 NOTE — PROGRESS NOTES
[facility] Heart-CORE Clinic  Pt did not return call yesterday and has not called in weight today. Attempted to call pt to discuss, no answer. LVM to call back.     PANKAJ LanN, RN, CHFN  04/23/20 at 1:00 PM

## 2020-04-24 NOTE — PROGRESS NOTES
[facility] Heart-CORE Clinic    MA spoke w/patient earlier today and was told by patient that he forgot to call into MHT. He reported his weight today - 256#. He reported no sx. Jaqueline Miller RN on 4/24/2020 at 2:38 PM    Future Appointments   Date Time Provider Department Center   4/30/2020  1:10 PM Anette Steward, APRN CNP [facility] PSA CLIN

## 2020-04-27 DIAGNOSIS — I50.30 HEART FAILURE WITH PRESERVED EJECTION FRACTION, NYHA CLASS I (H): ICD-10-CM

## 2020-04-27 RX ORDER — EPLERENONE 50 MG/1
50 TABLET, FILM COATED ORAL 2 TIMES DAILY
Qty: 180 TABLET | Refills: 0 | Status: SHIPPED | OUTPATIENT
Start: 2020-04-27 | End: 2020-07-23

## 2020-04-27 NOTE — PROGRESS NOTES
[facility] Heart-CORE Clinic    Pt was without a scale for period of time and since obtaining new scale, has called in MyHealth Tracker surveys sporadically.     Pt is due for CORE KARYN follow-up. Msg'd scheduling to call pt to arrange virtual visit this week. Will need to review at visit if HF telemonitoring is realistic option for pt.    Karen Stark RN BSN   9:18 AM 04/27/20

## 2020-04-28 NOTE — TELEPHONE ENCOUNTER
[facility] - CORE Clinic Telemanagement  My Health Tracker HF Alert     Weight today: 254#    Weight goal: 245-255#  MyHealth Tracker CHF Flowsheet My weight today is:   4/21/2020 255   4/22/2020 258   4/28/2020 254   Additional patient reported weights:    4/23 ~255   4/24 257   4/25 254   4/26 ~253   4/27 254  Patient confirmed that he weighs himself every day, but frequently forgets to call into MHT. He also stated that he got a new scale ~thurs last week(4/23)     Heart Failure sx: Today he is reporting increased swelling. He feels this in his ankles and that there is some pitting. He denied swelling further up his legs or anything into his feet. He denied any abdominal bloating. He added that his breathing is fine and denied any orthopnea or PND    Daily diuretic plan:    bumex 2mg bid   eplerenone 50mg bid    +KCL 30meq tid  Notes:    Patients weight within parameters but is experiencing some increased ankle swelling. appt this week as below. Will forward to Anette Steawrd for review.  Jaqueline Miller RN on 4/28/2020 at 10:21 AM    Future Appointments   Date Time Provider Department Center   4/30/2020  1:10 PM Anette Steward, RODRIGUEZ [facility] PSA CLIN

## 2020-04-30 ENCOUNTER — VIRTUAL VISIT (OUTPATIENT)
Dept: CARDIOLOGY | Facility: CLINIC | Age: 60
End: 2020-04-30
Attending: INTERNAL MEDICINE
Payer: MEDICARE

## 2020-04-30 DIAGNOSIS — I47.10 PAROXYSMAL SUPRAVENTRICULAR TACHYCARDIA (H): ICD-10-CM

## 2020-04-30 DIAGNOSIS — Z95.2 H/O AORTIC VALVE REPLACEMENT: ICD-10-CM

## 2020-04-30 DIAGNOSIS — I50.30 HEART FAILURE WITH PRESERVED EJECTION FRACTION, NYHA CLASS I (H): Primary | ICD-10-CM

## 2020-04-30 PROCEDURE — 99214 OFFICE O/P EST MOD 30 MIN: CPT | Mod: GT | Performed by: NURSE PRACTITIONER

## 2020-04-30 RX ORDER — LACTULOSE 10 G/15ML
SOLUTION ORAL
Qty: 45 ML
Start: 2020-04-30

## 2020-04-30 NOTE — LETTER
4/30/2020      RE: Gil Chowdary  [address] [address]       Dear Colleague,    Thank you for the opportunity to participate in the care of your patient, Gil Chowdary, at the [facility]. Please see a copy of my visit note below.    This visit was completed via video due to COVID-19 precautions.  I had the pleasure evaluating Gil Chowdary during a video visit today.  He is an established patient of Rachna Holt PA-C, and Dr. Mckeon.  He has a complex past medical history that includes:    1. Severe aortic stenosis with aortic valve replacement in 2017 receiving a 25 mm trifecta tissue valve.  2. Type 2 diabetes mellitus on insulin  3. Hepatic encephalopathy   4. Heart failure with preserved ejection fraction  5. Iron deficiency anemia and ITP followed by Dr. Fletcher  6. History of SVT and paroxysmal atrial fibrillation postoperatively.  Recent ZIO patch showed no arrhythmia  7. History of myectomy at time of aortic valve replacement.  No clear hypertrophic cardiomyopathy diagnosis documented.    Adolfo is feeling better at today's visit.  He currently denies chest pain, shortness of breath at rest, edema, or palpitations.  He resides with his sister and brother-in-law in [place].  He previously was living with his mother who passed away last year.  He has been working on his diet, but admits to being sedentary.  His sister has been strict about his dietary intake.  He would like to walk more, but jokes that the lactulose makes it hard with increased flatulence/diarrhea.      PHYSICAL EXAMINATION:  General:  no apparent distress, normal body habitus, sitting upright.  ENT/Mouth:  no nasal discharge.  Normal head shape, no apparent injury or laceration.  Eyes:  normal conjunctivae.  No observed jaundice.  Neck:  no apparent neck swelling.   Chest/Lungs:  no breathing difficulty while speaking.  No audible  wheezing.  No cough during conversation.  Cardiovascular:  reported HR is regular.  No obviously elevated jugular venous pressure.  No apparent edema in LE.   Abdomen:  no obvious abdominal distention.   Extremities:  no apparent cyanosis.  Skin:  no xanthelasma.  No facial lacerations.  Neurologic:  Normal arm motion bilateral, no tremors.    Psychiatric:  Alert and oriented x3, calm demeanor  The rest of the comprehensive physical examination is deferred due to public health emergency video visit restrictions.       DIAGNOSTIC STUDIES:  TTE Interpretation Summary 1/10/2020   Poor image quality.  Left ventricular systolic function is normal.  The visual ejection fraction is estimated at 60-65%.  Right ventricular systolic pressure could not be approximated due to inadequate tricuspid regurgitation.  Inferior vena cava not well visualized for estimation of right atrial pressure.  Compared to prior study, there is no significant change.    Recent labs, care everywhere: March 28, 2020  Potassium 3.7, BUN 18, creatinine 1.3, GFR 56, ammonia 68, hemoglobin 12.2, hematocrit 37.8, platelets 79.    IMPRESSION:  1. Severe aortic stenosis status post AVR tissue valve. Aortic valve not well visualized on echocardiogram.  2. Heart failure with preserved ejection fraction currently stable  3. Sleep apnea with use of CPAP  4. Hepatic encephalopathy managed by Dr. Neely.  Early onset dementia.    RECOMMENDATIONS:  1. Continue Bumex at 2 mg twice daily, eplerenone 50 mg twice daily, potassium 30 mEq 3 times daily.  BMP is stable and patient appears to be euvolemic during our video visit today.  Labs are stable.  2. Questions were asked regarding starting quinine and think to help prevent COVID-19.  I discouraged trying to use supplements to prevent contraction of this virus.  I did recommend CDC guidelines which includes facemasks, social distancing, and frequent handwashing.  3. Follow-up visit in 3 months with a BMP and to see  Rachna Holt PAC in CORE  4. In regard to his dementia, his sister is a very strong social support for him.  She did join us for the video conference.  She manages his medication and dietary intake.    Thank you for including me in his care.  If there are any questions or concerns I have asked that they please contact us.  I have encouraged him to write his weights down daily on a calendar along with his blood pressure and pulse rate.  He is part of  my health tracker, but sometimes forgets to call in his weights.  No medication changes were warranted today.    Please do not hesitate to contact me if you have any questions/concerns.     Sincerely,     Anette Steward NP, APRN CNP

## 2020-04-30 NOTE — PATIENT INSTRUCTIONS
Call my nurse with any questions or concerns:  [phone]  *If you have concerns after hours, please call [phone], option 2 to speak with on call Cardiologist.    1. Medication changes from today:    No changes  See Rachna in 3-4 months  Please call with any concerns  Thank you-Anette

## 2020-04-30 NOTE — PROGRESS NOTES
"Gil Chowdary is a 59 year old male who is being evaluated via a billable video visit.      The patient has been notified of following:     \"This video visit will be conducted via a call between you and your physician/provider. We have found that certain health care needs can be provided without the need for an in-person physical exam.  This service lets us provide the care you need with a video conversation.  If a prescription is necessary we can send it directly to your pharmacy.  If lab work is needed we can place an order for that and you can then stop by our lab to have the test done at a later time.    Video visits are billed at different rates depending on your insurance coverage.  Please reach out to your insurance provider with any questions.    If during the course of the call the physician/provider feels a video visit is not appropriate, you will not be charged for this service.\"  Unable to obtain blood pressure and pulse.  Weight: 253lb  O2-N/A  Review Of Systems  Skin: NEGATIVE  Eyes:Ears/Nose/Throat: glaucoma  Respiratory:sleep apnea, wears CPAP, MOTA when lifting something  Cardiovascular:lightheadedness last week, edema ankles, occ.  Gastrointestinal: NEGATIVE  Genitourinary:NEGATIVE   Musculoskeletal: NEGATIVE  Neurologic: NEGATIVE  Psychiatric: anxiety and depression  Hematologic/Lymphatic/Immunologic: NEGATIVE  Endocrine:  Diabetes-BS today 110, not using insulin currently    Patient has given verbal consent for Video visit? Yes    How would you like to obtain your AVS? Carolyn    Patient would like the video invitation sent by: Text to cell phone: [phone]    Will anyone else be joining your video visit?       Video-Visit Details    Type of service:  Video Visit    Video Start Time: 1:38 PM  Video End Time: 1:56 PM    Originating Location (pt. Location): Home    Distant Location (provider location):  [facility]     Platform used for Video Visit: " [facility]      PHYSICIAN NOTE:  This visit was completed via video due to COVID-19 precautions.  I had the pleasure evaluating Gil Chowdary during a video visit today.  He is an established patient of Rachna Holt PA-C, and Dr. Mckeon.  He has a complex past medical history that includes:    1. Severe aortic stenosis with aortic valve replacement in 2017 receiving a 25 mm trifecta tissue valve.  2. Type 2 diabetes mellitus on insulin  3. Hepatic encephalopathy   4. Heart failure with preserved ejection fraction  5. Iron deficiency anemia and ITP followed by Dr. Fletcher  6. History of SVT and paroxysmal atrial fibrillation postoperatively.  Recent ZIO patch showed no arrhythmia  7. History of myectomy at time of aortic valve replacement.  No clear hypertrophic cardiomyopathy diagnosis documented.    Adolfo is feeling better at today's visit.  He currently denies chest pain, shortness of breath at rest, edema, or palpitations.  He resides with his sister and brother-in-law in [place].  He previously was living with his mother who passed away last year.  He has been working on his diet, but admits to being sedentary.  His sister has been strict about his dietary intake.  He would like to walk more, but jokes that the lactulose makes it hard with increased flatulence/diarrhea.      PHYSICAL EXAMINATION:  General:  no apparent distress, normal body habitus, sitting upright.  ENT/Mouth:  no nasal discharge.  Normal head shape, no apparent injury or laceration.  Eyes:  normal conjunctivae.  No observed jaundice.  Neck:  no apparent neck swelling.   Chest/Lungs:  no breathing difficulty while speaking.  No audible wheezing.  No cough during conversation.  Cardiovascular:  reported HR is regular.  No obviously elevated jugular venous pressure.  No apparent edema in LE.   Abdomen:  no obvious abdominal distention.   Extremities:  no apparent cyanosis.  Skin:  no xanthelasma.  No facial lacerations.  Neurologic:  Normal arm  motion bilateral, no tremors.    Psychiatric:  Alert and oriented x3, calm demeanor  The rest of the comprehensive physical examination is deferred due to public health emergency video visit restrictions.       DIAGNOSTIC STUDIES:  TTE Interpretation Summary 1/10/2020   Poor image quality.  Left ventricular systolic function is normal.  The visual ejection fraction is estimated at 60-65%.  Right ventricular systolic pressure could not be approximated due to inadequate tricuspid regurgitation.  Inferior vena cava not well visualized for estimation of right atrial pressure.  Compared to prior study, there is no significant change.    Recent labs, care everywhere: March 28, 2020  Potassium 3.7, BUN 18, creatinine 1.3, GFR 56, ammonia 68, hemoglobin 12.2, hematocrit 37.8, platelets 79.    IMPRESSION:  1. Severe aortic stenosis status post AVR tissue valve. Aortic valve not well visualized on echocardiogram.  2. Heart failure with preserved ejection fraction currently stable  3. Sleep apnea with use of CPAP  4. Hepatic encephalopathy managed by Dr. Neely.  Early onset dementia.    RECOMMENDATIONS:  1. Continue Bumex at 2 mg twice daily, eplerenone 50 mg twice daily, potassium 30 mEq 3 times daily.  BMP is stable and patient appears to be euvolemic during our video visit today.  Labs are stable.  2. Questions were asked regarding starting quinine and think to help prevent COVID-19.  I discouraged trying to use supplements to prevent contraction of this virus.  I did recommend CDC guidelines which includes facemasks, social distancing, and frequent handwashing.  3. Follow-up visit in 3 months with a BMP and to see Rachna More PAC in CORE  4. In regard to his dementia, his sister is a very strong social support for him.  She did join us for the video conference.  She manages his medication and dietary intake.    Thank you for including me in his care.  If there are any questions or concerns I have asked that they please  contact us.  I have encouraged him to write his weights down daily on a calendar along with his blood pressure and pulse rate.  He is part of  my health tracker, but sometimes forgets to call in his weights.  No medication changes were warranted today.    Anette Steward NP, APRN CNP          Video visit documentation  Application:  Doximity  Patient location: home  Provider location: clinic

## 2020-05-11 ENCOUNTER — CARE COORDINATION (OUTPATIENT)
Dept: CARDIOLOGY | Facility: CLINIC | Age: 60
End: 2020-05-11

## 2020-05-11 NOTE — PROGRESS NOTES
[facility] - CORE Clinic Telemanagement  My Health Tracker HF Alert     No Call Variance. Last weight called in on 5/6. Called pt to review, no answer. LVM reminding him to submit weight survey daily.     Goal parameters: 245-255#  MyHealth Tracker CHF Flowsheet My weight today is:   3/25/2020 256   4/16/2020 254   4/21/2020 255   4/22/2020 258   4/28/2020 254   5/4/2020 253   5/5/2020 252   5/6/2020 253     Meghana Pappas, PANKAJN, RN, CHFN  05/11/20 at 1:28 PM

## 2020-05-18 ENCOUNTER — CARE COORDINATION (OUTPATIENT)
Dept: CARDIOLOGY | Facility: CLINIC | Age: 60
End: 2020-05-18

## 2020-05-26 ENCOUNTER — CARE COORDINATION (OUTPATIENT)
Dept: CARDIOLOGY | Facility: CLINIC | Age: 60
End: 2020-05-26

## 2020-05-26 NOTE — PROGRESS NOTES
[facility] Clinic - CORE Clinic Telemanagement  My Health Tracker HF Alert     No Call Variance. Last weight called in on 5/23 at 254#. Called pt, he thought he called in today, but must have forgot. He is doing well without concerns. Wt remains stable at 254# today and within goal. Will continue to monitor.     PANKAJ LanN, RN, CHFN  05/26/20 at 1:37 PM

## 2020-06-02 ENCOUNTER — CARE COORDINATION (OUTPATIENT)
Dept: CARDIOLOGY | Facility: CLINIC | Age: 60
End: 2020-06-02

## 2020-06-02 NOTE — PROGRESS NOTES
[facility] Clinic - CORE Clinic Telemanagement  My Health Tracker HF Alert    No Call Variance. Last weight from 5/29/20. Called pt, no answer. LVM reminding him to call in daily with weights.     No future appointments.      Meghana Pappas, PANKAJN, RN, CHFN  06/02/20 at 2:13 PM

## 2020-06-04 ENCOUNTER — CARE COORDINATION (OUTPATIENT)
Dept: CARDIOLOGY | Facility: CLINIC | Age: 60
End: 2020-06-04

## 2020-06-04 NOTE — PROGRESS NOTES
[facility] Heart Clinic - CORE Clinic Telemanagement  My Health Tracker HF Alert     Weight today: 254#   Weight goal: 245-255#  MyHealth Tracker CHF Flowsheet My weight today is:   5/6/2020 253   5/11/2020 253   5/13/2020 253   5/15/2020 254   5/16/2020 254   5/17/2020 253   5/18/2020 256   5/19/2020 256   5/21/2020 255   5/23/2020 254   5/27/2020 256   5/29/2020 254   6/3/2020 256   6/4/2020 254     Heart Failure sx: dizziness    Daily diuretic plan:   Bumex 2mg BID   KCL 30meq TID    Notes: Last visit 4/30/20 with LAKSHMI Blair. No changes made. Pt reported dizziness on survey today. Wt within goal. Due for CORE follow up August 2020. Called pt, he reports he felt a lightheaded a little yesterday, he was outside and thinks he was out too long in the heat. Pt did drink fluids and is feeling better today. Denies dizziness today. Reviewed with pt to be careful in high heat and humidity, stay hydrated, don't over do it. Pt stated understanding. Will continue to monitor.     No future appointments.      PANKAJ LanN, RN, CHFN  06/04/20 at 11:04 AM

## 2020-06-10 ENCOUNTER — CARE COORDINATION (OUTPATIENT)
Dept: CARDIOLOGY | Facility: CLINIC | Age: 60
End: 2020-06-10

## 2020-06-10 NOTE — PROGRESS NOTES
[facility] Heart - CORE Clinic    Called patient w/reminder to call MHT daily. We have not had transmission from him since 6/6. LM re:importance of daily calls.  Jaqueline Miller RN on 6/10/2020 at 11:23 AM

## 2020-07-08 ENCOUNTER — CARE COORDINATION (OUTPATIENT)
Dept: CARDIOLOGY | Facility: CLINIC | Age: 60
End: 2020-07-08

## 2020-07-08 NOTE — PROGRESS NOTES
[facility] Heart-CORE Clinic    Incoming VM from pt. States his doctor recommended that he get in to see Rachna Holt PA-C.     Reviewed Care Everywhere records. Pt saw PCP yesterday and had BMP done. Wt at visit was 263#. Per visit note:             Reviewed MHT. Has not submitted any surveys since 7/5. Also has been inconsistent with surveys requiring multiple reminder calls.         Called pt back to discuss. Unfortunately, pt did not take wt this AM. States he was disheartened by yesterday's wt, and could not bring himself to step on the scale today. Has already eaten and drank tea this morning.     Discussed HF sx. Pt endorses mild ankle swelling bilaterally and abdominal swelling. Denies SOB/orthopnea. Denies dietary changes.     Confirmed compliance to:    Bumex 2 mg BID   Eplerenone 50 mg BID   KCL 30 mEq TID     Scheduled pt for telephone appt with Anette Steward NP tomorrow to discuss further. See below:     Future Appointments   Date Time Provider Department Center   7/9/2020  1:50 PM [facility]     Will also route to Anette for review.     Maureen Castro, BSN, RN, PHN, HNB-BC   7/8/2020 at 8:43 AM

## 2020-07-09 ENCOUNTER — VIRTUAL VISIT (OUTPATIENT)
Dept: CARDIOLOGY | Facility: CLINIC | Age: 60
End: 2020-07-09
Payer: MEDICARE

## 2020-07-09 DIAGNOSIS — I50.33 ACUTE ON CHRONIC DIASTOLIC HEART FAILURE (H): ICD-10-CM

## 2020-07-09 DIAGNOSIS — I50.30 HEART FAILURE WITH PRESERVED EJECTION FRACTION, NYHA CLASS I (H): Primary | ICD-10-CM

## 2020-07-09 PROCEDURE — 99214 OFFICE O/P EST MOD 30 MIN: CPT | Mod: 95 | Performed by: NURSE PRACTITIONER

## 2020-07-09 NOTE — LETTER
7/9/2020    Delia Neely  [address] [address]    RE: Gil Chowdary       Dear Colleague,    I had the pleasure of seeing Gil Chowdary in the [facility].    Gil Chowdary is a 59 year old male who is being evaluated via a billable video visit.      KARYN NOTE:  This visit was completed via video due to COVID-19 precautions.  The patient provided consent for a video visit.    I had the pleasure evaluating Gil Chowdary during a video visit today.    The patient is a delightful 60 yo and is an established patient of Rachna Holt PA-C, and Dr. Mckeon.  He has a complex past medical history that includes:  1. Severe aortic stenosis with aortic valve replacement in 2017 receiving a 25 mm trifecta tissue valve.  2. Type 2 diabetes mellitus on insulin  3. Hepatic encephalopathy   4. Heart failure with preserved ejection fraction  5. Iron deficiency anemia and ITP followed by Dr. Fletcher  6. History of SVT and paroxysmal atrial fibrillation postoperatively.  Recent ZIO patch showed no arrhythmia  7. History of myectomy at time of aortic valve replacement.  No clear hypertrophic cardiomyopathy diagnosis documented    Increased leg edema, wt up about 5 lbs. No sob, or orthopnea. He resides with his sister and brother-in-law in [place].  He previously was living with his mother who passed away last year.  He has been working on his diet, but admits to being sedentary.  His sister has been strict about his dietary intake. He has been eating higher sodium foods when his sister isn't around.      PHYSICAL EXAMINATION: Limited video available  General:  no apparent distress, normal body habitus, sitting upright.  ENT/Mouth:  no nasal discharge.  Normal head shape, no apparent injury or laceration.  Eyes:  normal conjunctivae.  No observed jaundice.  Neck:  no apparent neck swelling.   Chest/Lungs:  no breathing difficulty while speaking.  No  audible wheezing.  No cough during conversation.  Cardiovascular:  reported HR is regular.  No obviously elevated jugular venous pressure.  No reported edema in LE.   Extremities:  no apparent cyanosis.  Skin:  no xanthelasma.  No facial lacerations.  Neurologic:  Normal arm motion bilateral, no tremors.    Psychiatric:  Alert and oriented x3, calm demeanor  The rest of the comprehensive physical examination is deferred due to public health emergency video visit restrictions.         DIAGNOSTIC STUDIES:  TTE Interpretation Summary 1/10/2020  Poor image quality.  Left ventricular systolic function is normal.  The visual ejection fraction is estimated at 60-65%.  Right ventricular systolic pressure could not be approximated due to inadequate tricuspid regurgitation.  Inferior vena cava not well visualized for estimation of right atrial pressure.  Compared to prior study, there is no significant change.    BMP 7/7/2020: K+ 3.9, BUN 14, creat 1.28, hgb 11.3, plt 76, hgbA1c 5.8    IMPRESSION:  1. Severe aortic stenosis status post AVR tissue valve. Aortic valve not well visualized on echocardiogram.  2. Heart failure with preserved ejection fraction. Wt up 5 lbs with increased lower ext edema. No sob. Dietary indiscretions are an issue lately.    3. Sleep apnea with use of CPAP  4. Hepatic encephalopathy managed by Dr. Neely.  Early onset dementia.    RECOMMENDATIONS:  1. Take an extra 1 mg Bumex in the am x 3 days. 3 mg am and 2 mg pm through Sunday. I've asked that they call on Monday with an update  2. If wt comes back to baseline then resume Bumex dosing to 2 mg bid.   3. Discussed not sneaking high salty foods which he's been doing.  4. See Rachna Holt PA-C or myself in 1 month      Thank you for allowing me to participate in the care of your patient.    Sincerely,     Anette Steward, NP, APRN CNP     [facility]

## 2020-07-09 NOTE — PROGRESS NOTES
"Gil Chowdary is a 59 year old male who is being evaluated via a billable video visit.      The patient has been notified of following:     \"This video visit will be conducted via a call between you and your physician/provider. We have found that certain health care needs can be provided without the need for an in-person physical exam.  This service lets us provide the care you need with a video conversation.  If a prescription is necessary we can send it directly to your pharmacy.  If lab work is needed we can place an order for that and you can then stop by our lab to have the test done at a later time.    Video visits are billed at different rates depending on your insurance coverage.  Please reach out to your insurance provider with any questions.    If during the course of the call the physician/provider feels a video visit is not appropriate, you will not be charged for this service.\"  Vitals - Patient Reported  Systolic (Patient Reported): 100  Diastolic (Patient Reported): 54  Weight (Patient Reported): 117.5 kg (259 lb)  SpO2 (Patient Reported): 94(room air)  Pulse (Patient Reported): 74    Review Of Systems  Skin: NEGATIVE  Eyes:Ears/Nose/Throat: glaucoma  Respiratory: sleep apnea, wears cpap at night  Cardiovascular: edema in feet, wears circulator machine on feet every other day, energy  Level varies,  Gastrointestinal: blood in stool, hemorrhoids  Genitourinary:NEGATIVE   Musculoskeletal: joint pain, arthritis  Neurologic: memory problems  Psychiatric: anxiety, depression, stress  Hematologic/Lymphatic/Immunologic: NEGATIVE  Endocrine:  NEGATIVE    Richa Somers LPN    Patient has given verbal consent for Video visit? Yes  How would you like to obtain your AVS? Carolyn  Patient would like the video invitation sent by: Text to cell phone: [phone]  Will anyone else be joining your video visit? No        Video-Visit Details    Type of service:  Video Visit    Video Start Time: 1:43 " PM  Video End Time: 2:15 PM    Originating Location (pt. Location): Home. Bad connection    Distant Location (provider location):  [facility]     Platform used for Video Visit: Coupay      KARYN NOTE:  This visit was completed via video due to COVID-19 precautions.  The patient provided consent for a video visit.    I had the pleasure evaluating Gil Chowdary during a video visit today.    The patient is a delightful 60 yo and is an established patient of Rachna Holt PA-C, and Dr. Mckeon.  He has a complex past medical history that includes:  1. Severe aortic stenosis with aortic valve replacement in 2017 receiving a 25 mm trifecta tissue valve.  2. Type 2 diabetes mellitus on insulin  3. Hepatic encephalopathy   4. Heart failure with preserved ejection fraction  5. Iron deficiency anemia and ITP followed by Dr. Fletcher  6. History of SVT and paroxysmal atrial fibrillation postoperatively.  Recent ZIO patch showed no arrhythmia  7. History of myectomy at time of aortic valve replacement.  No clear hypertrophic cardiomyopathy diagnosis documented    Increased leg edema, wt up about 5 lbs. No sob, or orthopnea. He resides with his sister and brother-in-law in [place].  He previously was living with his mother who passed away last year.  He has been working on his diet, but admits to being sedentary.  His sister has been strict about his dietary intake. He has been eating higher sodium foods when his sister isn't around.      PHYSICAL EXAMINATION: Limited video available  General:  no apparent distress, normal body habitus, sitting upright.  ENT/Mouth:  no nasal discharge.  Normal head shape, no apparent injury or laceration.  Eyes:  normal conjunctivae.  No observed jaundice.  Neck:  no apparent neck swelling.   Chest/Lungs:  no breathing difficulty while speaking.  No audible wheezing.  No cough during conversation.  Cardiovascular:  reported HR is regular.  No obviously elevated  jugular venous pressure.  No reported edema in LE.   Extremities:  no apparent cyanosis.  Skin:  no xanthelasma.  No facial lacerations.  Neurologic:  Normal arm motion bilateral, no tremors.    Psychiatric:  Alert and oriented x3, calm demeanor  The rest of the comprehensive physical examination is deferred due to public health emergency video visit restrictions.         DIAGNOSTIC STUDIES:  TTE Interpretation Summary 1/10/2020  Poor image quality.  Left ventricular systolic function is normal.  The visual ejection fraction is estimated at 60-65%.  Right ventricular systolic pressure could not be approximated due to inadequate tricuspid regurgitation.  Inferior vena cava not well visualized for estimation of right atrial pressure.  Compared to prior study, there is no significant change.    BMP 7/7/2020: K+ 3.9, BUN 14, creat 1.28, hgb 11.3, plt 76, hgbA1c 5.8    IMPRESSION:  1. Severe aortic stenosis status post AVR tissue valve. Aortic valve not well visualized on echocardiogram.  2. Heart failure with preserved ejection fraction. Wt up 5 lbs with increased lower ext edema. No sob. Dietary indiscretions are an issue lately.    3. Sleep apnea with use of CPAP  4. Hepatic encephalopathy managed by Dr. Neely.  Early onset dementia.    RECOMMENDATIONS:  1. Take an extra 1 mg Bumex in the am x 3 days. 3 mg am and 2 mg pm through Sunday. I've asked that they call on Monday with an update  2. If wt comes back to baseline then resume Bumex dosing to 2 mg bid.   3. Discussed not sneaking high salty foods which he's been doing.  4. See Rachna Holt PA-C or myself in 1 month      Anette Steward NP, APRN CNP

## 2020-07-09 NOTE — PATIENT INSTRUCTIONS
Call my nurse with any questions or concerns:  [phone]  *If you have concerns after hours, please call [phone], option 2 to speak with on call Cardiologist.    1. Medication changes from today:    Please behave with your diet.   Keep legs elevated  Stay inside during hot, humid days  Increase Bumex 3 mg am and 2 mg pm x 3 days  Call Monday to the CORE RN with an update  IF wt back to baseline then resume previous dosing at 2 mg bid      Follow up visit in 1 month/  Thank you!

## 2020-07-10 NOTE — PROGRESS NOTES
[facility]    Wt update: per SABA, wt 257# today with no sx, down from 263# at [facility] 7/7. Wt reflective of extra Bumex per Anette Steward NP. Will cont to monitor.    PANKAJ PrattN, RN, PHN, HNB-BC   7/10/2020 at 9:44 AM

## 2020-07-13 ENCOUNTER — CARE COORDINATION (OUTPATIENT)
Dept: CARDIOLOGY | Facility: CLINIC | Age: 60
End: 2020-07-13

## 2020-07-13 NOTE — PROGRESS NOTES
[facility]  My Health Tracker HF Alert     No Call Variance. Last weight recorded on 7/10/20 at 257#. Per notes, pt had visit with LAKSHMI Blair on 7/9/20, home weight 259#, pt instructed to increase Bumex to 3mg in am and 2mg in pm x 3 days, if weight back to baseline, then resume 2mg BID dosing.     Called pt for update, no answer. LVM asking for call back with weight and symptom update.     MyHealth Tracker CHF Flowsheet My weight today is:   6/15/2020 256   6/17/2020 255   6/18/2020 255   6/19/2020 254   6/22/2020 256   6/24/2020 256   6/26/2020 255   6/28/2020 254   6/29/2020 253   7/1/2020 255   7/5/2020 255   7/10/2020 257     Future Appointments   Date Time Provider Department Center   7/29/2020  9:00 AM [facility]   8/3/2020  7:30 AM Rachna Holt PA-C [facility]       PANKAJ LanN, RN, CHFN  07/13/20 at 2:45 PM

## 2020-07-15 NOTE — PROGRESS NOTES
[facility]  Have not heard back from pt with update and he has still not called in weight since 7/10. Called pt, no answer. LVM asking for him to call back with weight and sx update and what taking for Bumex, as well as reminded him to call in weights daily before noon each day.     Meghana Pappas, PANKAJN, RN, CHFN  07/15/20 at 1:59 PM

## 2020-07-20 NOTE — PROGRESS NOTES
[facility]  My Health Tracker HF Alert    Situation/Background:    No call variance. Last recorded wt 7/10. VIKASH Pappas RN called pt x2 on Friday to remind him to submit surveys.     Assessment/Recommendations:    Compliance with MHT has been an ongoing issue, likely at least in part d/t ongoing cognitive barriers.     MHT no longer appropriate, will dis-enroll. Has follow-up with Rcahna Holt PA-C on 8/3. Will sent FYI to Anette Steward CNP.      PANKAJ PrattN, RN, PHN, HNB-BC   7/20/2020 at 1:16 PM

## 2020-07-22 ENCOUNTER — CARE COORDINATION (OUTPATIENT)
Dept: CARDIOLOGY | Facility: CLINIC | Age: 60
End: 2020-07-22

## 2020-07-22 NOTE — PROGRESS NOTES
[facility]    Reviewed w/Anette Steward:  Tell him to use support stockings and lets do a BMP either this week or early next week, and change his visit with Rachna to me.     Called patient w/above recommendations. His appts have been scheduled:  Future Appointments   Date Time Provider Department Center   7/29/2020  9:00 AM [facility]   8/5/2020 12:30 PM Anette Steward, APRN CNP [facility]     Patient verbalized understanding and had no other questions.  Jaqueline Miller RN on 7/22/2020 at 3:35 PM

## 2020-07-22 NOTE — PROGRESS NOTES
[facility]    Spoke w/patient and reviewed his concerns. He is concerned about the swelling in his feet. The L>R. He stated that they look fine 1st thing ing the am, but they get swollen as they day progresses. He is able to get his shoes/sox on, but the L is tight. He was lasts seen by Anette Steward on 7/9. Per her appt notes:  Increased leg edema, wt up about 5 lbs. No sob, or orthopnea    I asked if they were more swollen today than they were at this appt. He was unable to tell - he struggled with trying to answer this.     Also at the appt on 7/9, Anette recommended he increase the bumex to 3mg am/2mg pm X3 days and return to his baseline dose of 2mg bid if his weight returns to baseline. Despite multiple attempts CORE RN was unable to connect w/him to assess response. Adolfo has actually been taking bumex 3mg bid - likely since ~7/9 although he was not able to exactly remember.     Recent weights:   7/22 253   7/21 254   7/20 254   7/19 255   7/18 255   7/9 259(Anette Steward appt)    Patient denied any other areas of swelling or sensation of abdominal bloating. He denied SOB/MOTA beyond his usual. He is sleeping soundly without orthopnea/PND.     Wellness screening for his lab appt on Wed 7/29 as follows. Patient also scheduled for appt w/Rachna More on 8/3. I will request scheduling to contact patient to make other arrangements. I will forward to Anette Steward for review.  Jaqueline Miller RN on 7/22/2020 at 1:27 PM      Wellness Screening Tool    Symptom Screening:    Do you have one of the following NEW symptoms:      Fever (subjective or >100.0)? No    New cough? No    Shortness of breath? No     Chills? No    New loss of taste or smell? No     Generalized body aches? No     New persistent headache? No     New sore throat? No     Nausea, vomiting or diarrhea? No     Within the past 3 weeks, have you been exposed to someone with a known positive illness below?      COVID - 19 (known or suspected)  No    Chicken pox? No    Measles? No    Pertussis? No    Patient notified of visitor restriction: Yes  Patient informed to wear a mask: Yes  Patient denied recent travel    Patient's appointment status: Patient will be seen in clinic as scheduled

## 2020-07-22 NOTE — PROGRESS NOTES
[facility]    Incoming message from patient reporting increased LE swelling. Call back to assess - LM .  Jaqueline Miller, RN on 7/22/2020 at 10:11 AM

## 2020-07-23 DIAGNOSIS — I50.30 HEART FAILURE WITH PRESERVED EJECTION FRACTION, NYHA CLASS I (H): ICD-10-CM

## 2020-07-23 RX ORDER — EPLERENONE 50 MG/1
50 TABLET, FILM COATED ORAL 2 TIMES DAILY
Qty: 180 TABLET | Refills: 0 | Status: SHIPPED | OUTPATIENT
Start: 2020-07-23

## 2020-07-24 NOTE — PROGRESS NOTES
[facility]  Received VM from pt stating he thinks he needs visit Monday or Tuesday to assess his legs, and asked for a call back.     Reviewed chart, pt spoke with nurse earlier this week about swelling feet, provider recommended support stockings and BMP this week or next week. Pt also scheduled for follow up with LAKSHMI Blair video visit 8/3.     Returned call to pt, weights remain stable. Wt today 254#, which is within goal weights of 245-255#. Pt reports he is still having trouble with swelling in his feet and legs. Asked if he has been using the support stockings recommended on Wednesday. Pt stated he has been, and it as helped a little bit, but end of day they are worse than am. Reviewed to be elevating 20-30 min a few times a day. Pt states he is doing this. Pt denies any SOB. Reinforced support stockings and elevation. Pt reports swelling is not any worse. Reviewed to continue to weigh. Pt would like to be seen early next week in clinic. No openings Monday or Tuesday. Offered in clinic visit with LAKSHMI Blair on Thursday next week, but pt did not want to wait until then. First opening on Wednesday with JOAN Bunch. Pt would like to take that, even though he has never seen her before. Pt will keep his labs as scheduled 7/29 and then see JOAN Bunch in clinic at 10:50am for assessment of increased leg swelling. Told pt to call back prior to that appt if swelling worsens, increased weight, or he develops SOB. Pt stated understanding. Pt states that it takes two days to get a hold of us and has to push so many buttons when he calls. Pt left VM for us this afternoon about swelling, and I called him back same day within an hour. Asked pt if he has our direct CORE RN phone number, he states he does not. Had pt write down our CORE RN phone number.     Meghana Pappas, PANKAJN, RN, CHFN  07/24/20 at 2:33 PM

## 2020-07-28 ENCOUNTER — TELEPHONE (OUTPATIENT)
Dept: LAB | Facility: CLINIC | Age: 60
End: 2020-07-28

## 2020-07-28 ENCOUNTER — TELEPHONE (OUTPATIENT)
Dept: CARDIOLOGY | Facility: CLINIC | Age: 60
End: 2020-07-28

## 2020-07-29 ENCOUNTER — OFFICE VISIT (OUTPATIENT)
Dept: CARDIOLOGY | Facility: CLINIC | Age: 60
End: 2020-07-29
Payer: MEDICARE

## 2020-07-29 VITALS
SYSTOLIC BLOOD PRESSURE: 108 MMHG | OXYGEN SATURATION: 96 % | DIASTOLIC BLOOD PRESSURE: 67 MMHG | WEIGHT: 274.5 LBS | BODY MASS INDEX: 40.54 KG/M2 | HEART RATE: 62 BPM

## 2020-07-29 DIAGNOSIS — I50.30 HEART FAILURE WITH PRESERVED EJECTION FRACTION, NYHA CLASS I (H): ICD-10-CM

## 2020-07-29 DIAGNOSIS — E66.01 MORBID OBESITY (H): ICD-10-CM

## 2020-07-29 DIAGNOSIS — I50.32 CHRONIC DIASTOLIC CONGESTIVE HEART FAILURE (H): Primary | ICD-10-CM

## 2020-07-29 DIAGNOSIS — I35.0 NONRHEUMATIC AORTIC VALVE STENOSIS: ICD-10-CM

## 2020-07-29 LAB
ANION GAP SERPL CALCULATED.3IONS-SCNC: 9.6 MMOL/L (ref 6–17)
BUN SERPL-MCNC: 11 MG/DL (ref 7–30)
CALCIUM SERPL-MCNC: 8.2 MG/DL (ref 8.5–10.5)
CHLORIDE SERPL-SCNC: 107 MMOL/L (ref 98–107)
CO2 SERPL-SCNC: 25 MMOL/L (ref 23–29)
CREAT SERPL-MCNC: 1.17 MG/DL (ref 0.7–1.3)
GFR SERPL CREATININE-BSD FRML MDRD: 64 ML/MIN/{1.73_M2}
GLUCOSE SERPL-MCNC: 161 MG/DL (ref 70–105)
POTASSIUM SERPL-SCNC: 3.6 MMOL/L (ref 3.5–5.1)
SODIUM SERPL-SCNC: 138 MMOL/L (ref 136–145)

## 2020-07-29 PROCEDURE — 36415 COLL VENOUS BLD VENIPUNCTURE: CPT | Performed by: NURSE PRACTITIONER

## 2020-07-29 PROCEDURE — 80048 BASIC METABOLIC PNL TOTAL CA: CPT | Performed by: NURSE PRACTITIONER

## 2020-07-29 PROCEDURE — 99214 OFFICE O/P EST MOD 30 MIN: CPT | Performed by: PHYSICIAN ASSISTANT

## 2020-07-29 RX ORDER — METOLAZONE 2.5 MG/1
2.5 TABLET ORAL DAILY
Qty: 5 TABLET | Refills: 0 | Status: SHIPPED | OUTPATIENT
Start: 2020-07-29 | End: 2020-07-29

## 2020-07-29 RX ORDER — HYDROCHLOROTHIAZIDE 25 MG/1
TABLET ORAL
Qty: 5 TABLET | Refills: 0 | Status: SHIPPED | OUTPATIENT
Start: 2020-07-29 | End: 2020-08-05

## 2020-07-29 RX ORDER — BUMETANIDE 1 MG/1
3 TABLET ORAL 2 TIMES DAILY
Qty: 360 TABLET | Refills: 3 | COMMUNITY
Start: 2020-07-29

## 2020-07-29 NOTE — PROGRESS NOTES
Cardiology Progress Note    Date of Service: 07/30/2020  Patient seen today in follow up of: CORE follow up  Primary cardiologist: Dr. Mckeon    HPI:  Gil Chowdary is a very pleasant 59 year old male with a history of severe aortic stenosis with aortic valve replacement in 2017 with a 25 mm trifecta tissue valve, diabetes mellitus type 2 on insulin, hepatic encephalopathy, heart failure with preserved LVEF, iron deficiency anemia and ITP followed by Dr. Fletcher, history of SVT and paroxysmal atrial fibrillation postoperatively, and a history of a myomectomy at the time of his aortic valve replacement although no clear hypertrophic cardiomyopathy diagnosis is been documented.  He typically has been followed in the [facility]ORE clinic by Rachna Holt PA-C and more recently has had two virtual visits with RODRIGUEZ Chester.    It seems in 2018 he had significant issues with volume overload.  He was hospitalized but for some personal reasons had to be discharged prior to being euvolemic.  He was followed very closely in our clinic for medication adjustments and monitoring.  His baseline weight is felt to be around 250 to 255 pounds.  He has been maintained on 2 mg of Bumex twice daily as well as eplerenone 50 mg twice daily.    He had an in clinic visit with Dr. Mckeon in January and appeared to be doing fairly well at that time.  Weight in clinic was 261 pounds.    Recently, he has been struggling with some weight gain.  He had a visit with Ms. Steward on 7/9/2020.  He reported worsening leg edema and his weight was up about 5 pounds to 259 pounds.  He was instructed to increase his morning dose of Bumex to 3 mg and continue 2 mg in the afternoon.  Per her note, his sister whom he lives with has been strict about his sodium intake although when his sister is not around here he apparently has been eating high sodium foods.    The core nurses had difficulty getting a hold of him to follow-up on his response.  They were  finally able to get a hold of him on 7/22.  He then reported worsening swelling.  It was also unclear how much Bumex he was taking.  He thought he maybe had been taking 3 mg of Bumex twice daily.  Compression stockings were recommended as well as a BMP and a follow-up visit.  He was scheduled for a visit with Ms. Steward next week but due to ongoing leg edema requested in person visit as soon as possible thus he is here today to see me.    I spoke with Adolfo today for follow up.  I am meeting him for the first time.  He reportedly has a history of dementia thus his history is somewhat limited.  He tells me today he has had progressive leg edema over the last week in particular.  He has not weighed at home in the last few days but when he did last weight he was 260 pounds.  Of note, he previously called in his weights to our telehealth program but has been noncompliant with this recently and was disenrolled earlier this month.  His weight in clinic today is 274 pounds which is 13 pounds higher than he was back in January.  He denies any shortness of breath whatsoever although admits his activity is quite limited.  He is not having orthopnea or PND.  He tells me his sister does most of the cooking at home and that he thinks she uses more sodium than she should.  Again, as noted above it seems his sister tries to be quite careful and that he has been sneaking food.    ASSESSMENT/PLAN:  Gil Chowdary is a pleasant 59-year-old male with a history of heart failure with preserved LVEF, severe aortic stenosis status post aortic valve replacement, dementia, anemia, cirrhosis and hepatic encephalopathy maintained on lactulose, among other comorbidities as noted above.  It seems in the last few weeks to particularly the last few weeks will has been experiencing worsening leg edema and weight gain.  He unfortunately has not weighed the last few days but his last home weight was 260 pounds which is at least 5 pounds up from  his previous estimated dry weight.  On exam, he does have some swelling into his thighs I suspect some of this may be related to lymphedema.  I do not he has been treated in the lymphedema clinic in the past.    At this point, we believe he has been taking 3 mg of Bumex twice daily.  He also takes a total of 100 mg of eplerenone.  He had labs done today which show stable renal function with a creatinine of 1.17.  His BUN is normal as is his potassium.  I suggested we continue his Bumex and have him take a dose of 25 mg of hydrochlorothiazide for the next 2 days to augment diuresis.  It seems he has responded well to that in the past.  On those 2 days, I asked him to increase his potassium supplement from 30 mEq 3 times daily to 40 mEq 3 times daily.  I asked him to go home and weigh himself today and to write his weights down over the weekend, so we can see if he is moving in the right direction.  I also referred him to the lymphedema clinic.  I am not sure if it will be feasible for him to follow up there as he lives quite a ways a way but this has been helpful for him in the past.  At this point, he fortunately is not having any respiratory symptoms.    I asked him to be cautious with his sodium.  He also tells me he is motivated to start increasing his activity a bit which would be helpful.    I will have the core nurses follow-up with him on Monday.  Hopefully, we can get an assessment of his weights.  He has a follow-up video visit already scheduled with Ms. Steward in about a week and things can be reevaluated at that time.  If needed, we certainly could consider treating him in the infusion clinic if he continues to gain weight despite oral oral diuretics.    Orders this Visit:  Orders Placed This Encounter   Procedures     LYMPHEDEMA THERAPY REFERRAL     Orders Placed This Encounter   Medications     bumetanide (BUMEX) 1 MG tablet     Sig: Take 3 tablets (3 mg) by mouth 2 times daily     Dispense:  360  tablet     Refill:  3     DISCONTD: metolazone (ZAROXOLYN) 2.5 MG tablet     Sig: Take 1 tablet (2.5 mg) by mouth daily     Dispense:  5 tablet     Refill:  0     hydrochlorothiazide (HYDRODIURIL) 25 MG tablet     Sig: Take as directed by the [facility] clinic     Dispense:  5 tablet     Refill:  0     Medications Discontinued During This Encounter   Medication Reason     bumetanide (BUMEX) 1 MG tablet Reorder     metolazone (ZAROXOLYN) 2.5 MG tablet        CURRENT MEDICATIONS:  Current Outpatient Medications   Medication Sig Dispense Refill     aspirin 81 MG tablet Take 81 mg by mouth every morning        atorvastatin (LIPITOR) 40 MG tablet Take 1 tablet (40 mg) by mouth daily 90 tablet 0     bumetanide (BUMEX) 1 MG tablet Take 3 tablets (3 mg) by mouth 2 times daily 360 tablet 3     clindamycin (CLEOCIN) 300 MG capsule Take 2 capsules(600mg) one time, one hour before dental procedure 2 capsule 3     eplerenone (INSPRA) 50 MG tablet Take 1 tablet (50 mg) by mouth 2 times daily 180 tablet 0     ferrous sulfate (FEROSUL) 325 (65 Fe) MG tablet Take 1 tablet (325 mg) by mouth 2 times daily 60 tablet 3     hydrochlorothiazide (HYDRODIURIL) 25 MG tablet Take as directed by the [facility] clinic 5 tablet 0     Insulin Aspart (NOVOLOG FLEXPEN SC) Inject Subcutaneous as needed       lactulose (CHRONULAC) 10 GM/15ML solution 1 cup (15mL) twice daily 45 mL      magnesium oxide (MAG-OX) 400 MG tablet Take 1 tablet (400 mg) by mouth daily 90 tablet 3     memantine (NAMENDA) 5 MG tablet Take 5 mg by mouth 2 times daily       metoprolol tartrate (LOPRESSOR) 25 MG tablet Take 1 tablet (25 mg) by mouth 2 times daily 180 tablet 3     Multiple Vitamins-Minerals (CENTRUM ADULTS PO) Take 1 tablet by mouth every morning        order for DME Pt to be fit with compression garments 20-30mmHg or veclro compression garment from foot to knee bilaterally. 4 each 3     pantoprazole (PROTONIX) 40 MG EC tablet Take 40 mg by mouth every morning (before  breakfast)       potassium chloride ER (K-DUR/KLOR-CON M) 10 MEQ CR tablet Take 3 tablets (30 mEq) by mouth 3 times daily       prednisoLONE acetate (PRED FORTE) 1 % ophthalmic susp Place 1 drop into both eyes as needed (eye pain)        timolol (TIMOPTIC) 0.5 % ophthalmic solution Place 1 drop into both eyes 2 times daily        vilazodone (VIIBRYD) 20 MG TABS tablet Take 20 mg by mouth daily       ALLERGIES  Allergies   Allergen Reactions     Amoxicillin      Itchy      Penicillins Hives     Spironolactone Rash     PAST MEDICAL HISTORY:  Past Medical History:   Diagnosis Date     Former tobacco use      Glaucoma      Hyperlipidemia LDL goal <160 12/6/2011     Obesity      DRAKE on CPAP      Right bundle branch block      Severe aortic stenosis     On Echo 10/28/16     PAST SURGICAL HISTORY:  Past Surgical History:   Procedure Laterality Date     CV HEART CATHETERIZATION WITH POSSIBLE INTERVENTION N/A 3/19/2019    Procedure: RHC and LHC (No angiogram);  Surgeon: Jai Romo MD;  Location:  HEART CARDIAC CATH LAB     CV RIGHT HEART CATH N/A 3/19/2019    Procedure: Right Heart Cath;  Surgeon: Jai Romo MD;  Location:  HEART CARDIAC CATH LAB     HEART CATH LEFT HEART CATH  04/07/2017    Diffuse non-obstuctive CAD     REPAIR VALVE AORTIC N/A 5/16/2017    Procedure: REPAIR VALVE AORTIC;  Median Sternotony, CardioPulmonary Bypass, Aortic Valve Replace using 25MM Trifecta Valve with [employer], Septal myectomy;  Surgeon: Jun Simon MD;  Location:  OR     REPLACE VALVE AORTIC N/A 5/16/2017    Procedure: REPLACE VALVE AORTIC;;  Surgeon: Jun Simon MD;  Location:  OR     SINUS SURGERY  2005     FAMILY HISTORY:  Family History   Problem Relation Age of Onset     Family History Negative Mother      Diabetes Father      Heart Surgery Father         CABG     Coronary Artery Disease Father      Hypertension Brother      Diabetes Brother      Heart Disease Brother      Heart  Surgery Brother         CABG x 3     Family History Negative Sister      Diabetes Brother         History of diabetes, but no longer an issue     Family History Negative Brother      SOCIAL HISTORY:  Social History     Socioeconomic History     Marital status:      Spouse name: None     Number of children: None     Years of education: None     Highest education level: None   Occupational History     None   Social Needs     Financial resource strain: None     Food insecurity     Worry: None     Inability: None     Transportation needs     Medical: None     Non-medical: None   Tobacco Use     Smoking status: Former Smoker     Packs/day: 1.00     Years: 20.00     Pack years: 20.00     Types: Cigarettes, Cigars     Start date:      Last attempt to quit: 10/21/2011     Years since quittin.7     Smokeless tobacco: Never Used   Substance and Sexual Activity     Alcohol use: No     Drug use: No     Sexual activity: None   Lifestyle     Physical activity     Days per week: None     Minutes per session: None     Stress: None   Relationships     Social connections     Talks on phone: None     Gets together: None     Attends [employer] service: None     Active member of club or organization: None     Attends meetings of clubs or organizations: None     Relationship status: None     Intimate partner violence     Fear of current or ex partner: None     Emotionally abused: None     Physically abused: None     Forced sexual activity: None   Other Topics Concern     Parent/sibling w/ CABG, MI or angioplasty before 65F 55M? Yes     Comment: brother triple bypass 49   Social History Narrative     None     Review of Systems:  Skin:  Negative     Eyes:  Positive for glasses  ENT:  Negative    Respiratory:  Positive for sleep apnea;CPAP;cough  Cardiovascular:  Negative Positive for;edema;fatigue  Gastroenterology: Negative    Genitourinary:  not assessed    Musculoskeletal:  Positive for arthritis  Neurologic:  Positive for  memory problems  Psychiatric:  Positive for anxiety;depression  Heme/Lymph/Imm:  Negative    Endocrine:  Positive for diabetes     Physical Exam:  Vitals: /67   Pulse 62   Wt 124.5 kg (274 lb 8 oz)   SpO2 96%   BMI 40.54 kg/m     Wt Readings from Last 4 Encounters:   07/29/20 124.5 kg (274 lb 8 oz)   01/15/20 118.4 kg (261 lb)   10/14/19 116.6 kg (257 lb)   08/14/19 120.7 kg (266 lb)     GEN: well nourished, in no acute distress.  HEENT:  Pupils equal, round. Sclerae nonicteric.  C/V:  Regular rate and rhythm, no murmur, rub or gallop.    RESP: Respirations are unlabored. Clear to auscultation bilaterally without wheezing, rales, or rhonchi.  GI: Abdomen soft, nontender.  EXTREM: he has edema into his thighs bilaterally.   NEURO: Alert and oriented, cooperative.  SKIN: Warm and dry.     Recent Lab Results:  LIPID RESULTS:  Lab Results   Component Value Date    CHOL 126 10/10/2018    HDL 23 (A) 10/10/2018    LDL 71 10/10/2018    TRIG 230 (A) 10/10/2018     LIVER ENZYME RESULTS:  Lab Results   Component Value Date    AST 88 (H) 03/18/2019    ALT 44 03/18/2019     CBC RESULTS:  Lab Results   Component Value Date    WBC 4.3 04/05/2019    RBC 3.53 (L) 04/05/2019    HGB 11.4 (L) 04/05/2019    HCT 35.0 (L) 04/05/2019    MCV 99 04/05/2019    MCH 32.3 04/05/2019    MCHC 32.6 04/05/2019    RDW 15.2 (H) 04/05/2019    PLT 86 (L) 04/05/2019     BMP RESULTS:  Lab Results   Component Value Date     07/29/2020    POTASSIUM 3.6 07/29/2020    CHLORIDE 107 07/29/2020    CO2 25 07/29/2020    ANIONGAP 9.6 07/29/2020     (H) 07/29/2020    BUN 11 07/29/2020    CR 1.17 07/29/2020    GFRESTIMATED 64 07/29/2020    GFRESTBLACK 77 07/29/2020    MYRON 8.2 (L) 07/29/2020      A1C RESULTS:  Lab Results   Component Value Date    A1C 5.4 03/16/2019     INR RESULTS:  Lab Results   Component Value Date    INR 1.28 (H) 03/15/2019    INR 2.19 (H) 10/14/2018       New/Pertinent imaging results since last visit:  None    Yessica  SIRENA Wilson  [facility]

## 2020-07-29 NOTE — PATIENT INSTRUCTIONS
Call CORE nurse for any questions or concerns Mon-Fri 8am-4pm:                                                 #[phone]                                       For concerns after hours:                                               #[phone], option 2     1: Medication changes: Please take 1 tablet (25 mg) of hydrochlorothiazide that I sent to the pharmacy on 7/30 and 7/31 with your morning dose of Bumex. On these two days I would like you to take extra potassium - please take 4 tablets (40 meq) three times a day. On Saturday, you can go back to taking just 3 mg of Bumex twice daily and your usual dose of potassium (3 tablets three times daily). If you have any questions about what to be taking - please call the CORE nurses!   -  Continue Bumex 3 mg twice daily for now.  2: Plan from today: Please call in your weights today and going forward over the weekend.  -  I'll have the [facility] clinic nurses call you on Monday for an update  -  If you're not making progress, we can consider treating you in the infusion clinic next week.   -  Keep your legs elevated while you are sitting around  -  Lymphedema clinic referral if that would be an option for you.  3: Lab results:   Component      Latest Ref Rng & Units 7/29/2020   Sodium      136 - 145 mmol/L 138   Potassium      3.5 - 5.1 mmol/L 3.6   Chloride      98 - 107 mmol/L 107   Carbon Dioxide      23 - 29 mmol/L 25   Anion Gap      6 - 17 mmol/L 9.6   Glucose      70 - 105 mg/dL 161 (H)   Urea Nitrogen      7 - 30 mg/dL 11   Creatinine      0.70 - 1.30 mg/dL 1.17   GFR Estimate      >60 mL/min/1.73:m2 64   GFR Estimate If Black      >60 mL/min/1.73:m2 77   Calcium      8.5 - 10.5 mg/dL 8.2 (L)

## 2020-07-29 NOTE — LETTER
7/29/2020    Delia Neely  [address] [address]    RE: Gil Chowdary       Dear Colleague,    I had the pleasure of seeing Gil Chowdary in the [facility].      Cardiology Progress Note    Date of Service: 07/30/2020  Patient seen today in follow up of: CORE follow up  Primary cardiologist: Dr. Mckeon    HPI:  Gil Chowdary is a very pleasant 59 year old male with a history of severe aortic stenosis with aortic valve replacement in 2017 with a 25 mm trifecta tissue valve, diabetes mellitus type 2 on insulin, hepatic encephalopathy, heart failure with preserved LVEF, iron deficiency anemia and ITP followed by Dr. Fletcher, history of SVT and paroxysmal atrial fibrillation postoperatively, and a history of a myomectomy at the time of his aortic valve replacement although no clear hypertrophic cardiomyopathy diagnosis is been documented.  He typically has been followed in the CORE clinic by Rachna Holt PA-C and more recently has had two virtual visits with RODRIGUEZ Chester.    It seems in 2018 he had significant issues with volume overload.  He was hospitalized but for some personal reasons had to be discharged prior to being euvolemic.  He was followed very closely in our clinic for medication adjustments and monitoring.  His baseline weight is felt to be around 250 to 255 pounds.  He has been maintained on 2 mg of Bumex twice daily as well as eplerenone 50 mg twice daily.    He had an in clinic visit with Dr. Mckeon in January and appeared to be doing fairly well at that time.  Weight in clinic was 261 pounds.    Recently, he has been struggling with some weight gain.  He had a visit with Ms. Steward on 7/9/2020.  He reported worsening leg edema and his weight was up about 5 pounds to 259 pounds.  He was instructed to increase his morning dose of Bumex to 3 mg and continue 2 mg in the afternoon.  Per her note, his sister whom he  lives with has been strict about his sodium intake although when his sister is not around here he apparently has been eating high sodium foods.    The core nurses had difficulty getting a hold of him to follow-up on his response.  They were finally able to get a hold of him on 7/22.  He then reported worsening swelling.  It was also unclear how much Bumex he was taking.  He thought he maybe had been taking 3 mg of Bumex twice daily.  Compression stockings were recommended as well as a BMP and a follow-up visit.  He was scheduled for a visit with Ms. Steward next week but due to ongoing leg edema requested in person visit as soon as possible thus he is here today to see me.    I spoke with Adolfo today for follow up.  I am meeting him for the first time.  He reportedly has a history of dementia thus his history is somewhat limited.  He tells me today he has had progressive leg edema over the last week in particular.  He has not weighed at home in the last few days but when he did last weight he was 260 pounds.  Of note, he previously called in his weights to our telehealth program but has been noncompliant with this recently and was disenrolled earlier this month.  His weight in clinic today is 274 pounds which is 13 pounds higher than he was back in January.  He denies any shortness of breath whatsoever although admits his activity is quite limited.  He is not having orthopnea or PND.  He tells me his sister does most of the cooking at home and that he thinks she uses more sodium than she should.  Again, as noted above it seems his sister tries to be quite careful and that he has been sneaking food.    ASSESSMENT/PLAN:  Gil Chowdary is a pleasant 59-year-old male with a history of heart failure with preserved LVEF, severe aortic stenosis status post aortic valve replacement, dementia, anemia, cirrhosis and hepatic encephalopathy maintained on lactulose, among other comorbidities as noted above.  It seems in the  last few weeks to particularly the last few weeks will has been experiencing worsening leg edema and weight gain.  He unfortunately has not weighed the last few days but his last home weight was 260 pounds which is at least 5 pounds up from his previous estimated dry weight.  On exam, he does have some swelling into his thighs I suspect some of this may be related to lymphedema.  I do not he has been treated in the lymphedema clinic in the past.    At this point, we believe he has been taking 3 mg of Bumex twice daily.  He also takes a total of 100 mg of eplerenone.  He had labs done today which show stable renal function with a creatinine of 1.17.  His BUN is normal as is his potassium.  I suggested we continue his Bumex and have him take a dose of 25 mg of hydrochlorothiazide for the next 2 days to augment diuresis.  It seems he has responded well to that in the past.  On those 2 days, I asked him to increase his potassium supplement from 30 mEq 3 times daily to 40 mEq 3 times daily.  I asked him to go home and weigh himself today and to write his weights down over the weekend, so we can see if he is moving in the right direction.  I also referred him to the lymphedema clinic.  I am not sure if it will be feasible for him to follow up there as he lives quite a ways a way but this has been helpful for him in the past.  At this point, he fortunately is not having any respiratory symptoms.    I asked him to be cautious with his sodium.  He also tells me he is motivated to start increasing his activity a bit which would be helpful.    I will have the core nurses follow-up with him on Monday.  Hopefully, we can get an assessment of his weights.  He has a follow-up video visit already scheduled with Ms. Steward in about a week and things can be reevaluated at that time.  If needed, we certainly could consider treating him in the infusion clinic if he continues to gain weight despite oral oral diuretics.    Orders this  Visit:  Orders Placed This Encounter   Procedures     LYMPHEDEMA THERAPY REFERRAL     Orders Placed This Encounter   Medications     bumetanide (BUMEX) 1 MG tablet     Sig: Take 3 tablets (3 mg) by mouth 2 times daily     Dispense:  360 tablet     Refill:  3     DISCONTD: metolazone (ZAROXOLYN) 2.5 MG tablet     Sig: Take 1 tablet (2.5 mg) by mouth daily     Dispense:  5 tablet     Refill:  0     hydrochlorothiazide (HYDRODIURIL) 25 MG tablet     Sig: Take as directed by the CORE clinic     Dispense:  5 tablet     Refill:  0     Medications Discontinued During This Encounter   Medication Reason     bumetanide (BUMEX) 1 MG tablet Reorder     metolazone (ZAROXOLYN) 2.5 MG tablet        CURRENT MEDICATIONS:  Current Outpatient Medications   Medication Sig Dispense Refill     aspirin 81 MG tablet Take 81 mg by mouth every morning        atorvastatin (LIPITOR) 40 MG tablet Take 1 tablet (40 mg) by mouth daily 90 tablet 0     bumetanide (BUMEX) 1 MG tablet Take 3 tablets (3 mg) by mouth 2 times daily 360 tablet 3     clindamycin (CLEOCIN) 300 MG capsule Take 2 capsules(600mg) one time, one hour before dental procedure 2 capsule 3     eplerenone (INSPRA) 50 MG tablet Take 1 tablet (50 mg) by mouth 2 times daily 180 tablet 0     ferrous sulfate (FEROSUL) 325 (65 Fe) MG tablet Take 1 tablet (325 mg) by mouth 2 times daily 60 tablet 3     hydrochlorothiazide (HYDRODIURIL) 25 MG tablet Take as directed by the CORE clinic 5 tablet 0     Insulin Aspart (NOVOLOG FLEXPEN SC) Inject Subcutaneous as needed       lactulose (CHRONULAC) 10 GM/15ML solution 1 cup (15mL) twice daily 45 mL      magnesium oxide (MAG-OX) 400 MG tablet Take 1 tablet (400 mg) by mouth daily 90 tablet 3     memantine (NAMENDA) 5 MG tablet Take 5 mg by mouth 2 times daily       metoprolol tartrate (LOPRESSOR) 25 MG tablet Take 1 tablet (25 mg) by mouth 2 times daily 180 tablet 3     Multiple Vitamins-Minerals (CENTRUM ADULTS PO) Take 1 tablet by mouth every  morning        order for DME Pt to be fit with compression garments 20-30mmHg or veclro compression garment from foot to knee bilaterally. 4 each 3     pantoprazole (PROTONIX) 40 MG EC tablet Take 40 mg by mouth every morning (before breakfast)       potassium chloride ER (K-DUR/KLOR-CON M) 10 MEQ CR tablet Take 3 tablets (30 mEq) by mouth 3 times daily       prednisoLONE acetate (PRED FORTE) 1 % ophthalmic susp Place 1 drop into both eyes as needed (eye pain)        timolol (TIMOPTIC) 0.5 % ophthalmic solution Place 1 drop into both eyes 2 times daily        vilazodone (VIIBRYD) 20 MG TABS tablet Take 20 mg by mouth daily       ALLERGIES  Allergies   Allergen Reactions     Amoxicillin      Itchy      Penicillins Hives     Spironolactone Rash     PAST MEDICAL HISTORY:  Past Medical History:   Diagnosis Date     Former tobacco use      Glaucoma      Hyperlipidemia LDL goal <160 12/6/2011     Obesity      DRAKE on CPAP      Right bundle branch block      Severe aortic stenosis     On Echo 10/28/16     PAST SURGICAL HISTORY:  Past Surgical History:   Procedure Laterality Date     CV HEART CATHETERIZATION WITH POSSIBLE INTERVENTION N/A 3/19/2019    Procedure: RHC and LHC (No angiogram);  Surgeon: Jai Romo MD;  Location:  HEART CARDIAC CATH LAB     CV RIGHT HEART CATH N/A 3/19/2019    Procedure: Right Heart Cath;  Surgeon: Jai Romo MD;  Location:  HEART CARDIAC CATH LAB     HEART CATH LEFT HEART CATH  04/07/2017    Diffuse non-obstuctive CAD     REPAIR VALVE AORTIC N/A 5/16/2017    Procedure: REPAIR VALVE AORTIC;  Median Sternotony, CardioPulmonary Bypass, Aortic Valve Replace using 25MM Trifecta Valve with [employer], Septal myectomy;  Surgeon: Jun Simon MD;  Location:  OR     REPLACE VALVE AORTIC N/A 5/16/2017    Procedure: REPLACE VALVE AORTIC;;  Surgeon: Jun Simon MD;  Location:  OR     SINUS SURGERY  2005     FAMILY HISTORY:  Family History   Problem  Relation Age of Onset     Family History Negative Mother      Diabetes Father      Heart Surgery Father         CABG     Coronary Artery Disease Father      Hypertension Brother      Diabetes Brother      Heart Disease Brother      Heart Surgery Brother         CABG x 3     Family History Negative Sister      Diabetes Brother         History of diabetes, but no longer an issue     Family History Negative Brother      SOCIAL HISTORY:  Social History     Socioeconomic History     Marital status:      Spouse name: None     Number of children: None     Years of education: None     Highest education level: None   Occupational History     None   Social Needs     Financial resource strain: None     Food insecurity     Worry: None     Inability: None     Transportation needs     Medical: None     Non-medical: None   Tobacco Use     Smoking status: Former Smoker     Packs/day: 1.00     Years: 20.00     Pack years: 20.00     Types: Cigarettes, Cigars     Start date:      Last attempt to quit: 10/21/2011     Years since quittin.7     Smokeless tobacco: Never Used   Substance and Sexual Activity     Alcohol use: No     Drug use: No     Sexual activity: None   Lifestyle     Physical activity     Days per week: None     Minutes per session: None     Stress: None   Relationships     Social connections     Talks on phone: None     Gets together: None     Attends Synagogue service: None     Active member of club or organization: None     Attends meetings of clubs or organizations: None     Relationship status: None     Intimate partner violence     Fear of current or ex partner: None     Emotionally abused: None     Physically abused: None     Forced sexual activity: None   Other Topics Concern     Parent/sibling w/ CABG, MI or angioplasty before 65F 55M? Yes     Comment: brother triple bypass 49   Social History Narrative     None     Review of Systems:  Skin:  Negative     Eyes:  Positive for glasses  ENT:  Negative     Respiratory:  Positive for sleep apnea;CPAP;cough  Cardiovascular:  Negative Positive for;edema;fatigue  Gastroenterology: Negative    Genitourinary:  not assessed    Musculoskeletal:  Positive for arthritis  Neurologic:  Positive for memory problems  Psychiatric:  Positive for anxiety;depression  Heme/Lymph/Imm:  Negative    Endocrine:  Positive for diabetes     Physical Exam:  Vitals: /67   Pulse 62   Wt 124.5 kg (274 lb 8 oz)   SpO2 96%   BMI 40.54 kg/m     Wt Readings from Last 4 Encounters:   07/29/20 124.5 kg (274 lb 8 oz)   01/15/20 118.4 kg (261 lb)   10/14/19 116.6 kg (257 lb)   08/14/19 120.7 kg (266 lb)     GEN: well nourished, in no acute distress.  HEENT:  Pupils equal, round. Sclerae nonicteric.  C/V:  Regular rate and rhythm, no murmur, rub or gallop.    RESP: Respirations are unlabored. Clear to auscultation bilaterally without wheezing, rales, or rhonchi.  GI: Abdomen soft, nontender.  EXTREM: he has edema into his thighs bilaterally.   NEURO: Alert and oriented, cooperative.  SKIN: Warm and dry.     Recent Lab Results:  LIPID RESULTS:  Lab Results   Component Value Date    CHOL 126 10/10/2018    HDL 23 (A) 10/10/2018    LDL 71 10/10/2018    TRIG 230 (A) 10/10/2018     LIVER ENZYME RESULTS:  Lab Results   Component Value Date    AST 88 (H) 03/18/2019    ALT 44 03/18/2019     CBC RESULTS:  Lab Results   Component Value Date    WBC 4.3 04/05/2019    RBC 3.53 (L) 04/05/2019    HGB 11.4 (L) 04/05/2019    HCT 35.0 (L) 04/05/2019    MCV 99 04/05/2019    MCH 32.3 04/05/2019    MCHC 32.6 04/05/2019    RDW 15.2 (H) 04/05/2019    PLT 86 (L) 04/05/2019     BMP RESULTS:  Lab Results   Component Value Date     07/29/2020    POTASSIUM 3.6 07/29/2020    CHLORIDE 107 07/29/2020    CO2 25 07/29/2020    ANIONGAP 9.6 07/29/2020     (H) 07/29/2020    BUN 11 07/29/2020    CR 1.17 07/29/2020    GFRESTIMATED 64 07/29/2020    GFRESTBLACK 77 07/29/2020    MYRON 8.2 (L) 07/29/2020      A1C  RESULTS:  Lab Results   Component Value Date    A1C 5.4 03/16/2019     INR RESULTS:  Lab Results   Component Value Date    INR 1.28 (H) 03/15/2019    INR 2.19 (H) 10/14/2018       New/Pertinent imaging results since last visit:  None      Thank you for allowing me to participate in the care of your patient.    Sincerely,     Yessica Wilson PA-C     [facility]

## 2020-07-30 ENCOUNTER — CARE COORDINATION (OUTPATIENT)
Dept: CARDIOLOGY | Facility: CLINIC | Age: 60
End: 2020-07-30

## 2020-07-30 NOTE — PROGRESS NOTES
Called Elisabeth, pt's sister back, reviewed NOT to give Metolazone. Told Elisabeth to set this aside and do not give any to pt. Elisabeth stated understanding. Reconfirmed plan for hydrochlorothiazide today and tomorrow only.     PANKAJ LanN, RN, CHFN  07/30/20 at 11:09 AM

## 2020-07-30 NOTE — PROGRESS NOTES
[facility]  Received VM back from Elisabeth. Medication they picked up yesterday was Metolazone 2.5mg, 5 tablets. Elisabeth asked for call back with what to do with these.     Reviewed chart, looks like Rx was sent by JOAN Savage, but then canceled on 7/29. Will confirm with JOAN Bunch.     PANKAJ LanN, RN, CHFN  07/30/20 at 10:32 AM

## 2020-07-30 NOTE — PROGRESS NOTES
[facility]  Received VM from sister Elisabeth with questions about medications and asked for call back. Returned call to Elisabeth. They picked up 2 Rx's yesterday and she wants clarification. They got the hydrochlorothiazide and she understood pt was to take 1 tablet today and tomorrow with his morning Bumex, but they received 5 tablets, so she wanted to confirm. Confirm plan as above, reviewed to set other 3 tablets aside for PRN use in the future. She stated understanding. Elisabeth reports they also got another Rx, she cannot remember the name, and it also only had 5 tablets, and she is not sure what to do with those. She is not home now, and does not know the name. Asked Elisabeth to call back once home and leave a message with name of the other Rx, as I can only see that Bumex and hydrochlorothiazide was sent in to pharmacy yesterday. Elisabeth will call back.     PANKAJ LanN, RN, CHFN  07/30/20 at 10:01 AM

## 2020-08-03 ENCOUNTER — CARE COORDINATION (OUTPATIENT)
Dept: CARDIOLOGY | Facility: CLINIC | Age: 60
End: 2020-08-03

## 2020-08-03 NOTE — PROGRESS NOTES
I'm glad his weight is down and he's seems improved. I think his rash should be evaluated - I know he lives fairly far away so perhaps he could see his PCP. He could probably another dose or two of hydrochlorothiazide since he's still quite up from his dry weight but I think we should wait until he's evaluated in case it's a reaction. Thanks. Alina

## 2020-08-03 NOTE — PROGRESS NOTES
Called pt, reviewed recommendations from JOAN Bunch. Pt will call PCP to get in for a visit to assess rash ASAP. Told pt I will check in tomorrow to see if he was able to get in and be seen. He stated understanding. Messaged CORE RN board to call pt tomorrow afternoon for update.     PANKAJ LanN, RN, CHFN  08/03/20 at 1:14 PM

## 2020-08-03 NOTE — PROGRESS NOTES
Pt had visit with JOAN Bunch on 7/29/20. t had not been weighing himself. Pt was asked to start weighing daily. Bumex continued at 3mg BID, and also instructed to take hydrochlorothiazide 25mg x 2 days, on 7/30 and 7/31, as well as take extra KCL (for total of 40meq or 4 tablets TID) x 2 days on 7/30, and 7/31 as well. Pt has follow up with LAKSHMI Blair on 8/5 and was instructed to keep this.     Called pt for update, he reports his legs are not much better. He reports the left leg is back and forth, right leg steady. He reports he now has a red rash, started over the weekend, on both legs. Pt denies itchiness. Both warm to the touch. Some areas are small red dots, other areas look like an overall red rash/haze. Pt reports his weight last week when he started weighing on Wednesday 7/29 was 277# and has been trending down since. He is now down to 267#. Confirmed he took hydrochlorothiazide and extra KCL x 2 days. Despite legs being unchanged, he reports bloating greatly improved. Denies SOB. He has been putting Vaseline on his legs. Asked pt if he has had gout or cellulitis before, he is not sure. Pt denies fever or chills. Wt is down 10# since last week. Pt wanting to know what he needs to do about his legs. Messaged to JOAN Bunch for review.      Future Appointments   Date Time Provider Department Center   8/5/2020 12:30 PM Anette Steward, APRN CNP [facility]     PANKAJ LanN, RN, CHFN  08/03/20 at 11:38 AM

## 2020-08-04 NOTE — PROGRESS NOTES
[facility]    Called patient to f/u re:PCP eval of rash.  for call back.  Jaqueline Miller RN on 8/4/2020 at 2:43 PM

## 2020-08-04 NOTE — PROGRESS NOTES
[facility]    Return VM from pt. States he did see PCP for rash and has started a treatment for it. Did not indicate that any meds were stopped. States he feels well, and wt is now down to 262#. Follow-up with Anette as below:     Future Appointments   Date Time Provider Department Center   8/5/2020 12:30 PM Anette Steward, APRN CNP [facility]        Maureen Castro BSN, RN, PHN, HNB-BC   8/4/2020 at 3:59 PM

## 2020-08-05 ENCOUNTER — VIRTUAL VISIT (OUTPATIENT)
Dept: CARDIOLOGY | Facility: CLINIC | Age: 60
End: 2020-08-05
Attending: NURSE PRACTITIONER
Payer: MEDICARE

## 2020-08-05 DIAGNOSIS — I50.30 HEART FAILURE WITH PRESERVED EJECTION FRACTION, NYHA CLASS I (H): ICD-10-CM

## 2020-08-05 DIAGNOSIS — I50.32 CHRONIC DIASTOLIC CONGESTIVE HEART FAILURE (H): Primary | ICD-10-CM

## 2020-08-05 DIAGNOSIS — R60.0 LOCALIZED EDEMA: ICD-10-CM

## 2020-08-05 PROCEDURE — 99214 OFFICE O/P EST MOD 30 MIN: CPT | Mod: 95 | Performed by: NURSE PRACTITIONER

## 2020-08-05 RX ORDER — HYDROCHLOROTHIAZIDE 25 MG/1
TABLET ORAL
Qty: 10 TABLET | Refills: 0 | Status: SHIPPED | OUTPATIENT
Start: 2020-08-05 | End: 2020-08-13

## 2020-08-05 NOTE — LETTER
8/5/2020    Delia Neely  [address] [address]    RE: Gil Chowdary       Dear Colleague,    I had the pleasure of seeing Gil Chowdary in the [facility].    Gil Chowdary is a 59 year old male who is being evaluated via a billable video visit.      KARYN NOTE:  This visit was completed via video due to COVID-19 precautions.  The patient provided consent for a video visit.    I had the pleasure evaluating Gil Chowdary during a video visit today.    The patient is a delightful 58 yo male with the following chronic medical issues:  1. Severe aortic stenosis with aortic valve replacement in 2017 receiving a 25 mm trifecta tissue valve.  2. Type 2 diabetes mellitus on insulin  3. Hepatic encephalopathy   4. Heart failure with preserved ejection fraction  5. Iron deficiency anemia and ITP followed by Dr. Fletcher  6. History of SVT and paroxysmal atrial fibrillation postoperatively.  Recent ZIO patch showed no arrhythmia  7. History of myectomy at time of aortic valve replacement.  No clear hypertrophic cardiomyopathy diagnosis documented    It is my pleasure having a video visit with Adolfo today.  He was evaluated in the clinic by Rhonda Wilson PA-C for worsening edema and weight gain.  Despite increasing his Bumex to 3 mg twice daily and being on eplerenone 50 mg twice daily , his weight continues to climb.  Last week Ms. Wilson gave him hydrochlorothiazide 25 mg daily.  He ultimately took the diuretic for 5 days with additional potassium 40 mEq 3 times daily.  He is down 12 pounds, but is disappointed that his leg edema continues to be an issue.  He saw Dr. Neely and it appears that she did lymph wraps which she will have taken off on Friday.  He states that the hydrochlorothiazide did help get rid of the extra fluid, but he still has more to get rid of.  His ideal dry weight in the past was 245 pounds-255 pounds.    Vitals - Patient  "Reported  Weight (Patient Reported): 117.9 kg (260 lb)  Height (Patient Reported): 177.8 cm (5' 10\")  BMI (Based on Pt Reported Ht/Wt): 37.31  Wt down 12 lbs in 1 week    PHYSICAL EXAMINATION:  General:  no apparent distress, normal body habitus, sitting upright.  ENT/Mouth:  no nasal discharge.  Normal head shape, no apparent injury or laceration.  Eyes:  normal conjunctivae.  No observed jaundice.  Neck:  no apparent neck swelling.   Chest/Lungs:  no breathing difficulty while speaking.  No audible wheezing.  No cough during conversation.  Cardiovascular:  reported HR is regular.  No obviously elevated jugular venous pressure.  No reported edema in LE.   Abdomen:  no apparent abdominal distention.   Extremities:  Wrapped up to knees  Skin:  no xanthelasma.  No facial lacerations.  Neurologic:  Normal arm motion bilateral, no tremors.    Psychiatric:  Alert and oriented x3, calm demeanor  The rest of the comprehensive physical examination is deferred due to public health emergency video visit restrictions.         DIAGNOSTIC STUDIES:  Results for ERIKA MATOS (MRN [id number]) as of 8/5/2020 12:51   Ref. Range 7/29/2020 08:37   Sodium Latest Ref Range: 136 - 145 mmol/L 138   Potassium Latest Ref Range: 3.5 - 5.1 mmol/L 3.6   Chloride Latest Ref Range: 98 - 107 mmol/L 107   Carbon Dioxide Latest Ref Range: 23 - 29 mmol/L 25   Urea Nitrogen Latest Ref Range: 7 - 30 mg/dL 11   Creatinine Latest Ref Range: 0.70 - 1.30 mg/dL 1.17   GFR Estimate Latest Ref Range: >60 mL/min/1.73_m2 64   GFR Estimate If Black Latest Ref Range: >60 mL/min/1.73_m2 77   Calcium Latest Ref Range: 8.5 - 10.5 mg/dL 8.2 (L)   Anion Gap Latest Ref Range: 6 - 17 mmol/L 9.6   Glucose Latest Ref Range: 70 - 105 mg/dL 161 (H)       IMPRESSION:  1. Severe aortic stenosis status post AVR tissue valve. Aortic valve not well visualized on echocardiogram.  2. Heart failure with preserved ejection fraction. Wt up 5 lbs with increased lower ext edema. No " sob. Dietary indiscretions are an issue lately.    3. Sleep apnea with use of CPAP  4. Hepatic encephalopathy managed by Dr. Neely.  Early onset dementia.    RECOMMENDATIONS:  1. Resume HCTZ 25 mg daily for 4 days.  2. BMP on Friday at Dr. Neely's office to reassess creatinine and potassium  3. Virtual visit with me in 1 week  4. Continue with lymphedema wraps as directed by Dr. Neely     Mata sounded very flat and discouraged during her visit.  I reassured him that he is down 12 pounds and he needs to elevate his legs during the day.  He needs to also continue to watch his salt and fluid intake.  I understand that he does suffer from short-term memory deficits.  He resides with his sister and she is a great social support to him.  I did send 10 tablets of hydrochlorothiazide, if his BMP is stable then he may benefit from remaining on low-dose hydrochlorothiazide in addition to the eplerenone and Bumex.     As always thank you for including me in his care.    Sincerely,     Anette Steward NP, APRN CNP     [facility]

## 2020-08-05 NOTE — PATIENT INSTRUCTIONS
Call my nurse with any questions or concerns:  [phone]  *If you have concerns after hours, please call [phone], option 2 to speak with on call Cardiologist.    1. Medication changes from today:    Please have your family doctor draw a BMP on Friday  Take hydrochlorothiazide 25 mg once daily for 4 days.     2. Lab Results:      Results for ERIKA MATOS (MRN [id number]) as of 8/5/2020 12:51   Ref. Range 7/29/2020 08:37   Sodium Latest Ref Range: 136 - 145 mmol/L 138   Potassium Latest Ref Range: 3.5 - 5.1 mmol/L 3.6   Chloride Latest Ref Range: 98 - 107 mmol/L 107   Carbon Dioxide Latest Ref Range: 23 - 29 mmol/L 25   Urea Nitrogen Latest Ref Range: 7 - 30 mg/dL 11   Creatinine Latest Ref Range: 0.70 - 1.30 mg/dL 1.17   GFR Estimate Latest Ref Range: >60 mL/min/1.73_m2 64   GFR Estimate If Black Latest Ref Range: >60 mL/min/1.73_m2 77   Calcium Latest Ref Range: 8.5 - 10.5 mg/dL 8.2 (L)   Anion Gap Latest Ref Range: 6 - 17 mmol/L 9.6   Glucose Latest Ref Range: 70 - 105 mg/dL 161 (H)

## 2020-08-05 NOTE — PROGRESS NOTES
"Gil Chowdary is a 59 year old male who is being evaluated via a billable video visit.      The patient has been notified of following:     \"This video visit will be conducted via a call between you and your physician/provider. We have found that certain health care needs can be provided without the need for an in-person physical exam.  This service lets us provide the care you need with a video conversation.  If a prescription is necessary we can send it directly to your pharmacy.  If lab work is needed we can place an order for that and you can then stop by our lab to have the test done at a later time.    Video visits are billed at different rates depending on your insurance coverage.  Please reach out to your insurance provider with any questions.    If during the course of the call the physician/provider feels a video visit is not appropriate, you will not be charged for this service.\"    Patient has given verbal consent for Video visit? Yes  How would you like to obtain your AVS? MyChart  If you are dropped from the video visit, the video invite should be resent to: [phone]  Will anyone else be joining your video visit? No      Vitals - Patient Reported  Weight (Patient Reported): 117.9 kg (260 lb)  Height (Patient Reported): 177.8 cm (5' 10\")  BMI (Based on Pt Reported Ht/Wt): 37.31    Review Of Systems  Skin: Rash on legs  Eyes:Ears/Nose/Throat: NEGATIVE  Respiratory: NEGATIVE  Cardiovascular: Edema   Gastrointestinal: NEGATIVE  Genitourinary:NEGATIVE   Musculoskeletal: arthritis  Neurologic: NEGATIVE  Psychiatric: stress, anxiety, depression  Hematologic/Lymphatic/Immunologic: NEGATIVE  Endocrine: diabetic     Stella Rajan LPN      Video-Visit Details    Type of service:  Video Visit    Video Start Time: 12:41 PM  Video End Time: 1:105 PM    Originating Location (pt. Location): Home    Distant Location (provider location):  [facility]     Platform used for " "Video Visit: Epo[employer]      KARYN NOTE:  This visit was completed via video due to COVID-19 precautions.  The patient provided consent for a video visit.    I had the pleasure evaluating Gil Chowdary during a video visit today.    The patient is a delightful 60 yo male with the following chronic medical issues:  1. Severe aortic stenosis with aortic valve replacement in 2017 receiving a 25 mm trifecta tissue valve.  2. Type 2 diabetes mellitus on insulin  3. Hepatic encephalopathy   4. Heart failure with preserved ejection fraction  5. Iron deficiency anemia and ITP followed by Dr. Fletcher  6. History of SVT and paroxysmal atrial fibrillation postoperatively.  Recent ZIO patch showed no arrhythmia  7. History of myectomy at time of aortic valve replacement.  No clear hypertrophic cardiomyopathy diagnosis documented    It is my pleasure having a video visit with Adolfo today.  He was evaluated in the clinic by Rhonda Wilson PA-C for worsening edema and weight gain.  Despite increasing his Bumex to 3 mg twice daily and being on eplerenone 50 mg twice daily , his weight continues to climb.  Last week Ms. Wilson gave him hydrochlorothiazide 25 mg daily.  He ultimately took the diuretic for 5 days with additional potassium 40 mEq 3 times daily.  He is down 12 pounds, but is disappointed that his leg edema continues to be an issue.  He saw Dr. Neely and it appears that she did lymph wraps which she will have taken off on Friday.  He states that the hydrochlorothiazide did help get rid of the extra fluid, but he still has more to get rid of.  His ideal dry weight in the past was 245 pounds-255 pounds.    Vitals - Patient Reported  Weight (Patient Reported): 117.9 kg (260 lb)  Height (Patient Reported): 177.8 cm (5' 10\")  BMI (Based on Pt Reported Ht/Wt): 37.31  Wt down 12 lbs in 1 week    PHYSICAL EXAMINATION:  General:  no apparent distress, normal body habitus, sitting upright.  ENT/Mouth:  no nasal discharge.  Normal head " shape, no apparent injury or laceration.  Eyes:  normal conjunctivae.  No observed jaundice.  Neck:  no apparent neck swelling.   Chest/Lungs:  no breathing difficulty while speaking.  No audible wheezing.  No cough during conversation.  Cardiovascular:  reported HR is regular.  No obviously elevated jugular venous pressure.  No reported edema in LE.   Abdomen:  no apparent abdominal distention.   Extremities:  Wrapped up to knees  Skin:  no xanthelasma.  No facial lacerations.  Neurologic:  Normal arm motion bilateral, no tremors.    Psychiatric:  Alert and oriented x3, calm demeanor  The rest of the comprehensive physical examination is deferred due to public health emergency video visit restrictions.         DIAGNOSTIC STUDIES:  Results for ERIKA MATOS (MRN [id number]) as of 8/5/2020 12:51   Ref. Range 7/29/2020 08:37   Sodium Latest Ref Range: 136 - 145 mmol/L 138   Potassium Latest Ref Range: 3.5 - 5.1 mmol/L 3.6   Chloride Latest Ref Range: 98 - 107 mmol/L 107   Carbon Dioxide Latest Ref Range: 23 - 29 mmol/L 25   Urea Nitrogen Latest Ref Range: 7 - 30 mg/dL 11   Creatinine Latest Ref Range: 0.70 - 1.30 mg/dL 1.17   GFR Estimate Latest Ref Range: >60 mL/min/1.73_m2 64   GFR Estimate If Black Latest Ref Range: >60 mL/min/1.73_m2 77   Calcium Latest Ref Range: 8.5 - 10.5 mg/dL 8.2 (L)   Anion Gap Latest Ref Range: 6 - 17 mmol/L 9.6   Glucose Latest Ref Range: 70 - 105 mg/dL 161 (H)       IMPRESSION:  1. Severe aortic stenosis status post AVR tissue valve. Aortic valve not well visualized on echocardiogram.  2. Heart failure with preserved ejection fraction. Wt up 5 lbs with increased lower ext edema. No sob. Dietary indiscretions are an issue lately.    3. Sleep apnea with use of CPAP  4. Hepatic encephalopathy managed by Dr. Neely.  Early onset dementia.    RECOMMENDATIONS:  1. Resume HCTZ 25 mg daily for 4 days.  2. BMP on Friday at Dr. Neely's office to reassess creatinine and potassium  3. Virtual  visit with me in 1 week  4. Continue with lymphedema wraps as directed by Dr. Chin Mata sounded very flat and discouraged during her visit.  I reassured him that he is down 12 pounds and he needs to elevate his legs during the day.  He needs to also continue to watch his salt and fluid intake.  I understand that he does suffer from short-term memory deficits.  He resides with his sister and she is a great social support to him.  I did send 10 tablets of hydrochlorothiazide, if his BMP is stable then he may benefit from remaining on low-dose hydrochlorothiazide in addition to the eplerenone and Bumex.     As always thank you for including me in his care.    Anette Steward, NP, APRN CNP

## 2020-08-12 ENCOUNTER — TELEPHONE (OUTPATIENT)
Dept: CARDIOLOGY | Facility: CLINIC | Age: 60
End: 2020-08-12

## 2020-08-13 ENCOUNTER — TRANSFERRED RECORDS (OUTPATIENT)
Dept: HEALTH INFORMATION MANAGEMENT | Facility: CLINIC | Age: 60
End: 2020-08-13

## 2020-08-13 ENCOUNTER — VIRTUAL VISIT (OUTPATIENT)
Dept: CARDIOLOGY | Facility: CLINIC | Age: 60
End: 2020-08-13
Attending: NURSE PRACTITIONER
Payer: MEDICARE

## 2020-08-13 DIAGNOSIS — I50.30 HEART FAILURE WITH PRESERVED EJECTION FRACTION, NYHA CLASS I (H): ICD-10-CM

## 2020-08-13 DIAGNOSIS — R60.0 LOCALIZED EDEMA: ICD-10-CM

## 2020-08-13 DIAGNOSIS — I50.32 CHRONIC DIASTOLIC CONGESTIVE HEART FAILURE (H): Primary | ICD-10-CM

## 2020-08-13 PROCEDURE — 99214 OFFICE O/P EST MOD 30 MIN: CPT | Mod: 95 | Performed by: NURSE PRACTITIONER

## 2020-08-13 RX ORDER — HYDROCHLOROTHIAZIDE 25 MG/1
TABLET ORAL
Qty: 30 TABLET | Refills: 1 | Status: SHIPPED | OUTPATIENT
Start: 2020-08-13

## 2020-08-13 NOTE — LETTER
"8/13/2020    Delia Neely  [address] [address]    RE: Gil Chowdary       Dear Colleague,    I had the pleasure of seeing Gil Chowdary in the [facility].    Gil Chowdary is a 59 year old male who is being evaluated via a billable video visit.      KARYN NOTE:  This visit was completed via video due to COVID-19 precautions.  The patient provided consent for a video visit.      I had the pleasure evaluating Gil Chowdary for acute CHF.      The patient is a delightful 60 yo male with the following medical issues:  1. Severe aortic stenosis with aortic valve replacement in 2017 receiving a 25 mm trifecta tissue valve.  2. Type 2 diabetes mellitus on insulin  3. Hepatic encephalopathy   4. Heart failure with preserved ejection fraction  5. Iron deficiency anemia and ITP followed by Dr. Fletcher  6. History of SVT and paroxysmal atrial fibrillation postoperatively.  Recent ZIO patch showed no arrhythmia  7. History of myectomy at time of aortic valve replacement.  No clear hypertrophic cardiomyopathy diagnosis documented      It is my pleasure having a virtual visit with Adolfo today.  We opted for the video visit due to COVID-19 pandemic.  He is frustrated with his lymphedema.  He describes a\" rash\" on his lower legs.  It is difficult to assess via video but appears to be chronic venous stasis changes secondary from his lymphedema.  He denies chest pain, orthopnea, PND, syncope, or palpitations.  He is trying to limit his sodium intake, but admits he still struggles with sneaking high salty foods.  He is in the process of buying a 3 wheeled bike to try to increase his activity level.  I acknowledge and note the review of systems noted above.    PHYSICAL EXAMINATION:  116/60 mmHg  Weight 268 lbs  General:  no apparent distress, normal body habitus, sitting upright.  ENT/Mouth:  no nasal discharge.  Normal head shape, no apparent " injury or laceration.  Eyes:  normal conjunctivae.  No observed jaundice.  Neck:  no apparent neck swelling.   Chest/Lungs:  no breathing difficulty while speaking.  No audible wheezing.  No cough during conversation.  Cardiovascular:  reported HR is regular.  No obviously elevated jugular venous pressure.  No reported edema in LE.   Abdomen:  no apparent abdominal distention.   Extremities:  Lymphedema wraps on.   Skin: has rash on legs. Looks like venous stasis changes  Neurologic:  normal arm motion, no tremor.    Psychiatric:  alert and oriented x3, calm demeanor.  The rest of the comprehensive physical examination is deferred due to public health emergency video visit restrictions.         DIAGNOSTIC STUDIES:  Results for ERIKA MATOS (MRN [id number]) as of 8/5/2020 12:51    Ref. Range 7/29/2020 08:37   Sodium Latest Ref Range: 136 - 145 mmol/L 138   Potassium Latest Ref Range: 3.5 - 5.1 mmol/L 3.6   Chloride Latest Ref Range: 98 - 107 mmol/L 107   Carbon Dioxide Latest Ref Range: 23 - 29 mmol/L 25   Urea Nitrogen Latest Ref Range: 7 - 30 mg/dL 11   Creatinine Latest Ref Range: 0.70 - 1.30 mg/dL 1.17   GFR Estimate Latest Ref Range: >60 mL/min/1.73_m2 64   GFR Estimate If Black Latest Ref Range: >60 mL/min/1.73_m2 77   Calcium Latest Ref Range: 8.5 - 10.5 mg/dL 8.2 (L)   Anion Gap Latest Ref Range: 6 - 17 mmol/L 9.6   Glucose Latest Ref Range: 70 - 105 mg/dL 161 (H)        IMPRESSION:  1. Severe aortic stenosis status post AVR tissue valve. Aortic valve not well visualized on echocardiogram.  2. Heart failure with preserved ejection fraction. No sob.  Weight continues to fluctuate.  He is off hydrochlorothiazide.  I would like to get an updated BMP before reinitiation of the HCTZ.  Dietary indiscretions are an issue lately.    3. Sleep apnea with use of CPAP  4. Hepatic encephalopathy managed by Dr. Neely.  Early onset dementia.       RECOMMENDATIONS:  1. BMP tomorrow at PCP's office  2. If K+ and creat  stable then will restart hydrochlorothiazide 25 mg every day  3. See me in 3 weeks with a BMP  4. Watch diet and continue with lymph wraps    If his BMP is stable, potassium is greater than 4.0 then I will resume hydrochlorothiazide at 25 mg daily.  [facility]  If potassium is less than 4.0, but I would recommend increasing his potassium chloride.  He currently takes 30 mEq 3 times daily.  He also remains on eplerenone 50 mg twice daily.  He does take lactulose 15 mL's twice daily which he also attributes to his hypokalemia.      Thank you for allowing me to participate in the care of your patient.    Sincerely,     Anette Steward NP, APRN CNP     [facility]

## 2020-08-13 NOTE — PROGRESS NOTES
"Gil Chowdary is a 59 year old male who is being evaluated via a billable video visit.      The patient has been notified of following:     \"This video visit will be conducted via a call between you and your physician/provider. We have found that certain health care needs can be provided without the need for an in-person physical exam.  This service lets us provide the care you need with a video conversation.  If a prescription is necessary we can send it directly to your pharmacy.  If lab work is needed we can place an order for that and you can then stop by our lab to have the test done at a later time.    Video visits are billed at different rates depending on your insurance coverage.  Please reach out to your insurance provider with any questions.    If during the course of the call the physician/provider feels a video visit is not appropriate, you will not be charged for this service.\"    Patient has given verbal consent for Video visit? Yes  How would you like to obtain your AVS? MyChart  If you are dropped from the video visit, the video invite should be resent to: Text to cell phone: [phone]  Will anyone else be joining your video visit? No          Review Of Systems  Skin: rash on legs  Eyes:Ears/Nose/Throat: NEGATIVE  Respiratory: NEGATIVE  Cardiovascular: edema  Gastrointestinal: NEGATIVE  Genitourinary:NEGATIVE   Musculoskeletal: arthritis in hand, elbow, muscle cramping in legs   Neurologic: tremors  Psychiatric: stress, anxiety, depression  Hematologic/Lymphatic/Immunologic: NEGATIVE  Endocrine:  Diabetic    Stella Rajan LPN    Video-Visit Details    Type of service:  Video Visit    Video Start Time: 12:40 PM  Video End Time: 1:02 PM    Originating Location (pt. Location): Home    Distant Location (provider location):  [facility]     Platform used for Video Visit: [employer]      KARYN NOTE:  This visit was completed via video due to COVID-19 precautions.  The " "patient provided consent for a video visit.      I had the pleasure evaluating Gil Chowdary for acute CHF.      The patient is a delightful 58 yo male with the following medical issues:  1. Severe aortic stenosis with aortic valve replacement in 2017 receiving a 25 mm trifecta tissue valve.  2. Type 2 diabetes mellitus on insulin  3. Hepatic encephalopathy   4. Heart failure with preserved ejection fraction  5. Iron deficiency anemia and ITP followed by Dr. Fletcher  6. History of SVT and paroxysmal atrial fibrillation postoperatively.  Recent ZIO patch showed no arrhythmia  7. History of myectomy at time of aortic valve replacement.  No clear hypertrophic cardiomyopathy diagnosis documented      It is my pleasure having a virtual visit with Adolfo today.  We opted for the video visit due to COVID-19 pandemic.  He is frustrated with his lymphedema.  He describes a\" rash\" on his lower legs.  It is difficult to assess via video but appears to be chronic venous stasis changes secondary from his lymphedema.  He denies chest pain, orthopnea, PND, syncope, or palpitations.  He is trying to limit his sodium intake, but admits he still struggles with sneaking high salty foods.  He is in the process of buying a 3 wheeled bike to try to increase his activity level.  I acknowledge and note the review of systems noted above.    PHYSICAL EXAMINATION:  116/60 mmHg  Weight 268 lbs  General:  no apparent distress, normal body habitus, sitting upright.  ENT/Mouth:  no nasal discharge.  Normal head shape, no apparent injury or laceration.  Eyes:  normal conjunctivae.  No observed jaundice.  Neck:  no apparent neck swelling.   Chest/Lungs:  no breathing difficulty while speaking.  No audible wheezing.  No cough during conversation.  Cardiovascular:  reported HR is regular.  No obviously elevated jugular venous pressure.  No reported edema in LE.   Abdomen:  no apparent abdominal distention.   Extremities:  Lymphedema wraps on.   Skin: " has rash on legs. Looks like venous stasis changes  Neurologic:  normal arm motion, no tremor.    Psychiatric:  alert and oriented x3, calm demeanor.  The rest of the comprehensive physical examination is deferred due to public health emergency video visit restrictions.         DIAGNOSTIC STUDIES:  Results for ERIKA MATOS (MRN [id number]) as of 8/5/2020 12:51    Ref. Range 7/29/2020 08:37   Sodium Latest Ref Range: 136 - 145 mmol/L 138   Potassium Latest Ref Range: 3.5 - 5.1 mmol/L 3.6   Chloride Latest Ref Range: 98 - 107 mmol/L 107   Carbon Dioxide Latest Ref Range: 23 - 29 mmol/L 25   Urea Nitrogen Latest Ref Range: 7 - 30 mg/dL 11   Creatinine Latest Ref Range: 0.70 - 1.30 mg/dL 1.17   GFR Estimate Latest Ref Range: >60 mL/min/1.73_m2 64   GFR Estimate If Black Latest Ref Range: >60 mL/min/1.73_m2 77   Calcium Latest Ref Range: 8.5 - 10.5 mg/dL 8.2 (L)   Anion Gap Latest Ref Range: 6 - 17 mmol/L 9.6   Glucose Latest Ref Range: 70 - 105 mg/dL 161 (H)        IMPRESSION:  1. Severe aortic stenosis status post AVR tissue valve. Aortic valve not well visualized on echocardiogram.  2. Heart failure with preserved ejection fraction. No sob.  Weight continues to fluctuate.  He is off hydrochlorothiazide.  I would like to get an updated BMP before reinitiation of the HCTZ.  Dietary indiscretions are an issue lately.    3. Sleep apnea with use of CPAP  4. Hepatic encephalopathy managed by Dr. Neely.  Early onset dementia.       RECOMMENDATIONS:  1. BMP tomorrow at PCP's office  2. If K+ and creat stable then will restart hydrochlorothiazide 25 mg every day  3. See me in 3 weeks with a BMP  4. Watch diet and continue with lymph wraps    If his BMP is stable, potassium is greater than 4.0 then I will resume hydrochlorothiazide at 25 mg daily.  I did send a prescription to his [facility] facility.  If potassium is less than 4.0, but I would recommend increasing his potassium chloride.  He currently takes 30 mEq 3 times  daily.  He also remains on eplerenone 50 mg twice daily.  He does take lactulose 15 mL's twice daily which he also attributes to his hypokalemia.    Anette Steward NP, APRN CNP

## 2020-08-13 NOTE — PATIENT INSTRUCTIONS
Call my nurse with any questions or concerns:  [phone]  *If you have concerns after hours, please call [phone], option 2 to speak with on call Cardiologist.    1. Medication changes from today:    Resume hydrochlorothiazide 25 mg daily ONCE I am able to review your lab work.  Please check your BMP at

## 2020-08-17 ENCOUNTER — CARE COORDINATION (OUTPATIENT)
Dept: CARDIOLOGY | Facility: CLINIC | Age: 60
End: 2020-08-17

## 2020-08-17 NOTE — PROGRESS NOTES
[facility]    Asked by HIMS to review [facility] records re: lymphedema therapy. Will need KARYN signature approval to proceed with MLD--reminder sent to RN board to complete this task on 8/19.    When reviewing above, I noted that pt had visit with Anette Steward CNP 8/13 to follow-up on recent diuretic plan changes and fluid status. Wt was 268# that day.     Anette MARTINS advised pt cont w/ plan to have labs repeated at PCP office and further adjustments re: potassium supp and hydrochlorothiazide pending results.     8/14/2020 BMP per Care Everywhere, below. Renal fxn stable, potassium level lower at 3.3. Pt has IM appt tomorrow and nursing notes in Care Everywhere state he has 2-day w/u planned at [facility] later this wk. Will watch for tomorrow's IM note to assure potassium level addressed.    Karen Stark RN BSN   4:52 PM 08/17/20      IM note started, but not yet completed in Care Everywhere. Will check again 8/19 for plan.    Karen Stark RN BSN   3:50 PM 08/18/20

## 2020-08-19 NOTE — PROGRESS NOTES
[facility]        A reminder was sent to CORE RN board to perform chart check and follow-up with pt re: [place] visit and his wt/sx next wk.    Karen Stark RN BSN   3:52 PM 08/19/20

## 2020-08-19 NOTE — PROGRESS NOTES
"[facility]    Pt had visit with Anette Steward CNP 8/13 to follow-up on recent diuretic plan changes and fluid status. Wt was 268# that day.      Anette NP advised pt cont w/ plan to have labs repeated at PCP office and further adjustments re: potassium supp and hydrochlorothiazide pending results.     Pt had IM visit yesterday (wt 265#) to assess LE edema and a laceration. I cannot see that potassium level was addressed, nor any med changes. He was encouraged to use compression/elevation and decrease salt.     Pt told me today \"breathing's good\", swelling has gone down, wt is 262# (down from 277#) at the beginning of this month. He has 3 days of diagnostics and provider visits at [place] end of this wk/early next wk. \"I just wanted to get an overall big picture of how to take care of my heart.\" He was a bit tangential and tearful today.     I advised him to increase his potassium rich food intake and reviewed sources. Reminder to CORE RN board to follow-up with pt next wk. He has visit with Anette and LALI 9/3--FYI to her.     Current diuretic plan: Pt confirmed below meds are accurate on his med list, his sister helps with med set up;   Bumex 3mg BID   Epelerenone 50mg BID   hydrochlorothiazide PRN   Potassium 30meq TID   *Note pt is also on lactulose 10GM BID            Karen Stark RN BSN   11:31 AM 08/19/20        "

## 2020-08-20 ENCOUNTER — TRANSFERRED RECORDS (OUTPATIENT)
Dept: HEALTH INFORMATION MANAGEMENT | Facility: CLINIC | Age: 60
End: 2020-08-20

## 2020-08-24 ENCOUNTER — TRANSFERRED RECORDS (OUTPATIENT)
Dept: HEALTH INFORMATION MANAGEMENT | Facility: CLINIC | Age: 60
End: 2020-08-24

## 2020-08-26 NOTE — PROGRESS NOTES
[facility]  Checked Care Everywhere for [facility], no updated records for [place] noted. Called pt, no answer. LVM asking for call back with update and how things went at [place].     Future Appointments   Date Time Provider Department Center   9/3/2020  1:10 PM [facility]   9/3/2020  2:30 PM Anette Steward, APRN CNP [facility] CL[facility]        PANKAJ LanN, RN, CHFN  08/26/20 at 10:38 AM

## 2020-08-26 NOTE — PROGRESS NOTES
Signed orders received from LAKSHMI Blair for MDL. Faxed to [facility].     DESMOND Lan, RN, CHFN  08/26/20 at 8:52 AM

## 2020-08-28 ENCOUNTER — CARE COORDINATION (OUTPATIENT)
Dept: CARDIOLOGY | Facility: CLINIC | Age: 60
End: 2020-08-28

## 2020-08-28 NOTE — PROGRESS NOTES
"[facility]    Called patient to assess outcome of [facility] visit and get general update. Checked Care Everywhere for [facility] records - nothing yet. Patient stated that he asked [facility] to forward records    Patient unable to tell me any specific outcome/recommendations from [facility] visit. He stated, \"my hearts running good..... they talked to me about the liver and kidneys..... they told me I can't eat that stuff anymore, the stuff that will hurt my liver, kidneys.\"  He was able to state that he is to eliminate fried foods, sugar, salt. He rambled generally about eating better, riding his bike, using [employer]. He stated they ciara blood but he was not able to tell me any results.     I asked about any medication changes. He \"thought they added a pill....\".  He was unable to tell me what meds he takes and his sister was not available to help.     Recent weights:   8/28 253   8/27 253   8/26 254   8/25 256   8/17 262   8/13 268  He stated that his legs look better. He stated, \"they are easier to bend.\"  He added that his breathing is \"fine.\"      Future Appointments   Date Time Provider Department Center   9/3/2020  1:10 PM [facility]   9/3/2020  2:30 PM Anette Steward, APRN CNP [facility] PSA CLIN     Assessment/Plan:  Will continue to watch for [facility] records. I confirmed patients upcoming appts as above. Will continue to closely monitor and request HIM to help obtain [facility] records. Will also forward to Anette Steward as fyi. Jaqueline Miller RN on 8/28/2020 at 4:18 PM    "

## 2020-08-28 NOTE — TELEPHONE ENCOUNTER
Thank you for the update! This is how our last few conversations have gone too. He forgets easily. I am glad to see his weights are trending down. His diet he has been struggling with for awhile now.

## 2020-09-02 ENCOUNTER — TELEPHONE (OUTPATIENT)
Dept: CARDIOLOGY | Facility: CLINIC | Age: 60
End: 2020-09-02

## 2020-09-02 NOTE — TELEPHONE ENCOUNTER
LVM for pt. Re. Wellness Screening prior to appt's 09/03/20 at our [facility] location.    Rosi, DECLAN  09/02/20

## 2020-09-02 NOTE — TELEPHONE ENCOUNTER
"Wellness Screening Tool    Symptom Screening:    Do you have one of the following NEW symptoms:      Fever (subjective or >100.0)?  No    New cough? No    Shortness of breath? No    Chills? No  New loss of taste or smell? No      BMI:   Estimated body mass index is 40.54 kg/m  as calculated from the following:    Height as of 1/15/20: 1.753 m (5' 9\").    Weight as of 7/29/20: 124.5 kg (274 lb 8 oz).                   Generalized body aches? No    New persistent headache? No    New sore throat? No    Nausea, vomiting or diarrhea? No    Within the past 2 weeks, have you been exposed to someone with a known positive illness below?      COVID - 19 (known or suspected) No    Chicken pox?  No    Measles? No    Pertussis? No    Have you had a positive COVID-19 diagnostic test (nasal swab test) in the last 14 days or are you currently   on self-quarantine restrictions (i.e.travel restriction, exposure, etc?) No        Patient notified of visitor restriction: Yes  Patient informed to wear a mask: Yes    Patient's appointment status: Patient will be seen in clinic as scheduled on 9/3/20 at 1:10    "

## 2020-10-13 ENCOUNTER — TRANSFERRED RECORDS (OUTPATIENT)
Dept: HEALTH INFORMATION MANAGEMENT | Facility: CLINIC | Age: 60
End: 2020-10-13

## 2020-10-16 ENCOUNTER — DOCUMENTATION ONLY (OUTPATIENT)
Dept: CARDIOLOGY | Facility: CLINIC | Age: 60
End: 2020-10-16

## 2020-10-16 NOTE — PROGRESS NOTES
Lymphedema therapy order for [facility] [place] was signed by Rhonda SOTO and faxed to [phone].    Order was sent to scanning.

## 2021-01-15 ENCOUNTER — HEALTH MAINTENANCE LETTER (OUTPATIENT)
Age: 61
End: 2021-01-15

## 2021-09-05 ENCOUNTER — HEALTH MAINTENANCE LETTER (OUTPATIENT)
Age: 61
End: 2021-09-05

## 2021-10-03 NOTE — PROGRESS NOTES
Update appreciated.   Labs look better than ever.  No changes.  Rachna Holt PA-C 11/13/2019 1:16 PM         Shaking at home and found to have a fever in the ED.

## 2022-02-20 ENCOUNTER — HEALTH MAINTENANCE LETTER (OUTPATIENT)
Age: 62
End: 2022-02-20

## 2022-04-17 ENCOUNTER — HEALTH MAINTENANCE LETTER (OUTPATIENT)
Age: 62
End: 2022-04-17

## 2022-10-23 ENCOUNTER — HEALTH MAINTENANCE LETTER (OUTPATIENT)
Age: 62
End: 2022-10-23

## 2023-04-02 ENCOUNTER — HEALTH MAINTENANCE LETTER (OUTPATIENT)
Age: 63
End: 2023-04-02

## 2023-06-01 ENCOUNTER — HEALTH MAINTENANCE LETTER (OUTPATIENT)
Age: 63
End: 2023-06-01

## 2024-01-14 ENCOUNTER — HEALTH MAINTENANCE LETTER (OUTPATIENT)
Age: 64
End: 2024-01-14

## 2025-05-02 NOTE — PROGRESS NOTES
CARDIOLOGY NEW CORE CLINIC CONSULTATION    REASON FOR CONSULT: Diastolic heart failure    PRIMARY CARE PHYSICIAN:  Sam Villatoro    HISTORY OF PRESENT ILLNESS:    I had the pleasure of seeing Mr. Chowdary in cardiology outpatient heart failure clinic.  He has been followed by Dr. Salinas in the past and will now resume his care with me.  He is a pleasant 58-year-old gentleman with past medical history of severe aortic stenosis with aortic valve replacement in 2017 with right 25 mm tissue valve, type 2 diabetes on insulin, hyperlipidemia, sleep apnea on CPAP, heart failure with preserved ejection fraction, recent iron deficiency anemia for which she is being followed by Dr. Fletcher, history of SVT and paroxysmal A. fib with no recurrence on recent patch.     The patient was admitted with exacerbation of diastolic heart failure in October 2018.   The patient cares for his elderly mother and did not want to stay in the hospital for a long period of time and had to be discharged before becoming euvolemic.  Since then he has been followed on an outpatient basis to manage CHF symptoms/Volume overload.  This is necessitated weekly clinic visits with titration of medication and several sessions of IV Bumex with good response.    He was recently seen in the clinic on 1/2/2019 and during that visit the Metoprolol was increased to 50 mg daily and potassium dose was decreased.  He was scheduled to undergo IV Bumex to keep him euvolemic.  The patient underwent these procedures yesterday with a good clinical response.  He reports that he diuresed well and urinated over 5 L. However, interestingly his weight remains unchanged from his last visit on 1/2/2018.  However he does feelbetter and his edema is also not as severe as before.  He however does report cramping in his hands and legs occasionally.  He tries to restrict his salt in diet and water intake.  He plans to exercise and eat healthy.  He has been taking his medications as  prescribed      PAST MEDICAL HISTORY:  Past Medical History:   Diagnosis Date     Former tobacco use      Glaucoma      Hyperlipidemia LDL goal <160 12/6/2011     Obesity      DRAKE on CPAP      Right bundle branch block      Severe aortic stenosis     On Echo 10/28/16       MEDICATIONS:  Current Outpatient Medications   Medication     aspirin 81 MG tablet     atorvastatin (LIPITOR) 40 MG tablet     bumetanide (BUMEX) 1 MG tablet     clindamycin (CLEOCIN) 300 MG capsule     eplerenone (INSPRA) 50 MG tablet     ferrous sulfate (IRON) 325 (65 Fe) MG tablet     insulin aspart (NOVOLOG FLEXPEN) 100 UNIT/ML injection     insulin aspart (NOVOLOG FLEXPEN) 100 UNIT/ML injection     Insulin Glargine (BASAGLAR KWIKPEN SC)     magnesium oxide (MAG-OX) 400 MG tablet     metoprolol (LOPRESSOR) 50 MG tablet     Multiple Vitamins-Minerals (CENTRUM ADULTS PO)     pantoprazole (PROTONIX) 40 MG EC tablet     prednisoLONE acetate (PRED FORTE) 1 % ophthalmic susp     timolol (TIMOPTIC) 0.5 % ophthalmic solution     No current facility-administered medications for this visit.        ALLERGIES:  Allergies   Allergen Reactions     Amoxicillin      Itchy      Penicillins Hives     Spironolactone Rash       SOCIAL HISTORY:  I have reviewed this patient's social history and updated it with pertinent information if needed. Gil Chowdary  reports that he quit smoking about 7 years ago. His smoking use included cigarettes and cigars. He started smoking about 40 years ago. He has a 20.00 pack-year smoking history. he has never used smokeless tobacco. He reports that he does not drink alcohol or use drugs.    FAMILY HISTORY:  I have reviewed this patient's family history and updated it with pertinent information if needed.   Family History   Problem Relation Age of Onset     Family History Negative Mother      Diabetes Father      Heart Surgery Father         CABG     Coronary Artery Disease Father      Hypertension Brother      Diabetes  Brother      Heart Disease Brother      Heart Surgery Brother         CABG x 3     Family History Negative Sister      Diabetes Brother         History of diabetes, but no longer an issue     Family History Negative Brother        REVIEW OF SYSTEMS:  Skin:  Negative     Eyes:  Positive for glasses;glaucoma  ENT:  Negative    Respiratory:  Positive for sleep apnea;CPAP  Cardiovascular:  Negative for;palpitations;chest pain;exercise intolerance;fatigue;lightheadedness;dizziness edema  Gastroenterology: Positive for heartburn  Genitourinary:  Negative    Musculoskeletal:  Positive for arthritis;joint pain  Neurologic:  Positive for memory problems  Psychiatric:  Positive for excessive stress;anxiety;depression  Heme/Lymph/Imm:  Negative allergies  Endocrine:  Positive for diabetes      PHYSICAL EXAM:                     Vital Signs with Ranges     297 lbs 8 oz    Constitutional: awake, alert, no distress  Head: normocephalic,atraumatic   Eyes: PERRL, sclera nonicteric  ENT: trachea midline, neck supple  Respiratory:  Normal bilateral breath sounds in all respiratory fields.  No wheezing or crackles.  Cardiac:  S1-S2 were heard, there is 1 out of 6 systolic ejection murmur at the right upper sternal border which does not any changes with respiration.  2+ pulses all extremities                                       GI: nondistended, nontender, bowel sounds present  Skin: dry, no rash  Musculoskeletal: good muscle tone, strength 5/5 in upper and lower extremities, no clubbing  1+ bilateral pitting edema  Neurologic: no focal deficits  Neuropsychiatric: appropriate affact    DATA:  Labs: Pertinent cardiac labs reviewed  Admission on 01/09/2019, Discharged on 01/09/2019   Component Date Value Ref Range Status     Urea Nitrogen 01/09/2019 20  7 - 30 mg/dL Final     Creatinine 01/09/2019 1.10  0.66 - 1.25 mg/dL Final     GFR Estimate 01/09/2019 73  >60 mL/min/[1.73_m2] Final    Comment: Non  GFR Calc  Starting  12/18/2018, serum creatinine based estimated GFR (eGFR) will be   calculated using the Chronic Kidney Disease Epidemiology Collaboration   (CKD-EPI) equation.       GFR Estimate If Black 01/09/2019 85  >60 mL/min/clinical next week she is that she try to do is treat impression of the great that was thank you so much thank you Final    Comment:  GFR Calc  Starting 12/18/2018, serum creatinine based estimated GFR (eGFR) will be   calculated using the Chronic Kidney Disease Epidemiology Collaboration   (CKD-EPI) equation.       Potassium 01/09/2019 3.9  3.4 - 5.3 mmol/L Final     Sodium 01/09/2019 140  133 - 144 mmol/L Final     Magnesium 01/09/2019 2.6* 1.6 - 2.3 mg/dL Final     Potassium 01/09/2019 3.7  3.4 - 5.3 mmol/L Final       ASSESSMENT:  This is a 58-year-old gentleman with past medical history as stated above who is being followed in our heart failure clinic for management of diastolic heart failure which has been difficult to manage due to inability to tolerate diuretics.  We have made some changes to his diuretics and he currently seems to be moving the right direction.     CHF with volume overload  - I will continue the current medications at the current doses.  We will not make any changes to his diuretic regimen today since it seems he derived signifncant benefit from the IV infusion of Bumex.   - I will have him come back and see Yvrose Schmitt in clinic in a week and he will follow-up with me in a month.  We will consider increasing/changing his diuretics regimen if there are signs of worsening CHF.  - Will continue current dose of potassium. Will add magnesium for his muscle cramps although it is less likely to benefit him since his Mg level is normal. We will disconti Mg during his visit with Yvrose Schmitt if there is no benefit.    - Discussed the need for weight checks and sodium restricted diet.  Bring his weight diary to his next clinic appointment.    Aortic valve replacement  - The  aortic valve seems to be functioning well based on findings of physical examination.  His last echocardiogram, a few months ago was consistent with these findings.  We will continue to monitor for now.    Anemia and thrombocytopenia  -He is being followed by Dr. Fletcher and is currently on iron supplementation.  -He is currently on aspirin only and his Coumadin has been stopped.    Jeremie Mckeon MD     Detail Level: Simple Render Risk Assessment In Note?: no Additional Notes: Psoriasis PGA: 4.0 - Severe\\nTotal Body Surface Area (%): 10.0\\nItch Numeric Rating Scale (NRS): 4.0\\n\\nPatient has involvement of sensitive locations including: ears and tip of penis. \\n\\nPatient has used multiple steroids in the past: betamethasone augmented 0.05 % Topical cream and triamcinolone acetonide 0.025 % Topical cream

## (undated) DEVICE — DECANTER BAG 2002S

## (undated) DEVICE — ESU HOLSTER PLASTIC DISP E2400

## (undated) DEVICE — SU PROLENE 4-0 RB-1DA 36" 8557H

## (undated) DEVICE — SYR BIOGLUE PREFILLED 5ML BG3515-5-US

## (undated) DEVICE — BLADE CLIPPER SGL USE 9680

## (undated) DEVICE — BLADE SAW STERNAL 20X30MM KM-32

## (undated) DEVICE — SU PROLENE 5-0 RB-2DA 30" 8710H

## (undated) DEVICE — LEAD ELEC MYOCARDIO PACING TEMPORARY MEDTRONIC

## (undated) DEVICE — ESU ELEC BLADE 6" COATED E1450-6

## (undated) DEVICE — GLOVE LINER HALF FINGER NYLON KP5015/50

## (undated) DEVICE — SU PROLENE 4-0 SHDA 36" 8521H

## (undated) DEVICE — SOL NACL 0.9% IRRIG 3000ML BAG 2B7477

## (undated) DEVICE — SU PLEDGET SOFT TFE 13MMX7MMX1.5MM D7044

## (undated) DEVICE — TUBING INSUFFLATION W/FILTER CPC TO LUER 620-030-301

## (undated) DEVICE — SUCTION MANIFOLD DORNOCH ULTRA CART UL-CL500

## (undated) DEVICE — MANIFOLD KIT ANGIO AUTOMATED 014613

## (undated) DEVICE — LINEN GOWN XLG 5407

## (undated) DEVICE — SU STEEL MYO/WIRE II STERNOTOMY 8 BE-1 3X14" 048-217

## (undated) DEVICE — PROTECTOR ARM ONE-STEP TRENDELENBURG 40418

## (undated) DEVICE — BONE WAX 2.5GM W31G

## (undated) DEVICE — TOURNIQUET VASCULAR KIT 7 1/2" 79012

## (undated) DEVICE — DRAPE WARMER 66X44" ORS-300

## (undated) DEVICE — SUCTION CATH AIRLIFE TRI-FLO W/CONTROL PORT 14FR  T60C

## (undated) DEVICE — SU ETHIBOND 2-0 V-5 DA 10X30" 10X52

## (undated) DEVICE — TOTE ANGIO CORP PC15AT SAN32CC83O

## (undated) DEVICE — PREP CHLORAPREP 26ML TINTED ORANGE  260815

## (undated) DEVICE — SU VICRYL 3-0 FS-1 27" J442H

## (undated) DEVICE — MANIFOLD IV 1 VALVE PRESSURE OFF HANDLE H965700581071

## (undated) DEVICE — DRSG TELFA 3"X8" NON25720

## (undated) DEVICE — NDL PERC ENTRY THINWALL 18GA 7.0" G00166

## (undated) DEVICE — DEFIB PRO-PADZ LVP LQD GEL ADULT 8900-2105-01

## (undated) DEVICE — SU PROLENE 6-0 C-1DA 30" 8706H

## (undated) DEVICE — SU VICRYL 0 CTX 36" J370H

## (undated) DEVICE — SU ETHIBOND 2-0 SHDA 30" X563H

## (undated) DEVICE — SU PROLENE 3-0 SHDA 36" 8522H

## (undated) DEVICE — DRAPE IOBAN INCISE 23X17" 6650EZ

## (undated) DEVICE — SU DEVICE COR-KNOT MINI 4X14MM 031350

## (undated) DEVICE — GLOVE NEOLON 2G NEOPRENE PF 7.5 LATEX FREE MSG6075

## (undated) DEVICE — Device

## (undated) DEVICE — SOL NACL 0.9% 10ML VIAL 0409-4888-02

## (undated) DEVICE — INTRO SHEATH 4FRX10CM PINNACLE RSS402

## (undated) DEVICE — DRAIN CHEST TUBE 28FR STR 8028

## (undated) DEVICE — SOL NACL 0.9% IRRIG 1000ML BOTTLE 2F7124

## (undated) DEVICE — KIT HAND CONTROL ANGIOTOUCH ACIST 65CM AT-P65

## (undated) DEVICE — PACK ADULT HEART UMMC PV15CG92D

## (undated) DEVICE — WIPES FOLEY CARE SURESTEP PROVON DFC100

## (undated) DEVICE — SU DERMABOND ADVANCED .7ML DNX12

## (undated) DEVICE — LINEN TOWEL PACK X6 WHITE 5487

## (undated) DEVICE — NDL COUNTER 20CT 31142493

## (undated) DEVICE — SU ETHIBOND 0 CT-1 CR 8X18" CX21D

## (undated) DEVICE — SU ETHIBOND 0 TIE 6X30" X306H

## (undated) DEVICE — SU STEEL 6 CCS 4X18" M654G

## (undated) DEVICE — SU ETHIBOND 3-0 BBDA 36" X588H

## (undated) DEVICE — SUCTION DRY CHEST DRAIN OASIS 3600-100

## (undated) DEVICE — INTRO SHEATH 7FRX10CM PINNACLE RSS702

## (undated) DEVICE — SU DEVICE ENDO COR KNOT QUICK LOAD 030850

## (undated) DEVICE — LINEN TOWEL PACK X30 5481

## (undated) DEVICE — SU DEVICE ENDO COR KNOT QUICK LOAD RELOAD 030874

## (undated) DEVICE — CATH DIAG 4FR ANG PIG 538453S

## (undated) DEVICE — TIES BANDING T50R

## (undated) RX ORDER — HEPARIN SODIUM 1000 [USP'U]/ML
INJECTION, SOLUTION INTRAVENOUS; SUBCUTANEOUS
Status: DISPENSED
Start: 2017-05-16

## (undated) RX ORDER — HEPARIN SODIUM 1000 [USP'U]/ML
INJECTION, SOLUTION INTRAVENOUS; SUBCUTANEOUS
Status: DISPENSED
Start: 2017-04-07

## (undated) RX ORDER — HEPARIN SODIUM 1000 [USP'U]/ML
INJECTION, SOLUTION INTRAVENOUS; SUBCUTANEOUS
Status: DISPENSED
Start: 2019-03-19

## (undated) RX ORDER — FENTANYL CITRATE 50 UG/ML
INJECTION, SOLUTION INTRAMUSCULAR; INTRAVENOUS
Status: DISPENSED
Start: 2019-03-19

## (undated) RX ORDER — BUMETANIDE 0.25 MG/ML
INJECTION INTRAMUSCULAR; INTRAVENOUS
Status: DISPENSED
Start: 2019-01-23

## (undated) RX ORDER — CLINDAMYCIN PHOSPHATE 900 MG/50ML
INJECTION, SOLUTION INTRAVENOUS
Status: DISPENSED
Start: 2017-05-16

## (undated) RX ORDER — CEFAZOLIN SODIUM 1 G/3ML
INJECTION, POWDER, FOR SOLUTION INTRAMUSCULAR; INTRAVENOUS
Status: DISPENSED
Start: 2017-05-16

## (undated) RX ORDER — LIDOCAINE HYDROCHLORIDE 10 MG/ML
INJECTION, SOLUTION EPIDURAL; INFILTRATION; INTRACAUDAL; PERINEURAL
Status: DISPENSED
Start: 2019-03-19

## (undated) RX ORDER — VERAPAMIL HYDROCHLORIDE 2.5 MG/ML
INJECTION, SOLUTION INTRAVENOUS
Status: DISPENSED
Start: 2017-04-07

## (undated) RX ORDER — BUMETANIDE 0.25 MG/ML
INJECTION INTRAMUSCULAR; INTRAVENOUS
Status: DISPENSED
Start: 2019-01-09

## (undated) RX ORDER — NITROGLYCERIN 5 MG/ML
VIAL (ML) INTRAVENOUS
Status: DISPENSED
Start: 2017-04-07

## (undated) RX ORDER — FENTANYL CITRATE 50 UG/ML
INJECTION, SOLUTION INTRAMUSCULAR; INTRAVENOUS
Status: DISPENSED
Start: 2017-04-07